# Patient Record
Sex: MALE | Race: WHITE | NOT HISPANIC OR LATINO | Employment: OTHER | URBAN - METROPOLITAN AREA
[De-identification: names, ages, dates, MRNs, and addresses within clinical notes are randomized per-mention and may not be internally consistent; named-entity substitution may affect disease eponyms.]

---

## 2017-07-07 ENCOUNTER — OFFICE VISIT (OUTPATIENT)
Dept: FAMILY MEDICINE CLINIC | Facility: CLINIC | Age: 67
End: 2017-07-07

## 2017-07-07 ENCOUNTER — HOSPITAL ENCOUNTER (OUTPATIENT)
Dept: GENERAL RADIOLOGY | Facility: HOSPITAL | Age: 67
Discharge: HOME | End: 2017-07-07
Admitting: INTERNAL MEDICINE

## 2017-07-07 VITALS
HEART RATE: 55 BPM | WEIGHT: 192.3 LBS | BODY MASS INDEX: 25.49 KG/M2 | TEMPERATURE: 98.2 F | SYSTOLIC BLOOD PRESSURE: 126 MMHG | RESPIRATION RATE: 16 BRPM | DIASTOLIC BLOOD PRESSURE: 78 MMHG | OXYGEN SATURATION: 95 % | HEIGHT: 73 IN

## 2017-07-07 DIAGNOSIS — J42 CHRONIC BRONCHITIS, UNSPECIFIED CHRONIC BRONCHITIS TYPE (HCC): Primary | ICD-10-CM

## 2017-07-07 PROCEDURE — 71020 HC CHEST PA AND LATERAL: CPT

## 2017-07-07 PROCEDURE — 99213 OFFICE O/P EST LOW 20 MIN: CPT | Performed by: INTERNAL MEDICINE

## 2017-07-07 RX ORDER — METHYLPREDNISOLONE 4 MG/1
TABLET ORAL
Qty: 21 TABLET | Refills: 0 | Status: SHIPPED | OUTPATIENT
Start: 2017-07-07 | End: 2017-08-23

## 2017-07-07 RX ORDER — PREDNISONE 10 MG/1
TABLET ORAL
COMMUNITY
Start: 2017-07-03 | End: 2017-08-23

## 2017-07-07 RX ORDER — CEFUROXIME AXETIL 500 MG/1
TABLET ORAL
COMMUNITY
Start: 2017-07-03 | End: 2017-08-23

## 2017-07-07 RX ORDER — AZITHROMYCIN 250 MG/1
TABLET, FILM COATED ORAL
Qty: 6 TABLET | Refills: 0 | Status: SHIPPED | OUTPATIENT
Start: 2017-07-07 | End: 2017-08-23

## 2017-07-07 NOTE — PROGRESS NOTES
"Subjective   Patient ID: Ronnell Rizvi is a 67 y.o. male presents with   Chief Complaint   Patient presents with   • Follow-up     on COPD       HPI -  This patient has a history of COPD.  He says he's been sick off and on for the last 2 months.  He went to a urgent care center.  Earlier this week and they gave him Ceftin and prednisone he's feeling better now.    Assessment plan    Finish Ceftin and prednisone start a nonsedating antihistamine and I'll send him in a Z-Manny and a Medrol pack to have on hand in case he gets sick again we will also get a chest x-ray.    Allergies   Allergen Reactions   • Aspirin    • Combivent [Ipratropium-Albuterol]        The following portions of the patient's history were reviewed and updated as appropriate: allergies, current medications, past family history, past medical history, past social history, past surgical history and problem list.      Review of Systems   Constitutional: Positive for fatigue. Negative for fever.   HENT: Negative.    Eyes: Negative.    Respiratory: Positive for cough. Negative for shortness of breath.    Cardiovascular: Negative.        Objective     Vitals:    07/07/17 0746   BP: 126/78   Pulse: 55   Resp: 16   Temp: 98.2 °F (36.8 °C)   TempSrc: Oral   SpO2: 95%   Weight: 192 lb 4.8 oz (87.2 kg)   Height: 73\" (185.4 cm)         Physical Exam   Constitutional: He is oriented to person, place, and time. He appears well-developed and well-nourished.   HENT:   Head: Normocephalic and atraumatic.   Eyes: EOM are normal. Pupils are equal, round, and reactive to light.   Cardiovascular: Normal rate, regular rhythm and normal heart sounds.    Pulmonary/Chest: Effort normal and breath sounds normal.   Neurological: He is alert and oriented to person, place, and time.   Psychiatric: He has a normal mood and affect. His behavior is normal.   Nursing note and vitals reviewed.        Ronnell was seen today for follow-up.    Diagnoses and all orders for this " visit:    Chronic bronchitis, unspecified chronic bronchitis type  -     XR Chest PA & Lateral    Other orders  -     azithromycin (ZITHROMAX Z-MIKAEL) 250 MG tablet; Take 2 tablets the first day, then 1 tablet daily for 4 days.  -     MethylPREDNISolone (MEDROL, MIKAEL,) 4 MG tablet; Take as directed on package instructions.        Call or return to clinic prn if these symptoms worsen or fail to improve as anticipated.

## 2017-08-11 ENCOUNTER — TELEPHONE (OUTPATIENT)
Dept: FAMILY MEDICINE CLINIC | Facility: CLINIC | Age: 67
End: 2017-08-11

## 2017-08-12 ENCOUNTER — APPOINTMENT (OUTPATIENT)
Dept: GENERAL RADIOLOGY | Facility: HOSPITAL | Age: 67
End: 2017-08-12

## 2017-08-12 ENCOUNTER — APPOINTMENT (OUTPATIENT)
Dept: CT IMAGING | Facility: HOSPITAL | Age: 67
End: 2017-08-12

## 2017-08-12 ENCOUNTER — HOSPITAL ENCOUNTER (EMERGENCY)
Facility: HOSPITAL | Age: 67
Discharge: HOME | End: 2017-08-12
Attending: EMERGENCY MEDICINE | Admitting: EMERGENCY MEDICINE

## 2017-08-12 VITALS
TEMPERATURE: 98.2 F | RESPIRATION RATE: 16 BRPM | WEIGHT: 185 LBS | DIASTOLIC BLOOD PRESSURE: 79 MMHG | BODY MASS INDEX: 24.52 KG/M2 | SYSTOLIC BLOOD PRESSURE: 150 MMHG | HEIGHT: 73 IN | HEART RATE: 58 BPM | OXYGEN SATURATION: 96 %

## 2017-08-12 DIAGNOSIS — R41.0 CONFUSION: Primary | ICD-10-CM

## 2017-08-12 LAB
ALBUMIN SERPL-MCNC: 3.6 G/DL (ref 3.5–5.2)
ALBUMIN/GLOB SERPL: 1.6 G/DL
ALP SERPL-CCNC: 48 U/L (ref 40–129)
ALT SERPL W P-5'-P-CCNC: 22 U/L (ref 5–41)
AMPHET+METHAMPHET UR QL: NEGATIVE
AMPHETAMINES UR QL: NEGATIVE
ANION GAP SERPL CALCULATED.3IONS-SCNC: 11.2 MMOL/L
AST SERPL-CCNC: 31 U/L (ref 5–40)
BARBITURATES UR QL SCN: NEGATIVE
BASOPHILS # BLD AUTO: 0.04 10*3/MM3 (ref 0–0.2)
BASOPHILS NFR BLD AUTO: 0.5 % (ref 0–2)
BENZODIAZ UR QL SCN: NEGATIVE
BILIRUB SERPL-MCNC: 0.4 MG/DL (ref 0.2–1.2)
BILIRUB UR QL STRIP: NEGATIVE
BUN BLD-MCNC: 20 MG/DL (ref 8–23)
BUN/CREAT SERPL: 18.3 (ref 7–25)
BUPRENORPHINE SERPL-MCNC: NEGATIVE NG/ML
CALCIUM SPEC-SCNC: 8.7 MG/DL (ref 8.8–10.5)
CANNABINOIDS SERPL QL: NEGATIVE
CHLORIDE SERPL-SCNC: 100 MMOL/L (ref 98–107)
CLARITY UR: CLEAR
CO2 SERPL-SCNC: 26.8 MMOL/L (ref 22–29)
COCAINE UR QL: NEGATIVE
COLOR UR: YELLOW
CREAT BLD-MCNC: 1.09 MG/DL (ref 0.76–1.27)
DEPRECATED RDW RBC AUTO: 41.1 FL (ref 37–54)
EOSINOPHIL # BLD AUTO: 0.02 10*3/MM3 (ref 0.1–0.3)
EOSINOPHIL NFR BLD AUTO: 0.3 % (ref 0–4)
ERYTHROCYTE [DISTWIDTH] IN BLOOD BY AUTOMATED COUNT: 12.5 % (ref 11.5–14.5)
GFR SERPL CREATININE-BSD FRML MDRD: 67 ML/MIN/1.73
GLOBULIN UR ELPH-MCNC: 2.3 GM/DL
GLUCOSE BLD-MCNC: 98 MG/DL (ref 65–99)
GLUCOSE UR STRIP-MCNC: NEGATIVE MG/DL
HCT VFR BLD AUTO: 41.8 % (ref 42–52)
HGB BLD-MCNC: 14.3 G/DL (ref 14–18)
HGB UR QL STRIP.AUTO: NEGATIVE
IMM GRANULOCYTES # BLD: 0.05 10*3/MM3 (ref 0–0.03)
IMM GRANULOCYTES NFR BLD: 0.6 % (ref 0–0.5)
KETONES UR QL STRIP: NEGATIVE
LEUKOCYTE ESTERASE UR QL STRIP.AUTO: NEGATIVE
LYMPHOCYTES # BLD AUTO: 2 10*3/MM3 (ref 0.6–4.8)
LYMPHOCYTES NFR BLD AUTO: 25.2 % (ref 20–45)
MCH RBC QN AUTO: 31.2 PG (ref 27–31)
MCHC RBC AUTO-ENTMCNC: 34.2 G/DL (ref 31–37)
MCV RBC AUTO: 91.3 FL (ref 80–94)
METHADONE UR QL SCN: NEGATIVE
MONOCYTES # BLD AUTO: 0.98 10*3/MM3 (ref 0–1)
MONOCYTES NFR BLD AUTO: 12.4 % (ref 3–8)
NEUTROPHILS # BLD AUTO: 4.84 10*3/MM3 (ref 1.5–8.3)
NEUTROPHILS NFR BLD AUTO: 61 % (ref 45–70)
NITRITE UR QL STRIP: NEGATIVE
NRBC BLD MANUAL-RTO: 0 /100 WBC (ref 0–0)
OPIATES UR QL: NEGATIVE
OXYCODONE UR QL SCN: NEGATIVE
PCP UR QL SCN: NEGATIVE
PH UR STRIP.AUTO: 6 [PH] (ref 4.5–8)
PLATELET # BLD AUTO: 207 10*3/MM3 (ref 140–500)
PMV BLD AUTO: 10.4 FL (ref 7.4–10.4)
POTASSIUM BLD-SCNC: 3.6 MMOL/L (ref 3.5–5.2)
PROPOXYPH UR QL: NEGATIVE
PROT SERPL-MCNC: 5.9 G/DL (ref 6–8.5)
PROT UR QL STRIP: NEGATIVE
RBC # BLD AUTO: 4.58 10*6/MM3 (ref 4.7–6.1)
SODIUM BLD-SCNC: 138 MMOL/L (ref 136–145)
SP GR UR STRIP: 1.02 (ref 1–1.03)
TRICYCLICS UR QL SCN: NEGATIVE
UROBILINOGEN UR QL STRIP: NORMAL
WBC NRBC COR # BLD: 7.93 10*3/MM3 (ref 4.8–10.8)

## 2017-08-12 PROCEDURE — 71020 HC CHEST PA AND LATERAL: CPT

## 2017-08-12 PROCEDURE — 80306 DRUG TEST PRSMV INSTRMNT: CPT | Performed by: EMERGENCY MEDICINE

## 2017-08-12 PROCEDURE — 70450 CT HEAD/BRAIN W/O DYE: CPT

## 2017-08-12 PROCEDURE — 93005 ELECTROCARDIOGRAM TRACING: CPT | Performed by: EMERGENCY MEDICINE

## 2017-08-12 PROCEDURE — 81003 URINALYSIS AUTO W/O SCOPE: CPT | Performed by: EMERGENCY MEDICINE

## 2017-08-12 PROCEDURE — 99284 EMERGENCY DEPT VISIT MOD MDM: CPT | Performed by: EMERGENCY MEDICINE

## 2017-08-12 PROCEDURE — 80053 COMPREHEN METABOLIC PANEL: CPT | Performed by: EMERGENCY MEDICINE

## 2017-08-12 PROCEDURE — 93010 ELECTROCARDIOGRAM REPORT: CPT | Performed by: INTERNAL MEDICINE

## 2017-08-12 PROCEDURE — 99283 EMERGENCY DEPT VISIT LOW MDM: CPT

## 2017-08-12 PROCEDURE — 85025 COMPLETE CBC W/AUTO DIFF WBC: CPT | Performed by: EMERGENCY MEDICINE

## 2017-08-12 NOTE — DISCHARGE INSTRUCTIONS
Follow-up with Dr. Joy as discussed.  Return to ED for medical emergencies.    Hypertension  Hypertension, commonly called high blood pressure, is when the force of blood pumping through your arteries is too strong. Your arteries are the blood vessels that carry blood from your heart throughout your body. A blood pressure reading consists of a higher number over a lower number, such as 110/72. The higher number (systolic) is the pressure inside your arteries when your heart pumps. The lower number (diastolic) is the pressure inside your arteries when your heart relaxes. Ideally you want your blood pressure below 120/80.  Hypertension forces your heart to work harder to pump blood. Your arteries may become narrow or stiff. Having untreated or uncontrolled hypertension can cause heart attack, stroke, kidney disease, and other problems.  RISK FACTORS  Some risk factors for high blood pressure are controllable. Others are not.   Risk factors you cannot control include:   · Race. You may be at higher risk if you are .  · Age. Risk increases with age.  · Gender. Men are at higher risk than women before age 45 years. After age 65, women are at higher risk than men.  Risk factors you can control include:  · Not getting enough exercise or physical activity.  · Being overweight.  · Getting too much fat, sugar, calories, or salt in your diet.  · Drinking too much alcohol.  SIGNS AND SYMPTOMS  Hypertension does not usually cause signs or symptoms. Extremely high blood pressure (hypertensive crisis) may cause headache, anxiety, shortness of breath, and nosebleed.  DIAGNOSIS  To check if you have hypertension, your health care provider will measure your blood pressure while you are seated, with your arm held at the level of your heart. It should be measured at least twice using the same arm. Certain conditions can cause a difference in blood pressure between your right and left arms. A blood pressure reading  that is higher than normal on one occasion does not mean that you need treatment. If it is not clear whether you have high blood pressure, you may be asked to return on a different day to have your blood pressure checked again. Or, you may be asked to monitor your blood pressure at home for 1 or more weeks.  TREATMENT  Treating high blood pressure includes making lifestyle changes and possibly taking medicine. Living a healthy lifestyle can help lower high blood pressure. You may need to change some of your habits.  Lifestyle changes may include:  · Following the DASH diet. This diet is high in fruits, vegetables, and whole grains. It is low in salt, red meat, and added sugars.  · Keep your sodium intake below 2,300 mg per day.  · Getting at least 30-45 minutes of aerobic exercise at least 4 times per week.  · Losing weight if necessary.  · Not smoking.  · Limiting alcoholic beverages.  · Learning ways to reduce stress.  Your health care provider may prescribe medicine if lifestyle changes are not enough to get your blood pressure under control, and if one of the following is true:  · You are 18-59 years of age and your systolic blood pressure is above 140.  · You are 60 years of age or older, and your systolic blood pressure is above 150.  · Your diastolic blood pressure is above 90.  · You have diabetes, and your systolic blood pressure is over 140 or your diastolic blood pressure is over 90.  · You have kidney disease and your blood pressure is above 140/90.  · You have heart disease and your blood pressure is above 140/90.  Your personal target blood pressure may vary depending on your medical conditions, your age, and other factors.  HOME CARE INSTRUCTIONS  · Have your blood pressure rechecked as directed by your health care provider.    · Take medicines only as directed by your health care provider. Follow the directions carefully. Blood pressure medicines must be taken as prescribed. The medicine does not  work as well when you skip doses. Skipping doses also puts you at risk for problems.  · Do not smoke.    · Monitor your blood pressure at home as directed by your health care provider.   SEEK MEDICAL CARE IF:   · You think you are having a reaction to medicines taken.  · You have recurrent headaches or feel dizzy.  · You have swelling in your ankles.  · You have trouble with your vision.  SEEK IMMEDIATE MEDICAL CARE IF:  · You develop a severe headache or confusion.  · You have unusual weakness, numbness, or feel faint.  · You have severe chest or abdominal pain.  · You vomit repeatedly.  · You have trouble breathing.  MAKE SURE YOU:   · Understand these instructions.  · Will watch your condition.  · Will get help right away if you are not doing well or get worse.     This information is not intended to replace advice given to you by your health care provider. Make sure you discuss any questions you have with your health care provider.     Document Released: 12/18/2006 Document Revised: 05/03/2016 Document Reviewed: 10/10/2014  [employer] Patient Education ©2017 [employer]

## 2017-08-12 NOTE — ED PROVIDER NOTES
Subjective   Patient is a 67 y.o. male presenting with altered mental status.   History provided by:  Spouse  History limited by: patient slept through why she attempts to awaken him.  He can provide no information during this.  Altered Mental Status   Presenting symptoms: confusion    Severity:  Mild (wife reports patient had difficulty using TV remote 2 days ago.  Just today a restaurant patient did not recall  name despite knowing this in the past.)  Most recent episode:  Yesterday  Progression:  Resolved  Chronicity:  New  Context: not alcohol use, not dementia, not drug use, not head injury, not nursing home resident and not recent change in medication    Context comment:  As described below.  Associated symptoms: nausea and vomiting    Associated symptoms: no abdominal pain, normal movement, no agitation, no bladder incontinence, no decreased appetite, no depression, no eye deviation, no fever, no hallucinations, no headaches, no light-headedness, no palpitations, no rash, no seizures, no slurred speech, no visual change and no weakness      HPI Narrative:Mr. Rizvi is a 68 yo WM who presents secondary to confusion.  Wife is main historian.  She reports early Thursday morning patient awaken her from sleep due to his restlessness.  Patient states she is a very heavy sleeper.  After patient awaken her with his restlessness she in turn attempted to wake up him.  Despite multiple She was unsuccessful.  She reports patient was breathing abnormally.  When patient awoke Thursday morning he was confused and ill feeling.  He had several episodes of emesis.  This resolved over the course of the day.  Yesterday patient continued to have confusion although less significant than the day before.  Patient is asymptomatic today.  Wife called to Dr. Joy but unable to arrange an appointment until later in the week.    Discharge recommend patient come to ER for evaluation.  Patient has no complaint at this  time.        Review of Systems   Constitutional: Negative for chills, decreased appetite, diaphoresis and fever.   HENT: Negative for congestion, ear pain and sore throat.    Eyes: Negative for pain and discharge.   Respiratory: Negative for chest tightness, shortness of breath, wheezing and stridor.    Cardiovascular: Negative for chest pain, palpitations and leg swelling.   Gastrointestinal: Positive for nausea and vomiting. Negative for abdominal pain and diarrhea.   Genitourinary: Negative for bladder incontinence, dysuria, flank pain, frequency and hematuria.   Musculoskeletal: Negative for back pain, myalgias, neck pain and neck stiffness.   Skin: Negative for color change, pallor and rash.   Neurological: Negative for dizziness, seizures, syncope, weakness, light-headedness and headaches.   Psychiatric/Behavioral: Positive for confusion. Negative for agitation and hallucinations. The patient is not nervous/anxious.    All other systems reviewed and are negative.      Past Medical History:   Diagnosis Date   • Allergic rhinitis    • Anal fissure    • Asthma    • B12 deficiency    • Chronic bronchitis    • Dysarthria    • Emphysema lung    • Fatty liver    • GERD (gastroesophageal reflux disease)    • Heart murmur     possible MVP in past documentation   • Hepatitis     thought to be Hepatitis A    • History of pneumonia     With left lower lobe atelectasis and scar tissue, being followed   • Pneumonia     LLL with scar tissue   • Spinal stenosis        Allergies   Allergen Reactions   • Aspirin    • Combivent [Ipratropium-Albuterol]        Past Surgical History:   Procedure Laterality Date   • COLONOSCOPY     • HAND SURGERY Bilateral    • OTHER SURGICAL HISTORY      inguinal hernia repair for person over age 5   • TONSILLECTOMY         Family History   Problem Relation Age of Onset   • Obesity Mother    • Clotting disorder Mother      Upper extremities   • Alcohol abuse Father    • Cancer Father 63      Esophagus and lung   • Other Father      cardiac disorder   • Kidney disease Sister    • Kidney failure Sister    • Drug abuse Paternal Uncle    • Stroke Sister    • Throat cancer Sister        Social History     Social History   • Marital status:      Spouse name: Joey   • Number of children: N/A   • Years of education: N/A     Occupational History   •  Retired     Social History Main Topics   • Smoking status: Former Smoker   • Smokeless tobacco: Never Used   • Alcohol use No   • Drug use: No   • Sexual activity: Defer     Other Topics Concern   • None     Social History Narrative   • None           Objective   Physical Exam   Constitutional: He is oriented to person, place, and time. He appears well-developed and well-nourished. No distress.   66 yo WM lying in bed. He appears in good overall health. Accompanied by his wife.    HENT:   Head: Normocephalic and atraumatic.   Right Ear: External ear normal.   Left Ear: External ear normal.   Nose: Nose normal.   Mouth/Throat: Oropharynx is clear and moist.   Eyes: Conjunctivae and EOM are normal. Pupils are equal, round, and reactive to light.   Neck: Normal range of motion. Neck supple.   Cardiovascular: Normal rate, regular rhythm, normal heart sounds and intact distal pulses.  Exam reveals no gallop and no friction rub.    No murmur heard.  Pulmonary/Chest: Effort normal and breath sounds normal. No stridor. No respiratory distress. He has no wheezes. He has no rales.   Abdominal: Soft. He exhibits no distension. There is no tenderness.   Musculoskeletal: Normal range of motion. He exhibits no edema.   Neurological: He is alert and oriented to person, place, and time. No cranial nerve deficit.   Skin: Skin is warm and dry. No rash noted. He is not diaphoretic. No erythema.   Psychiatric: He has a normal mood and affect. His behavior is normal.   Nursing note and vitals reviewed.      ECG 12 Lead    Date/Time: 8/12/2017 11:52 AM  Performed by:  MOSES HANSON  Authorized by: MOSES HANSON   Interpreted by physician  Comparison: compared with previous ECG from 10/1/2016  Similar to previous ECG  Rhythm: sinus rhythm  Rate: normal  QRS axis: left  Conduction: conduction normal  ST Segments: ST segments normal  T flattening: III, aVF and aVL  Other: no other findings  Clinical impression: non-specific ECG               ED Course  ED Course   Comment By Time   08/12/17  11:41 AM  Patient's symptoms haven't resolved.  However wife called PMD - Dr. Joy.  He recommended patient come into the ER for evaluation secondary to confusion/altered mental status. Moses Hanson MD 08/12 1141   08/12/17  1:48 PM  patient's workup was unremarkable.  I find no source for patient's confusion.   Based on Elise description I suspect patient has sleep apnea.  Encouraged patient to follow-up with Dr. Joy to arrange sleep study.  Will DC home. Moses Hanson MD 08/12 1349      Labs Reviewed   COMPREHENSIVE METABOLIC PANEL - Abnormal; Notable for the following:        Result Value    Calcium 8.7 (*)     Total Protein 5.9 (*)     All other components within normal limits   CBC WITH AUTO DIFFERENTIAL - Abnormal; Notable for the following:     RBC 4.58 (*)     Hematocrit 41.8 (*)     MCH 31.2 (*)     Monocyte % 12.4 (*)     Immature Grans % 0.6 (*)     Eosinophils, Absolute 0.02 (*)     Immature Grans, Absolute 0.05 (*)     All other components within normal limits   URINALYSIS W/ CULTURE IF INDICATED - Normal    Narrative:     Urine microscopic not indicated.   URINE DRUG SCREEN - Normal    Narrative:     Urine drug screen results are to be used for medical purposes only.  They are not to be used for legal purposes such as employment testing.  Negative results do not necessarily mean the complete absence of a subtance, but rather that the result is less than the cutoff for that substance.  Positive results are unconfirmed and considered Preliminary Positive.   [facility] does not automatically confirm Postitive Unconfirmed results.  The physician may request (order) an Unconfirmed Positive result to be sent out for confirmation.      Negative Thresholds for Drugs Screened:    THC screen, urine                          50 ng/ml  Phenycyclidine (PCP), urine                25 ng/ml  Cocaine screen, urine                     150 ng/ml  Methamphetamine, urine                    500 ng/ml  Opiate screen, urine                      100 ng/ml  Amphetamine screen, urine                 500 ng/ml  Benzodiazepine screen, urine              150 ng/ml  Tricyclic Antidepressants screen, urine   300 ng/ml  Methadone screen, urine                   200 ng/ml  Barbiturates screen, urine                200 ng/ml  Oxycodone screen, urine                   100 ng/ml  Propoxyphene screen, urine                300 ng/ml  Buprenorphine screen, urine                10 ng/ml   CBC AND DIFFERENTIAL    Narrative:     The following orders were created for panel order CBC & Differential.  Procedure                               Abnormality         Status                     ---------                               -----------         ------                     CBC Auto Differential[[id number]]         Abnormal            Final result                 Please view results for these tests on the individual orders.     My differential diagnosis for altered mental status includes but is not limited to:  Hypoglycemia, hyperglycemia, DKA, overdose, ethanol intoxication, thiamine deficiency, niacin deficiency, hypothymia, hyperviscosity, Chucho’s disease, hyponatremia, hypernatremia, liver failure, kidney failure, hyper or hypothyroid, no insufficiency, hypoxia, hypercarbia, carbon monoxide poisoning, postanoxic encephalopathy, ischemic stroke, intracranial bleed, subarachnoid hemorrhage, brain tumor, closed head injury, epidural hematoma, epidural hematoma, seizure activity, postictal state,  syncopal episode, disseminated encephalomyelitis, central pontine myelinolysis, post cardiac arrest, bacterial meningitis, viral meningitis, fungal meningitis, encephalitis, brain abscess, subdural empyema, hysteria, catatonic state, malingering, hypertensive encephalopathy, vasculitis, TTP, DIC            MDM  Number of Diagnoses or Management Options  Confusion: new and requires workup     Amount and/or Complexity of Data Reviewed  Clinical lab tests: reviewed and ordered  Tests in the radiology section of CPT®: reviewed and ordered  Tests in the medicine section of CPT®: reviewed and ordered  Independent visualization of images, tracings, or specimens: yes    Risk of Complications, Morbidity, and/or Mortality  Presenting problems: moderate  Diagnostic procedures: moderate  Management options: moderate    Patient Progress  Patient progress: stable      Final diagnoses:   Confusion            Sid Barajas MD  08/12/17 7689

## 2017-08-23 ENCOUNTER — OFFICE VISIT (OUTPATIENT)
Dept: FAMILY MEDICINE CLINIC | Facility: CLINIC | Age: 67
End: 2017-08-23

## 2017-08-23 VITALS
WEIGHT: 194.1 LBS | SYSTOLIC BLOOD PRESSURE: 130 MMHG | HEART RATE: 63 BPM | DIASTOLIC BLOOD PRESSURE: 82 MMHG | OXYGEN SATURATION: 96 % | TEMPERATURE: 98 F | HEIGHT: 73 IN | BODY MASS INDEX: 25.72 KG/M2 | RESPIRATION RATE: 16 BRPM

## 2017-08-23 DIAGNOSIS — G47.33 OBSTRUCTIVE SLEEP APNEA SYNDROME: Primary | ICD-10-CM

## 2017-08-23 PROCEDURE — 99213 OFFICE O/P EST LOW 20 MIN: CPT | Performed by: INTERNAL MEDICINE

## 2017-08-23 NOTE — PROGRESS NOTES
"Subjective   Patient ID: Ronnell Rizvi is a 67 y.o. male presents with   Chief Complaint   Patient presents with   • Follow-up     from [facility] er       HPI - This is an ER follow-up patient was unarousable at home his wife took a video of him and he looks like he's having severe sleep apnea.  On top of that he does have chronic lung disease.  I suspect he has sleep apnea and is having pretty significant hypoxia with it.  His workup at the ER was negative.  He's feeling better now.    Assessment plan    Likely obstructive sleep apnea and a history of chronic bronchitis-appointment with sleep study.  In the meantime patient is to try to sleep on his side.    Allergies   Allergen Reactions   • Aspirin    • Combivent [Ipratropium-Albuterol]        The following portions of the patient's history were reviewed and updated as appropriate: allergies, current medications, past family history, past medical history, past social history, past surgical history and problem list.      Review of Systems   Constitutional: Positive for fatigue.   HENT: Negative.    Respiratory: Negative.    Cardiovascular: Negative.    Psychiatric/Behavioral: Positive for confusion.       Objective     Vitals:    08/23/17 0917   BP: 130/82   Pulse: 63   Resp: 16   Temp: 98 °F (36.7 °C)   TempSrc: Oral   SpO2: 96%   Weight: 194 lb 1.6 oz (88 kg)   Height: 73\" (185.4 cm)         Physical Exam   Constitutional: He is oriented to person, place, and time. He appears well-developed and well-nourished.   HENT:   Head: Normocephalic and atraumatic.   Cardiovascular: Normal rate, regular rhythm and normal heart sounds.    Pulmonary/Chest: Effort normal and breath sounds normal.   Neurological: He is alert and oriented to person, place, and time.   Psychiatric: He has a normal mood and affect. His behavior is normal.   Nursing note and vitals reviewed.        Ronnell was seen today for follow-up.    Diagnoses and all orders for this visit:    Obstructive sleep " apnea syndrome  -     Ambulatory Referral to Sleep Medicine        Call or return to clinic prn if these symptoms worsen or fail to improve as anticipated.

## 2017-08-31 ENCOUNTER — TELEPHONE (OUTPATIENT)
Dept: FAMILY MEDICINE CLINIC | Facility: CLINIC | Age: 67
End: 2017-08-31

## 2017-09-26 ENCOUNTER — HOSPITAL ENCOUNTER (OUTPATIENT)
Dept: SLEEP MEDICINE | Facility: HOSPITAL | Age: 67
Discharge: HOME | End: 2017-09-26
Admitting: INTERNAL MEDICINE

## 2017-09-26 DIAGNOSIS — G47.33 OSA (OBSTRUCTIVE SLEEP APNEA): Primary | ICD-10-CM

## 2017-09-26 DIAGNOSIS — G47.33 OBSTRUCTIVE SLEEP APNEA SYNDROME: ICD-10-CM

## 2017-09-26 DIAGNOSIS — R06.83 SNORING: ICD-10-CM

## 2017-09-26 PROCEDURE — G0463 HOSPITAL OUTPT CLINIC VISIT: HCPCS

## 2017-09-26 NOTE — CONSULTS
[facility]      Ronnell Rizvi  67 y.o.  male  1950    PCP:Jong Joy MD    Type of service: Initial consult/Visit    Chief complaint: Snoring,      History of present illness;  This is a 67 y.o. male being referred for evaluation of sleep apnea.  The patient reports symptoms of snoring, daytime excessive sleepiness and fatigue.  In addition patient also gives a history of waking up choking and gasping for breath.  His wife as recorded in snore and having apneic spells.  These recordings shows that he has sleep apnea.  Normally goes to bed around midnight  and wakes up around 6 a.m, still feels not rested well and tired.       Past Medical History:   Diagnosis Date   • Allergic rhinitis    • Anal fissure    • Asthma    • B12 deficiency    • Chronic bronchitis    • Dysarthria    • Emphysema lung    • Fatty liver    • GERD (gastroesophageal reflux disease)    • Heart murmur     possible MVP in past documentation   • Hepatitis     thought to be Hepatitis A    • History of pneumonia     With left lower lobe atelectasis and scar tissue, being followed   • Pneumonia     LLL with scar tissue   • Spinal stenosis      no  Medications:    Current Outpatient Prescriptions:   •  Cyanocobalamin (B-12) 1000 MCG/ML kit, Inject 1,000 mcg as directed Every 30 (Thirty) Days. Pt. Stated he takes this medication weekly 250 mcg , Disp: , Rfl:     Social history:  Shift work: no  Tobacco use: quit 1979  Alcohol use: no  Caffeinated drinks: 4 soda  Over-the-counter sleeping aid: no  Narcotic medications: no    Review of systems:  [place] Sleepiness Scale: 10  Positive for snoring, witnessed apneas, fatigue and daytime excessive sleepiness,   Negative for shortness of breath, cough, wheezing, chest pain, nausea and vomiting,    Physical exam:  Weight: 192,lbs   BMI: 25  Neck circumference: 16 inches  BP: 150/80    PHYSICAL EXAMINATION:  HEENT: Head is atraumatic, pupils are round equal and reacting to  light,  no nasal septal defects or deviation and the nasal passages are clear, tonsils are not enlarged, oral airway Mallampati class II  NECK: No lymphadenopathy, trachea is in the midline  RESPIRATORY SYSTEM: Breath sounds are equal on both sides, there are no wheezes or crackles  CARDIOVASULAR SYSTEM: Heart sounds are regular and normal, there are no murmurs or thrills  ABDOMEN: Soft, no hepatosplenomegaly, no evidence of ascites  EXTREMITES: No cyanosis, clubbing or edema   NEUROLOGICAL SYSTEM: Oriented x 3, no gross neurological defects    Assessment and plan:  · Obstructive sleep apnea:  I strongly suspect the patient has sleep apnea as suggested by the symptoms and physical examination.  I have talked to the patient about the signs and symptoms of sleep apnea and consequences of untreated sleep apnea.  I'm going to order a home sleep test.  Will have a follow-up after this sleep test is done  · Snoring  · History of COPD  · Ringing in the ears      Lora Block MD, [facility]  Pulmonary, Critical Care and sleep Medicine

## 2017-10-10 ENCOUNTER — HOSPITAL ENCOUNTER (OUTPATIENT)
Facility: HOSPITAL | Age: 67
Setting detail: OBSERVATION
LOS: 2 days | Discharge: HOME | End: 2017-10-12
Attending: EMERGENCY MEDICINE | Admitting: HOSPITALIST

## 2017-10-10 ENCOUNTER — APPOINTMENT (OUTPATIENT)
Dept: CT IMAGING | Facility: HOSPITAL | Age: 67
End: 2017-10-10

## 2017-10-10 ENCOUNTER — APPOINTMENT (OUTPATIENT)
Dept: GENERAL RADIOLOGY | Facility: HOSPITAL | Age: 67
End: 2017-10-10

## 2017-10-10 ENCOUNTER — TELEPHONE (OUTPATIENT)
Dept: FAMILY MEDICINE CLINIC | Facility: CLINIC | Age: 67
End: 2017-10-10

## 2017-10-10 DIAGNOSIS — I63.9 CEREBROVASCULAR ACCIDENT (CVA), UNSPECIFIED MECHANISM (HCC): Primary | ICD-10-CM

## 2017-10-10 DIAGNOSIS — R47.01 APHASIA: ICD-10-CM

## 2017-10-10 DIAGNOSIS — R48.0 ALEXIA: ICD-10-CM

## 2017-10-10 LAB
ALBUMIN SERPL-MCNC: 4 G/DL (ref 3.5–5.2)
ALBUMIN/GLOB SERPL: 1.4 G/DL
ALP SERPL-CCNC: 57 U/L (ref 40–129)
ALT SERPL W P-5'-P-CCNC: 24 U/L (ref 5–41)
ANION GAP SERPL CALCULATED.3IONS-SCNC: 10.8 MMOL/L
APTT PPP: 25.1 SECONDS (ref 24.3–38.1)
AST SERPL-CCNC: 22 U/L (ref 5–40)
BASOPHILS # BLD AUTO: 0.03 10*3/MM3 (ref 0–0.2)
BASOPHILS NFR BLD AUTO: 0.5 % (ref 0–2)
BILIRUB SERPL-MCNC: 0.4 MG/DL (ref 0.2–1.2)
BUN BLD-MCNC: 18 MG/DL (ref 8–23)
BUN/CREAT SERPL: 15.8 (ref 7–25)
CALCIUM SPEC-SCNC: 9.1 MG/DL (ref 8.8–10.5)
CHLORIDE SERPL-SCNC: 100 MMOL/L (ref 98–107)
CO2 SERPL-SCNC: 27.2 MMOL/L (ref 22–29)
CREAT BLD-MCNC: 1.14 MG/DL (ref 0.76–1.27)
DEPRECATED RDW RBC AUTO: 42.9 FL (ref 37–54)
EOSINOPHIL # BLD AUTO: 0.1 10*3/MM3 (ref 0.1–0.3)
EOSINOPHIL NFR BLD AUTO: 1.7 % (ref 0–4)
ERYTHROCYTE [DISTWIDTH] IN BLOOD BY AUTOMATED COUNT: 12.4 % (ref 11.5–14.5)
GFR SERPL CREATININE-BSD FRML MDRD: 64 ML/MIN/1.73
GLOBULIN UR ELPH-MCNC: 2.9 GM/DL
GLUCOSE BLD-MCNC: 75 MG/DL (ref 65–99)
GLUCOSE BLDC GLUCOMTR-MCNC: 96 MG/DL (ref 70–130)
HCT VFR BLD AUTO: 46.3 % (ref 42–52)
HGB BLD-MCNC: 15.6 G/DL (ref 14–18)
IMM GRANULOCYTES # BLD: 0.02 10*3/MM3 (ref 0–0.03)
IMM GRANULOCYTES NFR BLD: 0.3 % (ref 0–0.5)
INR PPP: 0.97 (ref 0.9–1.1)
LYMPHOCYTES # BLD AUTO: 1.59 10*3/MM3 (ref 0.6–4.8)
LYMPHOCYTES NFR BLD AUTO: 26.7 % (ref 20–45)
MCH RBC QN AUTO: 31.6 PG (ref 27–31)
MCHC RBC AUTO-ENTMCNC: 33.7 G/DL (ref 31–37)
MCV RBC AUTO: 93.7 FL (ref 80–94)
MONOCYTES # BLD AUTO: 0.62 10*3/MM3 (ref 0–1)
MONOCYTES NFR BLD AUTO: 10.4 % (ref 3–8)
NEUTROPHILS # BLD AUTO: 3.6 10*3/MM3 (ref 1.5–8.3)
NEUTROPHILS NFR BLD AUTO: 60.4 % (ref 45–70)
NRBC BLD MANUAL-RTO: 0 /100 WBC (ref 0–0)
PLATELET # BLD AUTO: 217 10*3/MM3 (ref 140–500)
PMV BLD AUTO: 10.1 FL (ref 7.4–10.4)
POTASSIUM BLD-SCNC: 3.9 MMOL/L (ref 3.5–5.2)
PROT SERPL-MCNC: 6.9 G/DL (ref 6–8.5)
PROTHROMBIN TIME: 12.9 SECONDS (ref 12.1–15)
RBC # BLD AUTO: 4.94 10*6/MM3 (ref 4.7–6.1)
SODIUM BLD-SCNC: 138 MMOL/L (ref 136–145)
WBC NRBC COR # BLD: 5.96 10*3/MM3 (ref 4.8–10.8)

## 2017-10-10 PROCEDURE — 99284 EMERGENCY DEPT VISIT MOD MDM: CPT

## 2017-10-10 PROCEDURE — 25010000002 ENOXAPARIN PER 10 MG: Performed by: EMERGENCY MEDICINE

## 2017-10-10 PROCEDURE — 82962 GLUCOSE BLOOD TEST: CPT

## 2017-10-10 PROCEDURE — 85730 THROMBOPLASTIN TIME PARTIAL: CPT | Performed by: EMERGENCY MEDICINE

## 2017-10-10 PROCEDURE — 70450 CT HEAD/BRAIN W/O DYE: CPT

## 2017-10-10 PROCEDURE — 71010 HC CHEST PA OR AP: CPT

## 2017-10-10 PROCEDURE — 93005 ELECTROCARDIOGRAM TRACING: CPT | Performed by: EMERGENCY MEDICINE

## 2017-10-10 PROCEDURE — 85025 COMPLETE CBC W/AUTO DIFF WBC: CPT | Performed by: EMERGENCY MEDICINE

## 2017-10-10 PROCEDURE — 80053 COMPREHEN METABOLIC PANEL: CPT | Performed by: EMERGENCY MEDICINE

## 2017-10-10 PROCEDURE — 94799 UNLISTED PULMONARY SVC/PX: CPT

## 2017-10-10 PROCEDURE — 99285 EMERGENCY DEPT VISIT HI MDM: CPT | Performed by: EMERGENCY MEDICINE

## 2017-10-10 PROCEDURE — 93010 ELECTROCARDIOGRAM REPORT: CPT | Performed by: INTERNAL MEDICINE

## 2017-10-10 PROCEDURE — 85610 PROTHROMBIN TIME: CPT | Performed by: EMERGENCY MEDICINE

## 2017-10-10 RX ORDER — GUAIFENESIN 600 MG/1
1200 TABLET, EXTENDED RELEASE ORAL 2 TIMES DAILY PRN
Status: DISCONTINUED | OUTPATIENT
Start: 2017-10-10 | End: 2017-10-12 | Stop reason: HOSPADM

## 2017-10-10 RX ORDER — ACETAMINOPHEN 650 MG/1
650 SUPPOSITORY RECTAL EVERY 4 HOURS PRN
Status: DISCONTINUED | OUTPATIENT
Start: 2017-10-10 | End: 2017-10-12 | Stop reason: HOSPADM

## 2017-10-10 RX ORDER — ATORVASTATIN CALCIUM 40 MG/1
80 TABLET, FILM COATED ORAL NIGHTLY
Status: DISCONTINUED | OUTPATIENT
Start: 2017-10-10 | End: 2017-10-12

## 2017-10-10 RX ORDER — ACETAMINOPHEN 325 MG/1
650 TABLET ORAL EVERY 4 HOURS PRN
Status: DISCONTINUED | OUTPATIENT
Start: 2017-10-10 | End: 2017-10-12 | Stop reason: HOSPADM

## 2017-10-10 RX ORDER — SODIUM CHLORIDE 0.9 % (FLUSH) 0.9 %
1-10 SYRINGE (ML) INJECTION AS NEEDED
Status: DISCONTINUED | OUTPATIENT
Start: 2017-10-10 | End: 2017-10-12 | Stop reason: HOSPADM

## 2017-10-10 RX ORDER — GUAIFENESIN 600 MG/1
1200 TABLET, EXTENDED RELEASE ORAL 2 TIMES DAILY PRN
COMMUNITY
End: 2018-11-29 | Stop reason: ALTCHOICE

## 2017-10-10 RX ORDER — CLOPIDOGREL BISULFATE 75 MG/1
75 TABLET ORAL ONCE
Status: COMPLETED | OUTPATIENT
Start: 2017-10-10 | End: 2017-10-10

## 2017-10-10 RX ORDER — ATORVASTATIN CALCIUM 20 MG/1
TABLET, FILM COATED ORAL
Status: DISPENSED
Start: 2017-10-10 | End: 2017-10-11

## 2017-10-10 RX ORDER — CLOPIDOGREL BISULFATE 75 MG/1
75 TABLET ORAL DAILY
Status: DISCONTINUED | OUTPATIENT
Start: 2017-10-10 | End: 2017-10-12 | Stop reason: HOSPADM

## 2017-10-10 RX ORDER — SODIUM CHLORIDE 9 MG/ML
40 INJECTION, SOLUTION INTRAVENOUS AS NEEDED
Status: DISCONTINUED | OUTPATIENT
Start: 2017-10-10 | End: 2017-10-12 | Stop reason: HOSPADM

## 2017-10-10 RX ADMIN — ATORVASTATIN CALCIUM 80 MG: 20 TABLET, FILM COATED ORAL at 21:29

## 2017-10-10 RX ADMIN — CLOPIDOGREL BISULFATE 75 MG: 75 TABLET ORAL at 17:09

## 2017-10-10 RX ADMIN — ENOXAPARIN SODIUM 40 MG: 40 INJECTION SUBCUTANEOUS at 17:09

## 2017-10-10 NOTE — ED PROVIDER NOTES
Subjective   History of Present Illness  History of Present Illness    Chief complaint: Unable to read    Location: Home    Quality/Severity:  Moderate    Timing/Onset/Duration: Acute onset at midnight    Modifying Factors: Nothing seems to make it worse, patient states it's gotten a little bit better    Associated Symptoms: Patient complains of a mild headache above his left eye.  This was present until just prior to arrival at the emergency department.  The patient denies any double vision or blurred vision.  No slurred speech or difficulty swallowing or speaking.  No chest pain or shortness of breath.  No fever or chills.  No abdominal pain.  No diarrhea or burning when he urinates.  No numbness, tingling, weakness, or change in bladder or bowel function. Patient states that he has difficulty naming objects.  The patient has a chronic cough related to COPD.  The cough is no worse than usual.  There is no sore throat earache or nasal congestion.    Narrative: This 67-year-old white male presents to the emergency department after being seen at Dr. Upton's office who is an ophthalmologist at [facility].  The patient states at midnight he could not read.  He experienced a headache above the left eye.  Like his right eye was making a jerking movement.  The patient's wife gave the patient has been a Tylenol.  The patient's symptoms did not resolve and he saw Dr. Arriola.  Dr. Negron evaluated the eye and stated that there was no ophthalmologic pathology.  The patient: Primary care provider who sent him here for further evaluation.  The patient states that the headache was 4/10 above the left eye.  He has difficulty naming objects.  He has no other complaints.    PCP:  Rufino    Review of Systems   Constitutional: Negative for chills and fever.   HENT: Negative for ear pain and sore throat.    Eyes: Negative for discharge and redness.   Respiratory: Positive for cough. Negative for chest tightness and shortness of breath.     Cardiovascular: Negative for chest pain, palpitations and leg swelling.   Gastrointestinal: Negative for abdominal pain, blood in stool, constipation, diarrhea, nausea and vomiting.   Genitourinary: Negative for decreased urine volume, dysuria and urgency.   Musculoskeletal: Negative for arthralgias, back pain, neck pain and neck stiffness.   Skin: Negative for rash.   Neurological: Positive for headaches. Negative for dizziness, speech difficulty, weakness, light-headedness and numbness.   Hematological: Negative for adenopathy.   Psychiatric/Behavioral: Negative.  Negative for agitation and confusion.        Medication List      ASK your doctor about these medications          B-12 1000 MCG/ML kit           Past Medical History:   Diagnosis Date   • Allergic rhinitis    • Anal fissure    • Asthma    • B12 deficiency    • Chronic bronchitis    • Dysarthria    • Emphysema lung    • Fatty liver    • GERD (gastroesophageal reflux disease)    • Heart murmur     possible MVP in past documentation   • Hepatitis     thought to be Hepatitis A    • History of pneumonia     With left lower lobe atelectasis and scar tissue, being followed   • Pneumonia     LLL with scar tissue   • Spinal stenosis        Allergies   Allergen Reactions   • Aspirin    • Combivent [Ipratropium-Albuterol]        Past Surgical History:   Procedure Laterality Date   • COLONOSCOPY     • HAND SURGERY Bilateral    • HERNIA REPAIR     • OTHER SURGICAL HISTORY      inguinal hernia repair for person over age 5   • TONSILLECTOMY         Family History   Problem Relation Age of Onset   • Obesity Mother    • Clotting disorder Mother      Upper extremities   • Alcohol abuse Father    • Cancer Father 63     Esophagus and lung   • Other Father      cardiac disorder   • Kidney disease Sister    • Kidney failure Sister    • Drug abuse Paternal Uncle    • Stroke Sister    • Throat cancer Sister        Social History     Social History   • Marital status:       Spouse name: Joey   • Number of children: N/A   • Years of education: N/A     Occupational History   •  Retired     Social History Main Topics   • Smoking status: Former Smoker   • Smokeless tobacco: Never Used   • Alcohol use No   • Drug use: No   • Sexual activity: Defer     Other Topics Concern   • None     Social History Narrative   • None           Objective   Physical Exam   Constitutional: He is oriented to person, place, and time. He appears well-developed and well-nourished. No distress.   ED Triage Vitals:  Temp: 97.7 °F (36.5 °C) (10/10/17 1514)  Heart Rate: 118 (10/10/17 1514)  Resp: 18 (10/10/17 1514)  BP: 132/88 (10/10/17 1514)  SpO2: 94 % (10/10/17 1514)  Temp src: n/a  Heart Rate Source: n/a  Patient Position: n/a  BP Location: n/a  FiO2 (%): n/a    The patient's vitals were reviewed by me.  Unless otherwise noted they are within normal limits.  Patient is tachycardic with heart rate of 118.     HENT:   Head: Normocephalic and atraumatic.   Right Ear: External ear normal.   Left Ear: External ear normal.   Nose: Nose normal.   Mouth/Throat: Oropharynx is clear and moist.   Eyes: Conjunctivae and EOM are normal. Pupils are equal, round, and reactive to light. Right eye exhibits no discharge. Left eye exhibits no discharge.   Neck: Normal range of motion. Neck supple. No JVD present. No tracheal deviation present. No thyromegaly present.   No carotid bruit   Cardiovascular: Normal rate, regular rhythm, normal heart sounds and intact distal pulses.  Exam reveals no gallop and no friction rub.    No murmur heard.  Pulmonary/Chest: Effort normal and breath sounds normal. No stridor. No respiratory distress. He has no wheezes. He has no rales. He exhibits no tenderness.   Abdominal: Soft. Bowel sounds are normal. He exhibits no distension and no mass. There is no tenderness. There is no rebound and no guarding. No hernia.   Musculoskeletal: Normal range of motion. He exhibits no  edema or deformity.   Lymphadenopathy:     He has no cervical adenopathy.   Neurological: He is alert and oriented to person, place, and time. No cranial nerve deficit. He exhibits normal muscle tone. Coordination normal.   The patient has anomic aphasia   Skin: Skin is dry. No rash noted. He is not diaphoretic. No erythema. No pallor.   Psychiatric: His behavior is normal.   Nursing note and vitals reviewed.      Procedures         ED Course  ED Course   Comment By Time   The laboratory values were reviewed by me.  There is no abnormality. Cash Mario MD 10/10 1709   5:09 PM, 10/10/17:  The EKG was obtained at 1624.  EKG was read by me at 1625.  EKG shows a sinus bradycardia with a rate of 59.  There is left axis deviation with left ventricular hypertrophy.  The NH is prolonged at 220 ms.  The QRS and QT intervals are unremarkable.  There is a sinus pause arrest with supraventricular escape rhythm.  There is no acute ST elevation or depression.    5:09 PM, 10/10/17:  The CT the head as read by Dr. Rayo Araujo shows an evolving left posterior CVA.  It is nonhemorrhagic.    5:09 PM, 10/10/17:  The patient was reassessed.  His vital signs were reviewed and are stable.  Neurological exam: Unchanged.    5:09 PM, 10/10/17:  I spoke with Dr. Huerta.  He wants the patient to receive 75 mg of Plavix by mouth.  He wants the patient to receive Lovenox 40 mg subcutaneous daily.  He will consult on the patient.    5:09 PM, 10/10/17:  The patient's glucose is 93.    5:09 PM, 10/10/17:  The patient passed his bedside swallow screen.    5:17 PM, 10/10/17:  I spoke with , on-call for the hospitalist, she will admit the patient.                [facility]  CT Head Without Contrast   ED Interpretation   The CT the head as read by Dr. Araujo shows a left posterior   temporal and occipital subacute infarct.  No evidence of   hemorrhage transformation.  Otherwise no change since August 12, 2017.  There is stable  diffuse low-attenuation white matter   changes and mild cerebral cortical atrophy.  Old lacunar infarct   involving the head of the left caudate nucleus.      Final Result   1. Left posterior temporal and occipital subacute infarct. No evidence   of hemorrhagic transformation.   2. Otherwise, no change since 08/12/2017.   3. Stable diffuse low-attenuation white matter changes and mild cerebral   cortical atrophy. Old lacunar infarct involving the head of the left   caudate nucleus.   4. I have discussed the findings by telephone with the ED physician   prior to this dictation.       This report was finalized on 10/10/2017 4:52 PM by Dr. aRyo Araujo MD.          XR Chest 1 View   ED Interpretation   The chest x-ray is read by Dr. Rayo Araujo is negative.      Final Result   No active disease. No change since 08/12/2017.       This report was finalized on 10/10/2017 4:25 PM by Dr. Rayo Araujo MD.            Labs Reviewed   CBC WITH AUTO DIFFERENTIAL - Abnormal; Notable for the following:        Result Value    MCH 31.6 (*)     Monocyte % 10.4 (*)     All other components within normal limits   PROTIME-INR - Normal    Narrative:     Therapeutic Ranges for INR: 2.0-3.0 (PT 20-30)                              2.5-3.5 (PT 25-34)   APTT - Normal    Narrative:     PTT = The equivalent PTT values for the therapeutic range of heparin levels at 0.1 to 0.7 U/ml are 53 to 110 seconds.   POCT GLUCOSE FINGERSTICK - Normal    Narrative:     Meter: [id number] : [id number] Denny Camara NURSING ASSISTANT   COMPREHENSIVE METABOLIC PANEL   CBC AND DIFFERENTIAL    Narrative:     The following orders were created for panel order CBC & Differential.  Procedure                               Abnormality         Status                     ---------                               -----------         ------                     CBC Auto Differential[[id number]]        Abnormal            Final result                  Please view results for these tests on the individual orders.     Ct Head Without Contrast    Result Date: 10/10/2017  Narrative: CT HEAD, NONCONTRAST, 10/10/2017:  HISTORY: 67-year-old male in the ED with new onset visual disturbance and altered mental status beginning at about midnight last night.  TECHNIQUE: CT examination of the head without IV contrast. Radiation dose reduction techniques were utilized, including automated exposure control and exposure modulation based on body size.  FINDINGS: The examination shows a subacute cortical and subcortical infarct in the low left posterior temporal occipital lobe without evidence of hemorrhagic transformation. This is new since the previous study of 08/12/2017.  No additional new intracranial lesion is demonstrated. Mild generalized cerebral cortical atrophy. Moderate diffuse low-attenuation white matter changes, nonspecific but likely related to chronic small vessel disease. Old lacunar infarcts in the head of the left caudate nucleus.  No evidence of intracranial hemorrhage, mass, mass effect, progressive ventricular enlargement or additional abnormality.      Impression: 1. Left posterior temporal and occipital subacute infarct. No evidence of hemorrhagic transformation. 2. Otherwise, no change since 08/12/2017. 3. Stable diffuse low-attenuation white matter changes and mild cerebral cortical atrophy. Old lacunar infarct involving the head of the left caudate nucleus. 4. I have discussed the findings by telephone with the ED physician prior to this dictation.  This report was finalized on 10/10/2017 4:52 PM by Dr. Rayo Araujo MD.      Xr Chest 1 View    Result Date: 10/10/2017  Narrative: CHEST X-RAY, 10/10/2017:  HISTORY: 67-year-old male in the ED complaining of new onset visual disturbance beginning last evening. Headache.  TECHNIQUE: AP portable upright chest x-ray.  FINDINGS: Heart size and pulmonary vascularity are normal. The lungs are clear. No  visible pulmonary infiltrate or pleural effusion. Mild chronic elevation right hemidiaphragm. No change since 08/12/2017.      Impression: No active disease. No change since 08/12/2017.  This report was finalized on 10/10/2017 4:25 PM by Dr. Rayo Araujo MD.        Final diagnoses:   None         ED Medications:  Medications   enoxaparin (LOVENOX) syringe 40 mg (not administered)   clopidogrel (PLAVIX) tablet 75 mg (75 mg Oral Given 10/10/17 9086)       New Medications:     Medication List      ASK your doctor about these medications          B-12 1000 MCG/ML kit           Stopped Medications:     Medication List      ASK your doctor about these medications          B-12 1000 MCG/ML kit             Final diagnoses:   Cerebrovascular accident (CVA), unspecified mechanism            Cash Mario MD  10/10/17 2942

## 2017-10-10 NOTE — ED NOTES
Pt scored a zero on the NIH stroke scale. He has a clear voice but can not read some sentences but can tell me he can not read them. Pt did put his glasses on and he was able to read two - three words but not a complete sentence.     Mingo Weber RN  10/10/17 2802

## 2017-10-10 NOTE — PLAN OF CARE
Problem: Stroke (Ischemic) (Adult)  Goal: Signs and Symptoms of Listed Potential Problems Will be Absent or Manageable (Stroke)  Outcome: Outcome(s) achieved Date Met:  10/10/17

## 2017-10-10 NOTE — TELEPHONE ENCOUNTER
Mr. Rizvi stated that las night he was having trouble with his vision and a headache. He also states that he is having problems reading and understanding what he reads. Went to eye  This morning and she said everything in the eye is ok but it could be a TIA. I suggested he goes to hospital. He lives by [place] so he said he would run over there. If you want me to do anything just let me know. Thanks!

## 2017-10-10 NOTE — ED NOTES
Pt ambulatory to room and needed to go to the bathroom. Report from Marie Weber RN  10/10/17 7314

## 2017-10-11 ENCOUNTER — APPOINTMENT (OUTPATIENT)
Dept: MRI IMAGING | Facility: HOSPITAL | Age: 67
End: 2017-10-11

## 2017-10-11 LAB
CHOLEST SERPL-MCNC: 171 MG/DL (ref 0–200)
GLUCOSE BLDC GLUCOMTR-MCNC: 119 MG/DL (ref 70–130)
GLUCOSE BLDC GLUCOMTR-MCNC: 74 MG/DL (ref 70–130)
HBA1C MFR BLD: 5.2 % (ref 4.8–5.6)
HDLC SERPL-MCNC: 47 MG/DL (ref 40–60)
LDLC SERPL CALC-MCNC: 108 MG/DL (ref 0–100)
LDLC/HDLC SERPL: 2.31 {RATIO}
TRIGL SERPL-MCNC: 78 MG/DL (ref 0–150)
VLDLC SERPL-MCNC: 15.6 MG/DL (ref 8–32)

## 2017-10-11 PROCEDURE — 99222 1ST HOSP IP/OBS MODERATE 55: CPT | Performed by: NURSE PRACTITIONER

## 2017-10-11 PROCEDURE — 83036 HEMOGLOBIN GLYCOSYLATED A1C: CPT | Performed by: HOSPITALIST

## 2017-10-11 PROCEDURE — 82962 GLUCOSE BLOOD TEST: CPT

## 2017-10-11 PROCEDURE — G8989 SELF CARE D/C STATUS: HCPCS

## 2017-10-11 PROCEDURE — 97161 PT EVAL LOW COMPLEX 20 MIN: CPT

## 2017-10-11 PROCEDURE — G9176 SPEECH LANG D/C STATUS: HCPCS

## 2017-10-11 PROCEDURE — 97165 OT EVAL LOW COMPLEX 30 MIN: CPT

## 2017-10-11 PROCEDURE — G0008 ADMIN INFLUENZA VIRUS VAC: HCPCS | Performed by: HOSPITALIST

## 2017-10-11 PROCEDURE — 90661 CCIIV3 VAC ABX FR 0.5 ML IM: CPT | Performed by: HOSPITALIST

## 2017-10-11 PROCEDURE — G8979 MOBILITY GOAL STATUS: HCPCS

## 2017-10-11 PROCEDURE — 80061 LIPID PANEL: CPT | Performed by: HOSPITALIST

## 2017-10-11 PROCEDURE — 25010000002 ENOXAPARIN PER 10 MG: Performed by: HOSPITALIST

## 2017-10-11 PROCEDURE — G9174 SPEECH LANG CURRENT STATUS: HCPCS

## 2017-10-11 PROCEDURE — 70553 MRI BRAIN STEM W/O & W/DYE: CPT

## 2017-10-11 PROCEDURE — 96372 THER/PROPH/DIAG INJ SC/IM: CPT

## 2017-10-11 PROCEDURE — 70549 MR ANGIOGRAPH NECK W/O&W/DYE: CPT

## 2017-10-11 PROCEDURE — 70544 MR ANGIOGRAPHY HEAD W/O DYE: CPT

## 2017-10-11 PROCEDURE — A9577 INJ MULTIHANCE: HCPCS | Performed by: HOSPITALIST

## 2017-10-11 PROCEDURE — G9175 SPEECH LANG GOAL STATUS: HCPCS

## 2017-10-11 PROCEDURE — 96105 ASSESSMENT OF APHASIA: CPT

## 2017-10-11 PROCEDURE — 0 GADOBENATE DIMEGLUMINE 529 MG/ML SOLUTION: Performed by: HOSPITALIST

## 2017-10-11 PROCEDURE — G8987 SELF CARE CURRENT STATUS: HCPCS

## 2017-10-11 PROCEDURE — 25010000002 INFLUENZA VAC SUBUNIT QUAD 0.5 ML SUSPENSION PREFILLED SYRINGE: Performed by: HOSPITALIST

## 2017-10-11 PROCEDURE — G8978 MOBILITY CURRENT STATUS: HCPCS

## 2017-10-11 PROCEDURE — G8980 MOBILITY D/C STATUS: HCPCS

## 2017-10-11 PROCEDURE — G8988 SELF CARE GOAL STATUS: HCPCS

## 2017-10-11 RX ADMIN — CLOPIDOGREL 75 MG: 75 TABLET, FILM COATED ORAL at 09:58

## 2017-10-11 RX ADMIN — ENOXAPARIN SODIUM 40 MG: 40 INJECTION SUBCUTANEOUS at 09:58

## 2017-10-11 RX ADMIN — A/SINGAPORE/GP1908/2015 IVR-180 (H1N1) (AN A/MICHIGAN/45/2015-LIKE VIRUS), A/SINGAPORE/GP2050/2015 (H3N2) (AN A/HONG KONG/4801/2014 - LIKE VIRUS), B/UTAH/9/2014 (A B/PHUKET/3073/2013-LIKE VIRUS), B/HONG KONG/259/2010 (A B/BRISBANE/60/08-LIKE VIRUS) 0.5 ML: 15; 15; 15; 15 INJECTION, SUSPENSION INTRAMUSCULAR at 09:59

## 2017-10-11 RX ADMIN — GADOBENATE DIMEGLUMINE 18 ML: 529 INJECTION, SOLUTION INTRAVENOUS at 17:30

## 2017-10-11 RX ADMIN — ATORVASTATIN CALCIUM 80 MG: 20 TABLET, FILM COATED ORAL at 21:04

## 2017-10-11 NOTE — THERAPY DISCHARGE NOTE
"Acute Care - Physical Therapy Initial Eval/Discharge   Angelica Alatorre     Patient Name: Ronnell Rizvi  : 1950  MRN: [id number]  Today's Date: 10/11/2017   Onset of Illness/Injury or Date of Surgery Date: 10/10/17  Date of Referral to PT: 10/10/17  Referring Physician: Dr Alcaraz      Admit Date: 10/10/2017    Visit Dx:    ICD-10-CM ICD-9-CM   1. Cerebrovascular accident (CVA), unspecified mechanism I63.9 434.91     Patient Active Problem List   Diagnosis   • Epididymal pain   • Cough   • Anemia, unspecified   • Chest pain   • Health care maintenance   • Vitamin B12 deficiency   • Chronic fatigue   • Dysarthria   • Benign prostatic hypertrophy (BPH) with weak urinary stream   • Chronic bronchitis   • Obstructive sleep apnea syndrome   • CVA (cerebral vascular accident)     Past Medical History:   Diagnosis Date   • Allergic rhinitis    • Anal fissure    • Asthma    • B12 deficiency    • Chronic bronchitis    • Dysarthria    • Emphysema lung    • Fatty liver    • GERD (gastroesophageal reflux disease)    • Heart murmur     possible MVP in past documentation   • Hepatitis     thought to be Hepatitis A    • History of pneumonia     With left lower lobe atelectasis and scar tissue, being followed   • Pneumonia     LLL with scar tissue   • Spinal stenosis      Past Surgical History:   Procedure Laterality Date   • COLONOSCOPY     • HAND SURGERY Bilateral    • HERNIA REPAIR     • OTHER SURGICAL HISTORY      inguinal hernia repair for person over age 5   • TONSILLECTOMY            PT ASSESSMENT (last 72 hours)      PT Evaluation       10/11/17 0835        Document Type evaluation  -    Subjective Information agree to therapy   c/o \"difficulty reading\"  -    Patient Effort, Rehab Treatment excellent  -    Symptoms Noted During/After Treatment none  -       Patient Profile Review yes  -    Onset of Illness/Injury or Date of Surgery Date 10/10/17  -    Referring Physician Dr Alcaraz  -JW    General " Observations pt sitting in chair, agreeable to evaluation  -JW    Pertinent History Of Current Problem pt reports a headache above left eye and difficulty reading items.  pt presents to ER and admitted for possible CVA.  CT scan showed subacute left posterior CVA in temporal and occipital areas.  -JW    Precautions/Limitations no known precautions/limitations  -JW    Prior Level of Function independent:;all household mobility;community mobility;ADL's  -JW    Equipment Currently Used at Home none   reports spouse owns rollator  -JW    Plans/Goals Discussed With patient;agreed upon  -JW    Risks Reviewed patient:;increased discomfort  -JW    Barriers to Rehab none identified  -JW       Lives With spouse  -JW    Living Arrangements house  -JW    Home Accessibility stairs to enter home  -JW    Number of Stairs to Enter Home 2  -JW    Stair Railings at Home other (see comments)   1 handrail  -JW    Type of Financial/Environmental Concern     Transportation Available        Date of Referral to PT 10/10/17  -JW    Patient/Family Goals Statement return home  -JW    Criteria for Skilled Therapeutic Interventions Met no problems identified which require skilled intervention  -JW       Pain Assessment No/denies pain  -JW       Visual Impairment Comment pt able to track objects across midline and throughout visual field.  pt noted to have some decreased in peripheral vision.  Patient attempted to read sentences on paper, noted significant increase in time required   -JW       Current Cognitive/Communication Assessment functional  -JW    Orientation Status oriented x 4  -JW    Follows Commands/Answers Questions 100% of the time;able to follow single-step instructions  -    Personal Safety WNL/WFL  -JW    Personal Safety Interventions gait belt;nonskid shoes/slippers when out of bed  -JW       General ROM no range of motion deficits identified  -JW       General MMT Assessment no strength deficits identified  -JW       Bed  Mobility, Comment deferred--up in chair  -       Transfers, Sit-Stand [place] conditional independence  -    Transfers, Stand-Sit [place] conditional independence  -    Transfers, Sit-Stand-Sit, Assist Device other (see comments)   no device used  -       Gait, [place] Level conditional independence  -    Gait, Assistive Device other (see comments)   no device used  -    Gait, Distance (Feet) 300  -    Gait, Gait Pattern Analysis swing-through gait  -    Gait, Gait Deviations forward flexed posture  -    Gait, Comment pt performs gait around external obstacles and performs head turns.   pt able to read room numbers and signs along hospital corridor with 75% accuracy  -       Sensory Impairment --   reports no numbness or tingling  -       Pre-Treatment Position sitting in chair/recliner  -    Post Treatment Position chair  -JW    In Chair sitting;call light within reach;encouraged to call for assist;with SLP  -            User Key  (r) = Recorded By, (t) = Taken By, (c) = Cosigned By    Initials Name Provider Type    MAIA Ibarra RN Registered Nurse    MUKUND Quintana PT Physical Therapist          Physical Therapy Education     Title: PT OT SLP Therapies (Resolved)     Topic: Physical Therapy (Resolved)     Point: Mobility training (Resolved)    Learning Progress Summary    Learner Readiness Method Response Comment Documented by Status   Patient Acceptance E VU   10/11/17 0936 Done                      User Key     Initials Effective Dates Name Provider Type Discipline     12/01/15 -  Patria Quintana PT Physical Therapist PT                PT Recommendation and Plan  Anticipated Equipment Needs At Discharge: other (see comments) (none)  Anticipated Discharge Disposition: home with outpatient services (pt may require outpatinet SLP--defer to SLP recommendations.)  PT Frequency: evaluation only  Plan of Care Review  Plan Of Care Reviewed With:  patient  Outcome Summary/Follow up Plan: Physical therapy evaluation complete.  Patient performs transfers and gait with conditional independence x300 feet without device.  Patient able to perform gait with head turns and around external obstacles without balance loss.  Patient reports mobility is currently at baseline level of function and states only concern currently is reading accuracy.  No further PT recommendations at this time.              Outcome Measures       10/11/17 0835          How much help from another person do you currently need...    Turning from your back to your side while in flat bed without using bedrails? 4  -JW      Moving from lying on back to sitting on the side of a flat bed without bedrails? 4  -JW      Moving to and from a bed to a chair (including a wheelchair)? 4  -JW      Standing up from a chair using your arms (e.g., wheelchair, bedside chair)? 4  -JW      Climbing 3-5 steps with a railing? 3  -JW      To walk in hospital room? 3  -JW      AM-PAC 6 Clicks Score 22  -      Modified [place] Scale    Modified [place] Scale 1 - No significant disability despite symptoms.  Able to carry out all usual duties and activities.  -      Functional Assessment    Outcome Measure Options AM-PAC 6 Clicks Basic Mobility (PT);Modified [place]  -        User Key  (r) = Recorded By, (t) = Taken By, (c) = Cosigned By    Initials Name Provider Type    MUKUND Quintana PT Physical Therapist           Time Calculation:         PT Charges       10/11/17 0940          Time Calculation    Start Time 0835  -      Stop Time 0856  -      Time Calculation (min) 21 min  -      PT Received On 10/11/17  -        User Key  (r) = Recorded By, (t) = Taken By, (c) = Cosigned By    Initials Name Provider Type    MUKUND Quintana PT Physical Therapist          Therapy Charges for Today     Code Description Service Date Service Provider Modifiers Qty    96247010038  PT MOBILITY CURRENT 10/11/2017 Patria  Mariano, PT GP, [place] 1    57105960808 HC PT MOBILITY PROJECTED 10/11/2017 Patria Quintana, PT GP, [place] 1    73989248351 HC PT MOBILITY DISCHARGE 10/11/2017 Patria Quintana, PT GP, [place] 1    02880377794 HC PT EVAL LOW COMPLEXITY 1 10/11/2017 Patria Quintana, PT GP 1          PT G-Codes  PT Professional Judgement Used?: Yes  Outcome Measure Options: AM-PAC 6 Clicks Basic Mobility (PT), Modified [place]  Functional Limitation: Mobility: Walking and moving around  Mobility: Walking and Moving Around Current Status (): 0 percent impaired, limited or restricted  Mobility: Walking and Moving Around Goal Status (): 0 percent impaired, limited or restricted  Mobility: Walking and Moving Around Discharge Status (): 0 percent impaired, limited or restricted    PT Discharge Summary  Anticipated Discharge Disposition: home with outpatient services (pt may require outpatinet SLP--defer to SLP recommendations.)  Reason for Discharge: At baseline function    Patria Quintana, PT  10/11/2017

## 2017-10-11 NOTE — THERAPY DISCHARGE NOTE
Acute Care - Occupational Therapy Initial Eval/Discharge  [facility]     Patient Name: Ronnell Rizvi  : 1950  MRN: [id number]  Today's Date: 10/11/2017  Onset of Illness/Injury or Date of Surgery Date: 10/10/17  Date of Referral to OT: 10/11/17  Referring Physician: Dr. Alcaraz      Admit Date: 10/10/2017       ICD-10-CM ICD-9-CM   1. Cerebrovascular accident (CVA), unspecified mechanism I63.9 434.91   2. Aphasia R47.01 784.3   3. Alexia R48.0 315.01     Patient Active Problem List   Diagnosis   • Epididymal pain   • Cough   • Anemia, unspecified   • Chest pain   • Health care maintenance   • Vitamin B12 deficiency   • Chronic fatigue   • Dysarthria   • Benign prostatic hypertrophy (BPH) with weak urinary stream   • Chronic bronchitis   • Obstructive sleep apnea syndrome   • CVA (cerebral vascular accident)     Past Medical History:   Diagnosis Date   • Allergic rhinitis    • Anal fissure    • Asthma    • B12 deficiency    • Chronic bronchitis    • Dysarthria    • Emphysema lung    • Fatty liver    • GERD (gastroesophageal reflux disease)    • Heart murmur     possible MVP in past documentation   • Hepatitis     thought to be Hepatitis A    • History of pneumonia     With left lower lobe atelectasis and scar tissue, being followed   • Pneumonia     LLL with scar tissue   • Spinal stenosis      Past Surgical History:   Procedure Laterality Date   • COLONOSCOPY     • HAND SURGERY Bilateral    • HERNIA REPAIR     • OTHER SURGICAL HISTORY      inguinal hernia repair for person over age 5   • TONSILLECTOMY            OT ASSESSMENT FLOWSHEET (last 72 hours)      OT Evaluation       10/11/17 0840        Rehab Evaluation    Document Type evaluation  -SD (r) LD (t) SD (c)    Subjective Information agree to therapy;complains of   difficulty reading  -SD (r) LD (t) SD (c)    Patient Effort, Rehab Treatment good  -SD (r) LD (t) SD (c)    Symptoms Noted Comment Pt stated he felt his ability to read was slowly  improving since admission  -SD    General Information    Patient Profile Review yes  -SD (r) LD (t) SD (c)    Onset of Illness/Injury or Date of Surgery Date 10/10/17  -SD (r) LD (t) SD (c)    Referring Physician Dr. Alcaraz  -SD (r) LD (t) SD (c)    General Observations pt sitting in chair, agreeable to evaluation  -SD (r) LD (t) SD (c)    Pertinent History Of Current Problem pt reports a headache above left eye and difficulty reading items.  pt presents to ER and admitted for possible CVA.  CT scan showed subacute left posterior CVA in temporal and occipital areas.  -SD (r) LD (t) SD (c)    Precautions/Limitations no known precautions/limitations  -SD (r) LD (t) SD (c)    Prior Level of Function independent:;all household mobility;community mobility;ADL's  -SD (r) LD (t) SD (c)    Equipment Currently Used at Home none  -SD (r) LD (t) SD (c)    Plans/Goals Discussed With patient;agreed upon  -SD (r) LD (t) SD (c)    Risks Reviewed patient:;increased discomfort  -SD (r) LD (t) SD (c)    Barriers to Rehab none identified  -SD (r) LD (t) SD (c)    Living Environment    Lives With spouse  -SD (r) LD (t) SD (c)    Living Arrangements house  -SD (r) LD (t) SD (c)    Home Accessibility stairs to enter home  -SD (r) LD (t) SD (c)    Number of Stairs to Enter Home 2  -SD (r) LD (t) SD (c)    Stair Railings at Home other (see comments)   1 handrail  -SD (r) LD (t) SD (c)    Type of Financial/Environmental Concern none  -SD (r) LD (t) SD (c)    Transportation Available car  -SD (r) LD (t) SD (c)    Clinical Impression    Date of Referral to OT 10/11/17  -SD (r) LD (t) SD (c)    OT Diagnosis Difficulty reading s/p  subacute left posterior CVA in temporal and occipital areas.  -SD (r) LD (t) SD (c)    Prognosis good  -SD (r) LD (t) SD (c)    Functional Level At Time Of Evaluation Reading deficit may impact functional activities (management of finances, medication routine, driving)   -SD (r) LD (t) SD (c)    Impairments Found  (describe specific impairments) other (see comments)   reading deficit  -SD (r) LD (t) SD (c)    Patient/Family Goals Statement Return home with spouse, outpatient therapy to address reading difficulty  -SD (r) LD (t) SD (c)    Criteria for Skilled Therapeutic Interventions Met no   recommending outpatient services  -SD (r) LD (t) SD (c)    Therapy Frequency evaluation only  -SD    Anticipated Discharge Disposition home with outpatient services  -SD (r) LD (t) SD (c)    Pain Assessment    Pain Assessment No/denies pain  -SD (r) LD (t) SD (c)    Vision Assessment/Intervention    Visual Impairment Comment pt able to track objects across midline and throughout visual field.  pt noted to have some decreased in peripheral vision.  Patient attempted to read sentences on paper, noted significant increase in time required.   -SD (r) LD (t) SD (c)    Cognitive Assessment/Intervention    Current Cognitive/Communication Assessment functional  -SD (r) LD (t) SD (c)    Orientation Status oriented x 4  -SD (r) LD (t) SD (c)    Follows Commands/Answers Questions 100% of the time;able to follow single-step instructions  -SD (r) LD (t) SD (c)    Personal Safety WNL/WFL  -SD (r) LD (t) SD (c)    Personal Safety Interventions fall prevention program maintained;gait belt;nonskid shoes/slippers when out of bed;supervised activity  -SD (r) LD (t) SD (c)    Additional Documentation --   Pt completed written/ recall activity without difficulty  -SD (r) LD (t) SD (c)    ROM (Range of Motion)    General ROM no range of motion deficits identified  -SD (r) LD (t) SD (c)    MMT (Manual Muscle Testing)    General MMT Assessment no strength deficits identified  -SD (r) LD (t) SD (c)    Bed Mobility, Assessment/Treatment    Bed Mobility, Comment Pt up in chair  -SD (r) LD (t) SD (c)    Transfer Assessment/Treatment    Transfers, Sit-Stand [place] independent  -SD (r) LD (t) SD (c)    Transfers, Stand-Sit [place] independent  -SD (r) LD  (t) SD (c)    Transfers, Sit-Stand-Sit, Assist Device other (see comments)   no device  -SD (r) LD (t) SD (c)    Functional Mobility    Functional Mobility- Ind. Level independent  -SD (r) LD (t) SD (c)    Functional Mobility- Device other (see comments)   no device used  -SD (r) LD (t) SD (c)    Functional Mobility-Distance (Feet) 100  -SD (r) LD (t) SD (c)    Upper Body Bathing Assessment/Training    UB Bathing Assess/Train, [place] Level independent  -SD (r) LD (t) SD (c)    Lower Body Bathing Assessment/Training    LB Bathing Assess/Train, [place] Level independent  -SD (r) LD (t) SD (c)    Upper Body Dressing Assessment/Training    UB Dressing Assess/Train, [place] independent  -SD (r) LD (t) SD (c)    Lower Body Dressing Assessment/Training    LB Dressing Assess/Train, [place] independent  -SD (r) LD (t) SD (c)    Toileting Assessment/Training    Toileting Assess/Train, [place]ep[place] Level independent  -SD (r) LD (t) SD (c)    Grooming Assessment/Training    Grooming Assess/Train, Indepen Level independent  -SD (r) LD (t) SD (c)    Positioning and Restraints    Pre-Treatment Position sitting in chair/recliner  -SD (r) LD (t) SD (c)    Post Treatment Position chair  -SD (r) LD (t) SD (c)    In Chair sitting;call light within reach;encouraged to call for assist;with SLP  -SD (r) LD (t) SD (c)      User Key  (r) = Recorded By, (t) = Taken By, (c) = Cosigned By    Initials Name Effective Dates    GIOVANA Ballard, MS CCC-SLP 06/22/16 -     BENNIE Valdes, RN 02/24/17 -     PIPPA Ballard, OTR 06/22/16 -     GAUDENCIO Dexter, RN 06/16/16 -     MAIA Ibarra, MAT 06/16/16 -     MUKUND Quintana, PT 12/01/15 -     LD Sarah Eugene, OT Student 08/24/17 -           Occupational Therapy Education     Title: PT OT SLP Therapies (Done)     Topic: Occupational Therapy (Resolved)     Point: ADL training (Resolved)    Description: Instruct learner(s) on proper safety adaptation and  remediation techniques during self care or transfers.   Instruct in proper use of assistive devices.    Learning Progress Summary    Learner Readiness Method Response Comment Documented by Status   Patient Acceptance E VU Educated pt on OT services and safety concerns considering reading deficit with ADL/IADL management.  10/11/17 1010 Done                      User Key     Initials Effective Dates Name Provider Type Discipline     08/24/17 -  Sarah Eugene, OT Student OT Student OT                OT Recommendation and Plan  Anticipated Discharge Disposition: home with outpatient services  Therapy Frequency: evaluation only  Plan of Care Review  Outcome Summary/Follow up Plan: Occupational therapy evaluation completed. Pt is independent with basic ADL/IADL activity; however his reading deficit may impact higher level IADL tasks such as management of finances, medication routine, etc. Pt stated he does not have any immediate concerns with daily routine or returning home. Pt states his spouse will be available to assist at home as needed. No OT services recommended at this time. Recommended  pt has supervision with activities impacted by his reading deficit to ensure accuracy (medicine routine/money management).               Outcome Measures       10/11/17 0840 10/11/17 0835       How much help from another person do you currently need...    Turning from your back to your side while in flat bed without using bedrails?  4  -JW     Moving from lying on back to sitting on the side of a flat bed without bedrails?  4  -JW     Moving to and from a bed to a chair (including a wheelchair)?  4  -JW     Standing up from a chair using your arms (e.g., wheelchair, bedside chair)?  4  -JW     Climbing 3-5 steps with a railing?  3  -JW     To walk in hospital room?  3  -JW     AM-PAC 6 Clicks Score  22  -JW     How much help from another is currently needed...    Putting on and taking off regular lower body clothing? 4  -SD  (r) LD (t) SD (c)      Bathing (including washing, rinsing, and drying) 4  -SD (r) LD (t) SD (c)      Toileting (which includes using toilet bed pan or urinal) 4  -SD (r) LD (t) SD (c)      Putting on and taking off regular upper body clothing 4  -SD (r) LD (t) SD (c)      Taking care of personal grooming (such as brushing teeth) 4  -SD (r) LD (t) SD (c)      Eating meals 4  -SD (r) LD (t) SD (c)      Score 24  -SD (r) LD (t)      Modified [place] Scale    Modified [place] Scale 1 - No significant disability despite symptoms.  Able to carry out all usual duties and activities.  -SD 1 - No significant disability despite symptoms.  Able to carry out all usual duties and activities.  -     Functional Assessment    Outcome Measure Options AM-PAC 6 Clicks Daily Activity (OT)  -SD (r) LD (t) SD (c) AM-PAC 6 Clicks Basic Mobility (PT);Modified [place]  -       User Key  (r) = Recorded By, (t) = Taken By, (c) = Cosigned By    Initials Name Provider Type    PIPPA Ballard OTR Occupational Therapist    MUKUND Quintana, PT Physical Therapist    NIKITA Eugene, OT Student OT Student          Time Calculation:         Time Calculation- OT       10/11/17 1121          Time Calculation- OT    OT Start Time 0835  -SD      OT Stop Time 0857  -SD      OT Time Calculation (min) 22 min  -SD        User Key  (r) = Recorded By, (t) = Taken By, (c) = Cosigned By    Initials Name Provider Type    PIPPA Ballard OTTRACIE Occupational Therapist          Therapy Charges for Today     Code Description Service Date Service Provider Modifiers Qty    [id number]  OT EVAL LOW COMPLEXITY 1 10/11/2017 Sarah Eugene, OT Student GO 1          OT G-codes  OT Functional Scales Options: AM-PAC 6 Clicks Daily Activity (OT)  Score: 24  Functional Limitation: Self care  Self Care Current Status (): 0 percent impaired, limited or restricted  Self Care Goal Status (): 0 percent impaired, limited or restricted  Self Care Discharge  Status (): 0 percent impaired, limited or restricted    OT Discharge Summary  Anticipated Discharge Disposition: home with outpatient services  Reason for Discharge:  (Pt stated no immediate concerns with ADL/IADLs, recommended outpatient services after discharge to address concerns regarding reading deficits)    Sarah Eugene, OT Student  10/11/2017

## 2017-10-11 NOTE — SIGNIFICANT NOTE
10/11/17 1523   Rehab Treatment   Discipline speech language pathologist       SLP provided patient with handouts for alexia. Pt demonstrates understanding. Also provided number and name to contact me regarding outpatient therapy if needed.

## 2017-10-11 NOTE — PLAN OF CARE
Problem: Patient Care Overview (Adult)  Goal: Plan of Care Review  Outcome: Ongoing (interventions implemented as appropriate)    10/11/17 1214   Coping/Psychosocial Response Interventions   Plan Of Care Reviewed With patient   Outcome Evaluation   Outcome Summary/Follow up Plan SLP/Reading assessment: Patient demonstrates mild alexia due to visual processing impairment related to his stroke. He demonstrated performance as follows: word/visual 10/10, word/auditory 10/10, word/semantic 9/10, functional reading of phrases/sentences 9/10, matching words to synonyms 8/10. Comprehension of sentences 40/40, Reading commands 18/20, letter discrimination 6/6, spelled word recognition 6/6, spelling 6/6, number recognition 6/6. Pt demonstrated some errors of oral reading but was able to use context cues to make corrections and demonstrated appropriate comprehension with extended time necessary to read sentences. Pt was also able to use techniques to cover prefixes and/or suffixes to aid in reading of longer, more complex words. Further assessment is indicated to further determine severity and interventions. Recommend outpatient Speech and Language Services.

## 2017-10-11 NOTE — PLAN OF CARE
Problem: Patient Care Overview (Adult)  Goal: Plan of Care Review  Outcome: Ongoing (interventions implemented as appropriate)    10/11/17 0110   Coping/Psychosocial Response Interventions   Plan Of Care Reviewed With patient   Patient Care Overview   Progress progress toward functional goals as expected         Problem: Fall Risk (Adult)  Goal: Absence of Falls  Outcome: Ongoing (interventions implemented as appropriate)    10/11/17 0110   Fall Risk (Adult)   Absence of Falls making progress toward outcome

## 2017-10-11 NOTE — CONSULTS
Patient Identification:  NAME:  Ronnell Rizvi  Age:  67 y.o.   Sex:  male   :  1950   MRN:  [id number]       Chief complaint: I can't read, reason for consult inability to read    History of present illness:  This patient is a 67-year-old right-handed white male with a history of heart murmur hepatitis  History of pneumonia and chronic bronchitis spinal stenosis who comes in and thinks his problems may have begun Monday night 2 nights ago he felt a little funny but the next day noted he was having trouble seeing off to the right he was also having trouble reading since then he feels like his vision is returned to normal but he still having trouble reading.  Quality is trouble reading duration is been 1-1/2 days, no other associated symptoms no modifying factors context patient who on CAT scan appears to have some evidence of stroke in the left occipital temporal type region no evidence of hemorrhage.  Modifying factor we placed him on Plavix and low-dose Lovenox again no evidence of hemorrhage on the CAT scan which I looked at them with an independent eyeball review      Past medical history:  Past Medical History:   Diagnosis Date   • Allergic rhinitis    • Anal fissure    • Asthma    • B12 deficiency    • Chronic bronchitis    • Dysarthria    • Emphysema lung    • Fatty liver    • GERD (gastroesophageal reflux disease)    • Heart murmur     possible MVP in past documentation   • Hepatitis     thought to be Hepatitis A    • History of pneumonia     With left lower lobe atelectasis and scar tissue, being followed   • Pneumonia     LLL with scar tissue   • Spinal stenosis        Past surgical history:  Past Surgical History:   Procedure Laterality Date   • COLONOSCOPY     • HAND SURGERY Bilateral    • HERNIA REPAIR     • OTHER SURGICAL HISTORY      inguinal hernia repair for person over age 5   • TONSILLECTOMY         Allergies:  Aspirin and Combivent [ipratropium-albuterol]    Home  medications:  Prescriptions Prior to Admission   Medication Sig Dispense Refill Last Dose   • Cyanocobalamin (B-12) 1000 MCG/ML kit Inject 250 mcg as directed 1 (One) Time Per Week. Pt. Stated he takes this medication weekly 250 mcg, pt reports took it last friday   Past Week at Unknown time   • guaiFENesin (MUCINEX) 600 MG 12 hr tablet Take 1,200 mg by mouth 2 (Two) Times a Day As Needed for Cough.   10/9/2017 at Unknown time        Hospital medications:    atorvastatin 80 mg Oral Nightly   clopidogrel 75 mg Oral Daily   enoxaparin 40 mg Subcutaneous Daily        •  acetaminophen **OR** acetaminophen  •  guaiFENesin  •  sodium chloride  •  sodium chloride    Family history:  Family History   Problem Relation Age of Onset   • Obesity Mother    • Clotting disorder Mother      Upper extremities   • Alcohol abuse Father    • Cancer Father 63     Esophagus and lung   • Other Father      cardiac disorder   • Kidney disease Sister    • Kidney failure Sister    • Drug abuse Paternal Uncle    • Stroke Sister    • Throat cancer Sister        Social history:  Social History   Substance Use Topics   • Smoking status: Former Smoker   • Smokeless tobacco: Never Used   • Alcohol use No       Review of systems:    He feels like his vision is back to normal now but it wasn't yesterday off to the right no hair eyes nose throat skin bone joint  lymphatic hematologic or oncologic complaints no neck pain chest pain abdominal pain bowel bladder incontinence fever chills or rash    Objective:  Vitals Ranges:   Temp:  [97.2 °F (36.2 °C)-98.3 °F (36.8 °C)] 97.3 °F (36.3 °C)  Heart Rate:  [] 77  Resp:  [16-18] 18  BP: (116-181)/(62-90) 128/79      Physical Exam:  Awake alert well oriented fund of knowledge good attention span and concentration good recent and remote memory good language function normal well-developed well-nourished in no distress able to take dictation very well has a little trouble reading but not much he does have  more trouble reading other more complex words like on a menu or handout.  He is in no distress no clubbing cyanosis edema awake alert oriented ×3 visual acuity normal at 3 feet visual fields are full there is no homonymous hemianopsia at this time.  Language function is normal well-developed well-nourished in no distress pupils to constricting to one half normal disc retinas visual fields extraocular movements full without nystagmus nasolabial folds palate tongue symmetrical normal hearing facial sensation head turning shoulder shrug motor 5 out of 5 times four exts no atrophy fasciculations rigidity bradykinesia resting tremor reflexes trace throughout symmetrical toes downgoing bilaterally sensation normal light touch face arms and legs when the upper and lower extremity station and gait normal heart regular without murmur neck supple without bruits no clubbing cyanosis or edema    Results review:   I reviewed the patient's new clinical results.    Data review:  Lab Results (last 24 hours)     Procedure Component Value Units Date/Time    CBC & Differential [[id number]] Collected:  10/10/17 1551    Specimen:  Blood Updated:  10/10/17 1558    Narrative:       The following orders were created for panel order CBC & Differential.  Procedure                               Abnormality         Status                     ---------                               -----------         ------                     CBC Auto Differential[[id number]]        Abnormal            Final result                 Please view results for these tests on the individual orders.    CBC Auto Differential [[id number]]  (Abnormal) Collected:  10/10/17 1551    Specimen:  Blood Updated:  10/10/17 1558     WBC 5.96 10*3/mm3      RBC 4.94 10*6/mm3      Hemoglobin 15.6 g/dL      Hematocrit 46.3 %      MCV 93.7 fL      MCH 31.6 (H) pg      MCHC 33.7 g/dL      RDW 12.4 %      RDW-SD 42.9 fl      MPV 10.1 fL      Platelets 217 10*3/mm3      Neutrophil % 60.4 %       Lymphocyte % 26.7 %      Monocyte % 10.4 (H) %      Eosinophil % 1.7 %      Basophil % 0.5 %      Immature Grans % 0.3 %      Neutrophils, Absolute 3.60 10*3/mm3      Lymphocytes, Absolute 1.59 10*3/mm3      Monocytes, Absolute 0.62 10*3/mm3      Eosinophils, Absolute 0.10 10*3/mm3      Basophils, Absolute 0.03 10*3/mm3      Immature Grans, Absolute 0.02 10*3/mm3      nRBC 0.0 /100 WBC     Protime-INR [[id number]]  (Normal) Collected:  10/10/17 1551    Specimen:  Blood Updated:  10/10/17 1612     Protime 12.9 Seconds      INR 0.97    Narrative:       Therapeutic Ranges for INR: 2.0-3.0 (PT 20-30)                              2.5-3.5 (PT 25-34)    aPTT [[id number]]  (Normal) Collected:  10/10/17 1551    Specimen:  Blood Updated:  10/10/17 1612     PTT 25.1 seconds     Narrative:       PTT = The equivalent PTT values for the therapeutic range of heparin levels at 0.1 to 0.7 U/ml are 53 to 110 seconds.    Comprehensive Metabolic Panel [[id number]] Collected:  10/10/17 1551    Specimen:  Blood Updated:  10/10/17 1619     Glucose 75 mg/dL      BUN 18 mg/dL      Creatinine 1.14 mg/dL      Sodium 138 mmol/L      Potassium 3.9 mmol/L      Chloride 100 mmol/L      CO2 27.2 mmol/L      Calcium 9.1 mg/dL      Total Protein 6.9 g/dL      Albumin 4.00 g/dL      ALT (SGPT) 24 U/L      AST (SGOT) 22 U/L      Alkaline Phosphatase 57 U/L      Total Bilirubin 0.4 mg/dL      eGFR Non African Amer 64 mL/min/1.73      Globulin 2.9 gm/dL      A/G Ratio 1.4 g/dL      BUN/Creatinine Ratio 15.8     Anion Gap 10.8 mmol/L     POC Glucose Fingerstick [[id number]]  (Normal) Collected:  10/10/17 1654    Specimen:  Blood Updated:  10/10/17 1702     Glucose 96 mg/dL     Narrative:       Meter: [id number] : [id number] Denny Camara NURSING ASSISTANT    Hemoglobin A1c [[id number]]  (Normal) Collected:  10/11/17 0404    Specimen:  Blood Updated:  10/11/17 0454     Hemoglobin A1C 5.20 %     Narrative:       Hemoglobin A1C Ranges:    Increased  Risk for Diabetes  5.7% to 6.4%  Diabetes                     >= 6.5%  Diabetic Goal                < 7.0%    Lipid Panel [[id number]]  (Abnormal) Collected:  10/11/17 0404    Specimen:  Blood Updated:  10/11/17 0516     Total Cholesterol 171 mg/dL      Triglycerides 78 mg/dL      HDL Cholesterol 47 mg/dL      LDL Cholesterol  108 (H) mg/dL      VLDL Cholesterol 15.6 mg/dL      LDL/HDL Ratio 2.31    POC Glucose Fingerstick [[id number]]  (Normal) Collected:  10/11/17 1158    Specimen:  Blood Updated:  10/11/17 1206     Glucose 74 mg/dL     Narrative:       Meter: [id number] : [id number] Angy Haynes NURSING ASSISTANT           Imaging:  Imaging Results (last 24 hours)     Procedure Component Value Units Date/Time    XR Chest 1 View [[id number]] Collected:  10/10/17 1624     Updated:  10/10/17 1628    Narrative:       CHEST X-RAY, 10/10/2017:     HISTORY:   67-year-old male in the ED complaining of new onset visual disturbance  beginning last evening. Headache.     TECHNIQUE:  AP portable upright chest x-ray.     FINDINGS:  Heart size and pulmonary vascularity are normal. The lungs are clear. No  visible pulmonary infiltrate or pleural effusion. Mild chronic elevation  right hemidiaphragm. No change since 08/12/2017.       Impression:       No active disease. No change since 08/12/2017.     This report was finalized on 10/10/2017 4:25 PM by Dr. Rayo Araujo MD.       CT Head Without Contrast [[id number]] Collected:  10/10/17 1648     Updated:  10/10/17 1709    Narrative:       CT HEAD, NONCONTRAST, 10/10/2017:     HISTORY:  67-year-old male in the ED with new onset visual disturbance and altered  mental status beginning at about midnight last night.     TECHNIQUE:  CT examination of the head without IV contrast. Radiation dose reduction  techniques were utilized, including automated exposure control and  exposure modulation based on body size.     FINDINGS:  The examination shows a subacute cortical and  subcortical infarct in the  low left posterior temporal occipital lobe without evidence of  hemorrhagic transformation. This is new since the previous study of  08/12/2017.     No additional new intracranial lesion is demonstrated. Mild generalized  cerebral cortical atrophy. Moderate diffuse low-attenuation white matter  changes, nonspecific but likely related to chronic small vessel disease.  Old lacunar infarcts in the head of the left caudate nucleus.     No evidence of intracranial hemorrhage, mass, mass effect, progressive  ventricular enlargement or additional abnormality.       Impression:       1. Left posterior temporal and occipital subacute infarct. No evidence  of hemorrhagic transformation.  2. Otherwise, no change since 08/12/2017.  3. Stable diffuse low-attenuation white matter changes and mild cerebral  cortical atrophy. Old lacunar infarct involving the head of the left  caudate nucleus.  4. I have discussed the findings by telephone with the ED physician  prior to this dictation.     This report was finalized on 10/10/2017 4:52 PM by Dr. Rayo Araujo MD.                Assessment and Plan:     Active Problems:    CVA (cerebral vascular accident)    Left occipital stroke with right homonymous hemianopsia now resolved and improving alexia without agraphia  His hemoglobin A1c and lipid panel are normal he's been started on Plavix I will check an MRI of the brain with and without contrast plus MRA of the brain and neck with and without contrast which I will review with an independent eyeball review otherwise all of this should continue to improve he is very ricco that the visual field has improved to the right and was able to read most of what he had written himself.  He will not drive until being given clearance by an outpatient M.D. area  Krunal Huerta MD  10/11/17  1:47 PM

## 2017-10-11 NOTE — PLAN OF CARE
Problem: Patient Care Overview (Adult)  Goal: Plan of Care Review  Outcome: Ongoing (interventions implemented as appropriate)    10/11/17 1011   Outcome Evaluation   Outcome Summary/Follow up Plan Occupational therapy evaluation completed. Pt is independent with basic ADL/IADL activity; however his reading deficit may impact higher level IADL tasks such as management of finances, medication routine, etc. Pt stated he does not have any immediate concerns with daily routine or returning home. Pt states his spouse will be available to assist at home as needed. No OT services recommended at this time. Recommended  pt has supervision with activities impacted by his reading deficit to ensure accuracy (medicine routine/money management).

## 2017-10-11 NOTE — PLAN OF CARE
"Problem: Patient Care Overview (Adult)  Goal: Plan of Care Review  Outcome: Ongoing (interventions implemented as appropriate)    10/11/17 1544   Outcome Evaluation   Outcome Summary/Follow up Plan No c/o pain 0/10; no fall noted; CVA sypmtoms resolved; MRA/MRI-Pending; NIHHS-\"0\";Tele-SR w/ [facility] pause; PT/OT-Signed off; SLP-Passed; Pt. A&O X4; Accu Checks Q6Hrs; Flu vaccine administered         Problem: Fall Risk (Adult)  Goal: Identify Related Risk Factors and Signs and Symptoms  Outcome: Ongoing (interventions implemented as appropriate)    Problem: Pain, Acute (Adult)  Goal: Identify Related Risk Factors and Signs and Symptoms  Outcome: Ongoing (interventions implemented as appropriate)      "

## 2017-10-11 NOTE — PLAN OF CARE
Problem: Patient Care Overview (Adult)  Goal: Plan of Care Review  Outcome: Ongoing (interventions implemented as appropriate)    10/11/17 0980   Coping/Psychosocial Response Interventions   Plan Of Care Reviewed With patient   Outcome Evaluation   Outcome Summary/Follow up Plan Physical therapy evaluation complete. Patient performs transfers and gait with conditional independence x300 feet without device. Patient able to perform gait with head turns and around external obstacles without balance loss. Patient reports mobility is currently at baseline level of function and states only concern currently is reading accuracy. No further PT recommendations at this time.

## 2017-10-11 NOTE — THERAPY DISCHARGE NOTE
Acute Care - Speech Language Pathology Initial Eval/Discharge  [place] [facility]     Patient Name: Ronnell Rivzi  : 1950  MRN: [id number]  Today's Date: 10/11/2017  Onset of Illness/Injury or Date of Surgery Date: (P) 10/10/17  Date of Referral to SLP: 10/10/17  Referring Physician: Marilia      Admit Date: 10/10/2017     Visit Dx:    ICD-10-CM ICD-9-CM   1. Cerebrovascular accident (CVA), unspecified mechanism I63.9 434.91   2. Aphasia R47.01 784.3   3. Alexia R48.0 315.01     Patient Active Problem List   Diagnosis   • Epididymal pain   • Cough   • Anemia, unspecified   • Chest pain   • Health care maintenance   • Vitamin B12 deficiency   • Chronic fatigue   • Dysarthria   • Benign prostatic hypertrophy (BPH) with weak urinary stream   • Chronic bronchitis   • Obstructive sleep apnea syndrome   • CVA (cerebral vascular accident)     Past Medical History:   Diagnosis Date   • Allergic rhinitis    • Anal fissure    • Asthma    • B12 deficiency    • Chronic bronchitis    • Dysarthria    • Emphysema lung    • Fatty liver    • GERD (gastroesophageal reflux disease)    • Heart murmur     possible MVP in past documentation   • Hepatitis     thought to be Hepatitis A    • History of pneumonia     With left lower lobe atelectasis and scar tissue, being followed   • Pneumonia     LLL with scar tissue   • Spinal stenosis      Past Surgical History:   Procedure Laterality Date   • COLONOSCOPY     • HAND SURGERY Bilateral    • HERNIA REPAIR     • OTHER SURGICAL HISTORY      inguinal hernia repair for person over age 5   • TONSILLECTOMY            SLP EVALUATION (last 72 hours)      SLP Evaluation       10/11/17 1025    Rehab Evaluation    Document Type evaluation  -AD    Subjective Information no complaints;agree to therapy  -AD    Patient Effort, Rehab Treatment good  -AD    Symptoms Noted During/After Treatment none  -AD    General Information    Patient Profile Review yes  -AD    Onset of Illness/Injury 10/10/17    midnight  -AD    Referring Physician Marilia  -AD    Subjective Patient Observations Pt was seen upright in a chair, was alert and cooperative. His wife was present for a few minutes initially.  -AD    Pertinent History Of Current Problem Pt presented to the ER with difficulty reading and visual changes. He reports that he had difficulty reading an aspirin bottle and could not make out the words. He states it has improved over the last 24 hours and that he has had an episode similar to this about 2 years ago that resolved on it's own. Pt with some reported hearing loss and tinnitus. Suspected sleep apnea that is being evaluated.  -AD    Current Diet Limitations thin liquids;regular solid  -AD    Precautions/Limitations, Vision other (see comments)   visual changes related to his current stroke  -AD    Precautions/Limitations, Hearing hearing impairment, bilaterally;other (see comments)   has a hearing aid but not present. Not sure what ear.  -AD    Prior Level of Function- Communication functional in all spheres  -AD    Prior Level of Function- Swallowing no diet consistency restrictions;safe, efficient swallowing in all situations  -AD    Plans/Goals Discussed With patient;agreed upon  -AD    Barriers to Rehab visual deficit  -AD    Living Environment    Lives With spouse  -AD    Clinical Impression    Date of Referral to SLP 10/10/17  -AD    SLP Diagnosis Mild alexia  -AD    Prognosis Good for return to functional reading for current social, vocational and avocational tasks  -AD    Functional Level At Time Of Evaluation impaired  -AD    Patient's Goals For Discharge return home;take care of myself at home;return to work;return to all previous roles/activities  -AD    Criteria for Skilled Therapeutic Interventions Met skilled criteria for speech language intervention met;other (see comments)   for reading ability/comprehension for high level tasks  -AD    Rehab Potential good, to achieve stated therapy goals   -AD    Therapy Frequency evaluation only   recommend outpatient Speech Language therapy  -AD    Anticipated Discharge Disposition home with outpatient services  -AD    Pain Assessment    Pain Assessment No/denies pain  -AD    Vision Assessment/Intervention    Visual Field right visual field impairment;other (see comments)  -AD    Visual Field Comment Suspect some very mild right field cuts.   -AD    Cognitive Assessment/Intervention    Current Cognitive/Communication Assessment functional  -AD    Orientation Status oriented x 4  -AD    Follows Commands/Answers Questions able to follow single-step instructions;able to follow multi-step instructions;100% of the time  -AD    Personal Safety WNL/WFL  -AD    Communication Assessment/Intervention    Communication Assessment --   Mild alexia  -AD    Additional Documentation Reading Assessment/Intervention (Group)  -AD    Reading Assessment/Intervention    Reading Skills mild impairment  -AD    Reading, Matching Ability successful, picture/word;successful, object/word;successful, word/word;WNL/WFL  -AD    Oral Reading Ability mild impairment;successful, numbers;successful, letters;successful, words;successful, sentences  -AD    Reading Comprehension mild impairment;successful, single words;successful, phrase length;successful, sentence length  -AD    Reading Skills Intervention Patient demonstrates mild alexia due to visual processing impairment related to his stroke. He demonstrated performance as follows: word/visual 10/10, word/auditory 10/10, word/semantic 9/10, functional reading of phrases/sentences 9/10, matching words to synonyms 8/10. Comprehension of sentences 40/40, Reading commands 18/20, letter discrimination 6/6, spelled word recognition 6/6, spelling 6/6, number recognition 6/6. Pt demonstrated some errors of oral reading but was able to use context cues to make corrections and demonstrated appropriate comprehension with extended time necessary to read  sentences. Pt was also able to use techniques to cover prefixes and/or suffixes to aid in reading of longer, more complex words. Further assessment is indicated to further determine severity and interventions.   -AD    Positioning and Restraints    Pre-Treatment Position sitting in chair/recliner  -AD    Post Treatment Position chair  -AD    In Chair call light within reach;encouraged to call for assist;sitting  -AD      User Key  (r) = Recorded By, (t) = Taken By, (c) = Cosigned By    Initials Name Effective Dates    AD Sunshine Ballard MS [facility]-SLP 06/22/16 -          EDUCATION  The patient has been educated in the following areas:   Reading Impairment. Understanding of reading deficits related to visual changes/visual processing deficits resulting from his stroke. Understanding of recommendation for outpatient therapy.     SLP Recommendation and Plan  SLP Diagnosis: Mild alexia  Prognosis: Good for return to functional reading for current social, vocational and avocational tasks  Rehab Potential: good, to achieve stated therapy goals  Criteria for Skilled Therapeutic Interventions Met: skilled criteria for speech language intervention met, other (see comments) (for reading ability/comprehension for high level tasks)  Anticipated Discharge Disposition: home with outpatient services  Therapy Frequency: evaluation only (recommend outpatient Speech Language therapy)    Plan of Care Review  Plan Of Care Reviewed With: patient  Outcome Summary/Follow up Plan: SLP/Reading assessment: Patient demonstrates mild alexia due to visual processing impairment related to his stroke. He demonstrated performance as follows: word/visual 10/10, word/auditory 10/10, word/semantic 9/10, functional reading of phrases/sentences 9/10, matching words to synonyms 8/10. Comprehension of sentences 40/40, Reading commands 18/20, letter discrimination 6/6, spelled word recognition 6/6, spelling 6/6, number recognition 6/6. Pt demonstrated some  errors of oral reading but was able to use context cues to make corrections and demonstrated appropriate comprehension with extended time necessary to read sentences. Pt was also able to use techniques to cover prefixes and/or suffixes to aid in reading of longer, more complex words. Further assessment is indicated to further determine severity and interventions. Recommend outpatient Speech and Language Services.     Time Calculation:         Time Calculation- SLP       10/11/17 1101          Time Calculation- SLP    SLP Start Time 0910  -AD      SLP Stop Time 1025  -AD      SLP Time Calculation (min) 75 min  -AD      Total Timed Code Minutes- SLP 75 minute(s)  -AD      SLP Non-Billable Time (min) 0 min  -AD      TCU Minutes- SLP 0 min  -AD      SLP Received On 10/11/17  -AD      SLP - Next Appointment 10/11/17  -AD        User Key  (r) = Recorded By, (t) = Taken By, (c) = Cosigned By    Initials Name Provider Type    AD Sunshine Ballard MS CCC-SLP Speech Therapist        Therapy Charges for Today     Code Description Service Date Service Provider Modifiers Qty    89237536761 HC ST ASSESSMENT OF APHASIA PER HOUR 10/11/2017 Sunshine Ballard MS CCC-SLP GN 1    97860246481 HC ST OTHER FUNCT LIMIT CURRENT 10/11/2017 Sunshine Ballard MS CCC-SLP TEA, CJ 1    [id number] HC ST OTHER FUNCT LIMIT PROJECTED 10/11/2017 Sunshine Ballard MS CCC-SLP GN, CJ 1    [id number] HC ST OTHER FUNCT LIMIT DISCHARGE 10/11/2017 Sunshine Ballard MS CCC-SLP GN, CJ 1        SLP G-Codes  SLP NOMS Used?: Yes  Functional Limitations: Other Speech Language Pathology  Other Speech-Language Pathology Functional Limitation Current Status (): At least 20 percent but less than 40 percent impaired, limited or restricted (Reading comprehension)  Other Speech-Language Pathology Functional Limitation Goal Status (): At least 20 percent but less than 40 percent impaired, limited or restricted  Other Speech-Language Pathology Functional  Limitation Discharge Status (): At least 20 percent but less than 40 percent impaired, limited or restricted    SLP Discharge Summary  Anticipated Discharge Disposition: home with outpatient services    Sunshine Ballard, MS CCC-SLP  10/11/2017

## 2017-10-11 NOTE — NURSING NOTE
Discharge Planning Assessment  [place]     Patient Name: Ronnell Rizvi  MRN: [id number]  Today's Date: 10/11/2017    Admit Date: 10/10/2017          Discharge Needs Assessment       10/11/17 1126    Living Environment    Lives With spouse    Living Arrangements house    Transportation Available car;family or friend will provide    Living Environment    Provides Primary Care For no one    Quality Of Family Relationships supportive    Able to Return to Prior Living Arrangements yes    Discharge Needs Assessment    Readmission Within The Last 30 Days no previous admission in last 30 days    Equipment Currently Used at Home none    Discharge Planning Comments Spoke with patient at bedside, he is sitting up in recliner. Face sheet verified. He states that he is independent of ADLs including driving prior to admission. He denies use of DME, home 02,cpap/bipap. He states he was recently diagnosed with sleep apnea and is scheduled to  a bipap machine from offices at [facility] on 10/27/17 and use over night and [id number]. He inquires if that can be arranged at this facility because he prefers not to drive in to [place]. He cannot recall the ordering doctor, but states I may contact his wife for clarification. He has not used home health or rehab services. He uses [facility] [place] and denies issues obtaining medications. He has a living will. He plans to return home with his wife at [place] and does not anticipate additional needs. Will continue to follow.            Discharge Plan       10/11/17 1132    Case Management/Social Work Plan    Plan plan return home, no needs    Patient/Family In Agreement With Plan yes    Additional Comments Spoke with patient at bedside, he is sitting up in recliner. Face sheet verified. He states that he is independent of ADLs including driving prior to admission. He denies use of DME, home 02,cpap/bipap. He states he was recently diagnosed with sleep apnea and is scheduled to   a bipap machine from offices at [facility] on 10/27/17 and use over night and [id number]. He inquires if that can be arranged at this facility because he prefers not to drive in to [place]. He cannot recall the ordering doctor, but states I may contact his wife for clarification. He has not used home health or rehab services. He uses [facility] [place] and denies issues obtaining medications. He has a living will. He plans to return home with his wife at [place] and does not anticipate additional needs. Will continue to follow        Discharge Placement     No information found                Demographic Summary       10/11/17 1125    Referral Information    Admission Type inpatient    Arrived From home or self-care    Referral Source admission list    Reason For Consult discharge planning   stroke protocol    Record Reviewed medical record    Contact Information    Permission Granted to Share Information With ;family/designee    Primary Care Physician Information    Name Jong Joy MD            Functional Status     None            Psychosocial     None            Abuse/Neglect     None            Legal     None            Substance Abuse     None            Patient Forms     None          Jose Valdes RN

## 2017-10-11 NOTE — H&P
[facility] HOSPITALIST     Jong Joy MD    CHIEF COMPLAINT: unable to read, right eye visual difficulty    HISTORY OF PRESENT ILLNESS:  The patient is a 67 YOM who presented to the emergency department secondary to 2 nights of difficulty seeing on the right, trouble reading associated with mild headache over left eye, difficulty naming objects. He went to optometrist at [facility] who sent him to the ER. CT in ER revealed left posterior temporal and occipital subacute infarct and he was admitted for further workup.     At the time of exam, the patient reports his vision has returned, and he has no headache or difficulty naming objects. He reports no other symptoms previously or currently other than as mentioned above.    He further denies f/c/cough/soa/n/v/d/chest pain/abdominal pain/recent illness/sick exposures/change in bowel or bladder habits/no weight change/bloody emesis or bloody stools/change in medications or any other new concerns.    Past Medical History:   Diagnosis Date   • Allergic rhinitis    • Anal fissure    • Asthma    • B12 deficiency    • Chronic bronchitis    • Dysarthria    • Emphysema lung    • Fatty liver    • GERD (gastroesophageal reflux disease)    • Heart murmur     possible MVP in past documentation   • Hepatitis     thought to be Hepatitis A    • History of pneumonia     With left lower lobe atelectasis and scar tissue, being followed   • Pneumonia     LLL with scar tissue   • Spinal stenosis      Past Surgical History:   Procedure Laterality Date   • COLONOSCOPY     • HAND SURGERY Bilateral    • HERNIA REPAIR     • OTHER SURGICAL HISTORY      inguinal hernia repair for person over age 5   • TONSILLECTOMY       Family History   Problem Relation Age of Onset   • Obesity Mother    • Clotting disorder Mother      Upper extremities   • Alcohol abuse Father    • Cancer Father 63     Esophagus and lung   • Other Father      cardiac disorder   • Kidney disease Sister    •  "Kidney failure Sister    • Drug abuse Paternal Uncle    • Stroke Sister    • Throat cancer Sister      Social History   Substance Use Topics   • Smoking status: Former Smoker   • Smokeless tobacco: Never Used   • Alcohol use No     Prescriptions Prior to Admission   Medication Sig Dispense Refill Last Dose   • Cyanocobalamin (B-12) 1000 MCG/ML kit Inject 250 mcg as directed 1 (One) Time Per Week. Pt. Stated he takes this medication weekly 250 mcg, pt reports took it last friday   Past Week at Unknown time   • guaiFENesin (MUCINEX) 600 MG 12 hr tablet Take 1,200 mg by mouth 2 (Two) Times a Day As Needed for Cough.   10/9/2017 at Unknown time     Allergies:  Aspirin and Combivent [ipratropium-albuterol]    REVIEW OF SYSTEMS:  Please see the above history of present illness for pertinent positives and negatives.  The remainder of the patient's systems have been reviewed and are negative.     Vital Signs  Temp:  [97.2 °F (36.2 °C)-98.3 °F (36.8 °C)] 97.6 °F (36.4 °C)  Heart Rate:  [57-88] 74  Resp:  [16-18] 18  BP: (116-181)/(62-90) 150/81  Oxygen Therapy  SpO2: 94 %  Pulse Oximetry Type: Continuous  O2 Device: room air}  Body mass index is 24.82 kg/(m^2).  Flowsheet Rows         First Filed Value    Admission Height  73\" (185.4 cm) Documented at 10/10/2017 1514    Admission Weight  185 lb (83.9 kg) Documented at 10/10/2017 1514             Physical Exam:  Physical Exam   Constitutional: Patient appears well-developed and well-nourished and in no acute distress   HEENT:   Head: Normocephalic and atraumatic.   Eyes:  Pupils are equal, round, and reactive to light. EOM are intact. Sclera are anicteric and non-injected.  Mouth and Throat: Patient has moist mucous membranes. Oropharynx is clear of any erythema or exudate.     Neck: Neck supple. No JVD present. No thyromegaly present. No lymphadenopathy present.  Cardiovascular: Regular rate, regular rhythm, S1 normal and S2 normal.  Exam reveals no gallop and no friction " rub.  No murmur heard.  Pulmonary/Chest: Lungs are clear to auscultation bilaterally. No respiratory distress. No wheezes. No rhonchi. No rales.   Abdominal: Soft. Bowel sounds are normal. No distension and no mass. There is no hepatosplenomegaly. There is no tenderness.   Musculoskeletal: Normal Muscle tone  Extremities: No edema. Pulses are palpable in all 4 extremities.  Neurological: Patient is alert and oriented to person, place, and time. Cranial nerves II-XII are grossly intact with no focal deficits.  Skin: Skin is warm. No rash noted. Nails show no clubbing.  No cyanosis or erythema.     Results Review:    I reviewed the patient's new clinical results.  Lab Results (most recent)     Procedure Component Value Units Date/Time    CBC & Differential [[id number]] Collected:  10/10/17 1551    Specimen:  Blood Updated:  10/10/17 1558    Narrative:       The following orders were created for panel order CBC & Differential.  Procedure                               Abnormality         Status                     ---------                               -----------         ------                     CBC Auto Differential[[id number]]        Abnormal            Final result                 Please view results for these tests on the individual orders.    CBC Auto Differential [[id number]]  (Abnormal) Collected:  10/10/17 1551    Specimen:  Blood Updated:  10/10/17 1558     WBC 5.96 10*3/mm3      RBC 4.94 10*6/mm3      Hemoglobin 15.6 g/dL      Hematocrit 46.3 %      MCV 93.7 fL      MCH 31.6 (H) pg      MCHC 33.7 g/dL      RDW 12.4 %      RDW-SD 42.9 fl      MPV 10.1 fL      Platelets 217 10*3/mm3      Neutrophil % 60.4 %      Lymphocyte % 26.7 %      Monocyte % 10.4 (H) %      Eosinophil % 1.7 %      Basophil % 0.5 %      Immature Grans % 0.3 %      Neutrophils, Absolute 3.60 10*3/mm3      Lymphocytes, Absolute 1.59 10*3/mm3      Monocytes, Absolute 0.62 10*3/mm3      Eosinophils, Absolute 0.10 10*3/mm3      Basophils,  Absolute 0.03 10*3/mm3      Immature Grans, Absolute 0.02 10*3/mm3      nRBC 0.0 /100 WBC     Protime-INR [[id number]]  (Normal) Collected:  10/10/17 1551    Specimen:  Blood Updated:  10/10/17 1612     Protime 12.9 Seconds      INR 0.97    Narrative:       Therapeutic Ranges for INR: 2.0-3.0 (PT 20-30)                              2.5-3.5 (PT 25-34)    aPTT [[id number]]  (Normal) Collected:  10/10/17 1551    Specimen:  Blood Updated:  10/10/17 1612     PTT 25.1 seconds     Narrative:       PTT = The equivalent PTT values for the therapeutic range of heparin levels at 0.1 to 0.7 U/ml are 53 to 110 seconds.    Comprehensive Metabolic Panel [[id number]] Collected:  10/10/17 1551    Specimen:  Blood Updated:  10/10/17 1619     Glucose 75 mg/dL      BUN 18 mg/dL      Creatinine 1.14 mg/dL      Sodium 138 mmol/L      Potassium 3.9 mmol/L      Chloride 100 mmol/L      CO2 27.2 mmol/L      Calcium 9.1 mg/dL      Total Protein 6.9 g/dL      Albumin 4.00 g/dL      ALT (SGPT) 24 U/L      AST (SGOT) 22 U/L      Alkaline Phosphatase 57 U/L      Total Bilirubin 0.4 mg/dL      eGFR Non African Amer 64 mL/min/1.73      Globulin 2.9 gm/dL      A/G Ratio 1.4 g/dL      BUN/Creatinine Ratio 15.8     Anion Gap 10.8 mmol/L     POC Glucose Fingerstick [[id number]]  (Normal) Collected:  10/10/17 1654    Specimen:  Blood Updated:  10/10/17 1702     Glucose 96 mg/dL     Narrative:       Meter: [id number] : [id number] Denny Camara NURSING ASSISTANT    Hemoglobin A1c [[id number]]  (Normal) Collected:  10/11/17 0404    Specimen:  Blood Updated:  10/11/17 0454     Hemoglobin A1C 5.20 %     Narrative:       Hemoglobin A1C Ranges:    Increased Risk for Diabetes  5.7% to 6.4%  Diabetes                     >= 6.5%  Diabetic Goal                < 7.0%    Lipid Panel [[id number]]  (Abnormal) Collected:  10/11/17 0404    Specimen:  Blood Updated:  10/11/17 0516     Total Cholesterol 171 mg/dL      Triglycerides 78 mg/dL      HDL Cholesterol 47 mg/dL       LDL Cholesterol  108 (H) mg/dL      VLDL Cholesterol 15.6 mg/dL      LDL/HDL Ratio 2.31    POC Glucose Fingerstick [[id number]]  (Normal) Collected:  10/11/17 1158    Specimen:  Blood Updated:  10/11/17 1206     Glucose 74 mg/dL     Narrative:       Meter: [id number] : [id number] Angy Ivy NURSING ASSISTANT          Imaging Results (most recent)     Procedure Component Value Units Date/Time    XR Chest 1 View [[id number]] Collected:  10/10/17 1624     Updated:  10/10/17 1628    Narrative:       CHEST X-RAY, 10/10/2017:     HISTORY:   67-year-old male in the ED complaining of new onset visual disturbance  beginning last evening. Headache.     TECHNIQUE:  AP portable upright chest x-ray.     FINDINGS:  Heart size and pulmonary vascularity are normal. The lungs are clear. No  visible pulmonary infiltrate or pleural effusion. Mild chronic elevation  right hemidiaphragm. No change since 08/12/2017.       Impression:       No active disease. No change since 08/12/2017.     This report was finalized on 10/10/2017 4:25 PM by Dr. Rayo Araujo MD.       CT Head Without Contrast [[id number]] Collected:  10/10/17 1648     Updated:  10/10/17 1709    Narrative:       CT HEAD, NONCONTRAST, 10/10/2017:     HISTORY:  67-year-old male in the ED with new onset visual disturbance and altered  mental status beginning at about midnight last night.     TECHNIQUE:  CT examination of the head without IV contrast. Radiation dose reduction  techniques were utilized, including automated exposure control and  exposure modulation based on body size.     FINDINGS:  The examination shows a subacute cortical and subcortical infarct in the  low left posterior temporal occipital lobe without evidence of  hemorrhagic transformation. This is new since the previous study of  08/12/2017.     No additional new intracranial lesion is demonstrated. Mild generalized  cerebral cortical atrophy. Moderate diffuse low-attenuation white  matter  changes, nonspecific but likely related to chronic small vessel disease.  Old lacunar infarcts in the head of the left caudate nucleus.     No evidence of intracranial hemorrhage, mass, mass effect, progressive  ventricular enlargement or additional abnormality.       Impression:       1. Left posterior temporal and occipital subacute infarct. No evidence  of hemorrhagic transformation.  2. Otherwise, no change since 08/12/2017.  3. Stable diffuse low-attenuation white matter changes and mild cerebral  cortical atrophy. Old lacunar infarct involving the head of the left  caudate nucleus.  4. I have discussed the findings by telephone with the ED physician  prior to this dictation.     This report was finalized on 10/10/2017 4:52 PM by Dr. Rayo Araujo MD.       MRI Angiogram Head Without Contrast [[id number]] Updated:  10/11/17 1502        pending    ECG/EMG Results (most recent)     Procedure Component Value Units Date/Time    ECG 12 Lead [[id number]] Collected:  10/10/17 1624     Updated:  10/10/17 2003    Narrative:       RR Interval= 1017 ms  WV Interval= 220 ms  QRSD Interval= 88 ms  QT Interval= 448 ms  QTc Interval= 444 ms  Heart Rate= 59 ms  P Axis= 27 deg  QRS Axis= -33 deg  T Wave Axis= 63 deg  I: 40 Axis= -22 deg  T: 40 Axis= -38 deg  ST Axis= 167 deg  SINUS RHYTHM  SINUS PAUSE/ARREST W/ SUPRAVENTRICULAR ESCAPE  LEFT AXIS DEVIATION  LVH WITH SECONDARY REPOLARIZATION ABNORMALITY  pause is new  Electronically Signed by:  Andrew Regalado ([facility]) ([facility]) 10-Oct-2017 20:00:39  Date and Time of Study: 2017-10-10 16:24:43       reviewed report    Assessment/Plan    1.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 Homonymous hemianopsia: resolved, Bradley Parker following  Continue lovenox prophy dose, plavix 75 mg daily, atorvastatin 80 mg nightly  No driving until cleared by Jong Joy MD and ophthalmology  Diagnostics still in process, plan discharge tomorrow with outpatient therapies  "arranged.     2. H/O Sinus pause/Wenkebach: noted on EKG here and Echo 10/2016   · \"Left ventricular ejection fraction is normal (Calculated EF = 54%).  · Myocardial perfusion imaging indicates a normal myocardial perfusion study with no evidence of ischemia.  · Impressions are consistent with a low risk study.  · Findings consistent with a normal ECG stress test.  There were sinus pauses on the baseline echo consistent with likely sinus node Wenkebach. These pauses resolved with exercise.\"    3. GERD: no acute issues here    4. Chronic bronchitis/emphysema: mucinex twice daily as needed, no acute issues here    5. H/O B12 deficiency: resume home injections at discharge    6. H/O hepatitis: no acute issues here    Plan: AD tomorrow with outpatient SLP/OT planned    I discussed the patients findings and my recommendations with patient.    Bhavani Sena, APRN  10/11/17  4:13 PM          "

## 2017-10-11 NOTE — PLAN OF CARE
Problem: Patient Care Overview (Adult)  Goal: Discharge Needs Assessment  Outcome: Ongoing (interventions implemented as appropriate)    10/11/17 1126   Discharge Needs Assessment   Readmission Within The Last 30 Days no previous admission in last 30 days   Discharge Planning Comments Spoke with patient at bedside, he is sitting up in recliner. Face sheet verified. He states that he is independent of ADLs including driving prior to admission. He denies use of DME, home 02,cpap/bipap. He states he was recently diagnosed with sleep apnea and is scheduled to  a bipap machine from offices at [facility] on 10/27/17 and use over night and [id number]. He inquires if that can be arranged at this facility because he prefers not to drive in to [place]. He cannot recall the ordering doctor, but states I may contact his wife for clarification. He has not used home health or rehab services. He uses [facility] [place] and denies issues obtaining medications. He has a living will. He plans to return home with his wife at [place] and does not anticipate additional needs. Will continue to follow.   Living Environment   Transportation Available car;family or friend will provide   Self-Care   Equipment Currently Used at Home none

## 2017-10-12 ENCOUNTER — APPOINTMENT (OUTPATIENT)
Dept: CARDIOLOGY | Facility: HOSPITAL | Age: 67
End: 2017-10-12
Attending: PSYCHIATRY & NEUROLOGY

## 2017-10-12 VITALS
HEIGHT: 73 IN | DIASTOLIC BLOOD PRESSURE: 84 MMHG | TEMPERATURE: 97.1 F | WEIGHT: 188.1 LBS | OXYGEN SATURATION: 94 % | HEART RATE: 71 BPM | SYSTOLIC BLOOD PRESSURE: 142 MMHG | BODY MASS INDEX: 24.93 KG/M2 | RESPIRATION RATE: 18 BRPM

## 2017-10-12 PROBLEM — I63.9 CEREBROVASCULAR ACCIDENT (CVA) (HCC): Status: ACTIVE | Noted: 2017-10-12

## 2017-10-12 LAB
GLUCOSE BLDC GLUCOMTR-MCNC: 101 MG/DL (ref 70–130)
GLUCOSE BLDC GLUCOMTR-MCNC: 80 MG/DL (ref 70–130)
GLUCOSE BLDC GLUCOMTR-MCNC: 99 MG/DL (ref 70–130)

## 2017-10-12 PROCEDURE — 82962 GLUCOSE BLOOD TEST: CPT

## 2017-10-12 PROCEDURE — G0378 HOSPITAL OBSERVATION PER HR: HCPCS

## 2017-10-12 PROCEDURE — 25010000002 ENOXAPARIN PER 10 MG: Performed by: HOSPITALIST

## 2017-10-12 PROCEDURE — 99217 PR OBSERVATION CARE DISCHARGE MANAGEMENT: CPT | Performed by: NURSE PRACTITIONER

## 2017-10-12 PROCEDURE — 0296T HC EXT ECG > 48HR TO 21 DAY RCRD W/CONECT INTL RCRD: CPT

## 2017-10-12 PROCEDURE — 96372 THER/PROPH/DIAG INJ SC/IM: CPT

## 2017-10-12 RX ORDER — ATORVASTATIN CALCIUM 40 MG/1
40 TABLET, FILM COATED ORAL NIGHTLY
Status: DISCONTINUED | OUTPATIENT
Start: 2017-10-12 | End: 2017-10-12 | Stop reason: HOSPADM

## 2017-10-12 RX ORDER — ATORVASTATIN CALCIUM 80 MG/1
80 TABLET, FILM COATED ORAL NIGHTLY
Qty: 30 TABLET | Refills: 1 | Status: CANCELLED | OUTPATIENT
Start: 2017-10-12

## 2017-10-12 RX ORDER — ATORVASTATIN CALCIUM 40 MG/1
40 TABLET, FILM COATED ORAL NIGHTLY
Qty: 30 TABLET | Refills: 1 | Status: ON HOLD | OUTPATIENT
Start: 2017-10-12 | End: 2018-03-26 | Stop reason: ALTCHOICE

## 2017-10-12 RX ORDER — CLOPIDOGREL BISULFATE 75 MG/1
75 TABLET ORAL DAILY
Qty: 30 TABLET | Refills: 1 | Status: SHIPPED | OUTPATIENT
Start: 2017-10-13 | End: 2019-03-26 | Stop reason: SDUPTHER

## 2017-10-12 RX ADMIN — ENOXAPARIN SODIUM 40 MG: 40 INJECTION SUBCUTANEOUS at 09:08

## 2017-10-12 RX ADMIN — CLOPIDOGREL 75 MG: 75 TABLET, FILM COATED ORAL at 09:08

## 2017-10-12 NOTE — PROGRESS NOTES
Patient Identification:  NAME:  Ronnell Rizvi  Age:  67 y.o.   Sex:  male   :  1950   MRN:  [id number]       Chief complaint: Stroke    History of present illness:  He is doing very well he has no visual field problems but still has some difficulty reading although he states it's getting better I performed an independent eyeball review the MRI and MRA he has no vascular cutoff and there is some slight left occipital temporal cerebral edema without significant shift and without hemorrhage she is tolerating Plavix well and lobe and A1c aren't is normal lipid panel is very mildly abnormal  He knows not to drive until given clearance by an outpatient M.D. and will follow-up with an outpatient M.D. subsequent to this with regards to his ZIO patch that we are ordering      Past medical history:  Past Medical History:   Diagnosis Date   • Allergic rhinitis    • Anal fissure    • Asthma    • B12 deficiency    • Chronic bronchitis    • Dysarthria    • Emphysema lung    • Fatty liver    • GERD (gastroesophageal reflux disease)    • Heart murmur     possible MVP in past documentation   • Hepatitis     thought to be Hepatitis A    • History of pneumonia     With left lower lobe atelectasis and scar tissue, being followed   • Pneumonia     LLL with scar tissue   • Spinal stenosis        Allergies:  Aspirin; Citrus; Combivent [ipratropium-albuterol]; and Lactose intolerance (gi)    Home medications:  Prescriptions Prior to Admission   Medication Sig Dispense Refill Last Dose   • Cyanocobalamin (B-12) 1000 MCG/ML kit Inject 250 mcg as directed 1 (One) Time Per Week. Pt. Stated he takes this medication weekly 250 mcg, pt reports took it last friday   Past Week at Unknown time   • guaiFENesin (MUCINEX) 600 MG 12 hr tablet Take 1,200 mg by mouth 2 (Two) Times a Day As Needed for Cough.   10/9/2017 at Unknown time        Hospital medications:    atorvastatin 40 mg Oral Nightly   clopidogrel 75 mg Oral Daily   enoxaparin 40 mg  Subcutaneous Daily        •  acetaminophen **OR** acetaminophen  •  guaiFENesin  •  sodium chloride  •  sodium chloride      Objective:  Vitals Ranges:   Temp:  [97.1 °F (36.2 °C)-99.5 °F (37.5 °C)] 97.1 °F (36.2 °C)  Heart Rate:  [71-81] 71  Resp:  [18-20] 18  BP: (112-150)/(58-84) 142/84      Physical Exam:Awake alert and oriented ×3 fund of knowledge good normal cranial nerves II through VII tongue is midline visual fields are absolutely full he is able to read but misses a few words along the way.  Reflexes 1+ symmetrical toes downgoing bilaterally    Results review:   I reviewed the patient's new clinical results.    Data review:  Lab Results (last 24 hours)     Procedure Component Value Units Date/Time    POC Glucose Fingerstick [[id number]]  (Normal) Collected:  10/11/17 1158    Specimen:  Blood Updated:  10/11/17 1206     Glucose 74 mg/dL     Narrative:       Meter: [id number] : [id number] Angy Haynes NURSING ASSISTANT    POC Glucose Fingerstick [[id number]]  (Normal) Collected:  10/11/17 1753    Specimen:  Blood Updated:  10/11/17 1801     Glucose 119 mg/dL     Narrative:       Meter: [id number] : [id number] Angy Haynes NURSING ASSISTANT    POC Glucose Fingerstick [[id number]]  (Normal) Collected:  10/11/17 2355    Specimen:  Blood Updated:  10/12/17 0001     Glucose 99 mg/dL     Narrative:       Meter: [id number] : [id number] Ada AVILA PRN    POC Glucose Fingerstick [[id number]]  (Normal) Collected:  10/12/17 0705    Specimen:  Blood Updated:  10/12/17 0712     Glucose 101 mg/dL     Narrative:       Meter: [id number] : [id number] Ada LINDAG PRN           Imaging:  Imaging Results (last 24 hours)     Procedure Component Value Units Date/Time    MRI Brain With & Without Contrast [[id number]] Resulted:  10/12/17 1129     Updated:  10/12/17 1129    Narrative:       MRI OF THE BRAIN WITH AND WITHOUT CONTRAST 10/11/2017    HISTORY: Difficulty reading and trouble seeing off to the right  side with   a headache left side of eye starting on 10/09/2017. Vision has improved.    MRI of the anny was performed prior to and following intravenous   administration of 17 mL MultiHance. There is an earlier MRI of the brain   from 08/12/2017.     There is abnormally restricted diffusion seen in the left posterior   cerebral artery distribution including predominantly posterolateral   occipital lobe into posterior temporal lobe with subtle localized mass   effect and focal effacement. The area of restricted diffusion is about 4.7   x 3.1 cm dimension axial plane. It is not associated with hemorrhagic   transformation. There is subtle enhancement on the surface of the involved   gyri and the findings are most consistent with now early subacute ischemic   insult. Pattern of involvement would suggest a thromboembolic insult to   the left posterior cerebral artery distribution and further clinical   assessment is recommended. There is a background of preexisting white   matter disease which is moderate in amount, nonspecific and likely due to   small vessel disease. Mild signal abnormality in the brainstem is likely   due to small vessel disease and there are either multiple lacunes or   prominent perivascular spaces in the bilateral basal ganglia and to a   lesser extent thalami. These are tiny lesions and it is likely that there   is a mixture of both processes (lacunes and prominent perivascular spaces)   present.     No additional possible intracranial mass effect or pathologic intracranial   enhancement is seen after contrast administration. There is no pathologic   intracranial enhancement.     The visualized paranasal sinuses show minor mucosal thickening in the   ethmoid cells and the visualized mastoid air cells are clear.       Impression:          1.  Findings are most consistent with a early subacute thromboembolic   insult to the left posterior cerebral artery distribution with area of   restricted  diffusion and mild localized mass effect involving the left   inferolateral occipital lobe to posterolateral temporal lobe. Please   correlate further clinically for possible risk factors for thromboembolic   disease. No hemorrhagic transformation at this time. No midline shift.   2.  Preexisting probable sequela of small vessel disease.         MRI Angiogram Neck With & Without Contrast [[id number]] Resulted:  10/12/17 1129     Updated:  10/12/17 1129    Narrative:       MR ANGIOGRAM OF THE NECK WITH AND WITHOUT CONTRAST 10/11/2017    HISTORY: Onset of difficulty reading and seeing off to the right side   since 10/09/2017. Headaches. Vision improving.    COMMENT: MR angiography performed neck vessels prior to and during the   intravenous administration of 17 mL MultiHance.     By NASCET criteria there does not appear to be hemodynamically significant   stenosis at either carotid bifurcation. There is probably mild disease at   the right carotid bulb with mild signal loss proximally. Both vertebral   arteries are patent and supply the basilar. The right is slightly   dominant.     The aortic arch branch pattern is normal.      Impression:          By NASCET criteria there does not appear to be hemodynamically significant   stenosis at either carotid bifurcation. Likely mild disease at the right   carotid bulb. Both vertebral arteries are patent.     MRI Angiogram Head Without Contrast [[id number]] Resulted:  10/12/17 1129     Updated:  10/12/17 1129    Narrative:       MR ANGIOGRAM INTRACRANIAL 10/11/2017    HISTORY: Difficulty reading, trouble seeing off to the right side with a   headache left side of eye starting 10/09/2017. Vision has improved.     COMMENT: MR angiography performed [place] [facility] [place] vasculature without   contrast on a 1.5T system. There is no comparison MR angiogram.    The vertebrobasilar system is patent. At this time, there is no   intracranial vascular cutoff. There is a hypoplastic or disease  right A1   vessel. Supply of the right A2 vessel is from the left side. There is a   focus of signal outpouching from the anterior communicating artery   directed posteriorly and inferiorly up to 2-3 mm in dimension. This is   concerning for a saccular aneurysm and should be further characterized   with at minimum a CT angiogram of the intracranial vessels. There is also   a broad-neck focus signal outpouching from the supraclinoid left internal   carotid artery up to 2-3 mm dimension. It could be an infundibulum related   to a posterior communicator which I cannot demonstrate on the MR   angiogram, or alternatively it could be a small aneurysm. The CT angiogram   will also be helpful for further evaluating this area. There is no other   possible focal central stenosis or intracranial aneurysm suspected.       Impression:          1.  Concern for a 2-3 mm aneurysm from the anterior communicating artery   directed posteriorly and inferiorly. Recommend correlation with a CT   angiogram.   2.  Possible 2-3 mm aneurysm supraclinoid left internal carotid artery.   3.  Hypoplastic right A1 vessel.   4.  No intracranial vascular cutoff.                    Assessment and Plan:     This patient has a left occipital stroke in the area that would explain his original right field cut in his current problems with reading printed words.  He has some cerebral edema but I would not start Decadron or mannitol and he looks great today.  He is on Lipitor at 80 mg and I will decrease that down to 40 mg as I've reviewed his lipid panel and they're minimally abnormal he has been started on Plavix and will not take aspirin with it.  At this point he is remained in a normal sinus rhythm but I have ordered additional diagnostic testing in the form of his CO patch to rule out the possibility of intermittent atrial fibrillation.  I will sign off in follow-up when necessary reconsult thank you      Krunal Huerta MD  10/12/17  11:44 AM

## 2017-10-12 NOTE — DISCHARGE SUMMARY
Ronnell Elip  1950  [id number]    Hospitalists Discharge Summary    Date of Admission: 10/10/2017  Date of Discharge:  10/12/2017    Primary Discharge Diagnoses:   1. 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 Homonymous hemianopsia   Secondary Discharge Diagnoses:   2. H/O Sinus pause/Wenkebach   3. GERD   4. Chronic bronchitis/emphysema   5. H/O B12 deficiency   6. H/O hepatitis  PCP  Patient Care Team:  Jong Joy MD as PCP - General  Gregory King MD as Consulting Physician (Hematology and Oncology)  Lacey Davis RN as Care Coordinator (Population Health)    Consults:   Consults     Date and Time Order Name Status Description    10/10/2017 1906 Inpatient Consult to Neurology Completed         Operations and Procedures Performed:     Ct Head Without Contrast    Result Date: 10/10/2017  Narrative: CT HEAD, NONCONTRAST, 10/10/2017:  HISTORY: 67-year-old male in the ED with new onset visual disturbance and altered mental status beginning at about midnight last night.  TECHNIQUE: CT examination of the head without IV contrast. Radiation dose reduction techniques were utilized, including automated exposure control and exposure modulation based on body size.  FINDINGS: The examination shows a subacute cortical and subcortical infarct in the low left posterior temporal occipital lobe without evidence of hemorrhagic transformation. This is new since the previous study of 08/12/2017.  No additional new intracranial lesion is demonstrated. Mild generalized cerebral cortical atrophy. Moderate diffuse low-attenuation white matter changes, nonspecific but likely related to chronic small vessel disease. Old lacunar infarcts in the head of the left caudate nucleus.  No evidence of intracranial hemorrhage, mass, mass effect, progressive ventricular enlargement or additional abnormality.      Impression: 1. Left posterior temporal and occipital subacute infarct. No evidence of hemorrhagic  transformation. 2. Otherwise, no change since 08/12/2017. 3. Stable diffuse low-attenuation white matter changes and mild cerebral cortical atrophy. Old lacunar infarct involving the head of the left caudate nucleus. 4. I have discussed the findings by telephone with the ED physician prior to this dictation.  This report was finalized on 10/10/2017 4:52 PM by Dr. Rayo Araujo MD.      Mri Angiogram Head Without Contrast    Result Date: 10/12/2017  Narrative: MR ANGIOGRAM INTRACRANIAL 10/11/2017 HISTORY: Difficulty reading, trouble seeing off to the right side with a headache left side of eye starting 10/09/2017. Vision has improved. COMMENT: MR angiography performed [place] vasculature without contrast on a 1.5T system. There is no comparison MR angiogram. The vertebrobasilar system is patent. At this time, there is no intracranial vascular cutoff. There is a hypoplastic or disease right A1 vessel. Supply of the right A2 vessel is from the left side. There is a focus of signal outpouching from the anterior communicating artery directed posteriorly and inferiorly up to 2-3 mm in dimension. This is concerning for a saccular aneurysm and should be further characterized with at minimum a CT angiogram of the intracranial vessels. There is also a broad-neck focus signal outpouching from the supraclinoid left internal carotid artery up to 2-3 mm dimension. It could be an infundibulum related to a posterior communicator which I cannot demonstrate on the MR angiogram, or alternatively it could be a small aneurysm. The CT angiogram will also be helpful for further evaluating this area. There is no other possible focal central stenosis or intracranial aneurysm suspected.     Impression:  1.  Concern for a 2-3 mm aneurysm from the anterior communicating artery directed posteriorly and inferiorly. Recommend correlation with a CT angiogram. 2.  Possible 2-3 mm aneurysm supraclinoid left internal carotid artery. 3.   Hypoplastic right A1 vessel. 4.  No intracranial vascular cutoff.     Mri Angiogram Neck With & Without Contrast    Result Date: 10/12/2017  Narrative: MR ANGIOGRAM OF THE NECK WITH AND WITHOUT CONTRAST 10/11/2017 HISTORY: Onset of difficulty reading and seeing off to the right side since 10/09/2017. Headaches. Vision improving. COMMENT: MR angiography performed neck vessels prior to and during the intravenous administration of 17 mL MultiHance. By NASCET criteria there does not appear to be hemodynamically significant stenosis at either carotid bifurcation. There is probably mild disease at the right carotid bulb with mild signal loss proximally. Both vertebral arteries are patent and supply the basilar. The right is slightly dominant. The aortic arch branch pattern is normal.     Impression:  By NASCET criteria there does not appear to be hemodynamically significant stenosis at either carotid bifurcation. Likely mild disease at the right carotid bulb. Both vertebral arteries are patent.     Mri Brain With & Without Contrast    Result Date: 10/12/2017  Narrative: MRI OF THE BRAIN WITH AND WITHOUT CONTRAST 10/11/2017 HISTORY: Difficulty reading and trouble seeing off to the right side with a headache left side of eye starting on 10/09/2017. Vision has improved. MRI of the anny was performed prior to and following intravenous administration of 17 mL MultiHance. There is an earlier MRI of the brain from 08/12/2017. There is abnormally restricted diffusion seen in the left posterior cerebral artery distribution including predominantly posterolateral occipital lobe into posterior temporal lobe with subtle localized mass effect and focal effacement. The area of restricted diffusion is about 4.7 x 3.1 cm dimension axial plane. It is not associated with hemorrhagic transformation. There is subtle enhancement on the surface of the involved gyri and the findings are most consistent with now early subacute ischemic insult.  Pattern of involvement would suggest a thromboembolic insult to the left posterior cerebral artery distribution and further clinical assessment is recommended. There is a background of preexisting white matter disease which is moderate in amount, nonspecific and likely due to small vessel disease. Mild signal abnormality in the brainstem is likely due to small vessel disease and there are either multiple lacunes or prominent perivascular spaces in the bilateral basal ganglia and to a lesser extent thalami. These are tiny lesions and it is likely that there is a mixture of both processes (lacunes and prominent perivascular spaces) present. No additional possible intracranial mass effect or pathologic intracranial enhancement is seen after contrast administration. There is no pathologic intracranial enhancement. The visualized paranasal sinuses show minor mucosal thickening in the ethmoid cells and the visualized mastoid air cells are clear.     Impression:  1.  Findings are most consistent with a early subacute thromboembolic insult to the left posterior cerebral artery distribution with area of restricted diffusion and mild localized mass effect involving the left inferolateral occipital lobe to posterolateral temporal lobe. Please correlate further clinically for possible risk factors for thromboembolic disease. No hemorrhagic transformation at this time. No midline shift. 2.  Preexisting probable sequela of small vessel disease.     Xr Chest 1 View    Result Date: 10/10/2017  Narrative: CHEST X-RAY, 10/10/2017:  HISTORY: 67-year-old male in the ED complaining of new onset visual disturbance beginning last evening. Headache.  TECHNIQUE: AP portable upright chest x-ray.  FINDINGS: Heart size and pulmonary vascularity are normal. The lungs are clear. No visible pulmonary infiltrate or pleural effusion. Mild chronic elevation right hemidiaphragm. No change since 08/12/2017.      Impression: No active disease. No  "change since 08/12/2017.  This report was finalized on 10/10/2017 4:25 PM by Dr. Rayo Araujo MD.      Allergies:  is allergic to aspirin; citrus; combivent [ipratropium-albuterol]; and lactose intolerance (gi).    Azar  n/a    Discharge Medications:   Ronnell Rizvi   Home Medication Instructions ALOK:[id number]    Printed on:10/12/17 4516   Medication Information                      atorvastatin (LIPITOR) 40 MG tablet  Take 1 tablet by mouth Every Night.             clopidogrel (PLAVIX) 75 MG tablet  Take 1 tablet by mouth Daily.             Cyanocobalamin (B-12) 1000 MCG/ML kit  Inject 250 mcg as directed 1 (One) Time Per Week. Pt. Stated he takes this medication weekly 250 mcg, pt reports took it last friday             guaiFENesin (MUCINEX) 600 MG 12 hr tablet  Take 1,200 mg by mouth 2 (Two) Times a Day As Needed for Cough.               History of Present Illness: Taken from original HPI on admit:   \"The patient is a 67 YOM who presented to the emergency department secondary to 2 nights of difficulty seeing on the right, trouble reading associated with mild headache over left eye, difficulty naming objects. He went to optometrist at [facility] who sent him to the ER. CT in ER revealed left posterior temporal and occipital subacute infarct and he was admitted for further workup.      At the time of exam, the patient reports his vision has returned, and he has no headache or difficulty naming objects. He reports no other symptoms previously or currently other than as mentioned above.     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 Homonymous hemianopsia: resolved, Bradley Parker following 1,2  Continued on lovenox prophy dose while admitted, home on " "plavix 75 mg daily, atorvastatin 40 mg nightly  No driving until cleared by Jong Joy MD and ophthalmology  Plan outpatient OT/SLP  Zio patch placed, f/u Dr. Sawant 2 weeks  F/U Jong Joy MD 1 week     2. H/O Sinus pause/Wenkebach: noted on EKG here and Echo 10/2016   · \"Left ventricular ejection fraction is normal (Calculated EF = 54%).  · Myocardial perfusion imaging indicates a normal myocardial perfusion study with no evidence of ischemia.  · Impressions are consistent with a low risk study.  · Findings consistent with a normal ECG stress test.  There were sinus pauses on the baseline echo consistent with likely sinus node Wenkebach. These pauses resolved with exercise.\"  F/U Dr. Sawant 2 weeks as above     3. GERD: no acute issues here     4. Chronic bronchitis/emphysema: continued on mucinex twice daily as needed, no acute issues here     5. H/O B12 deficiency: resume home injections at discharge     6. H/O hepatitis: no acute issues here      Last Lab Results:   Lab Results (most recent)     Procedure Component Value Units Date/Time    CBC & Differential [[id number]] Collected:  10/10/17 1551    Specimen:  Blood Updated:  10/10/17 1558    Narrative:       The following orders were created for panel order CBC & Differential.  Procedure                               Abnormality         Status                     ---------                               -----------         ------                     CBC Auto Differential[[id number]]        Abnormal            Final result                 Please view results for these tests on the individual orders.    CBC Auto Differential [[id number]]  (Abnormal) Collected:  10/10/17 1551    Specimen:  Blood Updated:  10/10/17 1558     WBC 5.96 10*3/mm3      RBC 4.94 10*6/mm3      Hemoglobin 15.6 g/dL      Hematocrit 46.3 %      MCV 93.7 fL      MCH 31.6 (H) pg      MCHC 33.7 g/dL      RDW 12.4 %      RDW-SD 42.9 fl      MPV 10.1 fL      Platelets 217 10*3/mm3      Neutrophil % " 60.4 %      Lymphocyte % 26.7 %      Monocyte % 10.4 (H) %      Eosinophil % 1.7 %      Basophil % 0.5 %      Immature Grans % 0.3 %      Neutrophils, Absolute 3.60 10*3/mm3      Lymphocytes, Absolute 1.59 10*3/mm3      Monocytes, Absolute 0.62 10*3/mm3      Eosinophils, Absolute 0.10 10*3/mm3      Basophils, Absolute 0.03 10*3/mm3      Immature Grans, Absolute 0.02 10*3/mm3      nRBC 0.0 /100 WBC     Protime-INR [[id number]]  (Normal) Collected:  10/10/17 1551    Specimen:  Blood Updated:  10/10/17 1612     Protime 12.9 Seconds      INR 0.97    Narrative:       Therapeutic Ranges for INR: 2.0-3.0 (PT 20-30)                              2.5-3.5 (PT 25-34)    aPTT [[id number]]  (Normal) Collected:  10/10/17 1551    Specimen:  Blood Updated:  10/10/17 1612     PTT 25.1 seconds     Narrative:       PTT = The equivalent PTT values for the therapeutic range of heparin levels at 0.1 to 0.7 U/ml are 53 to 110 seconds.    Comprehensive Metabolic Panel [[id number]] Collected:  10/10/17 1551    Specimen:  Blood Updated:  10/10/17 1619     Glucose 75 mg/dL      BUN 18 mg/dL      Creatinine 1.14 mg/dL      Sodium 138 mmol/L      Potassium 3.9 mmol/L      Chloride 100 mmol/L      CO2 27.2 mmol/L      Calcium 9.1 mg/dL      Total Protein 6.9 g/dL      Albumin 4.00 g/dL      ALT (SGPT) 24 U/L      AST (SGOT) 22 U/L      Alkaline Phosphatase 57 U/L      Total Bilirubin 0.4 mg/dL      eGFR Non African Amer 64 mL/min/1.73      Globulin 2.9 gm/dL      A/G Ratio 1.4 g/dL      BUN/Creatinine Ratio 15.8     Anion Gap 10.8 mmol/L     POC Glucose Fingerstick [[id number]]  (Normal) Collected:  10/10/17 1654    Specimen:  Blood Updated:  10/10/17 1702     Glucose 96 mg/dL     Narrative:       Meter: [id number] : [id number] Denny Garciah NURSING ASSISTANT    Hemoglobin A1c [[id number]]  (Normal) Collected:  10/11/17 0404    Specimen:  Blood Updated:  10/11/17 0454     Hemoglobin A1C 5.20 %     Narrative:       Hemoglobin A1C  Ranges:    Increased Risk for Diabetes  5.7% to 6.4%  Diabetes                     >= 6.5%  Diabetic Goal                < 7.0%    Lipid Panel [[id number]]  (Abnormal) Collected:  10/11/17 0404    Specimen:  Blood Updated:  10/11/17 0516     Total Cholesterol 171 mg/dL      Triglycerides 78 mg/dL      HDL Cholesterol 47 mg/dL      LDL Cholesterol  108 (H) mg/dL      VLDL Cholesterol 15.6 mg/dL      LDL/HDL Ratio 2.31    POC Glucose Fingerstick [[id number]]  (Normal) Collected:  10/11/17 1158    Specimen:  Blood Updated:  10/11/17 1206     Glucose 74 mg/dL     Narrative:       Meter: [id number] : [id number] Angy Ivy NURSING ASSISTANT    POC Glucose Fingerstick [[id number]]  (Normal) Collected:  10/11/17 1753    Specimen:  Blood Updated:  10/11/17 1801     Glucose 119 mg/dL     Narrative:       Meter: [id number] : [id number] Angy Haynes NURSING ASSISTANT    POC Glucose Fingerstick [[id number]]  (Normal) Collected:  10/11/17 2355    Specimen:  Blood Updated:  10/12/17 0001     Glucose 99 mg/dL     Narrative:       Meter: [id number] : [id number] Ada AVILA PRZEFERINO        Imaging Results (most recent)     Procedure Component Value Units Date/Time    XR Chest 1 View [[id number]] Collected:  10/10/17 1624     Updated:  10/10/17 1628    Narrative:       CHEST X-RAY, 10/10/2017:     HISTORY:   67-year-old male in the ED complaining of new onset visual disturbance  beginning last evening. Headache.     TECHNIQUE:  AP portable upright chest x-ray.     FINDINGS:  Heart size and pulmonary vascularity are normal. The lungs are clear. No  visible pulmonary infiltrate or pleural effusion. Mild chronic elevation  right hemidiaphragm. No change since 08/12/2017.       Impression:       No active disease. No change since 08/12/2017.     This report was finalized on 10/10/2017 4:25 PM by Dr. Rayo Araujo MD.       CT Head Without Contrast [[id number]] Collected:  10/10/17 1648     Updated:  10/10/17 1709     Narrative:       CT HEAD, NONCONTRAST, 10/10/2017:     HISTORY:  67-year-old male in the ED with new onset visual disturbance and altered  mental status beginning at about midnight last night.     TECHNIQUE:  CT examination of the head without IV contrast. Radiation dose reduction  techniques were utilized, including automated exposure control and  exposure modulation based on body size.     FINDINGS:  The examination shows a subacute cortical and subcortical infarct in the  low left posterior temporal occipital lobe without evidence of  hemorrhagic transformation. This is new since the previous study of  08/12/2017.     No additional new intracranial lesion is demonstrated. Mild generalized  cerebral cortical atrophy. Moderate diffuse low-attenuation white matter  changes, nonspecific but likely related to chronic small vessel disease.  Old lacunar infarcts in the head of the left caudate nucleus.     No evidence of intracranial hemorrhage, mass, mass effect, progressive  ventricular enlargement or additional abnormality.       Impression:       1. Left posterior temporal and occipital subacute infarct. No evidence  of hemorrhagic transformation.  2. Otherwise, no change since 08/12/2017.  3. Stable diffuse low-attenuation white matter changes and mild cerebral  cortical atrophy. Old lacunar infarct involving the head of the left  caudate nucleus.  4. I have discussed the findings by telephone with the ED physician  prior to this dictation.     This report was finalized on 10/10/2017 4:52 PM by Dr. Rayo Araujo MD.       MRI Angiogram Head Without Contrast [[id number]] Updated:  10/11/17 1502    MRI Angiogram Neck With & Without Contrast [[id number]] Updated:  10/11/17 1658    MRI Brain With & Without Contrast [[id number]] Updated:  10/11/17 1717        PROCEDURES: NONE    Condition on Discharge:  Stable    Physical Exam at Discharge  Vital Signs  Temp:  [97.1 °F (36.2 °C)-99.5 °F (37.5 °C)] 97.1 °F (36.2  °C)  Heart Rate:  [71-81] 71  Resp:  [18-20] 18  BP: (112-150)/(58-84) 142/84    Physical Exam:  Physical Exam   Constitutional: Patient appears well-developed and well-nourished and in no acute distress   HEENT:   Head: Normocephalic and atraumatic.   Eyes:  Pupils are equal, round, and reactive to light. EOM are intact. Sclera are anicteric and non-injected.  Mouth and Throat: Patient has moist mucous membranes. Oropharynx is clear of any erythema or exudate.     Neck: Neck supple. No JVD present. No thyromegaly present. No lymphadenopathy present.  Cardiovascular: Regular rate, regular rhythm, S1 normal and S2 normal.  Exam reveals no gallop and no friction rub.  No murmur heard.  Pulmonary/Chest: Lungs are clear to auscultation bilaterally. No respiratory distress. No wheezes. No rhonchi. No rales.   Abdominal: Soft. Bowel sounds are normal. No distension and no mass. There is no hepatosplenomegaly. There is no tenderness.   Musculoskeletal: Normal Muscle tone  Extremities: No edema. Pulses are palpable in all 4 extremities.  Neurological: Patient is alert and oriented to person, place, and time. Cranial nerves II-XII are grossly intact with no focal deficits.  Skin: Skin is warm. No rash noted. Nails show no clubbing.  No cyanosis or erythema.    Discharge Disposition  Home    Visiting Nurse:    No     Home PT/OT:  No     Home Safety Evaluation:  No     DME  None new    Discharge Diet:         Dietary Orders            Start     Ordered    10/10/17 1926  Diet Regular; Cardiac  Diet Effective Now     Comments:  Pt passed swallow evel.   Question Answer Comment   Diet Texture / Consistency Regular    Common Modifiers Cardiac        10/10/17 1926        Activity at Discharge:  As tolerated    Pre-discharge education  Stroke, medications, follow up    Follow-up Appointments  Future Appointments  Date Time Provider Department Center   10/27/2017 1:00 PM  [facility]     Additional  Instructions for the Follow-ups that You Need to Schedule     Discharge Follow-Up With Specified Provider    As directed    To:  Dr. Sawant   Follow Up:  2 Weeks       Discharge Follow-up with PCP    As directed    Follow Up Details:  1 week             Test Results Pending at Discharge: none     CHINEDU Schumacher  10/12/17  12:07 PM    Time: Discharge 30 min (if over 30 minutes give explanation as to why it took greater than 30 minutes)

## 2017-10-12 NOTE — DISCHARGE INSTR - OTHER ORDERS
[facility]  [phone]  Please call to make arrangements to have home sleep study appointment changed to [facility] location.  Spoke with Ember @ [facility]

## 2017-10-12 NOTE — NURSING NOTE
Continued Stay Note  [place] Morton     Patient Name: Ronnell Rizvi  MRN: [id number]  Today's Date: 10/12/2017    Admit Date: 10/10/2017          Discharge Plan       10/12/17 1101    Case Management/Social Work Plan    Additional Comments Call placed to [facility], spoke with Ember. She states she can arrange for patient to  equipment for home sleep study from this facility- request patient call to make arrangements that work with his schedule. Spoke with patient at bedside. Informed phone number and information will be printed on discharge AVS. Will follow for additional needs.              Discharge Codes     None            Jose Valdes RN

## 2017-10-12 NOTE — NURSING NOTE
Continued Stay Note  [place] Morton     Patient Name: Ronnell iRzvi  MRN: [id number]  Today's Date: 10/12/2017    Admit Date: 10/10/2017          Discharge Plan       10/12/17 1354    Case Management/Social Work Plan    Additional Comments Spoke with [facility], appointment for SLP/OT made for Monday 10/16/17 @ 9am. Patient updated and agreeable to appt time. Will continue to follow.      10/12/17 1101    Case Management/Social Work Plan    Additional Comments Call placed to [facility], spoke with Ember. She states she can arrange for patient to  equipment for home sleep study from this facility- request patient call to make arrangements that work with his schedule. Spoke with patient at bedside. Informed phone number and information will be printed on discharge AVS. Will follow for additional needs.              Discharge Codes     None        Expected Discharge Date and Time     Expected Discharge Date Expected Discharge Time    Oct 12, 2017             Jose Valdes RN

## 2017-10-12 NOTE — PLAN OF CARE
Problem: Patient Care Overview (Adult)  Goal: Plan of Care Review  Outcome: Ongoing (interventions implemented as appropriate)    10/12/17 0447   Coping/Psychosocial Response Interventions   Plan Of Care Reviewed With patient   Patient Care Overview   Progress improving   Outcome Evaluation   Outcome Summary/Follow up Plan VSS, NIH remains 0, no complaints or issues this shift. Patient ready to go home.          Problem: Fall Risk (Adult)  Goal: Identify Related Risk Factors and Signs and Symptoms  Outcome: Ongoing (interventions implemented as appropriate)    10/12/17 0447   Fall Risk   Fall Risk: Related Risk Factors age-related changes;fatigue/slow reaction;fear of falling;gait/mobility problems;history of falls   Fall Risk: Signs and Symptoms presence of risk factors       Goal: Absence of Falls  Outcome: Ongoing (interventions implemented as appropriate)    10/12/17 0447   Fall Risk (Adult)   Absence of Falls making progress toward outcome         Problem: Pain, Acute (Adult)  Goal: Identify Related Risk Factors and Signs and Symptoms  Outcome: Ongoing (interventions implemented as appropriate)    10/12/17 0447   Pain, Acute   Related Risk Factors (Acute Pain) knowledge deficit;patient perception       Goal: Acceptable Pain Control/Comfort Level  Outcome: Ongoing (interventions implemented as appropriate)    10/12/17 0447   Pain, Acute (Adult)   Acceptable Pain Control/Comfort Level making progress toward outcome

## 2017-10-16 ENCOUNTER — APPOINTMENT (OUTPATIENT)
Dept: SPEECH THERAPY | Facility: HOSPITAL | Age: 67
End: 2017-10-16

## 2017-10-16 ENCOUNTER — PATIENT OUTREACH (OUTPATIENT)
Dept: CASE MANAGEMENT | Facility: OTHER | Age: 67
End: 2017-10-16

## 2017-10-16 NOTE — OUTREACH NOTE
Pt. Plans on attending [facility] he will also  equipment for a home sleep study. Explained role of Care Advisor and contact information given to patient. Reviewed Gaps in Care and need to schedule Annual Wellness Visit. He has got his flu shot and needs #2 pneumonia. No other questions or concerns voiced at this time.

## 2017-10-17 ENCOUNTER — HOSPITAL ENCOUNTER (OUTPATIENT)
Dept: SPEECH THERAPY | Facility: HOSPITAL | Age: 67
Setting detail: THERAPIES SERIES
Discharge: HOME | End: 2017-10-17

## 2017-10-17 DIAGNOSIS — I69.30 LATE EFFECT OF CEREBROVASCULAR ACCIDENT: Primary | ICD-10-CM

## 2017-10-17 DIAGNOSIS — H53.9 VISUAL DISTURBANCE AS LATE EFFECT OF CEREBROVASCULAR ACCIDENT (CVA): ICD-10-CM

## 2017-10-17 DIAGNOSIS — I69.398 VISUAL DISTURBANCE AS LATE EFFECT OF CEREBROVASCULAR ACCIDENT (CVA): ICD-10-CM

## 2017-10-17 PROCEDURE — 92523 SPEECH SOUND LANG COMPREHEN: CPT

## 2017-10-17 PROCEDURE — G9174 SPEECH LANG CURRENT STATUS: HCPCS

## 2017-10-17 PROCEDURE — G9175 SPEECH LANG GOAL STATUS: HCPCS

## 2017-10-17 NOTE — THERAPY EVALUATION
Outpatient Speech Language Pathology   Adult Speech Language Cognitive Initial Evaluation  [place]     Patient Name: Ronnell Rizvi  : 1950  MRN: [id number]  Today's Date: 10/17/2017        Visit Date: 10/17/2017   Patient Active Problem List   Diagnosis   • Epididymal pain   • Cough   • Anemia, unspecified   • Chest pain   • Health care maintenance   • Vitamin B12 deficiency   • Chronic fatigue   • Dysarthria   • Benign prostatic hypertrophy (BPH) with weak urinary stream   • Chronic bronchitis   • Obstructive sleep apnea syndrome   • CVA (cerebral vascular accident)   • Cerebrovascular accident (CVA)        Past Medical History:   Diagnosis Date   • Allergic rhinitis    • Anal fissure    • Asthma    • B12 deficiency    • Chronic bronchitis    • Dysarthria    • Emphysema lung    • Fatty liver    • GERD (gastroesophageal reflux disease)    • Heart murmur     possible MVP in past documentation   • Hepatitis     thought to be Hepatitis A    • History of pneumonia     With left lower lobe atelectasis and scar tissue, being followed   • Pneumonia     LLL with scar tissue   • Spinal stenosis         Past Surgical History:   Procedure Laterality Date   • COLONOSCOPY     • HAND SURGERY Bilateral    • HERNIA REPAIR     • OTHER SURGICAL HISTORY      inguinal hernia repair for person over age 5   • TONSILLECTOMY           Visit Dx:    ICD-10-CM ICD-9-CM   1. Late effect of cerebrovascular accident I69.30 438.9   2. Visual disturbance as late effect of cerebrovascular accident (CVA) I69.398 438.7    H53.9                  Adult Speech Language - 10/17/17 1600     Background and History    Reason for Referral Pt with difficulty reading following a stroke on   -AD    Stated Goals To be able to read better and quicker.  -AD    Description of Complaint Slow ability to read due to stroke.  -AD    Previous Functional Status Functional in all spheres  -AD    Current Baseline Abilities Independent with the exception of  reading ability.  -AD    Pertinent Medications Clopidogrel; atoravastatin, B12 injections, guafenisin.  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  -AD    Effective Techniques Verbal cue;Other (comment)   Use of tracking w/finger and blocking of prefixes/suffixes  -AD    Expression    Primary Mode of Expression verbal  -AD    Dominant Hand Right  -AD    Expressive Language WFL  -AD    Receptive Language WFL  -AD    Automatic Speech WFL  -AD    Repetition WFL  -AD    Expressive Production WFL  -AD    Pragmatics Comprehension WFL  -AD    Pragmatics Production WFL  -AD    Written Expression WFL  -AD    Oral Motor    Facial Appearance WFL  -AD    Dentition adequate  -AD    Secretions manages secretions (comment)   no anterior loss or excess secretions noted  -AD    Lips WFL  -AD    Tongue WFL  -AD    Palate WFL  -AD    Cheeks WFL  -AD    Jaw WFL  -AD    Dysarthria- Informal Assessment    Tasks Used to Assess Respiratory Support Effect reading;connected speech  -AD    Phonation Effects on Speech WFL  -AD    Tasks Used to Assess Phonation reading;connected speech  -AD    Articulation Effects on Speech WFL  -AD    Tasks Used to Assess Articulation reading;connected speech  -AD    Resonance Effects on Speech WFL  -AD    Tasks Used to Assess Resonance reading;connected speech  -AD    Prosody Effects on Speech WFL  -AD    Tasks Used to Assess Prosody  reading;connected speech  -AD    Reading and Writing    Reading 78/100; 15.6/20 Reading Score for LQ; 7.8/10 Reading Score CQ  -AD      User Key  (r) = Recorded By, (t) = Taken By, (c) = Cosigned By    Initials Name Provider Type    GIOVANA Ballard, MS CCC-SLP Speech Therapist         Patient demonstrates functional reading skills based on assessment via the WAB-R per the reading subtest and the supplemental Writing and Reading subtests. Comprehension and skills are WFL but slow rate noted with occasional errors. He can typically catch his errors and correct on his own. Good use of context information and ability to use his finger to scan and follow along while he is reading.     WAB-R Scores  Reading  A. Comprehension of Sentences 40/40  B. Reading Commands 18/18  C. Written Word-Object Choice Matching 6/6  D. Written Word -Picture Choice Matching 6/6  E. Picture-Written Word Choice Matching 6/6  F. Spoken Word-Written Word Choice Matching 6/6  G. Letter Discrimination 6/6  H. Spelled Word Recognition 6/6  I. Spelling 6/6    Reading Total 78/100    Reading Comprehension Battery for Aphasia  Word-Visual 10/10  Word-Auditory 10/10  Word-Semantic 9/10  Functional Reading 9/10  Synonyms 8/10  Sentence-Picture 10/10  Paragraph-Picture 9/10  Paragraph-Factual 10/10  Paragraph-Inferential 10/10    Again, noted increased time to read, but functional comprehension. Some effect noted in social, avocational and volunteer activities.             OP SLP Education       10/17/17 1215    Education    Barriers to Learning Visual deficit  -AD    Action Taken to Address Barriers Compensatory strategies; recommend follow up appointment with opthalmologist for further assessment of vision.  -AD    Education Provided Described results of evaluation;Patient expressed understanding of evaluation;Patient participated in establishing goals and treatment plan;Patient demonstrated recommended strategies  -AD    Assessed Learning  needs;Learning motivation;Learning preferences;Learning readiness  -AD    Learning Motivation Strong  -AD    Learning Method Explanation;Demonstration;Teach back;Written materials  -AD    Teaching Response Verbalized understanding;Demonstrated understanding  -AD    Education Comments Handouts for home practice provided while in the hospital. Pt states he has not worked on these yet, but will when he gets time.   -AD      User Key  (r) = Recorded By, (t) = Taken By, (c) = Cosigned By    Initials Name Effective Dates    GIOVANA Ballard MS [facility]SLP 06/22/16 -                 SLP OP Goals       10/17/17 1215       Goal Type Needed    Goal Type Needed Other Adult Goals  -AD     Subjective Comments    Subjective Comments Patient was seen for an evaluation for 75 direct minutes. He was alert and cooperative throughout the evaluation.   -AD     Subjective Pain    Able to rate subjective pain? yes  -AD     Pre-Treatment Pain Level 0  -AD     Post-Treatment Pain Level 0  -AD     Other Goals    Other Adult Goal- 1 LTG: Patient will be able to comprehend written material in social/avocational/volunteer work setting independently with spontaneous use of compensatory strategies as needed.   -AD     Status: Other Adult Goal- 1 New  -AD     Other Adult Goal- 2 STG: Patient will improve comprehension of written language skills by reading aloud multiple paragraphs and responding to wh-questions with 80% without cues adn using strategies as needed.  -AD     Status: Other Adult Goal- 2 New  -AD     Other Adult Goal- 3 Patient will improve comprehension of writtent language skills by stating compensatory strategies to utilize when breakdowns in reading comprehension occur with 80% consistently without cues.   -AD     Status: Other Adult Goal- 3 New  -AD     SLP Time Calculation    SLP Goal Re-Cert Due Date 11/17/17  -AD       User Key  (r) = Recorded By, (t) = Taken By, (c) = Cosigned By    Initials Name Provider Type    GIOVANA Gonsalez  RADHA Ballard MS [facility]-SLP Speech Therapist                OP SLP Assessment/Plan - 10/17/17 1215     SLP Assessment    Functional Problems Speech Language- Adult/Cognition  -AD    Impact on Function: Adult Speech Language/Cognition Unable to understand written/spoken language;Unable to complete specified job requirements;Difficulty participating in avocational activities  -AD    Clinical Impression: Speech Language-Adult/Congnition Mild:;Other (comment)   alexia r/t recent stroke  -AD    Functional Problems Comment Difficulty reading information for work, social and avocational activities. Needs significantly increased time to perform.   -AD    Clinical Impression Comments Mild alexia which has improved since onset of stroke symptoms.   -AD    Please refer to paper survey for additional self-reported information Yes  -AD    Please refer to items scanned into chart for additional diagnostic informaiton and handouts as provided by clinician Yes  -AD    SLP Diagnosis Mild alexia   -AD    Prognosis Excellent (comment)   Due to motivation and spontaneous recovery at this time.   -AD    Patient/caregiver participated in establishment of treatment plan and goals Yes  -AD    Patient would benefit from skilled therapy intervention Yes  -AD    SLP Plan    Frequency once weekly   -AD    Duration 1 month  -AD    Planned CPT's? SLP INDIVIDUAL SPEECH THERAPY: 67250;SLP SPEECH & LANGUAGE EVAL: 26956  -AD    Expected Duration Therapy Session (min) 30-45 minutes  -AD    Plan Comments Will contact patient to set up next appointment.   -AD      User Key  (r) = Recorded By, (t) = Taken By, (c) = Cosigned By    Initials Name Provider Type    AD Sunshine Ballard, MS CCC-SLP Speech Therapist         Time Calculation:   SLP Start Time: 1100  SLP Stop Time: 1215  SLP Time Calculation (min): 75 min  SLP Non-Billable Time (min): 0 min  Total Timed Code Minutes- SLP: 0 minute(s)    Therapy Charges for Today     Code Description Service Date  Service Provider Modifiers Qty    23488254537 HC ST EVAL SPEECH AND PROD W LANG  5 10/17/2017 Sunshine Ballard, MS CCC-SLP GN 1    94842435680 HC ST OTHER FUNCT LIMIT CURRENT 10/17/2017 Sunshine Ballard MS CCC-SLP GN, CI 1    04696136395 HC ST OTHER FUNCT LIMIT PROJECTED 10/17/2017 Sunshine Ballard, MS CCC-SLP TEA, CH 1          SLP G-Codes  SLP NOMS Used?: Yes  Functional Limitations: Other Speech Language Pathology (Reading)  Other Speech-Language Pathology Functional Limitation Current Status (): At least 1 percent but less than 20 percent impaired, limited or restricted (NOMS Reading)  Other Speech-Language Pathology Functional Limitation Goal Status (): 0 percent impaired, limited or restricted (NOMS Reading)      Sunshine Ballard, MS CABRERA-SLP  10/17/2017

## 2017-10-24 ENCOUNTER — OFFICE VISIT (OUTPATIENT)
Dept: CARDIOLOGY | Facility: CLINIC | Age: 67
End: 2017-10-24

## 2017-10-24 VITALS
HEART RATE: 67 BPM | BODY MASS INDEX: 25.58 KG/M2 | HEIGHT: 73 IN | DIASTOLIC BLOOD PRESSURE: 76 MMHG | SYSTOLIC BLOOD PRESSURE: 118 MMHG | WEIGHT: 193 LBS

## 2017-10-24 DIAGNOSIS — I44.1 MOBITZ (TYPE) I (WENCKEBACH'S) ATRIOVENTRICULAR BLOCK: ICD-10-CM

## 2017-10-24 DIAGNOSIS — Z86.73 HISTORY OF STROKE: Primary | ICD-10-CM

## 2017-10-24 PROCEDURE — 99214 OFFICE O/P EST MOD 30 MIN: CPT | Performed by: INTERNAL MEDICINE

## 2017-10-24 PROCEDURE — 93000 ELECTROCARDIOGRAM COMPLETE: CPT | Performed by: INTERNAL MEDICINE

## 2017-10-24 NOTE — PROGRESS NOTES
Date of Office Visit: 10/24/2017  Encounter Provider: Christopher Sawant MD  Place of Service: [facility] CARDIOLOGY  Patient Name: Ronnell Rizvi  :1950    Chief Complaint   Patient presents with   • Stroke   :     HPI: Ronnell Rizvi is a 67 y.o. male who presents today to follow up after recent hospitalization.  I met him in 2016 when he was hospitalized for chest pain.  A Cardiolite stress was normal (EF 54%).  He did have some Wenckebach at the beginning of stress which normalized with exercise.    In 2017 he had an episode of visual changes that resolved on their own.  The next morning it happened again and he came to the ED and was diagnosed with a stroke of the left occipital/temporal lobe.  He was found to have some diffuse small vessel disease.  An echo wasn't performed but a Zio patch was placed.  We were not consulted in the hospital.  He was placed on clopidogrel and atorvastatin.    His visual changes have resolved.  He denies palpitations, syncope, lightheadedness, edema, orthopnea, PND, chest pain, or dyspnea.  He's very active.        Past Medical History:   Diagnosis Date   • Allergic rhinitis    • Anal fissure    • Asthma    • B12 deficiency    • Chronic bronchitis    • Emphysema lung    • Fatty liver    • GERD (gastroesophageal reflux disease)    • Heart murmur     possible MVP in past documentation   • Hepatitis     thought to be Hepatitis A    • History of pneumonia     With left lower lobe atelectasis and scar tissue, being followed   • Pneumonia     LLL with scar tissue   • Spinal stenosis    • Stroke     10/2017: left occipital/temporal       Past Surgical History:   Procedure Laterality Date   • COLONOSCOPY     • HAND SURGERY Bilateral    • HERNIA REPAIR     • OTHER SURGICAL HISTORY      inguinal hernia repair for person over age 5   • TONSILLECTOMY         Social History     Social History   • Marital status:      Spouse name: Joey   •  "Number of children: N/A   • Years of education: N/A     Occupational History   •  Retired     Social History Main Topics   • Smoking status: Former Smoker   • Smokeless tobacco: Never Used   • Alcohol use No   • Drug use: No   • Sexual activity: Defer     Other Topics Concern   • Not on file     Social History Narrative       Family History   Problem Relation Age of Onset   • Obesity Mother    • Clotting disorder Mother      Upper extremities   • Alcohol abuse Father    • Cancer Father 63     Esophagus and lung   • Other Father      cardiac disorder   • Kidney disease Sister    • Kidney failure Sister    • Drug abuse Paternal Uncle    • Stroke Sister    • Throat cancer Sister        Review of Systems   Neurological:        As per HPI   All other systems reviewed and are negative.      Allergies   Allergen Reactions   • Aspirin Nausea Only   • Citrus      Blisters on mouth     • Combivent [Ipratropium-Albuterol]    • Lactose Intolerance (Gi)          Current Outpatient Prescriptions:   •  atorvastatin (LIPITOR) 40 MG tablet, Take 1 tablet by mouth Every Night., Disp: 30 tablet, Rfl: 1  •  clopidogrel (PLAVIX) 75 MG tablet, Take 1 tablet by mouth Daily., Disp: 30 tablet, Rfl: 1  •  Cyanocobalamin (B-12) 1000 MCG/ML kit, Inject 250 mcg as directed 1 (One) Time Per Week. Pt. Stated he takes this medication weekly 250 mcg, pt reports took it last friday, Disp: , Rfl:   •  guaiFENesin (MUCINEX) 600 MG 12 hr tablet, Take 1,200 mg by mouth 2 (Two) Times a Day As Needed for Cough., Disp: , Rfl:      Objective:     Vitals:    10/24/17 0848   BP: 118/76   Pulse: 67   Weight: 193 lb (87.5 kg)   Height: 73\" (185.4 cm)     Body mass index is 25.46 kg/(m^2).    Physical Exam   Constitutional: He is oriented to person, place, and time. He appears well-developed and well-nourished.   HENT:   Head: Normocephalic.   Nose: Nose normal.   Mouth/Throat: Oropharynx is clear and moist.   Eyes: Conjunctivae and EOM are normal. " Pupils are equal, round, and reactive to light.   Neck: Normal range of motion. No JVD present.   Cardiovascular: Normal rate, regular rhythm, normal heart sounds and intact distal pulses.    No murmur heard.  Pulmonary/Chest: Effort normal and breath sounds normal.   Abdominal: Soft. He exhibits no mass. There is no tenderness.   Musculoskeletal: Normal range of motion. He exhibits no edema.   Lymphadenopathy:     He has no cervical adenopathy.   Neurological: He is alert and oriented to person, place, and time. No cranial nerve deficit.   Skin: Skin is warm and dry. No rash noted.   Psychiatric: He has a normal mood and affect. His behavior is normal. Judgment and thought content normal.   Vitals reviewed.        ECG 12 Lead  Date/Time: 10/24/2017 10:20 AM  Performed by: CHRISTOPHER SAWANT  Authorized by: CHRISTOPHER SAWANT   Comparison: compared with previous ECG   Similar to previous ECG  Rhythm: sinus rhythm  Conduction: conduction normal  ST Segments: ST segments normal  T Waves: T waves normal  QRS axis: normal  Other findings: PRWP  Clinical impression: non-specific ECG              Assessment:       Diagnosis Plan   1. History of stroke  Adult Transthoracic Echo Complete W/ Cont if Necessary Per Protocol   2. Mobitz (type) I (Wenckebach's) atrioventricular block  Adult Transthoracic Echo Complete W/ Cont if Necessary Per Protocol          Plan:       1.  He had a left occipital/temporal stroke which was presumed to be due to small vessel disease. He's now on clopidogrel and atorvastatin.  I am going to complete the workup by getting an echo with bubble study, and I will follow up on the Zio patch.    2.  He had some Wenckebach during his treadmill stress last year.  He has no symptoms of advanced conduction system disease.  The Zio will shed more light on this.    Sincerely,       Christopher Sawant MD

## 2017-10-27 ENCOUNTER — APPOINTMENT (OUTPATIENT)
Dept: SLEEP MEDICINE | Facility: HOSPITAL | Age: 67
End: 2017-10-27
Attending: INTERNAL MEDICINE

## 2017-10-27 ENCOUNTER — HOSPITAL ENCOUNTER (OUTPATIENT)
Dept: CARDIOLOGY | Facility: HOSPITAL | Age: 67
Discharge: HOME | End: 2017-10-27
Attending: INTERNAL MEDICINE | Admitting: INTERNAL MEDICINE

## 2017-10-27 VITALS
OXYGEN SATURATION: 96 % | DIASTOLIC BLOOD PRESSURE: 71 MMHG | HEIGHT: 73 IN | BODY MASS INDEX: 25.58 KG/M2 | HEART RATE: 55 BPM | WEIGHT: 193 LBS | SYSTOLIC BLOOD PRESSURE: 146 MMHG

## 2017-10-27 DIAGNOSIS — I44.1 MOBITZ (TYPE) I (WENCKEBACH'S) ATRIOVENTRICULAR BLOCK: ICD-10-CM

## 2017-10-27 DIAGNOSIS — Z86.73 HISTORY OF STROKE: ICD-10-CM

## 2017-10-27 LAB
BH CV ECHO MEAS - ACS: 2.1 CM
BH CV ECHO MEAS - AI DEC SLOPE: 327 CM/SEC^2
BH CV ECHO MEAS - AI MAX PG: 90.6 MMHG
BH CV ECHO MEAS - AI MAX VEL: 476 CM/SEC
BH CV ECHO MEAS - AI P1/2T: 426.4 MSEC
BH CV ECHO MEAS - AO MAX PG (FULL): 2.5 MMHG
BH CV ECHO MEAS - AO MAX PG: 7.5 MMHG
BH CV ECHO MEAS - AO MEAN PG (FULL): 1 MMHG
BH CV ECHO MEAS - AO MEAN PG: 4 MMHG
BH CV ECHO MEAS - AO ROOT AREA (BSA CORRECTED): 1.7
BH CV ECHO MEAS - AO ROOT AREA: 10.8 CM^2
BH CV ECHO MEAS - AO ROOT DIAM: 3.7 CM
BH CV ECHO MEAS - AO V2 MAX: 137 CM/SEC
BH CV ECHO MEAS - AO V2 MEAN: 87.3 CM/SEC
BH CV ECHO MEAS - AO V2 VTI: 34.8 CM
BH CV ECHO MEAS - AVA(I,A): 2.9 CM^2
BH CV ECHO MEAS - AVA(I,D): 2.9 CM^2
BH CV ECHO MEAS - AVA(V,A): 2.8 CM^2
BH CV ECHO MEAS - AVA(V,D): 2.8 CM^2
BH CV ECHO MEAS - BSA(HAYCOCK): 2.1 M^2
BH CV ECHO MEAS - BSA: 2.1 M^2
BH CV ECHO MEAS - BZI_BMI: 25.5 KILOGRAMS/M^2
BH CV ECHO MEAS - BZI_METRIC_HEIGHT: 185.4 CM
BH CV ECHO MEAS - BZI_METRIC_WEIGHT: 87.5 KG
BH CV ECHO MEAS - CONTRAST EF (2CH): 64 ML/M^2
BH CV ECHO MEAS - CONTRAST EF 4CH: 63.8 ML/M^2
BH CV ECHO MEAS - EDV(CUBED): 141.4 ML
BH CV ECHO MEAS - EDV(MOD-SP2): 113 ML
BH CV ECHO MEAS - EDV(MOD-SP4): 128 ML
BH CV ECHO MEAS - EDV(TEICH): 130.1 ML
BH CV ECHO MEAS - EF(CUBED): 74.6 %
BH CV ECHO MEAS - EF(MOD-SP2): 64 %
BH CV ECHO MEAS - EF(MOD-SP4): 63.8 %
BH CV ECHO MEAS - EF(TEICH): 66.1 %
BH CV ECHO MEAS - ESV(CUBED): 35.9 ML
BH CV ECHO MEAS - ESV(MOD-SP2): 40.7 ML
BH CV ECHO MEAS - ESV(MOD-SP4): 46.4 ML
BH CV ECHO MEAS - ESV(TEICH): 44.1 ML
BH CV ECHO MEAS - FS: 36.7 %
BH CV ECHO MEAS - IVS/LVPW: 1.1
BH CV ECHO MEAS - IVSD: 0.75 CM
BH CV ECHO MEAS - LA DIMENSION: 3.5 CM
BH CV ECHO MEAS - LA/AO: 0.95
BH CV ECHO MEAS - LAT PEAK E' VEL: 9 CM/SEC
BH CV ECHO MEAS - LV DIASTOLIC VOL/BSA (35-75): 60.4 ML/M^2
BH CV ECHO MEAS - LV MASS(C)D: 122.7 GRAMS
BH CV ECHO MEAS - LV MASS(C)DI: 57.9 GRAMS/M^2
BH CV ECHO MEAS - LV MAX PG: 5 MMHG
BH CV ECHO MEAS - LV MEAN PG: 3 MMHG
BH CV ECHO MEAS - LV SYSTOLIC VOL/BSA (12-30): 21.9 ML/M^2
BH CV ECHO MEAS - LV V1 MAX: 112 CM/SEC
BH CV ECHO MEAS - LV V1 MEAN: 74.1 CM/SEC
BH CV ECHO MEAS - LV V1 VTI: 29.3 CM
BH CV ECHO MEAS - LVIDD: 5.2 CM
BH CV ECHO MEAS - LVIDS: 3.3 CM
BH CV ECHO MEAS - LVLD AP2: 8.7 CM
BH CV ECHO MEAS - LVLD AP4: 8.9 CM
BH CV ECHO MEAS - LVLS AP2: 7.3 CM
BH CV ECHO MEAS - LVLS AP4: 7.1 CM
BH CV ECHO MEAS - LVOT AREA (M): 3.5 CM^2
BH CV ECHO MEAS - LVOT AREA: 3.5 CM^2
BH CV ECHO MEAS - LVOT DIAM: 2.1 CM
BH CV ECHO MEAS - LVPWD: 0.65 CM
BH CV ECHO MEAS - MED PEAK E' VEL: 7 CM/SEC
BH CV ECHO MEAS - MR MAX PG: 81.4 MMHG
BH CV ECHO MEAS - MR MAX VEL: 451 CM/SEC
BH CV ECHO MEAS - MV A DUR: 0.17 SEC
BH CV ECHO MEAS - MV A MAX VEL: 107 CM/SEC
BH CV ECHO MEAS - MV DEC SLOPE: 569 CM/SEC^2
BH CV ECHO MEAS - MV DEC TIME: 0.29 SEC
BH CV ECHO MEAS - MV E MAX VEL: 83.9 CM/SEC
BH CV ECHO MEAS - MV E/A: 0.78
BH CV ECHO MEAS - MV MAX PG: 5.9 MMHG
BH CV ECHO MEAS - MV MEAN PG: 2 MMHG
BH CV ECHO MEAS - MV P1/2T MAX VEL: 99.9 CM/SEC
BH CV ECHO MEAS - MV P1/2T: 51.4 MSEC
BH CV ECHO MEAS - MV V2 MAX: 121 CM/SEC
BH CV ECHO MEAS - MV V2 MEAN: 62 CM/SEC
BH CV ECHO MEAS - MV V2 VTI: 33.7 CM
BH CV ECHO MEAS - MVA P1/2T LCG: 2.2 CM^2
BH CV ECHO MEAS - MVA(P1/2T): 4.3 CM^2
BH CV ECHO MEAS - MVA(VTI): 3 CM^2
BH CV ECHO MEAS - PA ACC TIME: 0.11 SEC
BH CV ECHO MEAS - PA MAX PG (FULL): 2.1 MMHG
BH CV ECHO MEAS - PA MAX PG: 3.3 MMHG
BH CV ECHO MEAS - PA PR(ACCEL): 31.3 MMHG
BH CV ECHO MEAS - PA V2 MAX: 91.2 CM/SEC
BH CV ECHO MEAS - PULM A REVS DUR: 0.16 SEC
BH CV ECHO MEAS - PULM A REVS VEL: 38.1 CM/SEC
BH CV ECHO MEAS - PULM DIAS VEL: 46.5 CM/SEC
BH CV ECHO MEAS - PULM S/D: 1.3
BH CV ECHO MEAS - PULM SYS VEL: 61.8 CM/SEC
BH CV ECHO MEAS - PVA(V,A): 2.7 CM^2
BH CV ECHO MEAS - PVA(V,D): 2.7 CM^2
BH CV ECHO MEAS - QP/QS: 0.62
BH CV ECHO MEAS - RAP SYSTOLE: 3 MMHG
BH CV ECHO MEAS - RV MAX PG: 1.2 MMHG
BH CV ECHO MEAS - RV MEAN PG: 1 MMHG
BH CV ECHO MEAS - RV V1 MAX: 55.4 CM/SEC
BH CV ECHO MEAS - RV V1 MEAN: 39.9 CM/SEC
BH CV ECHO MEAS - RV V1 VTI: 13.8 CM
BH CV ECHO MEAS - RVOT AREA: 4.5 CM^2
BH CV ECHO MEAS - RVOT DIAM: 2.4 CM
BH CV ECHO MEAS - RVSP: 20.1 MMHG
BH CV ECHO MEAS - SI(AO): 176.5 ML/M^2
BH CV ECHO MEAS - SI(CUBED): 49.8 ML/M^2
BH CV ECHO MEAS - SI(LVOT): 47.9 ML/M^2
BH CV ECHO MEAS - SI(MOD-SP2): 34.1 ML/M^2
BH CV ECHO MEAS - SI(MOD-SP4): 38.5 ML/M^2
BH CV ECHO MEAS - SI(TEICH): 40.6 ML/M^2
BH CV ECHO MEAS - SV(AO): 374.2 ML
BH CV ECHO MEAS - SV(CUBED): 105.5 ML
BH CV ECHO MEAS - SV(LVOT): 101.5 ML
BH CV ECHO MEAS - SV(MOD-SP2): 72.3 ML
BH CV ECHO MEAS - SV(MOD-SP4): 81.6 ML
BH CV ECHO MEAS - SV(RVOT): 62.4 ML
BH CV ECHO MEAS - SV(TEICH): 86 ML
BH CV ECHO MEAS - TAPSE (>1.6): 2.4 CM2
BH CV ECHO MEAS - TR MAX VEL: 207 CM/SEC
BH CV VAS BP RIGHT ARM: NORMAL MMHG
BH CV XLRA - RV BASE: 3.4 CM
BH CV XLRA - TDI S': 13 CM/SEC
E/E' RATIO: 11
LEFT ATRIUM VOLUME INDEX: 30 ML/M2
LEFT ATRIUM VOLUME: 57 CM3
LV EF 2D ECHO EST: 64 %

## 2017-10-27 PROCEDURE — 0399T ADULT TRANSTHORACIC ECHO COMPLETE W/ CONT IF NECESSARY PER PROTOCOL: CPT | Performed by: INTERNAL MEDICINE

## 2017-10-27 PROCEDURE — 93306 TTE W/DOPPLER COMPLETE: CPT | Performed by: INTERNAL MEDICINE

## 2017-10-27 PROCEDURE — 0399T HC MYOCARDL STRAIN IMAG QUAN ASSMT PER SESS: CPT

## 2017-10-27 PROCEDURE — 93306 TTE W/DOPPLER COMPLETE: CPT

## 2017-10-31 ENCOUNTER — HOSPITAL ENCOUNTER (OUTPATIENT)
Dept: SLEEP MEDICINE | Facility: HOSPITAL | Age: 67
Discharge: HOME | End: 2017-10-31
Attending: INTERNAL MEDICINE | Admitting: INTERNAL MEDICINE

## 2017-10-31 DIAGNOSIS — G47.33 OSA (OBSTRUCTIVE SLEEP APNEA): ICD-10-CM

## 2017-10-31 DIAGNOSIS — R06.83 SNORING: ICD-10-CM

## 2017-10-31 PROCEDURE — 95806 SLEEP STUDY UNATT&RESP EFFT: CPT

## 2017-11-03 ENCOUNTER — TELEPHONE (OUTPATIENT)
Dept: CARDIOLOGY | Facility: CLINIC | Age: 67
End: 2017-11-03

## 2017-11-03 PROCEDURE — 0298T ZIO PATCH: CPT | Performed by: INTERNAL MEDICINE

## 2017-11-03 NOTE — TELEPHONE ENCOUNTER
I reviewed the monitor and called him but got VM so I left a message.    This isn't long enough to be considered AF.  This is just a run of atrial ectopy.  No true AF seen in two weeks.  Stroke was felt to be due to small vessel disease and he's on clopidogrel.    No high grade AV block seen either.

## 2017-11-03 NOTE — TELEPHONE ENCOUNTER
----- Message from Marva Ogden MD sent at 11/3/2017  8:10 AM EDT -----  Abnormal Ziopatch - see results.     thx-  RM

## 2017-11-14 ENCOUNTER — TELEPHONE (OUTPATIENT)
Dept: CARDIOLOGY | Facility: CLINIC | Age: 67
End: 2017-11-14

## 2017-11-14 NOTE — TELEPHONE ENCOUNTER
Pt called and said that he had his sleep study done and that they told him he stops breathing about 20 times at night. During the times that he stops breathing his O2 will drop into the 80s. He just wanted to inform you.

## 2017-11-15 ENCOUNTER — TELEPHONE (OUTPATIENT)
Dept: SLEEP MEDICINE | Facility: HOSPITAL | Age: 67
End: 2017-11-15

## 2018-01-17 ENCOUNTER — OFFICE VISIT (OUTPATIENT)
Dept: SLEEP MEDICINE | Facility: HOSPITAL | Age: 68
End: 2018-01-17
Attending: INTERNAL MEDICINE

## 2018-01-17 VITALS
HEIGHT: 73 IN | BODY MASS INDEX: 25.84 KG/M2 | SYSTOLIC BLOOD PRESSURE: 168 MMHG | DIASTOLIC BLOOD PRESSURE: 79 MMHG | HEART RATE: 65 BPM | WEIGHT: 195 LBS

## 2018-01-17 DIAGNOSIS — G47.33 OBSTRUCTIVE SLEEP APNEA SYNDROME: Primary | ICD-10-CM

## 2018-01-17 PROCEDURE — G0463 HOSPITAL OUTPT CLINIC VISIT: HCPCS

## 2018-01-17 NOTE — PROGRESS NOTES
Sleep clinic follow up note.  [facility] SLEEP MEDICINE    Ronnell Rizvi  68 y.o.  male  1950    PCP: Chase Haque MD      Date of visit: 1/17/2018    INTERM HISTORY:  This is a 68 y.o. years old patient who has a history of sleep apnea AHI 20/hr and is on CPAP.  Patient reports good compliance with the device and has no problems.  Denies snoring, daytime excessive sleepiness.   The Smart card download has been reviewed and shows the following..  Compliance;76 %  > 4 hr use, 33 %  Residual AHI: 15 /hr (normal less than 5)      PAST MEDICAL HISTORY:  · Obstructive sleep apnea  Past Medical History:   Diagnosis Date   • Allergic rhinitis    • Anal fissure    • Asthma    • B12 deficiency    • Chronic bronchitis    • Emphysema lung    • Fatty liver    • GERD (gastroesophageal reflux disease)    • Heart murmur     possible MVP in past documentation   • Hepatitis     thought to be Hepatitis A    • History of pneumonia     With left lower lobe atelectasis and scar tissue, being followed   • Pneumonia     LLL with scar tissue   • Spinal stenosis    • Stroke     10/2017: left occipital/temporal       MEDICATIONS:    Current Outpatient Prescriptions:   •  atorvastatin (LIPITOR) 40 MG tablet, Take 1 tablet by mouth Every Night., Disp: 30 tablet, Rfl: 1  •  clopidogrel (PLAVIX) 75 MG tablet, Take 1 tablet by mouth Daily., Disp: 30 tablet, Rfl: 1  •  Cyanocobalamin (B-12) 1000 MCG/ML kit, Inject 250 mcg as directed 1 (One) Time Per Week. Pt. Stated he takes this medication weekly 250 mcg, pt reports took it last friday, Disp: , Rfl:   •  guaiFENesin (MUCINEX) 600 MG 12 hr tablet, Take 1,200 mg by mouth 2 (Two) Times a Day As Needed for Cough., Disp: , Rfl:     SOCIAL, FAMILY HISTORY: Medical record is reviewed and noted.    REVIEW OF SYSTEMS: Martinez Sleepiness Scale :Total score: 3     PHYSICAL EXAMINATION:  Vitals:    01/17/18 1500   BP: 168/79   Pulse: 65   Weight: 88.5 kg (195 lb)   Height: 185.4 cm  "(73\")    Body mass index is 25.73 kg/(m^2).    HEENT: Unremarkable, pupils are round equal and reacting to light   NECK: No lymphadenopathy, throat is clear, oral airway Mallampati class III  RESPRATORY SYSTEM: Breath sounds are equal on both sides and are normal, no wheezes or crackles  CARDIOVASULAR SYSTEM: Heart rate is regular without murmur  ABDOMEN: Soft, no ascites, no hepatosplenomegaly.  EXTREMITIES: No cyanosis, clubbing or edema    ASSESSMENT AND PLAN:  · Obstructive sleep apnea, this patient has moderate sleep apnea .  He is using the CPAP but he needs to use it more than 4 over 60.  The full benefit.  I have talked with patient about the download data and encouraged him to continue to use his CPAP.  Moreover he still has high residual AHI,  going to increase the CPAP 14 cm.  He will return to the clinic for another check in about 2 months The DME company is [facility]  · COPD      Lora Block MD, FCCP  Pulmonary, Critical Care and sleep Medicine                 "

## 2018-02-20 ENCOUNTER — LAB (OUTPATIENT)
Dept: OTHER | Facility: HOSPITAL | Age: 68
End: 2018-02-20

## 2018-02-20 ENCOUNTER — OFFICE VISIT (OUTPATIENT)
Dept: ONCOLOGY | Facility: CLINIC | Age: 68
End: 2018-02-20

## 2018-02-20 VITALS
BODY MASS INDEX: 24.83 KG/M2 | OXYGEN SATURATION: 97 % | SYSTOLIC BLOOD PRESSURE: 124 MMHG | HEIGHT: 74 IN | DIASTOLIC BLOOD PRESSURE: 80 MMHG | TEMPERATURE: 97.8 F | RESPIRATION RATE: 16 BRPM | HEART RATE: 52 BPM | WEIGHT: 193.5 LBS

## 2018-02-20 DIAGNOSIS — I69.328 SPEECH OR LANGUAGE DEFICIT, POST-STROKE: ICD-10-CM

## 2018-02-20 DIAGNOSIS — E53.8 VITAMIN B12 DEFICIENCY: Primary | ICD-10-CM

## 2018-02-20 DIAGNOSIS — I63.012 CEREBROVASCULAR ACCIDENT (CVA) DUE TO THROMBOSIS OF LEFT VERTEBRAL ARTERY (HCC): ICD-10-CM

## 2018-02-20 DIAGNOSIS — E53.8 VITAMIN B12 DEFICIENCY: ICD-10-CM

## 2018-02-20 DIAGNOSIS — N40.1 BENIGN PROSTATIC HYPERPLASIA WITH WEAK URINARY STREAM: Primary | ICD-10-CM

## 2018-02-20 DIAGNOSIS — R39.12 BENIGN PROSTATIC HYPERPLASIA WITH WEAK URINARY STREAM: Primary | ICD-10-CM

## 2018-02-20 DIAGNOSIS — D51.3 OTHER DIETARY VITAMIN B12 DEFICIENCY ANEMIA: ICD-10-CM

## 2018-02-20 DIAGNOSIS — R53.82 CHRONIC FATIGUE: ICD-10-CM

## 2018-02-20 LAB
ALBUMIN SERPL-MCNC: 4.1 G/DL (ref 3.5–5.2)
ALBUMIN/GLOB SERPL: 1.6 G/DL
ALP SERPL-CCNC: 62 U/L (ref 39–117)
ALT SERPL W P-5'-P-CCNC: 24 U/L (ref 1–41)
ANION GAP SERPL CALCULATED.3IONS-SCNC: 11.5 MMOL/L
AST SERPL-CCNC: 23 U/L (ref 1–40)
BASOPHILS # BLD AUTO: 0.03 10*3/MM3 (ref 0–0.2)
BASOPHILS NFR BLD AUTO: 0.6 % (ref 0–1.5)
BILIRUB SERPL-MCNC: 0.5 MG/DL (ref 0.1–1.2)
BUN BLD-MCNC: 15 MG/DL (ref 8–23)
BUN/CREAT SERPL: 12.8 (ref 7–25)
CALCIUM SPEC-SCNC: 9 MG/DL (ref 8.6–10.5)
CHLORIDE SERPL-SCNC: 101 MMOL/L (ref 98–107)
CHOLEST SERPL-MCNC: 142 MG/DL (ref 0–200)
CO2 SERPL-SCNC: 27.5 MMOL/L (ref 22–29)
CREAT BLD-MCNC: 1.17 MG/DL (ref 0.76–1.27)
CRP SERPL-MCNC: 0.04 MG/DL (ref 0–0.5)
DEPRECATED RDW RBC AUTO: 40.8 FL (ref 37–54)
EOSINOPHIL # BLD AUTO: 0.06 10*3/MM3 (ref 0–0.7)
EOSINOPHIL NFR BLD AUTO: 1.1 % (ref 0.3–6.2)
ERYTHROCYTE [DISTWIDTH] IN BLOOD BY AUTOMATED COUNT: 12.3 % (ref 11.5–14.5)
ERYTHROCYTE [SEDIMENTATION RATE] IN BLOOD: 1 MM/HR (ref 0–20)
FIBRINOGEN PPP-MCNC: 374 MG/DL (ref 219–464)
GFR SERPL CREATININE-BSD FRML MDRD: 62 ML/MIN/1.73
GLOBULIN UR ELPH-MCNC: 2.5 GM/DL
GLUCOSE BLD-MCNC: 97 MG/DL (ref 65–99)
HCT VFR BLD AUTO: 44 % (ref 40.4–52.2)
HDLC SERPL-MCNC: 51 MG/DL (ref 40–60)
HGB BLD-MCNC: 15.2 G/DL (ref 13.7–17.6)
IMM GRANULOCYTES # BLD: 0.02 10*3/MM3 (ref 0–0.03)
IMM GRANULOCYTES NFR BLD: 0.4 % (ref 0–0.5)
LDH SERPL-CCNC: 171 U/L (ref 135–225)
LDLC SERPL CALC-MCNC: 76 MG/DL (ref 0–100)
LDLC/HDLC SERPL: 1.49 {RATIO}
LYMPHOCYTES # BLD AUTO: 1.62 10*3/MM3 (ref 0.9–4.8)
LYMPHOCYTES NFR BLD AUTO: 29.8 % (ref 19.6–45.3)
MCH RBC QN AUTO: 30.9 PG (ref 27–32.7)
MCHC RBC AUTO-ENTMCNC: 34.5 G/DL (ref 32.6–36.4)
MCV RBC AUTO: 89.4 FL (ref 79.8–96.2)
MONOCYTES # BLD AUTO: 0.58 10*3/MM3 (ref 0.2–1.2)
MONOCYTES NFR BLD AUTO: 10.7 % (ref 5–12)
NEUTROPHILS # BLD AUTO: 3.13 10*3/MM3 (ref 1.9–8.1)
NEUTROPHILS NFR BLD AUTO: 57.4 % (ref 42.7–76)
NRBC BLD MANUAL-RTO: 0 /100 WBC (ref 0–0)
PLATELET # BLD AUTO: 208 10*3/MM3 (ref 140–500)
PMV BLD AUTO: 9.9 FL (ref 6–12)
POTASSIUM BLD-SCNC: 4.2 MMOL/L (ref 3.5–5.2)
PROT SERPL-MCNC: 6.6 G/DL (ref 6–8.5)
RBC # BLD AUTO: 4.92 10*6/MM3 (ref 4.6–6)
SODIUM BLD-SCNC: 140 MMOL/L (ref 136–145)
TRIGL SERPL-MCNC: 75 MG/DL (ref 0–150)
VLDLC SERPL-MCNC: 15 MG/DL (ref 5–40)
WBC NRBC COR # BLD: 5.44 10*3/MM3 (ref 4.5–10.7)

## 2018-02-20 PROCEDURE — 85651 RBC SED RATE NONAUTOMATED: CPT | Performed by: INTERNAL MEDICINE

## 2018-02-20 PROCEDURE — 85025 COMPLETE CBC W/AUTO DIFF WBC: CPT | Performed by: INTERNAL MEDICINE

## 2018-02-20 PROCEDURE — 99214 OFFICE O/P EST MOD 30 MIN: CPT | Performed by: INTERNAL MEDICINE

## 2018-02-20 PROCEDURE — 82784 ASSAY IGA/IGD/IGG/IGM EACH: CPT | Performed by: INTERNAL MEDICINE

## 2018-02-20 PROCEDURE — 86147 CARDIOLIPIN ANTIBODY EA IG: CPT | Performed by: INTERNAL MEDICINE

## 2018-02-20 PROCEDURE — 85732 THROMBOPLASTIN TIME PARTIAL: CPT | Performed by: INTERNAL MEDICINE

## 2018-02-20 PROCEDURE — 85613 RUSSELL VIPER VENOM DILUTED: CPT | Performed by: INTERNAL MEDICINE

## 2018-02-20 PROCEDURE — 36415 COLL VENOUS BLD VENIPUNCTURE: CPT

## 2018-02-20 PROCEDURE — 83883 ASSAY NEPHELOMETRY NOT SPEC: CPT | Performed by: INTERNAL MEDICINE

## 2018-02-20 PROCEDURE — 85384 FIBRINOGEN ACTIVITY: CPT | Performed by: INTERNAL MEDICINE

## 2018-02-20 PROCEDURE — 84165 PROTEIN E-PHORESIS SERUM: CPT | Performed by: INTERNAL MEDICINE

## 2018-02-20 PROCEDURE — 86334 IMMUNOFIX E-PHORESIS SERUM: CPT | Performed by: INTERNAL MEDICINE

## 2018-02-20 PROCEDURE — 86140 C-REACTIVE PROTEIN: CPT | Performed by: INTERNAL MEDICINE

## 2018-02-20 PROCEDURE — 83615 LACTATE (LD) (LDH) ENZYME: CPT | Performed by: INTERNAL MEDICINE

## 2018-02-20 PROCEDURE — 80053 COMPREHEN METABOLIC PANEL: CPT | Performed by: INTERNAL MEDICINE

## 2018-02-20 PROCEDURE — 80061 LIPID PANEL: CPT | Performed by: INTERNAL MEDICINE

## 2018-02-20 PROCEDURE — 86146 BETA-2 GLYCOPROTEIN ANTIBODY: CPT | Performed by: INTERNAL MEDICINE

## 2018-02-20 NOTE — PROGRESS NOTES
Subjective        History of Present Illness  This patient is here today after almost 2 years of absence from our practice with previous history of vitamin B12 deficiency for which he undergoes self-replacement of vitamin B12 once a week. In any event, since 10/2017, the patient presented to the emergency room after being seen by his eye doctor when he had the night before, decrease in his visual field in both eyes and lasted for several minutes, probably for an hour. He had also some pain in his right eye. Similar symptoms had happened weeks before in absence of any pain but this disappeared very quickly. Upon being seen in the emergency room, the patient was admitted after a CT scan of the head documented a posterior circulation stroke. The patient was placed on anticoagulant and he was given cholesterol medicine. He was found to have a high blood pressure during that episode and he remains on blood pressure medication. Today, he complains that he has not had any other new episode similar to the original one but he has still a lot of difficulty in regard to vision of words and he has tremendous difficulty understanding letters and signs. This talks in favor of some other phenomenon. The patient denies any alterations in his language at this time in regard to understanding or discussing issues but he has sometimes difficulty finding words. He has tremendous difficulty mainly in reading. He has no difficulty with his gait or balance. He has no motor deficit, no sensory deficit. Memory seems to be appropriate. He is able to drive his car. He has not had any falls. He denies any seizure activity. He has not had any chest pains, palpitations or shortness of breath. He has not had any angina. Cardiological assessment was done while in the [facility] showing probably a PFO that he has had for most of his life. No atrial septum aneurysm was found. On the other hand, no PE was performed.     The patient also  had an MRI angiogram that suggested maybe an embolic phenomenon more than a local thrombotic phenomenon through posterior circulation.     The patient denies any other clots in lower extremities. He has not had anything that suggests a pulmonary embolus. His appetite is good. His weight is stable. His bowel function is normal. He has no passage of blood in the stools. Urination is ongoing with some frequency. No alterations in the skin. No joint pain. No rashes. No neuropathy.       Past Medical History   Past Medical History:   Diagnosis Date   • Allergic rhinitis    • Anal fissure    • Asthma    • B12 deficiency    • Chronic bronchitis    • Emphysema lung    • Fatty liver    • GERD (gastroesophageal reflux disease)    • Heart murmur     possible MVP in past documentation   • Hepatitis     thought to be Hepatitis A    • History of pneumonia     With left lower lobe atelectasis and scar tissue, being followed   • Pneumonia     LLL with scar tissue   • Spinal stenosis    • Stroke     10/2017: left occipital/temporal     Social History     Social History   • Marital status:      Spouse name: Joey   • Number of children: N/A   • Years of education: N/A     Occupational History   •  Retired     Social History Main Topics   • Smoking status: Former Smoker     Quit date: 2/20/1979   • Smokeless tobacco: Never Used   • Alcohol use No   • Drug use: No   • Sexual activity: Defer     Other Topics Concern   • Not on file     Social History Narrative     Family History   Problem Relation Age of Onset   • Obesity Mother    • Clotting disorder Mother      Upper extremities   • Alcohol abuse Father    • Cancer Father 63     Esophagus and lung   • Other Father      cardiac disorder   • Kidney disease Sister    • Kidney failure Sister    • Drug abuse Paternal Uncle    • Stroke Sister    • Throat cancer Sister        Review of Systems   General: no fever, chills, fatigue, weight changes, or lack of  appetite.  Eyes: no epiphora, xerophthalmia,conjunctivitis, pain, glaucoma, blurred vision, blindness, secretion, photophobia, proptosis, diplopia.  Ears: no otorrhea, tinnitus, otorrhagia, deafness, pain, vertigo.  Nose: no rhinorrhea, epistaxis, alteration in perception of odors, sinuses pressure.  Mouth: no alteration in gums or teeth,  ulcers, no difficulty with mastication or deglut ion, no odynophagia.  Neck: no masses or pain, no thyroid alterations, no pain in muscles or arteries, no carotid odynia, no crepitation.  Respiratory: no cough, sputum production, dyspnea, trepopnea, pleuritic pain, hemoptysis.  Heart: no syncope, irregularity, palpitations, angina, orthopnea, paroxysmal nocturnal dyspnea.  Vascular Venous: no tenderness,edema, palpable cords, postphlebitic syndrome, skin changes or ulcerations.  Vascular Arterial: no distal ischemia, claudication, gangrene, neuropathic ischemic pain, skin ulcers, paleness or cyanosis.  GI: no dysphagia, odynophagia, no regurgitation, no heartburn,no indigestion,no nausea,no vomiting,no hematemesis ,no melena,no jaundice,no distention, no obstipation,no enterorrhagia,no proctalgia,no anal  lesions, nochanges in bowel habits.  : no frequency, hesitancy, hematuria, discharge, pain.  Musculoskeletal: no muscle or tendon pain or inflammation, joint pain, edema, functional limitation, fasciculations, mass.  Neurologic: no headache, seizures, stated alterations on Craneal nerves, no motor or senssory deficit, normal coordination, no alteration in memory, orientation, calculation,writting, verbal , significant written language difficulties.  Skin: no rashes, pruritus or localized lesions.  Psychiatric: no anxiety, depression, agitation, delusions, proper insight.  A comprehensive 14 point review of systems was performed and was negative except as mentioned.    Medications:  The current medication list was reviewed in the EMR    ALLERGIES:    Allergies   Allergen  "Reactions   • Aspirin Nausea Only   • Citrus      Blisters on mouth     • Combivent [Ipratropium-Albuterol]    • Lactose Intolerance (Gi)        Objective      Vitals:    02/20/18 0904   BP: 124/80   Pulse: 52   Resp: 16   Temp: 97.8 °F (36.6 °C)   TempSrc: Oral   SpO2: 97%   Weight: 87.8 kg (193 lb 8 oz)   Height: 187 cm (73.62\")  Comment: new ht   PainSc: 0-No pain     Current Status 2/20/2018   ECOG score 0       Physical Exam  .   GENERAL:  Well-developed, well-nourished  Patient  in no acute distress.   SKIN:  Warm, dry without rashes, purpura or petechiae.  HEENT:  Pupils were equal and reactive to light and accomodation, conjunctivas non injected, no pterigion, normal extraocular movements, normal visual acuity.   Mouth mucosa was moist, no exudates in oropharynx, normal gum line, normal roof of the mouth and pillars, normal papillations of the tongue.Ear canals were normal, as well tympanic membranes, normal hearing acuity.No pain in mastoid area or erythema.FUNDOSCOPIC EXAM WAS NORMAL, NO PAIN ON TEMPORAL ARTERIES.  NECK:  Supple with good range of motion; no thyromegaly or masses, no JVD or bruits, no cervical adenopathies.No carotid arteries pain, no carotid abnormal pulsation or arterial dance.  LYMPHATICS:  No cervical, supraclavicular, axillary, epitrochlear or inguinal adenopathy.  CHEST:  Normal excursion of both spencer thoraces, normal voice fremitus, no subcutaneous emphysema, normal axillas, no rashes or acanthosis nigricans. Lungs clear to percussion and auscultation, normal breath sounds bilaterally, no wheezing, crackles or ronchi, no stridor, no rubs.  CARDIAC AND VASCULAR: PMI not displaced,no thrills, normal rate and regular rhythm, with BASAL GRADE 1/6 SYSTOLIC murmur,NO  rubs or S3 or S4 right or left sided gallops. Normal femoral, popliteal, pedis, brachial and carotid pulses.  ABDOMEN:  Soft, nontender with no organomegaly or masses, no ascites, no collateral circulation,no distention,no " Platinum sign, no abdominal pain, no inguinal hernias,no umbilical hernias, no abdominal bruits. Normal bowel sounds.  GENITAL: Not  Performed.  EXTREMITIES  AND SPINE:  No clubbing, cyanosis or edema, no deformities or pain .No kyphosis, scoliosis, deformities or pain in spine, ribs or pelvic bone.  NEUROLOGICAL:  Patient was awake, alert, oriented to time, person and place,normal gait and coordination, negative Romberg; cranial nerves were normal, motor strength in upper and lower extremities was 5/5 proximally and distally, reflexes were symmetric, toes were down going, normal sensory modalities to touch, pinprick, temperature discrimination, and vibratory sensation, normal propioception, no meningeal signs with supple neck.        RECENT LABS:  Hematology WBC   Date Value Ref Range Status   02/20/2018 5.44 4.50 - 10.70 10*3/mm3 Final     RBC   Date Value Ref Range Status   02/20/2018 4.92 4.60 - 6.00 10*6/mm3 Final     Hemoglobin   Date Value Ref Range Status   02/20/2018 15.2 13.7 - 17.6 g/dL Final     Hematocrit   Date Value Ref Range Status   02/20/2018 44.0 40.4 - 52.2 % Final     Platelets   Date Value Ref Range Status   02/20/2018 208 140 - 500 10*3/mm3 Final      MRI OF THE BRAIN WITH AND WITHOUT CONTRAST 10/11/2017     HISTORY: Difficulty reading and trouble seeing off to the right side with a headache left side of eye starting on 10/09/2017. Vision has improved.     MRI of the anny was performed prior to and following intravenous administration of 17 mL MultiHance. There is an earlier MRI of the brain from 08/12/2017.      There is abnormally restricted diffusion seen in the left posterior cerebral artery distribution including predominantly posterolateral occipital lobe into posterior temporal lobe with subtle localized mass effect and focal effacement. The area of restricted diffusion is about 4.7 x 3.1 cm dimension axial plane. It is not associated with hemorrhagic transformation. There is subtle  enhancement on the surface of the involved gyri and the findings are most consistent with now early subacute ischemic insult. Pattern of involvement would suggest a thromboembolic insult to the left posterior cerebral artery distribution and further clinical assessment is recommended. There is a background of preexisting white matter disease which is moderate in amount, nonspecific and likely due to small vessel disease. Mild signal abnormality in the brainstem is likely due to small vessel disease and there are either multiple lacunes or prominent perivascular spaces in the bilateral basal ganglia and to a lesser extent thalami. These are tiny lesions and it is likely that there is a mixture of both processes (lacunes and prominent perivascular spaces) present.      No additional possible intracranial mass effect or pathologic intracranial enhancement is seen after contrast administration. There is no pathologic intracranial enhancement.      The visualized paranasal sinuses show minor mucosal thickening in the ethmoid cells and the visualized mastoid air cells are clear.      IMPRESSION:   1.  Findings are most consistent with a early subacute thromboembolic insult to the left posterior cerebral artery distribution with area of restricted diffusion and mild localized mass effect involving the left inferolateral occipital lobe to posterolateral temporal lobe. Please correlate further clinically for possible risk factors for thromboembolic disease. No hemorrhagic transformation at this time. No midline shift.   2.  Preexisting probable sequela of small vessel disease.       Interpretation Summary ECHO  · Left ventricular systolic function is normal. Estimated EF = 64%.  · Mild aortic valve regurgitation is present.  · There is chordal KYRA without LVOT obstruction.  · Saline test results are positive and indicate an atrial level shunt.             Assessment/Plan  This patient has history of developing a stroke in  posterior circulation in 10/2017, having a previous episode like a TIA, lasting for a few minutes weeks before. He decided to sit at home and take aspirin and Tylenol when he went to bed after the episode that took him to the hospital the next day. He has had since then tremendous difficulty for written language to the point that he has a difficulty reading anything no matter what the size that the letters are. The patient states that he has also recovered the speech with some slowness and he has occasional difficulty finding the words that correspond to the proper answer. That raises the question if he had indeed an embolic phenomenon even though I have reviewed the note by Cardiology and seems that they doubt that this was the case. I have reviewed the note by the neurologist suggesting that he had a local thrombotic phenomenon more than embolic phenomenon even though the radiologist mentioned the opposite. Therefore, this raises the question if the patient has had more episodes and why he is having this in absence of any hypertension, hyperlipidemia, diabetes, carotid arterial disease and things of this nature. I would like for this patient, given his history of B12 deficiency, to have an extensive workup, at least from the point of view of blood work, to be sure that it is not thrombophilic phenomenon ongoing that is making him more prone to develop such. For this reason I have sent him back to the lab to obtain a lupus anticoagulant, anticardiolipin antibody profile, C-reactive protein, LDH, protein immunoelectrophoresis as well as a lipid profile.     I will review him back in a couple of weeks. I have not modified any medicines.     I strongly believe this patient needs to have rehab by Speech Pathology. He was never contacted after discharge in regard to the need for this. We are now 4 months after the episode.     The patient is willing to pursue. He was sent back to the lab to proceed with testing and we  will review him back in a couple of weeks.                           2/20/2018      CC:

## 2018-02-22 LAB
ALBUMIN SERPL-MCNC: 3.7 G/DL (ref 2.9–4.4)
ALBUMIN/GLOB SERPL: 1.3 {RATIO} (ref 0.7–1.7)
ALPHA1 GLOB FLD ELPH-MCNC: 0.3 G/DL (ref 0–0.4)
ALPHA2 GLOB SERPL ELPH-MCNC: 0.7 G/DL (ref 0.4–1)
B-GLOBULIN SERPL ELPH-MCNC: 1.1 G/DL (ref 0.7–1.3)
CARDIOLIPIN IGG SER IA-ACNC: <9 GPL U/ML (ref 0–14)
CARDIOLIPIN IGM SER IA-ACNC: <9 MPL U/ML (ref 0–12)
GAMMA GLOB SERPL ELPH-MCNC: 0.9 G/DL (ref 0.4–1.8)
GLOBULIN SER CALC-MCNC: 2.9 G/L (ref 2.2–3.9)
IGA SERPL-MCNC: 200 MG/DL (ref 61–437)
IGG SERPL-MCNC: 805 MG/DL (ref 700–1600)
IGM SERPL-MCNC: 60 MG/DL (ref 20–172)
INTERPRETATION SERPL IEP-IMP: NORMAL
KAPPA LC SERPL-MCNC: 15.5 MG/L (ref 3.3–19.4)
KAPPA LC/LAMBDA SER: 1.38 {RATIO} (ref 0.26–1.65)
LA NT PLATELET PPP: 30.4 SEC (ref 0–51.9)
LAMBDA LC FREE SERPL-MCNC: 11.2 MG/L (ref 5.7–26.3)
LUPUS ANTICOAGULANT REFLEX: NORMAL
Lab: NORMAL
M-SPIKE: NORMAL G/DL
PROT SERPL-MCNC: 6.6 G/DL (ref 6–8.5)
SCREEN DRVVT: 37.3 SEC (ref 0–47)

## 2018-02-23 LAB
B2 GLYCOPROT1 IGA SER-ACNC: <9 GPI IGA UNITS (ref 0–25)
B2 GLYCOPROT1 IGG SER-ACNC: <9 GPI IGG UNITS (ref 0–20)
B2 GLYCOPROT1 IGM SER-ACNC: <9 GPI IGM UNITS (ref 0–32)

## 2018-03-06 ENCOUNTER — OFFICE VISIT (OUTPATIENT)
Dept: ONCOLOGY | Facility: CLINIC | Age: 68
End: 2018-03-06

## 2018-03-06 ENCOUNTER — APPOINTMENT (OUTPATIENT)
Dept: OTHER | Facility: HOSPITAL | Age: 68
End: 2018-03-06

## 2018-03-06 VITALS
HEIGHT: 74 IN | BODY MASS INDEX: 25.03 KG/M2 | HEART RATE: 65 BPM | OXYGEN SATURATION: 96 % | SYSTOLIC BLOOD PRESSURE: 145 MMHG | DIASTOLIC BLOOD PRESSURE: 77 MMHG | TEMPERATURE: 97.6 F | WEIGHT: 195 LBS | RESPIRATION RATE: 16 BRPM

## 2018-03-06 DIAGNOSIS — Z86.73 HISTORY OF STROKE: ICD-10-CM

## 2018-03-06 DIAGNOSIS — E53.8 VITAMIN B12 DEFICIENCY: Primary | ICD-10-CM

## 2018-03-06 PROCEDURE — 99214 OFFICE O/P EST MOD 30 MIN: CPT | Performed by: INTERNAL MEDICINE

## 2018-03-06 PROCEDURE — G0463 HOSPITAL OUTPT CLINIC VISIT: HCPCS | Performed by: INTERNAL MEDICINE

## 2018-03-06 NOTE — PROGRESS NOTES
Subjective        History of Present Illness  This patient is here today after almost 2 years of absence from our practice with previous history of vitamin B12 deficiency for which he undergoes self-replacement of vitamin B12 once a week. In any event, since 10/2017, the patient presented to the emergency room after being seen by his eye doctor when he had the night before, decrease in his visual field in both eyes and lasted for several minutes, probably for an hour. He had also some pain in his right eye. Similar symptoms had happened weeks before in absence of any pain but this disappeared very quickly. Upon being seen in the emergency room, the patient was admitted after a CT scan of the head documented a posterior circulation stroke. The patient was placed on anticoagulant and he was given cholesterol medicine. He was found to have a high blood pressure during that episode and he remains on blood pressure medication. Today, he complains that he has not had any other new episode similar to the original one but he has still a lot of difficulty in regard to vision of words and he has tremendous difficulty understanding letters and signs. This talks in favor of some other phenomenon. The patient denies any alterations in his language at this time in regard to understanding or discussing issues but he has sometimes difficulty finding words. He has tremendous difficulty mainly in reading. He has no difficulty with his gait or balance. He has no motor deficit, no sensory deficit. Memory seems to be appropriate. He is able to drive his car. He has not had any falls. He denies any seizure activity. He has not had any chest pains, palpitations or shortness of breath. He has not had any angina. Cardiological assessment was done while in the [facility] showing probably a PFO that he has had for most of his life. No atrial septum aneurysm was found. On the other hand, no PE was performed.     The patient also  had an MRI angiogram that suggested maybe an embolic phenomenon more than a local thrombotic phenomenon through posterior circulation.     The patient denies any other clots in lower extremities. He has not had anything that suggests a pulmonary embolus. His appetite is good. His weight is stable. His bowel function is normal. He has no passage of blood in the stools. Urination is ongoing with some frequency. No alterations in the skin. No joint pain. No rashes. No neuropathy.     In the meantime the patient has returned after he has had further laboratory assessment that will be described below. The conclusion of the discussion today is very important and for this reason I have referred the patient back to see Dr. Christopher Sawant, Cardiology because I strongly believe that the reason why this patient developed an embolic stroke was related to atrial fibrillation. Also the patient's blood pressure has been mildly out of control and this will require some assessment and control.       Past Medical History   Past Medical History:   Diagnosis Date   • Allergic rhinitis    • Anal fissure    • Asthma    • B12 deficiency    • Chronic bronchitis    • Emphysema lung    • Fatty liver    • GERD (gastroesophageal reflux disease)    • Heart murmur     possible MVP in past documentation   • Hepatitis     thought to be Hepatitis A    • History of pneumonia     With left lower lobe atelectasis and scar tissue, being followed   • Pneumonia     LLL with scar tissue   • Spinal stenosis    • Stroke     10/2017: left occipital/temporal     Social History     Social History   • Marital status:      Spouse name: Joey   • Number of children: N/A   • Years of education: N/A     Occupational History   •  Retired     Social History Main Topics   • Smoking status: Former Smoker     Quit date: 2/20/1979   • Smokeless tobacco: Never Used   • Alcohol use No   • Drug use: No   • Sexual activity: Defer     Other Topics Concern   •  Not on file     Social History Narrative     Family History   Problem Relation Age of Onset   • Obesity Mother    • Clotting disorder Mother      Upper extremities   • Alcohol abuse Father    • Cancer Father 63     Esophagus and lung   • Other Father      cardiac disorder   • Kidney disease Sister    • Kidney failure Sister    • Drug abuse Paternal Uncle    • Stroke Sister    • Throat cancer Sister        Review of Systems   General: no fever, chills, fatigue, weight changes, or lack of appetite.  Eyes: no epiphora, xerophthalmia,conjunctivitis, pain, glaucoma, blurred vision, blindness, secretion, photophobia, proptosis, diplopia.  Ears: no otorrhea, tinnitus, otorrhagia, deafness, pain, vertigo.  Nose: no rhinorrhea, epistaxis, alteration in perception of odors, sinuses pressure.  Mouth: no alteration in gums or teeth,  ulcers, no difficulty with mastication or deglut ion, no odynophagia.  Neck: no masses or pain, no thyroid alterations, no pain in muscles or arteries, no carotid odynia, no crepitation.  Respiratory: no cough, sputum production, dyspnea, trepopnea, pleuritic pain, hemoptysis.  Heart: no syncope, irregularity, palpitations, angina, orthopnea, paroxysmal nocturnal dyspnea.  Vascular Venous: no tenderness,edema, palpable cords, postphlebitic syndrome, skin changes or ulcerations.  Vascular Arterial: no distal ischemia, claudication, gangrene, neuropathic ischemic pain, skin ulcers, paleness or cyanosis.  GI: no dysphagia, odynophagia, no regurgitation, no heartburn,no indigestion,no nausea,no vomiting,no hematemesis ,no melena,no jaundice,no distention, no obstipation,no enterorrhagia,no proctalgia,no anal  lesions, nochanges in bowel habits.  : no frequency, hesitancy, hematuria, discharge, pain.  Musculoskeletal: no muscle or tendon pain or inflammation, joint pain, edema, functional limitation, fasciculations, mass.  Neurologic: no headache, seizures, alterations on Craneal nerves, no motor or  "senssory deficit, normal coordination, stated alteration in memory, no alteration in orientation, calculation,problem with writting, verbal language.  Skin: no rashes, pruritus or localized lesions.  Psychiatric: no anxiety, depression, agitation, delusions, proper insight.      Medications:  The current medication list was reviewed in the EMR    ALLERGIES:    Allergies   Allergen Reactions   • Aspirin Nausea Only   • Citrus      Blisters on mouth     • Combivent [Ipratropium-Albuterol]    • Lactose Intolerance (Gi)        Objective      Vitals:    03/06/18 0757   BP: 145/77   Pulse: 65   Resp: 16   Temp: 97.6 °F (36.4 °C)   TempSrc: Oral   SpO2: 96%   Weight: 88.5 kg (195 lb)   Height: 187 cm (73.62\")   PainSc: 0-No pain     Current Status 3/6/2018   ECOG score 0       Physical Exam  .   GENERAL:  Well-developed, well-nourished  Patient  in no acute distress.   SKIN:  Warm, dry without rashes, purpura or petechiae.  HEENT:  Pupils were equal and reactive to light and accomodation, conjunctivas non injected, no pterigion, normal extraocular movements, normal visual acuity.   Mouth mucosa was moist, no exudates in oropharynx, normal gum line, normal roof of the mouth and pillars, normal papillations of the tongue  NECK:  Supple with good range of motion; no thyromegaly or masses, no JVD or bruits, no cervical adenopathies.No carotid arteries pain, no carotid abnormal pulsation or arterial dance.  LYMPHATICS:  No cervical, supraclavicular, axillary, epitrochlear or inguinal adenopathy.  CHEST:  Normal excursion of both spencer thoraces, normal voice fremitus, no subcutaneous emphysema, normal axillas, no rashes or acanthosis nigricans. Lungs clear to percussion and auscultation, normal breath sounds bilaterally, no wheezing, crackles or ronchi, no stridor, no rubs.  CARDIAC AND VASCULAR:normal rate and regular rhythm, with systolic g1/6 basal  murmur,no rubs or S3 or S4 right or left sided gallops. Normal femoral, " popliteal, pedis, brachial and carotid pulses.  ABDOMEN:  Soft, nontender with no organomegaly or masses, no ascites, no collateral circulation,no distention,no [place] sign, no abdominal pain, no inguinal hernias,no umbilical hernias, no abdominal bruits. Normal bowel sounds.  GENITAL: Not  Performed.  EXTREMITIES  AND SPINE:  No clubbing, cyanosis or edema, no deformities or pain .No kyphosis, scoliosis, deformities or pain in spine, ribs or pelvic bone.  NEUROLOGICAL:  Patient was awake, alert, oriented to time, person and place,normal gait and coordination, negative Romberg; cranial nerves were normal, motor strength in upper and lower extremities was 5/5 proximally and distally, reflexes were symmetric, toes were down going, normal sensory modalities to touch, pinprick, temperature discrimination, and vibratory sensation, normal propioception, no meningeal signs with supple neck.              RECENT LABS:  Hematology WBC   Date Value Ref Range Status   02/20/2018 5.44 4.50 - 10.70 10*3/mm3 Final     RBC   Date Value Ref Range Status   02/20/2018 4.92 4.60 - 6.00 10*6/mm3 Final     Hemoglobin   Date Value Ref Range Status   02/20/2018 15.2 13.7 - 17.6 g/dL Final     Hematocrit   Date Value Ref Range Status   02/20/2018 44.0 40.4 - 52.2 % Final     Platelets   Date Value Ref Range Status   02/20/2018 208 140 - 500 10*3/mm3 Final      MRI OF THE BRAIN WITH AND WITHOUT CONTRAST 10/11/2017     HISTORY: Difficulty reading and trouble seeing off to the right side with a headache left side of eye starting on 10/09/2017. Vision has improved.     MRI of the anny was performed prior to and following intravenous administration of 17 mL MultiHance. There is an earlier MRI of the brain from 08/12/2017.      There is abnormally restricted diffusion seen in the left posterior cerebral artery distribution including predominantly posterolateral occipital lobe into posterior temporal lobe with subtle localized mass effect and  focal effacement. The area of restricted diffusion is about 4.7 x 3.1 cm dimension axial plane. It is not associated with hemorrhagic transformation. There is subtle enhancement on the surface of the involved gyri and the findings are most consistent with now early subacute ischemic insult. Pattern of involvement would suggest a thromboembolic insult to the left posterior cerebral artery distribution and further clinical assessment is recommended. There is a background of preexisting white matter disease which is moderate in amount, nonspecific and likely due to small vessel disease. Mild signal abnormality in the brainstem is likely due to small vessel disease and there are either multiple lacunes or prominent perivascular spaces in the bilateral basal ganglia and to a lesser extent thalami. These are tiny lesions and it is likely that there is a mixture of both processes (lacunes and prominent perivascular spaces) present.      No additional possible intracranial mass effect or pathologic intracranial enhancement is seen after contrast administration. There is no pathologic intracranial enhancement.      The visualized paranasal sinuses show minor mucosal thickening in the ethmoid cells and the visualized mastoid air cells are clear.      IMPRESSION:   1.  Findings are most consistent with a early subacute thromboembolic insult to the left posterior cerebral artery distribution with area of restricted diffusion and mild localized mass effect involving the left inferolateral occipital lobe to posterolateral temporal lobe. Please correlate further clinically for possible risk factors for thromboembolic disease. No hemorrhagic transformation at this time. No midline shift.   2.  Preexisting probable sequela of small vessel disease.       Interpretation Summary ECHO  · Left ventricular systolic function is normal. Estimated EF = 64%.  · Mild aortic valve regurgitation is present.  · There is chordal KYRA without LVOT  obstruction.  · Saline test results are positive and indicate an atrial level shunt.         Study Impressions zio patch  An abnormal monitor study. There was a 3.1 second pause at 0341 on 10/26/17. There was a 13 beat run of atrial fibrillation, heart rate 120s, at 0506 on 10/15/17.    Assessment/Plan  I have reviewed the extensive evaluation in this patient after he had his stroke and I have reviewed his echocardiogram as well as the Zio patch that basically concluded that the patient has had episodes of paroxysmal atrial fibrillation. The laboratory assessment that we did on the patient a few weeks ago including lipid profile that was completely normal, C-reactive protein that was normal, fibrinogen level that was completely normal, anticardiolipin antibody profile and anti-glycoprotein antibody profile negative, as well as sedimentation rate normal, and a normal monoclonal protein study and serum protein electrophoresis. Therefore there is nothing that I can tell from the point of view of blood disorders that could be favoring a stroke and leads me to the conclusion that again the most likely reason why this patient developed the embolic phenomenon was related to irregular heartbeat, episodes of paroxysmal atrial fibrillation and so forth. Therefore the patient is going to see Dr. Christopher Sawant very soon and I wonder if the patient is going to require a beta blocker to control blood pressure and instead of Plavix maybe the patient will be a candidate to receive long term anticoagulation with Xarelto or Eliquis. In my opinion that will be better off. In any event his B12 deficiency has been treated by the patient with injections twice a month and I do not have any problems with this.    The patient otherwise will return to see me in a year. I advised him to have proper measurement of blood pressure from time to time and notify if any systolic number is above 160 or diastolic number is above 90. He can discuss this  with his primary physician or Dr. Sawant.    I do believe that the patient probably will require another round of monitoring in regard to heart rhythm and again will leave this to Dr. Sawant in the close future an appointment that is coming up in the next couple of weeks.                                3/6/2018      CC:

## 2018-03-07 ENCOUNTER — HOSPITAL ENCOUNTER (OUTPATIENT)
Dept: SPEECH THERAPY | Facility: HOSPITAL | Age: 68
Setting detail: THERAPIES SERIES
Discharge: HOME | End: 2018-03-07

## 2018-03-07 DIAGNOSIS — I69.928 OTHER SPEECH AND LANGUAGE DEFICITS, LATE EFFECT OF CEREBROVASCULAR DISEASE(438.19): Primary | ICD-10-CM

## 2018-03-07 PROCEDURE — 96105 ASSESSMENT OF APHASIA: CPT

## 2018-03-07 PROCEDURE — G9174 SPEECH LANG CURRENT STATUS: HCPCS

## 2018-03-07 PROCEDURE — G9175 SPEECH LANG GOAL STATUS: HCPCS

## 2018-03-12 ENCOUNTER — HOSPITAL ENCOUNTER (OUTPATIENT)
Dept: SPEECH THERAPY | Facility: HOSPITAL | Age: 68
Setting detail: THERAPIES SERIES
End: 2018-03-12

## 2018-03-12 ENCOUNTER — HOSPITAL ENCOUNTER (EMERGENCY)
Facility: HOSPITAL | Age: 68
Discharge: HOME | End: 2018-03-12
Attending: EMERGENCY MEDICINE | Admitting: EMERGENCY MEDICINE

## 2018-03-12 ENCOUNTER — APPOINTMENT (OUTPATIENT)
Dept: GENERAL RADIOLOGY | Facility: HOSPITAL | Age: 68
End: 2018-03-12

## 2018-03-12 ENCOUNTER — APPOINTMENT (OUTPATIENT)
Dept: CT IMAGING | Facility: HOSPITAL | Age: 68
End: 2018-03-12
Attending: EMERGENCY MEDICINE

## 2018-03-12 VITALS
TEMPERATURE: 98.6 F | SYSTOLIC BLOOD PRESSURE: 146 MMHG | BODY MASS INDEX: 24.36 KG/M2 | DIASTOLIC BLOOD PRESSURE: 80 MMHG | HEART RATE: 82 BPM | HEIGHT: 73 IN | RESPIRATION RATE: 16 BRPM | WEIGHT: 183.8 LBS | OXYGEN SATURATION: 92 %

## 2018-03-12 DIAGNOSIS — I10 UNCONTROLLED HYPERTENSION: Primary | ICD-10-CM

## 2018-03-12 DIAGNOSIS — J44.9 CHRONIC OBSTRUCTIVE PULMONARY DISEASE, UNSPECIFIED COPD TYPE (HCC): ICD-10-CM

## 2018-03-12 DIAGNOSIS — R11.0 NAUSEA: ICD-10-CM

## 2018-03-12 LAB
ALBUMIN SERPL-MCNC: 4 G/DL (ref 3.5–5.2)
ALBUMIN/GLOB SERPL: 1.3 G/DL
ALP SERPL-CCNC: 76 U/L (ref 40–129)
ALT SERPL W P-5'-P-CCNC: 21 U/L (ref 5–41)
ANION GAP SERPL CALCULATED.3IONS-SCNC: 10.7 MMOL/L
ARTERIAL PATENCY WRIST A: POSITIVE
AST SERPL-CCNC: 24 U/L (ref 5–40)
ATMOSPHERIC PRESS: 740 MMHG
BASE EXCESS BLDA CALC-SCNC: 1.3 MMOL/L (ref 0–2)
BASOPHILS # BLD AUTO: 0.02 10*3/MM3 (ref 0–0.2)
BASOPHILS NFR BLD AUTO: 0.2 % (ref 0–2)
BDY SITE: ABNORMAL
BILIRUB SERPL-MCNC: 0.7 MG/DL (ref 0.2–1.2)
BODY TEMPERATURE: 37 C
BUN BLD-MCNC: 14 MG/DL (ref 8–23)
BUN/CREAT SERPL: 14.1 (ref 7–25)
CALCIUM SPEC-SCNC: 9.3 MG/DL (ref 8.8–10.5)
CHLORIDE SERPL-SCNC: 100 MMOL/L (ref 98–107)
CO2 SERPL-SCNC: 26.3 MMOL/L (ref 22–29)
CREAT BLD-MCNC: 0.99 MG/DL (ref 0.76–1.27)
D DIMER PPP FEU-MCNC: 0.41 MCGFEU/ML (ref 0–0.46)
DEPRECATED RDW RBC AUTO: 40 FL (ref 37–54)
EOSINOPHIL # BLD AUTO: 0 10*3/MM3 (ref 0.1–0.3)
EOSINOPHIL NFR BLD AUTO: 0 % (ref 0–4)
ERYTHROCYTE [DISTWIDTH] IN BLOOD BY AUTOMATED COUNT: 12.1 % (ref 11.5–14.5)
GAS FLOW AIRWAY: 0 LPM
GFR SERPL CREATININE-BSD FRML MDRD: 75 ML/MIN/1.73
GLOBULIN UR ELPH-MCNC: 3 GM/DL
GLUCOSE BLD-MCNC: 128 MG/DL (ref 65–99)
HCO3 BLDA-SCNC: 25.9 MMOL/L (ref 20–26)
HCT VFR BLD AUTO: 45.2 % (ref 42–52)
HGB BLD-MCNC: 15.8 G/DL (ref 14–18)
HGB BLDA-MCNC: 15.8 G/DL (ref 14–18)
IMM GRANULOCYTES # BLD: 0.07 10*3/MM3 (ref 0–0.03)
IMM GRANULOCYTES NFR BLD: 0.7 % (ref 0–0.5)
LYMPHOCYTES # BLD AUTO: 0.93 10*3/MM3 (ref 0.6–4.8)
LYMPHOCYTES NFR BLD AUTO: 9 % (ref 20–45)
Lab: ABNORMAL
Lab: ABNORMAL
MCH RBC QN AUTO: 31.8 PG (ref 27–31)
MCHC RBC AUTO-ENTMCNC: 35 G/DL (ref 31–37)
MCV RBC AUTO: 90.9 FL (ref 80–94)
MODALITY: ABNORMAL
MONOCYTES # BLD AUTO: 0.62 10*3/MM3 (ref 0–1)
MONOCYTES NFR BLD AUTO: 6 % (ref 3–8)
NEUTROPHILS # BLD AUTO: 8.74 10*3/MM3 (ref 1.5–8.3)
NEUTROPHILS NFR BLD AUTO: 84.1 % (ref 45–70)
NOTIFIED BY: ABNORMAL
NOTIFIED WHO: ABNORMAL
NRBC BLD MANUAL-RTO: 0 /100 WBC (ref 0–0)
PCO2 BLDA: 40.1 MM HG (ref 35–45)
PCO2 TEMP ADJ BLD: 40.1 MM HG (ref 35–45)
PH BLDA: 7.42 PH UNITS (ref 7.35–7.45)
PH, TEMP CORRECTED: 7.42 PH UNITS (ref 7.35–7.45)
PLATELET # BLD AUTO: 238 10*3/MM3 (ref 140–500)
PMV BLD AUTO: 9.9 FL (ref 7.4–10.4)
PO2 BLDA: 52.9 MM HG (ref 83–108)
PO2 TEMP ADJ BLD: 52.9 MM HG (ref 83–108)
POTASSIUM BLD-SCNC: 4.5 MMOL/L (ref 3.5–5.2)
PROT SERPL-MCNC: 7 G/DL (ref 6–8.5)
RBC # BLD AUTO: 4.97 10*6/MM3 (ref 4.7–6.1)
SAO2 % BLDCOA: 89.7 % (ref 94–99)
SODIUM BLD-SCNC: 137 MMOL/L (ref 136–145)
TROPONIN T SERPL-MCNC: <0.01 NG/ML (ref 0–0.03)
VENTILATOR MODE: ABNORMAL
WBC NRBC COR # BLD: 10.38 10*3/MM3 (ref 4.8–10.8)

## 2018-03-12 PROCEDURE — 85025 COMPLETE CBC W/AUTO DIFF WBC: CPT | Performed by: EMERGENCY MEDICINE

## 2018-03-12 PROCEDURE — 36600 WITHDRAWAL OF ARTERIAL BLOOD: CPT

## 2018-03-12 PROCEDURE — 85379 FIBRIN DEGRADATION QUANT: CPT | Performed by: EMERGENCY MEDICINE

## 2018-03-12 PROCEDURE — 80053 COMPREHEN METABOLIC PANEL: CPT | Performed by: EMERGENCY MEDICINE

## 2018-03-12 PROCEDURE — 71046 X-RAY EXAM CHEST 2 VIEWS: CPT

## 2018-03-12 PROCEDURE — 70450 CT HEAD/BRAIN W/O DYE: CPT

## 2018-03-12 PROCEDURE — 99284 EMERGENCY DEPT VISIT MOD MDM: CPT

## 2018-03-12 PROCEDURE — 93005 ELECTROCARDIOGRAM TRACING: CPT | Performed by: EMERGENCY MEDICINE

## 2018-03-12 PROCEDURE — 96374 THER/PROPH/DIAG INJ IV PUSH: CPT

## 2018-03-12 PROCEDURE — 25010000002 ONDANSETRON PER 1 MG: Performed by: EMERGENCY MEDICINE

## 2018-03-12 PROCEDURE — 82803 BLOOD GASES ANY COMBINATION: CPT

## 2018-03-12 PROCEDURE — 84484 ASSAY OF TROPONIN QUANT: CPT | Performed by: EMERGENCY MEDICINE

## 2018-03-12 PROCEDURE — 99284 EMERGENCY DEPT VISIT MOD MDM: CPT | Performed by: EMERGENCY MEDICINE

## 2018-03-12 PROCEDURE — 93010 ELECTROCARDIOGRAM REPORT: CPT | Performed by: INTERNAL MEDICINE

## 2018-03-12 RX ORDER — ONDANSETRON 2 MG/ML
8 INJECTION INTRAMUSCULAR; INTRAVENOUS ONCE
Status: COMPLETED | OUTPATIENT
Start: 2018-03-12 | End: 2018-03-12

## 2018-03-12 RX ORDER — HYDROCHLOROTHIAZIDE 12.5 MG/1
12.5 TABLET ORAL DAILY
Qty: 5 TABLET | Refills: 0 | Status: SHIPPED | OUTPATIENT
Start: 2018-03-12 | End: 2018-03-20 | Stop reason: SDUPTHER

## 2018-03-12 RX ADMIN — ONDANSETRON 8 MG: 2 INJECTION INTRAMUSCULAR; INTRAVENOUS at 08:46

## 2018-03-12 NOTE — ED PROVIDER NOTES
Subjective     Nausea   The primary symptoms include nausea and vomiting. Primary symptoms do not include fever, abdominal pain, diarrhea, arthralgias or rash. Primary symptoms comment: elev bp. The illness began yesterday. The onset was sudden. The problem has not changed since onset.      Review of Systems   Constitutional: Negative for fever.   HENT: Negative for congestion and sore throat.    Eyes: Negative for photophobia, pain and visual disturbance.   Respiratory: Negative for chest tightness and shortness of breath.    Gastrointestinal: Positive for nausea and vomiting. Negative for abdominal pain and diarrhea.   Musculoskeletal: Negative for arthralgias.   Skin: Negative for rash.   Neurological: Positive for headaches. Negative for dizziness, syncope and speech difficulty.   All other systems reviewed and are negative.      Past Medical History:   Diagnosis Date   • Allergic rhinitis    • Anal fissure    • Asthma    • B12 deficiency    • Chronic bronchitis    • Emphysema lung    • Fatty liver    • GERD (gastroesophageal reflux disease)    • Heart murmur     possible MVP in past documentation   • Hepatitis     thought to be Hepatitis A    • History of pneumonia     With left lower lobe atelectasis and scar tissue, being followed   • Pneumonia     LLL with scar tissue   • Spinal stenosis    • Stroke     10/2017: left occipital/temporal       Allergies   Allergen Reactions   • Aspirin Nausea Only   • Citrus      Blisters on mouth     • Combivent [Ipratropium-Albuterol]    • Lactose Intolerance (Gi)        Past Surgical History:   Procedure Laterality Date   • COLONOSCOPY     • HAND SURGERY Bilateral    • HERNIA REPAIR     • OTHER SURGICAL HISTORY      inguinal hernia repair for person over age 5   • TONSILLECTOMY         Family History   Problem Relation Age of Onset   • Obesity Mother    • Clotting disorder Mother      Upper extremities   • Alcohol abuse Father    • Cancer Father 63     Esophagus and lung    • Other Father      cardiac disorder   • Kidney disease Sister    • Kidney failure Sister    • Drug abuse Paternal Uncle    • Stroke Sister    • Throat cancer Sister        Social History     Social History   • Marital status:      Spouse name: Joey     Occupational History   •  Retired     Social History Main Topics   • Smoking status: Former Smoker     Quit date: 2/20/1979   • Smokeless tobacco: Never Used   • Alcohol use No   • Drug use: No   • Sexual activity: Defer     Other Topics Concern   • Not on file           Objective   Physical Exam   Constitutional: He is oriented to person, place, and time. He appears well-developed and well-nourished. No distress.   HENT:   Head: Normocephalic and atraumatic.   Nose: Nose normal.   Mouth/Throat: Oropharynx is clear and moist. No oropharyngeal exudate.   Eyes: Conjunctivae and EOM are normal. Pupils are equal, round, and reactive to light. Right eye exhibits no discharge. Left eye exhibits no discharge. No scleral icterus.   Neck: Normal range of motion. Neck supple. No JVD present. No tracheal deviation present. No thyromegaly present.   Cardiovascular: Normal rate, regular rhythm, normal heart sounds and intact distal pulses.  Exam reveals no gallop and no friction rub.    No murmur heard.  Pulmonary/Chest: Effort normal and breath sounds normal. No stridor. No respiratory distress. He has no wheezes. He has no rales. He exhibits no tenderness.   Abdominal: Soft. Bowel sounds are normal. He exhibits no distension and no mass. There is no tenderness. There is no rebound and no guarding.   Musculoskeletal: Normal range of motion. He exhibits no edema, tenderness or deformity.   Lymphadenopathy:     He has no cervical adenopathy.   Neurological: He is oriented to person, place, and time. He has normal reflexes. He displays normal reflexes. No cranial nerve deficit. He exhibits normal muscle tone. Coordination normal.   Skin: Skin is warm. No rash  noted. He is not diaphoretic. No erythema.   Psychiatric: He has a normal mood and affect.   Nursing note and vitals reviewed.      Procedures         ED Course  ED Course   Comment By Time   Ekg by me nsr, lvh, no st param Leiva MD 03/12 0840      reeval appears well, ok with plan to start hctz today and f/u pmd this week, denies any soa or resp complaints            MDM      Final diagnoses:   Uncontrolled hypertension   Chronic obstructive pulmonary disease, unspecified COPD type   Nausea            Curt Leiva MD  03/12/18 1022       Curt Leiva MD  03/12/18 1022       Curt Leiva MD  03/12/18 1033

## 2018-03-14 ENCOUNTER — OFFICE VISIT (OUTPATIENT)
Dept: SLEEP MEDICINE | Facility: HOSPITAL | Age: 68
End: 2018-03-14
Attending: INTERNAL MEDICINE

## 2018-03-14 VITALS
HEIGHT: 73 IN | HEART RATE: 79 BPM | DIASTOLIC BLOOD PRESSURE: 71 MMHG | SYSTOLIC BLOOD PRESSURE: 154 MMHG | BODY MASS INDEX: 25.84 KG/M2 | WEIGHT: 195 LBS

## 2018-03-14 DIAGNOSIS — G47.33 OBSTRUCTIVE SLEEP APNEA SYNDROME: Primary | ICD-10-CM

## 2018-03-14 PROCEDURE — G0463 HOSPITAL OUTPT CLINIC VISIT: HCPCS

## 2018-03-14 NOTE — PROGRESS NOTES
SLEEP CLINIC FOLLOW UP PROGRESS NOTE.    [facility] SLEEP MEDICINE    Ronnell Rizvi  68 y.o.  male  1950    PCP: Chase Haque MD      Date of visit: 3/14/2018    INTERM HISTORY:  This is a 68 y.o. years old patient who has a history of sleep apnea with AHI of 20/h.  Patient was prescribed CPAP but unfortunately he is unable to use the CPAP.  He has been trying it for almost 3-4 months.  His compliance is very poor.  Patient reports that he is moderate gets very dry even though he is on humidification set at 5 which is maximum.  His compliance more than 4 was usage is only 16%.  He also has a history of stroke in the past and I am concerned that if his sleep apnea is not treated.  He may be at a high risk for repeat stroke.    PAST MEDICAL HISTORY:  · Obstructive sleep apnea  Past Medical History:   Diagnosis Date   • Allergic rhinitis    • Anal fissure    • Asthma    • B12 deficiency    • Chronic bronchitis    • Emphysema lung    • Fatty liver    • GERD (gastroesophageal reflux disease)    • Heart murmur     possible MVP in past documentation   • Hepatitis     thought to be Hepatitis A    • History of pneumonia     With left lower lobe atelectasis and scar tissue, being followed   • Pneumonia     LLL with scar tissue   • Spinal stenosis    • Stroke     10/2017: left occipital/temporal       MEDICATIONS:    Current Outpatient Prescriptions:   •  atorvastatin (LIPITOR) 40 MG tablet, Take 1 tablet by mouth Every Night., Disp: 30 tablet, Rfl: 1  •  clopidogrel (PLAVIX) 75 MG tablet, Take 1 tablet by mouth Daily., Disp: 30 tablet, Rfl: 1  •  Cyanocobalamin (B-12) 1000 MCG/ML kit, Inject 250 mcg as directed 1 (One) Time Per Week. Pt. Stated he takes this medication weekly 250 mcg, pt reports took it last friday, Disp: , Rfl:   •  guaiFENesin (MUCINEX) 600 MG 12 hr tablet, Take 1,200 mg by mouth 2 (Two) Times a Day As Needed for Cough., Disp: , Rfl:   •  hydrochlorothiazide (HYDRODIURIL) 12.5 MG  "tablet, Take 1 tablet by mouth Daily., Disp: 5 tablet, Rfl: 0    Allergies   Allergen Reactions   • Aspirin Nausea Only   • Citrus      Blisters on mouth     • Combivent [Ipratropium-Albuterol]    • Lactose Intolerance (Gi)        SOCIAL, FAMILY HISTORY: Medical records are reviewed and noted.    REVIEW OF SYSTEMS:   [place] Sleepiness Scale :Total score: 5       PHYSICAL EXAMINATION:  Vitals:    03/14/18 1300   BP: 154/71   Pulse: 79   Weight: 88.5 kg (195 lb)   Height: 185.4 cm (73\")    Body mass index is 25.73 kg/m².    HEENT: Unremarkable, pupils are round equal and reacting to light, nasal passage is clear   NECK: No lymphadenopathy, throat is clear, oral airway Mallampati class III  RESPRATORY SYSTEM: Breath sounds are equal on both sides and are normal, no wheezes or crackles  CARDIOVASULAR SYSTEM: Heart rate is regular without murmur  ABDOMEN: Soft, no ascites, no hepatosplenomegaly.  EXTREMITIES: No cyanosis, clubbing or edema       ASSESSMENT AND PLAN:  · Obstructive sleep apnea, patient is unable to use the CPAP.  He has been trying for almost 4 months.  His compliance is extremely poor.  I have talked to the patient about getting a mandibular advancement device.  I'm going to send him to see Dr. Krunal Harrell to get a med device made  ·       Lora Block MD, [facility]  Pulmonary, Critical Care and sleep Medicine                 "

## 2018-03-19 ENCOUNTER — HOSPITAL ENCOUNTER (OUTPATIENT)
Dept: SPEECH THERAPY | Facility: HOSPITAL | Age: 68
Setting detail: THERAPIES SERIES
Discharge: HOME | End: 2018-03-19

## 2018-03-19 DIAGNOSIS — I69.928 OTHER SPEECH AND LANGUAGE DEFICITS, LATE EFFECT OF CEREBROVASCULAR DISEASE(438.19): Primary | ICD-10-CM

## 2018-03-19 PROCEDURE — 96125 COGNITIVE TEST BY HC PRO: CPT

## 2018-03-20 ENCOUNTER — OFFICE VISIT (OUTPATIENT)
Dept: CARDIOLOGY | Facility: CLINIC | Age: 68
End: 2018-03-20

## 2018-03-20 VITALS
BODY MASS INDEX: 25.45 KG/M2 | HEART RATE: 66 BPM | DIASTOLIC BLOOD PRESSURE: 60 MMHG | WEIGHT: 192 LBS | SYSTOLIC BLOOD PRESSURE: 136 MMHG | HEIGHT: 73 IN

## 2018-03-20 DIAGNOSIS — Z86.73 HISTORY OF STROKE: Primary | ICD-10-CM

## 2018-03-20 DIAGNOSIS — R41.3 MEMORY LOSS: ICD-10-CM

## 2018-03-20 DIAGNOSIS — G47.33 OBSTRUCTIVE SLEEP APNEA SYNDROME: ICD-10-CM

## 2018-03-20 DIAGNOSIS — R93.1 ABNORMAL FINDINGS ON DIAGNOSTIC IMAGING OF HEART AND CORONARY CIRCULATION: ICD-10-CM

## 2018-03-20 DIAGNOSIS — R03.0 ELEVATED BLOOD-PRESSURE READING WITHOUT DIAGNOSIS OF HYPERTENSION: ICD-10-CM

## 2018-03-20 PROCEDURE — 93000 ELECTROCARDIOGRAM COMPLETE: CPT | Performed by: INTERNAL MEDICINE

## 2018-03-20 PROCEDURE — 99214 OFFICE O/P EST MOD 30 MIN: CPT | Performed by: INTERNAL MEDICINE

## 2018-03-20 RX ORDER — HYDROCHLOROTHIAZIDE 12.5 MG/1
12.5 TABLET ORAL DAILY
Qty: 90 TABLET | Refills: 1 | Status: SHIPPED | OUTPATIENT
Start: 2018-03-20 | End: 2018-03-22 | Stop reason: SDUPTHER

## 2018-03-20 NOTE — PROGRESS NOTES
Date of Office Visit: 2018  Encounter Provider: Christopher Sawant MD  Place of Service: [facility] CARDIOLOGY  Patient Name: Ronnell Rizvi  :1950    Chief Complaint   Patient presents with   • Follow-up     H/O STROKE    :     HPI: Ronnell Rizvi is a 68 y.o. male who presents today to follow up after recent hospitalization.  I met him in 2016 when he was hospitalized for chest pain.  A Cardiolite stress was normal (EF 54%).  He did have some Wenckebach at the beginning of stress which normalized with exercise.    In 2017, he had an episode of visual changes that resolved on their own.  The next morning it happened again and he came to the ED and was diagnosed with a stroke of the left occipital/temporal lobe.  He was found to have some diffuse small vessel disease.  An echo wasn't performed but a Zio patch was placed.  We were not consulted in the hospital.  He was placed on clopidogrel and atorvastatin.    In 2017, the Zio showed some atrial ectopy (there was a 13 beat run of PACs) but no atrial fibrillation.  An echo showed normal LV systolic function and a small PFO.    His studies have been reviewed by neurology, who feel that this may very well have been embolic; he has an appointment with them soon.    His visual changes have resolved.  He denies palpitations, syncope, lightheadedness, edema, orthopnea, PND, chest pain, or dyspnea.  His blood pressure recently started spiking and he was placed on HCTZ.   He has ELIZABETH and doesn't tolerate CPAP at all.    He also has had some memory loss and he worries it's the statin.    Past Medical History:   Diagnosis Date   • Allergic rhinitis    • Anal fissure    • Asthma    • B12 deficiency    • Benign prostatic hypertrophy (BPH) with weak urinary stream 2016   • Chronic bronchitis    • Emphysema lung    • Fatty liver    • GERD (gastroesophageal reflux disease)    • Heart murmur     possible MVP in past  documentation   • Hepatitis     thought to be Hepatitis A    • History of pneumonia     With left lower lobe atelectasis and scar tissue, being followed   • Obstructive sleep apnea syndrome 8/23/2017   • Pneumonia     LLL with scar tissue   • Spinal stenosis    • Stroke     10/2017: left occipital/temporal       Past Surgical History:   Procedure Laterality Date   • COLONOSCOPY     • HAND SURGERY Bilateral    • HERNIA REPAIR     • OTHER SURGICAL HISTORY      inguinal hernia repair for person over age 5   • TONSILLECTOMY         Social History     Social History   • Marital status:      Spouse name: Joey   • Number of children: N/A   • Years of education: N/A     Occupational History   •  Retired     Social History Main Topics   • Smoking status: Former Smoker     Quit date: 2/20/1979   • Smokeless tobacco: Never Used      Comment: caffeine use: Drinks 4 glasses of Dt coke on avg.    • Alcohol use No   • Drug use: No   • Sexual activity: Defer     Other Topics Concern   • Not on file     Social History Narrative   • No narrative on file       Family History   Problem Relation Age of Onset   • Obesity Mother    • Clotting disorder Mother      Upper extremities   • Alcohol abuse Father    • Cancer Father 63     Esophagus and lung   • Other Father      cardiac disorder   • Kidney disease Sister    • Kidney failure Sister    • Drug abuse Paternal Uncle    • Stroke Sister    • Throat cancer Sister        Review of Systems   Constitution: Positive for malaise/fatigue.   Cardiovascular: Negative for chest pain and palpitations.   Respiratory: Negative for shortness of breath.    Neurological: Negative for dizziness and light-headedness.        As per HPI   Psychiatric/Behavioral: Positive for depression and memory loss.   All other systems reviewed and are negative.      Allergies   Allergen Reactions   • Aspirin Nausea Only   • Citrus      Blisters on mouth     • Combivent [Ipratropium-Albuterol] Other  "(See Comments)     Throat swelling   • Lactose Intolerance (Gi)          Current Outpatient Prescriptions:   •  atorvastatin (LIPITOR) 40 MG tablet, Take 1 tablet by mouth Every Night., Disp: 30 tablet, Rfl: 1  •  clopidogrel (PLAVIX) 75 MG tablet, Take 1 tablet by mouth Daily., Disp: 30 tablet, Rfl: 1  •  Cyanocobalamin (B-12) 1000 MCG/ML kit, Inject 250 mcg as directed 1 (One) Time Per Week. Pt. Stated he takes this medication weekly 250 mcg, pt reports took it last friday, Disp: , Rfl:   •  guaiFENesin (MUCINEX) 600 MG 12 hr tablet, Take 1,200 mg by mouth 2 (Two) Times a Day As Needed for Cough., Disp: , Rfl:   •  hydrochlorothiazide (HYDRODIURIL) 12.5 MG tablet, Take 1 tablet by mouth Daily., Disp: 5 tablet, Rfl: 0     Objective:     Vitals:    03/20/18 1245   BP: 136/60   BP Location: Right arm   Pulse: 66   Weight: 87.1 kg (192 lb)   Height: 185.4 cm (73\")     Body mass index is 25.33 kg/m².    Physical Exam   Constitutional: He is oriented to person, place, and time. He appears well-developed and well-nourished.   HENT:   Head: Normocephalic.   Nose: Nose normal.   Mouth/Throat: Oropharynx is clear and moist.   Eyes: Conjunctivae and EOM are normal. Pupils are equal, round, and reactive to light.   Neck: Normal range of motion. No JVD present.   Cardiovascular: Normal rate, regular rhythm, normal heart sounds and intact distal pulses.    No murmur heard.  Pulmonary/Chest: Effort normal and breath sounds normal.   Abdominal: Soft. He exhibits no mass. There is no tenderness.   Musculoskeletal: Normal range of motion. He exhibits no edema.   Lymphadenopathy:     He has no cervical adenopathy.   Neurological: He is alert and oriented to person, place, and time. No cranial nerve deficit.   Skin: Skin is warm and dry. No rash noted.   Psychiatric: He has a normal mood and affect. His behavior is normal. Judgment and thought content normal.   Vitals reviewed.        ECG 12 Lead  Date/Time: 3/20/2018 1:15 " "PM  Performed by: CHRISTOPHER SAWANT  Authorized by: CHRISTOPHER ASWANT   Comparison: compared with previous ECG   Similar to previous ECG  Rhythm: sinus rhythm  Conduction: 1st degree  ST Segments: ST segments normal  T Waves: T waves normal  Other findings: PRWP  Clinical impression: abnormal ECG              Assessment:       Diagnosis Plan   1. History of stroke  Adult Transesophageal Echo (THIERRY) W/ Cont if Necessary Per Protocol   2. Elevated blood-pressure reading without diagnosis of hypertension     3. Memory loss     4. Obstructive sleep apnea syndrome            Plan:       1.  He had a left occipital/temporal stroke which was initially presumed to be due to small vessel disease. He's on clopidogrel and atorvastatin.  Neurology feels that this was actually a large vessel process and possibly embolic.  He does have untreated ELIZABETH, which increases his risk of AF significantly.  An echo showed a PFO and a Zio showed atrial ectopy.  A LINQ will be placed and a THIERRY will be performed.     2.  I have continued the HCTZ that was started in the ED.    3.  He is concerned this is due to the statin.  I asked him to take a \"holiday\" from atorvastatin for 30 days to see if there is any change.    4.  He is working with Dr. Perez on treatment options.    Sincerely,       Christopher Sawant MD                "

## 2018-03-20 NOTE — THERAPY EVALUATION
Outpatient Speech Language Pathology   Adult Speech Language Cognitive Initial Evaluation  [place]     Patient Name: Ronnell Rizvi  : 1950  MRN: [id number]  Today's Date: 3/20/2018        Visit Date: 2018   Patient Active Problem List   Diagnosis   • Epididymal pain   • Anemia, unspecified   • Health care maintenance   • Vitamin B12 deficiency   • Chronic fatigue   • Benign prostatic hypertrophy (BPH) with weak urinary stream   • Chronic bronchitis   • Obstructive sleep apnea syndrome   • History of stroke        Past Medical History:   Diagnosis Date   • Allergic rhinitis    • Anal fissure    • Asthma    • B12 deficiency    • Chronic bronchitis    • Emphysema lung    • Fatty liver    • GERD (gastroesophageal reflux disease)    • Heart murmur     possible MVP in past documentation   • Hepatitis     thought to be Hepatitis A    • History of pneumonia     With left lower lobe atelectasis and scar tissue, being followed   • Pneumonia     LLL with scar tissue   • Spinal stenosis    • Stroke     10/2017: left occipital/temporal        Past Surgical History:   Procedure Laterality Date   • COLONOSCOPY     • HAND SURGERY Bilateral    • HERNIA REPAIR     • OTHER SURGICAL HISTORY      inguinal hernia repair for person over age 5   • TONSILLECTOMY           Visit Dx:    ICD-10-CM ICD-9-CM   1. Other speech and language deficits, late effect of cerebrovascular disease(438.19) I69.928 438.19                 SLP SLC Evaluation - 18 1430        Communication Assessment/Intervention    Document Type evaluation  -AD (r) AB (t) AD (c)    Total Evaluation Minutes, SLP 90  -AD (r) AB (t) AD (c)    Subjective Information no complaints  -AD (r) AB (t) AD (c)    Patient Observations alert;cooperative;agree to therapy  -AD (r) AB (t) AD (c)    Patient Effort good  -AD (r) AB (t) AD (c)    Comment Pt was seen for cognitive linguistic evalaution due to complaints of recent memory difficulties.  -AD    Symptoms Noted  During/After Treatment none  -AD (r) AB (t) AD (c)       General Information    Patient Profile Reviewed yes  -AD (r) AB (t) AD (c)    Pertinent History Of Current Problem Pt with some alexia as a result of CVA. See previous note for further hx.  -AD (r) AB (t) AD (c)    Precautions/Limitations, Vision WFL  -AD (r) AB (t) AD (c)    Precautions/Limitations, Hearing other (see comments)   pt reports recent HA prescription.Not wearing during session  -AD (r)  AD (c)    Prior Level of Function-Communication WFL  -AD (r) AB (t) AD (c)    Plans/Goals Discussed with patient  -AD (r) AB (t) AD (c)    Barriers to Rehab none identified  -AD (r) AB (t) AD (c)    Patient's Goals for Discharge other (see comments)   improved reading   -AD (r)  AD (c)    Standardized Assessment Used RBANS  -AD (r) AB (t) AD (c)       Cognitive Assessment Intervention- SLP    Cognitive Function (Cognition) WFL  -AD (r) AB (t) AD (c)    Orientation Status (Cognition) WNL  -AD (r) AB (t) AD (c)    Memory (Cognitive) WNL  -AD (r) AB (t) AD (c)    Attention (Cognitive) WFL;mild impairment;other (see comments)   relative difficulty in attention  -AD (r) AB (t) AD (c)    Thought Organization (Cognitive) WFL  -AD (r) AB (t) AD (c)    Reasoning (Cognitive) WFL  -AD (r) AB (t) AD (c)    Problem Solving (Cognitive) WFL  -AD (r) AB (t) AD (c)    Functional Math (Cognitive) WFL  -AD (r) AB (t) AD (c)    Executive Function (Cognition) WFL  -AD (r) AB (t) AD (c)    Pragmatics (Communication) WFL  -AD (r) AB (t) AD (c)    Right Hemisphere Function WFL  -AD (r) AB (t) AD (c)    Cognition, Comment Pt completed the Repeatable Battery for the Assessment of Neuropsychological Status (RBANS-B). This assessment evaluates the areas of immediate memory, visuospatial/ constructional skills, language, attention, and delayed memory. He achieved a total standard score of 100, placing him within average range overall, however, there was significant descrepancy between areas  tested. His scores are as follows: immediate memory 94, visuospatial construct 131, language 85, attention 91, delayed memory 102. His visuospatial abilities were significantly higher than the other areas tested, with the greatest discrepancy between that and language. The subtests that compose the language portion of RBANS include picture naming and semantic fluency. He did not demonstrate difficulty during the picture naming tasks, however he did demonstrate difficulty during the semantic fluency task. This indicates that his language abilities are variable depending on the task persented. His other relative difficulty was in attention. Low average or borderline scores can represent general low average attention and processing speed, or may occur when there is variability in the subtests that comprise the index. While the overall score would still indicate attention difficulty, the deficits may be more related to visual scanning and processing speed. This is likely related to the difficulties reading, that Mr. Rizvi reported. Overall his scores in every area tested place him in the average range or, for visuospatial/constructional skills, above average. Overall, cognition is considered normal.  -AD (r)  AD (c)       SLP Clinical Impressions    SLP Diagnosis no evident deficits in cognitive communication. Continue to target written language comprehension.  -AD (r)  AD (c)      User Key  (r) = Recorded By, (t) = Taken By, (c) = Cosigned By    Initials Name Provider Type    GIOVANA Ballard MS [facility]-SLP Speech Therapist    AB Devin Rod, Speech Therapy Student Speech Therapy Student                OP SLP Education     Row Name 03/20/18 0900       Education    Education Comments Pt verbalized understanding of the targets of the session and the results of the measures taken.   -AD (r) AB (t) AD (c)      User Key  (r) = Recorded By, (t) = Taken By, (c) = Cosigned By    Initials Name Effective Dates    GIOVANA GARCIA  Elio, MS CCC-SLP 06/22/16 -     AB Devin Rod, Speech Therapy Student 01/31/18 -                 SLP OP Goals     Row Name 03/19/[id number]          Goal Type Needed Other Adult Goals  -AD (r) AB (t) AD (c)          Subjective Comments Pt was seen by graduate clinician with SLP present. Pt alert and cooperative and appeared to put forth his best effort on all tasks.  -AD (r)  AD (c)          Able to rate subjective pain? no  -AD (r) AB (t) AD (c)          Written Language Comprehension LTG's Patient will be able to read and comprehend magazines and books  -AD (r) AB (t) AD (c)    Patient will be able to read and comprehend magazines and books 80%:;without cues  -AD (r) AB (t) AD (c)    Status: Patient will be able to read and comprehend magazines and books New  -AD (r) AB (t) AD (c)    Comments: Patient will be able to read and comprehend magazines and books Not directly targeted due to therapy focus on other goals.   -AD (r) AB (t) AD (c)          Other Adult Goal- 1 Pt will answer questions about written paragraphs with increased speed with 90% accuracy without cues.  -AD (r) AB (t) AD (c)    Status: Other Adult Goal- 1 New  -AD (r) AB (t) AD (c)    Comments: Other Adult Goal- 1 Not directly targeted due to therapy focus on other goals.  -AD (r) AB (t) AD (c)    Other Adult Goal- 2 Pt will read multisyllabic words, in the context of a sentence, with 90% accuracy without cues.  -AD (r) AB (t) AD (c)    Status: Other Adult Goal- 2 New  -AD (r) AB (t) AD (c)    Comments: Other Adult Goal- 2 Not directly targeted due to therapy focus on other goals.   -AD (r) AB (t) AD (c)    Other Adult Goal- 3 Patient will improve comprehension of writtent language skills by stating compensatory strategies to utilize when breakdowns in reading comprehension occur with 80% consistently without cues.   -AD (r) AB (t) AD (c)    Status: Other Adult Goal- 3 New  -AD (r) AB (t) AD (c)    Comments: Other Adult Goal- 3 Not directly  targeted due to therapy focus on other goals.   -AD (r) AB (t) AD (c)    Other Adult Goal- 4 Pt will participate in further testing to assess cognitive communication and memory.   -AD (r) AB (t) AD (c)    Status: Other Adult Goal- 4 Progressing as expected  -AD (r) AB (t) AD (c)    Comments: Other Adult Goal- 4 Pt completed the Repeatable Battery for the Assessment of Neuropsychological Status (RBANS-B). This assessment evaluates the areas of immediate memory, visuospatial/ constructional skills, language, attention, and delayed memory. He achieved a total standard score of 100, placing him within average range overall, however, there was significant descrepancy between areas tested. His scores are as follows: immediate memory 94, visuospatial construct 131, language 85, attention 91, delayed memory 102. His visuospatial abilities were significantly higher than the other areas tested, with the greatest discrepancy between that and language. The subtests that comprise the language portion of RBANS include picture naming and semantic fluency. He did not demonstrate difficulty during the picture naming tasks, however he did demonstrate difficulty during the semantic fluency task. This indicates that his language abilities are variable depending on the task persented. His other relative difficulty was in attention. Low average or borderline scores can represent general low average attention and processing speed, or may occur when there is variability in the subtests that comprise the index. While the overall score would still indicate attention difficulty, the deficits may be more related to visual scanning and processing speed. This is likely related to the difficulties reading, that Mr. Rizvi reported. Overall his scores in every area tested place him in the average range or, for visuospatial/constructional skills, above average.   -AD (r)  AD (c)          SLP Goal Re-Cert Due Date 04/07/18  -AD (r) AB (t) AD (c)       User Key  (r) = Recorded By, (t) = Taken By, (c) = Cosigned By    Initials Name Provider Type    GIOVANA Ballard, MS [facility]-SLP Speech Therapist    AB Devin Rod Speech Therapy Student Speech Therapy Student                OP SLP Assessment/Plan - 03/20/18 0900        SLP Assessment    Clinical Impression Comments The results from the RBANS suggest that Mr. Saenz has a strength in visuospatial contructional abilities, and a relative weakness in language and attention. His scores were within average, or above average range, however, there was some discrepency between visuospatial/constructional abilities compared to attention and language. The reading difficulties Mr. Rizvi reported are likely due to his relative weakness in attention and language in combination of visual processing deficits related to his occipital lobe infarct.    -AD (r)  AD (c)       SLP Plan    Plan Comments Target goals as stated in his plan of care.  -AD (r)  AD (c)      User Key  (r) = Recorded By, (t) = Taken By, (c) = Cosigned By    Initials Name Provider Type    GIOVANA Ballard, MS [facility]-SLP Speech Therapist           Time Calculation:   SLP Start Time: 1300  SLP Stop Time: 1430  SLP Time Calculation (min): 90 min  SLP Non-Billable Time (min): 0 min  Total Timed Code Minutes- SLP: 90 minute(s)    Therapy Charges for Today     Code Description Service Date Service Provider Modifiers Qty    [id number]  ST STD COG PERF TEST PER HOUR 3/19/2018 Devin Rod, Speech Therapy Student GN 1          Devin Rod Speech Therapy Student  3/20/2018

## 2018-03-22 RX ORDER — HYDROCHLOROTHIAZIDE 12.5 MG/1
12.5 TABLET ORAL DAILY
Qty: 90 TABLET | Refills: 1 | Status: SHIPPED | OUTPATIENT
Start: 2018-03-22 | End: 2018-09-18 | Stop reason: SDUPTHER

## 2018-03-26 ENCOUNTER — HOSPITAL ENCOUNTER (OUTPATIENT)
Dept: CARDIOLOGY | Facility: HOSPITAL | Age: 68
Discharge: HOME | End: 2018-03-26
Attending: INTERNAL MEDICINE

## 2018-03-26 ENCOUNTER — HOSPITAL ENCOUNTER (OUTPATIENT)
Facility: HOSPITAL | Age: 68
Setting detail: HOSPITAL OUTPATIENT SURGERY
Discharge: HOME | End: 2018-03-26
Attending: INTERNAL MEDICINE | Admitting: INTERNAL MEDICINE

## 2018-03-26 VITALS
SYSTOLIC BLOOD PRESSURE: 154 MMHG | OXYGEN SATURATION: 93 % | RESPIRATION RATE: 16 BRPM | DIASTOLIC BLOOD PRESSURE: 95 MMHG | HEART RATE: 57 BPM

## 2018-03-26 VITALS
DIASTOLIC BLOOD PRESSURE: 83 MMHG | TEMPERATURE: 97.5 F | WEIGHT: 185 LBS | BODY MASS INDEX: 24.52 KG/M2 | HEART RATE: 68 BPM | RESPIRATION RATE: 18 BRPM | OXYGEN SATURATION: 96 % | HEIGHT: 73 IN | SYSTOLIC BLOOD PRESSURE: 142 MMHG

## 2018-03-26 DIAGNOSIS — Z86.73 HISTORY OF STROKE: ICD-10-CM

## 2018-03-26 DIAGNOSIS — R93.1 ABNORMAL FINDINGS ON DIAGNOSTIC IMAGING OF HEART AND CORONARY CIRCULATION: ICD-10-CM

## 2018-03-26 LAB
BH CV ECHO MEAS - AI DEC SLOPE: 208.5 CM/SEC^2
BH CV ECHO MEAS - AI MAX PG: 42.2 MMHG
BH CV ECHO MEAS - AI MAX VEL: 324 CM/SEC
BH CV ECHO MEAS - AI P1/2T: 455.1 MSEC
BH CV ECHO MEAS - BSA(HAYCOCK): 2.1 M^2
BH CV ECHO MEAS - BSA: 2.1 M^2
BH CV ECHO MEAS - BZI_BMI: 25.3 KILOGRAMS/M^2
BH CV ECHO MEAS - BZI_METRIC_HEIGHT: 185.4 CM
BH CV ECHO MEAS - BZI_METRIC_WEIGHT: 87.1 KG
BH CV VAS BP LEFT ARM: NORMAL MMHG

## 2018-03-26 PROCEDURE — C1764 EVENT RECORDER, CARDIAC: HCPCS | Performed by: INTERNAL MEDICINE

## 2018-03-26 PROCEDURE — 93010 ELECTROCARDIOGRAM REPORT: CPT | Performed by: INTERNAL MEDICINE

## 2018-03-26 PROCEDURE — 25010000002 FENTANYL CITRATE (PF) 100 MCG/2ML SOLUTION: Performed by: INTERNAL MEDICINE

## 2018-03-26 PROCEDURE — 93312 ECHO TRANSESOPHAGEAL: CPT | Performed by: INTERNAL MEDICINE

## 2018-03-26 PROCEDURE — 33282 PR IMPLANTATION PT-ACTIVATED CARDIAC EVENT RECORDER: CPT | Performed by: INTERNAL MEDICINE

## 2018-03-26 PROCEDURE — 33282 HC INSERTION PT ACTIV CARD EVNT REC: CPT | Performed by: INTERNAL MEDICINE

## 2018-03-26 PROCEDURE — 93312 ECHO TRANSESOPHAGEAL: CPT

## 2018-03-26 PROCEDURE — 93005 ELECTROCARDIOGRAM TRACING: CPT | Performed by: INTERNAL MEDICINE

## 2018-03-26 PROCEDURE — 93325 DOPPLER ECHO COLOR FLOW MAPG: CPT

## 2018-03-26 PROCEDURE — 25010000002 MIDAZOLAM PER 1 MG: Performed by: INTERNAL MEDICINE

## 2018-03-26 PROCEDURE — 93320 DOPPLER ECHO COMPLETE: CPT

## 2018-03-26 PROCEDURE — 93320 DOPPLER ECHO COMPLETE: CPT | Performed by: INTERNAL MEDICINE

## 2018-03-26 PROCEDURE — 93325 DOPPLER ECHO COLOR FLOW MAPG: CPT | Performed by: INTERNAL MEDICINE

## 2018-03-26 DEVICE — ICM CONFIRM RX 1.4CC DM3500: Type: IMPLANTABLE DEVICE | Status: FUNCTIONAL

## 2018-03-26 RX ORDER — PROMETHAZINE HYDROCHLORIDE 25 MG/ML
12.5 INJECTION, SOLUTION INTRAMUSCULAR; INTRAVENOUS EVERY 4 HOURS PRN
Status: DISCONTINUED | OUTPATIENT
Start: 2018-03-26 | End: 2018-03-26 | Stop reason: HOSPADM

## 2018-03-26 RX ORDER — ACETAMINOPHEN 325 MG/1
650 TABLET ORAL EVERY 4 HOURS PRN
Status: DISCONTINUED | OUTPATIENT
Start: 2018-03-26 | End: 2018-03-26 | Stop reason: HOSPADM

## 2018-03-26 RX ORDER — METOCLOPRAMIDE HYDROCHLORIDE 5 MG/ML
10 INJECTION INTRAMUSCULAR; INTRAVENOUS EVERY 6 HOURS PRN
Status: DISCONTINUED | OUTPATIENT
Start: 2018-03-26 | End: 2018-03-26 | Stop reason: HOSPADM

## 2018-03-26 RX ORDER — LIDOCAINE HYDROCHLORIDE 10 MG/ML
0.1 INJECTION, SOLUTION EPIDURAL; INFILTRATION; INTRACAUDAL; PERINEURAL ONCE AS NEEDED
Status: DISCONTINUED | OUTPATIENT
Start: 2018-03-26 | End: 2018-03-26 | Stop reason: HOSPADM

## 2018-03-26 RX ORDER — SODIUM CHLORIDE 0.9 % (FLUSH) 0.9 %
1-10 SYRINGE (ML) INJECTION AS NEEDED
Status: DISCONTINUED | OUTPATIENT
Start: 2018-03-26 | End: 2018-03-26 | Stop reason: HOSPADM

## 2018-03-26 RX ORDER — ONDANSETRON 2 MG/ML
4 INJECTION INTRAMUSCULAR; INTRAVENOUS EVERY 6 HOURS PRN
Status: DISCONTINUED | OUTPATIENT
Start: 2018-03-26 | End: 2018-03-26 | Stop reason: HOSPADM

## 2018-03-26 RX ORDER — NITROGLYCERIN 0.4 MG/1
0.4 TABLET SUBLINGUAL
Status: DISCONTINUED | OUTPATIENT
Start: 2018-03-26 | End: 2018-03-26 | Stop reason: HOSPADM

## 2018-03-26 RX ORDER — FENTANYL CITRATE 50 UG/ML
INJECTION, SOLUTION INTRAMUSCULAR; INTRAVENOUS AS NEEDED
Status: DISCONTINUED | OUTPATIENT
Start: 2018-03-26 | End: 2018-03-26 | Stop reason: HOSPADM

## 2018-03-26 RX ORDER — LIDOCAINE HYDROCHLORIDE AND EPINEPHRINE 10; 10 MG/ML; UG/ML
INJECTION, SOLUTION INFILTRATION; PERINEURAL AS NEEDED
Status: DISCONTINUED | OUTPATIENT
Start: 2018-03-26 | End: 2018-03-26 | Stop reason: HOSPADM

## 2018-03-26 RX ORDER — MIDAZOLAM HYDROCHLORIDE 1 MG/ML
1 INJECTION INTRAMUSCULAR; INTRAVENOUS
Status: DISCONTINUED | OUTPATIENT
Start: 2018-03-26 | End: 2018-03-26 | Stop reason: HOSPADM

## 2018-03-26 RX ORDER — SODIUM CHLORIDE 9 MG/ML
75 INJECTION, SOLUTION INTRAVENOUS CONTINUOUS
Status: DISCONTINUED | OUTPATIENT
Start: 2018-03-26 | End: 2018-03-26 | Stop reason: HOSPADM

## 2018-03-26 RX ORDER — FENTANYL CITRATE 50 UG/ML
INJECTION, SOLUTION INTRAMUSCULAR; INTRAVENOUS
Status: COMPLETED | OUTPATIENT
Start: 2018-03-26 | End: 2018-03-26

## 2018-03-26 RX ORDER — MIDAZOLAM HYDROCHLORIDE 1 MG/ML
INJECTION INTRAMUSCULAR; INTRAVENOUS
Status: COMPLETED | OUTPATIENT
Start: 2018-03-26 | End: 2018-03-26

## 2018-03-26 RX ADMIN — LIDOCAINE HYDROCHLORIDE 10 ML: 20 SOLUTION ORAL; TOPICAL at 11:03

## 2018-03-26 RX ADMIN — Medication 2 MG: at 11:08

## 2018-03-26 RX ADMIN — FENTANYL CITRATE 50 MCG: 50 INJECTION INTRAMUSCULAR; INTRAVENOUS at 11:05

## 2018-03-26 RX ADMIN — FENTANYL CITRATE 25 MCG: 50 INJECTION INTRAMUSCULAR; INTRAVENOUS at 11:09

## 2018-03-26 RX ADMIN — BENZOCAINE, BUTAMBEN, AND TETRACAINE HYDROCHLORIDE 1 SPRAY: .028; .004; .004 AEROSOL, SPRAY TOPICAL at 11:04

## 2018-03-26 RX ADMIN — SODIUM CHLORIDE 75 ML/HR: 9 INJECTION, SOLUTION INTRAVENOUS at 10:15

## 2018-03-26 RX ADMIN — Medication 2 MG: at 11:05

## 2018-03-26 NOTE — H&P (VIEW-ONLY)
Date of Office Visit: 2018  Encounter Provider: Christopher Sawant MD  Place of Service: [facility] CARDIOLOGY  Patient Name: Ronnell Rizvi  :1950    Chief Complaint   Patient presents with   • Follow-up     H/O STROKE    :     HPI: Ronnell Rizvi is a 68 y.o. male who presents today to follow up after recent hospitalization.  I met him in 2016 when he was hospitalized for chest pain.  A Cardiolite stress was normal (EF 54%).  He did have some Wenckebach at the beginning of stress which normalized with exercise.    In 2017, he had an episode of visual changes that resolved on their own.  The next morning it happened again and he came to the ED and was diagnosed with a stroke of the left occipital/temporal lobe.  He was found to have some diffuse small vessel disease.  An echo wasn't performed but a Zio patch was placed.  We were not consulted in the hospital.  He was placed on clopidogrel and atorvastatin.    In 2017, the Zio showed some atrial ectopy (there was a 13 beat run of PACs) but no atrial fibrillation.  An echo showed normal LV systolic function and a small PFO.    His studies have been reviewed by neurology, who feel that this may very well have been embolic; he has an appointment with them soon.    His visual changes have resolved.  He denies palpitations, syncope, lightheadedness, edema, orthopnea, PND, chest pain, or dyspnea.  His blood pressure recently started spiking and he was placed on HCTZ.   He has ELIZABETH and doesn't tolerate CPAP at all.    He also has had some memory loss and he worries it's the statin.    Past Medical History:   Diagnosis Date   • Allergic rhinitis    • Anal fissure    • Asthma    • B12 deficiency    • Benign prostatic hypertrophy (BPH) with weak urinary stream 2016   • Chronic bronchitis    • Emphysema lung    • Fatty liver    • GERD (gastroesophageal reflux disease)    • Heart murmur     possible MVP in past  documentation   • Hepatitis     thought to be Hepatitis A    • History of pneumonia     With left lower lobe atelectasis and scar tissue, being followed   • Obstructive sleep apnea syndrome 8/23/2017   • Pneumonia     LLL with scar tissue   • Spinal stenosis    • Stroke     10/2017: left occipital/temporal       Past Surgical History:   Procedure Laterality Date   • COLONOSCOPY     • HAND SURGERY Bilateral    • HERNIA REPAIR     • OTHER SURGICAL HISTORY      inguinal hernia repair for person over age 5   • TONSILLECTOMY         Social History     Social History   • Marital status:      Spouse name: Joey   • Number of children: N/A   • Years of education: N/A     Occupational History   •  Retired     Social History Main Topics   • Smoking status: Former Smoker     Quit date: 2/20/1979   • Smokeless tobacco: Never Used      Comment: caffeine use: Drinks 4 glasses of Dt coke on avg.    • Alcohol use No   • Drug use: No   • Sexual activity: Defer     Other Topics Concern   • Not on file     Social History Narrative   • No narrative on file       Family History   Problem Relation Age of Onset   • Obesity Mother    • Clotting disorder Mother      Upper extremities   • Alcohol abuse Father    • Cancer Father 63     Esophagus and lung   • Other Father      cardiac disorder   • Kidney disease Sister    • Kidney failure Sister    • Drug abuse Paternal Uncle    • Stroke Sister    • Throat cancer Sister        Review of Systems   Constitution: Positive for malaise/fatigue.   Cardiovascular: Negative for chest pain and palpitations.   Respiratory: Negative for shortness of breath.    Neurological: Negative for dizziness and light-headedness.        As per HPI   Psychiatric/Behavioral: Positive for depression and memory loss.   All other systems reviewed and are negative.      Allergies   Allergen Reactions   • Aspirin Nausea Only   • Citrus      Blisters on mouth     • Combivent [Ipratropium-Albuterol] Other  "(See Comments)     Throat swelling   • Lactose Intolerance (Gi)          Current Outpatient Prescriptions:   •  atorvastatin (LIPITOR) 40 MG tablet, Take 1 tablet by mouth Every Night., Disp: 30 tablet, Rfl: 1  •  clopidogrel (PLAVIX) 75 MG tablet, Take 1 tablet by mouth Daily., Disp: 30 tablet, Rfl: 1  •  Cyanocobalamin (B-12) 1000 MCG/ML kit, Inject 250 mcg as directed 1 (One) Time Per Week. Pt. Stated he takes this medication weekly 250 mcg, pt reports took it last friday, Disp: , Rfl:   •  guaiFENesin (MUCINEX) 600 MG 12 hr tablet, Take 1,200 mg by mouth 2 (Two) Times a Day As Needed for Cough., Disp: , Rfl:   •  hydrochlorothiazide (HYDRODIURIL) 12.5 MG tablet, Take 1 tablet by mouth Daily., Disp: 5 tablet, Rfl: 0     Objective:     Vitals:    03/20/18 1245   BP: 136/60   BP Location: Right arm   Pulse: 66   Weight: 87.1 kg (192 lb)   Height: 185.4 cm (73\")     Body mass index is 25.33 kg/m².    Physical Exam   Constitutional: He is oriented to person, place, and time. He appears well-developed and well-nourished.   HENT:   Head: Normocephalic.   Nose: Nose normal.   Mouth/Throat: Oropharynx is clear and moist.   Eyes: Conjunctivae and EOM are normal. Pupils are equal, round, and reactive to light.   Neck: Normal range of motion. No JVD present.   Cardiovascular: Normal rate, regular rhythm, normal heart sounds and intact distal pulses.    No murmur heard.  Pulmonary/Chest: Effort normal and breath sounds normal.   Abdominal: Soft. He exhibits no mass. There is no tenderness.   Musculoskeletal: Normal range of motion. He exhibits no edema.   Lymphadenopathy:     He has no cervical adenopathy.   Neurological: He is alert and oriented to person, place, and time. No cranial nerve deficit.   Skin: Skin is warm and dry. No rash noted.   Psychiatric: He has a normal mood and affect. His behavior is normal. Judgment and thought content normal.   Vitals reviewed.        ECG 12 Lead  Date/Time: 3/20/2018 1:15 " "PM  Performed by: CHRISTOPHER SAWANT  Authorized by: CHRISTOPHER SAWANT   Comparison: compared with previous ECG   Similar to previous ECG  Rhythm: sinus rhythm  Conduction: 1st degree  ST Segments: ST segments normal  T Waves: T waves normal  Other findings: PRWP  Clinical impression: abnormal ECG              Assessment:       Diagnosis Plan   1. History of stroke  Adult Transesophageal Echo (THIERRY) W/ Cont if Necessary Per Protocol   2. Elevated blood-pressure reading without diagnosis of hypertension     3. Memory loss     4. Obstructive sleep apnea syndrome            Plan:       1.  He had a left occipital/temporal stroke which was initially presumed to be due to small vessel disease. He's on clopidogrel and atorvastatin.  Neurology feels that this was actually a large vessel process and possibly embolic.  He does have untreated ELIZABETH, which increases his risk of AF significantly.  An echo showed a PFO and a Zio showed atrial ectopy.  A LINQ will be placed and a THIERRY will be performed.     2.  I have continued the HCTZ that was started in the ED.    3.  He is concerned this is due to the statin.  I asked him to take a \"holiday\" from atorvastatin for 30 days to see if there is any change.    4.  He is working with Dr. Perez on treatment options.    Sincerely,       Christopher Sawant MD                "

## 2018-03-28 ENCOUNTER — APPOINTMENT (OUTPATIENT)
Dept: SPEECH THERAPY | Facility: HOSPITAL | Age: 68
End: 2018-03-28

## 2018-03-28 ENCOUNTER — HOSPITAL ENCOUNTER (OUTPATIENT)
Dept: SPEECH THERAPY | Facility: HOSPITAL | Age: 68
Setting detail: THERAPIES SERIES
Discharge: HOME | End: 2018-03-28

## 2018-03-28 DIAGNOSIS — I69.928 OTHER SPEECH AND LANGUAGE DEFICITS, LATE EFFECT OF CEREBROVASCULAR DISEASE(438.19): Primary | ICD-10-CM

## 2018-03-28 PROCEDURE — 92507 TX SP LANG VOICE COMM INDIV: CPT

## 2018-03-29 ENCOUNTER — TELEPHONE (OUTPATIENT)
Dept: CARDIOLOGY | Facility: CLINIC | Age: 68
End: 2018-03-29

## 2018-03-29 ENCOUNTER — CLINICAL SUPPORT NO REQUIREMENTS (OUTPATIENT)
Dept: CARDIOLOGY | Facility: CLINIC | Age: 68
End: 2018-03-29

## 2018-03-29 DIAGNOSIS — I44.1 MOBITZ (TYPE) I (WENCKEBACH'S) ATRIOVENTRICULAR BLOCK: ICD-10-CM

## 2018-03-29 DIAGNOSIS — I63.9 CRYPTOGENIC STROKE (HCC): Primary | ICD-10-CM

## 2018-03-29 NOTE — THERAPY TREATMENT NOTE
Outpatient Speech Language Pathology   Adult Speech Language Cognitive Treatment Note  [place]     Patient Name: Ronnell Rizvi  : 1950  MRN: [id number]  Today's Date: 3/29/2018         Visit Date: 2018   Patient Active Problem List   Diagnosis   • Epididymal pain   • Anemia, unspecified   • Health care maintenance   • Vitamin B12 deficiency   • Chronic fatigue   • Benign prostatic hypertrophy (BPH) with weak urinary stream   • Chronic bronchitis   • Obstructive sleep apnea syndrome   • History of stroke          Visit Dx:    ICD-10-CM ICD-9-CM   1. Other speech and language deficits, late effect of cerebrovascular disease(438.19) I69.928 438.19           SLP OP Goals     Row Name 18 1500       Goal Type Needed    Goal Type Needed Other Adult Goals  -AD (r) AB (t) AD (c)       Subjective Comments    Subjective Comments Pt was seen by graduate clinician with SLP present. He was alert and willing to participate.   -AD (r) AB (t) AD (c)       Subjective Pain    Able to rate subjective pain? no  -AD (r) AB (t) AD (c)       Written Language Comprehension Goals    Written Language Comprehension LTG's Patient will be able to read and comprehend magazines and books  -AD (r) AB (t) AD (c)    Patient will be able to read and comprehend magazines and books 80%:;without cues  -AD (r) AB (t) AD (c)    Status: Patient will be able to read and comprehend magazines and books Progressing as expected  -AD (r) AB (t) AD (c)       Other Goals    Other Adult Goal- 1 Pt will answer questions about written paragraphs with increased speed with 90% accuracy without cues.  -AD (r) AB (t) AD (c)    Status: Other Adult Goal- 1 Progressing as expected  -AD (r) AB (t) AD (c)    Comments: Other Adult Goal- 1 Mr Rizvi was able to read newspaper articles in 5-10 mnutes. He was able to independenty answer questions about a previously known news story. He demonstrated decreased reading fluency, particularly on less frequently  occuring words, and contractions he was able to independently correct reading mistakes. He reported fatigue after reading tasks.   -AD (r) AB (t) AD (c)    Other Adult Goal- 2 Pt will read multisyllabic words, in the context of a sentence, with 90% accuracy without cues.  -AD (r) AB (t) AD (c)    Status: Other Adult Goal- 2 Progressing as expected  -AD (r) AB (t) AD (c)    Comments: Other Adult Goal- 2 When highlighted, Mr. Rizvi was able to read multisyllabic words independently, but with some decreased fluency. He was 100% accurate with only occasional self corrections.  -AD    Other Adult Goal- 3 Patient will improve comprehension of written language skills by stating compensatory strategies to utilize when breakdowns in reading comprehension occur with 80% consistently without cues.   -AD    Status: Other Adult Goal- 3 Progressing as expected  -AD (r) AB (t) AD (c)    Comments: Other Adult Goal- 3 Mr. Rizvi demonstrated comprehension of written news story that he was previously familiar with. With a 15 minute delay between reading task and comprehension questions, Mr. Rizvi was able to independently answer 2/3 questions about the article.  -AD (r) AB (t) AD (c)    Other Adult Goal- 4 Pt will participate in further testing to assess cognitive communication and memory.   -AD (r) AB (t) AD (c)    Status: Other Adult Goal- 4 Achieved  -AD    Comments: Other Adult Goal- 4 Achieved on 3/20/18.  -AD       SLP Time Calculation    SLP Goal Re-Cert Due Date 04/07/18  -AD (r) AB (t) AD (c)      User Key  (r) = Recorded By, (t) = Taken By, (c) = Cosigned By    Initials Name Provider Type    GIOVANA Ballard, MS [facility]-SLP Speech Therapist    AB Devin Rod, Speech Therapy Student Speech Therapy Student                OP SLP Education     Row Name 03/28/18 1500       Education    Education Comments Pt verbalized understanding of the therapy targets. Home tasks were provided. Encouragement of oral reading daily at home  provided. Pt verbalized understanding.  -AD      User Key  (r) = Recorded By, (t) = Taken By, (c) = Cosigned By    Initials Name Effective Dates    GIOVANA Ballard MS CCC-SLP 06/22/16 -                 OP SLP Assessment/Plan - 03/28/18 1500        SLP Assessment    Clinical Impression Comments Mr. Rizvi demonstrated reading decoding and comprehension skills. He demonstrated the most difficulty with reading fluency and speed. He reported fatigue with reading tasks.   -AD (r) AB (t) AD (c)       SLP Plan    Plan Comments Target goals as previously stated. Target reading speed and fluency.  -AD (r) AB (t) AD (c)      User Key  (r) = Recorded By, (t) = Taken By, (c) = Cosigned By    Initials Name Provider Type    GIOVANA Ballard MS [facility]-SLP Speech Therapist    AB Devin Rod, Speech Therapy Student Speech Therapy Student             Time Calculation:   SLP Start Time: 1300  SLP Stop Time: 1400  SLP Time Calculation (min): 60 min  SLP Non-Billable Time (min): 0 min  Total Timed Code Minutes- SLP: 0 minute(s)    Therapy Charges for Today     Code Description Service Date Service Provider Modifiers Qty    56692265322 [facility] TREATMENT SPEECH 4 3/28/2018 Devin Rod, Speech Therapy Student GN 1          Devin Rod Speech Therapy Student  3/29/2018

## 2018-04-01 ENCOUNTER — CLINICAL SUPPORT NO REQUIREMENTS (OUTPATIENT)
Dept: CARDIOLOGY | Facility: CLINIC | Age: 68
End: 2018-04-01

## 2018-04-01 DIAGNOSIS — I63.9 CRYPTOGENIC STROKE (HCC): Primary | ICD-10-CM

## 2018-04-01 DIAGNOSIS — I44.1 MOBITZ (TYPE) I (WENCKEBACH'S) ATRIOVENTRICULAR BLOCK: ICD-10-CM

## 2018-04-02 ENCOUNTER — HOSPITAL ENCOUNTER (OUTPATIENT)
Dept: SPEECH THERAPY | Facility: HOSPITAL | Age: 68
Setting detail: THERAPIES SERIES
Discharge: HOME | End: 2018-04-02

## 2018-04-02 DIAGNOSIS — I69.928 OTHER SPEECH AND LANGUAGE DEFICITS, LATE EFFECT OF CEREBROVASCULAR DISEASE(438.19): Primary | ICD-10-CM

## 2018-04-02 PROCEDURE — 92507 TX SP LANG VOICE COMM INDIV: CPT

## 2018-04-02 NOTE — THERAPY TREATMENT NOTE
Outpatient Speech Language Pathology   Adult Speech Language Cognitive Treatment Note  [place] [facility]     Patient Name: Ronnell Rizvi  : 1950  MRN: [id number]  Today's Date: 2018         Visit Date: 2018   Patient Active Problem List   Diagnosis   • Epididymal pain   • Anemia, unspecified   • Health care maintenance   • Vitamin B12 deficiency   • Chronic fatigue   • Benign prostatic hypertrophy (BPH) with weak urinary stream   • Chronic bronchitis   • Obstructive sleep apnea syndrome   • History of stroke          Visit Dx:    ICD-10-CM ICD-9-CM   1. Other speech and language deficits, late effect of cerebrovascular disease(438.19) I69.928 438.19               SLP OP Goals     Row Name 18 1415       Goal Type Needed    Goal Type Needed Other Adult Goals  -AD (r) AB (t) AD (c)       Subjective Comments    Subjective Comments Pt was seen by graduate clinician with SLP present. He was alert and cooperative.  -AD (r) AB (t) AD (c)       Subjective Pain    Able to rate subjective pain? no  -AD (r) AB (t) AD (c)       Written Language Comprehension Goals    Written Language Comprehension LTG's Patient will be able to read and comprehend magazines and books  -AD (r) AB (t) AD (c)    Patient will be able to read and comprehend magazines and books 80%:;without cues  -AD (r) AB (t) AD (c)    Status: Patient will be able to read and comprehend magazines and books Progressing as expected  -AD (r) AB (t) AD (c)       Other Goals    Other Adult Goal- 1 Pt will answer questions about written paragraphs with increased speed with 90% accuracy without cues.  -AD (r) AB (t) AD (c)    Status: Other Adult Goal- 1 Progressing as expected  -AD (r) AB (t) AD (c)    Comments: Other Adult Goal- 1 Mr. Rizvi was able to independently answer approximately 8 comprehension questions about articles on unfamiliar topics with inconsistent cues, without difficulty.   -AD (r) AB (t) AD (c)    Other Adult Goal- 2 Pt will read  multisyllabic words, in the context of a sentence, with 90% accuracy without cues.  -AD (r) AB (t) AD (c)    Status: Other Adult Goal- 2 Progressing as expected  -AD (r) AB (t) AD (c)    Comments: Other Adult Goal- 2 When highlighted, the pt was audrey to read multisyllabic words with approximately 90% accuracy. His comprehension of multisyllabic words appeared to be better than his reading fluency. His errors were more likely to be on unfamiliar compound words and contractions.   -AD (r) AB (t) AD (c)    Other Adult Goal- 3 Patient will improve comprehension of written language skills by stating compensatory strategies to utilize when breakdowns in reading comprehension occur with 80% consistently without cues.   -AD (r) AB (t) AD (c)    Status: Other Adult Goal- 3 Progressing as expected  -AD (r) AB (t) AD (c)    Comments: Other Adult Goal- 3 When covering the first half of a compound word, Mr. Rizvi was able to read a word that he had difficulty reading independently. Mr. Rizvi did not demonstrate independent use of reading strategy.  -AD (r) AB (t) AD (c)       SLP Time Calculation    SLP Goal Re-Cert Due Date  --      User Key  (r) = Recorded By, (t) = Taken By, (c) = Cosigned By    Initials Name Provider Type    GIOVANA Ballard MS [facility]-SLP Speech Therapist    AB Devin Rod, Speech Therapy Student Speech Therapy Student                OP SLP Education     Row Name 04/02/18 1500       Education    Education Comments Pt verbalized understanding of session targets. Clinician gave verbal instructions for home practice activities.  -AD (r) AB (t) AD (c)      User Key  (r) = Recorded By, (t) = Taken By, (c) = Cosigned By    Initials Name Effective Dates    GIOVANA Ballard MS CCC-SLP 06/22/16 -     AB Devin Rod, Speech Therapy Student 01/31/18 -                 OP SLP Assessment/Plan - 04/02/18 1400        SLP Assessment    Clinical Impression Comments Mr Rizvi demonstrated improved reading comprehension  and improved reading fluency of multisyllabic words. He has the most difficulty with unfamiliar words, compound words, contractions, and hyephenated words. He benefited from visual cue of covering up first part of compound word. He also required inconsistent cues to answer comprehension questions.  -AD (r) AB (t) AD (c)       SLP Plan    Plan Comments Target goals as stated. Target compensatory strategies and target compound words and contractions.   -AD (r) AB (t) AD (c)      User Key  (r) = Recorded By, (t) = Taken By, (c) = Cosigned By    Initials Name Provider Type    GIOVANA Ballard, MS [facility]-SLP Speech Therapist    AB Devin Rod, Speech Therapy Student Speech Therapy Student             Time Calculation:   SLP Start Time: 1300  SLP Stop Time: 1415  SLP Time Calculation (min): 75 min  SLP Non-Billable Time (min): 0 min  Total Timed Code Minutes- SLP: 0 minute(s)    Therapy Charges for Today     Code Description Service Date Service Provider Modifiers Qty    [id number] [facility] TREATMENT SPEECH 5 4/2/2018 Devin Rod Speech Therapy Student GN 1                   Devin Rod Speech Therapy Student  4/2/2018

## 2018-04-05 ENCOUNTER — CLINICAL SUPPORT NO REQUIREMENTS (OUTPATIENT)
Dept: CARDIOLOGY | Facility: CLINIC | Age: 68
End: 2018-04-05

## 2018-04-05 DIAGNOSIS — I44.1 MOBITZ (TYPE) I (WENCKEBACH'S) ATRIOVENTRICULAR BLOCK: ICD-10-CM

## 2018-04-05 DIAGNOSIS — I63.9 CRYPTOGENIC STROKE (HCC): Primary | ICD-10-CM

## 2018-04-05 PROCEDURE — 93285 PRGRMG DEV EVAL SCRMS IP: CPT | Performed by: INTERNAL MEDICINE

## 2018-04-12 ENCOUNTER — HOSPITAL ENCOUNTER (OUTPATIENT)
Dept: SPEECH THERAPY | Facility: HOSPITAL | Age: 68
Setting detail: THERAPIES SERIES
Discharge: HOME | End: 2018-04-12

## 2018-04-12 DIAGNOSIS — I69.928 OTHER SPEECH AND LANGUAGE DEFICITS, LATE EFFECT OF CEREBROVASCULAR DISEASE(438.19): Primary | ICD-10-CM

## 2018-04-12 PROCEDURE — 92507 TX SP LANG VOICE COMM INDIV: CPT

## 2018-04-12 NOTE — THERAPY PROGRESS REPORT/RE-CERT
Outpatient Speech Language Pathology   Adult Speech Language Cognitive Progress Note  [place]     Patient Name: Ronnell Rizvi  : 1950  MRN: [id number]  Today's Date: 2018         Visit Date: 2018   Patient Active Problem List   Diagnosis   • Epididymal pain   • Anemia, unspecified   • Health care maintenance   • Vitamin B12 deficiency   • Chronic fatigue   • Benign prostatic hypertrophy (BPH) with weak urinary stream   • Chronic bronchitis   • Obstructive sleep apnea syndrome   • History of stroke          Visit Dx:    ICD-10-CM ICD-9-CM   1. Other speech and language deficits, late effect of cerebrovascular disease(438.19) I69.928 438.19             SLP OP Goals     Row Name 18 1415       Goal Type Needed    Goal Type Needed Other Adult Goals  -AD (r) AB (t) AD (c)       Subjective Comments    Subjective Comments Pt was seen by graduate clinician with SLP pleasant. He was cooperative and willing to participate.   -AD (r) AB (t) AD (c)       Subjective Pain    Able to rate subjective pain? no  -AD (r) AB (t) AD (c)       Written Language Comprehension Goals    Written Language Comprehension LTG's Patient will be able to read and comprehend magazines and books  -AD (r) AB (t) AD (c)    Patient will be able to read and comprehend magazines and books 80%:;without cues  -AD (r) AB (t) AD (c)    Status: Patient will be able to read and comprehend magazines and books Progressing as expected  -AD (r) AB (t) AD (c)    Comments: Patient will be able to read and comprehend magazines and books Mr. Rizvi is making progress in his overall ability to read and comprehend articles. His reading fluency is improving, but he continues to have the most difficulty in this area with decreased rate of oral and silent reading.   -AD       Other Goals    Other Adult Goal- 1 Pt will answer questions about written paragraphs with increased speed with 90% accuracy without cues.  -AD (r) AB (t) AD (c)    Status:  Other Adult Goal- 1 Progressing as expected  -AD (r) AB (t) AD (c)    Comments: Other Adult Goal- 1 Mr. Rizvi was able to answer approximately 6/7 questions about written paragraphs and newspaper articles without cues.   -AD (r) AB (t) AD (c)    Other Adult Goal- 2 Pt will read multisyllabic words, in the context of a sentence, with 90% accuracy without cues.  -AD (r) AB (t) AD (c)    Status: Other Adult Goal- 2 Progressing as expected  -AD (r) AB (t) AD (c)    Comments: Other Adult Goal- 2 Mr. Rizvi was able to read 55/55 multisyllabic words in the context of a paragraph with inconsistent cues. He can successfully read and decode unfamiliar and uncommon multisyllabic words, however, his overall reading fluency is affected by difficulty with quickly decoding multisyllabic words.   -AD (r) AB (t) AD (c)    Other Adult Goal- 3 Patient will improve comprehension of written language skills by stating compensatory strategies to utilize when breakdowns in reading comprehension occur with 80% consistently without cues.   -AD (r) AB (t) AD (c)    Status: Other Adult Goal- 3 Progressing as expected  -AD (r) AB (t) AD (c)    Comments: Other Adult Goal- 3 Mr. Rizvi demonstrated the ability to successfully use the compensatory strategy of reading one letter of a word at a time to decode a word. This strategy is effective for him but it does have an effect on his overall fluency.   -AD (r) AB (t) AD (c)    Other Adult Goal- 4 --  -AD       SLP Time Calculation    SLP Goal Re-Cert Due Date 04/07/18  -AD (r) AB (t) AD (c)      User Key  (r) = Recorded By, (t) = Taken By, (c) = Cosigned By    Initials Name Provider Type    GIOVANA Ballard, MS [facility]-SLP Speech Therapist    AB Devin Rod, Speech Therapy Student Speech Therapy Student                OP SLP Education     Row Name 04/12/18 1500       Education    Barriers to Learning No barriers identified  -AD (r) AB (t) AD (c)    Education Provided Patient participated in  establishing goals and treatment plan  -AD (r) AB (t) AD (c)    Assessed Learning needs;Learning motivation;Learning preferences;Learning readiness  -AD (r) AB (t) AD (c)    Learning Motivation Strong  -AD (r) AB (t) AD (c)    Learning Method Explanation  -AD (r) AB (t) AD (c)    Teaching Response Verbalized understanding  -AD (r) AB (t) AD (c)    Education Comments Pt was provided with verbal instructions for home practice. Rec to continue reading at least one article daily and to consider re-reading articles to improve fluency and speed. He verbalized understanding.   -AD      User Key  (r) = Recorded By, (t) = Taken By, (c) = Cosigned By    Initials Name Effective Dates    GIOVANA Ballard, MS [facility]-SLP 06/22/16 -     AB Devin Rod, Speech Therapy Student 01/31/18 -                 OP SLP Assessment/Plan - 04/12/18 1500        SLP Assessment    Functional Problems Speech Language- Adult/Cognition  -AD (r) AB (t) AD (c)    Impact on Function: Adult Speech Language/Cognition Restrictions in personal and social life  -AD (r) AB (t) AD (c)    Clinical Impression: Speech Language-Adult/Congnition Mild:;Other (comment)   alexia  -AD (r) AB (t) AD (c)    Functional Problems Comment Pt reports difficulty with reading fluency, which affects his overall speed when completing a reading task.   -AD (r) AB (t) AD (c)    Clinical Impression Comments Mr. Rizvi continues to demonstrate difficulty with reading fluency. His reading comprehension appears to be within normal limits, however, his fluency continues to affect the time in which it takes to complete a reading task. He reports that unfamiliar or uncommon words are the most difficult for him. When he uses strategies he is able to independently decode an unfamiliar word. He also reports that he is continuing to have difficulty with reading endurance.   -AD    Please refer to paper survey for additional self-reported information Yes  -AD (r) AB (t) AD (c)    Please refer  to items scanned into chart for additional diagnostic informaiton and handouts as provided by clinician Yes  -AD (r) AB (t) AD (c)    Prognosis Good (comment)  -AD (r) AB (t) AD (c)    Patient/caregiver participated in establishment of treatment plan and goals Yes  -AD (r) AB (t) AD (c)    Patient would benefit from skilled therapy intervention Yes  -AD (r) AB (t) AD (c)    SLP Diagnosis mild alexia  -AD (r) AB (t) AD (c)       SLP Plan    Frequency once biweekly  -AD (r) AB (t) AD (c)    Duration 3 weeks  -AD (r) AB (t) AD (c)    Planned CPT's? SLP INDIVIDUAL SPEECH THERAPY: 57973  -AD (r) AB (t) AD (c)    Expected Duration Therapy Session - minutes 45-60 minutes  -AD (r) AB (t) AD (c)    Plan Comments Target goals as stated. Continue to target multisyllabic words.   -AD (r) AB (t) AD (c)    Retired CPM F14 ROW ASR EXPECTED DURATION THERAPY SESSION (MIN) 45-60 minutes  -AD (r) AB (t) AD (c)      User Key  (r) = Recorded By, (t) = Taken By, (c) = Cosigned By    Initials Name Provider Type    GIOVANA Ballard, MS [facility]-SLP Speech Therapist    AB Devin Rod Speech Therapy Student Speech Therapy Student           Time Calculation:   SLP Start Time: 1300  SLP Stop Time: 1415  SLP Time Calculation (min): 75 min  SLP Non-Billable Time (min): 0 min  Total Timed Code Minutes- SLP: 0 minute(s)    Therapy Charges for Today     Code Description Service Date Service Provider Modifiers Qty    [id number]  ST TREATMENT SPEECH 5 4/12/2018 Devin Rod, Speech Therapy Student GN 1          Devin Rod Speech Therapy Student  4/12/2018

## 2018-04-18 ENCOUNTER — OFFICE VISIT (OUTPATIENT)
Dept: NEUROLOGY | Facility: CLINIC | Age: 68
End: 2018-04-18

## 2018-04-18 VITALS
HEART RATE: 68 BPM | DIASTOLIC BLOOD PRESSURE: 73 MMHG | BODY MASS INDEX: 25.45 KG/M2 | WEIGHT: 192 LBS | HEIGHT: 73 IN | SYSTOLIC BLOOD PRESSURE: 135 MMHG | OXYGEN SATURATION: 95 %

## 2018-04-18 DIAGNOSIS — I63.412 CEREBROVASCULAR ACCIDENT (CVA) DUE TO EMBOLISM OF LEFT MIDDLE CEREBRAL ARTERY (HCC): Primary | ICD-10-CM

## 2018-04-18 DIAGNOSIS — R51.9 INTRACTABLE EPISODIC HEADACHE, UNSPECIFIED HEADACHE TYPE: ICD-10-CM

## 2018-04-18 PROCEDURE — 99205 OFFICE O/P NEW HI 60 MIN: CPT | Performed by: RADIOLOGY

## 2018-04-18 NOTE — PROGRESS NOTES
"DOS: 2018  NAME: Ronnell Rizvi   : 1950  PCP: Chase Haque MD    Chief Complaint   Patient presents with   • Stroke      Referring MD: Gregory King MD    Neurological Problem and Interval History:  68 y.o. RHW male with a Hx of hypertension and ELIZABETH, stroke and heart murmur. Patient here today for stroke follow up.     2017 patient had a 5 min episode of loss of peripheral vision with no other symptoms. Patient was not seen/treated for this event. Patient reports that he thought the issues was eye related. 2017 patient reports bilateral loss of peripheral vision with difficulty reading and right frontal headache with eye pain . He reports all words looked Icelandic.  Patient again thought this event was related to his eyes and scheduled an appointment with his eye doctor. After a normal eye exam the eye doctor then referred him to his PCP. Patient was unable to get an appointment for 3 weeks therefore he went to the emergency department. He had an evaluation at [facility].  The etiology of the stroke was not found.  He was placed on Plavix.  He cannot tolerate aspirin is a some stomachache.  Patient denies headaches prior to this event. He reports similar headaches since. His headaches are transient and present nearly everyday. He denies photophobia, phonophobia, double/blurred vision, N/V, neck stiffness, and/or other neurological symptoms with these events. He reports that the headaches last 5-10 min in duration and often occur multiple times daily and are sudden in onset. He denies change in duration or quality. The headaches are aggravated by stress, concentration and reading and are not alleviated by anything although patient denies trying anything to improve the headaches. Denies headaches with straining, cough and bending over. He states \"they just come and go on their own\".  He denies prior history of headache, migraine. Denies family hx of migraine, MI or Stroke under " "the age of 44 year of age. He denies PE, hypercoagulable issues, extra stretchy skin, MI, prior stroke. He denies any s/s of stroke since event 10/17.     Patient reports stopping Statin d/t memory issues. Patient also notes that he is unable to tolerate ASA \"it alters\" me.     Family History   Problem Relation Age of Onset   • Obesity Mother    • Clotting disorder Mother      Upper extremities   • Alcohol abuse Father    • Cancer Father 63     Esophagus and lung   • Other Father      cardiac disorder   • Kidney disease Sister    • Kidney failure Sister    • Drug abuse Paternal Uncle    • Stroke Sister    • Throat cancer Sister      Social History     Social History   • Marital status:      Spouse name: Joey     Occupational History   •  Retired     Social History Main Topics   • Smoking status: Former Smoker     Quit date: 2/20/1979   • Smokeless tobacco: Never Used      Comment: caffeine use: Drinks 4 glasses of Dt coke on avg.    • Alcohol use No   • Drug use: No   • Sexual activity: Defer     Other Topics Concern   • Not on file       Review of Systems:        Review of Systems   Constitutional: Positive for fatigue. Negative for activity change, appetite change, chills, diaphoresis, fever and unexpected weight change.   HENT: Positive for tinnitus. Negative for drooling, hearing loss, trouble swallowing and voice change.    Eyes: Negative for photophobia, pain and visual disturbance.   Respiratory: Negative for chest tightness and shortness of breath.    Cardiovascular: Negative for chest pain, palpitations and leg swelling.   Gastrointestinal: Negative for blood in stool, nausea and vomiting.   Endocrine: Negative for cold intolerance and heat intolerance.   Genitourinary: Negative for difficulty urinating.   Musculoskeletal: Negative for gait problem, neck pain and neck stiffness.   Skin: Negative for rash.   Neurological: Positive for headaches (alot of headaches after second stroke and " "he never got them before them. They come sharp around forehead/eyebrow area and comes and goes pretty quickly per pt. ). Negative for dizziness, tremors, seizures, syncope, facial asymmetry, speech difficulty, weakness, light-headedness and numbness.   Hematological: Does not bruise/bleed easily.   Psychiatric/Behavioral: Positive for agitation and sleep disturbance. Negative for behavioral problems, confusion, hallucinations and suicidal ideas. The patient is not nervous/anxious.        \"The following portions of the patient's history were reviewed and updated as appropriate: allergies, current medications, past family history, past medical history, past social history, past surgical history and problem list.\"    Review and Interpretation of Imaging:  MRI brain 10/11/17: DWI shows a moderate inferior left temporal occipital stroke, flair shows mild to moderate periventricular and subcortical white matter disease that also involves the alonzo and the acute stroke is visible on FLAIR and GRE sequences are poor quality but show few small punctate areas of abnormality within the acute stroke but not elsewhere  MRA aortic arch with and without contrast: For some reason the innominate artery and aortic arch were cut off by the tech when they reconstructed images however the right vertebral artery appears to be patent as is the right intracarotid all artery although it does have a mild stenosis in the distal part of bulb the left internal carotid artery is mildly irregular otherwise patent, left vertebral artery appears to be patent with possibly some irregularity at its origin  MRA [place]: Image quality is suboptimal however the major vessels appear to be patent with a dominant left A1 segment, the posterior cerebral artery on the left is patent as is the left proximal M1 and M2 branches but there is some fullness in the region of the ventricular indicating artery raise possibility of a small ACom " aneurysm    Laboratory Results:             Lab Results   Component Value Date    HGBA1C 5.20 10/11/2017         Lab Results   Component Value Date    CHOL 142 02/20/2018    CHOL 171 10/11/2017         Lab Results   Component Value Date    HDL 51 02/20/2018    HDL 47 10/11/2017    HDL 49 11/01/2016         Lab Results   Component Value Date    LDL 76 02/20/2018     (H) 10/11/2017     (H) 11/01/2016         Lab Results   Component Value Date    TRIG 75 02/20/2018    TRIG 78 10/11/2017    TRIG 86 11/01/2016     No results found for: RPR  Lab Results   Component Value Date    TSH 1.260 11/01/2016     Lab Results   Component Value Date    SPZGHFDP10 776 11/01/2016     THIERRY   Interpretation Summary   · No evidence of a left atrial appendage thrombus was present.  · Moderate patent foramen ovale present. Saline test results are positive.     Physical Examination: NIHSS: 0 mRS: 1  General Appearance:   Well developed, well nourished, well groomed, alert, and cooperative.  HEENT: Normocephalic.  Normal fundoscopic exam including normal retina, discs are flat with sharp margins, normal vasculature. Jose's sign  Neck and Spine: Normal range of motion.  Normal alignment. No mass or tenderness. No bruits.  Cardiac: Bradycardia, regularly irregular.   Peripheral Vasculature: Radial and pedal pulses are equal and symmetric. No signs of distal embolization.  Extremities:    No edema or deformities. Normal joint ROM.  Skin:    No rashes or birth marks.    Neurological examination:  Higher Integrative  Function: Oriented to time, place and person. Normal registration, recall, attention span and concentration. Normal language including comprehension, spontaneous speech, repetition, writing, naming and vocabulary. Subtle dyslexia. No neglect with normal visual-spatial function and construction. Normal fund of knowledge and higher integrative function.  CN II: Pupils are equal, round, and reactive to light. Normal visual  acuity and visual fields.    CN III IV VI: Extraocular movements are full without nystagmus.   CN V: Normal facial sensation and strength of muscles of mastication.  CN VII: Facial movements are symmetric. No weakness.  CN VIII:   Auditory acuity mildly decreased  CN IX & X:   Symmetric palatal movement.  CN XI: Sternocleidomastoid and trapezius are normal.  No weakness.  CN XII:   The tongue is midline.  No atrophy or fasciculations.  Motor: Normal muscle strength, bulk and tone in upper and lower extremities.  No fasciculations, rigidity, spasticity, or abnormal movements.  Reflexes: 2+ in the upper and lower extremities. Plantar responses are flexor.  Sensation: Normal to light touch, temperature, and proprioception in arms and legs. Normal graphesthesia and no extinction on DSS.  Station and Gait: Normal gait and station.    Coordination: Finger to nose test shows no dysmetria.  Rapid alternating movements are normal.  Heel to shin normal.    Diagnoses / Discussion:  68-year-old man presents several months status post a left hemispheric cortical stroke.  He has recovered very well and has only minimal difficulty with reading.  Prognosis for continued recovery is good.  The major question is etiology.  He has had a pretty thorough evaluation with the only possible etiology found being a PFO.  At his age it PFO is unlikely to be a cause of stroke therefore I think he needs completion of the stroke workup.  In reviewing the records it appears he may not have had a hypercoagulable panel although he did see hematology.  We will therefore review the records and if any hypercoagulable testing was not completed order at this time.  The headaches are of unclear significance as they are very brief in duration.  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  Nevertheless this is in the differential diagnosis and if the headaches change or worsen then catheter  angiography may be indicated if no other etiology for the stroke is found the headaches and cells appear to be brought on by reading therefore eyestrain may be a simple explanation at.  Although the MRA suggests a possible anterior communicating artery aneurysm if it is real it would be very small and therefore very unlikely to be symptomatic.  His current antiplatelet regimen is adequate as he cannot tolerate aspirin.  I do not see a clear indication for anticoagulation. If after completion of the workup including at least 6 months of EKG monitoring no other etiology is found then percutaneous PFO closure may be reasonable.    Plan:   Hypercoagulable panel   Blood pressure control to <130/80   Goal LDL <70-recommend high dose statins-    Serum glucose < 140   Call 911 for stroke any stroke symptoms   Follow-up 3 months  Ronnell was seen today for stroke.    Diagnoses and all orders for this visit:    Cerebrovascular accident (CVA) due to embolism of left middle cerebral artery  -     Hypercoagulation Profile; Future    Intractable episodic headache, unspecified headache type        MDM  Reviewed: previous chart and vitals  Reviewed previous: ultrasound  Interpretation: labs, MRI and ECG         “The above HPI was documented by CHINEDU Muñoz acting as a scribe for Marjorie Rees MD.”     “I confirm the above HPI documented by CHINEDU Muñoz,  accurately reflected working decisions made by myself, Adrien Amador MD.”

## 2018-04-23 ENCOUNTER — LAB (OUTPATIENT)
Dept: LAB | Facility: HOSPITAL | Age: 68
End: 2018-04-23

## 2018-04-23 DIAGNOSIS — I63.412 CEREBROVASCULAR ACCIDENT (CVA) DUE TO EMBOLISM OF LEFT MIDDLE CEREBRAL ARTERY (HCC): ICD-10-CM

## 2018-04-23 PROCEDURE — 36415 COLL VENOUS BLD VENIPUNCTURE: CPT

## 2018-04-26 LAB
AT III ACT/NOR PPP CHRO: 103 %
F5 GENE MUT ANL BLD/T: NORMAL
INTERPRETATION: NORMAL
LABORATORY COMMENT REPORT: NORMAL
PROT S ACT/NOR PPP: 110 %
PRT C ACTIVITY (CHROMOGENIC): 114 %
REF LAB TEST METHOD: NORMAL

## 2018-04-27 ENCOUNTER — CLINICAL SUPPORT NO REQUIREMENTS (OUTPATIENT)
Dept: CARDIOLOGY | Facility: CLINIC | Age: 68
End: 2018-04-27

## 2018-04-27 DIAGNOSIS — I63.9 CRYPTOGENIC STROKE (HCC): Primary | ICD-10-CM

## 2018-04-27 PROCEDURE — 93299 PR REM INTERROG ICPMS/SCRMS <30 D TECH REVIEW: CPT | Performed by: INTERNAL MEDICINE

## 2018-04-27 PROCEDURE — 93298 REM INTERROG DEV EVAL SCRMS: CPT | Performed by: INTERNAL MEDICINE

## 2018-04-30 ENCOUNTER — HOSPITAL ENCOUNTER (OUTPATIENT)
Dept: SPEECH THERAPY | Facility: HOSPITAL | Age: 68
Setting detail: THERAPIES SERIES
Discharge: HOME | End: 2018-04-30

## 2018-04-30 DIAGNOSIS — I69.928 OTHER SPEECH AND LANGUAGE DEFICITS, LATE EFFECT OF CEREBROVASCULAR DISEASE(438.19): Primary | ICD-10-CM

## 2018-04-30 PROCEDURE — 92507 TX SP LANG VOICE COMM INDIV: CPT

## 2018-04-30 PROCEDURE — G9175 SPEECH LANG GOAL STATUS: HCPCS

## 2018-04-30 PROCEDURE — G9176 SPEECH LANG D/C STATUS: HCPCS

## 2018-05-03 ENCOUNTER — OFFICE VISIT (OUTPATIENT)
Dept: CARDIOLOGY | Facility: CLINIC | Age: 68
End: 2018-05-03

## 2018-05-03 ENCOUNTER — CLINICAL SUPPORT NO REQUIREMENTS (OUTPATIENT)
Dept: CARDIOLOGY | Facility: CLINIC | Age: 68
End: 2018-05-03

## 2018-05-03 VITALS
HEART RATE: 54 BPM | SYSTOLIC BLOOD PRESSURE: 118 MMHG | DIASTOLIC BLOOD PRESSURE: 70 MMHG | HEIGHT: 73 IN | BODY MASS INDEX: 25.31 KG/M2 | WEIGHT: 191 LBS

## 2018-05-03 DIAGNOSIS — Z86.73 HISTORY OF STROKE: Primary | ICD-10-CM

## 2018-05-03 DIAGNOSIS — I63.9 CRYPTOGENIC STROKE (HCC): Primary | ICD-10-CM

## 2018-05-03 DIAGNOSIS — I44.1 MOBITZ (TYPE) I (WENCKEBACH'S) ATRIOVENTRICULAR BLOCK: ICD-10-CM

## 2018-05-03 DIAGNOSIS — I45.5 SINOATRIAL BLOCK: ICD-10-CM

## 2018-05-03 DIAGNOSIS — R00.2 PALPITATIONS: ICD-10-CM

## 2018-05-03 DIAGNOSIS — Q21.12 PFO (PATENT FORAMEN OVALE): ICD-10-CM

## 2018-05-03 PROCEDURE — 93000 ELECTROCARDIOGRAM COMPLETE: CPT | Performed by: INTERNAL MEDICINE

## 2018-05-03 PROCEDURE — 93285 PRGRMG DEV EVAL SCRMS IP: CPT | Performed by: INTERNAL MEDICINE

## 2018-05-03 PROCEDURE — 99214 OFFICE O/P EST MOD 30 MIN: CPT | Performed by: INTERNAL MEDICINE

## 2018-05-03 NOTE — PROGRESS NOTES
Date of Office Visit: 2018  Encounter Provider: Christopher Sawant MD  Place of Service: [facility] CARDIOLOGY  Patient Name: Ronnell Rizvi  :1950    Chief Complaint   Patient presents with   • Cerebrovascular Accident   :     HPI: Ronnell Rizvi is a 68 y.o. male who presents today to follow up.    I first met him in 2016 when he was hospitalized for chest pain.  A Cardiolite stress was normal (EF 54%).  He did have some Wenckebach at the beginning of stress which normalized with exercise.    In 2017, he had an episode of visual changes that resolved on their own.  The next morning it happened again and he came to the ED and was diagnosed with a stroke of the left occipital/temporal lobe.  He was found to have some diffuse small vessel disease.  An echo wasn't performed but a Zio patch was placed.  We were not consulted in the hospital.  He was placed on clopidogrel and atorvastatin.    In 2017, the Zio showed some atrial ectopy (there was a 13 beat run of PACs) but no atrial fibrillation.  An echo showed normal LV systolic function and a small PFO.    His studies were then reviewed by neurology, who felt that this was actually embolic.    A THIERRY showed a moderate sized PFO and moderate aortic insufficiency but was otherwise normal. A Confirm device was placed (implanted monitor from [facility]).  Upon arrival he was noted to have an irregular rhythm (not atrial fibrillation).      Yesterday he noted a few minutes of palpitations which felt like a hard heart beat, but not fast or irregular.  It occurred after a nap.    Past Medical History:   Diagnosis Date   • Allergic rhinitis    • Anal fissure    • Asthma    • B12 deficiency    • Benign prostatic hypertrophy (BPH) with weak urinary stream 2016   • Chronic bronchitis    • Emphysema lung    • Fatty liver    • GERD (gastroesophageal reflux disease)    • Heart murmur     possible MVP in past  documentation   • Hepatitis     thought to be Hepatitis A    • History of pneumonia     With left lower lobe atelectasis and scar tissue, being followed   • Obstructive sleep apnea syndrome 8/23/2017   • Pneumonia     LLL with scar tissue   • Spinal stenosis    • Stroke     10/2017: left occipital/temporal       Past Surgical History:   Procedure Laterality Date   • CARDIAC ELECTROPHYSIOLOGY PROCEDURE N/A 3/26/2018    Procedure: Loop insertion   CONFIRM RX;  Surgeon: Luis Wyatt MD;  Location: [facility] INVASIVE LOCATION;  Service: Cardiovascular   • COLONOSCOPY     • HAND SURGERY Bilateral    • INGUINAL HERNIA REPAIR Left    • OTHER SURGICAL HISTORY      inguinal hernia repair for person over age 5   • TONSILLECTOMY         Social History     Social History   • Marital status:      Spouse name: Joey   • Number of children: N/A   • Years of education: N/A     Occupational History   •  Retired     Social History Main Topics   • Smoking status: Former Smoker     Quit date: 2/20/1979   • Smokeless tobacco: Never Used      Comment: caffeine use: Drinks 4 glasses of Dt coke on avg.    • Alcohol use No   • Drug use: No   • Sexual activity: Defer     Other Topics Concern   • Not on file     Social History Narrative   • No narrative on file       Family History   Problem Relation Age of Onset   • Obesity Mother    • Clotting disorder Mother      Upper extremities   • Alcohol abuse Father    • Cancer Father 63     Esophagus and lung   • Other Father      cardiac disorder   • Kidney disease Sister    • Kidney failure Sister    • Drug abuse Paternal Uncle    • Stroke Sister    • Throat cancer Sister        Review of Systems   Constitution: Positive for malaise/fatigue.   HENT: Positive for hearing loss.    Cardiovascular: Positive for palpitations. Negative for chest pain.   Respiratory: Positive for cough and snoring. Negative for shortness of breath.    Neurological: Positive for excessive  "daytime sleepiness and headaches. Negative for dizziness and light-headedness.        As per HPI   Psychiatric/Behavioral: Positive for depression and memory loss.   All other systems reviewed and are negative.      Allergies   Allergen Reactions   • Aspirin Nausea Only   • Citrus      Blisters on mouth     • Combivent [Ipratropium-Albuterol] Other (See Comments)     Throat swelling   • Lactose Intolerance (Gi)          Current Outpatient Prescriptions:   •  clopidogrel (PLAVIX) 75 MG tablet, Take 1 tablet by mouth Daily., Disp: 30 tablet, Rfl: 1  •  Cyanocobalamin (B-12) 1000 MCG/ML kit, Inject 250 mcg as directed 1 (One) Time Per Week. Pt. Stated he takes this medication weekly 250 mcg, pt reports took it last friday, Disp: , Rfl:   •  guaiFENesin (MUCINEX) 600 MG 12 hr tablet, Take 1,200 mg by mouth 2 (Two) Times a Day As Needed for Cough., Disp: , Rfl:   •  hydrochlorothiazide (HYDRODIURIL) 12.5 MG tablet, Take 1 tablet by mouth Daily., Disp: 90 tablet, Rfl: 1     Objective:     Vitals:    05/03/18 1021   BP: 118/70   BP Location: Left arm   Pulse: 54   Weight: 86.6 kg (191 lb)   Height: 185.4 cm (72.99\")     Body mass index is 25.21 kg/m².    Physical Exam   Constitutional: He is oriented to person, place, and time. He appears well-developed and well-nourished.   HENT:   Head: Normocephalic.   Nose: Nose normal.   Mouth/Throat: Oropharynx is clear and moist.   Eyes: Conjunctivae and EOM are normal. Pupils are equal, round, and reactive to light.   Neck: Normal range of motion. No JVD present.   Cardiovascular: Normal rate, normal heart sounds and intact distal pulses.  A regularly irregular rhythm present.   No murmur heard.  Pulmonary/Chest: Effort normal and breath sounds normal.   Abdominal: Soft. He exhibits no mass. There is no tenderness.   Musculoskeletal: Normal range of motion. He exhibits no edema.   Lymphadenopathy:     He has no cervical adenopathy.   Neurological: He is alert and oriented to " person, place, and time. No cranial nerve deficit.   Skin: Skin is warm and dry. No rash noted.   Psychiatric: He has a normal mood and affect. His behavior is normal. Judgment and thought content normal.   Vitals reviewed.        ECG 12 Lead  Date/Time: 5/3/2018 3:15 PM  Performed by: CHRISTOPHER SAWANT  Authorized by: CHRISTOPHER SAWANT   Comparison: compared with previous ECG   Comparison to previous ECG: SA exit block is new  Rhythm: sinus rhythm  Conduction comments: Second degree type 2 SA exit block  ST Segments: ST segments normal  T Waves: T waves normal  Other: no other findings  Clinical impression: abnormal ECG              Assessment:       Diagnosis Plan   1. History of stroke  Adult Transesophageal Echo (THIERRY) W/ Cont if Necessary Per Protocol     2.  PFO  3.  Palpitations  4.  Sinoatrial block     Plan:       1/2.  He had a left occipital/temporal stroke which was initially presumed to be due to small vessel disease. He was placed on clopidogrel and atorvastatin (which he stopped due to memory loss).  Then, outpatient neurological evaluation led to a further workup.  He was found to have a PFO on THIERRY. He has an implanted monitor.  If no atrial fibrillation is seen after six months or so, then we will consider PFO closure.    3/4.  We interrogated his device today and no arrhythmia was seen yesterday.  No AF has been seen.  He has second degree SA block (type 2) which is asymptomatic.  He doesn't have any symptoms of higher level block.    Sincerely,       Christopher Sawant MD

## 2018-06-02 ENCOUNTER — CLINICAL SUPPORT NO REQUIREMENTS (OUTPATIENT)
Dept: CARDIOLOGY | Facility: CLINIC | Age: 68
End: 2018-06-02

## 2018-06-02 DIAGNOSIS — I45.5 SINOATRIAL BLOCK: ICD-10-CM

## 2018-06-02 DIAGNOSIS — I44.1 MOBITZ (TYPE) I (WENCKEBACH'S) ATRIOVENTRICULAR BLOCK: ICD-10-CM

## 2018-06-02 DIAGNOSIS — I63.9 CRYPTOGENIC STROKE (HCC): Primary | ICD-10-CM

## 2018-07-04 ENCOUNTER — CLINICAL SUPPORT NO REQUIREMENTS (OUTPATIENT)
Dept: CARDIOLOGY | Facility: CLINIC | Age: 68
End: 2018-07-04

## 2018-07-04 DIAGNOSIS — I63.9 CRYPTOGENIC STROKE (HCC): Primary | ICD-10-CM

## 2018-07-04 DIAGNOSIS — I45.5 SINOATRIAL BLOCK: ICD-10-CM

## 2018-07-04 DIAGNOSIS — I44.1 MOBITZ (TYPE) I (WENCKEBACH'S) ATRIOVENTRICULAR BLOCK: ICD-10-CM

## 2018-07-04 PROCEDURE — 93299 PR REM INTERROG ICPMS/SCRMS <30 D TECH REVIEW: CPT | Performed by: INTERNAL MEDICINE

## 2018-07-04 PROCEDURE — 93298 REM INTERROG DEV EVAL SCRMS: CPT | Performed by: INTERNAL MEDICINE

## 2018-07-12 ENCOUNTER — EPISODE CHANGES (OUTPATIENT)
Dept: CASE MANAGEMENT | Facility: OTHER | Age: 68
End: 2018-07-12

## 2018-07-18 ENCOUNTER — TELEPHONE (OUTPATIENT)
Dept: NEUROLOGY | Facility: CLINIC | Age: 68
End: 2018-07-18

## 2018-07-18 NOTE — TELEPHONE ENCOUNTER
I spoke with Mr. Rizvi and let him know the hypercoaguable blood pane was unremarkable.  This lab work shows how well his blood clots.  No source for stroke was identified.  Follow up as previously recommended.  We reviewed signs and symptoms of stroke and to call 911 immediately.  CHADD Bennett RN

## 2018-07-31 ENCOUNTER — CLINICAL SUPPORT NO REQUIREMENTS (OUTPATIENT)
Dept: CARDIOLOGY | Facility: CLINIC | Age: 68
End: 2018-07-31

## 2018-07-31 DIAGNOSIS — I63.9 CRYPTOGENIC STROKE (HCC): Primary | ICD-10-CM

## 2018-07-31 DIAGNOSIS — I45.5 SINOATRIAL BLOCK: ICD-10-CM

## 2018-07-31 DIAGNOSIS — I44.1 MOBITZ (TYPE) I (WENCKEBACH'S) ATRIOVENTRICULAR BLOCK: ICD-10-CM

## 2018-08-14 ENCOUNTER — OFFICE VISIT (OUTPATIENT)
Dept: NEUROLOGY | Facility: CLINIC | Age: 68
End: 2018-08-14

## 2018-08-14 VITALS
DIASTOLIC BLOOD PRESSURE: 82 MMHG | WEIGHT: 183 LBS | BODY MASS INDEX: 24.25 KG/M2 | OXYGEN SATURATION: 96 % | HEIGHT: 73 IN | HEART RATE: 70 BPM | SYSTOLIC BLOOD PRESSURE: 136 MMHG

## 2018-08-14 DIAGNOSIS — I63.412 CEREBROVASCULAR ACCIDENT (CVA) DUE TO EMBOLISM OF LEFT MIDDLE CEREBRAL ARTERY (HCC): Primary | ICD-10-CM

## 2018-08-14 DIAGNOSIS — R51.9 INTRACTABLE EPISODIC HEADACHE, UNSPECIFIED HEADACHE TYPE: ICD-10-CM

## 2018-08-14 PROCEDURE — 99213 OFFICE O/P EST LOW 20 MIN: CPT | Performed by: NURSE PRACTITIONER

## 2018-08-14 NOTE — PROGRESS NOTES
"DOS: 2018  NAME: Ronnell Rizvi   : 1950  PCP: Chase Haque MD    Chief Complaint   Patient presents with   • Stroke        Neurological Problem and Interval History:  This is a 68 y.o. RHW male with a Hx of hypertension and ELIZABETH, stroke and PFO/heart murmur. Patient here today for stroke follow up.      2017 patient had a 5 min episode of loss of peripheral vision with no other symptoms. He was not seen/ treated for this event.     In 2017 patient reports bilateral loss of peripheral vision with difficulty reading and right frontal headache with eye pain . He reports all words looked Ivorian.  Patient again thought this event was related to his eyes and scheduled an appointment with his eye doctor. After a normal eye exam the eye doctor then referred him to his PCP. Patient was unable to get an appointment for 3 weeks therefore he went to the emergency department. He had an evaluation at [facility].  The etiology of the stroke was not determined.  He was placed on Plavix as ASA causes GI upset. He denies having headaches prior to this event and has reported similar frequent headaches since. The headache are primarily around the eyes and  are sudden in onset  lasting  < 30 seconds and he feels that he has noticed that they are often brought on by stress although that is not always the case as he's noted concentration and reading can \"bring on\" the headaches as well. The headaches are relieved w/o intervention. He denies photophobia, phonophobia, double/blurred vision, N/V, neck stiffness, and/or other neurological symptoms with these events. He denies aura prior to headaches, headache after exercise and/or intercourse. Denies headaches with straining, cough and bending over. Denies family hx of migraine, MI or Stroke under the age of 45 years of age. He denies PE, hypercoagulable issues, extra stretchy skin, MI, prior stroke.  He is currently still taking Plavix and stopped " statin d/t issues w/ memory.     He denies any s/s of stroke since event 10/17. He has a LINQ recorder in place; no evidence of arrhythmias (per cardiology notes). According to patient if no arrhythmias noted by October 2018; Cardiology will consider PFO closure d/t etiology of stroke remains unknown. He reports poor compliance of ELIZABETH treatment d/t poor fitting denture mask. He reports poor sleep quality; 4 hours per night. Patient reports that he is closer to is baseline but still notices issues w/ fatiguing with reading, dyslexia and short-term memory loss. BP adequately controlled; checks it at home SBP consistently 120s. Quit smoking 1979. LAst LDL > 100; discussed goal of < 70 to decrease cardiovascular and stroke risk factors.         Review of Systems:        Review of Systems   Constitutional: Positive for fatigue. Negative for activity change, appetite change, chills, diaphoresis, fever and unexpected weight change.   HENT: Positive for hearing loss, sinus pressure, tinnitus and voice change. Negative for drooling and trouble swallowing.    Eyes: Negative for photophobia, pain and visual disturbance.   Respiratory: Positive for shortness of breath. Negative for chest tightness.    Cardiovascular: Negative for chest pain, palpitations and leg swelling.   Gastrointestinal: Positive for nausea (once a month). Negative for blood in stool and vomiting.   Endocrine: Negative for cold intolerance and heat intolerance.   Genitourinary: Negative for difficulty urinating.   Musculoskeletal: Positive for back pain, neck pain and neck stiffness. Negative for gait problem.   Skin: Negative for rash.   Neurological: Positive for headaches. Negative for dizziness, tremors, seizures, syncope, facial asymmetry, speech difficulty, weakness, light-headedness and numbness.   Hematological: Does not bruise/bleed easily.   Psychiatric/Behavioral: Positive for agitation (pt stated when he b12 gets low he can tell because he gets  "more aggitated easier.) and sleep disturbance. Negative for behavioral problems, confusion, hallucinations and suicidal ideas. The patient is not nervous/anxious.          Current Outpatient Prescriptions:   •  clopidogrel (PLAVIX) 75 MG tablet, Take 1 tablet by mouth Daily., Disp: 30 tablet, Rfl: 1  •  Cyanocobalamin (B-12) 1000 MCG/ML kit, Inject 250 mcg as directed 1 (One) Time Per Week. Pt. Stated he takes this medication weekly 250 mcg, pt reports took it last friday, Disp: , Rfl:   •  guaiFENesin (MUCINEX) 600 MG 12 hr tablet, Take 1,200 mg by mouth 2 (Two) Times a Day As Needed for Cough., Disp: , Rfl:   •  hydrochlorothiazide (HYDRODIURIL) 12.5 MG tablet, Take 1 tablet by mouth Daily., Disp: 90 tablet, Rfl: 1    \"The following portions of the patient's history were reviewed and updated as appropriate: allergies, current medications, past family history, past medical history, past social history, past surgical history and problem list.\"  Review and Interpretation of Imaging (per Dr. Turcios's independent review:  MRI brain 10/11/17: DWI shows a moderate inferior left temporal occipital stroke, flair shows mild to moderate periventricular and subcortical white matter disease that also involves the alonzo and the acute stroke is visible on FLAIR and GRE sequences are poor quality but show few small punctate areas of abnormality within the acute stroke but not elsewhere  MRA aortic arch with and without contrast: For some reason the innominate artery and aortic arch were cut off by the tech when they reconstructed images however the right vertebral artery appears to be patent as is the right intracarotid all artery although it does have a mild stenosis in the distal part of bulb the left internal carotid artery is mildly irregular otherwise patent, left vertebral artery appears to be patent with possibly some irregularity at its origin  MRA [place]: Image quality is suboptimal however the major vessels " appear to be patent with a dominant left A1 segment, the posterior cerebral artery on the left is patent as is the left proximal M1 and M2 branches but there is some fullness in the region of the ventricular indicating artery raise possibility of a small ACom aneurysm    Laboratory Results:             Lab Results   Component Value Date    HGBA1C 5.20 10/11/2017         Lab Results   Component Value Date    CHOL 142 02/20/2018    CHOL 171 10/11/2017         Lab Results   Component Value Date    HDL 51 02/20/2018    HDL 47 10/11/2017    HDL 49 11/01/2016         Lab Results   Component Value Date    LDL 76 02/20/2018     (H) 10/11/2017     (H) 11/01/2016         Lab Results   Component Value Date    TRIG 75 02/20/2018    TRIG 78 10/11/2017    TRIG 86 11/01/2016     No results found for: RPR  Lab Results   Component Value Date    TSH 1.260 11/01/2016     Lab Results   Component Value Date    [id number] 776 11/01/2016       Physical Examination: NIHSS: 0 mRS: 0  General Appearance:   Well developed, well nourished, well groomed, alert, and cooperative.  HEENT: Normocephalic.  Normal fundoscopic exam including normal retina, discs are flat with sharp margins, normal vasculature.  Neck and Spine: Normal range of motion.  Normal alignment. No mass or tenderness. No bruits.  Cardiac: Regular rate and rhythm. No murmurs.  Peripheral Vasculature: Radial and pedal pulses are equal and symmetric. No     signs of distal embolization.  Extremities:    No edema or deformities. Normal joint ROM.  Skin:    No rashes or birth marks.    Neurological examination:  Higher Integrative  Function: Oriented to time, place and person. Normal registration, recall, attention span and concentration. Normal language including comprehension, spontaneous speech, repetition, reading, writing, naming and vocabulary. No neglect with normal visual-spatial function and construction. Normal fund of knowledge and higher integrative  function.  CN II: Pupils are equal, round, and reactive to light. Normal visual acuity and visual fields.    CN III IV VI: Extraocular movements are full without nystagmus.   CN V: Normal facial sensation and strength of muscles of mastication.  CN VII: Facial movements are symmetric. No weakness.  CN VIII:   Auditory acuity is normal.  CN IX & X:   Symmetric palatal movement.  CN XI: Sternocleidomastoid and trapezius are normal.  No weakness.  CN XII:   The tongue is midline.  No atrophy or fasciculations.  Motor: Normal muscle strength, bulk and tone in upper and lower extremities.  No fasciculations, rigidity, spasticity, or abnormal movements.  Reflexes: 2+ in the upper and lower extremities. Plantar responses are flexor.  Sensation: Normal to light touch, pinprick, vibration, temperature, and proprioception in arms and legs. Normal graphesthesia and no extinction on DSS.  Station and Gait: Normal gait and station.    Coordination: Finger to nose test shows no dysmetria.  Rapid alternating movements are normal.  Heel to shin normal.    Diagnoses / Discussion: Diagnoses / Discussion:  68-year-old man presents is being seen today in follow-up for left hemispheric cortical stroke.  He has recovered fully minus some issues w/ w/ fatiguing with reading, dyslexia and short-term memory loss. The stroke work-up has been unrevealing thus far; etiology remains unknown. Cardiology following. LINQ recorder in place; no arrythmias noted; possible PFO closure later this year. Previously, Dr. Amador suggested catheter angiography may be helpful in determining etiology if other work-up was negative. Given the fact that etiology of CVA is unknown and  patient is still having headaches although no change and/or worsening has been reported I will plan to refer to Dr. Star Rodgers to obtain his opinion about the need for diagnostic angiography. Patient is agreeable to plan above.        Plan:     Referral to [facility] for  Headaches; possible diagnostic angiography               Blood pressure control to <130/80              Goal LDL <70-recommend high dose statins-                         Serum glucose < 140              Call 911 for stroke any stroke symptoms              Follow-up 6 months   Ronnell was seen today for stroke.  There are no diagnoses linked to this encounter.    Coding

## 2018-08-30 ENCOUNTER — TELEPHONE (OUTPATIENT)
Dept: NEUROSURGERY | Facility: CLINIC | Age: 68
End: 2018-08-30

## 2018-08-31 ENCOUNTER — CLINICAL SUPPORT NO REQUIREMENTS (OUTPATIENT)
Dept: CARDIOLOGY | Facility: CLINIC | Age: 68
End: 2018-08-31

## 2018-08-31 DIAGNOSIS — I63.9 CRYPTOGENIC STROKE (HCC): Primary | ICD-10-CM

## 2018-08-31 PROCEDURE — 93298 REM INTERROG DEV EVAL SCRMS: CPT | Performed by: INTERNAL MEDICINE

## 2018-08-31 PROCEDURE — 93299 PR REM INTERROG ICPMS/SCRMS <30 D TECH REVIEW: CPT | Performed by: INTERNAL MEDICINE

## 2018-09-16 ENCOUNTER — CLINICAL SUPPORT NO REQUIREMENTS (OUTPATIENT)
Dept: CARDIOLOGY | Facility: CLINIC | Age: 68
End: 2018-09-16

## 2018-09-16 DIAGNOSIS — I63.9 CRYPTOGENIC STROKE (HCC): Primary | ICD-10-CM

## 2018-09-18 RX ORDER — HYDROCHLOROTHIAZIDE 12.5 MG/1
TABLET ORAL
Qty: 90 TABLET | Refills: 0 | Status: SHIPPED | OUTPATIENT
Start: 2018-09-18 | End: 2018-10-08 | Stop reason: SDUPTHER

## 2018-09-23 ENCOUNTER — CLINICAL SUPPORT NO REQUIREMENTS (OUTPATIENT)
Dept: CARDIOLOGY | Facility: CLINIC | Age: 68
End: 2018-09-23

## 2018-09-23 DIAGNOSIS — I63.9 CRYPTOGENIC STROKE (HCC): Primary | ICD-10-CM

## 2018-10-02 ENCOUNTER — CLINICAL SUPPORT NO REQUIREMENTS (OUTPATIENT)
Dept: CARDIOLOGY | Facility: CLINIC | Age: 68
End: 2018-10-02

## 2018-10-02 DIAGNOSIS — I63.9 CRYPTOGENIC STROKE (HCC): Primary | ICD-10-CM

## 2018-10-02 PROCEDURE — 93298 REM INTERROG DEV EVAL SCRMS: CPT | Performed by: INTERNAL MEDICINE

## 2018-10-02 PROCEDURE — 93299 PR REM INTERROG ICPMS/SCRMS <30 D TECH REVIEW: CPT | Performed by: INTERNAL MEDICINE

## 2018-10-09 ENCOUNTER — OFFICE VISIT (OUTPATIENT)
Dept: CARDIOLOGY | Facility: CLINIC | Age: 68
End: 2018-10-09

## 2018-10-09 VITALS
HEIGHT: 73 IN | HEART RATE: 59 BPM | SYSTOLIC BLOOD PRESSURE: 120 MMHG | WEIGHT: 192.8 LBS | DIASTOLIC BLOOD PRESSURE: 62 MMHG | BODY MASS INDEX: 25.55 KG/M2

## 2018-10-09 DIAGNOSIS — I35.1 NONRHEUMATIC AORTIC VALVE INSUFFICIENCY: ICD-10-CM

## 2018-10-09 DIAGNOSIS — Z86.73 HISTORY OF STROKE: Primary | ICD-10-CM

## 2018-10-09 DIAGNOSIS — I45.5 SINOATRIAL BLOCK: ICD-10-CM

## 2018-10-09 DIAGNOSIS — Q21.12 PFO (PATENT FORAMEN OVALE): ICD-10-CM

## 2018-10-09 PROCEDURE — 93000 ELECTROCARDIOGRAM COMPLETE: CPT | Performed by: INTERNAL MEDICINE

## 2018-10-09 PROCEDURE — 99213 OFFICE O/P EST LOW 20 MIN: CPT | Performed by: INTERNAL MEDICINE

## 2018-10-09 RX ORDER — HYDROCHLOROTHIAZIDE 12.5 MG/1
12.5 TABLET ORAL DAILY
Qty: 90 TABLET | Refills: 2 | Status: SHIPPED | OUTPATIENT
Start: 2018-10-09 | End: 2019-04-09 | Stop reason: SDUPTHER

## 2018-10-09 RX ORDER — INFLUENZA A VIRUS A/MICHIGAN/45/2015 X-275 (H1N1) ANTIGEN (FORMALDEHYDE INACTIVATED), INFLUENZA A VIRUS A/SINGAPORE/INFIMH-16-0019/2016 IVR-186 (H3N2) ANTIGEN (FORMALDEHYDE INACTIVATED), AND INFLUENZA B VIRUS B/MARYLAND/15/2016 BX-69A (A B/COLORADO/6/2017-LIKE VIRUS) ANTIGEN (FORMALDEHYDE INACTIVATED) 60; 60; 60 UG/.5ML; UG/.5ML; UG/.5ML
INJECTION, SUSPENSION INTRAMUSCULAR
COMMUNITY
Start: 2018-09-23 | End: 2018-11-29 | Stop reason: ALTCHOICE

## 2018-10-09 NOTE — PROGRESS NOTES
Date of Office Visit: 10/09/2018  Encounter Provider: Christopher Sawant MD  Place of Service: [facility] CARDIOLOGY  Patient Name: Ronnell Rizvi  :1950    Chief Complaint   Patient presents with   • Stroke     FOLLOW UP    :     HPI: Ronnell Rizvi is a 68 y.o. male who presents today to follow up.    I first met him in 2016 when he was hospitalized for chest pain.  A Cardiolite stress was normal (EF 54%).  He did have some Wenckebach at the beginning of stress which normalized with exercise.    In 2017, he had an episode of visual changes that resolved on their own.  The next morning it happened again and he came to the ED and was diagnosed with a stroke of the left occipital/temporal lobe.  He was found to have some diffuse small vessel disease.  An echo wasn't performed but a Zio patch was placed.  We were not consulted in the hospital.  He was placed on clopidogrel and atorvastatin.    In 2017, the Zio showed some atrial ectopy (there was a 13 beat run of PACs) but no atrial fibrillation.  An echo showed normal LV systolic function and a small PFO.    His studies were then reviewed by neurology, who felt that this was actually embolic.    A THIERRY showed a moderate sized PFO and moderate aortic insufficiency but was otherwise normal. A Confirm device was placed (implanted monitor from [facility] [facility]).  Upon arrival he was noted to have an irregular rhythm (not atrial fibrillation).  This was actually a type 2 SA block.    He is doing well.  He is tired, but he doesn't have palpitations, dyspnea, or chest pain.     Past Medical History:   Diagnosis Date   • Allergic rhinitis    • Anal fissure    • Aortic insufficiency     moderate, THIERRY    • Asthma    • B12 deficiency    • Benign prostatic hypertrophy (BPH) with weak urinary stream 2016   • Chronic bronchitis (CMS/HCC)    • Emphysema lung (CMS/HCC)    • Fatty liver    • GERD (gastroesophageal reflux  disease)    • Hepatitis     thought to be Hepatitis A    • History of pneumonia     With left lower lobe atelectasis and scar tissue, being followed   • Obstructive sleep apnea syndrome 8/23/2017   • PFO (patent foramen ovale) 10/9/2018   • Pneumonia     LLL with scar tissue   • 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   • Spinal stenosis    • Stroke (CMS/HCC)     10/2017: left occipital/temporal       Past Surgical History:   Procedure Laterality Date   • CARDIAC ELECTROPHYSIOLOGY PROCEDURE N/A 3/26/2018    Procedure: Loop insertion   CONFIRM RX;  Surgeon: Luis Wyatt MD;  Location: [facility] INVASIVE LOCATION;  Service: Cardiovascular   • COLONOSCOPY     • HAND SURGERY Bilateral    • INGUINAL HERNIA REPAIR Left    • OTHER SURGICAL HISTORY      inguinal hernia repair for person over age 5   • TONSILLECTOMY         Social History     Social History   • Marital status:      Spouse name: Joey   • Number of children: N/A   • Years of education: N/A     Occupational History   •  Retired     Social History Main Topics   • Smoking status: Former Smoker     Quit date: 2/20/1979   • Smokeless tobacco: Never Used      Comment: caffeine use: Drinks 4 glasses of Dt coke on avg.    • Alcohol use No   • Drug use: No   • Sexual activity: Defer     Other Topics Concern   • Not on file     Social History Narrative   • No narrative on file       Family History   Problem Relation Age of Onset   • Obesity Mother    • Clotting disorder Mother         Upper extremities   • Alcohol abuse Father    • Cancer Father 63        Esophagus and lung   • Other Father         cardiac disorder   • Kidney disease Sister    • Kidney failure Sister    • Drug abuse Paternal Uncle    • Stroke Sister    • Throat cancer Sister        Review of Systems   Constitution: Positive for malaise/fatigue.   HENT: Positive for hearing loss.    Cardiovascular: Negative for chest pain.   Respiratory: Positive for snoring. Negative for shortness of  "breath.    Neurological: Negative for dizziness and light-headedness.        As per HPI   Psychiatric/Behavioral: Positive for depression and memory loss.   All other systems reviewed and are negative.      Allergies   Allergen Reactions   • Aspirin Nausea Only   • Citrus      Blisters on mouth     • Combivent [Ipratropium-Albuterol] Other (See Comments)     Throat swelling   • Lactose Intolerance (Gi)          Current Outpatient Prescriptions:   •  clopidogrel (PLAVIX) 75 MG tablet, Take 1 tablet by mouth Daily., Disp: 30 tablet, Rfl: 1  •  Cyanocobalamin (B-12) 1000 MCG/ML kit, Inject 250 mcg as directed 1 (One) Time Per Week. Pt. Stated he takes this medication weekly 250 mcg, pt reports took it last friday, Disp: , Rfl:   •  FLUZONE HIGH-DOSE 0.5 ML suspension prefilled syringe injection, , Disp: , Rfl:   •  guaiFENesin (MUCINEX) 600 MG 12 hr tablet, Take 1,200 mg by mouth 2 (Two) Times a Day As Needed for Cough., Disp: , Rfl:   •  hydrochlorothiazide (HYDRODIURIL) 12.5 MG tablet, Take 1 tablet by mouth Daily., Disp: 90 tablet, Rfl: 2     Objective:     Vitals:    10/09/18 0950   BP: 120/62   BP Location: Left arm   Pulse: 59   Weight: 87.5 kg (192 lb 12.8 oz)   Height: 185.4 cm (73\")     Body mass index is 25.44 kg/m².    Physical Exam   Constitutional: He is oriented to person, place, and time. He appears well-developed and well-nourished.   HENT:   Head: Normocephalic.   Nose: Nose normal.   Mouth/Throat: Oropharynx is clear and moist.   Eyes: Pupils are equal, round, and reactive to light. Conjunctivae and EOM are normal.   Neck: Normal range of motion. No JVD present.   Cardiovascular: Normal rate, regular rhythm, normal heart sounds and intact distal pulses.    No murmur heard.  Pulmonary/Chest: Effort normal and breath sounds normal.   Abdominal: Soft. He exhibits no mass. There is no tenderness.   Musculoskeletal: Normal range of motion. He exhibits no edema.   Lymphadenopathy:     He has no cervical " adenopathy.   Neurological: He is alert and oriented to person, place, and time. No cranial nerve deficit.   Skin: Skin is warm and dry. No rash noted.   Psychiatric: He has a normal mood and affect. His behavior is normal. Judgment and thought content normal.   Vitals reviewed.        ECG 12 Lead  Date/Time: 10/9/2018 11:50 AM  Performed by: CHRISTOPHER SAWANT  Authorized by: CHRISTOPHER SAWANT   Comparison: compared with previous ECG   Similar to previous ECG  Comparison to previous ECG: C/w prior, the SA block is gone, the PAC is now seen  Rhythm: sinus rhythm  Ectopy: atrial premature contractions  ST Segments: ST segments normal  T Waves: T waves normal  QRS axis: normal  Other: no other findings  Clinical impression: non-specific ECG              Assessment:       Diagnosis Plan   1. History of stroke  Adult Transesophageal Echo (THIERRY) W/ Cont if Necessary Per Protocol     2.  PFO  3.  Sinoatrial block type 2  4.  Aortic insufficiency     Plan:       1/2.  He had a left occipital/temporal stroke which was initially presumed to be due to small vessel disease. He was placed on clopidogrel and atorvastatin (which he stopped due to memory loss).  Then, outpatient neurological evaluation led to a further workup.  He was found to have a PFO on THIERRY. He has had an implanted monitor for seven months now, and no atrial fibrillation has been seen; I will discuss his case with Dr. Harris to see if we need to proceed with PFO closure.      3.  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  He doesn't have any symptoms of higher level block.    4.  I will check an echo in 2019; it was moderate by THIERRY in 2017.    Sincerely,       Christopher Sawant MD

## 2018-10-14 ENCOUNTER — CLINICAL SUPPORT NO REQUIREMENTS (OUTPATIENT)
Dept: CARDIOLOGY | Facility: CLINIC | Age: 68
End: 2018-10-14

## 2018-10-14 DIAGNOSIS — I44.1 MOBITZ (TYPE) I (WENCKEBACH'S) ATRIOVENTRICULAR BLOCK: ICD-10-CM

## 2018-10-14 DIAGNOSIS — I63.9 CRYPTOGENIC STROKE (HCC): Primary | ICD-10-CM

## 2018-10-14 DIAGNOSIS — I45.5 SINOATRIAL BLOCK: ICD-10-CM

## 2018-10-15 NOTE — PROGRESS NOTES
Christopher   I  think this is reasonable.  His Linq has been in for 6 months from what I can see with no atrial arrhythmias that would explain his CVA.  It looks like neurology is referring him to neurosurgery for a possible angiography.  I would like to see him in clinic and discuss PFO closure.  Belen will get this taken care of for us

## 2018-10-18 ENCOUNTER — TELEPHONE (OUTPATIENT)
Dept: NEUROSURGERY | Facility: CLINIC | Age: 68
End: 2018-10-18

## 2018-10-18 ENCOUNTER — OFFICE VISIT (OUTPATIENT)
Dept: NEUROSURGERY | Facility: CLINIC | Age: 68
End: 2018-10-18

## 2018-10-18 VITALS
WEIGHT: 190 LBS | BODY MASS INDEX: 25.18 KG/M2 | SYSTOLIC BLOOD PRESSURE: 146 MMHG | HEIGHT: 73 IN | DIASTOLIC BLOOD PRESSURE: 82 MMHG | HEART RATE: 80 BPM

## 2018-10-18 DIAGNOSIS — Z86.73 STATUS POST CVA: Primary | ICD-10-CM

## 2018-10-18 DIAGNOSIS — I67.1 CEREBRAL ANEURYSM, NONRUPTURED: ICD-10-CM

## 2018-10-18 PROCEDURE — 99214 OFFICE O/P EST MOD 30 MIN: CPT | Performed by: NURSE PRACTITIONER

## 2018-10-18 NOTE — TELEPHONE ENCOUNTER
[facility] [facility] Confirm Loop recorder implanted by Dr. Wyatt 3-26-18. Contacted his office; per Ember he is fine to have CTA (only MRI/MRA would be incompatible). Patient informed.

## 2018-10-18 NOTE — PROGRESS NOTES
"Subjective   Patient ID: Ronnell Rizvi is a 68 y.o. male who is being seen for consultation today at the request of CHINEDU Whitaker for CVA. He had an MRA of the head and neck at [facility] on 3/2/18. He presents accompanied.    History of Present Illness     He presents to the office today at the request of Dia Call, nurse practitioner, for history of a CVA.  In October 2017 he had visual changes and after coming to the ER was diagnosed with a stroke of the left occipital and temporal lobes.  He has been followed by cardiology and underwent Cardiolite stress test, which was normal.  He was found to have a PFO and is pending surgical discussion for closure.  He remains on Plavix for the history of strokes.    During a stroke workup, MRA imaging of the head was obtained revealing 2 small cerebral aneurysms.  He denies any new strokelike symptoms.  Only residual issues are some short-term memory loss, difficulty with concentration with speaking to others and some very mild word finding difficulty.  He denies any balance or gait instability, falls, double or blurry vision or any other new problems at this time.  He was having \"ice pick headaches.\"  That resolved after starting doxycycline approximately 1-1/2 weeks ago for concern of [place] spotted fever following multiple tick bites.      he presents unaccompanied.        /82 (BP Location: Left arm, Patient Position: Sitting, Cuff Size: Adult)   Pulse 80   Ht 185.4 cm (73\")   Wt 86.2 kg (190 lb)   BMI 25.07 kg/m²     The following portions of the patient's history were reviewed and updated as appropriate: allergies, current medications, past family history, past medical history, past social history, past surgical history and problem list.    Review of Systems   Constitutional: Negative for activity change (malaise).   HENT: Negative for rhinorrhea and tinnitus (or pulsation).    Eyes: Negative for visual disturbance.   Respiratory: Negative for " cough and shortness of breath.    Cardiovascular: Negative for chest pain and palpitations.   Gastrointestinal: Positive for nausea. Negative for abdominal pain and vomiting.   Genitourinary: Negative for enuresis.   Musculoskeletal: Negative for gait problem.   Skin: Negative for rash.   Neurological: Negative for dizziness, seizures, speech difficulty, weakness, light-headedness, numbness and headaches.   Hematological: Does not bruise/bleed easily.   Psychiatric/Behavioral: Negative for behavioral problems and sleep disturbance. The patient is not nervous/anxious.         Memory loss        Objective   Physical Exam   Constitutional: He is oriented to person, place, and time. He appears well-developed and well-nourished. He is cooperative.  Non-toxic appearance. He does not have a sickly appearance. He does not appear ill.   Very pleasant, well-appearing older male   HENT:   Head: Normocephalic and atraumatic.   Eyes: Pupils are equal, round, and reactive to light. EOM are normal.   Neck: Normal range of motion. Neck supple. No tracheal deviation present.   Pulmonary/Chest: Effort normal and breath sounds normal.   Abdominal: Soft.   Musculoskeletal: Normal range of motion.   Strength equal throughout and moving all extremities well   Neurological: He is alert and oriented to person, place, and time. He has normal strength. He displays no tremor. No cranial nerve deficit or sensory deficit. He has a normal Finger-Nose-Finger Test and a normal Tandem Gait Test. Gait normal. Coordination and gait normal. GCS eye subscore is 4. GCS verbal subscore is 5. GCS motor subscore is 6.   Reflex Scores:       Tricep reflexes are 2+ on the right side and 2+ on the left side.       Bicep reflexes are 2+ on the right side and 2+ on the left side.       Brachioradialis reflexes are 2+ on the right side and 2+ on the left side.       Patellar reflexes are 2+ on the right side and 2+ on the left side.       Achilles reflexes are  2+ on the right side and 2+ on the left side.  Gait is stable and upright  Cranial nerves II through XII grossly intact  Able to heel and toe walk, able to tandem   Skin: Skin is warm and dry.   Psychiatric: He has a normal mood and affect. His speech is normal and behavior is normal. Thought content normal.   Vitals reviewed.    Neurologic Exam     Mental Status   Oriented to person, place, and time.   Oriented to person.   Oriented to place.   Attention: normal. Concentration: normal.   Speech: speech is normal   Level of consciousness: alert    Cranial Nerves     CN II   Visual fields full to confrontation.   Visual acuity: normal  Right visual field deficit: none  Left visual field deficit: none     CN III, IV, VI   Pupils are equal, round, and reactive to light.  Extraocular motions are normal.   Right pupil: Size: 3 mm. Shape: regular. Reactivity: brisk.   Left pupil: Size: 3 mm. Shape: regular. Reactivity: brisk.   CN III: no CN III palsy  CN VI: no CN VI palsy  Nystagmus: none     CN V   Facial sensation intact.   Right facial sensation deficit: none  Left facial sensation deficit: none    CN VII   Facial expression full, symmetric.   Right facial weakness: none  Left facial weakness: none    CN VIII   CN VIII normal.     CN IX, X   CN IX normal.     CN XI   CN XI normal.     CN XII   CN XII normal.   Tongue: not atrophic  Tongue deviation: none    Motor Exam     Strength   Strength 5/5 throughout.     Gait, Coordination, and Reflexes     Gait  Gait: normal    Coordination   Finger to nose coordination: normal  Tandem walking coordination: normal    Tremor   Resting tremor: absent    Reflexes   Right brachioradialis: 2+  Left brachioradialis: 2+  Right biceps: 2+  Left biceps: 2+  Right triceps: 2+  Left triceps: 2+  Right patellar: 2+  Left patellar: 2+  Right achilles: 2+  Left achilles: 2+      Assessment/Plan   Independent Review of Radiographic Studies:     MRI from [facility] dated  October 11, 2017 reveals a posteriorly directed 3 mm aneurysm from the anterior communicating artery as well as a possible 2-3 mm aneurysm in the supraclinoid left ICA.        MRI brain from October 11, 2017 reveals acute infarct in the left posterior cerebral artery distribution      Medical Decision Making:    He presents to the office today for further evaluation with history of 2 prior left posterior cerebral artery distribution infarcts as well as to known small cerebral aneurysms.  Exam as noted above, no red flags.  Clinically, he is stable.  He has no deficits on exam.  He continues to take the Plavix as directed by neurology.  As discussed with Dr. Rodgers, he does not feel that it is necessary to have the patient undergo cerebral arteriogram at this time.  We will order CTA imaging of the head and neck, specifically getting the top of the aortic arch, to rule out any underlying atherosclerotic disease that may be contributing to 2 prior strokes.  As noted above, he is being followed closely by cardiology and is pending surgical closure of the PFO.  The aneurysms will also be followed with the CTA head imaging.  He has no restrictions from our standpoint.  We will see him back in the office once imaging studies are complete.      Plan: CTA head and neck as ordered, return to office in one month   Ronnell was seen today for cerebrovascular accident.    Diagnoses and all orders for this visit:    Status post CVA  -     CT Angiogram Head With & Without Contrast; Future  -     CT Angiogram Neck With & Without Contrast; Future    Cerebral aneurysm, nonruptured      Return in about 4 weeks (around 11/15/2018).

## 2018-10-18 NOTE — TELEPHONE ENCOUNTER
Patient was seen today by Sheila for CVA. Sheila put in for pt to have CTA head and neck     At checkout patient said that Sheila wants imaging of his heart because he has a hole in it. He also said that there is a device in his heart that records things. He wants to know if it is okay to do a CTA with that device in him.  Please advise

## 2018-10-22 ENCOUNTER — HOSPITAL ENCOUNTER (OUTPATIENT)
Dept: CT IMAGING | Facility: HOSPITAL | Age: 68
Discharge: HOME | End: 2018-10-22

## 2018-10-22 ENCOUNTER — HOSPITAL ENCOUNTER (OUTPATIENT)
Dept: CT IMAGING | Facility: HOSPITAL | Age: 68
Discharge: HOME | End: 2018-10-22
Admitting: NURSE PRACTITIONER

## 2018-10-22 DIAGNOSIS — Z86.73 STATUS POST CVA: ICD-10-CM

## 2018-10-22 PROCEDURE — 70498 CT ANGIOGRAPHY NECK: CPT

## 2018-10-22 PROCEDURE — 70496 CT ANGIOGRAPHY HEAD: CPT

## 2018-10-22 PROCEDURE — 0 IOPAMIDOL PER 1 ML: Performed by: NURSE PRACTITIONER

## 2018-10-22 RX ADMIN — IOPAMIDOL 100 ML: 755 INJECTION, SOLUTION INTRAVENOUS at 13:05

## 2018-11-12 ENCOUNTER — CLINICAL SUPPORT NO REQUIREMENTS (OUTPATIENT)
Dept: CARDIOLOGY | Facility: CLINIC | Age: 68
End: 2018-11-12

## 2018-11-12 DIAGNOSIS — I63.9 CRYPTOGENIC STROKE (HCC): Primary | ICD-10-CM

## 2018-11-12 PROCEDURE — 93299 PR REM INTERROG ICPMS/SCRMS <30 D TECH REVIEW: CPT | Performed by: INTERNAL MEDICINE

## 2018-11-12 PROCEDURE — 93298 REM INTERROG DEV EVAL SCRMS: CPT | Performed by: INTERNAL MEDICINE

## 2018-11-28 ENCOUNTER — TELEPHONE (OUTPATIENT)
Dept: NEUROSURGERY | Facility: CLINIC | Age: 68
End: 2018-11-28

## 2018-11-28 NOTE — TELEPHONE ENCOUNTER
Pt called and had to reschedule his appt for tomorrow due to having other things he needed to get done. He rescheduled for 1/3/19.

## 2018-11-29 ENCOUNTER — HOSPITAL ENCOUNTER (OUTPATIENT)
Facility: HOSPITAL | Age: 68
Setting detail: HOSPITAL OUTPATIENT SURGERY
End: 2018-11-29
Attending: INTERNAL MEDICINE | Admitting: INTERNAL MEDICINE

## 2018-11-29 ENCOUNTER — OFFICE VISIT (OUTPATIENT)
Dept: CARDIOLOGY | Facility: CLINIC | Age: 68
End: 2018-11-29

## 2018-11-29 VITALS
BODY MASS INDEX: 25.31 KG/M2 | WEIGHT: 191 LBS | DIASTOLIC BLOOD PRESSURE: 84 MMHG | SYSTOLIC BLOOD PRESSURE: 156 MMHG | HEIGHT: 73 IN | HEART RATE: 62 BPM

## 2018-11-29 DIAGNOSIS — Q21.12 PFO (PATENT FORAMEN OVALE): Primary | ICD-10-CM

## 2018-11-29 DIAGNOSIS — I63.9 CEREBROVASCULAR ACCIDENT (CVA), UNSPECIFIED MECHANISM (HCC): ICD-10-CM

## 2018-11-29 DIAGNOSIS — Q21.12 PFO (PATENT FORAMEN OVALE): ICD-10-CM

## 2018-11-29 PROCEDURE — 99204 OFFICE O/P NEW MOD 45 MIN: CPT | Performed by: INTERNAL MEDICINE

## 2018-11-29 NOTE — PROGRESS NOTES
Ronnell Elip  1950  Date of Office Visit: 11/29/2018  Encounter Provider: Deangelo Harris MD  Place of Service: [facility] CARDIOLOGY      CHIEF COMPLAINT:  CVA  PFO     HISTORY OF PRESENT ILLNESS:I had the pleasure of seeing the patient in consultation today. As you know, he is a 68-year-old male with a medical history of CVA who presents to me for evaluation of PFO closure. He has been evaluated by our neurology team including CHINEDU Muñoz. His story starts back in 01/2017 when he had symptoms consistent with CVA at that time. He had loss of peripheral vision that lasted for 5-10 minutes and resolved with no significant residual. He did not undergo evaluation at that time and did not have imaging done. He presented back later in 2017 with loss of peripheral vision bilaterally and difficulty reading. He stated that all the words that he was looking at appeared to be a different language.     He underwent evaluation by his ophthalmologist and his ophthalmologist told him that this was not caused by an issue with his eyes and recommended a neurology evaluation. He was evaluated at [facility] and was felt to have a CVA. He underwent imaging as below including an MRI and an MRA. He had no significant carotid artery disease. His MRI of the brain showed him to have early subacute thromboembolic insult to the left posterior cerebral artery distribution with restricted diffusion in the inferolateral occipital to posterolateral temporal lobe. This was felt to be embolic by Neurology. He had a transthoracic echocardiogram done at that time and did have evidence of a PFO then. He wore a ZIO patch which showed a 3-second pause and reportedly a 13-beat run of AFib but nothing sustained. On my review, this is actually just a nonsustained atrial tachycardia and certainly would not warrant anticoagulant.    He was evaluated by Dr. Amador as well and did have an  implantable recording device also placed which has not had any evidence of atrial fibrillation. He did have a CTA done more recently that showed just very mild carotid plaquing and no stenosis in that area. He also had a transesophageal echocardiogram done, once again with a moderate-sized patent foramen ovale.           Review of Systems   Constitution: Negative for fever, weakness and malaise/fatigue.   HENT: Negative for nosebleeds and sore throat.    Eyes: Negative for blurred vision and double vision.   Cardiovascular: Negative for chest pain, claudication, palpitations and syncope.   Respiratory: Negative for cough, shortness of breath and snoring.    Endocrine: Negative for cold intolerance, heat intolerance and polydipsia.   Skin: Negative for itching, poor wound healing and rash.   Musculoskeletal: Negative for joint pain, joint swelling, muscle weakness and myalgias.   Gastrointestinal: Negative for abdominal pain, melena, nausea and vomiting.   Neurological: Negative for light-headedness, loss of balance, seizures and vertigo.   Psychiatric/Behavioral: Negative for altered mental status and depression.          Past Medical History:   Diagnosis Date   • Allergic rhinitis    • Anal fissure    • Aortic insufficiency     moderate, THIERRY 2017   • Asthma    • B12 deficiency    • Benign prostatic hypertrophy (BPH) with weak urinary stream 11/1/2016   • Chronic bronchitis (CMS/HCC)    • Emphysema lung (CMS/HCC)    • Fatty liver    • GERD (gastroesophageal reflux disease)    • Hepatitis     thought to be Hepatitis A    • History of pneumonia     With left lower lobe atelectasis and scar tissue, being followed   • Obstructive sleep apnea syndrome 8/23/2017   • PFO (patent foramen ovale) 10/9/2018   • Pneumonia     LLL with scar tissue   • 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   • Spinal stenosis    • Stroke (CMS/HCC)     10/2017: left occipital/temporal       The following portions of the patient's history were reviewed and  "updated as appropriate: Social history , Family history and Surgical history     Current Outpatient Medications on File Prior to Visit   Medication Sig Dispense Refill   • clopidogrel (PLAVIX) 75 MG tablet Take 1 tablet by mouth Daily. 30 tablet 1   • Cyanocobalamin (B-12) 1000 MCG/ML kit Inject 250 mcg as directed 1 (One) Time Per Week. Pt. Stated he takes this medication weekly 250 mcg, pt reports took it last friday     • hydrochlorothiazide (HYDRODIURIL) 12.5 MG tablet Take 1 tablet by mouth Daily. 90 tablet 2   • [DISCONTINUED] FLUZONE HIGH-DOSE 0.5 ML suspension prefilled syringe injection      • [DISCONTINUED] guaiFENesin (MUCINEX) 600 MG 12 hr tablet Take 1,200 mg by mouth 2 (Two) Times a Day As Needed for Cough.       No current facility-administered medications on file prior to visit.        Allergies   Allergen Reactions   • Aspirin Nausea Only   • Citrus      Blisters on mouth     • Combivent [Ipratropium-Albuterol] Other (See Comments)     Throat swelling   • Lactose Intolerance (Gi)        Vitals:    11/29/18 1432   BP: 156/84   Pulse: 62   Weight: 86.6 kg (191 lb)   Height: 185.4 cm (73\")     Physical Exam   Constitutional: He is oriented to person, place, and time. He appears well-developed and well-nourished.   HENT:   Head: Normocephalic and atraumatic.   Eyes: Conjunctivae and EOM are normal. No scleral icterus.   Neck: Normal range of motion. Neck supple. Normal carotid pulses, no hepatojugular reflux and no JVD present. Carotid bruit is not present. No tracheal deviation present. No thyromegaly present.   Cardiovascular: Normal rate and regular rhythm. Exam reveals no gallop and no friction rub.   No murmur heard.  Pulses:       Carotid pulses are 2+ on the right side, and 2+ on the left side.       Radial pulses are 2+ on the right side, and 2+ on the left side.        Femoral pulses are 2+ on the right side, and 2+ on the left side.       Dorsalis pedis pulses are 2+ on the right side, and 2+ " on the left side.        Posterior tibial pulses are 2+ on the right side, and 2+ on the left side.   Pulmonary/Chest: Breath sounds normal. No respiratory distress. He has no decreased breath sounds. He has no wheezes. He has no rhonchi. He has no rales. He exhibits no tenderness.   Abdominal: Soft. Bowel sounds are normal. He exhibits no distension. There is no tenderness. There is no rebound.   Musculoskeletal: He exhibits no edema or deformity.   Neurological: He is alert and oriented to person, place, and time. He has normal strength. No sensory deficit.   Skin: No rash noted. No erythema.   Psychiatric: He has a normal mood and affect. His behavior is normal.       No components found for: CBC  No results found for: CMP  No components found for: LIPID  No results found for: BMP    Procedures     10/22/18  CTA  IMPRESSION:  1. Mild plaque in the bulbs with about 18% stenosis by NASCET criteria  in both proximal ICAs. No carotid or vertebral dissection.  2. High-grade short segment stenosis in the right posterior cerebral  artery in the P2 segment. This is new from prior.  3. Small 2 mm infundibulum on the posterior aspect of the left  supraclinoid ICA.  4. No definite anterior communicating artery aneurysm.  5. Stable appearance of hypoplastic right A1.      3/26/18  THIERRY  · No evidence of a left atrial appendage thrombus was present.  · Moderate patent foramen ovale present. Saline test results are positive.  · Left ventricular systolic function is normal.  · Moderate aortic valve regurgitation is present.  · Mild mitral valve regurgitation is present    10/11/17  MRI  1.  Findings are most consistent with a early subacute thromboembolic insult to the left posterior cerebral artery distribution with area of restricted diffusion and mild localized mass effect involving the left inferolateral occipital lobe to posterolateral temporal lobe. Please correlate further clinically for possible risk factors for  thromboembolic disease. No hemorrhagic transformation at this time. No midline shift.   2.  Preexisting probable sequela of small vessel disease.       DISCUSSION/SUMMARYThis is a 68-year-old male with a medical history of prior CVA, patent foramen ovale, and aortic regurgitation who presents to me for evaluation of PFO closure. He does have a moderate-sized patent foramen ovale documented on transesophageal echocardiogram. He certainly did have an embolic-appearing CVA that is cryptogenic. He has no evidence of atrial fibrillation on long-term rhythm monitoring. I think, in discussing with him, that he has had more than 1 neurologic event. In January he had the exact same symptoms that he had at the time of his CVA in October; it is just that in January they resolved relatively quickly. He did not undergo evaluation at that time.    1. PFO/TIA/CVA.  - I do think that he has a moderate-sized PFO and has had a couple of neurologic events which certainly have not been subtle.   - I have recommended continuing his Plavix therapy.   - I would recommend patent foramen ovale closure in the setting of more than 1 neurologic event and a moderate-sized PFO. I am aware that he is greater than 60 years of age; however, that is only applicable in my mind if the patient has had 1 neurologic event per the respect trial.   - He is agreeable to PFO closure and I will get this taken care of.

## 2018-12-03 ENCOUNTER — TRANSCRIBE ORDERS (OUTPATIENT)
Dept: CARDIOLOGY | Facility: CLINIC | Age: 68
End: 2018-12-03

## 2018-12-03 DIAGNOSIS — Z13.6 SCREENING FOR CARDIOVASCULAR CONDITION: Primary | ICD-10-CM

## 2018-12-03 DIAGNOSIS — Q21.12 PFO (PATENT FORAMEN OVALE): ICD-10-CM

## 2018-12-03 DIAGNOSIS — Z01.810 PREPROCEDURAL CARDIOVASCULAR EXAMINATION: ICD-10-CM

## 2018-12-07 ENCOUNTER — TELEPHONE (OUTPATIENT)
Dept: CARDIOLOGY | Facility: CLINIC | Age: 68
End: 2018-12-07

## 2018-12-07 NOTE — TELEPHONE ENCOUNTER
Pt called the office and had some questions about the cath he is to have on the 19th with Dr. Harris. Pt says he cant remember if it was discussed or not about the monitor  that tracks his afib if he will still have it or if its being removed. Also he wants to know if he needs to stop his blood thinner before his surgery.     Pt seemed very forgetful on what was discussed, wanting to know if he needs to come in on Tuesday to discuss this information or if you can call him at [phone]

## 2018-12-07 NOTE — TELEPHONE ENCOUNTER
I left a message.    The monitor will be removed at the time of PFO closure.      He is NOT to stop his clopidogrel.    He may call back to discuss more.

## 2018-12-08 ENCOUNTER — HOSPITAL ENCOUNTER (EMERGENCY)
Facility: HOSPITAL | Age: 68
Discharge: ANOTHER HOSPITAL | End: 2018-12-08
Attending: EMERGENCY MEDICINE | Admitting: EMERGENCY MEDICINE

## 2018-12-08 ENCOUNTER — HOSPITAL ENCOUNTER (INPATIENT)
Facility: HOSPITAL | Age: 68
LOS: 2 days | Discharge: HOME | End: 2018-12-13
Attending: INTERNAL MEDICINE | Admitting: INTERNAL MEDICINE

## 2018-12-08 ENCOUNTER — APPOINTMENT (OUTPATIENT)
Dept: CT IMAGING | Facility: HOSPITAL | Age: 68
End: 2018-12-08

## 2018-12-08 ENCOUNTER — APPOINTMENT (OUTPATIENT)
Dept: GENERAL RADIOLOGY | Facility: HOSPITAL | Age: 68
End: 2018-12-08

## 2018-12-08 ENCOUNTER — CLINICAL SUPPORT NO REQUIREMENTS (OUTPATIENT)
Dept: CARDIOLOGY | Facility: CLINIC | Age: 68
End: 2018-12-08

## 2018-12-08 VITALS
HEART RATE: 65 BPM | HEIGHT: 72 IN | OXYGEN SATURATION: 95 % | RESPIRATION RATE: 16 BRPM | SYSTOLIC BLOOD PRESSURE: 171 MMHG | TEMPERATURE: 97.8 F | BODY MASS INDEX: 24.23 KG/M2 | WEIGHT: 178.9 LBS | DIASTOLIC BLOOD PRESSURE: 85 MMHG

## 2018-12-08 DIAGNOSIS — Q21.12 PFO (PATENT FORAMEN OVALE): ICD-10-CM

## 2018-12-08 DIAGNOSIS — I63.9 CEREBROVASCULAR ACCIDENT (CVA), UNSPECIFIED MECHANISM (HCC): ICD-10-CM

## 2018-12-08 DIAGNOSIS — G45.9 TIA (TRANSIENT ISCHEMIC ATTACK): Primary | ICD-10-CM

## 2018-12-08 LAB
ALBUMIN SERPL-MCNC: 4.4 G/DL (ref 3.5–5.2)
ALBUMIN/GLOB SERPL: 1.5 G/DL
ALP SERPL-CCNC: 50 U/L (ref 40–129)
ALT SERPL W P-5'-P-CCNC: 29 U/L (ref 5–41)
ANION GAP SERPL CALCULATED.3IONS-SCNC: 10.1 MMOL/L
AST SERPL-CCNC: 22 U/L (ref 5–40)
BASOPHILS # BLD AUTO: 0.04 10*3/MM3 (ref 0–0.2)
BASOPHILS NFR BLD AUTO: 0.6 % (ref 0–2)
BILIRUB SERPL-MCNC: 0.4 MG/DL (ref 0.2–1.2)
BUN BLD-MCNC: 13 MG/DL (ref 8–23)
BUN/CREAT SERPL: 12.9 (ref 7–25)
CALCIUM SPEC-SCNC: 9.1 MG/DL (ref 8.8–10.5)
CHLORIDE SERPL-SCNC: 100 MMOL/L (ref 98–107)
CO2 SERPL-SCNC: 28.9 MMOL/L (ref 22–29)
CREAT BLD-MCNC: 1.01 MG/DL (ref 0.76–1.27)
DEPRECATED RDW RBC AUTO: 42.3 FL (ref 37–54)
EOSINOPHIL # BLD AUTO: 0.1 10*3/MM3 (ref 0.1–0.3)
EOSINOPHIL NFR BLD AUTO: 1.6 % (ref 0–4)
ERYTHROCYTE [DISTWIDTH] IN BLOOD BY AUTOMATED COUNT: 12.2 % (ref 11.5–14.5)
GFR SERPL CREATININE-BSD FRML MDRD: 73 ML/MIN/1.73
GLOBULIN UR ELPH-MCNC: 3 GM/DL
GLUCOSE BLD-MCNC: 90 MG/DL (ref 65–99)
HCT VFR BLD AUTO: 49.9 % (ref 42–52)
HGB BLD-MCNC: 16.6 G/DL (ref 14–18)
IMM GRANULOCYTES # BLD: 0.02 10*3/MM3 (ref 0–0.03)
IMM GRANULOCYTES NFR BLD: 0.3 % (ref 0–0.5)
LYMPHOCYTES # BLD AUTO: 1.85 10*3/MM3 (ref 0.6–4.8)
LYMPHOCYTES NFR BLD AUTO: 29.9 % (ref 20–45)
MCH RBC QN AUTO: 31.3 PG (ref 27–31)
MCHC RBC AUTO-ENTMCNC: 33.3 G/DL (ref 31–37)
MCV RBC AUTO: 94 FL (ref 80–94)
MONOCYTES # BLD AUTO: 0.73 10*3/MM3 (ref 0–1)
MONOCYTES NFR BLD AUTO: 11.8 % (ref 3–8)
NEUTROPHILS # BLD AUTO: 3.44 10*3/MM3 (ref 1.5–8.3)
NEUTROPHILS NFR BLD AUTO: 55.8 % (ref 45–70)
NRBC BLD MANUAL-RTO: 0 /100 WBC (ref 0–0)
PLATELET # BLD AUTO: 254 10*3/MM3 (ref 140–500)
PMV BLD AUTO: 10.2 FL (ref 7.4–10.4)
POTASSIUM BLD-SCNC: 3.7 MMOL/L (ref 3.5–5.2)
PROT SERPL-MCNC: 7.4 G/DL (ref 6–8.5)
RBC # BLD AUTO: 5.31 10*6/MM3 (ref 4.7–6.1)
SODIUM BLD-SCNC: 139 MMOL/L (ref 136–145)
TROPONIN T SERPL-MCNC: <0.01 NG/ML (ref 0–0.03)
WBC NRBC COR # BLD: 6.18 10*3/MM3 (ref 4.8–10.8)

## 2018-12-08 PROCEDURE — 84484 ASSAY OF TROPONIN QUANT: CPT | Performed by: EMERGENCY MEDICINE

## 2018-12-08 PROCEDURE — G0378 HOSPITAL OBSERVATION PER HR: HCPCS

## 2018-12-08 PROCEDURE — 70450 CT HEAD/BRAIN W/O DYE: CPT

## 2018-12-08 PROCEDURE — 93010 ELECTROCARDIOGRAM REPORT: CPT | Performed by: INTERNAL MEDICINE

## 2018-12-08 PROCEDURE — 85025 COMPLETE CBC W/AUTO DIFF WBC: CPT | Performed by: EMERGENCY MEDICINE

## 2018-12-08 PROCEDURE — 71045 X-RAY EXAM CHEST 1 VIEW: CPT

## 2018-12-08 PROCEDURE — 80053 COMPREHEN METABOLIC PANEL: CPT | Performed by: EMERGENCY MEDICINE

## 2018-12-08 PROCEDURE — 99285 EMERGENCY DEPT VISIT HI MDM: CPT | Performed by: EMERGENCY MEDICINE

## 2018-12-08 PROCEDURE — B246ZZZ ULTRASONOGRAPHY OF RIGHT AND LEFT HEART: ICD-10-PCS | Performed by: INTERNAL MEDICINE

## 2018-12-08 PROCEDURE — 99284 EMERGENCY DEPT VISIT MOD MDM: CPT

## 2018-12-08 PROCEDURE — 93005 ELECTROCARDIOGRAM TRACING: CPT | Performed by: EMERGENCY MEDICINE

## 2018-12-08 RX ORDER — SODIUM CHLORIDE 0.9 % (FLUSH) 0.9 %
3 SYRINGE (ML) INJECTION EVERY 12 HOURS SCHEDULED
Status: DISCONTINUED | OUTPATIENT
Start: 2018-12-08 | End: 2018-12-13 | Stop reason: HOSPADM

## 2018-12-08 RX ORDER — CLOPIDOGREL BISULFATE 75 MG/1
75 TABLET ORAL DAILY
Status: DISCONTINUED | OUTPATIENT
Start: 2018-12-09 | End: 2018-12-13 | Stop reason: HOSPADM

## 2018-12-08 RX ORDER — SODIUM CHLORIDE 0.9 % (FLUSH) 0.9 %
3-10 SYRINGE (ML) INJECTION AS NEEDED
Status: DISCONTINUED | OUTPATIENT
Start: 2018-12-08 | End: 2018-12-13 | Stop reason: HOSPADM

## 2018-12-08 RX ORDER — HYDROCHLOROTHIAZIDE 25 MG/1
12.5 TABLET ORAL DAILY
Status: DISCONTINUED | OUTPATIENT
Start: 2018-12-09 | End: 2018-12-13 | Stop reason: HOSPADM

## 2018-12-08 RX ORDER — ASPIRIN 300 MG/1
300 SUPPOSITORY RECTAL DAILY
Status: DISCONTINUED | OUTPATIENT
Start: 2018-12-09 | End: 2018-12-09

## 2018-12-08 RX ORDER — ATORVASTATIN CALCIUM 80 MG/1
80 TABLET, FILM COATED ORAL NIGHTLY
Status: DISCONTINUED | OUTPATIENT
Start: 2018-12-08 | End: 2018-12-13 | Stop reason: HOSPADM

## 2018-12-08 RX ORDER — ONDANSETRON 2 MG/ML
4 INJECTION INTRAMUSCULAR; INTRAVENOUS EVERY 6 HOURS PRN
Status: DISCONTINUED | OUTPATIENT
Start: 2018-12-08 | End: 2018-12-12 | Stop reason: SDUPTHER

## 2018-12-08 RX ORDER — ASPIRIN 325 MG
325 TABLET ORAL DAILY
Status: DISCONTINUED | OUTPATIENT
Start: 2018-12-09 | End: 2018-12-09

## 2018-12-08 RX ORDER — SODIUM CHLORIDE 9 MG/ML
75 INJECTION, SOLUTION INTRAVENOUS CONTINUOUS
Status: DISCONTINUED | OUTPATIENT
Start: 2018-12-08 | End: 2018-12-13 | Stop reason: HOSPADM

## 2018-12-08 RX ADMIN — ATORVASTATIN CALCIUM 80 MG: 80 TABLET, FILM COATED ORAL at 23:38

## 2018-12-08 RX ADMIN — SODIUM CHLORIDE, PRESERVATIVE FREE 3 ML: 5 INJECTION INTRAVENOUS at 20:45

## 2018-12-08 RX ADMIN — SODIUM CHLORIDE 75 ML/HR: 9 INJECTION, SOLUTION INTRAVENOUS at 23:50

## 2018-12-08 NOTE — ED NOTES
Bed board was called at 1604. Priscilla stated she had paged [facility] and was waiting for them to return her page.     Hoa Baldwin, SEMAJA  12/08/18 2120

## 2018-12-08 NOTE — ED NOTES
Call made to Dr. Huerta at 15:08, answered on the first call. This call is complete.      Gladis Sepulveda N  12/08/18 1503

## 2018-12-08 NOTE — ED NOTES
"RN calls [facility] to give report and was informed that \"Saskia\" will call back.     Zachery More RN  12/08/18 2444    "

## 2018-12-08 NOTE — ED NOTES
Call made to [facility] stroke doctor at 15:12, answered on the first call. Call competed      Hoa Baldwin CNA  12/08/18 [id number]

## 2018-12-08 NOTE — ED NOTES
Pt transferred to [facility] per ALS transport,IV site patent and no acute s/sx's of distress and no voiced c/o dizziness.     Zachery More RN  12/08/18 2557

## 2018-12-08 NOTE — ED NOTES
Bed board calls the ER with # for room and RN was informed that bed is dirty and to call back in 1 hr to give report. Pt informed.     Zachery More, MAT  12/08/18 6829

## 2018-12-08 NOTE — ED PROVIDER NOTES
Subjective   History of Present Illness  History of Present Illness    Chief complaint:  arm and leg weakness    Location: Right side    Quality/Severity:  Patient had difficulty putting his right leg in his pants, and opening jelly a [employer].    Timing/Onset/Duration: Noted at 4 AM this morning, lasted 2-3 hours.    Modifying Factors: Time is made it better, nothing is made it worse    Associated Symptoms: Mild headache.  No fever chills or cough.  No sore throat earache or nasal congestion.  No chest pain or shortness of breath.  No abdominal pain.  No diarrhea or burning when he urinates.  No nausea or vomiting.    Narrative: This 68-year-old white female with history of previous CVA, presents with onset of right-sided arm and leg weakness that started about 4 AM and lasted 2-3 hours.  Patient also states that he had difficulty driving    PCP:  Shabnam      Review of Systems   Constitutional: Negative for chills and fever.   HENT: Negative for ear pain and sore throat.    Eyes: Negative for discharge and redness.   Respiratory: Negative for cough, chest tightness, shortness of breath and wheezing.    Cardiovascular: Negative for chest pain.   Gastrointestinal: Negative for abdominal pain, diarrhea, nausea and vomiting.   Genitourinary: Negative for difficulty urinating and hematuria.   Musculoskeletal: Negative for back pain.   Skin: Negative for rash.   Neurological: Positive for weakness and headaches. Negative for dizziness, speech difficulty, light-headedness and numbness.   Psychiatric/Behavioral: Negative.  Negative for confusion.        Medication List      ASK your doctor about these medications    B-12 1000 MCG/ML kit     clopidogrel 75 MG tablet  Commonly known as:  PLAVIX  Take 1 tablet by mouth Daily.     hydrochlorothiazide 12.5 MG tablet  Commonly known as:  HYDRODIURIL  Take 1 tablet by mouth Daily.          Past Medical History:   Diagnosis Date   • Allergic rhinitis    • Anal fissure    •  Aortic insufficiency     moderate, THIERRY    • Asthma    • B12 deficiency    • Benign prostatic hypertrophy (BPH) with weak urinary stream 2016   • Chronic bronchitis (CMS/HCC)    • Emphysema lung (CMS/HCC)    • Fatty liver    • GERD (gastroesophageal reflux disease)    • Hepatitis     thought to be Hepatitis A    • History of pneumonia     With left lower lobe atelectasis and scar tissue, being followed   • Obstructive sleep apnea syndrome 2017   • PFO (patent foramen ovale) 10/9/2018   • Pneumonia     LLL with scar tissue   • 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   • Spinal stenosis    • Stroke (CMS/HCC)     10/2017: left occipital/temporal       Allergies   Allergen Reactions   • Aspirin Nausea Only   • Citrus      Blisters on mouth     • Combivent [Ipratropium-Albuterol] Other (See Comments)     Throat swelling   • Lactose Intolerance (Gi)        Past Surgical History:   Procedure Laterality Date   • COLONOSCOPY     • HAND SURGERY Bilateral    • INGUINAL HERNIA REPAIR Left    • OTHER SURGICAL HISTORY      inguinal hernia repair for person over age 5   • TONSILLECTOMY         Family History   Problem Relation Age of Onset   • Obesity Mother    • Clotting disorder Mother         Upper extremities   • Alcohol abuse Father    • Cancer Father 63        Esophagus and lung   • Other Father         cardiac disorder   • Heart disease Father    • Kidney disease Sister    • Kidney failure Sister    • Drug abuse Paternal Uncle    • Stroke Sister    • Throat cancer Sister        Social History     Socioeconomic History   • Marital status:      Spouse name: Joey   • Number of children: Not on file   • Years of education: Not on file   • Highest education level: Not on file   Occupational History   • Occupation:      Employer: RETIRED   Tobacco Use   • Smoking status: Former Smoker     Last attempt to quit: 1979     Years since quittin.8   • Smokeless tobacco: Never Used   • Tobacco comment:  caffeine use: Drinks 4 glasses of Dt coke on avg.    Substance and Sexual Activity   • Alcohol use: No   • Drug use: No   • Sexual activity: Defer           Objective   Physical Exam   Constitutional: He is oriented to person, place, and time. He appears well-developed and well-nourished. No distress.   ED Triage Vitals (12/08/18 1341)  Temp: 98 °F (36.7 °C)  Heart Rate: 71  Resp: 16  BP: (!) 194/93  SpO2: 97 %  Temp src: Oral  Heart Rate Source: Monitor  Patient Position: Sitting  BP Location: Right arm  FiO2 (%): n/a    The patient's vitals were reviewed by me.  Unless otherwise noted they are within normal limits.     HENT:   Head: Normocephalic and atraumatic.   Right Ear: External ear normal.   Left Ear: External ear normal.   Nose: Nose normal.   Mouth/Throat: Oropharynx is clear and moist. No oropharyngeal exudate.   Eyes: Conjunctivae and EOM are normal. Pupils are equal, round, and reactive to light. Right eye exhibits no discharge. Left eye exhibits no discharge. No scleral icterus.   Neck: Normal range of motion. Neck supple. No JVD present. No tracheal deviation present. No thyromegaly present.   Cardiovascular: Normal rate, regular rhythm, normal heart sounds and intact distal pulses. Exam reveals no gallop and no friction rub.   No murmur heard.  Pulmonary/Chest: Effort normal and breath sounds normal. No stridor. No respiratory distress. He has no wheezes. He has no rales. He exhibits no tenderness.   Abdominal: Soft. Bowel sounds are normal. He exhibits no distension and no mass. There is no tenderness. There is no rebound and no guarding. No hernia.   Musculoskeletal: Normal range of motion. He exhibits no edema, tenderness or deformity.   Lymphadenopathy:     He has no cervical adenopathy.   Neurological: He is alert and oriented to person, place, and time. He displays normal reflexes. No cranial nerve deficit or sensory deficit. Coordination normal.   Skin: Skin is warm and dry. No rash noted. He  is not diaphoretic. No erythema. No pallor.   Psychiatric: His behavior is normal.   Nursing note and vitals reviewed.      Procedures           ED Course  ED Course as of Dec 08 1516   Sat Dec 08, 2018   1452 The laboratory values were reviewed by me.  The laboratory values are unremarkable.  [RC]      ED Course User Index  [RC] Cash Mario MD      2:00 PM, 12/08/18:  The EKG was obtained at 1340.  EKG was read by me at 1341.  EKG shows a normal sinus rhythm with rate of 71.  There is borderline left axis deviation.  No hypertrophy.  The TN is prolonged at 204 ms.  The QRS and QT intervals are unremarkable.  There is no ectopy.  There is no acute ST elevation or depression  3:16 PM, 12/08/18:  The patient was reassessed.  He has no new complaints.  His vital signs were reviewed and are stable.  Her logical exam: Conscious alert oriented ×4 with no focal deficits noted.  The patient passed his bedside swallow evaluation.    3:16 PM, 12/08/18:  Spoke with Dr. Huerta, on call for neurology, he would like to have the patient transferred to [place] for further workup as there is no MRI capability here in the hospital this weekend.    3:17 PM, 12/08/18:  I spoke with , on call for neurology at [facility], he will accept the patient.    3:17 PM, 12/08/18:  He patient's diagnosis of TIA was discussed with him.  Treatment plan for transfer to [place] for further workup and evaluation was discussed with him.  All of his questions were answered patient will be transferred in good condition.          MDM    No orders to display     Labs Reviewed - No data to display  No results found.    Final diagnoses:   TIA (transient ischemic attack)         ED Medications:  Medications - No data to display    New Medications:     Medication List      ASK your doctor about these medications    B-12 1000 MCG/ML kit     clopidogrel 75 MG tablet  Commonly known as:  PLAVIX  Take 1 tablet by mouth  Daily.     hydrochlorothiazide 12.5 MG tablet  Commonly known as:  HYDRODIURIL  Take 1 tablet by mouth Daily.          Stopped Medications:     Medication List      ASK your doctor about these medications    B-12 1000 MCG/ML kit     clopidogrel 75 MG tablet  Commonly known as:  PLAVIX  Take 1 tablet by mouth Daily.     hydrochlorothiazide 12.5 MG tablet  Commonly known as:  HYDRODIURIL  Take 1 tablet by mouth Daily.            Final diagnoses:   TIA (transient ischemic attack)            Cash Mario MD  12/08/18 1518       Cash Mario MD  12/08/18 0999

## 2018-12-09 ENCOUNTER — APPOINTMENT (OUTPATIENT)
Dept: MRI IMAGING | Facility: HOSPITAL | Age: 68
End: 2018-12-09

## 2018-12-09 LAB
ALBUMIN SERPL-MCNC: 3.7 G/DL (ref 3.5–5.2)
ALBUMIN/GLOB SERPL: 1.6 G/DL
ALP SERPL-CCNC: 44 U/L (ref 39–117)
ALT SERPL W P-5'-P-CCNC: 25 U/L (ref 1–41)
ANION GAP SERPL CALCULATED.3IONS-SCNC: 8.3 MMOL/L
AST SERPL-CCNC: 19 U/L (ref 1–40)
BILIRUB SERPL-MCNC: 0.5 MG/DL (ref 0.1–1.2)
BUN BLD-MCNC: 15 MG/DL (ref 8–23)
BUN/CREAT SERPL: 14.7 (ref 7–25)
CALCIUM SPEC-SCNC: 9 MG/DL (ref 8.6–10.5)
CHLORIDE SERPL-SCNC: 105 MMOL/L (ref 98–107)
CHOLEST SERPL-MCNC: 168 MG/DL (ref 0–200)
CO2 SERPL-SCNC: 27.7 MMOL/L (ref 22–29)
CREAT BLD-MCNC: 1.02 MG/DL (ref 0.76–1.27)
DEPRECATED RDW RBC AUTO: 45.2 FL (ref 37–54)
ERYTHROCYTE [DISTWIDTH] IN BLOOD BY AUTOMATED COUNT: 12.7 % (ref 11.5–14.5)
FOLATE SERPL-MCNC: 12.17 NG/ML (ref 4.78–24.2)
GFR SERPL CREATININE-BSD FRML MDRD: 72 ML/MIN/1.73
GLOBULIN UR ELPH-MCNC: 2.3 GM/DL
GLUCOSE BLD-MCNC: 99 MG/DL (ref 65–99)
GLUCOSE BLDC GLUCOMTR-MCNC: 87 MG/DL (ref 70–130)
HBA1C MFR BLD: 5.1 % (ref 4.8–5.6)
HCT VFR BLD AUTO: 47.2 % (ref 40.4–52.2)
HDLC SERPL-MCNC: 46 MG/DL (ref 40–60)
HGB BLD-MCNC: 15.3 G/DL (ref 13.7–17.6)
LDLC SERPL CALC-MCNC: 107 MG/DL (ref 0–100)
LDLC/HDLC SERPL: 2.33 {RATIO}
MCH RBC QN AUTO: 31.5 PG (ref 27–32.7)
MCHC RBC AUTO-ENTMCNC: 32.4 G/DL (ref 32.6–36.4)
MCV RBC AUTO: 97.3 FL (ref 79.8–96.2)
PA ADP PRP-ACNC: 149 PRU (ref 194–418)
PLATELET # BLD AUTO: 208 10*3/MM3 (ref 140–500)
PMV BLD AUTO: 9.8 FL (ref 6–12)
POTASSIUM BLD-SCNC: 4.1 MMOL/L (ref 3.5–5.2)
PROT SERPL-MCNC: 6 G/DL (ref 6–8.5)
RBC # BLD AUTO: 4.85 10*6/MM3 (ref 4.6–6)
SODIUM BLD-SCNC: 141 MMOL/L (ref 136–145)
TRIGL SERPL-MCNC: 75 MG/DL (ref 0–150)
TSH SERPL DL<=0.05 MIU/L-ACNC: 1.71 MIU/ML (ref 0.27–4.2)
VIT B12 BLD-MCNC: 838 PG/ML (ref 211–946)
VLDLC SERPL-MCNC: 15 MG/DL (ref 5–40)
WBC NRBC COR # BLD: 5.16 10*3/MM3 (ref 4.5–10.7)

## 2018-12-09 PROCEDURE — G0378 HOSPITAL OBSERVATION PER HR: HCPCS

## 2018-12-09 PROCEDURE — 97530 THERAPEUTIC ACTIVITIES: CPT | Performed by: PHYSICAL THERAPIST

## 2018-12-09 PROCEDURE — 97162 PT EVAL MOD COMPLEX 30 MIN: CPT | Performed by: PHYSICAL THERAPIST

## 2018-12-09 PROCEDURE — 80053 COMPREHEN METABOLIC PANEL: CPT | Performed by: INTERNAL MEDICINE

## 2018-12-09 PROCEDURE — 80061 LIPID PANEL: CPT | Performed by: INTERNAL MEDICINE

## 2018-12-09 PROCEDURE — 70549 MR ANGIOGRAPH NECK W/O&W/DYE: CPT

## 2018-12-09 PROCEDURE — 85576 BLOOD PLATELET AGGREGATION: CPT | Performed by: PSYCHIATRY & NEUROLOGY

## 2018-12-09 PROCEDURE — 82962 GLUCOSE BLOOD TEST: CPT

## 2018-12-09 PROCEDURE — 0 GADOBENATE DIMEGLUMINE 529 MG/ML SOLUTION: Performed by: INTERNAL MEDICINE

## 2018-12-09 PROCEDURE — 82746 ASSAY OF FOLIC ACID SERUM: CPT | Performed by: PSYCHIATRY & NEUROLOGY

## 2018-12-09 PROCEDURE — 70544 MR ANGIOGRAPHY HEAD W/O DYE: CPT

## 2018-12-09 PROCEDURE — 70553 MRI BRAIN STEM W/O & W/DYE: CPT

## 2018-12-09 PROCEDURE — A9577 INJ MULTIHANCE: HCPCS | Performed by: INTERNAL MEDICINE

## 2018-12-09 PROCEDURE — 99221 1ST HOSP IP/OBS SF/LOW 40: CPT | Performed by: PSYCHIATRY & NEUROLOGY

## 2018-12-09 PROCEDURE — 82607 VITAMIN B-12: CPT | Performed by: PSYCHIATRY & NEUROLOGY

## 2018-12-09 PROCEDURE — 85027 COMPLETE CBC AUTOMATED: CPT | Performed by: INTERNAL MEDICINE

## 2018-12-09 PROCEDURE — 84443 ASSAY THYROID STIM HORMONE: CPT | Performed by: PSYCHIATRY & NEUROLOGY

## 2018-12-09 PROCEDURE — 83036 HEMOGLOBIN GLYCOSYLATED A1C: CPT | Performed by: INTERNAL MEDICINE

## 2018-12-09 RX ORDER — ASPIRIN 325 MG
325 TABLET ORAL DAILY
Status: DISCONTINUED | OUTPATIENT
Start: 2018-12-09 | End: 2018-12-09

## 2018-12-09 RX ORDER — ASPIRIN 300 MG/1
300 SUPPOSITORY RECTAL DAILY
Status: DISCONTINUED | OUTPATIENT
Start: 2018-12-09 | End: 2018-12-13

## 2018-12-09 RX ORDER — ASPIRIN 300 MG/1
300 SUPPOSITORY RECTAL DAILY
Status: DISCONTINUED | OUTPATIENT
Start: 2018-12-09 | End: 2018-12-09

## 2018-12-09 RX ORDER — ASPIRIN 325 MG
325 TABLET, DELAYED RELEASE (ENTERIC COATED) ORAL DAILY
Status: DISCONTINUED | OUTPATIENT
Start: 2018-12-09 | End: 2018-12-13

## 2018-12-09 RX ADMIN — SODIUM CHLORIDE 75 ML/HR: 9 INJECTION, SOLUTION INTRAVENOUS at 16:19

## 2018-12-09 RX ADMIN — CLOPIDOGREL 75 MG: 75 TABLET, FILM COATED ORAL at 09:10

## 2018-12-09 RX ADMIN — SODIUM CHLORIDE, PRESERVATIVE FREE 3 ML: 5 INJECTION INTRAVENOUS at 09:02

## 2018-12-09 RX ADMIN — GADOBENATE DIMEGLUMINE 16 ML: 529 INJECTION, SOLUTION INTRAVENOUS at 11:19

## 2018-12-09 RX ADMIN — ASPIRIN 325 MG: 325 TABLET, COATED ORAL at 12:10

## 2018-12-09 RX ADMIN — HYDROCHLOROTHIAZIDE 12.5 MG: 25 TABLET ORAL at 09:01

## 2018-12-09 NOTE — H&P
Internal medicine history and physical    INTERNAL MEDICINE   [facility]       Patient Identification:  Name: Ronnell Rizvi  Age: 68 y.o.  Sex: male  :  1950  MRN: [id number]                   Primary Care Physician: Chase Haque MD                                   Chief Complaint:  Sudden onset weakness of right arm and leg first noticed at 4:00 in the morning.  Last for about couple hours.    History of Present Illness:   Patient is a 68-year-old male with history of strokes twice in the past, history of hypertension and asthma and COPD/emphysema and obstructive sleep apnea as well as history of PFO was in his usual state of his health when he went to bed last night.  He woke up this morning feeling fine but when he attempted to printed put pants on himself his legs won't cooperate.  Subsequently he was unable to use his right arm and hand very well.  By the time he came to the emergency room his symptoms have improved.  He did have some issue driving to the accessed as his hands and would not cooperate.  History is limited difficult as patient had a hard time recalling all events in proper sequence and required multiple attempts at revising his history.  Isn't currently denies any chest pain shortness of breath, nausea or vomiting.  Patient's presentation was discussed with nephrology on call at [facility] and it was recommended that he would need further workup for this TIA-like presentation.  Patient was transferred to [facility] after the patient case was discussed accepted by the on-call neurologist for stroke team.  Hospitalist Service was asked to accept the patient in transfer.      Past Medical History:  Past Medical History:   Diagnosis Date   • Allergic rhinitis    • Anal fissure    • Aortic insufficiency     moderate, THIERRY 2017   • Asthma    • B12 deficiency    • Benign prostatic hypertrophy (BPH) with weak urinary stream 2016   • Chronic  bronchitis (CMS/HCC)    • Emphysema lung (CMS/HCC)    • Fatty liver    • GERD (gastroesophageal reflux disease)    • Hepatitis     thought to be Hepatitis A    • History of pneumonia     With left lower lobe atelectasis and scar tissue, being followed   • Obstructive sleep apnea syndrome 8/23/2017   • PFO (patent foramen ovale) 10/9/2018   • Pneumonia     LLL with scar tissue   • 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   • Spinal stenosis    • Stroke (CMS/HCC)     10/2017: left occipital/temporal     Past Surgical History:  Past Surgical History:   Procedure Laterality Date   • COLONOSCOPY     • HAND SURGERY Bilateral    • INGUINAL HERNIA REPAIR Left    • OTHER SURGICAL HISTORY      inguinal hernia repair for person over age 5   • TONSILLECTOMY        Home Meds:  Medications Prior to Admission   Medication Sig Dispense Refill Last Dose   • clopidogrel (PLAVIX) 75 MG tablet Take 1 tablet by mouth Daily. 30 tablet 1 12/7/2018 at Unknown time   • Cyanocobalamin (B-12) 1000 MCG/ML kit Inject 250 mcg as directed 1 (One) Time Per Week. Pt. Stated he takes this medication weekly 250 mcg, pt reports took it last friday 12/7/2018 at Unknown time   • hydrochlorothiazide (HYDRODIURIL) 12.5 MG tablet Take 1 tablet by mouth Daily. 90 tablet 2 12/7/2018 at Unknown time     Current Meds:     Current Facility-Administered Medications:   •  [START ON 12/9/2018] aspirin tablet 325 mg, 325 mg, Oral, Daily **OR** [START ON 12/9/2018] aspirin suppository 300 mg, 300 mg, Rectal, Daily, Brandy Stanford MD  •  atorvastatin (LIPITOR) tablet 80 mg, 80 mg, Oral, Nightly, Brandy Stanford MD  •  [START ON 12/9/2018] clopidogrel (PLAVIX) tablet 75 mg, 75 mg, Oral, Daily, Brandy Stanford MD  •  [START ON 12/9/2018] hydrochlorothiazide (HYDRODIURIL) tablet 12.5 mg, 12.5 mg, Oral, Daily, Brandy Stanford MD  •  ondansetron (ZOFRAN) injection 4 mg, 4 mg, Intravenous, Q6H PRN, Brandy Stanford MD  •  sodium chloride 0.9 % flush 3 mL, 3 mL, Intravenous, Q12H, Brandy Stanford  MD  •  sodium chloride 0.9 % flush 3-10 mL, 3-10 mL, Intravenous, PRN, Brandy Stanford MD  •  sodium chloride 0.9 % infusion, 75 mL/hr, Intravenous, Continuous, Brandy Stanford MD  Allergies:  Allergies   Allergen Reactions   • Aspirin Nausea Only   • Citrus      Blisters on mouth     • Combivent [Ipratropium-Albuterol] Other (See Comments)     Throat swelling   • Lactose Intolerance (Gi)      Social History:   Social History     Tobacco Use   • Smoking status: Former Smoker     Last attempt to quit: 1979     Years since quittin.8   • Smokeless tobacco: Never Used   • Tobacco comment: caffeine use: Drinks 4 glasses of Dt coke on avg.    Substance Use Topics   • Alcohol use: No      Family History:  Family History   Problem Relation Age of Onset   • Obesity Mother    • Clotting disorder Mother         Upper extremities   • Alcohol abuse Father    • Cancer Father 63        Esophagus and lung   • Other Father         cardiac disorder   • Heart disease Father    • Kidney disease Sister    • Kidney failure Sister    • Drug abuse Paternal Uncle    • Stroke Sister    • Throat cancer Sister           Review of Systems  See history of present illness and past medical history.  Constitutional: Negative for chills and fever.   HENT: Negative for ear pain and sore throat.    Eyes: Negative for discharge and redness.   Respiratory: Negative for cough, chest tightness, shortness of breath and wheezing.    Cardiovascular: Negative for chest pain.   Gastrointestinal: Negative for abdominal pain, diarrhea, nausea and vomiting.   Genitourinary: Negative for difficulty urinating and hematuria.   Musculoskeletal: Negative for back pain.   Skin: Negative for rash.   Neurological: Positive for weakness and headaches. Negative for dizziness, speech difficulty, light-headedness and numbness.   Psychiatric/Behavioral: Negative.  Negative for confusion.   Remainder of ROS is negative.      Vitals:   /82 (BP Location: Right arm,  "Patient Position: Sitting)   Pulse 58   Temp 97.8 °F (36.6 °C) (Oral)   Resp 16   Ht 184.2 cm (72.5\")   Wt 81.1 kg (178 lb 14.4 oz)   SpO2 96%   BMI 23.93 kg/m²   I/O: No intake or output data in the 24 hours ending 12/08/18 2051  Exam:  General Appearance:    Alert, cooperative, no distress, appears stated age   Head:    Normocephalic, without obvious abnormality, atraumatic   Eyes:    PERRL, conjunctiva/corneas clear, EOM's intact, both eyes   Ears:    Normal external ear canals, both ears   Nose:   Nares normal, septum midline, mucosa normal, no drainage    or sinus tenderness   Throat:   Lips, tongue, gums normal; oral mucosa pink and moist   Neck:   Supple, symmetrical, trachea midline, no adenopathy;     thyroid:  no enlargement/tenderness/nodules; no carotid    bruit or JVD   Back:     Symmetric, no curvature, ROM normal, no CVA tenderness   Lungs:     Clear to auscultation bilaterally, respirations unlabored   Chest Wall:    No tenderness or deformity    Heart:    Regular rate and rhythm, S1 and S2 normal, no murmur, rub   or gallop   Abdomen:     Soft, non-tender, bowel sounds active all four quadrants,     no masses, no hepatomegaly, no splenomegaly   Extremities:   Extremities normal, atraumatic, no cyanosis or edema   Pulses:   Pulses palpable in all extremities; symmetric all extremities   Skin:   Skin color normal, Skin is warm and dry,  no rashes or palpable lesions   Neurologic:   CNII-XII intact, motor strength grossly intact, sensation grossly intact to light touch, no focal deficits noted       Data Review:      I reviewed the patient's new clinical results.  Results from last 7 days   Lab Units  12/08/18   1423   WBC 10*3/mm3  6.18   HEMOGLOBIN g/dL  16.6   PLATELETS 10*3/mm3  254     Results from last 7 days   Lab Units  12/08/18   1423   SODIUM mmol/L  139   POTASSIUM mmol/L  3.7   CHLORIDE mmol/L  100   CO2 mmol/L  28.9   BUN mg/dL  13   CREATININE mg/dL  1.01   CALCIUM mg/dL  9.1 "   GLUCOSE mg/dL  90     REPORT of CT scan of the head and chest x-ray is not available in the computer but according to the ER physician's note no acute abnormality seen.  EKG showed normal sinus rhythm no acute changes.  Heart rate 71 bpm.        Assessment:  Active Hospital Problems    Diagnosis Date Noted   • **TIA (transient ischemic attack) [G45.9] 12/08/2018   • Cerebral aneurysm, nonruptured [I67.1] 10/18/2018   • PFO (patent foramen ovale) [Q21.1] 10/09/2018   • History of stroke [Z86.73] 10/24/2017   • Obstructive sleep apnea syndrome [G47.33] 08/23/2017   • Benign prostatic hypertrophy (BPH) with weak urinary stream [N40.1, R39.12] 11/01/2016   • Vitamin B12 deficiency [E53.8] 11/01/2016       Medical decision making:  Sudden onset of right leg weakness and right arm weakness with resolution in 23 hours in the context of previous stroke and negative CT scan of the head as reported-this presentation is concerning for TIA versus evolving CVA.  Plan is to admit the patient continue with aspirin and continue his statins, get MRI/MRA of head and neck vessels, neurology evaluation, echocardiogram and check fasting lipid profile.  Get OT and PT evaluation.  History of cerebral aneurysm-clinically does not have any specific symptoms and plan is to monitor.  We'll defer to neurology service if further evaluation by neurosurgery service as indicated during this hospitalization.  Obstructive sleep apnea-law him to use his CPAP if he has been using it in the past.  History of PFO - and previous history of strokes concern is that the patient is a candidate for modification of his anticoagulation regimen as he is exhibiting symptoms of TIA while being on Plavix.  Plan is to check echocardiogram and defer to neurology if further adjustment in his antiplatelet regimen need to be made.  Hypertension-continue his antihypertensive regimen which is hydrochlorothiazide and monitor for any electrolyte imbalance.    Brandy Stanford,  MD   12/8/2018  8:51 PM  Much of this encounter note is an electronic transcription/translation of spoken language to printed text. The electronic translation of spoken language may permit erroneous, or at times, nonsensical words or phrases to be inadvertently transcribed; Although I have reviewed the note for such errors, some may still exist

## 2018-12-09 NOTE — PLAN OF CARE
Problem: Patient Care Overview  Goal: Plan of Care Review  Outcome: Ongoing (interventions implemented as appropriate)   12/09/18 0150   Coping/Psychosocial   Plan of Care Reviewed With patient   Plan of Care Review   Progress no change   OTHER   Outcome Summary VSS. A&O x4. Denies pain. NIH=0. No defecits. MRI pending this am. Screening sheet in chart.

## 2018-12-09 NOTE — THERAPY DISCHARGE NOTE
Acute Care - Physical Therapy Initial Eval/Discharge  [facility]     Patient Name: Ronnell Rizvi  : 1950  MRN: [id number]  Today's Date: 2018   Onset of Illness/Injury or Date of Surgery: 18  Date of Referral to PT: 18  Referring Physician: syeda      Admit Date: 2018    Visit Dx:  No diagnosis found.  Patient Active Problem List   Diagnosis   • Anemia, unspecified   • Health care maintenance   • Vitamin B12 deficiency   • Chronic fatigue   • Benign prostatic hypertrophy (BPH) with weak urinary stream   • Chronic bronchitis (CMS/HCC)   • Obstructive sleep apnea syndrome   • History of stroke   • Sinoatrial block   • PFO (patent foramen ovale)   • Aortic insufficiency   • Cerebral aneurysm, nonruptured   • Cerebrovascular accident (CVA) (CMS/HCC)   • TIA (transient ischemic attack)     Past Medical History:   Diagnosis Date   • Allergic rhinitis    • Anal fissure    • Aortic insufficiency     moderate, THIERRY    • Asthma    • B12 deficiency    • Benign prostatic hypertrophy (BPH) with weak urinary stream 2016   • Chronic bronchitis (CMS/HCC)    • Emphysema lung (CMS/HCC)    • Fatty liver    • GERD (gastroesophageal reflux disease)    • Hepatitis     thought to be Hepatitis A    • History of pneumonia     With left lower lobe atelectasis and scar tissue, being followed   • Obstructive sleep apnea syndrome 2017   • PFO (patent foramen ovale) 10/9/2018   • Pneumonia     LLL with scar tissue   • 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   • Spinal stenosis    • Stroke (CMS/HCC)     10/2017: left occipital/temporal     Past Surgical History:   Procedure Laterality Date   • COLONOSCOPY     • HAND SURGERY Bilateral    • INGUINAL HERNIA REPAIR Left    • OTHER SURGICAL HISTORY      inguinal hernia repair for person over age 5   • TONSILLECTOMY            PT ASSESSMENT (last 12 hours)      Physical Therapy Evaluation     Row Name 18 1400          PT Evaluation Time/Intention    Subjective  Information  no complaints  -GJ     Document Type  discharge evaluation/summary  -GJ     Mode of Treatment  physical therapy  -GJ     Total Evaluation Minutes, Physical Therapy  20  -GJ     Patient Effort  excellent  -GJ     Symptoms Noted During/After Treatment  none  -GJ     Row Name 12/09/18 1400          General Information    Patient Profile Reviewed?  yes  -GJ     Onset of Illness/Injury or Date of Surgery  12/08/18  -GJ     Referring Physician  syeda  -GJ     Patient Observations  alert;cooperative;agree to therapy  -GJ     General Observations of Patient  pt in bed, no apparent distress  -GJ     Prior Level of Function  independent:;all household mobility;community mobility;gait;transfer  -GJ     Equipment Currently Used at Home  none  -GJ     Pertinent History of Current Functional Problem  Mr. Rizvi has h/o CVA, experienced weakness of RUE/RLE which prompted him to drive himself to hospital.  , PTA, independent with all mobility, lives with wife.   -GJ     Existing Precautions/Restrictions  no known precautions/restrictions  -GJ     Risks Reviewed  patient:;LOB;nausea/vomiting;dizziness;increased discomfort;change in vital signs;lines disloged;increased drainage  -GJ     Benefits Reviewed  patient:;improve function;increase independence;increase strength;increase balance;improve skin integrity;decrease pain;decrease risk of DVT;increase knowledge  -GJ     Barriers to Rehab  none identified  -GJ     Row Name 12/09/18 1400          Relationship/Environment    Lives With  spouse  -GJ     Row Name 12/09/18 1400          Resource/Environmental Concerns    Current Living Arrangements  home/apartment/condo  -GJ     Row Name 12/09/18 1400          Home Main Entrance    Number of Stairs, Main Entrance  two  -GJ     Stair Railings, Main Entrance  railings on both sides of stairs  -GJ     Row Name 12/09/18 1400          Cognitive Assessment/Intervention- PT/OT    Orientation Status (Cognition)  oriented x 4  -GJ      Follows Commands (Cognition)  WNL  -GJ     Row Name 12/09/18 1400          Bed Mobility Assessment/Treatment    Bed Mobility Assessment/Treatment  bed mobility (all) activities  -GJ     Sanders Level (Bed Mobility)  independent  -GJ     Row Name 12/09/18 1400          Transfer Assessment/Treatment    Transfer Assessment/Treatment  sit-stand transfer;stand-sit transfer  -GJ     Sit-Stand Sanders (Transfers)  independent  -GJ     Stand-Sit Sanders (Transfers)  independent  -GJ     Row Name 12/09/18 1400          Gait/Stairs Assessment/Training    Sanders Level (Gait)  independent  -GJ     Distance in Feet (Gait)  75  -GJ     Pattern (Gait)  step-through  -GJ     Comment (Gait/Stairs)  able to perform rhomberg, and tandem stance appropriately  -GJ     Row Name 12/09/18 1400          General ROM    GENERAL ROM COMMENTS  BUE/BLE AROM WFL  -GJ     Row Name 12/09/18 1400          MMT (Manual Muscle Testing)    General MMT Comments  BUE/BLE WFL  -GJ     Row Name 12/09/18 1400          Physical Therapy Clinical Impression    Date of Referral to PT  12/08/18  -GJ     Patient/Family Goals Statement (PT Clinical Impression)  go home  -GJ     Criteria for Skilled Interventions Met (PT Clinical Impression)  no;treatment indicated  -GJ     Care Plan Review (PT)  evaluation/treatment results reviewed;care plan/treatment goals reviewed  -GJ     Row Name 12/09/18 1400          Positioning and Restraints    Pre-Treatment Position  in bed  -GJ     Post Treatment Position  bed  -GJ     In Bed  notified nsg;supine;call light within reach;encouraged to call for assist  -GJ     Row Name 12/09/18 1400          Living Environment    Home Accessibility  stairs to enter home  -GJ       User Key  (r) = Recorded By, (t) = Taken By, (c) = Cosigned By    Initials Name Provider Type    Justin Dumont, PT Physical Therapist          Physical Therapy Education     Title: PT OT SLP Therapies (Done)     Topic: Physical  Therapy (Done)     Point: Mobility training (Done)     Learning Progress Summary           Patient Acceptance, TB,E,D, VU,DU,NR by  at 12/9/2018  2:38 PM                   Point: Home exercise program (Done)     Learning Progress Summary           Patient Acceptance, TB,E,D, VU,DU,NR by  at 12/9/2018  2:38 PM                   Point: Body mechanics (Done)     Learning Progress Summary           Patient Acceptance, TB,E,D, VU,DU,NR by  at 12/9/2018  2:38 PM                   Point: Precautions (Done)     Learning Progress Summary           Patient Acceptance, TB,E,D, VU,DU,NR by  at 12/9/2018  2:38 PM                               User Key     Initials Effective Dates Name Provider Type Discipline     03/07/18 -  Justin Richmond, PT Physical Therapist PT                PT Recommendation and Plan  Anticipated Discharge Disposition (PT): home  Therapy Frequency (PT Clinical Impression): evaluation only  Outcome Summary/Treatment Plan (PT)  Anticipated Discharge Disposition (PT): home    Outcome Measures     Row Name 12/09/18 1400             How much help from another person do you currently need...    Turning from your back to your side while in flat bed without using bedrails?  4  -GJ      Moving from lying on back to sitting on the side of a flat bed without bedrails?  4  -GJ      Moving to and from a bed to a chair (including a wheelchair)?  4  -GJ      Standing up from a chair using your arms (e.g., wheelchair, bedside chair)?  4  -GJ      Climbing 3-5 steps with a railing?  4  -GJ      To walk in hospital room?  4  -GJ      AM-PAC 6 Clicks Score  24  -GJ         Functional Assessment    Outcome Measure Options  AM-PAC 6 Clicks Basic Mobility (PT)  -        User Key  (r) = Recorded By, (t) = Taken By, (c) = Cosigned By    Initials Name Provider Type     Justin Richmond, PT Physical Therapist           Time Calculation:   PT Charges     Row Name 12/09/18 4241             Time Calculation    Start Time   1325  -GJ      Stop Time  1345  -GJ      Time Calculation (min)  20 min  -GJ      PT Received On  12/09/18  -GJ         Timed Charges    55066 - PT Therapeutic Activity Minutes  15  -GJ        User Key  (r) = Recorded By, (t) = Taken By, (c) = Cosigned By    Initials Name Provider Type    GJ Justin Richmond, PT Physical Therapist        Therapy Suggested Charges     Code   Minutes Charges    41588 (CPT®) Hc Pt Neuromusc Re Education Ea 15 Min      77067 (CPT®) Hc Pt Ther Proc Ea 15 Min      21203 (CPT®) Hc Gait Training Ea 15 Min      11602 (CPT®) Hc Pt Therapeutic Act Ea 15 Min 15 1    50557 (CPT®) Hc Pt Manual Therapy Ea 15 Min      32544 (CPT®) Hc Pt Iontophoresis Ea 15 Min      87239 (CPT®) Hc Pt Elec Stim Ea-Per 15 Min      72127 (CPT®) Hc Pt Ultrasound Ea 15 Min      04656 (CPT®) Hc Pt Self Care/Mgmt/Train Ea 15 Min      06889 (CPT®) Hc Pt Prosthetic (S) Train Initial Encounter, Each 15 Min      26909 (CPT®) Hc Pt Orthotic(S)/Prosthetic(S) Encounter, Each 15 Min      24116 (CPT®) Hc Orthotic(S) Mgmt/Train Initial Encounter, Each 15min      Total  15 1        Therapy Charges for Today     Code Description Service Date Service Provider Modifiers Qty    17244061335 HC PT THERAPEUTIC ACT EA 15 MIN 12/9/2018 Justin Richmond, PT GP 1    69764385182 HC PT EVAL MOD COMPLEXITY 1 12/9/2018 Justin Richmond, PT GP 1          PT G-Codes  Outcome Measure Options: AM-PAC 6 Clicks Basic Mobility (PT)  AM-PAC 6 Clicks Score: 24    PT Discharge Summary  Anticipated Discharge Disposition (PT): home  Reason for Discharge: At baseline function, Independent  Discharge Destination: Home    Justin Richmond, PT  12/9/2018

## 2018-12-09 NOTE — PROGRESS NOTES
" LOS: 0 days     Name: Ronnell Rizvi  Age: 68 y.o.  Sex: male  :  1950  MRN: [id number]         Primary Care Physician: Chase Haque MD    Subjective   Subjective  Feeling nearly back to his baseline.  Right-sided weakness has improved.  No new complaints today.    Objective   Vital Signs  Temp:  [97.6 °F (36.4 °C)-98 °F (36.7 °C)] 97.9 °F (36.6 °C)  Heart Rate:  [55-77] 72  Resp:  [16-20] 18  BP: (120-194)/() 139/71  Body mass index is 23.93 kg/m².    Objective:  General Appearance:  Comfortable and in no acute distress.    Vital signs: (most recent): Blood pressure 139/71, pulse 72, temperature 97.9 °F (36.6 °C), temperature source Oral, resp. rate 18, height 184.2 cm (72.5\"), weight 81.1 kg (178 lb 14.4 oz), SpO2 95 %.    Lungs:  Normal effort and normal respiratory rate.    Heart: Normal rate.  Regular rhythm.    Abdomen: Abdomen is soft.  Bowel sounds are normal.   There is no abdominal tenderness.     Extremities: There is no dependent edema or local swelling.    Neurological: Patient is alert and oriented to person, place and time.    Skin:  Warm and dry.              Results Review:       I reviewed the patient's new clinical results.    Results from last 7 days   Lab Units  18   0527  18   1423   WBC 10*3/mm3  5.16  6.18   HEMOGLOBIN g/dL  15.3  16.6   PLATELETS 10*3/mm3  208  254     Results from last 7 days   Lab Units  18   0527  18   1423   SODIUM mmol/L  141  139   POTASSIUM mmol/L  4.1  3.7   CHLORIDE mmol/L  105  100   CO2 mmol/L  27.7  28.9   BUN mg/dL  15  13   CREATININE mg/dL  1.02  1.01   CALCIUM mg/dL  9.0  9.1   GLUCOSE mg/dL  99  90                 Scheduled Meds:     aspirin  mg Oral Daily   Or      aspirin 300 mg Rectal Daily   atorvastatin 80 mg Oral Nightly   clopidogrel 75 mg Oral Daily   hydrochlorothiazide 12.5 mg Oral Daily   sodium chloride 3 mL Intravenous Q12H     PRN Meds:   ondansetron  •  sodium chloride  Continuous " Infusions:    sodium chloride 75 mL/hr Last Rate: 75 mL/hr (12/09/18 1211)       Assessment/Plan   Active Hospital Problems    Diagnosis Date Noted   • **TIA (transient ischemic attack) [G45.9] 12/08/2018   • Cerebral aneurysm, nonruptured [I67.1] 10/18/2018   • PFO (patent foramen ovale) [Q21.1] 10/09/2018   • History of stroke [Z86.73] 10/24/2017   • Obstructive sleep apnea syndrome [G47.33] 08/23/2017   • Benign prostatic hypertrophy (BPH) with weak urinary stream [N40.1, R39.12] 11/01/2016   • Vitamin B12 deficiency [E53.8] 11/01/2016      Resolved Hospital Problems   No resolved problems to display.       Assessment & Plan    - Await MRI/MRA and echocardiogram results  - Continue aspirin and Plavix, he does not wish to take atorvastatin given adverse effects previously with this medication  - Follow-up additional input from neurology.  May need to consider closure of the PFO  - Blood pressure stable.  Continue present management.    Kieran Valadez MD  [facility]  12/09/18  1:08 PM

## 2018-12-09 NOTE — PROGRESS NOTES
Discharge Planning Assessment  [facility]     Patient Name: Ronnell Rizvi  MRN: [id number]  Today's Date: 12/9/2018    Admit Date: 12/8/2018    Discharge Needs Assessment     Row Name 12/09/18 1709       Living Environment    Lives With  spouse    Name(s) of Who Lives With Patient  spouse Joey    Current Living Arrangements  home/apartment/condo    Primary Care Provided by  self    Provides Primary Care For  no one    Family Caregiver if Needed  none    Quality of Family Relationships  helpful;involved;supportive    Able to Return to Prior Arrangements  yes       Resource/Environmental Concerns    Resource/Environmental Concerns  none    Transportation Concerns  car, none       Transition Planning    Patient/Family Anticipates Transition to  home with family    Patient/Family Anticipated Services at Transition  none    Transportation Anticipated  family or friend will provide       Discharge Needs Assessment    Readmission Within the Last 30 Days  no previous admission in last 30 days    Concerns to be Addressed  denies needs/concerns at this time    Equipment Currently Used at Home  other (see comments) states he occasionally uses a BP monitor    Anticipated Changes Related to Illness  none    Equipment Needed After Discharge  none    Outpatient/Agency/Support Group Needs  -- n/a    Discharge Facility/Level of Care Needs  -- n/a    Offered/Gave Vendor List  no    Current Discharge Risk  -- denies current DC risk        Discharge Plan     Row Name 12/09/18 1712       Plan    Plan  Home via private auto with no anticipated needs    Patient/Family in Agreement with Plan  yes    Plan Comments  Introduced self/explained role of CCP. Face sheet data confirmed. CMS KUMARI letter signed. Pt states he is IADLs and lives at home with his spouse. States he has a BP monitor at home he uses occasionally but no other DME. States no hx of HH or SNF. Plans for his spouse to drive him home at DC and anticipates no needs. CCP  to follow.................JW        Destination      No service coordination in this encounter.      Durable Medical Equipment      No service coordination in this encounter.      Dialysis/Infusion      No service coordination in this encounter.      Home Medical Care      No service coordination in this encounter.      Community Resources      No service coordination in this encounter.          Demographic Summary     Row Name 12/09/18 1705       General Information    Admission Type  observation    Arrived From  home    Required Notices Provided  Observation Status Notice    Referral Source  admission list;physician    Reason for Consult  discharge planning    Preferred Language  English     Used During This Interaction  no       Contact Information    Permission Granted to Share Info With  ;family/designee        Functional Status     Row Name 12/09/18 1700       Functional Status    Usual Activity Tolerance  good    Current Activity Tolerance  fair       Functional Status, IADL    Medications  independent    Meal Preparation  independent    Housekeeping  independent    Laundry  independent    Shopping  independent       Mental Status    General Appearance WDL  WDL        Psychosocial    No documentation.       Abuse/Neglect    No documentation.       Legal    No documentation.       Substance Abuse    No documentation.       Patient Forms    No documentation.           Ivy Davis RN

## 2018-12-09 NOTE — PLAN OF CARE
Problem: Patient Care Overview  Goal: Plan of Care Review  Mr. Rizvi is a 67 y/o male, PTA independent with all mobility, lives with wife.  Today, he is independent with all functional mobility, demonstrates appropritae RHomberg/tandem stance balance.  He is at base line level and not an appropriate candidate for skilled physical therapy. Pt. Will be safe to return home.

## 2018-12-09 NOTE — PLAN OF CARE
Problem: Patient Care Overview  Goal: Plan of Care Review  Outcome: Ongoing (interventions implemented as appropriate)   12/09/18 0719   Coping/Psychosocial   Plan of Care Reviewed With patient   Plan of Care Review   Progress improving   OTHER   Outcome Summary NIH=0; VSS; Ambulating well SBA. Acute/possible sub-acute 9mm L infarct left corona radiata. Called to Dr. Elder and Dr. Valadez. Cardiology consulted (patient already sees Dr. Sawant. Continue to monitor and progress towards goals as tolerated.

## 2018-12-09 NOTE — CONSULTS
Neurology Note    Patient:  Ronnell Rizvi    YOB: 1950    REFERRING PHYSICIAN:  Brandy Stanford MD    CHIEF COMPLAINT:    Right sided weakness    HISTORY OF PRESENT ILLNESS:   The patient is a 68 y.o. male with h/o left occipitoparietal stroke last October, found to have a PFO, scheduled for closure later this month, on Plavix, admitted for observation in transfer from [place] yday with h/o episode of right sided weakness lasting 2-3 hours starting around 4 am yday. He was having difficulty putting on his pants and using his right hand to open a jar and driving. He is on Plavix. Reported poor tolerance of aspirin and Plavix noted.    Past Medical History:  Past Medical History:   Diagnosis Date   • Allergic rhinitis    • Anal fissure    • Aortic insufficiency     moderate, THIERRY 2017   • Asthma    • B12 deficiency    • Benign prostatic hypertrophy (BPH) with weak urinary stream 11/1/2016   • Chronic bronchitis (CMS/HCC)    • Emphysema lung (CMS/HCC)    • Fatty liver    • GERD (gastroesophageal reflux disease)    • Hepatitis     thought to be Hepatitis A    • History of pneumonia     With left lower lobe atelectasis and scar tissue, being followed   • Obstructive sleep apnea syndrome 8/23/2017   • PFO (patent foramen ovale) 10/9/2018   • Pneumonia     LLL with scar tissue   • 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   • Spinal stenosis    • Stroke (CMS/HCC)     10/2017: left occipital/temporal       Past Surgical History:  Past Surgical History:   Procedure Laterality Date   • COLONOSCOPY     • HAND SURGERY Bilateral    • INGUINAL HERNIA REPAIR Left    • OTHER SURGICAL HISTORY      inguinal hernia repair for person over age 5   • TONSILLECTOMY         Social History:   Social History     Socioeconomic History   • Marital status:      Spouse name: Joey   • Number of children: Not on file   • Years of education: Not on file   • Highest education level: Not on file   Occupational History   • Occupation:       Employer: RETIRED   Tobacco Use   • Smoking status: Former Smoker     Last attempt to quit: 1979     Years since quittin.8   • Smokeless tobacco: Never Used   • Tobacco comment: caffeine use: Drinks 4 glasses of Dt coke on avg.    Substance and Sexual Activity   • Alcohol use: No   • Drug use: No   • Sexual activity: Defer        Family History:   Family History   Problem Relation Age of Onset   • Obesity Mother    • Clotting disorder Mother         Upper extremities   • Alcohol abuse Father    • Cancer Father 63        Esophagus and lung   • Other Father         cardiac disorder   • Heart disease Father    • Kidney disease Sister    • Kidney failure Sister    • Drug abuse Paternal Uncle    • Stroke Sister    • Throat cancer Sister        Medications Prior to Admission:    Prior to Admission medications    Medication Sig Start Date End Date Taking? Authorizing Provider   clopidogrel (PLAVIX) 75 MG tablet Take 1 tablet by mouth Daily. 10/13/17   Bhavani Sena APRN   Cyanocobalamin (B-12) 1000 MCG/ML kit Inject 250 mcg as directed 1 (One) Time Per Week. Pt. Stated he takes this medication weekly 250 mcg, pt reports took it last friday    Provider, MD Sariah   hydrochlorothiazide (HYDRODIURIL) 12.5 MG tablet Take 1 tablet by mouth Daily. 10/9/18   Christopher Sawant MD       Allergies:  Aspirin; Citrus; Combivent [ipratropium-albuterol]; and Lactose intolerance (gi)      Review of system  Review of Systems   Neurological: Positive for weakness.   All other systems reviewed and are negative.      Vitals:    18 0901   BP: 139/71   Pulse: 72   Resp: 18   Temp: 97.9 °F (36.6 °C)   SpO2: 95%       Physical exam  Physical Exam   Constitutional: He is oriented to person, place, and time. He appears well-developed and well-nourished.   Eyes: EOM are normal. Pupils are equal, round, and reactive to light.   Cardiovascular: Normal rate and regular rhythm.   Pulmonary/Chest: Effort normal.    Neurological: He is alert and oriented to person, place, and time. He has normal strength and normal reflexes. He displays normal reflexes. No cranial nerve deficit or sensory deficit. He exhibits normal muscle tone. Coordination normal. He displays no Babinski's sign on the right side. He displays no Babinski's sign on the left side.   Psychiatric: He has a normal mood and affect. His behavior is normal. Thought content normal.         Lab Results   Component Value Date    WBC 5.16 12/09/2018    HGB 15.3 12/09/2018    HCT 47.2 12/09/2018    MCV 97.3 (H) 12/09/2018     12/09/2018     Lab Results   Component Value Date    GLUCOSE 99 12/09/2018    BUN 15 12/09/2018    CREATININE 1.02 12/09/2018    EGFRIFNONA 72 12/09/2018    EGFRIFAFRI 76 11/01/2016    BCR 14.7 12/09/2018    CO2 27.7 12/09/2018    CALCIUM 9.0 12/09/2018    PROTENTOTREF 6.6 02/20/2018    ALBUMIN 3.70 12/09/2018    LABIL2 1.3 02/20/2018    AST 19 12/09/2018    ALT 25 12/09/2018         Radiological Studies:  Ct Head Without Contrast    Result Date: 12/9/2018  NONCONTRAST CT HEAD  HISTORY: Dizziness and right-sided weakness since last night. HISTORY of stroke and atrial fibrillation.  TECHNIQUE:  Noncontrast CT head. Radiation dose reduction techniques included automated exposure control or exposure modulation based on body size. Radiation audit for CT and nuclear cardiology exams in the last 12 months: 3.  COMPARISON 03/12/2018.  FINDINGS:  There is generalized cerebral atrophy. Sulci and ventricles are otherwise unremarkable. No midline shift. No evidence of acute intracranial hemorrhage. There is no mass mass effect or edema to suggest acute infarct. Sequela of chronic infarct on the left posteriorly at the level of the parieto-occipital lobe unchanged. Periventricular white matter changes likely reflecting sequela of chronic ischemic small vessel disease. Globes are intact. Bones are intact. Sinuses are clear.       1. No clearly acute  intracranial process. No evidence of acute intracranial hemorrhage. 2. Imaging findings most characteristic of sequela of chronic ischemic small vessel disease. Chronic infarct in the left parieto-occipital region. 3. Preliminary report provided by Dr. Correa at approximately 1502 hours 12/08/2018.  This report was finalized on 12/9/2018 8:05 AM by Dr. Luiz Orellana MD.      Mri Angiogram Head Without Contrast    Result Date: 12/9/2018  MRI OF THE BRAIN WITH AND WITHOUT CONTRAST, MR ANGIOGRAPHY OF THE NECK WITH AND WITHOUT CONTRAST, AND MR ANGIOGRAPHY OF THE HEAD WITHOUT CONTRAST 12/09/2018.  HISTORY: Dizziness. Right-sided weakness.  TECHNIQUE: Multiple pre and postcontrast sagittal, axial and coronal images were obtained through the brain.  FINDINGS: The diffusion-weighted images show a small focus of bright signal intensity in the left corona radiata measuring 9 mm in size. This is consistent with an acute to subacute infarct. FLAIR images show scattered foci of bright signal intensity in the bilateral cerebral white matter consistent with moderate small vessel white matter ischemic disease. There is a moderate size area of T1 low signal and T2 bright signal in the left occipital lobe consistent with an old infarction. This is also seen on the outside MRI of the brain dated 10/11/2017.  No intracranial hemorrhage is seen. There is mild diffuse atrophy.  The craniocervical junction and sella appear normal.  No abnormal enhancement is seen following gadolinium.  3-D time-of-flight MR angiography of the neck was performed with and without contrast. NASCET criteria was used. There is normal flow signal in the bilateral common carotid and bilateral internal and external carotid arteries. No significant carotid stenosis is seen. There is vascular flow in both vertebral arteries.  3-D time-of-flight MR angiography of the head was performed. The distal bilateral vertebral arteries and the basilar artery and its branches  appear patent. There is some narrowing of the right posterior cerebral artery approximately 2 cm after its origin best seen on axial series 507 image 1. There is a hypoplastic A1 segment of the right anterior cerebral artery.  No definite aneurysm is seen.      1. A 9 mm acute or subacute infarct in the left corona radiata. 2. Old infarct in the left occipital lobe. 3. Moderate changes of bilateral small vessel white matter ischemic disease. 4. Hypoplastic A1 segment of the right anterior cerebral artery and right posterior cerebral artery narrowing approximately 2 cm after its origin. 5. No aneurysm is seen. 6. Findings were called to the [address] [place] [address]     station.        Mri Angiogram Neck With & Without Contrast    Result Date: 12/9/2018  MRI OF THE BRAIN WITH AND WITHOUT CONTRAST, MR ANGIOGRAPHY OF THE NECK WITH AND WITHOUT CONTRAST, AND MR ANGIOGRAPHY OF THE HEAD WITHOUT CONTRAST 12/09/2018.  HISTORY: Dizziness. Right-sided weakness.  TECHNIQUE: Multiple pre and postcontrast sagittal, axial and coronal images were obtained through the brain.  FINDINGS: The diffusion-weighted images show a small focus of bright signal intensity in the left corona radiata measuring 9 mm in size. This is consistent with an acute to subacute infarct. FLAIR images show scattered foci of bright signal intensity in the bilateral cerebral white matter consistent with moderate small vessel white matter ischemic disease. There is a moderate size area of T1 low signal and T2 bright signal in the left occipital lobe consistent with an old infarction. This is also seen on the outside MRI of the brain dated 10/11/2017.  No intracranial hemorrhage is seen. There is mild diffuse atrophy.  The craniocervical junction and sella appear normal.  No abnormal enhancement is seen following gadolinium.  3-D time-of-flight MR angiography of the neck was performed with and without contrast. NASCET criteria was used. There is normal flow signal in the  bilateral common carotid and bilateral internal and external carotid arteries. No significant carotid stenosis is seen. There is vascular flow in both vertebral arteries.  3-D time-of-flight MR angiography of the head was performed. The distal bilateral vertebral arteries and the basilar artery and its branches appear patent. There is some narrowing of the right posterior cerebral artery approximately 2 cm after its origin best seen on axial series 507 image 1. There is a hypoplastic A1 segment of the right anterior cerebral artery.  No definite aneurysm is seen.      1. A 9 mm acute or subacute infarct in the left corona radiata. 2. Old infarct in the left occipital lobe. 3. Moderate changes of bilateral small vessel white matter ischemic disease. 4. Hypoplastic A1 segment of the right anterior cerebral artery and right posterior cerebral artery narrowing approximately 2 cm after its origin. 5. No aneurysm is seen. 6. Findings were called to the [address] [place] [address]     station.        Mri Brain With & Without Contrast    Result Date: 12/9/2018  MRI OF THE BRAIN WITH AND WITHOUT CONTRAST, MR ANGIOGRAPHY OF THE NECK WITH AND WITHOUT CONTRAST, AND MR ANGIOGRAPHY OF THE HEAD WITHOUT CONTRAST 12/09/2018.  HISTORY: Dizziness. Right-sided weakness.  TECHNIQUE: Multiple pre and postcontrast sagittal, axial and coronal images were obtained through the brain.  FINDINGS: The diffusion-weighted images show a small focus of bright signal intensity in the left corona radiata measuring 9 mm in size. This is consistent with an acute to subacute infarct. FLAIR images show scattered foci of bright signal intensity in the bilateral cerebral white matter consistent with moderate small vessel white matter ischemic disease. There is a moderate size area of T1 low signal and T2 bright signal in the left occipital lobe consistent with an old infarction. This is also seen on the outside MRI of the brain dated 10/11/2017.  No intracranial hemorrhage  is seen. There is mild diffuse atrophy.  The craniocervical junction and sella appear normal.  No abnormal enhancement is seen following gadolinium.  3-D time-of-flight MR angiography of the neck was performed with and without contrast. NASCET criteria was used. There is normal flow signal in the bilateral common carotid and bilateral internal and external carotid arteries. No significant carotid stenosis is seen. There is vascular flow in both vertebral arteries.  3-D time-of-flight MR angiography of the head was performed. The distal bilateral vertebral arteries and the basilar artery and its branches appear patent. There is some narrowing of the right posterior cerebral artery approximately 2 cm after its origin best seen on axial series 507 image 1. There is a hypoplastic A1 segment of the right anterior cerebral artery.  No definite aneurysm is seen.      1. A 9 mm acute or subacute infarct in the left corona radiata. 2. Old infarct in the left occipital lobe. 3. Moderate changes of bilateral small vessel white matter ischemic disease. 4. Hypoplastic A1 segment of the right anterior cerebral artery and right posterior cerebral artery narrowing approximately 2 cm after its origin. 5. No aneurysm is seen. 6. Findings were called to the [facility]     station.        Xr Chest 1 View    Result Date: 12/9/2018  FRONTAL CHEST.     HISTORY: Dizziness and right-sided weakness since last night. HISTORY of stroke and atrial fibrillation.  TECHNIQUE: Frontal chest. COMPARISON 03/12/2018.  FINDINGS: Cardiac silhouette is within normal limits. The vascularity is unremarkable. There is old healed granulomatous disease. Mild chronic appearing volume loss on the left and mild elevation of the right hemidiaphragm. No effusion pneumothorax or dense consolidation. Incidental loop recorder on the left.      1. Chronic lung changes. No definite superimposed active disease or significant change.  This report was finalized on  12/9/2018 8:59 AM by Dr. Luiz Orellana MD.          During this visit the following were done:  Labs Reviewed [x]    Labs Ordered []    Radiology Reports Reviewed []    Radiology Ordered []    EKG, echo, and/or stress test reviewed [x]    EEG results reviewed  []    EEG reviewed and interpreted per myself   []    Discussed case with neurointerventionalist or neuroradiologist [x]    Referring Provider Records Reviewed []    ER Records Reviewed []    Hospital Records Reviewed []    History Obtained From Family []    Radiological images view and Interpreted per myself [x]    Case Discussed with referring provider []     Decision to obtain and request outside records  []        Assessment and Plan     Subacute left hemispheric stroke. Suspect embolism given past h/o prior cortical left parietoocipital stroke and PFO.           - agree with adding ECASA 81 mg.      - Check P2Y12.      - Consider cardiology consult for PFO closure this admission.      - observation on telemetry.    Thanks,        Electronically signed by Siva Elder MD on 12/9/2018 at 9:31 AM

## 2018-12-10 ENCOUNTER — APPOINTMENT (OUTPATIENT)
Dept: CARDIOLOGY | Facility: HOSPITAL | Age: 68
End: 2018-12-10
Attending: INTERNAL MEDICINE

## 2018-12-10 PROBLEM — I63.9 CEREBROVASCULAR ACCIDENT (CVA) (HCC): Status: ACTIVE | Noted: 2018-11-29

## 2018-12-10 LAB
ANION GAP SERPL CALCULATED.3IONS-SCNC: 9 MMOL/L
BASOPHILS # BLD AUTO: 0.01 10*3/MM3 (ref 0–0.2)
BASOPHILS NFR BLD AUTO: 0.2 % (ref 0–1.5)
BH CV ECHO MEAS - ACS: 1.7 CM
BH CV ECHO MEAS - AI DEC SLOPE: 155 CM/SEC^2
BH CV ECHO MEAS - AI MAX PG: 62.1 MMHG
BH CV ECHO MEAS - AI MAX VEL: 394 CM/SEC
BH CV ECHO MEAS - AI P1/2T: 744.5 MSEC
BH CV ECHO MEAS - AO MEAN PG (FULL): 1 MMHG
BH CV ECHO MEAS - AO MEAN PG: 3 MMHG
BH CV ECHO MEAS - AO ROOT AREA (BSA CORRECTED): 1.6
BH CV ECHO MEAS - AO ROOT AREA: 8 CM^2
BH CV ECHO MEAS - AO ROOT DIAM: 3.2 CM
BH CV ECHO MEAS - AO V2 MAX: 106 CM/SEC
BH CV ECHO MEAS - AO V2 MEAN: 81.8 CM/SEC
BH CV ECHO MEAS - AO V2 VTI: 29.3 CM
BH CV ECHO MEAS - AVA(I,A): 2.6 CM^2
BH CV ECHO MEAS - AVA(I,D): 2.6 CM^2
BH CV ECHO MEAS - BSA(HAYCOCK): 2 M^2
BH CV ECHO MEAS - BSA: 2 M^2
BH CV ECHO MEAS - BZI_BMI: 24.1 KILOGRAMS/M^2
BH CV ECHO MEAS - BZI_METRIC_HEIGHT: 182.9 CM
BH CV ECHO MEAS - BZI_METRIC_WEIGHT: 80.7 KG
BH CV ECHO MEAS - EDV(CUBED): 117.6 ML
BH CV ECHO MEAS - EDV(MOD-SP2): 54 ML
BH CV ECHO MEAS - EDV(MOD-SP4): 114 ML
BH CV ECHO MEAS - EDV(TEICH): 112.8 ML
BH CV ECHO MEAS - EF(CUBED): 74.7 %
BH CV ECHO MEAS - EF(MOD-BP): 61 %
BH CV ECHO MEAS - EF(MOD-SP2): 61.1 %
BH CV ECHO MEAS - EF(MOD-SP4): 66.7 %
BH CV ECHO MEAS - EF(TEICH): 66.4 %
BH CV ECHO MEAS - ESV(CUBED): 29.8 ML
BH CV ECHO MEAS - ESV(MOD-SP2): 21 ML
BH CV ECHO MEAS - ESV(MOD-SP4): 38 ML
BH CV ECHO MEAS - ESV(TEICH): 37.9 ML
BH CV ECHO MEAS - FS: 36.7 %
BH CV ECHO MEAS - IVS/LVPW: 1
BH CV ECHO MEAS - IVSD: 0.9 CM
BH CV ECHO MEAS - LAT PEAK E' VEL: 4 CM/SEC
BH CV ECHO MEAS - LV DIASTOLIC VOL/BSA (35-75): 56.2 ML/M^2
BH CV ECHO MEAS - LV MASS(C)D: 153 GRAMS
BH CV ECHO MEAS - LV MASS(C)DI: 75.4 GRAMS/M^2
BH CV ECHO MEAS - LV MEAN PG: 2 MMHG
BH CV ECHO MEAS - LV SYSTOLIC VOL/BSA (12-30): 18.7 ML/M^2
BH CV ECHO MEAS - LV V1 MAX: 93 CM/SEC
BH CV ECHO MEAS - LV V1 MEAN: 67.1 CM/SEC
BH CV ECHO MEAS - LV V1 VTI: 24 CM
BH CV ECHO MEAS - LVIDD: 4.9 CM
BH CV ECHO MEAS - LVIDS: 3.1 CM
BH CV ECHO MEAS - LVLD AP2: 7.5 CM
BH CV ECHO MEAS - LVLD AP4: 8.8 CM
BH CV ECHO MEAS - LVLS AP2: 5.7 CM
BH CV ECHO MEAS - LVLS AP4: 7.8 CM
BH CV ECHO MEAS - LVOT AREA (M): 3.1 CM^2
BH CV ECHO MEAS - LVOT AREA: 3.1 CM^2
BH CV ECHO MEAS - LVOT DIAM: 2 CM
BH CV ECHO MEAS - LVPWD: 0.9 CM
BH CV ECHO MEAS - MED PEAK E' VEL: 4 CM/SEC
BH CV ECHO MEAS - MR MAX PG: 27.9 MMHG
BH CV ECHO MEAS - MR MAX VEL: 264 CM/SEC
BH CV ECHO MEAS - MV A DUR: 0.15 SEC
BH CV ECHO MEAS - MV A MAX VEL: 103 CM/SEC
BH CV ECHO MEAS - MV DEC SLOPE: 236.5 CM/SEC^2
BH CV ECHO MEAS - MV DEC TIME: 0.28 SEC
BH CV ECHO MEAS - MV E MAX VEL: 58.1 CM/SEC
BH CV ECHO MEAS - MV E/A: 0.56
BH CV ECHO MEAS - MV MEAN PG: 2 MMHG
BH CV ECHO MEAS - MV P1/2T MAX VEL: 77.4 CM/SEC
BH CV ECHO MEAS - MV P1/2T: 95.8 MSEC
BH CV ECHO MEAS - MV V2 MEAN: 75.6 CM/SEC
BH CV ECHO MEAS - MV V2 VTI: 31.9 CM
BH CV ECHO MEAS - MVA P1/2T LCG: 2.8 CM^2
BH CV ECHO MEAS - MVA(P1/2T): 2.3 CM^2
BH CV ECHO MEAS - MVA(VTI): 2.4 CM^2
BH CV ECHO MEAS - PA ACC SLOPE: 1319 CM/SEC^2
BH CV ECHO MEAS - PA ACC TIME: 0.07 SEC
BH CV ECHO MEAS - PA MAX PG: 3 MMHG
BH CV ECHO MEAS - PA PR(ACCEL): 47.5 MMHG
BH CV ECHO MEAS - PA V2 MAX: 86.9 CM/SEC
BH CV ECHO MEAS - QP/QS: 0.56
BH CV ECHO MEAS - RV MEAN PG: 1 MMHG
BH CV ECHO MEAS - RV V1 MEAN: 35.1 CM/SEC
BH CV ECHO MEAS - RV V1 VTI: 12.1 CM
BH CV ECHO MEAS - RVOT AREA: 3.5 CM^2
BH CV ECHO MEAS - RVOT DIAM: 2.1 CM
BH CV ECHO MEAS - SI(AO): 116.2 ML/M^2
BH CV ECHO MEAS - SI(CUBED): 43.3 ML/M^2
BH CV ECHO MEAS - SI(LVOT): 37.2 ML/M^2
BH CV ECHO MEAS - SI(MOD-SP2): 16.3 ML/M^2
BH CV ECHO MEAS - SI(MOD-SP4): 37.5 ML/M^2
BH CV ECHO MEAS - SI(TEICH): 36.9 ML/M^2
BH CV ECHO MEAS - SV(AO): 235.6 ML
BH CV ECHO MEAS - SV(CUBED): 87.9 ML
BH CV ECHO MEAS - SV(LVOT): 75.4 ML
BH CV ECHO MEAS - SV(MOD-SP2): 33 ML
BH CV ECHO MEAS - SV(MOD-SP4): 76 ML
BH CV ECHO MEAS - SV(RVOT): 41.9 ML
BH CV ECHO MEAS - SV(TEICH): 74.9 ML
BH CV ECHO MEAS - TAPSE (>1.6): 1.8 CM2
BH CV ECHO MEAS - TR MAX VEL: 159 CM/SEC
BH CV ECHO MEASUREMENTS AVERAGE E/E' RATIO: 14.53
BH CV VAS BP RIGHT ARM: NORMAL MMHG
BH CV XLRA - RV BASE: 3 CM
BH CV XLRA - TDI S': 10 CM/SEC
BUN BLD-MCNC: 16 MG/DL (ref 8–23)
BUN/CREAT SERPL: 16.7 (ref 7–25)
CALCIUM SPEC-SCNC: 8.6 MG/DL (ref 8.6–10.5)
CHLORIDE SERPL-SCNC: 105 MMOL/L (ref 98–107)
CO2 SERPL-SCNC: 26 MMOL/L (ref 22–29)
CREAT BLD-MCNC: 0.96 MG/DL (ref 0.76–1.27)
DEPRECATED RDW RBC AUTO: 44.2 FL (ref 37–54)
EOSINOPHIL # BLD AUTO: 0.1 10*3/MM3 (ref 0–0.7)
EOSINOPHIL NFR BLD AUTO: 1.7 % (ref 0.3–6.2)
ERYTHROCYTE [DISTWIDTH] IN BLOOD BY AUTOMATED COUNT: 12.6 % (ref 11.5–14.5)
GFR SERPL CREATININE-BSD FRML MDRD: 78 ML/MIN/1.73
GLUCOSE BLD-MCNC: 95 MG/DL (ref 65–99)
HCT VFR BLD AUTO: 47 % (ref 40.4–52.2)
HGB BLD-MCNC: 15.3 G/DL (ref 13.7–17.6)
IMM GRANULOCYTES # BLD: 0.02 10*3/MM3 (ref 0–0.03)
IMM GRANULOCYTES NFR BLD: 0.3 % (ref 0–0.5)
LEFT ATRIUM VOLUME INDEX: 23 ML/M2
LYMPHOCYTES # BLD AUTO: 1.5 10*3/MM3 (ref 0.9–4.8)
LYMPHOCYTES NFR BLD AUTO: 25 % (ref 19.6–45.3)
MAXIMAL PREDICTED HEART RATE: 151 BPM
MCH RBC QN AUTO: 31.4 PG (ref 27–32.7)
MCHC RBC AUTO-ENTMCNC: 32.6 G/DL (ref 32.6–36.4)
MCV RBC AUTO: 96.3 FL (ref 79.8–96.2)
MONOCYTES # BLD AUTO: 0.63 10*3/MM3 (ref 0.2–1.2)
MONOCYTES NFR BLD AUTO: 10.5 % (ref 5–12)
NEUTROPHILS # BLD AUTO: 3.74 10*3/MM3 (ref 1.9–8.1)
NEUTROPHILS NFR BLD AUTO: 62.3 % (ref 42.7–76)
PLATELET # BLD AUTO: 209 10*3/MM3 (ref 140–500)
PMV BLD AUTO: 10.1 FL (ref 6–12)
POTASSIUM BLD-SCNC: 3.9 MMOL/L (ref 3.5–5.2)
RBC # BLD AUTO: 4.88 10*6/MM3 (ref 4.6–6)
SODIUM BLD-SCNC: 140 MMOL/L (ref 136–145)
STRESS TARGET HR: 128 BPM
WBC NRBC COR # BLD: 6 10*3/MM3 (ref 4.5–10.7)

## 2018-12-10 PROCEDURE — 85025 COMPLETE CBC W/AUTO DIFF WBC: CPT | Performed by: INTERNAL MEDICINE

## 2018-12-10 PROCEDURE — 97165 OT EVAL LOW COMPLEX 30 MIN: CPT

## 2018-12-10 PROCEDURE — 80048 BASIC METABOLIC PNL TOTAL CA: CPT | Performed by: INTERNAL MEDICINE

## 2018-12-10 PROCEDURE — 97535 SELF CARE MNGMENT TRAINING: CPT

## 2018-12-10 PROCEDURE — 99214 OFFICE O/P EST MOD 30 MIN: CPT | Performed by: INTERNAL MEDICINE

## 2018-12-10 PROCEDURE — G8987 SELF CARE CURRENT STATUS: HCPCS

## 2018-12-10 PROCEDURE — G0378 HOSPITAL OBSERVATION PER HR: HCPCS

## 2018-12-10 PROCEDURE — G8998 SWALLOW D/C STATUS: HCPCS

## 2018-12-10 PROCEDURE — 93306 TTE W/DOPPLER COMPLETE: CPT

## 2018-12-10 PROCEDURE — 92610 EVALUATE SWALLOWING FUNCTION: CPT

## 2018-12-10 PROCEDURE — 25010000002 PERFLUTREN (DEFINITY) 8.476 MG IN SODIUM CHLORIDE 0.9 % 10 ML INJECTION: Performed by: INTERNAL MEDICINE

## 2018-12-10 PROCEDURE — G8997 SWALLOW GOAL STATUS: HCPCS

## 2018-12-10 PROCEDURE — G8996 SWALLOW CURRENT STATUS: HCPCS

## 2018-12-10 PROCEDURE — 99214 OFFICE O/P EST MOD 30 MIN: CPT | Performed by: NURSE PRACTITIONER

## 2018-12-10 PROCEDURE — G8988 SELF CARE GOAL STATUS: HCPCS

## 2018-12-10 PROCEDURE — 93306 TTE W/DOPPLER COMPLETE: CPT | Performed by: INTERNAL MEDICINE

## 2018-12-10 PROCEDURE — G8989 SELF CARE D/C STATUS: HCPCS

## 2018-12-10 RX ADMIN — HYDROCHLOROTHIAZIDE 12.5 MG: 25 TABLET ORAL at 10:02

## 2018-12-10 RX ADMIN — ASPIRIN 325 MG: 325 TABLET, COATED ORAL at 10:02

## 2018-12-10 RX ADMIN — CLOPIDOGREL 75 MG: 75 TABLET, FILM COATED ORAL at 10:02

## 2018-12-10 RX ADMIN — PERFLUTREN 3 ML: 6.52 INJECTION, SUSPENSION INTRAVENOUS at 08:44

## 2018-12-10 RX ADMIN — SODIUM CHLORIDE 75 ML/HR: 9 INJECTION, SOLUTION INTRAVENOUS at 06:13

## 2018-12-10 NOTE — THERAPY EVALUATION
Acute Care - Speech Language Pathology   Swallow Initial Evaluation [facility]     Patient Name: Ronnell Rizvi  : 1950  MRN: [id number]  Today's Date: 12/10/2018  Onset of Illness/Injury or Date of Surgery: 18     Referring Physician: syeda      Admit Date: 2018    Visit Dx:   No diagnosis found.  Patient Active Problem List   Diagnosis   • Anemia, unspecified   • Health care maintenance   • Vitamin B12 deficiency   • Chronic fatigue   • Benign prostatic hypertrophy (BPH) with weak urinary stream   • Chronic bronchitis (CMS/HCC)   • Obstructive sleep apnea syndrome   • History of stroke   • Sinoatrial block   • PFO (patent foramen ovale)   • Aortic insufficiency   • Cerebral aneurysm, nonruptured   • CVA (cerebral vascular accident) (CMS/HCC)   • TIA (transient ischemic attack)   • Cerebrovascular accident (CVA) (CMS/HCC)     Past Medical History:   Diagnosis Date   • Allergic rhinitis    • Anal fissure    • Aortic insufficiency     moderate, THIERRY    • Asthma    • B12 deficiency    • Benign prostatic hypertrophy (BPH) with weak urinary stream 2016   • Chronic bronchitis (CMS/HCC)    • Emphysema lung (CMS/HCC)    • Fatty liver    • GERD (gastroesophageal reflux disease)    • Hepatitis     thought to be Hepatitis A    • History of pneumonia     With left lower lobe atelectasis and scar tissue, being followed   • Obstructive sleep apnea syndrome 2017   • PFO (patent foramen ovale) 10/9/2018   • Pneumonia     LLL with scar tissue   • 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   • Spinal stenosis    • Stroke (CMS/HCC)     10/2017: left occipital/temporal     Past Surgical History:   Procedure Laterality Date   • COLONOSCOPY     • HAND SURGERY Bilateral    • INGUINAL HERNIA REPAIR Left    • OTHER SURGICAL HISTORY      inguinal hernia repair for person over age 5   • TONSILLECTOMY          SWALLOW EVALUATION (last 72 hours)      SLP Adult Swallow Evaluation     Row Name 12/10/18 1200                    Rehab Evaluation    Document Type  evaluation  -AW        Subjective Information  no complaints  -AW        Patient Observations  alert;cooperative;agree to therapy  -AW        Patient Effort  good  -AW        Symptoms Noted During/After Treatment  none  -AW           General Information    Patient Profile Reviewed  yes  -AW        Pertinent History Of Current Problem  Pt admittd with R side weakness. MRI showed small infarct L corona radiata.  -AW        Current Method of Nutrition  regular textures;thin liquids  -AW        Prior Level of Function-Swallowing  no diet consistency restrictions  -AW        Plans/Goals Discussed with  patient;agreed upon  -AW        Barriers to Rehab  none identified  -AW        Patient's Goals for Discharge  return to all previous roles/activities  -AW           Pain Assessment    Additional Documentation  Pain Scale: Numbers Pre/Post-Treatment (Group)  -AW           Pain Scale: Numbers Pre/Post-Treatment    Pain Scale: Numbers, Pretreatment  0/10 - no pain  -AW        Pain Scale: Numbers, Post-Treatment  0/10 - no pain  -AW           Oral Motor and Function    Dentition Assessment  natural, present and adequate  -AW        Secretion Management  WNL/WFL  -AW        Mucosal Quality  moist, healthy  -AW        Volitional Swallow  WFL  -AW        Volitional Cough  WFL  -AW           Oral Musculature and Cranial Nerve Assessment    Oral Labial or Buccal Impairment, Detail, Cranial Nerve VII (Facial):  right labial droop;other (see comments) slight at rest, good active movement  -AW           General Eating/Swallowing Observations    Respiratory Support Currently in Use  room air  -AW        Eating/Swallowing Skills  self-fed  -AW        Positioning During Eating  upright in chair  -AW        Utensils Used  spoon;cup;straw  -AW        Consistencies Trialed  regular textures;soft textures;pureed;thin liquids  -AW           Clinical Swallow Eval    Oral Prep Phase  WFL  -AW        Oral  Transit  WFL  -AW        Oral Residue  WFL  -AW        Pharyngeal Phase  no overt signs/symptoms of pharyngeal impairment  -AW        Clinical Swallow Evaluation Summary  Pt observed with lunch tray. Mastication functioinal for regular solids. Thins were tolerated via cup and straw.  -AW           Clinical Impression    SLP Swallowing Diagnosis  functional oral phase;functional pharyngeal phase  -AW        Functional Impact  no impact on function  -AW        Rehab Potential/Prognosis, Swallowing  good, to achieve stated therapy goals  -AW        Swallow Criteria for Skilled Therapeutic Interventions Met  no problems identified which require skilled intervention  -AW           Recommendations    Therapy Frequency (Swallow)  evaluation only  -AW        SLP Diet Recommendation  regular textures;thin liquids  -AW        Recommended Precautions and Strategies  upright posture during/after eating;small bites of food and sips of liquid  -AW        SLP Rec. for Method of Medication Administration  meds whole;with thin liquids  -AW        Monitor for Signs of Aspiration  yes;notify SLP if any concerns  -AW        Anticipated Dischage Disposition  home with assist  -AW          User Key  (r) = Recorded By, (t) = Taken By, (c) = Cosigned By    Initials Name Effective Dates    Leonarda Harvey, MS CCC-SLP 06/08/18 -           EDUCATION  The patient has been educated in the following areas:   Dysphagia (Swallowing Impairment) Oral Care/Hydration.    SLP Recommendation and Plan  SLP Swallowing Diagnosis: functional oral phase, functional pharyngeal phase  SLP Diet Recommendation: regular textures, thin liquids  Recommended Precautions and Strategies: upright posture during/after eating, small bites of food and sips of liquid     Monitor for Signs of Aspiration: yes, notify SLP if any concerns     Swallow Criteria for Skilled Therapeutic Interventions Met: no problems identified which require skilled intervention  Anticipated  Dischage Disposition: home with assist  Rehab Potential/Prognosis, Swallowing: good, to achieve stated therapy goals  Therapy Frequency (Swallow): evaluation only          Plan of Care Reviewed With: patient  Plan of Care Review  Plan of Care Reviewed With: patient  Progress: no change  Outcome Summary: Bedside Swallow eval completed. Recommend continue on regular diet. ST is not indicated at this time. Please reconsult if needed.          SLP Outcome Measures (last 72 hours)      SLP Outcome Measures     Row Name 12/10/18 1200             SLP Outcome Measures    Outcome Measure Used?  Adult NOMS  -AW         Adult FCM Scores    FCM Chosen  Swallowing  -AW      Swallowing FCM Score  7  -AW        User Key  (r) = Recorded By, (t) = Taken By, (c) = Cosigned By    Initials Name Effective Dates    Leonarda Harvey MS CCC-SLP 06/08/18 -            Time Calculation:   Time Calculation- SLP     Row Name 12/10/18 1240             Time Calculation- SLP    SLP Start Time  1115  -AW      SLP Received On  12/10/18  -        User Key  (r) = Recorded By, (t) = Taken By, (c) = Cosigned By    Initials Name Provider Type    Leonarda Harvey MS CCC-SLP Speech and Language Pathologist          Therapy Charges for Today     Code Description Service Date Service Provider Modifiers Qty    55589080607 HC ST SWALLOWING CURRENT STATUS 12/10/2018 Leonarda Dacosta MS CCC-SLP GN, CH 1    38157096591 HC ST SWALLOWING PROJECTED 12/10/2018 Leonarda Dacosta MS CCC-SLP GN, CH 1    32880684883 HC ST SWALLOWING DISCHARGE 12/10/2018 Leonarda Dacosta MS CCC-SLP GN, CH 1    01482078435 HC ST EVAL ORAL PHARYNG SWALLOW 3 12/10/2018 Leonarda Dacosta MS CCC-SLP GN, KX 1          SLP G-Codes  SLP NOMS Used?: Yes  Functional Limitations: Swallowing  Swallow Current Status (): 0 percent impaired, limited or restricted  Swallow Goal Status (): 0 percent impaired, limited or restricted  Swallow Discharge Status (): 0 percent impaired, limited or restricted    Leonarda  MS Praneeth CCC-SLP  12/10/2018

## 2018-12-10 NOTE — PLAN OF CARE
Problem: Patient Care Overview  Goal: Plan of Care Review   12/10/18 1204   Coping/Psychosocial   Plan of Care Reviewed With patient   OTHER   Outcome Summary Pt at SBA level for functional mobiltity and UE 's WFL. Denies weakness or numbness UE's. Will not follow for OT. Pt agrees

## 2018-12-10 NOTE — PLAN OF CARE
Problem: Patient Care Overview  Goal: Plan of Care Review  Outcome: Ongoing (interventions implemented as appropriate)   12/10/18 0144   Coping/Psychosocial   Plan of Care Reviewed With patient   Plan of Care Review   Progress improving   OTHER   Outcome Summary A&Ox4. No neuro deficits noted. Dr Sawant to evaluate in the morning to see if his PFO should be closed during this admission. VSS. No acute changes overnight.        Problem: Fall Risk (Adult)  Goal: Identify Related Risk Factors and Signs and Symptoms  Outcome: Ongoing (interventions implemented as appropriate)    Goal: Absence of Fall  Outcome: Ongoing (interventions implemented as appropriate)      Problem: Stroke (Ischemic) (Adult)  Goal: Signs and Symptoms of Listed Potential Problems Will be Absent, Minimized or Managed (Stroke)  Outcome: Ongoing (interventions implemented as appropriate)

## 2018-12-10 NOTE — THERAPY DISCHARGE NOTE
Acute Care - Occupational Therapy Initial Eval/Discharge  [facility]     Patient Name: Ronnell Rizvi  : 1950  MRN: [id number]  Today's Date: 12/10/2018  Onset of Illness/Injury or Date of Surgery: 18     Referring Physician: syeda      Admit Date: 2018     No diagnosis found.  Patient Active Problem List   Diagnosis   • Anemia, unspecified   • Health care maintenance   • Vitamin B12 deficiency   • Chronic fatigue   • Benign prostatic hypertrophy (BPH) with weak urinary stream   • Chronic bronchitis (CMS/HCC)   • Obstructive sleep apnea syndrome   • History of stroke   • Sinoatrial block   • PFO (patent foramen ovale)   • Aortic insufficiency   • Cerebral aneurysm, nonruptured   • CVA (cerebral vascular accident) (CMS/HCC)   • TIA (transient ischemic attack)   • Cerebrovascular accident (CVA) (CMS/HCC)     Past Medical History:   Diagnosis Date   • Allergic rhinitis    • Anal fissure    • Aortic insufficiency     moderate, THIERRY    • Asthma    • B12 deficiency    • Benign prostatic hypertrophy (BPH) with weak urinary stream 2016   • Chronic bronchitis (CMS/HCC)    • Emphysema lung (CMS/HCC)    • Fatty liver    • GERD (gastroesophageal reflux disease)    • Hepatitis     thought to be Hepatitis A    • History of pneumonia     With left lower lobe atelectasis and scar tissue, being followed   • Obstructive sleep apnea syndrome 2017   • PFO (patent foramen ovale) 10/9/2018   • Pneumonia     LLL with scar tissue   • 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   • Spinal stenosis    • Stroke (CMS/HCC)     10/2017: left occipital/temporal     Past Surgical History:   Procedure Laterality Date   • COLONOSCOPY     • HAND SURGERY Bilateral    • INGUINAL HERNIA REPAIR Left    • OTHER SURGICAL HISTORY      inguinal hernia repair for person over age 5   • TONSILLECTOMY            OT ASSESSMENT FLOWSHEET (last 72 hours)      Occupational Therapy Evaluation     Row Name 12/10/18 1100                   OT  Evaluation Time/Intention    Subjective Information  no complaints  -SG        Document Type  evaluation;discharge evaluation/summary  -SG        Mode of Treatment  occupational therapy  -SG        Patient Effort  good  -SG           General Information    Patient Profile Reviewed?  yes  -SG        Patient Observations  alert;cooperative;agree to therapy  -SG        General Observations of Patient  standing in room  -SG        Prior Level of Function  independent:;ADL's  -SG           Cognitive Assessment/Intervention- PT/OT    Orientation Status (Cognition)  oriented x 3  -SG        Follows Commands (Cognition)  WFL  -SG           Functional Mobility    Functional Mobility- Ind. Level  supervision required  -SG        Functional Mobility- Comment  pt ambulates in room  -SG           Sit-Stand Transfer    Sit-Stand [place] (Transfers)  supervision  -SG           Stand-Sit Transfer    Stand-Sit [place] (Transfers)  supervision  -           BADL Safety/Performance    Skilled BADL Treatment/Intervention  BADL process/adaptation training  -SG           General ROM    GENERAL ROM COMMENTS  jBUE's WFL AROM  -SG           Sensory Assessment/Intervention    Sensory General Assessment  no sensation deficits identified BUE's  -SG           Positioning and Restraints    Pre-Treatment Position  in bed  -SG        Post Treatment Position  bed  -SG        In Bed  sitting;call light within reach;encouraged to call for assist;exit alarm on  -SG           Clinical Impression (OT)    OT Diagnosis  need for assist with personal care  -SG           Patient Education Goal (OT)    Activity (Patient Education Goal, OT)  pt to state safety for adls  -SG        [place]/Cues/Accuracy (Memory Goal 2, OT)  verbalizes understanding  -SG        Time Frame (Patient Education Goal, OT)  1 day  -SG        Progress/Outcome (Patient Education Goal, OT)  goal met  -SG          User Key  (r) = Recorded By, (t) = Taken By, (c) =  Cosigned By    Initials Name Effective Dates     Rosana Luis OTR 06/08/18 -           Occupational Therapy Education     Title: PT OT SLP Therapies (Done)     Topic: Occupational Therapy (Done)     Point: ADL training (Resolved)     Description: Instruct learner(s) on proper safety adaptation and remediation techniques during self care or transfers.   Instruct in proper use of assistive devices.    Learning Progress Summary           Patient Acceptance, E,TB, VU by  at 12/10/2018 12:07 PM    Acceptance, TB,E,D, VU,DU,NR by  at 12/9/2018  2:38 PM                   Point: Home exercise program (Done)     Description: Instruct learner(s) on appropriate technique for monitoring, assisting and/or progressing therapeutic exercises/activities.    Learning Progress Summary           Patient Acceptance, TB,E,D, VU,DU,NR by  at 12/9/2018  2:38 PM                   Point: Precautions (Done)     Description: Instruct learner(s) on prescribed precautions during self-care and functional transfers.    Learning Progress Summary           Patient Acceptance, TB,E,D, VU,DU,NR by  at 12/9/2018  2:38 PM                   Point: Body mechanics (Done)     Description: Instruct learner(s) on proper positioning and spine alignment during self-care, functional mobility activities and/or exercises.    Learning Progress Summary           Patient Acceptance, TB,E,D, VU,DU,NR by  at 12/9/2018  2:38 PM                               User Key     Initials Effective Dates Name Provider Type Discipline     06/08/18 -  Rosana Luis OTR Occupational Therapist OT     03/07/18 -  Justin Richmond, PT Physical Therapist PT                OT Recommendation and Plan     Plan of Care Review  Plan of Care Reviewed With: patient  Plan of Care Reviewed With: patient  Outcome Summary: Pt at SBA level for functional mobiltity and UE 's WFL. Denies weakness or numbness UE's. Will not follow for OT. Pt agrees     OT Rehab Goals     Row  Name 12/10/18 1100             Patient Education Goal (OT)    Activity (Patient Education Goal, OT)  pt to state safety for adls  -SG      [place]/Cues/Accuracy (Memory Goal 2, OT)  verbalizes understanding  -SG      Time Frame (Patient Education Goal, OT)  1 day  -SG      Progress/Outcome (Patient Education Goal, OT)  goal met  -SG        User Key  (r) = Recorded By, (t) = Taken By, (c) = Cosigned By    Initials Name Provider Type Discipline    Rosana Haro OTR Occupational Therapist OT          Outcome Measures     Row Name 12/10/18 1209 12/09/18 1400          How much help from another person do you currently need...    Turning from your back to your side while in flat bed without using bedrails?  --  4  -GJ     Moving from lying on back to sitting on the side of a flat bed without bedrails?  --  4  -GJ     Moving to and from a bed to a chair (including a wheelchair)?  --  4  -GJ     Standing up from a chair using your arms (e.g., wheelchair, bedside chair)?  --  4  -GJ     Climbing 3-5 steps with a railing?  --  4  -GJ     To walk in hospital room?  --  4  -GJ     AM-PAC 6 Clicks Score  --  24  -GJ        How much help from another is currently needed...    Putting on and taking off regular lower body clothing?  3  -SG  --     Bathing (including washing, rinsing, and drying)  3  -SG  --     Toileting (which includes using toilet bed pan or urinal)  3  -SG  --     Putting on and taking off regular upper body clothing  3  -SG  --     Taking care of personal grooming (such as brushing teeth)  3  -SG  --     Eating meals  4  -SG  --     Score  19  -SG  --        Functional Assessment    Outcome Measure Options  AM-PAC 6 Clicks Daily Activity (OT)  -SG  AM-PAC 6 Clicks Basic Mobility (PT)  -GJ       User Key  (r) = Recorded By, (t) = Taken By, (c) = Cosigned By    Initials Name Provider Type    Rosana Haro, OTR Occupational Therapist    Justin Dumont, PT Physical Therapist          Time  Calculation:   Time Calculation- OT     Row Name 12/10/18 1209             Time Calculation- OT    OT Start Time  0933  -SG      OT Stop Time  0950  -SG      OT Time Calculation (min)  17 min  -SG      Total Timed Code Minutes- OT  8 minute(s)  -SG        User Key  (r) = Recorded By, (t) = Taken By, (c) = Cosigned By    Initials Name Provider Type    SG Rosana Luis OTR Occupational Therapist        Therapy Suggested Charges     Code   Minutes Charges    None           Therapy Charges for Today     Code Description Service Date Service Provider Modifiers Qty    26822725471 HC OT EVAL LOW COMPLEXITY 2 12/10/2018 Rosana Luis OTR GO 1    90448853907 HC OT SELF CARE/MGMT/TRAIN EA 15 MIN 12/10/2018 Rosana Luis OTR GO 1    08464914391 HC OT SELFCARE CURRENT 12/10/2018 Rosana Luis OTR GO CK 1    91083670571 HC OT SELFCARE PROJECTED 12/10/2018 Rosana Luis OTR GO CK 1    59566323918 HC OT SELFCARE DISCHARGE 12/10/2018 Rosana Luis OTR GO, CK 1          OT G-codes  OT Functional Scales Options: AM-PAC 6 Clicks Daily Activity (OT)  Functional Limitation: Self care  Self Care Current Status (): At least 40 percent but less than 60 percent impaired, limited or restricted  Self Care Goal Status (): At least 40 percent but less than 60 percent impaired, limited or restricted  Self Care Discharge Status (): At least 40 percent but less than 60 percent impaired, limited or restricted    OT Discharge Summary  Reason for Discharge: other (comment)(denies need for OT)    DULCE Mcghee  12/10/2018

## 2018-12-10 NOTE — PROGRESS NOTES
" LOS: 0 days     Name: Ronnell Rizvi  Age: 68 y.o.  Sex: male  :  1950  MRN: [id number]         Primary Care Physician: Chase Haque MD    Subjective   Subjective  No new complaints.  Feels well.  Right sided weakness is much better.    Objective   Vital Signs  Temp:  [97.4 °F (36.3 °C)-98.5 °F (36.9 °C)] 98.5 °F (36.9 °C)  Heart Rate:  [51-69] 64  Resp:  [18] 18  BP: (130-137)/(57-88) 133/80  Body mass index is 23.93 kg/m².    Objective:  General Appearance:  Comfortable and in no acute distress.    Vital signs: (most recent): Blood pressure 133/80, pulse 64, temperature 98.5 °F (36.9 °C), temperature source Oral, resp. rate 18, height 184.2 cm (72.5\"), weight 81.1 kg (178 lb 14.4 oz), SpO2 94 %.    Lungs:  Normal effort and normal respiratory rate.    Heart: Normal rate.  Regular rhythm.    Abdomen: Abdomen is soft.  Bowel sounds are normal.   There is no abdominal tenderness.     Extremities: There is no dependent edema or local swelling.    Neurological: Patient is alert and oriented to person, place and time.    Skin:  Warm and dry.              Results Review:       I reviewed the patient's new clinical results.    Results from last 7 days   Lab Units  12/10/18   0456  18   0527  18   1423   WBC 10*3/mm3  6.00  5.16  6.18   HEMOGLOBIN g/dL  15.3  15.3  16.6   PLATELETS 10*3/mm3  209  208  254     Results from last 7 days   Lab Units  12/10/18   0456  18   0527  18   1423   SODIUM mmol/L  140  141  139   POTASSIUM mmol/L  3.9  4.1  3.7   CHLORIDE mmol/L  105  105  100   CO2 mmol/L  26.0  27.7  28.9   BUN mg/dL  16  15  13   CREATININE mg/dL  0.96  1.02  1.01   CALCIUM mg/dL  8.6  9.0  9.1   GLUCOSE mg/dL  95  99  90                 Scheduled Meds:     aspirin  mg Oral Daily   Or      aspirin 300 mg Rectal Daily   atorvastatin 80 mg Oral Nightly   clopidogrel 75 mg Oral Daily   hydrochlorothiazide 12.5 mg Oral Daily   sodium chloride 3 mL Intravenous Q12H     PRN " Meds:   ondansetron  •  sodium chloride  Continuous Infusions:    sodium chloride 75 mL/hr Last Rate: 75 mL/hr (12/10/18 0613)       Assessment/Plan   Active Hospital Problems    Diagnosis Date Noted   • **CVA (cerebral vascular accident) (CMS/[facility]) [I63.9] 11/29/2018   • Cerebral aneurysm, nonruptured [I67.1] 10/18/2018   • PFO (patent foramen ovale) [Q21.1] 10/09/2018   • History of stroke [Z86.73] 10/24/2017   • Obstructive sleep apnea syndrome [G47.33] 08/23/2017   • Benign prostatic hypertrophy (BPH) with weak urinary stream [N40.1, R39.12] 11/01/2016   • Vitamin B12 deficiency [E53.8] 11/01/2016      Resolved Hospital Problems   No resolved problems to display.       Assessment & Plan    -  MRI shows acute stroke  - Neurology has recommended PFO closure during this admission.  Cardiology has been consulted to assist with this.  He does have the procedure already scheduled for next Wednesday.  - Continue aspirin and Plavix, he does not wish to take atorvastatin given adverse effects previously with this medication  - Blood pressure stable.  Continue present management.          Kieran Valadez MD  [facility]  12/10/18  9:29 AM

## 2018-12-10 NOTE — PROGRESS NOTES
DOS: 12/10/2018  NAME: Ronnell Rizvi  : 1950  PCP: Chase Haque MD  CC: right sided weakness    Stroke    Subjective: Pt sitting up in the recliner, states he is feeling well. Family at bedside.    Pt seen in follow up, however new to the examiner.    Objective:  Vital signs:      Vitals:    18 1402 18 1756 18 2100 12/10/18 0500   BP: 130/88 137/74 131/57 133/80   BP Location: Left arm Left arm Left arm Left arm   Patient Position: Lying Lying Lying Lying   Pulse: 69 66 51 64   Resp:    Temp: 98 °F (36.7 °C) 97.4 °F (36.3 °C) 98.2 °F (36.8 °C) 98.5 °F (36.9 °C)   TempSrc: Oral Oral Oral Oral   SpO2: 94% 93% 96% 94%   Weight:       Height:           Current Facility-Administered Medications:   •  aspirin EC tablet 325 mg, 325 mg, Oral, Daily, 325 mg at 12/10/18 1002 **OR** aspirin suppository 300 mg, 300 mg, Rectal, Daily, Krunal Huerta MD  •  atorvastatin (LIPITOR) tablet 80 mg, 80 mg, Oral, Nightly, Brandy Stanford MD, 80 mg at 18 2338  •  clopidogrel (PLAVIX) tablet 75 mg, 75 mg, Oral, Daily, Brandy Stanford MD, 75 mg at 12/10/18 1002  •  hydrochlorothiazide (HYDRODIURIL) tablet 12.5 mg, 12.5 mg, Oral, Daily, Brandy Stanford MD, 12.5 mg at 12/10/18 1002  •  ondansetron (ZOFRAN) injection 4 mg, 4 mg, Intravenous, Q6H PRN, Brandy Stanford MD  •  sodium chloride 0.9 % flush 3 mL, 3 mL, Intravenous, Q12H, Brandy Stanford MD, 3 mL at 18 0902  •  sodium chloride 0.9 % flush 3-10 mL, 3-10 mL, Intravenous, PRN, Brandy Stanford MD  •  sodium chloride 0.9 % infusion, 75 mL/hr, Intravenous, Continuous, Brandy Stanford MD, Last Rate: 75 mL/hr at 12/10/18 1003, 75 mL/hr at 12/10/18 1003    No current facility-administered medications on file prior to encounter.      Current Outpatient Medications on File Prior to Encounter   Medication Sig   • clopidogrel (PLAVIX) 75 MG tablet Take 1 tablet by mouth Daily.   • Cyanocobalamin (B-12) 1000 MCG/ML kit Inject 250 mcg as directed 1 (One)  Time Per Week. Pt. Stated he takes this medication weekly 250 mcg, pt reports took it last friday   • hydrochlorothiazide (HYDRODIURIL) 12.5 MG tablet Take 1 tablet by mouth Daily.       PRN meds  ondansetron  •  sodium chloride    General appearance: well groomed, well nourished, alert and cooperative, NAD.  HEENT: Normocephalic, atraumatic, no masses or tenderness  COR: RRR  Resp: Even and unlabored, clear to auscultation in all 4 lung fields.  Extremities: Equal pulses, non distal embolization  Skin: warm, dry, no lesions or rashes    Neurological:   MS: AO x4. Language normal. No neglect. Higher integrative function normal  CN: II-XII normal  Motor: 5/5 strength in all 4 ext., normal tone  Reflexes: 1+ reflexes in all 4 ext.   Sensory: normal light touch sensation in all 4 ext.  Coordination: Normal finger to nose test, no dysmetria. Normal heel to shin test.    Laboratory results:  Lab Results   Component Value Date    HGBA1C 5.10 12/09/2018     Lab Results   Component Value Date    TSH 1.710 12/09/2018     Lab Results   Component Value Date    CHOL 168 12/09/2018    CHOL 142 02/20/2018    CHOL 171 10/11/2017    CHLPL 174 11/01/2016    CHLPL 170 07/21/2015     Lab Results   Component Value Date    TRIG 75 12/09/2018    TRIG 75 02/20/2018    TRIG 78 10/11/2017     Lab Results   Component Value Date    HDL 46 12/09/2018    HDL 51 02/20/2018    HDL 47 10/11/2017     Lab Results   Component Value Date     (H) 12/09/2018    LDL 76 02/20/2018     (H) 10/11/2017     Lab Results   Component Value Date    ZVGSVDBI18 838 12/09/2018     Lab Results   Component Value Date    GLUCOSE 95 12/10/2018    BUN 16 12/10/2018    CREATININE 0.96 12/10/2018    EGFRIFNONA 78 12/10/2018    EGFRIFAFRI 76 11/01/2016    BCR 16.7 12/10/2018    K 3.9 12/10/2018    CO2 26.0 12/10/2018    CALCIUM 8.6 12/10/2018    PROTENTOTREF 6.6 02/20/2018    ALBUMIN 3.70 12/09/2018    LABIL2 1.3 02/20/2018    AST 19 12/09/2018    ALT 25  12/09/2018 12/9 P2Y12 149    Review and interpretation of imaging:  Ct Head Without Contrast    Result Date: 12/9/2018  1. No clearly acute intracranial process. No evidence of acute intracranial hemorrhage. 2. Imaging findings most characteristic of sequela of chronic ischemic small vessel disease. Chronic infarct in the left parieto-occipital region. 3. Preliminary report provided by Dr. Correa at approximately 1502 hours 12/08/2018.  This report was finalized on 12/9/2018 8:05 AM by Dr. Luiz Orellana MD.      Mri Angiogram Head Without Contrast    Result Date: 12/10/2018  1. A 9 mm acute or subacute infarct in the left corona radiata. 2. Old infarct in the left occipital lobe. 3. Moderate changes of bilateral small vessel white matter ischemic disease. 4. Hypoplastic A1 segment of the right anterior cerebral artery and moderately severe narrowing of the P2 segment of the right posterior cerebral artery. 3. No aneurysm is seen. Tiny infundibulum is seen in the left posterior internal carotid artery near the posterior communicating artery origin. 6. Findings were called to the [facility].  This report was finalized on 12/10/2018 1:21 PM by Dr. Cayden Goddard M.D.      Mri Angiogram Neck With & Without Contrast    Result Date: 12/10/2018  1. A 9 mm acute or subacute infarct in the left corona radiata. 2. Old infarct in the left occipital lobe. 3. Moderate changes of bilateral small vessel white matter ischemic disease. 4. Hypoplastic A1 segment of the right anterior cerebral artery and moderately severe narrowing of the P2 segment of the right posterior cerebral artery. 3. No aneurysm is seen. Tiny infundibulum is seen in the left posterior internal carotid artery near the posterior communicating artery origin. 6. Findings were called to the [facility].  This report was finalized on 12/10/2018 1:21 PM by Dr. Cayden Goddard M.D.      Mri Brain With & Without Contrast    Result Date:  12/10/2018  1. A 9 mm acute or subacute infarct in the left corona radiata. 2. Old infarct in the left occipital lobe. 3. Moderate changes of bilateral small vessel white matter ischemic disease. 4. Hypoplastic A1 segment of the right anterior cerebral artery and moderately severe narrowing of the P2 segment of the right posterior cerebral artery. 3. No aneurysm is seen. Tiny infundibulum is seen in the left posterior internal carotid artery near the posterior communicating artery origin. 6. Findings were called to the [facility] nurses station.  This report was finalized on 12/10/2018 1:21 PM by Dr. Cayden Goddard M.D.      Xr Chest 1 View    Result Date: 12/9/2018  1. Chronic lung changes. No definite superimposed active disease or significant change.  This report was finalized on 12/9/2018 8:59 AM by Dr. Luiz Orellana MD.      12/10/2018 2D Echo interpretation  · Calculated EF = 61%.  · Left ventricular systolic function is normal.  · Left ventricular diastolic dysfunction (grade I) consistent with impaired relaxation.  · Mild aortic valve regurgitation is present.  · Saline test results are positive with valsalva manuever for right to left atrial level shunt.  · Small patent foramen ovale present.    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  Patient states he was trying ot put on his pants and was having a difficult time.  He also could not use his right hand to open a jar or drive.  He is on Plavix at home and was on ASA but reportedly has poor tolerance to it stating that it makes his stomach hurt. His P2Y12 was 149 on 12/9. CT head showed no acute intracranial hemorrhage, chronic ischemic small vessel disease, and a chronic infarct in the left occipitoparietal region. MRI/MRA head/neck showed a 9mm acute/subacute  infarct in the left corona radiata, hypoplastic A1 segment of the right GURMEET, and moderately severe narrowing of the P2 segment of the right PCA.  Etiology of stroke likely cardioemoblic given patient's prior cortical left occipitoparietal stroke and PFO. He previously wore a ZIO patch after last stroke which showed a nonsustained atrial tachycardia and did not warrant A/C.  He also has an implantable recording device by Dr. Leyda Abraham and this did not have any evidence of afib. Cardiology following patient, patient will undergo a PFO closure on Wednesday. Okay to have IV heparin during procedure.    Assessment/Plan:  Continue EC ASA 81mg and Plavix 75mg  Continue Lipitor 80mg  Neurochecks per stroke protocol  Normalize BP, encourage hydration.  Stroke Education  BEST/SCDs  PT/OT/ST  Will follow.    Plan discussed with patient and Dr. Galvan, agrees with plan above.

## 2018-12-10 NOTE — CONSULTS
Patient Name: Ronnell Rizvi  Age/Sex: 68 y.o. male  : 1950  MRN: [id number]    Date of Admission: 2018  Date of Encounter Visit: 12/10/18  Encounter Provider: Deangelo Harris MD  Place of Service: [facility] CARDIOLOGY      Referring Provider: Brandy Stanford MD  Patient Care Team:  Chase Haque MD as PCP - General (Internal Medicine)  Chase Haque MD as PCP - Claims Attributed  Gregory King MD as Consulting Physician (Hematology and Oncology)  Chase Haque MD as Referring Physician (Internal Medicine)  Luis Wyatt MD as Consulting Physician (Cardiology)    Subjective:     Consulted for: PFO closure    Chief Complaint: right sided arm and leg weakness      History of Present Illness:  Ronnell Rizvi is a 68 y.o. male with a history of CVA, PFO-on Plavix, moderate AI, and ELIZABETH.   He presented to the ED at [place] with c/o right sided arm and leg weakness. He was transferred here for further care.  Per the patient's report the right sided weakness lasted about 2-3 hours and started at about 4 AM the day before yesterday.  Brain MRI showed a 9mm acute or subacute infarct in the left corona radiata. EKG showed NSR.  At this point in time the patient states that his right-sided weakness is much better.  He reportedly feels well.    He is a patient of Dr. Sawant, who I saw in the office on 18 for PFO evaluation. He had a stroke in 10/2017. No echo/THIERRY was done; Zio patch was placed and he was placed Plavix and atorvastatin. Cardiology did not see that admission. In 2017, the Zio showed some atrial ectopy-13 beat run of PACs-but no atrial fibrillation and a 3 second pause. An echo showed normal LV and a small PFO. He later had a THIERRY that showed a moderate sized PFO and a LINQ placed. The LINQ was last interrogated on 18 and has not shown any atrial fibrillation.  1 result was documented as A. fib, however this is  actually sinus with exit block.    Previous testing:   PPM interrogation 11/12/18    3/26/18  THIERRY  · No evidence of a left atrial appendage thrombus was present.  · Moderate patent foramen ovale present. Saline test results are positive.  · Left ventricular systolic function is normal.  · Moderate aortic valve regurgitation is present.  · Mild mitral valve regurgitation is present    10/22/18  CTA  IMPRESSION:  1. Mild plaque in the bulbs with about 18% stenosis by NASCET criteria  in both proximal ICAs. No carotid or vertebral dissection.  2. High-grade short segment stenosis in the right posterior cerebral  artery in the P2 segment. This is new from prior.  3. Small 2 mm infundibulum on the posterior aspect of the left  supraclinoid ICA.  4. No definite anterior communicating artery aneurysm.  5. Stable appearance of hypoplastic right A1.  Past Medical History:  Past Medical History:   Diagnosis Date   • Allergic rhinitis    • Anal fissure    • Aortic insufficiency     moderate, THIERRY 2017   • Asthma    • B12 deficiency    • Benign prostatic hypertrophy (BPH) with weak urinary stream 11/1/2016   • Chronic bronchitis (CMS/HCC)    • Emphysema lung (CMS/HCC)    • Fatty liver    • GERD (gastroesophageal reflux disease)    • Hepatitis     thought to be Hepatitis A    • History of pneumonia     With left lower lobe atelectasis and scar tissue, being followed   • Obstructive sleep apnea syndrome 8/23/2017   • PFO (patent foramen ovale) 10/9/2018   • Pneumonia     LLL with scar tissue   • 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   • Spinal stenosis    • Stroke (CMS/HCC)     10/2017: left occipital/temporal       Past Surgical History:   Procedure Laterality Date   • COLONOSCOPY     • HAND SURGERY Bilateral    • INGUINAL HERNIA REPAIR Left    • OTHER SURGICAL HISTORY      inguinal hernia repair for person over age 5   • TONSILLECTOMY         Home Medications:   Medications Prior to Admission   Medication Sig Dispense Refill Last Dose   •  clopidogrel (PLAVIX) 75 MG tablet Take 1 tablet by mouth Daily. 30 tablet 1 2018 at Unknown time   • Cyanocobalamin (B-12) 1000 MCG/ML kit Inject 250 mcg as directed 1 (One) Time Per Week. Pt. Stated he takes this medication weekly 250 mcg, pt reports took it last 2018 at Unknown time   • hydrochlorothiazide (HYDRODIURIL) 12.5 MG tablet Take 1 tablet by mouth Daily. 90 tablet 2 2018 at Unknown time       Allergies:  Allergies   Allergen Reactions   • Aspirin Nausea Only   • Citrus      Blisters on mouth     • Combivent [Ipratropium-Albuterol] Other (See Comments)     Throat swelling   • Lactose Intolerance (Gi)    • Statins Mental Status Change     Severe memory impairment       Past Social History:  Social History     Socioeconomic History   • Marital status:      Spouse name: Joey   • Number of children: Not on file   • Years of education: Not on file   • Highest education level: Not on file   Social Needs   • Financial resource strain: Not on file   • Food insecurity - worry: Not on file   • Food insecurity - inability: Not on file   • Transportation needs - medical: Not on file   • Transportation needs - non-medical: Not on file   Occupational History   • Occupation:      Employer: RETIRED   Tobacco Use   • Smoking status: Former Smoker     Last attempt to quit: 1979     Years since quittin.8   • Smokeless tobacco: Never Used   • Tobacco comment: caffeine use: Drinks 4 glasses of Dt coke on avg.    Substance and Sexual Activity   • Alcohol use: No   • Drug use: No   • Sexual activity: Defer   Other Topics Concern   • Not on file   Social History Narrative   • Not on file        Past Family History:  Family History   Problem Relation Age of Onset   • Obesity Mother    • Clotting disorder Mother         Upper extremities   • Alcohol abuse Father    • Cancer Father 63        Esophagus and lung   • Other Father         cardiac disorder   • Heart disease Father     • Kidney disease Sister    • Kidney failure Sister    • Drug abuse Paternal Uncle    • Stroke Sister    • Throat cancer Sister          Review of Systems:  All systems reviewed. Pertinent positives identified in HPI. All other systems are negative.         Objective:     Objective:  Temp:  [97.4 °F (36.3 °C)-98.5 °F (36.9 °C)] 97.4 °F (36.3 °C)  Heart Rate:  [51-67] 67  Resp:  [18] 18  BP: (123-137)/(57-80) 123/78    Intake/Output Summary (Last 24 hours) at 12/10/2018 1604  Last data filed at 12/10/2018 0613  Gross per 24 hour   Intake 1980 ml   Output --   Net 1980 ml     Body mass index is 23.93 kg/m².      12/08/18  1853   Weight: 81.1 kg (178 lb 14.4 oz)           Physical Exam:   General Appearance Well developed, cooperative and well nourished and no acute distress   Head Normocephalic, without abnormality, atraumatic   Ears Ears appear intact with no abnormalities noted   Throat No oral lesions, no thrush, oral mucosa moist   Neck No adenopathy, supple, trachea midline, no thyromegaly, no carotid bruit, no JVD   Back No skin lesions, erythema or scars, no tenderness to percussion or palptaion,range of motion is normal   Lungs Clear to auscultation,respirations regular, even and unlabored   Heart Regular rhythm and normal rate, normal S1 and S2, no murmur, no gallop, no rub, no click   Chest wall No abnormalities observed   Abdomen Normal bowel sounds, no masses, no hepatomegaly,    Extremities Moves all extremities well, no edema, no cyanosis, no redness   Pulses Pulses palpable and equal bilaterally. Normal radial, carotid, femoral, dorsalis pedis and posterior tibial pulses bilaterally. Normal abdominal aorta   Skin No bleeding, bruising or rash   Psyhiatric Alert and oriented x 3, normal mood and affect       Lab Review:     Results from last 7 days   Lab Units  12/10/18   0456  12/09/18   0527  12/08/18   1423   SODIUM mmol/L  140  141  139   POTASSIUM mmol/L  3.9  4.1  3.7   CHLORIDE mmol/L  105  105   100   CO2 mmol/L  26.0  27.7  28.9   BUN mg/dL  16  15  13   CREATININE mg/dL  0.96  1.02  1.01   GLUCOSE mg/dL  95  99  90   CALCIUM mg/dL  8.6  9.0  9.1       Results from last 7 days   Lab Units  12/08/18   1423   TROPONIN T ng/mL  <0.010     Results from last 7 days   Lab Units  12/10/18   0456   WBC 10*3/mm3  6.00   HEMOGLOBIN g/dL  15.3   HEMATOCRIT %  47.0   PLATELETS 10*3/mm3  209         Results from last 7 days   Lab Units  12/09/18   0527   CHOLESTEROL mg/dL  168         Results from last 7 days   Lab Units  12/09/18   0527   CHOLESTEROL mg/dL  168   TRIGLYCERIDES mg/dL  75   HDL CHOL mg/dL  46   LDL CHOL mg/dL  107*             Results from last 7 days   Lab Units  12/09/18   1437   TSH mIU/mL  1.710       Imaging:    Imaging Results (most recent)     Procedure Component Value Units Date/Time    MRI Brain With & Without Contrast [[id number]] Collected:  12/09/18 1314     Updated:  12/10/18 1325    Narrative:       MRI OF THE BRAIN WITH AND WITHOUT CONTRAST, MR ANGIOGRAPHY OF THE NECK  WITH AND WITHOUT CONTRAST, AND MR ANGIOGRAPHY OF THE HEAD WITHOUT  CONTRAST 12/09/2018.     HISTORY: Dizziness. Right-sided weakness.     TECHNIQUE: Multiple pre and postcontrast sagittal, axial and coronal  images were obtained through the brain.     FINDINGS: The diffusion-weighted images show a small focus of bright  signal intensity in the left corona radiata measuring 9 mm in size. This  is consistent with an acute to subacute infarct. FLAIR images show  scattered foci of bright signal intensity in the bilateral cerebral  white matter consistent with moderate small vessel white matter ischemic  disease. There is a moderate size area of T1 low signal and T2 bright  signal in the left occipital lobe consistent with an old infarction.  This is also seen on the outside MRI of the brain dated 10/11/2017.     No intracranial hemorrhage is seen. There is mild diffuse atrophy.     The craniocervical junction and sella appear  normal.     No abnormal enhancement is seen following gadolinium.     3-D time-of-flight MR angiography of the neck was performed with and  without contrast. NASCET criteria was used. There is normal flow signal  in the bilateral common carotid and bilateral internal and external  carotid arteries. No significant carotid stenosis is seen. There is  vascular flow in both vertebral arteries.     3-D time-of-flight MR angiography of the head was performed. The distal  bilateral vertebral arteries and the basilar artery and its branches  appear patent. There is some narrowing of the right posterior cerebral  artery approximately 2 cm after its origin best seen on axial series 507  image 1. There is a hypoplastic A1 segment of the right anterior  cerebral artery. No anterior communicating artery aneurysm is seen. Tiny  infundibulum is seen in the posterior aspect of the left internal  carotid artery at the posterior to indicating artery origin.     No definite aneurysm is seen.       Impression:       1. A 9 mm acute or subacute infarct in the left corona radiata.  2. Old infarct in the left occipital lobe.  3. Moderate changes of bilateral small vessel white matter ischemic  disease.  4. Hypoplastic A1 segment of the right anterior cerebral artery and  moderately severe narrowing of the P2 segment of the right posterior  cerebral artery.  3. No aneurysm is seen. Tiny infundibulum is seen in the left posterior  internal carotid artery near the posterior communicating artery origin.  6. Findings were called to the [facility] nurses station.     This report was finalized on 12/10/2018 1:21 PM by Dr. Cayden Goddard M.D.       MRI Angiogram Head Without Contrast [[id number]] Collected:  12/09/18 1314     Updated:  12/10/18 1325    Narrative:       MRI OF THE BRAIN WITH AND WITHOUT CONTRAST, MR ANGIOGRAPHY OF THE NECK  WITH AND WITHOUT CONTRAST, AND MR ANGIOGRAPHY OF THE HEAD WITHOUT  CONTRAST 12/09/2018.     HISTORY:  Dizziness. Right-sided weakness.     TECHNIQUE: Multiple pre and postcontrast sagittal, axial and coronal  images were obtained through the brain.     FINDINGS: The diffusion-weighted images show a small focus of bright  signal intensity in the left corona radiata measuring 9 mm in size. This  is consistent with an acute to subacute infarct. FLAIR images show  scattered foci of bright signal intensity in the bilateral cerebral  white matter consistent with moderate small vessel white matter ischemic  disease. There is a moderate size area of T1 low signal and T2 bright  signal in the left occipital lobe consistent with an old infarction.  This is also seen on the outside MRI of the brain dated 10/11/2017.     No intracranial hemorrhage is seen. There is mild diffuse atrophy.     The craniocervical junction and sella appear normal.     No abnormal enhancement is seen following gadolinium.     3-D time-of-flight MR angiography of the neck was performed with and  without contrast. NASCET criteria was used. There is normal flow signal  in the bilateral common carotid and bilateral internal and external  carotid arteries. No significant carotid stenosis is seen. There is  vascular flow in both vertebral arteries.     3-D time-of-flight MR angiography of the head was performed. The distal  bilateral vertebral arteries and the basilar artery and its branches  appear patent. There is some narrowing of the right posterior cerebral  artery approximately 2 cm after its origin best seen on axial series 507  image 1. There is a hypoplastic A1 segment of the right anterior  cerebral artery. No anterior communicating artery aneurysm is seen. Tiny  infundibulum is seen in the posterior aspect of the left internal  carotid artery at the posterior to indicating artery origin.     No definite aneurysm is seen.       Impression:       1. A 9 mm acute or subacute infarct in the left corona radiata.  2. Old infarct in the left occipital  lobe.  3. Moderate changes of bilateral small vessel white matter ischemic  disease.  4. Hypoplastic A1 segment of the right anterior cerebral artery and  moderately severe narrowing of the P2 segment of the right posterior  cerebral artery.  3. No aneurysm is seen. Tiny infundibulum is seen in the left posterior  internal carotid artery near the posterior communicating artery origin.  6. Findings were called to the [facility] nurses station.     This report was finalized on 12/10/2018 1:21 PM by Dr. Cayden Goddard M.D.       MRI Angiogram Neck With & Without Contrast [[id number]] Collected:  12/09/18 1314     Updated:  12/10/18 1325    Narrative:       MRI OF THE BRAIN WITH AND WITHOUT CONTRAST, MR ANGIOGRAPHY OF THE NECK  WITH AND WITHOUT CONTRAST, AND MR ANGIOGRAPHY OF THE HEAD WITHOUT  CONTRAST 12/09/2018.     HISTORY: Dizziness. Right-sided weakness.     TECHNIQUE: Multiple pre and postcontrast sagittal, axial and coronal  images were obtained through the brain.     FINDINGS: The diffusion-weighted images show a small focus of bright  signal intensity in the left corona radiata measuring 9 mm in size. This  is consistent with an acute to subacute infarct. FLAIR images show  scattered foci of bright signal intensity in the bilateral cerebral  white matter consistent with moderate small vessel white matter ischemic  disease. There is a moderate size area of T1 low signal and T2 bright  signal in the left occipital lobe consistent with an old infarction.  This is also seen on the outside MRI of the brain dated 10/11/2017.     No intracranial hemorrhage is seen. There is mild diffuse atrophy.     The craniocervical junction and sella appear normal.     No abnormal enhancement is seen following gadolinium.     3-D time-of-flight MR angiography of the neck was performed with and  without contrast. NASCET criteria was used. There is normal flow signal  in the bilateral common carotid and bilateral internal and  external  carotid arteries. No significant carotid stenosis is seen. There is  vascular flow in both vertebral arteries.     3-D time-of-flight MR angiography of the head was performed. The distal  bilateral vertebral arteries and the basilar artery and its branches  appear patent. There is some narrowing of the right posterior cerebral  artery approximately 2 cm after its origin best seen on axial series 507  image 1. There is a hypoplastic A1 segment of the right anterior  cerebral artery. No anterior communicating artery aneurysm is seen. Tiny  infundibulum is seen in the posterior aspect of the left internal  carotid artery at the posterior to indicating artery origin.     No definite aneurysm is seen.       Impression:       1. A 9 mm acute or subacute infarct in the left corona radiata.  2. Old infarct in the left occipital lobe.  3. Moderate changes of bilateral small vessel white matter ischemic  disease.  4. Hypoplastic A1 segment of the right anterior cerebral artery and  moderately severe narrowing of the P2 segment of the right posterior  cerebral artery.  3. No aneurysm is seen. Tiny infundibulum is seen in the left posterior  internal carotid artery near the posterior communicating artery origin.  6. Findings were [place] to the [facility] [place].     This report was finalized on 12/10/2018 1:21 PM by Dr. Cayden Goddard M.D.             Results for orders placed during the hospital encounter of 12/08/18   Adult transthoracic echo complete    Narrative · Calculated EF = 61%.  · Left ventricular systolic function is normal.  · Left ventricular diastolic dysfunction (grade I) consistent with   impaired relaxation.  · Mild aortic valve regurgitation is present.  · Saline test results are positive with valsalva manuever for right to   left atrial level shunt.  · Small patent foramen ovale present.          EKG:         BASELINE:       I personally viewed and interpreted the patient's EKG/Telemetry  data.    Assessment:   Assessment/Plan   1.  Cryptogenic CVA: Recurrent  2.  Patent foramen Ovale  3.  Hyperlipidemia: refusing atorvastatin    -I have recommended the patient move forward with patent foramen ovale closure while he is an inpatient. Scheduled on Wed.  -He will receive IV heparin at the time of the procedure.  He should stay on aspirin and Plavix  -I will plan on leaving the LINQ in at this time.  We can always remove in the future.      Thank you for allowing me to participate in the care of Ronnell MENA Rizvi. Feel free to contact me directly with any further questions or concerns.    Deangelo Harris MD  [facility]  12/10/18  4:04 PM

## 2018-12-11 PROCEDURE — 99232 SBSQ HOSP IP/OBS MODERATE 35: CPT | Performed by: NURSE PRACTITIONER

## 2018-12-11 RX ADMIN — ASPIRIN 325 MG: 325 TABLET, COATED ORAL at 08:16

## 2018-12-11 RX ADMIN — CLOPIDOGREL 75 MG: 75 TABLET, FILM COATED ORAL at 08:16

## 2018-12-11 RX ADMIN — HYDROCHLOROTHIAZIDE 12.5 MG: 25 TABLET ORAL at 08:15

## 2018-12-11 RX ADMIN — SODIUM CHLORIDE, PRESERVATIVE FREE 3 ML: 5 INJECTION INTRAVENOUS at 08:15

## 2018-12-11 NOTE — PROGRESS NOTES
" LOS: 0 days     Name: Ronnell Rizvi  Age: 68 y.o.  Sex: male  :  1950  MRN: [id number]         Primary Care Physician: Chase Haque MD    Subjective   Subjective  No new complaints    Objective   Vital Signs  Temp:  [97.4 °F (36.3 °C)-97.6 °F (36.4 °C)] 97.5 °F (36.4 °C)  Heart Rate:  [64-74] 64  Resp:  [18-20] 18  BP: (123-136)/(76-83) 124/82  Body mass index is 23.93 kg/m².    Objective:  General Appearance:  Comfortable and in no acute distress.    Vital signs: (most recent): Blood pressure 124/82, pulse 64, temperature 97.5 °F (36.4 °C), resp. rate 18, height 184.2 cm (72.5\"), weight 81.1 kg (178 lb 14.4 oz), SpO2 94 %.    Lungs:  Normal effort and normal respiratory rate.    Heart: Normal rate.  Regular rhythm.    Abdomen: Abdomen is soft.  Bowel sounds are normal.   There is no abdominal tenderness.     Extremities: There is no dependent edema or local swelling.    Neurological: Patient is alert and oriented to person, place and time.    Skin:  Warm and dry.              Results Review:       I reviewed the patient's new clinical results.    Results from last 7 days   Lab Units  12/10/18   0456  18   0527  18   1423   WBC 10*3/mm3  6.00  5.16  6.18   HEMOGLOBIN g/dL  15.3  15.3  16.6   PLATELETS 10*3/mm3  209  208  254     Results from last 7 days   Lab Units  12/10/18   0456  18   0527  18   1423   SODIUM mmol/L  140  141  139   POTASSIUM mmol/L  3.9  4.1  3.7   CHLORIDE mmol/L  105  105  100   CO2 mmol/L  26.0  27.7  28.9   BUN mg/dL  16  15  13   CREATININE mg/dL  0.96  1.02  1.01   CALCIUM mg/dL  8.6  9.0  9.1   GLUCOSE mg/dL  95  99  90         Scheduled Meds:     aspirin  mg Oral Daily   Or      aspirin 300 mg Rectal Daily   atorvastatin 80 mg Oral Nightly   clopidogrel 75 mg Oral Daily   hydrochlorothiazide 12.5 mg Oral Daily   sodium chloride 3 mL Intravenous Q12H     PRN Meds:   ondansetron  •  sodium chloride  Continuous Infusions:    sodium chloride 75 " mL/hr Last Rate: 75 mL/hr (12/10/18 1003)       Assessment/Plan   Active Hospital Problems    Diagnosis Date Noted   • **CVA (cerebral vascular accident) (CMS/HCC) [I63.9] 11/29/2018   • Cerebral aneurysm, nonruptured [I67.1] 10/18/2018   • PFO (patent foramen ovale) [Q21.1] 10/09/2018   • History of stroke [Z86.73] 10/24/2017   • Obstructive sleep apnea syndrome [G47.33] 08/23/2017   • Benign prostatic hypertrophy (BPH) with weak urinary stream [N40.1, R39.12] 11/01/2016   • Vitamin B12 deficiency [E53.8] 11/01/2016      Resolved Hospital Problems   No resolved problems to display.       Assessment & Plan    - MRI shows acute stroke  -  Plans for PFO closure tomorrow noted  - Continue aspirin and Plavix, he does not wish to take atorvastatin given adverse effects previously with this medication  - Blood pressure stable.  Continue present management.    Dispo  Possibly home tomorrow after PFO closure      Kieran Valadez MD  [facility]  12/11/18  11:05 AM

## 2018-12-11 NOTE — PROGRESS NOTES
DOS: 2018  NAME: Ronnell Rizvi  : 1950  PCP: Chase Haque MD  CC: right sided weakness    Stroke    Subjective:  No acute events overnight, plans for PFO closure tomorrow. Patient sitting in recliner.  States he feels the same today, no new deficits.    Objective:  Vital signs:      Vitals:    12/10/18 1757 12/10/18 2221 18 0626 18 0806   BP: 123/83  136/76 124/82   BP Location: Left arm  Right arm    Patient Position: Sitting  Lying    Pulse: 74 74 64    Resp:    Temp: 97.5 °F (36.4 °C) 97.6 °F (36.4 °C) 97.4 °F (36.3 °C) 97.5 °F (36.4 °C)   TempSrc: Oral Oral Oral    SpO2: 96%  94%    Weight:       Height:           Current Facility-Administered Medications:   •  aspirin EC tablet 325 mg, 325 mg, Oral, Daily, 325 mg at 18 0816 **OR** aspirin suppository 300 mg, 300 mg, Rectal, Daily, Krunal Huerta MD  •  atorvastatin (LIPITOR) tablet 80 mg, 80 mg, Oral, Nightly, Brandy Stanford MD, 80 mg at 18 2338  •  clopidogrel (PLAVIX) tablet 75 mg, 75 mg, Oral, Daily, Brandy Stanford MD, 75 mg at 18 0816  •  hydrochlorothiazide (HYDRODIURIL) tablet 12.5 mg, 12.5 mg, Oral, Daily, Brandy Stanford MD, 12.5 mg at 18 0815  •  ondansetron (ZOFRAN) injection 4 mg, 4 mg, Intravenous, Q6H PRN, Brandy Stanford MD  •  sodium chloride 0.9 % flush 3 mL, 3 mL, Intravenous, Q12H, Brandy Stanford MD, 3 mL at 18 0815  •  sodium chloride 0.9 % flush 3-10 mL, 3-10 mL, Intravenous, PRN, Brandy Stanford MD  •  sodium chloride 0.9 % infusion, 75 mL/hr, Intravenous, Continuous, Brandy Stanford MD, Last Rate: 75 mL/hr at 12/10/18 1003, 75 mL/hr at 12/10/18 1003    PRN meds  ondansetron  •  sodium chloride    General appearance: well groomed, well nourished, alert and cooperative, NAD.  HEENT: Normocephalic, atraumatic, no masses or tenderness  COR: RRR  Resp: Even and unlabored, clear to auscultation in all 4 lung fields.  Extremities: Equal pulses, no edema.   Skin: warm, dry, no lesions  or rashes     Neurological:   MS: AO x4. Language normal. No neglect. Higher integrative function normal  CN: II-XII normal  Motor: 5/5 strength in all 4 ext., normal tone  Reflexes: 1+ reflexes in all 4 ext.   Sensory: normal light touch sensation in all 4 ext.  Coordination: Normal finger to nose test, no dysmetria. Normal heel to shin test.    Physical exam performed, changes noted.      Laboratory results:  Lab Results   Component Value Date    TSH 1.710 12/09/2018     Lab Results   Component Value Date    HGBA1C 5.10 12/09/2018     Lab Results   Component Value Date    FSEDAZUD76 838 12/09/2018     Lab Results   Component Value Date    CHOL 168 12/09/2018    CHOL 142 02/20/2018    CHOL 171 10/11/2017    CHLPL 174 11/01/2016    CHLPL 170 07/21/2015     Lab Results   Component Value Date    TRIG 75 12/09/2018    TRIG 75 02/20/2018    TRIG 78 10/11/2017     Lab Results   Component Value Date    HDL 46 12/09/2018    HDL 51 02/20/2018    HDL 47 10/11/2017     Lab Results   Component Value Date     (H) 12/09/2018    LDL 76 02/20/2018     (H) 10/11/2017     Lab Results   Component Value Date    GLUCOSE 95 12/10/2018    BUN 16 12/10/2018    CREATININE 0.96 12/10/2018    EGFRIFNONA 78 12/10/2018    EGFRIFAFRI 76 11/01/2016    BCR 16.7 12/10/2018    K 3.9 12/10/2018    CO2 26.0 12/10/2018    CALCIUM 8.6 12/10/2018    PROTENTOTREF 6.6 02/20/2018    ALBUMIN 3.70 12/09/2018    LABIL2 1.3 02/20/2018    AST 19 12/09/2018    ALT 25 12/09/2018       Review and interpretation of imaging:  Ct Head Without Contrast    Result Date: 12/9/2018  1. No clearly acute intracranial process. No evidence of acute intracranial hemorrhage. 2. Imaging findings most characteristic of sequela of chronic ischemic small vessel disease. Chronic infarct in the left parieto-occipital region. 3. Preliminary report provided by Dr. Correa at approximately 1502 hours 12/08/2018.  This report was finalized on 12/9/2018 8:05 AM by Dr. Dee  MD Esteban.      Mri Angiogram Head Without Contrast    Result Date: 12/10/2018  1. A 9 mm acute or subacute infarct in the left corona radiata. 2. Old infarct in the left occipital lobe. 3. Moderate changes of bilateral small vessel white matter ischemic disease. 4. Hypoplastic A1 segment of the right anterior cerebral artery and moderately severe narrowing of the P2 segment of the right posterior cerebral artery. 3. No aneurysm is seen. Tiny infundibulum is seen in the left posterior internal carotid artery near the posterior communicating artery origin. 6. Findings were called to the [facility] [employer] [facility].  This report was finalized on 12/10/2018 1:21 PM by Dr. Cayden Goddard M.D.      Mri Angiogram Neck With & Without Contrast    Result Date: 12/10/2018  1. A 9 mm acute or subacute infarct in the left corona radiata. 2. Old infarct in the left occipital lobe. 3. Moderate changes of bilateral small vessel white matter ischemic disease. 4. Hypoplastic A1 segment of the right anterior cerebral artery and moderately severe narrowing of the P2 segment of the right posterior cerebral artery. 3. No aneurysm is seen. Tiny infundibulum is seen in the left posterior internal carotid artery near the posterior communicating artery origin. 6. Findings were called to the  [facility].  This report was finalized on 12/10/2018 1:21 PM by Dr. Cayden Goddard M.D.      Mri Brain With & Without Contrast    Result Date: 12/10/2018  1. A 9 mm acute or subacute infarct in the left corona radiata. 2. Old infarct in the left occipital lobe. 3. Moderate changes of bilateral small vessel white matter ischemic disease. 4. Hypoplastic A1 segment of the right anterior cerebral artery and moderately severe narrowing of the P2 segment of the right posterior cerebral artery. 3. No aneurysm is seen. Tiny infundibulum is seen in the left posterior internal carotid artery near the posterior communicating artery origin. 6. Findings  were called to the [facility] nurses [facility].  This report was finalized on 12/10/2018 1:21 PM by Dr. Cayden Goddard M.D.      Xr Chest 1 View    Result Date: 12/9/2018  1. Chronic lung changes. No definite superimposed active disease or significant change.  This report was finalized on 12/9/2018 8:59 AM by Dr. Luiz Orellana MD.         12/10/2018 2D Echo interpretation  · Calculated EF = 61%.  · Left ventricular systolic function is normal.  · Left ventricular diastolic dysfunction (grade I) consistent with impaired relaxation.  · Mild aortic valve regurgitation is present.  · Saline test results are positive with valsalva manuever for right to left atrial level shunt.  · Small patent foramen ovale present.      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  Patient states he was trying to put on his pants and was having a difficult time.  He also could not use his right hand to open a jar or drive.  He is on Plavix at home and was on ASA but reportedly has poor tolerance to it stating that it makes his stomach hurt. His P2Y12 was 149 on 12/9. CT head showed no acute intracranial hemorrhage, chronic ischemic small vessel disease, and a chronic infarct in the left occipitoparietal region. MRI/MRA head/neck showed a 9mm acute/subacute infarct in the left corona radiata, hypoplastic A1 segment of the right GURMEET, and moderately severe narrowing of the P2 segment of the right PCA.      Etiology of stroke likely cardioemoblic given patient's prior cortical left occipitoparietal stroke and PFO. He previously wore a ZIO patch after last stroke which showed a nonsustained atrial tachycardia and did not warrant A/C.  He also has a LINQ device that did not have any evidence of afib. Per Cardiology, they will leave this device in and will  remove if warranted at a later date.     Cardiology following patient, patient will undergo a PFO closure tomorrow. Okay to have IV heparin during procedure. Recommend staying on EC ASA 81mg and Plavix 75mg post op.  He is currently tolerating EC ASA and agrees to continue to take it.      Assessment/Plan:  Continue EC ASA 81mg and Plavix 75mg post op.   Continue Lipitor 80mg, if patient unable to take Lipitor for previous adverse effects Recommend Crestor 20mg  Neurochecks per stroke protocol  Normalize BP, encourage hydration.  Stroke Education  BEST/SCDs  PT/OT/ST  Follow up in neurology clinic in 3 months with Anne CHE.  Will sign off, will see again per request.      Plan discussed with patient and Dr. Galvan, agrees with plan above

## 2018-12-11 NOTE — PLAN OF CARE
Problem: Patient Care Overview  Goal: Plan of Care Review  Outcome: Ongoing (interventions implemented as appropriate)   12/10/18 1238 12/10/18 1833   Coping/Psychosocial   Plan of Care Reviewed With patient --    Plan of Care Review   Progress no change --    OTHER   Outcome Summary --  Pt alert and oriented x3. No deficits noted, NIH =0, up ad mauro, echo completed. Pt will have PFO repair on Wed. Pt aware.      Goal: Individualization and Mutuality  Outcome: Ongoing (interventions implemented as appropriate)    Goal: Discharge Needs Assessment  Outcome: Ongoing (interventions implemented as appropriate)    Goal: Interprofessional Rounds/Family Conf  Outcome: Ongoing (interventions implemented as appropriate)      Problem: Fall Risk (Adult)  Goal: Identify Related Risk Factors and Signs and Symptoms  Outcome: Ongoing (interventions implemented as appropriate)    Goal: Absence of Fall  Outcome: Ongoing (interventions implemented as appropriate)      Problem: Stroke (Ischemic) (Adult)  Goal: Signs and Symptoms of Listed Potential Problems Will be Absent, Minimized or Managed (Stroke)  Outcome: Ongoing (interventions implemented as appropriate)

## 2018-12-11 NOTE — PLAN OF CARE
Problem: Patient Care Overview  Goal: Plan of Care Review  Outcome: Ongoing (interventions implemented as appropriate)   12/11/18 0312   Coping/Psychosocial   Plan of Care Reviewed With patient   Plan of Care Review   Progress improving   OTHER   Outcome Summary No acute changes noted overnight. PFO repair scheduled for Wednesday. VSS.       Problem: Stroke (Ischemic) (Adult)  Goal: Signs and Symptoms of Listed Potential Problems Will be Absent, Minimized or Managed (Stroke)  Outcome: Ongoing (interventions implemented as appropriate)

## 2018-12-11 NOTE — PLAN OF CARE
Problem: Patient Care Overview  Goal: Plan of Care Review  Outcome: Ongoing (interventions implemented as appropriate)   12/11/18 0312 12/11/18 0808 12/11/18 1732   Coping/Psychosocial   Plan of Care Reviewed With --  patient --    Plan of Care Review   Progress improving --  --    OTHER   Outcome Summary --  --  Pt. remains A&Ox4 throughout shift, Up ad mauro, NPO at midnight pending PFO repair; consent signed and in chart

## 2018-12-12 LAB
ACT BLD: 158 SECONDS (ref 82–152)
ACT BLD: 186 SECONDS (ref 82–152)

## 2018-12-12 PROCEDURE — C1769 GUIDE WIRE: HCPCS | Performed by: INTERNAL MEDICINE

## 2018-12-12 PROCEDURE — 85347 COAGULATION TIME ACTIVATED: CPT

## 2018-12-12 PROCEDURE — C1759 CATH, INTRA ECHOCARDIOGRAPHY: HCPCS | Performed by: INTERNAL MEDICINE

## 2018-12-12 PROCEDURE — 93662 INTRACARDIAC ECG (ICE): CPT | Performed by: INTERNAL MEDICINE

## 2018-12-12 PROCEDURE — 25010000002 FENTANYL CITRATE (PF) 100 MCG/2ML SOLUTION: Performed by: INTERNAL MEDICINE

## 2018-12-12 PROCEDURE — 25010000003 CEFAZOLIN 1-4 GM/50ML-% SOLUTION: Performed by: INTERNAL MEDICINE

## 2018-12-12 PROCEDURE — 25010000002 HEPARIN (PORCINE) PER 1000 UNITS: Performed by: INTERNAL MEDICINE

## 2018-12-12 PROCEDURE — C1892 INTRO/SHEATH,FIXED,PEEL-AWAY: HCPCS | Performed by: INTERNAL MEDICINE

## 2018-12-12 PROCEDURE — 25010000002 MIDAZOLAM PER 1 MG: Performed by: INTERNAL MEDICINE

## 2018-12-12 PROCEDURE — C1894 INTRO/SHEATH, NON-LASER: HCPCS | Performed by: INTERNAL MEDICINE

## 2018-12-12 PROCEDURE — 99153 MOD SED SAME PHYS/QHP EA: CPT | Performed by: INTERNAL MEDICINE

## 2018-12-12 PROCEDURE — C1817 SEPTAL DEFECT IMP SYS: HCPCS | Performed by: INTERNAL MEDICINE

## 2018-12-12 PROCEDURE — 93580 TRANSCATH CLOSURE OF ASD: CPT | Performed by: INTERNAL MEDICINE

## 2018-12-12 PROCEDURE — 99152 MOD SED SAME PHYS/QHP 5/>YRS: CPT | Performed by: INTERNAL MEDICINE

## 2018-12-12 PROCEDURE — 02U53JZ SUPPLEMENT ATRIAL SEPTUM WITH SYNTHETIC SUBSTITUTE, PERCUTANEOUS APPROACH: ICD-10-PCS | Performed by: INTERNAL MEDICINE

## 2018-12-12 DEVICE — OCCL PFO AMPLATZER CVR 45D 8F 25MM: Type: IMPLANTABLE DEVICE | Status: FUNCTIONAL

## 2018-12-12 RX ORDER — CEFAZOLIN SODIUM 1 G/50ML
INJECTION, SOLUTION INTRAVENOUS CONTINUOUS PRN
Status: COMPLETED | OUTPATIENT
Start: 2018-12-12 | End: 2018-12-12

## 2018-12-12 RX ORDER — SODIUM CHLORIDE 9 MG/ML
250 INJECTION, SOLUTION INTRAVENOUS ONCE AS NEEDED
Status: DISCONTINUED | OUTPATIENT
Start: 2018-12-12 | End: 2018-12-13 | Stop reason: HOSPADM

## 2018-12-12 RX ORDER — CLOPIDOGREL BISULFATE 75 MG/1
TABLET ORAL AS NEEDED
Status: DISCONTINUED | OUTPATIENT
Start: 2018-12-12 | End: 2018-12-12 | Stop reason: HOSPADM

## 2018-12-12 RX ORDER — FENTANYL CITRATE 50 UG/ML
INJECTION, SOLUTION INTRAMUSCULAR; INTRAVENOUS AS NEEDED
Status: DISCONTINUED | OUTPATIENT
Start: 2018-12-12 | End: 2018-12-12 | Stop reason: HOSPADM

## 2018-12-12 RX ORDER — HYDROCODONE BITARTRATE AND ACETAMINOPHEN 5; 325 MG/1; MG/1
1 TABLET ORAL EVERY 4 HOURS PRN
Status: DISCONTINUED | OUTPATIENT
Start: 2018-12-12 | End: 2018-12-13 | Stop reason: HOSPADM

## 2018-12-12 RX ORDER — HEPARIN SODIUM 1000 [USP'U]/ML
INJECTION, SOLUTION INTRAVENOUS; SUBCUTANEOUS AS NEEDED
Status: DISCONTINUED | OUTPATIENT
Start: 2018-12-12 | End: 2018-12-12 | Stop reason: HOSPADM

## 2018-12-12 RX ORDER — SODIUM CHLORIDE 9 MG/ML
50 INJECTION, SOLUTION INTRAVENOUS CONTINUOUS
Status: ACTIVE | OUTPATIENT
Start: 2018-12-12 | End: 2018-12-13

## 2018-12-12 RX ORDER — ASPIRIN 325 MG
TABLET ORAL AS NEEDED
Status: DISCONTINUED | OUTPATIENT
Start: 2018-12-12 | End: 2018-12-12 | Stop reason: HOSPADM

## 2018-12-12 RX ORDER — ACETAMINOPHEN 325 MG/1
650 TABLET ORAL EVERY 4 HOURS PRN
Status: DISCONTINUED | OUTPATIENT
Start: 2018-12-12 | End: 2018-12-13 | Stop reason: HOSPADM

## 2018-12-12 RX ORDER — LIDOCAINE HYDROCHLORIDE 20 MG/ML
INJECTION, SOLUTION INFILTRATION; PERINEURAL AS NEEDED
Status: DISCONTINUED | OUTPATIENT
Start: 2018-12-12 | End: 2018-12-12 | Stop reason: HOSPADM

## 2018-12-12 RX ORDER — MIDAZOLAM HYDROCHLORIDE 1 MG/ML
INJECTION INTRAMUSCULAR; INTRAVENOUS AS NEEDED
Status: DISCONTINUED | OUTPATIENT
Start: 2018-12-12 | End: 2018-12-12 | Stop reason: HOSPADM

## 2018-12-12 RX ORDER — TEMAZEPAM 15 MG/1
15 CAPSULE ORAL NIGHTLY PRN
Status: DISCONTINUED | OUTPATIENT
Start: 2018-12-12 | End: 2018-12-13 | Stop reason: HOSPADM

## 2018-12-12 RX ORDER — ONDANSETRON 4 MG/1
4 TABLET, ORALLY DISINTEGRATING ORAL EVERY 6 HOURS PRN
Status: DISCONTINUED | OUTPATIENT
Start: 2018-12-12 | End: 2018-12-13 | Stop reason: HOSPADM

## 2018-12-12 RX ORDER — ONDANSETRON 2 MG/ML
4 INJECTION INTRAMUSCULAR; INTRAVENOUS EVERY 6 HOURS PRN
Status: DISCONTINUED | OUTPATIENT
Start: 2018-12-12 | End: 2018-12-13 | Stop reason: HOSPADM

## 2018-12-12 RX ORDER — ONDANSETRON 4 MG/1
4 TABLET, FILM COATED ORAL EVERY 6 HOURS PRN
Status: DISCONTINUED | OUTPATIENT
Start: 2018-12-12 | End: 2018-12-13 | Stop reason: HOSPADM

## 2018-12-12 RX ADMIN — ATORVASTATIN CALCIUM 80 MG: 80 TABLET, FILM COATED ORAL at 20:54

## 2018-12-12 RX ADMIN — SODIUM CHLORIDE, PRESERVATIVE FREE 3 ML: 5 INJECTION INTRAVENOUS at 21:04

## 2018-12-12 NOTE — PROGRESS NOTES
" LOS: 1 day     Name: Ronnell Rizvi  Age: 68 y.o.  Sex: male  :  1950  MRN: [id number]         Primary Care Physician: Chase Haque MD    Subjective   Subjective  Patient seen this morning.  NO complaints at the time of my visit and awaiting PFO closure.    Objective   Vital Signs  Temp:  [97.5 °F (36.4 °C)-98.1 °F (36.7 °C)] 97.5 °F (36.4 °C)  Heart Rate:  [54-64] 62  Resp:  [14-20] 16  BP: (138-172)/(73-89) 158/87  Body mass index is 23.93 kg/m².    Objective:  General Appearance:  Comfortable and in no acute distress.    Vital signs: (most recent): Blood pressure 158/87, pulse 62, temperature 97.5 °F (36.4 °C), temperature source Oral, resp. rate 16, height 184.2 cm (72.5\"), weight 81.1 kg (178 lb 14.4 oz), SpO2 97 %.    Lungs:  Normal effort and normal respiratory rate.    Heart: Normal rate.  Regular rhythm.    Abdomen: Abdomen is soft.  Bowel sounds are normal.   There is no abdominal tenderness.     Extremities: There is no dependent edema or local swelling.    Neurological: Patient is alert and oriented to person, place and time.    Skin:  Warm and dry.              Results Review:       I reviewed the patient's new clinical results.    Results from last 7 days   Lab Units  12/10/18   0456  18   0527  18   1423   WBC 10*3/mm3  6.00  5.16  6.18   HEMOGLOBIN g/dL  15.3  15.3  16.6   PLATELETS 10*3/mm3  209  208  254     Results from last 7 days   Lab Units  12/10/18   0456  18   0527  18   1423   SODIUM mmol/L  140  141  139   POTASSIUM mmol/L  3.9  4.1  3.7   CHLORIDE mmol/L  105  105  100   CO2 mmol/L  26.0  27.7  28.9   BUN mg/dL  16  15  13   CREATININE mg/dL  0.96  1.02  1.01   CALCIUM mg/dL  8.6  9.0  9.1   GLUCOSE mg/dL  95  99  90                 Scheduled Meds:     [MAR Hold] aspirin  mg Oral Daily   Or      [MAR Hold] aspirin 300 mg Rectal Daily   [MAR Hold] atorvastatin 80 mg Oral Nightly   [MAR Hold] clopidogrel 75 mg Oral Daily   [MAR Hold] " hydrochlorothiazide 12.5 mg Oral Daily   [MAR Hold] sodium chloride 3 mL Intravenous Q12H     PRN Meds:   •  acetaminophen  •  atropine  •  HYDROcodone-acetaminophen  •  ondansetron **OR** ondansetron ODT **OR** ondansetron  •  [MAR Hold] sodium chloride  •  sodium chloride  •  temazepam  Continuous Infusions:    sodium chloride 75 mL/hr Last Rate: 75 mL/hr (12/10/18 1003)   sodium chloride 50 mL/hr        Assessment/Plan   Active Hospital Problems    Diagnosis Date Noted   • **CVA (cerebral vascular accident) (CMS/HCC) [I63.9] 11/29/2018   • Cerebrovascular accident (CVA) (CMS/HCC) [I63.9] 11/29/2018   • Cerebral aneurysm, nonruptured [I67.1] 10/18/2018   • PFO (patent foramen ovale) [Q21.1] 10/09/2018   • History of stroke [Z86.73] 10/24/2017   • Obstructive sleep apnea syndrome [G47.33] 08/23/2017   • Benign prostatic hypertrophy (BPH) with weak urinary stream [N40.1, R39.12] 11/01/2016   • Vitamin B12 deficiency [E53.8] 11/01/2016      Resolved Hospital Problems   No resolved problems to display.       Assessment & Plan    - MRI shows acute stroke  -  Plans for PFO closure today  - Continue aspirin and Plavix, he does not wish to take atorvastatin given adverse effects previously with this medication  - Blood pressure stable.  Continue present management.             Kieran Valadez MD  [facility]  12/12/18  6:48 PM

## 2018-12-12 NOTE — DISCHARGE INSTRUCTIONS
Discharge Instructions     1. Go home, rest and take it easy today.      2. You may experience some dizziness or memory loss from the anesthesia.  This may last for the next 24 hours.  Someone should plan on staying with you for the first 24 hours for your safety.    3. Do not make any important legal decisions or sign any legal papers for the next 24 hours.      4. Eat and drink lightly today.  Start off with liquids, jello, soup, crackers or other bland foods at first. You may advance your diet tomorrow as tolerated as long as you do not experience any nausea or vomiting.    5. You may resume routine medications including blood thinners. However, Glucophage should be not be started for 72 hours after the dye is given.      6. No lifting over 10 pounds and no strenuous activity for the next 2-3 days.    7. Try not to strain or bear down when your bowels move or when you empty your bladder.    8. Limit going up and down steps for 2 days.    9. No driving for the remainder of the day.  You may resume limited driving tomorrow if necessary.     10.  You may shower tomorrow.  No bath or hot tubs for at least 2 days after the procedure.          11. Leave the pressure dressing on until tomorrow morning.  You may then take this off, as well as the small see through dressing with gauze tomorrow.  If it doesn’t come off easily, do not pull this off.  If it is stuck, shower and let the warm water loosen it before removal.       12. Check your groin dressing regularly for bleeding through the dressing (under the pressure dressing).  A small amount of blood contained by the gauze is normal; the whole dressing should not be filled with blood or leaking out under the sides.     13. If you experience bleeding through the gauze/clear sticky dressing, lay flat and have someone apply direct pressure for 15 minutes.  If bleeding has stopped after this, you may put a clean gauze and tape over the area.  Continue to lie flat for  1-2 hours.  If bleeding continues after 15 minutes of pressure, call us at the number listed above.  There is an MD available after hours.      14. If you experience heavy bleeding or large swelling, continue to hold firm pressure and              call 911.  Do not call the MD on call.     15.  If you experience pain or discomfort you may take Tylenol or Ibuprofen, whichever you normally use for minor discomfort, unless otherwise instructed.       16.  If the MD gives you a prescription for narcotic pain pills (Tylenol #3, Vicodin, Hydrocodone, Oxycodone or Percocet), you cannot drive a vehicle or operate machinery while taking these.    17.  Severe pain is not expected after this procedure.  If you experience severe pain, call you doctor.    18.  Remember to contact our office for any of the following:    • Fever > 101 degrees  • Severe pain that cannot be controlled by taking your pain pills  • Severe nausea or vomiting   • Significant bleeding of your incisions  • Drainage that has a bad smell or is yellow or green in appearance  • Any other questions or concerns

## 2018-12-12 NOTE — PLAN OF CARE
Problem: Patient Care Overview  Goal: Plan of Care Review  Outcome: Ongoing (interventions implemented as appropriate)   12/12/18 0139   Coping/Psychosocial   Plan of Care Reviewed With patient   Plan of Care Review   Progress improving   OTHER   Outcome Summary No acute changes noted overnight. Rests peacefully through the night. Having PFO repair w/ Dr. Harris tomorrow.       Problem: Stroke (Ischemic) (Adult)  Goal: Signs and Symptoms of Listed Potential Problems Will be Absent, Minimized or Managed (Stroke)  Outcome: Ongoing (interventions implemented as appropriate)

## 2018-12-13 VITALS
SYSTOLIC BLOOD PRESSURE: 124 MMHG | DIASTOLIC BLOOD PRESSURE: 74 MMHG | HEIGHT: 73 IN | RESPIRATION RATE: 18 BRPM | BODY MASS INDEX: 23.71 KG/M2 | TEMPERATURE: 97.9 F | HEART RATE: 74 BPM | WEIGHT: 178.9 LBS | OXYGEN SATURATION: 93 %

## 2018-12-13 LAB — ACT BLD: 224 SECONDS (ref 82–152)

## 2018-12-13 PROCEDURE — 99232 SBSQ HOSP IP/OBS MODERATE 35: CPT | Performed by: NURSE PRACTITIONER

## 2018-12-13 RX ORDER — ASPIRIN 81 MG/1
81 TABLET ORAL DAILY
Status: DISCONTINUED | OUTPATIENT
Start: 2018-12-13 | End: 2018-12-13 | Stop reason: HOSPADM

## 2018-12-13 RX ORDER — ASPIRIN 81 MG/1
81 TABLET ORAL DAILY
Start: 2018-12-13 | End: 2019-10-15

## 2018-12-13 RX ADMIN — HYDROCHLOROTHIAZIDE 12.5 MG: 25 TABLET ORAL at 08:33

## 2018-12-13 RX ADMIN — CLOPIDOGREL 75 MG: 75 TABLET, FILM COATED ORAL at 08:33

## 2018-12-13 RX ADMIN — ASPIRIN 325 MG: 325 TABLET, COATED ORAL at 08:33

## 2018-12-13 RX ADMIN — SODIUM CHLORIDE, PRESERVATIVE FREE 3 ML: 5 INJECTION INTRAVENOUS at 08:33

## 2018-12-13 NOTE — PROGRESS NOTES
Case Management Discharge Note    Final Note: home    Destination      No service has been selected for the patient.      Durable Medical Equipment      No service has been selected for the patient.      Dialysis/Infusion      No service has been selected for the patient.      Home Medical Care      No service has been selected for the patient.      Community Resources      No service has been selected for the patient.        Other: (car)    Final Discharge Disposition Code: 01 - home or self-care

## 2018-12-13 NOTE — DISCHARGE SUMMARY
Date of Admission: 12/8/2018  Date of Discharge:  12/13/2018  Primary Care Physician: Chase Haque MD     Discharge Diagnosis:  Active Hospital Problems    Diagnosis Date Noted   • **CVA (cerebral vascular accident) (CMS/HCC) [I63.9] 11/29/2018   • Cerebrovascular accident (CVA) (CMS/HCC) [I63.9] 11/29/2018   • Cerebral aneurysm, nonruptured [I67.1] 10/18/2018   • PFO (patent foramen ovale) [Q21.1] 10/09/2018   • History of stroke [Z86.73] 10/24/2017   • Obstructive sleep apnea syndrome [G47.33] 08/23/2017   • Benign prostatic hypertrophy (BPH) with weak urinary stream [N40.1, R39.12] 11/01/2016   • Vitamin B12 deficiency [E53.8] 11/01/2016      Resolved Hospital Problems   No resolved problems to display.       DETAILS OF HOSPITAL STAY     Pertinent Test Results and Procedures Performed  Brain MRI with MRA of the head and neck:  1. A 9 mm acute or subacute infarct in the left corona radiata.  2. Old infarct in the left occipital lobe.  3. Moderate changes of bilateral small vessel white matter ischemic  disease.  4. Hypoplastic A1 segment of the right anterior cerebral artery and  moderately severe narrowing of the P2 segment of the right posterior  cerebral artery.  3. No aneurysm is seen. Tiny infundibulum is seen in the left posterior  internal carotid artery near the posterior communicating artery origin.  6. Findings were called to the [facility] nurses station.    Head CT:  1. No clearly acute intracranial process. No evidence of acute  intracranial hemorrhage.  2. Imaging findings most characteristic of sequela of chronic ischemic  small vessel disease. Chronic infarct in the left parieto-occipital  region.  3. Preliminary report provided by Dr. Correa at approximately 1502 hours  12/08/2018.    Transthoracic echocardiogram:  · Calculated EF = 61%.  · Left ventricular systolic function is normal.  · Left ventricular diastolic dysfunction (grade I) consistent with impaired relaxation.  · Mild aortic valve  regurgitation is present.  · Saline test results are positive with valsalva manuever for right to left atrial level shunt.  · Small patent foramen ovale present.    Chest x-ray:  1. Chronic lung changes. No definite superimposed active disease or  significant change.    The patient had PFO closure on 12/12/18    Hospital course  This is a very pleasant 68-year-old male who presented to the emergency room after noticing sudden onset weakness of the right arm and leg.  Please see H&P for full details of admission.  He underwent further workup with brain MRI and MRA of the head and neck as described above which showed an acute stroke in the left corona radiata.  The patient was evaluated by neurology and felt that this was a cryptogenic stroke and possibly due to his PFO.  Cardiology was consulted who was already following him in the outpatient setting and had planned for PFO closure next week.  Due to his new stroke the decision was made to move up closure and this was performed today.  The patient will be maintained on low-dose aspirin and Plavix.  The patient refused statin therapy.  His neurologic symptoms have resolved and now that the PFO has been closed he is ready for discharge home today.  He will follow-up in the neurology clinic in 3 months.        Physical Exam at Discharge:  General: No acute distress, AAOx3  HEENT: EOMI, PERRL  Cardiovascular: +s1 and s2, RRR  Lungs: No rhonchi or wheezing  Abdomen: soft, nontender    Consults:   Consults     Date and Time Order Name Status Description    12/9/2018 1341 Inpatient Cardiology Consult Completed     12/8/2018 2050 Inpatient Neurology Consult Stroke Completed     12/8/2018 1851 Inpatient Neurology Consult Stroke              Condition on Discharge: Stable, improved    Discharge Disposition  Home or Self Care    Discharge Medications     Discharge Medications      New Medications      Instructions Start Date   aspirin 81 MG EC tablet   81 mg, Oral, Daily          Continue These Medications      Instructions Start Date   B-12 1000 MCG/ML kit   250 mcg, Injection, Weekly, Pt. Stated he takes this medication weekly 250 mcg, pt reports took it last friday      clopidogrel 75 MG tablet  Commonly known as:  PLAVIX   75 mg, Oral, Daily      hydrochlorothiazide 12.5 MG tablet  Commonly known as:  HYDRODIURIL   12.5 mg, Oral, Daily             Discharge Diet:   Diet Instructions     Diet: Regular; Thin      Discharge Diet:  Regular    Fluid Consistency:  Thin          Activity at Discharge:   Activity Instructions     Activity as Tolerated            Follow-up Appointments  Future Appointments   Date Time Provider Department Center   1/3/2019  3:45 PM Sole Day APRN [facility] None   4/9/2019 10:30 AM Vivian Tran APRN [facility] None     Additional Instructions for the Follow-ups that You Need to Schedule     Discharge Follow-up with PCP   As directed       Currently Documented PCP:    Chase Haque MD    PCP Phone Number:    [phone]     Follow Up Details:  1-2 weeks         Discharge Follow-up with Specialty: celestino; 1 Month   As directed      Specialty:  celestino    Follow Up:  1 Month         Discharge Follow-up with Specified Provider: CHINEDU Garcia of Neurology   As directed      To:  CHINEDU Garcia of Neurology               I have examined and discussed discharge planning with the patient today.     Kieran Valadez MD  12/13/18  10:46 AM    Time: Discharge greater than 30 min

## 2018-12-13 NOTE — PROGRESS NOTES
"    Patient Name: Ronnell Rizvi  :1950  68 y.o.      Patient Care Team:  Chase Haque MD as PCP - General (Internal Medicine)  Chase Haque MD as PCP - Claims Attributed  Gregory King MD as Consulting Physician (Hematology and Oncology)  Chase Haque MD as Referring Physician (Internal Medicine)  Luis Wyatt MD as Consulting Physician (Cardiology)    Chief Complaint:  Follow up stroke, PFO    Interval History: s/p PFO closure. Neurologically intact. He feels well.        Objective   Vital Signs  Temp:  [97.5 °F (36.4 °C)-98.4 °F (36.9 °C)] 97.9 °F (36.6 °C)  Heart Rate:  [54-74] 74  Resp:  [14-20] 18  BP: (124-172)/(74-89) 124/74    Intake/Output Summary (Last 24 hours) at 2018 1029  Last data filed at 2018 1840  Gross per 24 hour   Intake 370 ml   Output --   Net 370 ml     Flowsheet Rows      First Filed Value   Admission Height  184.2 cm (72.5\") Documented at 2018 1853   Admission Weight  81.1 kg (178 lb 14.4 oz) Documented at 2018 1853          Physical Exam:   General Appearance:    Alert, cooperative, in no acute distress   Lungs:     Clear to auscultation.  Normal respiratory effort and rate.      Heart:    Regular rhythm and normal rate, normal S1 and S2, no murmurs, gallops or rubs.     Chest Wall:    No abnormalities observed   Abdomen:     Soft, nontender, positive bowel sounds.     Extremities:   no cyanosis, clubbing or edema.  No marked joint deformities.  Adequate musculoskeletal strength.    Bilateral fem sites - soft, not hematoma     Results Review:    Results from last 7 days   Lab Units  12/10/18   0456   SODIUM mmol/L  140   POTASSIUM mmol/L  3.9   CHLORIDE mmol/L  105   CO2 mmol/L  26.0   BUN mg/dL  16   CREATININE mg/dL  0.96   GLUCOSE mg/dL  95   CALCIUM mg/dL  8.6     Results from last 7 days   Lab Units  18   1423   TROPONIN T ng/mL  <0.010     Results from last 7 days   Lab Units  12/10/18   0456   WBC 10*3/mm3  6.00 "   HEMOGLOBIN g/dL  15.3   HEMATOCRIT %  47.0   PLATELETS 10*3/mm3  209             Results from last 7 days   Lab Units  12/09/18   0527   CHOLESTEROL mg/dL  168   TRIGLYCERIDES mg/dL  75   HDL CHOL mg/dL  46   LDL CHOL mg/dL  107*               Medication Review:     aspirin  mg Oral Daily   Or      aspirin 300 mg Rectal Daily   atorvastatin 80 mg Oral Nightly   clopidogrel 75 mg Oral Daily   hydrochlorothiazide 12.5 mg Oral Daily   sodium chloride 3 mL Intravenous Q12H          sodium chloride 75 mL/hr Last Rate: 75 mL/hr (12/10/18 1003)       Assessment/Plan   1. Cryptogenic stroke in setting of PFO s/p closure by Dr. Harris 12/12 - doing well, no complications.     Cardiac status is stable. He should follow up with Dr. Harris in one month with repeat echocardiogram at that time.  Recommend dual antiplatelet therapy for 6 months,  aspirin lifelong, and antibiotic prophylaxis for dental work for 6 months.     From a cardiac standpoint ASA 81 mg is sufficient with plavix. Per neuro noted on 12/10 81mg acceptable as well.     CHINEDU Caro  [facility]  12/13/18  10:29 AM

## 2018-12-14 ENCOUNTER — READMISSION MANAGEMENT (OUTPATIENT)
Dept: CALL CENTER | Facility: HOSPITAL | Age: 68
End: 2018-12-14

## 2018-12-15 NOTE — OUTREACH NOTE
Prep Survey      Responses   Facility patient discharged from?  [facility]   Is patient eligible?  Yes   Discharge diagnosis  acute CVA   Does the patient have one of the following disease processes/diagnoses(primary or secondary)?  Stroke (TIA)   Does the patient have Home health ordered?  No   Is there a DME ordered?  No   Comments regarding appointments  pt to schedule F/U with PCP   Medication alerts for this patient  ASA started, also on plavix   Prep survey completed?  Yes          Yennifer Correa RN

## 2018-12-17 ENCOUNTER — READMISSION MANAGEMENT (OUTPATIENT)
Dept: CALL CENTER | Facility: HOSPITAL | Age: 68
End: 2018-12-17

## 2018-12-17 NOTE — OUTREACH NOTE
Stroke Week 1 Survey      Responses   Facility patient discharged from?  [facility]   Does the patient have one of the following disease processes/diagnoses(primary or secondary)?  Stroke (TIA)   Is there a successful TCM telephone encounter documented?  No   Week 1 attempt successful?  Yes   Call start time  1104   Call end time  1119   Discharge diagnosis  acute CVA   Is patient permission given to speak with other caregiver?  No   Does the patient have a primary care provider?   Yes   Does the patient have an appointment with their PCP within 7 days of discharge?  No   Comments regarding PCP  Chase Haque MD   Nursing Interventions  Educated patient on importance of making appointment, Advised patient to make appointment   Has the patient kept scheduled appointments due by today?  N/A   Comments  Neurosurgery appt 1/3/19, cardiology appt 1/11/19.    Has home health visited the patient within 72 hours of discharge?  N/A   Psychosocial issues?  No   Does the patient require any assistance with activities of daily living such as eating, bathing, dressing, walking, etc.?  No   Does the patient have any residual symptoms from stroke/TIA?  Yes   Residual symptoms comments  Patient states that he has short term memory problems and difficulty with reading. States these deficits are from previous stroke. States taht the right sided weakness has totally resolved.    Does the patient understand the diet ordered at discharge?  Yes   Did the patient receive a copy of their discharge instructions?  Yes   Nursing interventions  Reviewed instructions with patient   What is the patient's perception of their health status since discharge?  Improving   Nursing interventions  Nurse provided patient education   Is the patient able to teach back FAST for Stroke?  Yes   Is the patient/caregiver able to teach back the risk factors for a stroke?  High blood pressure-goal below 120/80, High Cholesterol, Carotid or other artery  disease, History of TIAs, History of Afib   Is the patient/caregiver able to teach back signs and symptoms related to disease process for when to call PCP?  Yes   Is the patient/caregiver able to teach back signs and symptoms related to disease process for when to call 911?  Yes   Is the patient/caregiver able to teach back the hierarchy of who to call/visit for symptoms/problems? PCP, Specialist, Home health nurse, Urgent Care, ED, 911  Yes   Week 1 call completed?  Yes          Deena Hayes RN

## 2018-12-20 ENCOUNTER — CLINICAL SUPPORT NO REQUIREMENTS (OUTPATIENT)
Dept: CARDIOLOGY | Facility: CLINIC | Age: 68
End: 2018-12-20

## 2018-12-20 DIAGNOSIS — I63.9 CRYPTOGENIC STROKE (HCC): Primary | ICD-10-CM

## 2018-12-20 PROCEDURE — 93298 REM INTERROG DEV EVAL SCRMS: CPT | Performed by: INTERNAL MEDICINE

## 2018-12-20 PROCEDURE — 93299 PR REM INTERROG ICPMS/SCRMS <30 D TECH REVIEW: CPT | Performed by: INTERNAL MEDICINE

## 2018-12-26 ENCOUNTER — READMISSION MANAGEMENT (OUTPATIENT)
Dept: CALL CENTER | Facility: HOSPITAL | Age: 68
End: 2018-12-26

## 2018-12-26 NOTE — OUTREACH NOTE
Stroke Week 2 Survey      Responses   Facility patient discharged from?  [place]   Does the patient have one of the following disease processes/diagnoses(primary or secondary)?  Stroke (TIA)   Week 2 attempt successful?  Yes   Call start time  1108   Call end time  1113   Discharge diagnosis  acute CVA   Meds reviewed with patient/caregiver?  Yes   Is the patient having any side effects they believe may be caused by any medication additions or changes?  No   Does the patient have all medications ordered at discharge?  Yes   Is the patient taking all medications as directed (includes completed medication regime)?  Yes   Has the patient kept scheduled appointments due by today?  Yes   Does the patient require any assistance with activities of daily living such as eating, bathing, dressing, walking, etc.?  No   Does the patient have any residual symptoms from stroke/TIA?  No   Does the patient understand the diet ordered at discharge?  Yes   Is the patient able to teach back FAST for Stroke?  Yes   Additional teach back comments  Pt says he is doing well. Says he has no residule effects from stroke  Dr peck reviewed   Week 2 call completed?  Yes          Alison Tidwell RN

## 2018-12-28 ENCOUNTER — TELEPHONE (OUTPATIENT)
Dept: NEUROLOGY | Facility: CLINIC | Age: 68
End: 2018-12-28

## 2018-12-28 NOTE — TELEPHONE ENCOUNTER
Two Week Stroke Phone Call  Spoke with the patient    · Admission Date: 12/8/2018    · Discharge Date: 12/13/2018    · Discharge Destination: Home    · Meds reviewed with patient/caregiver?    [x]Yes [] No   o Antiplatelet: Aspirin 81mg, Plavix 75mg  - Understands purpose     [x]  Yes     []  No     - Understands how to take      [x]  Yes     []  No    o Cholesterol Reducing: Patient had refused statin therapy    · Is the patient taking all medication as directed?   [x]  Yes  []  No    · Discussed personal risk factors   [x]  Yes []  No     o High blood pressure   - Bp goal <130/80   o High cholesterol   - Review desired LDL goal <70      • Discussed signs and symptoms of stroke and when patient to call 911?   [x]  Yes []  No  o Sudden weakness or numbness of the face, arm, or leg especially on one side of the body  o Sudden confusion, trouble speaking or understanding  o Sudden trouble seeing in one or both eyes   o Sudden trouble walking, dizziness, loss of balance or coordination  o Sudden severe headaches with no known cause      Notified Patient that if any of these symptoms occur to call 911    · Does the patient have any new signs or symptoms of a stroke?   []  Yes     [x]  No    · Does the patietnt have an appointment with PCP?  [x]  Yes     []  No  · Does the patient have 3 month Stroke Clinic appointment?  Office will call to make    · Is the patient currently in therapy, outpatient, or home health?  []  Yes     [x]  No    Needs a referral?      []  Yes     [x]  No      Patient Satisfaction     · How often were you kept up to date with your plan of care?    []Never  [] Sometimes  [x] Usually  [] Always    · When test were ordered how often did someone explain to you the results?    []  Never  [] Sometimes  [x] Usually  [] Always    · What suggestions does the patient have of ways to improve the care to stroke patients?    No suggestions at this time  · Overall how satisfied were you with the stroke care  you received at [facility]?   []  Very Dissatisfied [] Dissatisfied   [] Satisfied [x] Very Satisfied

## 2019-01-03 ENCOUNTER — READMISSION MANAGEMENT (OUTPATIENT)
Dept: CALL CENTER | Facility: HOSPITAL | Age: 69
End: 2019-01-03

## 2019-01-03 ENCOUNTER — OFFICE VISIT (OUTPATIENT)
Dept: NEUROSURGERY | Facility: CLINIC | Age: 69
End: 2019-01-03

## 2019-01-03 VITALS
HEIGHT: 73 IN | WEIGHT: 193 LBS | SYSTOLIC BLOOD PRESSURE: 130 MMHG | HEART RATE: 83 BPM | DIASTOLIC BLOOD PRESSURE: 80 MMHG | BODY MASS INDEX: 25.58 KG/M2 | RESPIRATION RATE: 16 BRPM

## 2019-01-03 DIAGNOSIS — I67.9 INTRACRANIAL VASCULAR STENOSIS: Primary | ICD-10-CM

## 2019-01-03 PROCEDURE — 99214 OFFICE O/P EST MOD 30 MIN: CPT | Performed by: NURSE PRACTITIONER

## 2019-01-03 NOTE — OUTREACH NOTE
Stroke Week 3 Survey      Responses   Facility patient discharged from?  [place]   Does the patient have one of the following disease processes/diagnoses(primary or secondary)?  Stroke (TIA)   Week 3 attempt successful?  Yes   Call start time  1525   Call end time  1527   Discharge diagnosis  acute CVA   Meds reviewed with patient/caregiver?  Yes   Is the patient having any side effects they believe may be caused by any medication additions or changes?  No   Does the patient have all medications ordered at discharge?  Yes   Is the patient taking all medications as directed (includes completed medication regime)?  Yes   Does the patient have a primary care provider?   Yes   Has the patient kept scheduled appointments due by today?  Yes   Comments  Neurosurgery appt 1/3/19, cardiology appt 1/11/19.    Psychosocial issues?  No   Does the patient require any assistance with activities of daily living such as eating, bathing, dressing, walking, etc.?  No   Does the patient have any residual symptoms from stroke/TIA?  No   What is the patient's perception of their health status since discharge?  Improving   Week 3 call completed?  Yes          Deena Abebe RN

## 2019-01-03 NOTE — PROGRESS NOTES
Subjective   Patient ID: Ronnell Rizvi is a 68 y.o. male is here today for follow-up aneurysm.    History of Present Illness  Patient presents for follow-up of cerebral aneurysm prior imaging as well as recurrent strokes.  Since his last office visit he was hospitalized for an additional left corona radiata stroke that was associated with right sided weakness.  He had a known PFO and cardiology moved forward and did a PFO closure on December 12, 2018 while he was hospitalized.  No further stroke episodes. No significant headache episodes.    The following portions of the patient's history were reviewed and updated as appropriate: allergies, current medications and problem list.    Review of Systems   Constitutional: Negative for fatigue.   HENT: Positive for tinnitus.    Eyes: Negative for visual disturbance.   Respiratory: Negative for cough and wheezing.    Cardiovascular: Negative for leg swelling.   Gastrointestinal: Negative for nausea.   Genitourinary: Negative for difficulty urinating and enuresis.   Musculoskeletal: Positive for neck pain and neck stiffness.   Skin: Negative for rash.   Neurological: Positive for headaches. Negative for dizziness and weakness.   Psychiatric/Behavioral: Negative for sleep disturbance.       Objective   Physical Exam   Constitutional: He is oriented to person, place, and time. He appears well-developed and well-nourished.   Neck: Neck supple.   Pulmonary/Chest: Effort normal.   Neurological: He is alert and oriented to person, place, and time. He has a normal Finger-Nose-Finger Test, a normal Romberg Test and a normal Tandem Gait Test. Gait normal.   Skin: Skin is warm and dry.   Psychiatric: He has a normal mood and affect. His behavior is normal. Judgment normal.   Vitals reviewed.    Neurologic Exam     Mental Status   Oriented to person, place, and time.   Level of consciousness: alert  Knowledge: good.   Normal comprehension.     Cranial Nerves   Cranial nerves II  through XII intact.     Motor Exam   Right arm pronator drift: absent  Left arm pronator drift: absent    Gait, Coordination, and Reflexes     Gait  Gait: normal    Coordination   Romberg: negative  Finger to nose coordination: normal  Tandem walking coordination: normal      Assessment/Plan   Independent Review of Radiographic Studies:    CTA head and neck from [facility] dated October 22, 2018 was reviewed with Dr. Rodgers.  This shows severe right PCA stenosis, but only mild bilateral ICA stenosis. Here is no evidence of ACOM aneurysm and left ICA abnormality appears to be an infundibulum.    MRI brain [facility] dated December 9, 2018 reveals acute to subacute left corona radiata infarct. chronic infarct left occipital lobe.    Medical Decision Making:    Patient presents for follow-up after CTA head and neck.  Since his last office visit he has had an additional stroke.  He has also had a PFO closure in December.  He remains on dual antiplatelet therapy.  He denies any significant headaches.  He has had no further neurologic events.  His exam is as noted above with no neurologic red flags.  CTA reveals right PCA stenosis, but no left-sided stenosis of significance that would warrant the cause of his strokes.  The abnormality on the ACOM and left ICA do not appear aneurysmal.  They need no further follow-up.  I did review with him at his request the signs and symptoms of aneurysm rupture as well as the risk.  However, told him that the likelihood of this is minute.  There is no surgical intervention for the right PCA stenosis as there is no clinical benefit for angioplasty with this for reaching lesion as well as no ipsilateral stroke suggest a cause from the stenosis.  He should continue on dual antiplatelet therapy.  We'll continue follow-ups with neurology.  I have messaged the neurology coordinator to arrange three-month stroke follow-up from his recent hospitalization in  December.  No further follow-up needed in our office.    At the patient's request, I explained that an aneurysm less than 7 mm in size carries less than a 1% risk of rupture on a 5 year basis. I explained that the natural history of an aneurysm if it were to rupture included 30% of patients dying immediately. Of the survivors, 30% would die despite completely appropriate medical treatment, 30% would be severely disabled, and 30% would have a satisfactory result from treatment of a ruptured aneurysm. I went over the signs of rupture/leakage of aneurysm including sudden onset of severe headaches, vision problems, balance changes, speech difficulties, loss of consciousness, seizures, nausea/vomiting, etcetera and instructed the patient to seek immediate medical attention should these occur.    Plan: Return to office when necessary.  Follow-up with neurology.    Ronnell was seen today for cerebral aneurysm.    Diagnoses and all orders for this visit:    Intracranial vascular stenosis      Return if symptoms worsen or fail to improve.

## 2019-01-06 ENCOUNTER — CLINICAL SUPPORT NO REQUIREMENTS (OUTPATIENT)
Dept: CARDIOLOGY | Facility: CLINIC | Age: 69
End: 2019-01-06

## 2019-01-06 DIAGNOSIS — I63.9 CRYPTOGENIC STROKE (HCC): Primary | ICD-10-CM

## 2019-01-11 ENCOUNTER — OFFICE VISIT (OUTPATIENT)
Dept: CARDIOLOGY | Facility: CLINIC | Age: 69
End: 2019-01-11

## 2019-01-11 ENCOUNTER — HOSPITAL ENCOUNTER (OUTPATIENT)
Dept: CARDIOLOGY | Facility: HOSPITAL | Age: 69
Discharge: HOME | End: 2019-01-11
Admitting: NURSE PRACTITIONER

## 2019-01-11 ENCOUNTER — READMISSION MANAGEMENT (OUTPATIENT)
Dept: CALL CENTER | Facility: HOSPITAL | Age: 69
End: 2019-01-11

## 2019-01-11 VITALS
HEIGHT: 73 IN | HEART RATE: 69 BPM | SYSTOLIC BLOOD PRESSURE: 128 MMHG | WEIGHT: 182 LBS | DIASTOLIC BLOOD PRESSURE: 68 MMHG | BODY MASS INDEX: 24.12 KG/M2

## 2019-01-11 VITALS
BODY MASS INDEX: 26.14 KG/M2 | SYSTOLIC BLOOD PRESSURE: 136 MMHG | WEIGHT: 193 LBS | HEART RATE: 79 BPM | DIASTOLIC BLOOD PRESSURE: 74 MMHG | HEIGHT: 72 IN

## 2019-01-11 DIAGNOSIS — Q21.12 PFO (PATENT FORAMEN OVALE): ICD-10-CM

## 2019-01-11 DIAGNOSIS — Q21.12 PFO (PATENT FORAMEN OVALE): Primary | ICD-10-CM

## 2019-01-11 LAB
ASCENDING AORTA: 3.2 CM
BH CV ECHO MEAS - ACS: 2 CM
BH CV ECHO MEAS - AI DEC SLOPE: 133.2 CM/SEC^2
BH CV ECHO MEAS - AI MAX PG: 40 MMHG
BH CV ECHO MEAS - AI MAX VEL: 316.2 CM/SEC
BH CV ECHO MEAS - AI P1/2T: 695.2 MSEC
BH CV ECHO MEAS - AO MAX PG (FULL): 2.6 MMHG
BH CV ECHO MEAS - AO MAX PG: 6.3 MMHG
BH CV ECHO MEAS - AO MEAN PG (FULL): 1.8 MMHG
BH CV ECHO MEAS - AO MEAN PG: 4.1 MMHG
BH CV ECHO MEAS - AO ROOT AREA (BSA CORRECTED): 1.6
BH CV ECHO MEAS - AO ROOT AREA: 8.3 CM^2
BH CV ECHO MEAS - AO ROOT DIAM: 3.2 CM
BH CV ECHO MEAS - AO V2 MAX: 125.7 CM/SEC
BH CV ECHO MEAS - AO V2 MEAN: 96.2 CM/SEC
BH CV ECHO MEAS - AO V2 VTI: 27.1 CM
BH CV ECHO MEAS - AVA(I,A): 2.3 CM^2
BH CV ECHO MEAS - AVA(I,D): 2.3 CM^2
BH CV ECHO MEAS - AVA(V,A): 2.2 CM^2
BH CV ECHO MEAS - AVA(V,D): 2.2 CM^2
BH CV ECHO MEAS - BSA(HAYCOCK): 2.1 M^2
BH CV ECHO MEAS - BSA: 2 M^2
BH CV ECHO MEAS - BZI_BMI: 24.7 KILOGRAMS/M^2
BH CV ECHO MEAS - BZI_METRIC_HEIGHT: 182.9 CM
BH CV ECHO MEAS - BZI_METRIC_WEIGHT: 82.6 KG
BH CV ECHO MEAS - EDV(MOD-SP2): 66 ML
BH CV ECHO MEAS - EDV(MOD-SP4): 84 ML
BH CV ECHO MEAS - EDV(TEICH): 169.1 ML
BH CV ECHO MEAS - EF(CUBED): 68 %
BH CV ECHO MEAS - EF(MOD-BP): 60 %
BH CV ECHO MEAS - EF(MOD-SP2): 62.1 %
BH CV ECHO MEAS - EF(MOD-SP4): 57.1 %
BH CV ECHO MEAS - EF(TEICH): 58.7 %
BH CV ECHO MEAS - ESV(MOD-SP2): 25 ML
BH CV ECHO MEAS - ESV(MOD-SP4): 36 ML
BH CV ECHO MEAS - ESV(TEICH): 69.8 ML
BH CV ECHO MEAS - IVS/LVPW: 0.86
BH CV ECHO MEAS - IVSD: 0.92 CM
BH CV ECHO MEAS - LAT PEAK E' VEL: 7 CM/SEC
BH CV ECHO MEAS - LV DIASTOLIC VOL/BSA (35-75): 41 ML/M^2
BH CV ECHO MEAS - LV MASS(C)D: 235.3 GRAMS
BH CV ECHO MEAS - LV MASS(C)DI: 115 GRAMS/M^2
BH CV ECHO MEAS - LV MAX PG: 3.8 MMHG
BH CV ECHO MEAS - LV MEAN PG: 2.4 MMHG
BH CV ECHO MEAS - LV SYSTOLIC VOL/BSA (12-30): 17.6 ML/M^2
BH CV ECHO MEAS - LV V1 MAX: 97 CM/SEC
BH CV ECHO MEAS - LV V1 MEAN: 72.8 CM/SEC
BH CV ECHO MEAS - LV V1 VTI: 22.3 CM
BH CV ECHO MEAS - LVIDD: 5.8 CM
BH CV ECHO MEAS - LVIDS: 4 CM
BH CV ECHO MEAS - LVLD AP2: 7.2 CM
BH CV ECHO MEAS - LVLD AP4: 8 CM
BH CV ECHO MEAS - LVLS AP2: 6.1 CM
BH CV ECHO MEAS - LVLS AP4: 6.4 CM
BH CV ECHO MEAS - LVOT AREA (M): 2.8 CM^2
BH CV ECHO MEAS - LVOT AREA: 2.8 CM^2
BH CV ECHO MEAS - LVOT DIAM: 1.9 CM
BH CV ECHO MEAS - LVPWD: 1.1 CM
BH CV ECHO MEAS - MED PEAK E' VEL: 6 CM/SEC
BH CV ECHO MEAS - MV A DUR: 0.13 SEC
BH CV ECHO MEAS - MV A MAX VEL: 127.9 CM/SEC
BH CV ECHO MEAS - MV DEC SLOPE: 244.7 CM/SEC^2
BH CV ECHO MEAS - MV DEC TIME: 0.26 SEC
BH CV ECHO MEAS - MV E MAX VEL: 66.1 CM/SEC
BH CV ECHO MEAS - MV E/A: 0.52
BH CV ECHO MEAS - MV MAX PG: 7.7 MMHG
BH CV ECHO MEAS - MV MEAN PG: 2.5 MMHG
BH CV ECHO MEAS - MV P1/2T MAX VEL: 66.7 CM/SEC
BH CV ECHO MEAS - MV P1/2T: 79.8 MSEC
BH CV ECHO MEAS - MV V2 MAX: 139 CM/SEC
BH CV ECHO MEAS - MV V2 MEAN: 72.1 CM/SEC
BH CV ECHO MEAS - MV V2 VTI: 29.2 CM
BH CV ECHO MEAS - MVA P1/2T LCG: 3.3 CM^2
BH CV ECHO MEAS - MVA(P1/2T): 2.8 CM^2
BH CV ECHO MEAS - MVA(VTI): 2.2 CM^2
BH CV ECHO MEAS - PA MAX PG (FULL): 4 MMHG
BH CV ECHO MEAS - PA MAX PG: 5.5 MMHG
BH CV ECHO MEAS - PA V2 MAX: 116.7 CM/SEC
BH CV ECHO MEAS - PULM A REVS DUR: 0.1 SEC
BH CV ECHO MEAS - PULM A REVS VEL: 28.2 CM/SEC
BH CV ECHO MEAS - PULM DIAS VEL: 45.4 CM/SEC
BH CV ECHO MEAS - PULM S/D: 1.4
BH CV ECHO MEAS - PULM SYS VEL: 65 CM/SEC
BH CV ECHO MEAS - PVA(V,A): 1.5 CM^2
BH CV ECHO MEAS - PVA(V,D): 1.5 CM^2
BH CV ECHO MEAS - QP/QS: 0.58
BH CV ECHO MEAS - RAP SYSTOLE: 3 MMHG
BH CV ECHO MEAS - RV MAX PG: 1.5 MMHG
BH CV ECHO MEAS - RV MEAN PG: 0.88 MMHG
BH CV ECHO MEAS - RV V1 MAX: 60.6 CM/SEC
BH CV ECHO MEAS - RV V1 MEAN: 43.1 CM/SEC
BH CV ECHO MEAS - RV V1 VTI: 12.4 CM
BH CV ECHO MEAS - RVOT AREA: 3 CM^2
BH CV ECHO MEAS - RVOT DIAM: 1.9 CM
BH CV ECHO MEAS - RVSP: 27 MMHG
BH CV ECHO MEAS - SI(AO): 109.3 ML/M^2
BH CV ECHO MEAS - SI(CUBED): 66.1 ML/M^2
BH CV ECHO MEAS - SI(LVOT): 30.9 ML/M^2
BH CV ECHO MEAS - SI(MOD-SP2): 20 ML/M^2
BH CV ECHO MEAS - SI(MOD-SP4): 23.5 ML/M^2
BH CV ECHO MEAS - SI(TEICH): 48.5 ML/M^2
BH CV ECHO MEAS - SV(AO): 223.8 ML
BH CV ECHO MEAS - SV(CUBED): 135.3 ML
BH CV ECHO MEAS - SV(LVOT): 63.3 ML
BH CV ECHO MEAS - SV(MOD-SP2): 41 ML
BH CV ECHO MEAS - SV(MOD-SP4): 48 ML
BH CV ECHO MEAS - SV(RVOT): 36.9 ML
BH CV ECHO MEAS - SV(TEICH): 99.3 ML
BH CV ECHO MEAS - TAPSE (>1.6): 1.7 CM2
BH CV ECHO MEAS - TR MAX VEL: 243 CM/SEC
BH CV ECHO MEASUREMENTS AVERAGE E/E' RATIO: 10.17
BH CV XLRA - RV BASE: 2.8 CM
BH CV XLRA - TDI S': 12 CM/SEC
LEFT ATRIUM VOLUME INDEX: 17 ML/M2
LV EF 2D ECHO EST: 60 %
MAXIMAL PREDICTED HEART RATE: 150 BPM
SINUS: 2.9 CM
STJ: 3.1 CM
STRESS TARGET HR: 128 BPM

## 2019-01-11 PROCEDURE — 93000 ELECTROCARDIOGRAM COMPLETE: CPT | Performed by: INTERNAL MEDICINE

## 2019-01-11 PROCEDURE — 93306 TTE W/DOPPLER COMPLETE: CPT | Performed by: INTERNAL MEDICINE

## 2019-01-11 PROCEDURE — 99214 OFFICE O/P EST MOD 30 MIN: CPT | Performed by: INTERNAL MEDICINE

## 2019-01-11 PROCEDURE — 93306 TTE W/DOPPLER COMPLETE: CPT

## 2019-01-11 NOTE — OUTREACH NOTE
Stroke Week 4 Survey      Responses   Facility patient discharged from?  [facility]   Does the patient have one of the following disease processes/diagnoses(primary or secondary)?  Stroke (TIA)   Week 4 attempt successful?  Yes   Call start time  1312   Revoke  Decline to participate [He does not want to participate]   Call end time  1318          Leonarda Olmos RN

## 2019-01-11 NOTE — PROGRESS NOTES
Ronnell Elip  1950  Date of Office Visit: 1/11/2019  Encounter Provider: Deangelo Harris MD  Place of Service: [facility] CARDIOLOGY      CHIEF COMPLAINT:  CVA  PFO     HISTORY OF PRESENT ILLNESS:  68 y.o. male with a history of CVA, PFO-on Plavix, moderate AI, and ELIZABETH, who recently presented to the ED at [place] with c/o right sided arm and leg weakness. He was transferred for further care.  Per the patient's report the right sided weakness lasted about 2-3 hours and started at about 4 AM the day before his admit.  Brain MRI showed a 9mm acute or subacute infarct in the left corona radiata. EKG showed NSR.  His right-sided weakness improved. He had previously been noted to have a THIERRY that showed a moderate sized PFO and a LINQ placed. The LINQ was last interrogated on 11/12/18 and has not shown any atrial fibrillation.  1 result was documented as A. fib, however this is actually sinus with exit block.      During his hospital admission he was set up for a patent foramen ovale closure. He underwent that on 12/12/2018 with a 25 mm Amplatzer PFO occluder device. He tolerated this well and had no residual shunt documented then or on our echocardiogram today.        Review of Systems   Constitution: Negative for fever, weakness and malaise/fatigue.   HENT: Negative for nosebleeds and sore throat.    Eyes: Negative for blurred vision and double vision.   Cardiovascular: Negative for chest pain, claudication, palpitations and syncope.   Respiratory: Negative for cough, shortness of breath and snoring.    Endocrine: Negative for cold intolerance, heat intolerance and polydipsia.   Skin: Negative for itching, poor wound healing and rash.   Musculoskeletal: Negative for joint pain, joint swelling, muscle weakness and myalgias.   Gastrointestinal: Negative for abdominal pain, melena, nausea and vomiting.   Neurological: Negative for light-headedness, loss of balance, seizures and  "vertigo.   Psychiatric/Behavioral: Negative for altered mental status and depression.          Past Medical History:   Diagnosis Date   • Allergic rhinitis    • Anal fissure    • Aortic insufficiency     moderate, THIERRY 2017   • Asthma    • B12 deficiency    • Benign prostatic hypertrophy (BPH) with weak urinary stream 11/1/2016   • Chronic bronchitis (CMS/HCC)    • Emphysema lung (CMS/HCC)    • Fatty liver    • GERD (gastroesophageal reflux disease)    • Hepatitis     thought to be Hepatitis A    • History of pneumonia     With left lower lobe atelectasis and scar tissue, being followed   • Obstructive sleep apnea syndrome 8/23/2017   • PFO (patent foramen ovale) 10/9/2018   • Pneumonia     LLL with scar tissue   • 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     • clopidogrel (PLAVIX) 75 MG tablet Take 1 tablet by mouth Daily. 30 tablet 1   • Cyanocobalamin (B-12) 1000 MCG/ML kit Inject 250 mcg as directed 1 (One) Time Per Week. Pt. Stated he takes this medication weekly 250 mcg, pt reports took it last friday     • hydrochlorothiazide (HYDRODIURIL) 12.5 MG tablet Take 1 tablet by mouth Daily. 90 tablet 2     No current facility-administered medications on file prior to visit.        Allergies   Allergen Reactions   • Aspirin Nausea Only   • Citrus      Blisters on mouth     • Combivent [Ipratropium-Albuterol] Other (See Comments)     Throat swelling   • Lactose Intolerance (Gi)    • Statins Mental Status Change     Severe memory impairment       Vitals:    01/11/19 1505   BP: 128/68   Pulse: 69   Weight: 82.6 kg (182 lb)   Height: 184.2 cm (72.5\")     Physical " Exam   Constitutional: He is oriented to person, place, and time. He appears well-developed and well-nourished.   HENT:   Head: Normocephalic and atraumatic.   Eyes: Conjunctivae and EOM are normal. No scleral icterus.   Neck: Normal range of motion. Neck supple. Normal carotid pulses, no hepatojugular reflux and no JVD present. Carotid bruit is not present. No tracheal deviation present. No thyromegaly present.   Cardiovascular: Normal rate and regular rhythm. Exam reveals no gallop and no friction rub.   No murmur heard.  Pulses:       Carotid pulses are 2+ on the right side, and 2+ on the left side.       Radial pulses are 2+ on the right side, and 2+ on the left side.        Femoral pulses are 2+ on the right side, and 2+ on the left side.       Dorsalis pedis pulses are 2+ on the right side, and 2+ on the left side.        Posterior tibial pulses are 2+ on the right side, and 2+ on the left side.   Pulmonary/Chest: Breath sounds normal. No respiratory distress. He has no decreased breath sounds. He has no wheezes. He has no rhonchi. He has no rales. He exhibits no tenderness.   Abdominal: Soft. Bowel sounds are normal. He exhibits no distension. There is no tenderness. There is no rebound.   Musculoskeletal: He exhibits no edema or deformity.   Neurological: He is alert and oriented to person, place, and time. He has normal strength. No sensory deficit.   Skin: No rash noted. No erythema.   Psychiatric: He has a normal mood and affect. His behavior is normal.       No components found for: CBC  No results found for: CMP  No components found for: LIPID  No results found for: BMP      ECG 12 Lead  Date/Time: 1/11/2019 4:08 PM  Performed by: Deangelo Harris MD  Authorized by: Deangelo Harris MD   Comparison: compared with previous ECG from 12/8/2018  Rhythm: sinus rhythm  Rate: normal  QRS axis: left  Clinical impression: abnormal ECG                 10/22/18  CTA  IMPRESSION:  1. Mild plaque in the  bulbs with about 18% stenosis by NASCET criteria  in both proximal ICAs. No carotid or vertebral dissection.  2. High-grade short segment stenosis in the right posterior cerebral  artery in the P2 segment. This is new from prior.  3. Small 2 mm infundibulum on the posterior aspect of the left  supraclinoid ICA.  4. No definite anterior communicating artery aneurysm.  5. Stable appearance of hypoplastic right A1.      3/26/18  THIERRY  · No evidence of a left atrial appendage thrombus was present.  · Moderate patent foramen ovale present. Saline test results are positive.  · Left ventricular systolic function is normal.  · Moderate aortic valve regurgitation is present.  · Mild mitral valve regurgitation is present    10/11/17  MRI  1.  Findings are most consistent with a early subacute thromboembolic insult to the left posterior cerebral artery distribution with area of restricted diffusion and mild localized mass effect involving the left inferolateral occipital lobe to posterolateral temporal lobe. Please correlate further clinically for possible risk factors for thromboembolic disease. No hemorrhagic transformation at this time. No midline shift.   2.  Preexisting probable sequela of small vessel disease.       DISCUSSION/SUMMARY  69-year-old male with a medical history of prior CVA, patent foramen ovale, and aortic regurgitation who presents back to me after PFO closure.  He is doing very well.  His echocardiogram shows that his defect has been closed with no residual shunt.    1. PFO/TIA/CVA.  Status post PFO closure.  - I would recommend continuing aspirin and Plavix.  I will defer the duration of Plavix to neurology, however for his PFO closure I would recommend continuation of aspirin lifelong and Plavix for at least 6 months  -He will need to be on antibiotic prophylaxis for 6 months after PFO closure.    I will recommend that he follow-up with Dr. Sawant.  We can remove his LINQ when Dr. Sawant requests,however,  it does not seem to be bothering him.

## 2019-02-07 ENCOUNTER — TELEPHONE (OUTPATIENT)
Dept: CARDIOLOGY | Facility: CLINIC | Age: 69
End: 2019-02-07

## 2019-02-07 ENCOUNTER — CLINICAL SUPPORT NO REQUIREMENTS (OUTPATIENT)
Dept: CARDIOLOGY | Facility: CLINIC | Age: 69
End: 2019-02-07

## 2019-02-07 DIAGNOSIS — I63.9 CRYPTOGENIC STROKE (HCC): Primary | ICD-10-CM

## 2019-02-07 PROCEDURE — 93299 PR REM INTERROG ICPMS/SCRMS <30 D TECH REVIEW: CPT | Performed by: INTERNAL MEDICINE

## 2019-02-07 PROCEDURE — 93298 REM INTERROG DEV EVAL SCRMS: CPT | Performed by: INTERNAL MEDICINE

## 2019-02-07 NOTE — TELEPHONE ENCOUNTER
FYI Merlin alert report received today. Alert is for alexandra episode from 2/4 1829. On review, Strip begins Sinus 60s then slows to SB 27-30 lasting 21 sec before  returning to sinus 60s at end of strips. No other events recorded. Presents Sinus 70s at time of tx. I spoke with pt, he states he did get choked on some corn eating dinner last night and had a coughing spell around that time. He denies any dizziness or syncope with this episode.   NS    Episode occurred 2/4 1829

## 2019-03-09 ENCOUNTER — CLINICAL SUPPORT NO REQUIREMENTS (OUTPATIENT)
Dept: CARDIOLOGY | Facility: CLINIC | Age: 69
End: 2019-03-09

## 2019-03-09 DIAGNOSIS — I63.9 CRYPTOGENIC STROKE (HCC): Primary | ICD-10-CM

## 2019-03-20 ENCOUNTER — CLINICAL SUPPORT NO REQUIREMENTS (OUTPATIENT)
Dept: CARDIOLOGY | Facility: CLINIC | Age: 69
End: 2019-03-20

## 2019-03-20 DIAGNOSIS — I63.9 CRYPTOGENIC STROKE (HCC): Primary | ICD-10-CM

## 2019-03-20 PROCEDURE — 93298 REM INTERROG DEV EVAL SCRMS: CPT | Performed by: INTERNAL MEDICINE

## 2019-03-20 PROCEDURE — 93299 PR REM INTERROG ICPMS/SCRMS <30 D TECH REVIEW: CPT | Performed by: INTERNAL MEDICINE

## 2019-03-26 ENCOUNTER — OFFICE VISIT (OUTPATIENT)
Dept: NEUROLOGY | Facility: CLINIC | Age: 69
End: 2019-03-26

## 2019-03-26 VITALS
BODY MASS INDEX: 25.18 KG/M2 | HEIGHT: 73 IN | DIASTOLIC BLOOD PRESSURE: 70 MMHG | OXYGEN SATURATION: 98 % | SYSTOLIC BLOOD PRESSURE: 120 MMHG | HEART RATE: 68 BPM | WEIGHT: 190 LBS

## 2019-03-26 DIAGNOSIS — I63.9 CEREBROVASCULAR ACCIDENT (CVA), UNSPECIFIED MECHANISM (HCC): Primary | ICD-10-CM

## 2019-03-26 DIAGNOSIS — R56.9 SEIZURE-LIKE ACTIVITY (HCC): ICD-10-CM

## 2019-03-26 PROCEDURE — 99214 OFFICE O/P EST MOD 30 MIN: CPT | Performed by: NURSE PRACTITIONER

## 2019-03-26 RX ORDER — CLOPIDOGREL BISULFATE 75 MG/1
75 TABLET ORAL DAILY
Qty: 90 TABLET | Refills: 2 | Status: SHIPPED | OUTPATIENT
Start: 2019-03-26 | End: 2020-06-17

## 2019-04-09 ENCOUNTER — OFFICE VISIT (OUTPATIENT)
Dept: CARDIOLOGY | Facility: CLINIC | Age: 69
End: 2019-04-09

## 2019-04-09 ENCOUNTER — TELEPHONE (OUTPATIENT)
Dept: CARDIOLOGY | Facility: CLINIC | Age: 69
End: 2019-04-09

## 2019-04-09 VITALS
DIASTOLIC BLOOD PRESSURE: 78 MMHG | WEIGHT: 190 LBS | HEART RATE: 71 BPM | BODY MASS INDEX: 25.73 KG/M2 | SYSTOLIC BLOOD PRESSURE: 134 MMHG | HEIGHT: 72 IN

## 2019-04-09 DIAGNOSIS — I63.9 CEREBROVASCULAR ACCIDENT (CVA), UNSPECIFIED MECHANISM (HCC): ICD-10-CM

## 2019-04-09 DIAGNOSIS — I35.1 NONRHEUMATIC AORTIC VALVE INSUFFICIENCY: ICD-10-CM

## 2019-04-09 DIAGNOSIS — Q21.12 PFO (PATENT FORAMEN OVALE): Primary | ICD-10-CM

## 2019-04-09 PROCEDURE — 93000 ELECTROCARDIOGRAM COMPLETE: CPT | Performed by: NURSE PRACTITIONER

## 2019-04-09 PROCEDURE — 99214 OFFICE O/P EST MOD 30 MIN: CPT | Performed by: NURSE PRACTITIONER

## 2019-04-09 RX ORDER — HYDROCHLOROTHIAZIDE 12.5 MG/1
12.5 TABLET ORAL DAILY
Qty: 90 TABLET | Refills: 3 | Status: SHIPPED | OUTPATIENT
Start: 2019-04-09 | End: 2020-04-13

## 2019-04-09 NOTE — TELEPHONE ENCOUNTER
Pt would like to know if you would send in a refill on his hydrochlorothiazide 12.5 mg to the [facility] in [facility]    Thanks

## 2019-04-09 NOTE — PROGRESS NOTES
Subjective:     Encounter Date:04/09/2019      Patient ID: Ronnell Rizvi is a 69 y.o. male.    Chief Complaint:  6 month follow up, s/p CVA and PFO closure  History of Present Illness   Mr. Rizvi is a new patient to me and I have reviewed his past medical history.  He is a patient of Dr. Sawant.    History brought forward for continuity:    Dr. Sawant  first met him in October 2016 when he was hospitalized for chest pain.  A Cardiolite stress was normal (EF 54%).  He did have some Wenckebach at the beginning of stress which normalized with exercise.     In October 2017, he had an episode of visual changes that resolved on their own.  The next morning it happened again and he came to the ED and was diagnosed with a stroke of the left occipital/temporal lobe.  He was found to have some diffuse small vessel disease.  An echo wasn't performed but a Zio patch was placed.  We were not consulted in the hospital.  He was placed on clopidogrel and atorvastatin.     In November 2017, the Zio showed some atrial ectopy (there was a 13 beat run of PACs) but no atrial fibrillation.  An echo showed normal LV systolic function and a small PFO.     His studies were then reviewed by neurology, who felt that this was actually embolic.     A THIERRY showed a moderate sized PFO and moderate aortic insufficiency but was otherwise normal. A Confirm device was placed (implanted monitor from [facility]).  Upon arrival he was noted to have an irregular rhythm (not atrial fibrillation).  This was actually a type 2 SA block.    He presented to the ED at [place] on 12/10/19, with c/o right sided arm and leg weakness.  He was transferred for further care to [facility].  Patient reported the right sided weakness lasted about 2-3 hours and started at 4 am the day before his admit.  His brain MRI showed a 9mm acute or subacute infarct in the left corona radiata.  EKG showed NSR.  His right sided weakness improved.  There was a known moderate  sized PFO from previous THIERRY and a LINQ placed.  The LINQ was last interrogated on 11/12/18 and had not shown any atrial fibrillation.  There was one documented episode of a fib but that was actually sinus with exit block per Dr. Harris.  He underwent a PFO closure on 12/12/18 with a 25 mm Amplatzer PFO occluder device.  There was no residual shunt documented on echo post procedure.    He presents today, 4/9/19, for follow up of his cardiac status.  He denies any palpitations, shortness of breath or edema.  He has had no episodes of chest pain or chest tightness.  He has not had any lightheadedness dizziness, syncope or presyncopal episodes.  He denies any fatigue but states he does get tired after a long day.  He denies any leg pain, numbness or tingling in his upper or lower extremities.  He denies any snoring.  PND, cough or orthopnea.  He is on dual antiplatelet therapy and denies any unexplained bleeding.  He has had no further visual changes or weakness in his extremities status post his PFO closure.    The following portions of the patient's history were reviewed and updated as appropriate: allergies, current medications, past family history, past medical history, past social history, past surgical history and problem list.    Review of Systems   Constitution: Negative for weakness and malaise/fatigue.   HENT: Negative for congestion, hoarse voice and sore throat.    Eyes: Negative for blurred vision, double vision, photophobia, vision loss in left eye and vision loss in right eye.   Cardiovascular: Negative for chest pain, dyspnea on exertion, irregular heartbeat, leg swelling, near-syncope, orthopnea, palpitations, paroxysmal nocturnal dyspnea and syncope.   Respiratory: Negative for cough, hemoptysis, shortness of breath, sleep disturbances due to breathing, snoring, sputum production and wheezing.    Endocrine: Negative.    Hematologic/Lymphatic: Does not bruise/bleed easily.   Skin: Negative for color  change, dry skin, poor wound healing and rash.   Musculoskeletal: Negative for back pain, falls, gout, joint pain, joint swelling, muscle cramps and muscle weakness.   Gastrointestinal: Negative for abdominal pain, constipation, diarrhea, dysphagia, melena, nausea and vomiting.   Neurological: Negative for excessive daytime sleepiness, dizziness, headaches, light-headedness, loss of balance, numbness, paresthesias, seizures and vertigo.   Psychiatric/Behavioral: Negative for depression and substance abuse. The patient is not nervous/anxious.        ECG 12 Lead  Date/Time: 4/9/2019 11:20 AM  Performed by: Vivian Tran APRN  Authorized by: Vivian Tran APRN   Comparison: compared with previous ECG from 1/11/2019  Similar to previous ECG  Rhythm: sinus rhythm  Rate: normal  Conduction: conduction normal  ST Segments: ST segments normal  T Waves: T waves normal  QRS axis: left    Clinical impression: non-specific ECG               Objective:         Physical Exam   Constitutional: He is oriented to person, place, and time. Vital signs are normal. He appears well-developed and well-nourished. No distress.   HENT:   Head: Normocephalic and atraumatic.   Right Ear: Hearing normal.   Left Ear: Hearing normal.   Eyes: Conjunctivae and lids are normal.   Neck: Normal range of motion. Neck supple. No JVD present. Carotid bruit is not present. No thyromegaly present.   Cardiovascular: Normal rate, regular rhythm, S1 normal, S2 normal, normal heart sounds and intact distal pulses.  PMI is not displaced.  Exam reveals no gallop.    No murmur heard.  Pulses:       Carotid pulses are 2+ on the right side, and 2+ on the left side.       Radial pulses are 2+ on the right side, and 2+ on the left side.        Dorsalis pedis pulses are 2+ on the right side, and 2+ on the left side.        Posterior tibial pulses are 2+ on the right side, and 2+ on the left side.   Pulmonary/Chest: Effort normal and breath sounds normal. No  "respiratory distress. He has no wheezes. He has no rhonchi. He has no rales. He exhibits no tenderness.   Abdominal: Soft. Normal appearance and bowel sounds are normal. He exhibits no distension, no abdominal bruit and no mass. There is no tenderness.   Musculoskeletal: Normal range of motion.   Exhibits no edema or deformity   Lymphadenopathy:     He has no cervical adenopathy.   Neurological: He is alert and oriented to person, place, and time. No cranial nerve deficit. Coordination and gait normal.   Oriented to person, place and time.   Skin: Skin is warm, dry and intact. No rash noted. He is not diaphoretic. No cyanosis. Nails show no clubbing.   Psychiatric: He has a normal mood and affect. His speech is normal and behavior is normal. Judgment and thought content normal. Cognition and memory are normal.     Vitals:    04/09/19 1026   BP: 134/78   BP Location: Right arm   Pulse: 71   Weight: 86.2 kg (190 lb)   Height: 182.9 cm (72\")           Lab Review:       Assessment:          Diagnosis Plan   1. PFO (patent foramen ovale)     2. Nonrheumatic aortic valve insufficiency     3. Cerebrovascular accident (CVA), unspecified mechanism (CMS/[facility])            Plan:       1.  PFO - s/p closure 2/12/18 with 25mm Amplatzer PFO occluder device.  No further symptoms reported.      2.  Non rheumatic aortic valve insufficiency - Echo 12/18 -  normal LVSF.  EF 60%.  Normal contraction of LV wall segments.  LV wall thickness consistent with mild septal asymmetric hypertrophy. Mild thickening of AV, moderate AV regurg.  Trace MV regurg.  Trace TV regurg.  RVSP <35 mmHg (27 mmHg).    3.  He had a left occipital/temporal stroke which was initially presumed to be due to small vessel disease. He was placed on clopidogrel and atorvastatin (which he stopped due to memory loss).  Then, outpatient neurological evaluation led to a further workup.  He was found to have a PFO on THIERRY. He has had an implanted monitor for seven months now, " and no atrial fibrillation was seen.       4.  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  He doesn't have any symptoms of higher level block.    RTO 6 months with [facility]      Current Outpatient Medications:   •  aspirin 81 MG EC tablet, Take 1 tablet by mouth Daily., Disp: , Rfl:   •  clopidogrel (PLAVIX) 75 MG tablet, Take 1 tablet by mouth Daily., Disp: 90 tablet, Rfl: 2  •  Cyanocobalamin (B-12) 1000 MCG/ML kit, Inject 250 mcg as directed 1 (One) Time Per Week. Pt. Stated he takes this medication weekly 250 mcg, pt reports took it last friday, Disp: , Rfl:   •  hydrochlorothiazide (HYDRODIURIL) 12.5 MG tablet, Take 1 tablet by mouth Daily., Disp: 90 tablet, Rfl: 2

## 2019-04-17 ENCOUNTER — HOSPITAL ENCOUNTER (OUTPATIENT)
Dept: NEUROLOGY | Facility: HOSPITAL | Age: 69
Discharge: HOME | End: 2019-04-17
Admitting: NURSE PRACTITIONER

## 2019-04-17 DIAGNOSIS — R56.9 SEIZURE-LIKE ACTIVITY (HCC): ICD-10-CM

## 2019-04-17 PROCEDURE — 95813 EEG EXTND MNTR 61-119 MIN: CPT | Performed by: PSYCHIATRY & NEUROLOGY

## 2019-04-17 PROCEDURE — 95813 EEG EXTND MNTR 61-119 MIN: CPT

## 2019-05-05 ENCOUNTER — CLINICAL SUPPORT NO REQUIREMENTS (OUTPATIENT)
Dept: CARDIOLOGY | Facility: CLINIC | Age: 69
End: 2019-05-05

## 2019-05-05 DIAGNOSIS — I63.9 CRYPTOGENIC STROKE (HCC): Primary | ICD-10-CM

## 2019-05-05 PROCEDURE — 93298 REM INTERROG DEV EVAL SCRMS: CPT | Performed by: INTERNAL MEDICINE

## 2019-05-05 PROCEDURE — 93299 PR REM INTERROG ICPMS/SCRMS <30 D TECH REVIEW: CPT | Performed by: INTERNAL MEDICINE

## 2019-05-15 ENCOUNTER — TELEPHONE (OUTPATIENT)
Dept: NEUROLOGY | Facility: CLINIC | Age: 69
End: 2019-05-15

## 2019-05-15 NOTE — TELEPHONE ENCOUNTER
Called patient and notified.   Patient states understanding and has no other questions at this time.

## 2019-05-15 NOTE — TELEPHONE ENCOUNTER
----- Message from CHINEDU Whitaker sent at 5/15/2019  3:35 PM EDT -----  Normal EEG.  Keep planned appointment with Dr. Trujillo at the end of the month.

## 2019-05-30 ENCOUNTER — TELEPHONE (OUTPATIENT)
Dept: CARDIOLOGY | Facility: CLINIC | Age: 69
End: 2019-05-30

## 2019-05-30 ENCOUNTER — OFFICE VISIT (OUTPATIENT)
Dept: NEUROLOGY | Facility: CLINIC | Age: 69
End: 2019-05-30

## 2019-05-30 VITALS
SYSTOLIC BLOOD PRESSURE: 120 MMHG | OXYGEN SATURATION: 98 % | DIASTOLIC BLOOD PRESSURE: 80 MMHG | WEIGHT: 190.6 LBS | HEIGHT: 72 IN | HEART RATE: 63 BPM | BODY MASS INDEX: 25.81 KG/M2

## 2019-05-30 DIAGNOSIS — G47.33 OBSTRUCTIVE SLEEP APNEA: ICD-10-CM

## 2019-05-30 DIAGNOSIS — R56.9 NOCTURNAL SEIZURES (HCC): Primary | ICD-10-CM

## 2019-05-30 PROCEDURE — 99215 OFFICE O/P EST HI 40 MIN: CPT | Performed by: PSYCHIATRY & NEUROLOGY

## 2019-05-30 RX ORDER — LEVETIRACETAM 500 MG/1
500 TABLET ORAL 2 TIMES DAILY
Qty: 60 TABLET | Refills: 11 | Status: SHIPPED | OUTPATIENT
Start: 2019-05-30 | End: 2020-08-18

## 2019-05-30 NOTE — TELEPHONE ENCOUNTER
Dr. Jese Salazar wants to know if you can address this. She is in a THIERRY.    Stephany Ferro  Triage RN

## 2019-05-30 NOTE — TELEPHONE ENCOUNTER
05/30/19  11:27 AM  Ronnell Rizvi  1950  Home Phone [phone]       Ronnell Rizvi is a patient of Dr. Sawant. Dr. Trujillo has the pt in her office and wants to s/w you at your earliest convenience.    C: [phone].    Stephany Mcmahon RN

## 2019-05-30 NOTE — PROGRESS NOTES
Subjective:     Patient ID: Ronnell Rizvi is a 69 y.o. male.    Mr. Rizvi is a 69 year old right handed male with a h/o stroke x3, hepatitis as a kid, HTN, COPD, and Vit B12 deficiency who presents to the neurology clinic for the evaluation of possible seizures.  The patient was unsure regarding the details of his history, so we called his wife and discussed further details over speaker phone during the visit.   In 2017, he had a 30 minute episode, laying on back, unresponsive, had loud breathing.  Had been sleeping.  May have been woken up due to thrashing.  Took a while to get him back to his normal self.  Was thick tongued when he spoke.  Didn't seek immediate medical attention.  Wife videotaped him once with him laying on back in bed and he had made an odd noise that woke her up.  His eyes were open.  Wasn't responding to verbal stimulation.  Woke up after she started slapping him.  Body wasn't doing anything funny.  Not sure if he was awake.  Doesn't remember the whole episode.  [facility] that he was in a dream state.  This was a year ago.  The last one was late March/early April.  All episodes in the middle of the night.  Had associated urinary incontinence with the last one.  Wife has never say any stiffness, twitching or jerking.  Never sleeps on back, but always found on back.  Screamed out with the third episode.  No daytime symptoms.  No myalgias.  Never bit tongue or cheek.  Has a h/o multiple ticks bites and is taking doxy.  No known triggers.  Has a h/o ELIZABETH.  HST in 2017 showed AHI of 20.  Tried CPAP for 2 months, different masks, didn't do well.  Also failed oral appliance.  Last saw his sleep physician around 2017/2018.      Risk factors:  Childhood/febrile seizures? no  Head trauma/pathology? Concussion when he was a child  CNS infections? no  Family history of seizures? no  Driving? Yes    I reviewed the patient's record.  I reviewed Dia Call' note from 3/26/19.  The patient has a h/o HLD, HTN,  untreated ELIZABETH, strokes, PFO s/p closure.  He was in the hospital back in December of 2018 for right sided weakness.  He had an acute/subacute infarct in the left corona radiata.  Wife reports 3 episodes of difficulty breathing and depressed LOC.  Concern for seizure and ELIZABETH lead to referral.  MRI on 12/9/19 also showed an old infarct in the left occipital lobe as well.  4 hour EEG on 5/15/19 was normal.        The following portions of the patient's history were reviewed and updated as appropriate: allergies, current medications, past family history, past medical history, past social history, past surgical history and problem list.    Review of Systems   Constitutional: Negative for activity change, appetite change and fatigue.   HENT: Positive for sinus pressure. Negative for facial swelling, sinus pain and trouble swallowing.    Eyes: Negative for pain, redness and visual disturbance.   Respiratory: Negative for chest tightness, shortness of breath and wheezing.    Cardiovascular: Negative for chest pain, palpitations and leg swelling.   Gastrointestinal: Negative for diarrhea, nausea and vomiting.   Endocrine: Negative for cold intolerance, heat intolerance and polyphagia.   Genitourinary: Negative for difficulty urinating, frequency and urgency.   Musculoskeletal: Positive for back pain and neck pain. Negative for gait problem.   Neurological: Negative for dizziness, tremors, seizures, syncope, facial asymmetry, speech difficulty, weakness, light-headedness, numbness and headaches.   Hematological: Bruises/bleeds easily.   Psychiatric/Behavioral: Negative for agitation, behavioral problems, confusion, decreased concentration, dysphoric mood, hallucinations, self-injury, sleep disturbance and suicidal ideas. The patient is not nervous/anxious and is not hyperactive.     I reviewed the ROS documented by the MA.      Objective:    Neurologic Exam    Physical Exam   Constitutional:  Vital signs reviewed.  No  apparent distress.  Well groomed.  Eyes:  No injection, no icterus.  Fundoscopic exam performed.  No papilledema appreciated bilaterally.   Respiratory:  Normal effort.  Clear to auscultation bilaterally.  Cardiovascular:  Regular rate, but irregular rhythm.  No murmurs.  No carotid bruits. Symmetric radial pulses.  Musculoskeletal: Normal station.  Gait steady.  Normal arm swing.  Patient able to walk on heels and toes.   Some trouble with tandem gait.  Romberg negative.  Muscle tone and bulk normal.  Strength is 5/5 in the bilateral upper and lower extremities proximally and distally unless otherwise specified in the neurological exam.  Skin:  No rashes.  Warm, dry, and intact.  Psychiatric:  Good mood.  Normal affect.    Neurologic:  Mental status-  The patient is alert and oriented to person, place and time. Attention/concentration is within normal limits.  Speech is fluent without dysarthria.  The patient is able to name, repeat and follow complex commands without difficulty.  Immediate memory and delayed recall intact (3/3 words immediate and after 4 minutes).  Fund of knowledge normal.  Cranial nerves- Pupils equally round and reactive to light with intact accomodation.  Visual fields intact.  Extraocular movements intact.  Facial sensation intact.  Smile symmetric.  Hearing decreased to finger-rub bilaterally.  Palate elevates symmetrically.  SCM and trapezius are 5/5 bilaterally.  Tongue is midline.  Motor-  See musculoskeletal above.  No tremor.  Reflexes- Reflexes increased in right UE.  Toes down-going bilaterally.  Sensation- Intact to pinprick and vibration in bilateral upper and lower extremities symmetrically.  Coordination- Intact to finger to nose and heel knee shin bilaterally.  Intact rapid alternating movements bilaterally.  Gait- See musculoskeletal exam above.     Assessment/Plan:  Mr. Rizvi is a 69 year old right handed male with a h/o strokes, hepatitis as a child, HTN, COPD, and Vit B12 def  who presents today for evaluation of nocturnal spells.    1.  Nocturnal spells- Has had 3 spells in 3 years of waking up wife in the middle of the night unresponsive with noisy breathing.  His clinical presentation is suspicious for nocturnal seizures.  He is at risk given his h/o strokes.  Recommend treatment with an AED.  We discussed the pros and cons of LEV and LTG, and the patient decided to try LEV.  Will start on 500 mg BID.  He's already had a 4 hour EEG, so I don't feel more outpatient monitoring is needed at this time.  Unlikely to capture anything inpatient given the frequency of his spells.  We discussed that it is the [place] Law that he cannot drive within 90 days of a seizure.      2.  ELIZABETH- Has known ELIZABETH based on HST in 2017.  Has had strokes since and has COPD.  Will likely need repeat in lab PSG.  Recommend f/u with previous sleep physician for further evaluation.  We discussed briefly alternatives to PAP and oral appliances including the Inspire device.      3.  Irregular heart rate- Contacted the patient's cardiologist's office regarding the exam finding. He currently has a recording device.  They called back after he left.  They are going to contact him to have him download the data for their review.      A total of 60 minutes of face to face time was spent with the patient and >50% of that time was spent on counseling regarding his symptoms and plan of care.     Problems Addressed this Visit     None

## 2019-05-30 NOTE — TELEPHONE ENCOUNTER
I spoke with Dr. Trujillo via telephone.  She saw the patient today in the office and noted an irregular heartbeat.  He has a Linq monitor implanted.    I discussed with our pacemaker clinic and they are going to call the patient to perform a remote download to evaluate his rhythm.

## 2019-05-30 NOTE — PATIENT INSTRUCTIONS
[facility]  Gladis Trujillo MD  Neurology clinic  [phone]    With anti-seizure medications, you may initially notice side effects of fatigue, drowsiness, unsteadiness, and dizziness.  Other possible side effects include nausea, abdominal pain, headache, blurry or double vision, slurred speech and mood changes.  Generally, patients will noticed these symptoms when the medication is first started or with higher doses and will go away with time.    It is import to consistently take your medication every day.  Missing just one dose may put you at risk for a breakthrough seizure.  Consider using reminders on your phone or a pill box.    If you develop a rash, please call the neurology clinic immediately or notify another healthcare professional, as this may be potentially life-threatening.  If you are unable to reach a healthcare professional, go to the emergency room immediately for further evaluation.    If you develop thoughts of wanting to hurt yourself or others, please call the neurology clinic immediately to notify another healthcare professional.  If you are unable to reach a healthcare professional, go to the emergency room immediately for further evaluation.    It is the [place] state law that you cannot drive within 90 days of a seizure.    You should avoid certain activities that if you were to have a seizure, you could harm yourself or others. In general, it is recommended that you avoid operating heavy machinery or power tools, swimming or taking baths by yourself (showers are ok), don't stand over open flames, don't get on high ladders or the roof.  I also recommend to avoid sleeping on your stomach.    For further information on epilepsy and resources available to patients and their families, please visit the [facility] at www.efky.org or call [phone].

## 2019-06-05 ENCOUNTER — CLINICAL SUPPORT NO REQUIREMENTS (OUTPATIENT)
Dept: CARDIOLOGY | Facility: CLINIC | Age: 69
End: 2019-06-05

## 2019-06-05 DIAGNOSIS — I63.9 CRYPTOGENIC STROKE (HCC): Primary | ICD-10-CM

## 2019-06-05 PROCEDURE — 93291 INTERROG DEV EVAL SCRMS IP: CPT | Performed by: INTERNAL MEDICINE

## 2019-06-11 PROBLEM — I45.5 SINUS PAUSE: Status: ACTIVE | Noted: 2019-06-11

## 2019-06-25 ENCOUNTER — CLINICAL SUPPORT NO REQUIREMENTS (OUTPATIENT)
Dept: CARDIOLOGY | Facility: CLINIC | Age: 69
End: 2019-06-25

## 2019-06-25 DIAGNOSIS — I63.9 CRYPTOGENIC STROKE (HCC): Primary | ICD-10-CM

## 2019-07-28 ENCOUNTER — CLINICAL SUPPORT NO REQUIREMENTS (OUTPATIENT)
Dept: CARDIOLOGY | Facility: CLINIC | Age: 69
End: 2019-07-28

## 2019-07-28 DIAGNOSIS — I63.9 CRYPTOGENIC STROKE (HCC): Primary | ICD-10-CM

## 2019-07-28 PROCEDURE — 93299 PR REM INTERROG ICPMS/SCRMS <30 D TECH REVIEW: CPT | Performed by: INTERNAL MEDICINE

## 2019-07-28 PROCEDURE — 93298 REM INTERROG DEV EVAL SCRMS: CPT | Performed by: INTERNAL MEDICINE

## 2019-09-21 ENCOUNTER — CLINICAL SUPPORT NO REQUIREMENTS (OUTPATIENT)
Dept: CARDIOLOGY | Facility: CLINIC | Age: 69
End: 2019-09-21

## 2019-09-21 DIAGNOSIS — I63.9 CRYPTOGENIC STROKE (HCC): Primary | ICD-10-CM

## 2019-09-21 PROCEDURE — 93299 PR REM INTERROG ICPMS/SCRMS <30 D TECH REVIEW: CPT | Performed by: INTERNAL MEDICINE

## 2019-09-21 PROCEDURE — 93298 REM INTERROG DEV EVAL SCRMS: CPT | Performed by: INTERNAL MEDICINE

## 2019-09-24 ENCOUNTER — OFFICE VISIT (OUTPATIENT)
Dept: NEUROLOGY | Facility: CLINIC | Age: 69
End: 2019-09-24

## 2019-09-24 VITALS
WEIGHT: 186 LBS | OXYGEN SATURATION: 95 % | BODY MASS INDEX: 25.19 KG/M2 | HEIGHT: 72 IN | HEART RATE: 69 BPM | SYSTOLIC BLOOD PRESSURE: 160 MMHG | DIASTOLIC BLOOD PRESSURE: 88 MMHG

## 2019-09-24 DIAGNOSIS — G47.33 OBSTRUCTIVE SLEEP APNEA SYNDROME: ICD-10-CM

## 2019-09-24 DIAGNOSIS — I63.9 CEREBROVASCULAR ACCIDENT (CVA), UNSPECIFIED MECHANISM (HCC): Primary | ICD-10-CM

## 2019-09-24 DIAGNOSIS — E53.8 VITAMIN B12 DEFICIENCY: ICD-10-CM

## 2019-09-24 PROCEDURE — 99213 OFFICE O/P EST LOW 20 MIN: CPT | Performed by: NURSE PRACTITIONER

## 2019-09-24 NOTE — PROGRESS NOTES
DOS: 2019  NAME: Ronnell Rizvi   : 1950  PCP: Chase Haque MD    Chief Complaint   Patient presents with   • Stroke      He is unaccompanied.     Neurological Problem and Interval History:  69 y.o. right-handed male with a Hx of hyperlipidemia, hypertension and Obstructive sleep apnea (untreated), prior strokes and PFO (closed ) who I am seen today in follow-up for stroke.     History: Was last seen by me 2019.  At that time, he denied any new signs and/or symptoms of stroke.  At this visit he is wife reported 3 separate episodes of apnea/cessation of breathing with difficulty to arouse and sedation afterwards that was concerning to me for seizure versus untreated apnea before I referred him to my colleague Dr. Gladis Trujillo for further evaluation.  In reviewing her note it appears that she has concern for episodes being suspicious for nocturnal seizures therefore she recommended initiation of Keppra which patient states he never got filled.  Since that time, he denies any new nocturnal events.  Risk and benefits of not taking AED falguni with patient who verbalizes understanding.  I did reiterate [place] state law that he is unable to drive for 90 days after an event which he verbalizes understanding of.    Neurologically, he remains at his baseline.  He denies any new signs and/or symptoms of stroke.  He reports systolic blood pressures consistently less than 130s although today systolic is 160 he admits that he forgot to take his medicine this morning prior to leaving the house.  He continues to have untreated sleep apnea but is open to trying other delivery options therefore I will refer him back to the pulmonary group who did his original sleep study.  He denies any issues with mood specifically no concern for depression and/or PBA he denies any other complaints under concerns on my exam.  I will plan to follow him annually moving forward.      Review of Systems:   "      Review of Systems   Constitutional: Negative for activity change, appetite change and unexpected weight change.   HENT: Positive for postnasal drip. Negative for facial swelling, trouble swallowing and voice change.    Eyes: Negative for photophobia, pain and visual disturbance.   Respiratory: Negative for chest tightness, shortness of breath and wheezing.    Cardiovascular: Negative for chest pain, palpitations and leg swelling.   Gastrointestinal: Negative for abdominal pain, nausea and vomiting.   Endocrine: Negative for cold intolerance and heat intolerance.   Musculoskeletal: Negative for arthralgias, back pain, gait problem, joint swelling, myalgias, neck pain and neck stiffness.   Neurological: Negative for dizziness, tremors, seizures, syncope, facial asymmetry, speech difficulty, weakness, light-headedness, numbness and headaches.   Hematological: Does not bruise/bleed easily.   Psychiatric/Behavioral: Negative for agitation, behavioral problems, confusion, decreased concentration, dysphoric mood, hallucinations, self-injury, sleep disturbance and suicidal ideas. The patient is not nervous/anxious and is not hyperactive.          Current Outpatient Medications:   •  aspirin 81 MG EC tablet, Take 1 tablet by mouth Daily., Disp: , Rfl:   •  clopidogrel (PLAVIX) 75 MG tablet, Take 1 tablet by mouth Daily., Disp: 90 tablet, Rfl: 2  •  Cyanocobalamin (B-12) 1000 MCG/ML kit, Inject 250 mcg as directed 1 (One) Time Per Week. Pt. Stated he takes this medication weekly 250 mcg, pt reports took it last friday, Disp: , Rfl:   •  hydrochlorothiazide (HYDRODIURIL) 12.5 MG tablet, Take 1 tablet by mouth Daily., Disp: 90 tablet, Rfl: 3    \"The following portions of the patient's history were reviewed and updated as appropriate: allergies, current medications, past family history, past medical history, past social history, past surgical history and problem list.\"  Review and Interpretation of Imaging:  No new images " reviewed today  Laboratory Results:             Lab Results   Component Value Date    HGBA1C 5.10 12/09/2018         Lab Results   Component Value Date    CHOL 168 12/09/2018    CHOL 142 02/20/2018    CHOL 171 10/11/2017         Lab Results   Component Value Date    HDL 46 12/09/2018    HDL 51 02/20/2018    HDL 47 10/11/2017         Lab Results   Component Value Date     (H) 12/09/2018    LDL 76 02/20/2018     (H) 10/11/2017         Lab Results   Component Value Date    TRIG 75 12/09/2018    TRIG 75 02/20/2018    TRIG 78 10/11/2017     No results found for: RPR  Lab Results   Component Value Date    TSH 1.710 12/09/2018     Lab Results   Component Value Date    JPSDEMAH52 838 12/09/2018       Physical Examination: NIHSS: 0     mRS: 0  General Appearance:                      Well developed, well nourished, well groomed, alert, and cooperative.  HEENT:                       Normocephalic.  Normal fundoscopic exam including normal retina, discs are flat with sharp margins, normal vasculature.  Neck and Spine:                 Normal range of motion.  Normal alignment. No mass or tenderness. No bruits.  Cardiac:                                 Regular rate and rhythm. No murmurs.  Peripheral Vasculature:       Radial and pedal pulses are equal and symmetric.  Extremities:                           No edema or deformities. Normal joint ROM.  Skin:                                       No rashes or birth marks.     Neurological examination:  Higher Integrative  Function:                 Oriented to time, place and person. Normal registration, recall, attention span and concentration. Normal language including comprehension, spontaneous speech, repetition, reading, writing, naming and vocabulary. No neglect with normal visual-spatial function and construction. Normal fund of knowledge and higher integrative function.  CN II:       Pupils are equal, round, and reactive to light. Normal visual acuity and visual  fields.    CN III IV VI:           Extraocular movements are full without nystagmus.   CN V:     Normal facial sensation and strength of muscles of mastication.  CN VII:                         Facial movements are symmetric. No weakness.  CN VIII:                                   Auditory acuity is normal.  CN IX & X:                              Symmetric palatal movement.  CN XI:   Sternocleidomastoid and trapezius are normal.  No weakness.  CN XII:                                    The tongue is midline.  No atrophy or fasciculations.  Motor:   Normal muscle strength, bulk and tone in upper and lower extremities.  No fasciculations, rigidity, spasticity, or abnormal movements.  Reflexes:                 Plantar responses are flexor.  Sensation:             Normal to light touch, pinprick, vibration, temperature, and proprioception in arms and legs. Normal graphesthesia and no extinction on DSS.  Station and Gait:               Normal gait and station.    Coordination:                        Finger to nose test shows no dysmetria.  Rapid alternating movements are normal.  Heel to shin normal.       Diagnoses / Discussion: This is a 69-year-old male w/ hyperlipidemia, hypertension and Obstructive sleep apnea (untreated), prior strokes and PFO (closed 12/18) who I am seen today in follow-up for stroke who I am seen today in follow-up for her left hemispheric cortical stroke.  He remains at his neurologic baseline.  Previous he reported fatigue, issues with reading intermittent dyslexia and intermittent short-term memory loss which have improved mildly since last visit.  He was referred to Dr. Trujillo to further evaluate apneic episodes which were thought to be nocturnal seizures.  He was commended to start on ADD which patient states he never got filled.  [place] state law all reviewed with him in regards to not driving for 90 days after all seizures.  Patient verbalized understanding and agrees with plan.  He  continues to have untreated ELIZABETH he is open to re-evaluate.  I will plan to refer him back to pulmonary group who completed his initial sleep study.  I will plan to follow him annually moving forward.  Instructed him to call with any questions and/or concerns.  Recommendations below.    Plan:   Continue dual oral antiplatelets aspirin 81 mg and Plavix 75 mg and B12 per previous recommendations   Consider TriCor or Repatha for LDL control; discussed dietary options   Blood pressure control to <130/80   Goal LDL <70-recommend high dose statins-    Serum glucose < 140   Call 911 for stroke any stroke symptoms   Follow-up annually  Ronnell was seen today for stroke.    Diagnoses and all orders for this visit:    Cerebrovascular accident (CVA), unspecified mechanism (CMS/HCC)    Obstructive sleep apnea syndrome  -     Ambulatory Referral to Sleep Medicine    Vitamin B12 deficiency        MDM  Consults: pulmonary

## 2019-09-26 ENCOUNTER — CLINICAL SUPPORT NO REQUIREMENTS (OUTPATIENT)
Dept: CARDIOLOGY | Facility: CLINIC | Age: 69
End: 2019-09-26

## 2019-09-26 DIAGNOSIS — I63.9 CRYPTOGENIC STROKE (HCC): Primary | ICD-10-CM

## 2019-10-07 ENCOUNTER — CLINICAL SUPPORT NO REQUIREMENTS (OUTPATIENT)
Dept: CARDIOLOGY | Facility: CLINIC | Age: 69
End: 2019-10-07

## 2019-10-07 DIAGNOSIS — I63.9 CRYPTOGENIC STROKE (HCC): Primary | ICD-10-CM

## 2019-10-11 ENCOUNTER — OFFICE VISIT (OUTPATIENT)
Dept: SLEEP MEDICINE | Facility: HOSPITAL | Age: 69
End: 2019-10-11

## 2019-10-11 VITALS
WEIGHT: 190 LBS | BODY MASS INDEX: 25.18 KG/M2 | HEIGHT: 73 IN | HEART RATE: 76 BPM | DIASTOLIC BLOOD PRESSURE: 77 MMHG | SYSTOLIC BLOOD PRESSURE: 125 MMHG

## 2019-10-11 DIAGNOSIS — R53.82 CHRONIC FATIGUE: ICD-10-CM

## 2019-10-11 DIAGNOSIS — I63.9 CEREBROVASCULAR ACCIDENT (CVA), UNSPECIFIED MECHANISM (HCC): ICD-10-CM

## 2019-10-11 DIAGNOSIS — G47.33 OBSTRUCTIVE SLEEP APNEA SYNDROME: Primary | ICD-10-CM

## 2019-10-11 PROCEDURE — 99214 OFFICE O/P EST MOD 30 MIN: CPT | Performed by: INTERNAL MEDICINE

## 2019-10-11 RX ORDER — ZOLPIDEM TARTRATE 5 MG/1
5 TABLET ORAL TAKE AS DIRECTED
Qty: 2 TABLET | Refills: 0 | Status: SHIPPED | OUTPATIENT
Start: 2019-10-11 | End: 2019-10-15

## 2019-10-11 NOTE — PROGRESS NOTES
[facility]  [address]  [address]  [place] [address]  Phone   Fax       Ronnell SAN Belkys  1950  69 y.o.  male      PCP:Chase Haque MD  Referring Provider CHINEDU Muñoz    Type of service: Initial consult  Date of service: 10/11/2019      Chief Complaint   Patient presents with   • Sleep Apnea       History of present illness;  Thank you for asking to see Ronnell SAN Belkys, 69 y.o.  for evaluation of sleep apnea. The patient was seen today on 10/11/2019 at [facility].   Previously patient had a sleep study in 2017 and found to have moderate sleep apnea with AHI of 20/h.  However he returned his CPAP as he was unable to use the device.  Since then he had multiple developments.  He had 2 strokes and he had a PFO which was closed in December 2018.  He also has a loop recorder now.  He has been sent back for treatment of sleep apnea.  Patient does complain of having snoring and witnessed apneas which was evident in the previous sleep study.    Patient gives the following sleep history.  Sleep schedule:  Bedtime: 11:30 PM  Wake time: 6 AM  Normally takes about 10 minutes to fall asleep  Average hours of sleep 5-6  Number of naps per day 1  When patient wakes up still feels tired and not rested  Snoring; yes  Witnessed apneas; yes  Have you ever awakened gasping for breath, coughing, choking: Yes      Past Medical History:   Diagnosis Date   • Allergic rhinitis    • Anal fissure    • Aortic insufficiency     moderate, THIERRY 2017   • Asthma    • Benign prostatic hypertrophy (BPH) with weak urinary stream 11/1/2016   • Chronic bronchitis (CMS/HCC)    • Emphysema lung (CMS/HCC)    • Fatty liver    • GERD (gastroesophageal reflux disease)    • Hepatitis     thought to be Hepatitis A    • History of pneumonia     With left lower lobe atelectasis and scar tissue, being followed   • Obstructive sleep apnea syndrome 8/23/2017   • PFO (patent foramen  zenaida) 10/9/2018   • Pneumonia     LLL with scar tissue   • 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   • Spinal stenosis    • Stroke (CMS/HCC)     10/2017: left occipital/temporal       Social history:  Shift work: No  Tobacco use: Quit smoking  Alcohol use: No  Caffeinated drinks: 2 soda  Over-the-counter sleeping aid and medications: No  Narcotic medications: No    Family Hx  Family history of sleep apnea, not applicable  Family History   Problem Relation Age of Onset   • Obesity Mother    • Clotting disorder Mother         Upper extremities   • Alcohol abuse Father    • Cancer Father 63        Esophagus and lung   • Other Father         cardiac disorder   • Heart disease Father    • Kidney disease Sister    • Kidney failure Sister    • Drug abuse Paternal Uncle    • Stroke Sister    • Throat cancer Sister        Medications: reviewed    Current Outpatient Medications:   •  aspirin 81 MG EC tablet, Take 1 tablet by mouth Daily., Disp: , Rfl:   •  clopidogrel (PLAVIX) 75 MG tablet, Take 1 tablet by mouth Daily., Disp: 90 tablet, Rfl: 2  •  Cyanocobalamin (B-12) 1000 MCG/ML kit, Inject 250 mcg as directed 1 (One) Time Per Week. Pt. Stated he takes this medication weekly 250 mcg, pt reports took it last friday, Disp: , Rfl:   •  hydrochlorothiazide (HYDRODIURIL) 12.5 MG tablet, Take 1 tablet by mouth Daily., Disp: 90 tablet, Rfl: 3  •  zolpidem (AMBIEN) 5 MG tablet, Take 1 tablet by mouth Take As Directed for 2 doses. Bring the medication to the sleep lab. DO NOT USE AT HOME, Disp: 2 tablet, Rfl: 0    Review of systems:  [place] Sleepiness Scale: Total score: 7   Positive for snoring, witnessed apneas, fatigue and daytime excessive sleepiness, yes  Negative for shortness of breath, cough, wheezing, chest pain, nausea and vomiting, palpitation, swelling of feet:    Morning headaches: Yes  Awaken with sore throat or dry mouth : Yes  Leg jerking at night: No  Crawly feeling/urge sensation to move in the legs: No  Teeth grinding:  "No  Sleepwalking, nightmares, muscle weakness with laughing or anger,sleep paralysis: No  Nasal Congestion: No  Nocturia (how many times/night): 1  Memory Problem: Yes  Change in weight, no    Physical exam:  Vitals:    10/11/19 0900   BP: 125/77   Pulse: 76   Weight: 86.2 kg (190 lb)   Height: 185.4 cm (73\")    Body mass index is 25.07 kg/m². Neck Circumference: 16.5 inches  HEENT: Head is atraumatic, normocephalic   Eyes:pupils are round equal and reacting to light and accommodation, conjunctiva normal  Nose:no nasal septal defects or deviation and the nasal passages are clear, no nasal polyps,   Throat: tonsils are not enlarged, tongue normal size, oral airway Mallampati class 3  NECK: No lymphadenopathy, trachea is in the midline, thyroid not enlarged  RESPIRATORY SYSTEM: Breath sounds are equal on both sides, there are no wheezes or crackles  CARDIOVASULAR SYSTEM: Heart sounds are regular rhythm and teodora rate, there are no murmurs or thrills  ABDOMEN: Soft, no hepatosplenomegaly, no evidence of ascites  EXTREMITES: No cyanosis, clubbing or edema   NEUROLOGICAL SYSTEM: Oriented x 3, no gross neurological defects, gait normal    Medical records and labs reviewed in Epic reviewed in [facility]    Assessment and plan:  · Sleep apnea unspecified, (G47.30) Patient's symptoms and examination is consistent with sleep apnea.  I have talked to the patient about the signs and symptoms of sleep apnea and consequences of untreated sleep apnea. Discussed sleep testing.  I have placed a order in epic for a in lab Split night sleep test.  Patient will have a follow-up after this sleep test is done.  A prescription for zolpidem is been given for 2 tablets to bring it to the sleep center for sleep study  · Snoring secondary to sleep apnea  · Hypersomnia with [place] Sleepiness Scale of Total score: 7 due to sleep apnea.  · History of stroke  · Status post PFO closure        I once again thank you for asking me to see this patient in " consultation and I have forwarded my opinion and treatment plan.  Please do not hesitate to call me if you have any questions.     Lora Block MD, [facility]  Sleep Medicine.(Board-certified)  [facility]  10/11/2019 ,

## 2019-10-15 ENCOUNTER — OFFICE VISIT (OUTPATIENT)
Dept: CARDIOLOGY | Facility: CLINIC | Age: 69
End: 2019-10-15

## 2019-10-15 VITALS
SYSTOLIC BLOOD PRESSURE: 136 MMHG | WEIGHT: 187.9 LBS | BODY MASS INDEX: 24.9 KG/M2 | DIASTOLIC BLOOD PRESSURE: 76 MMHG | HEIGHT: 73 IN | HEART RATE: 56 BPM

## 2019-10-15 DIAGNOSIS — Q21.12 PFO (PATENT FORAMEN OVALE): ICD-10-CM

## 2019-10-15 DIAGNOSIS — I45.5 SINUS PAUSE: ICD-10-CM

## 2019-10-15 DIAGNOSIS — Z95.818 STATUS POST PLACEMENT OF IMPLANTABLE LOOP RECORDER: ICD-10-CM

## 2019-10-15 DIAGNOSIS — I35.1 NONRHEUMATIC AORTIC VALVE INSUFFICIENCY: ICD-10-CM

## 2019-10-15 DIAGNOSIS — I45.5 SINOATRIAL BLOCK: ICD-10-CM

## 2019-10-15 DIAGNOSIS — I63.9 CEREBROVASCULAR ACCIDENT (CVA), UNSPECIFIED MECHANISM (HCC): Primary | ICD-10-CM

## 2019-10-15 PROCEDURE — 93000 ELECTROCARDIOGRAM COMPLETE: CPT | Performed by: INTERNAL MEDICINE

## 2019-10-15 PROCEDURE — 99213 OFFICE O/P EST LOW 20 MIN: CPT | Performed by: INTERNAL MEDICINE

## 2019-10-15 RX ORDER — INFLUENZA A VIRUS A/MICHIGAN/45/2015 X-275 (H1N1) ANTIGEN (FORMALDEHYDE INACTIVATED), INFLUENZA A VIRUS A/SINGAPORE/INFIMH-16-0019/2016 IVR-186 (H3N2) ANTIGEN (FORMALDEHYDE INACTIVATED), AND INFLUENZA B VIRUS B/MARYLAND/15/2016 BX-69A (A B/COLORADO/6/2017-LIKE VIRUS) ANTIGEN (FORMALDEHYDE INACTIVATED) 60; 60; 60 UG/.5ML; UG/.5ML; UG/.5ML
INJECTION, SUSPENSION INTRAMUSCULAR
Refills: 0 | COMMUNITY
Start: 2019-10-02 | End: 2019-10-15

## 2019-10-15 RX ORDER — PNEUMOCOCCAL 13-VALENT CONJUGATE VACCINE 2.2; 2.2; 2.2; 2.2; 2.2; 4.4; 2.2; 2.2; 2.2; 2.2; 2.2; 2.2; 2.2 UG/.5ML; UG/.5ML; UG/.5ML; UG/.5ML; UG/.5ML; UG/.5ML; UG/.5ML; UG/.5ML; UG/.5ML; UG/.5ML; UG/.5ML; UG/.5ML; UG/.5ML
INJECTION, SUSPENSION INTRAMUSCULAR
COMMUNITY
Start: 2019-10-10 | End: 2019-10-15

## 2019-10-15 NOTE — PROGRESS NOTES
Date of Office Visit: 10/15/2019  Encounter Provider: Christopher Sawant MD  Place of Service: [facility] CARDIOLOGY  Patient Name: Ronnell Rizvi  :1950    Chief Complaint   Patient presents with   • Stroke   :     HPI: Ronnell Rizvi is a 69 y.o. male who presents today to follow up.    I first met him in 2016 when he was hospitalized for chest pain.  A Cardiolite stress was normal (EF 54%).  He did have some Wenckebach at the beginning of stress which normalized with exercise.    In 2017, he had an episode of visual changes that resolved on their own.  The next morning it happened again and he came to the ED and was diagnosed with a stroke of the left occipital/temporal lobe.  He was found to have some diffuse small vessel disease.  An echo wasn't performed but a Zio patch was placed.  We were not consulted in the hospital.  He was placed on clopidogrel and atorvastatin.    In 2017, the Zio showed some atrial ectopy (there was a 13 beat run of PACs) but no atrial fibrillation.  An echo showed normal LV systolic function and a small PFO.    His studies were then reviewed by a different neurologist, who felt that this was actually embolic. A THIERRY showed a moderate sized PFO and moderate aortic insufficiency but was otherwise normal. A Confirm device was placed (implanted monitor from [employer]).  Upon arrival he was noted to have an irregular rhythm (not atrial fibrillation).  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  The monitor failed to show any atrial fibrillation (see below).    In 2018, he presented with recurrent neurological symptoms.  He was found to have a subacute infarct as well as diffuse intracranial atherosclerosis.  He underwent percutaneous PFO closure with a 25mm Amplatzer device during that admission.    The implant the monitor has continued to report paroxysmal atrial fibrillation although review of every episode shows  that this is not the case.  He has short sinus pauses as well as type II sinoatrial exit block.    He feels well and denies any cardiac complaints at all.  He denies palpitations, lightheadedness, syncope, edema, chest pain, dyspnea, or recurrent stroke symptoms.    Past Medical History:   Diagnosis Date   • Allergic rhinitis    • Anal fissure    • Aortic insufficiency     moderate, THIERRY 2017   • Asthma    • Benign prostatic hypertrophy (BPH) with weak urinary stream 11/1/2016   • Chronic bronchitis (CMS/HCC)    • Emphysema lung (CMS/HCC)    • Fatty liver    • GERD (gastroesophageal reflux disease)    • Hepatitis     thought to be Hepatitis A    • History of pneumonia     With left lower lobe atelectasis and scar tissue, being followed   • Obstructive sleep apnea syndrome 8/23/2017   • PFO (patent foramen ovale)     s/p percutaneous closure with a 25mm Amplatzer device in December 2018   • Pneumonia     LLL with scar tissue   • 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   • Spinal stenosis    • Stroke (CMS/HCC)     10/2017: left occipital/temporal       Past Surgical History:   Procedure Laterality Date   • CARDIAC CATHETERIZATION N/A 12/12/2018    Procedure: Patent foramen ovale closure- [employer];  Surgeon: Deangelo Harris MD;  Location:  [facility];  Service: Cardiology   • CARDIAC CATHETERIZATION N/A 12/12/2018    Procedure: Intracardiac echocardiogram;  Surgeon: Deangelo Harris MD;  Location:  [facility];  Service: Cardiology   • CARDIAC ELECTROPHYSIOLOGY PROCEDURE N/A 3/26/2018    Procedure: Loop insertion   CONFIRM RX;  Surgeon: Luis Wyatt MD;  Location:  [facility];  Service: Cardiovascular   • COLONOSCOPY     • HAND SURGERY Bilateral    • INGUINAL HERNIA REPAIR Left    • OTHER SURGICAL HISTORY      inguinal hernia repair for person over age 5   • TONSILLECTOMY         Social History     Socioeconomic History   • Marital status:      Spouse  name: Joey   • Number of children: Not on file   • Years of education: Not on file   • Highest education level: Not on file   Occupational History   • Occupation:      Employer: RETIRED   Tobacco Use   • Smoking status: Former Smoker     Last attempt to quit: 1979     Years since quittin.6   • Smokeless tobacco: Never Used   • Tobacco comment: caffeine use: Drinks 4 glasses of Dt coke on avg.    Substance and Sexual Activity   • Alcohol use: No   • Drug use: No   • Sexual activity: Defer       Family History   Problem Relation Age of Onset   • Obesity Mother    • Clotting disorder Mother         Upper extremities   • Alcohol abuse Father    • Cancer Father 63        Esophagus and lung   • Other Father         cardiac disorder   • Heart disease Father    • Kidney disease Sister    • Kidney failure Sister    • Drug abuse Paternal Uncle    • Stroke Sister    • Throat cancer Sister        Review of Systems   HENT: Positive for hearing loss.    Cardiovascular: Negative for chest pain.   Respiratory: Positive for snoring. Negative for shortness of breath.    Neurological: Negative for dizziness and light-headedness.        As per HPI   Psychiatric/Behavioral: Positive for depression and memory loss.   All other systems reviewed and are negative.      Allergies   Allergen Reactions   • Aspirin Nausea Only   • Citrus      Blisters on mouth     • Combivent [Ipratropium-Albuterol] Other (See Comments)     Throat swelling   • Lactose Intolerance (Gi)    • Statins Mental Status Change     Severe memory impairment         Current Outpatient Medications:   •  aspirin 81 MG EC tablet, Take 1 tablet by mouth Daily., Disp: , Rfl:   •  clopidogrel (PLAVIX) 75 MG tablet, Take 1 tablet by mouth Daily., Disp: 90 tablet, Rfl: 2  •  hydrochlorothiazide (HYDRODIURIL) 12.5 MG tablet, Take 1 tablet by mouth Daily., Disp: 90 tablet, Rfl: 3     Objective:     Vitals:    10/15/19 1420   BP: 136/76   BP Location: Left arm  "  Pulse: 56   Weight: 85.2 kg (187 lb 14.4 oz)   Height: 185.4 cm (73\")     Body mass index is 24.79 kg/m².    Physical Exam   Constitutional: He is oriented to person, place, and time. He appears well-developed and well-nourished.   HENT:   Head: Normocephalic.   Nose: Nose normal.   Mouth/Throat: Oropharynx is clear and moist.   Eyes: Conjunctivae and EOM are normal. Pupils are equal, round, and reactive to light.   Neck: Normal range of motion. No JVD present.   Cardiovascular: Normal rate, regular rhythm, normal heart sounds and intact distal pulses.   No murmur heard.  Pulmonary/Chest: Effort normal and breath sounds normal.   Abdominal: Soft. There is no tenderness.   Musculoskeletal: Normal range of motion. He exhibits no edema.   Neurological: He is alert and oriented to person, place, and time. No cranial nerve deficit.   Skin: Skin is warm and dry. No rash noted.   Psychiatric: He has a normal mood and affect. His behavior is normal. Judgment and thought content normal.   Vitals reviewed.        ECG 12 Lead  Date/Time: 10/15/2019 2:31 PM  Performed by: Christopher Sawant MD  Authorized by: Christopher Sawant MD   Comparison: compared with previous ECG   Similar to previous ECG  Rhythm: sinus bradycardia  Conduction: 1st degree AV block  ST Segments: ST segments normal  T Waves: T waves normal  Other findings: poor R wave progression    Clinical impression: non-specific ECG              Assessment:       Diagnosis Plan   1. History of stroke  Adult Transesophageal Echo (THIERRY) W/ Cont if Necessary Per Protocol     2.  PFO  3.  Sinoatrial block type 2  4.  Aortic insufficiency     Plan:       1/2.  He had a left occipital/temporal stroke which was initially presumed to be due to small vessel disease. He was placed on clopidogrel and atorvastatin (which he stopped due to memory loss).  Then, outpatient neurological evaluation led to a further workup.  He was found to have a PFO on THIERRY, and then he had another event on " DAPT.  He then had the PFO closed.  As that was 10 months ago, and as he does have some intracranial atherosclerosis, I am stopping aspirin and continuing with clopidogrel as monotherapy.    He declines a statin.     3.  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  He doesn't have any symptoms of higher level block.    4.  I will check an echo in 2020.  It was moderate in 2017.    Sincerely,       Christopher Sawant MD

## 2019-10-24 ENCOUNTER — HOSPITAL ENCOUNTER (OUTPATIENT)
Dept: SLEEP MEDICINE | Facility: HOSPITAL | Age: 69
Discharge: HOME | End: 2019-10-24
Admitting: INTERNAL MEDICINE

## 2019-10-24 DIAGNOSIS — R53.82 CHRONIC FATIGUE: ICD-10-CM

## 2019-10-24 DIAGNOSIS — I63.9 CEREBROVASCULAR ACCIDENT (CVA), UNSPECIFIED MECHANISM (HCC): ICD-10-CM

## 2019-10-24 DIAGNOSIS — G47.33 OBSTRUCTIVE SLEEP APNEA SYNDROME: ICD-10-CM

## 2019-10-24 PROCEDURE — 95810 POLYSOM 6/> YRS 4/> PARAM: CPT | Performed by: INTERNAL MEDICINE

## 2019-10-24 PROCEDURE — 95810 POLYSOM 6/> YRS 4/> PARAM: CPT

## 2019-10-25 ENCOUNTER — TELEPHONE (OUTPATIENT)
Dept: SLEEP MEDICINE | Facility: HOSPITAL | Age: 69
End: 2019-10-25

## 2019-10-25 NOTE — TELEPHONE ENCOUNTER
Spoke with patient about results, faxed orders to [facility], [place], operated by [facility] per patient request, f/u on 12/27-AK

## 2019-10-27 ENCOUNTER — CLINICAL SUPPORT NO REQUIREMENTS (OUTPATIENT)
Dept: CARDIOLOGY | Facility: CLINIC | Age: 69
End: 2019-10-27

## 2019-10-27 DIAGNOSIS — I63.9 CRYPTOGENIC STROKE (HCC): Primary | ICD-10-CM

## 2019-10-27 PROCEDURE — 93298 REM INTERROG DEV EVAL SCRMS: CPT | Performed by: INTERNAL MEDICINE

## 2019-10-27 PROCEDURE — 93299 PR REM INTERROG ICPMS/SCRMS <30 D TECH REVIEW: CPT | Performed by: INTERNAL MEDICINE

## 2019-10-28 ENCOUNTER — TELEPHONE (OUTPATIENT)
Dept: CARDIOLOGY | Facility: CLINIC | Age: 69
End: 2019-10-28

## 2019-10-28 NOTE — TELEPHONE ENCOUNTER
A remote tx. Was received and there is bradycardia  Rates est. 27 bpm on 10/26/19 at 5;45 am  . See strips below. Thanks Ember

## 2019-11-24 ENCOUNTER — CLINICAL SUPPORT NO REQUIREMENTS (OUTPATIENT)
Dept: CARDIOLOGY | Facility: CLINIC | Age: 69
End: 2019-11-24

## 2019-11-24 DIAGNOSIS — I63.9 CRYPTOGENIC STROKE (HCC): Primary | ICD-10-CM

## 2019-12-13 ENCOUNTER — CLINICAL SUPPORT NO REQUIREMENTS (OUTPATIENT)
Dept: CARDIOLOGY | Facility: CLINIC | Age: 69
End: 2019-12-13

## 2019-12-13 DIAGNOSIS — I63.9 CRYPTOGENIC STROKE (HCC): Primary | ICD-10-CM

## 2019-12-13 PROCEDURE — 93298 REM INTERROG DEV EVAL SCRMS: CPT | Performed by: INTERNAL MEDICINE

## 2019-12-13 PROCEDURE — 93299 PR REM INTERROG ICPMS/SCRMS <30 D TECH REVIEW: CPT | Performed by: INTERNAL MEDICINE

## 2019-12-16 ENCOUNTER — CLINICAL SUPPORT NO REQUIREMENTS (OUTPATIENT)
Dept: CARDIOLOGY | Facility: CLINIC | Age: 69
End: 2019-12-16

## 2019-12-16 DIAGNOSIS — I63.9 CRYPTOGENIC STROKE (HCC): Primary | ICD-10-CM

## 2019-12-20 ENCOUNTER — CLINICAL SUPPORT NO REQUIREMENTS (OUTPATIENT)
Dept: CARDIOLOGY | Facility: CLINIC | Age: 69
End: 2019-12-20

## 2019-12-20 DIAGNOSIS — I63.9 CRYPTOGENIC STROKE (HCC): Primary | ICD-10-CM

## 2020-01-08 ENCOUNTER — CLINICAL SUPPORT NO REQUIREMENTS (OUTPATIENT)
Dept: CARDIOLOGY | Facility: CLINIC | Age: 70
End: 2020-01-08

## 2020-01-08 DIAGNOSIS — I63.9 CRYPTOGENIC STROKE (HCC): Primary | ICD-10-CM

## 2020-01-13 ENCOUNTER — CLINICAL SUPPORT NO REQUIREMENTS (OUTPATIENT)
Dept: CARDIOLOGY | Facility: CLINIC | Age: 70
End: 2020-01-13

## 2020-01-13 DIAGNOSIS — I63.9 CRYPTOGENIC STROKE (HCC): Primary | ICD-10-CM

## 2020-01-13 PROCEDURE — 93298 REM INTERROG DEV EVAL SCRMS: CPT | Performed by: INTERNAL MEDICINE

## 2020-01-14 ENCOUNTER — APPOINTMENT (OUTPATIENT)
Dept: GENERAL RADIOLOGY | Facility: HOSPITAL | Age: 70
End: 2020-01-14

## 2020-01-14 ENCOUNTER — APPOINTMENT (OUTPATIENT)
Dept: CT IMAGING | Facility: HOSPITAL | Age: 70
End: 2020-01-14

## 2020-01-14 ENCOUNTER — APPOINTMENT (OUTPATIENT)
Dept: MRI IMAGING | Facility: HOSPITAL | Age: 70
End: 2020-01-14

## 2020-01-14 ENCOUNTER — APPOINTMENT (OUTPATIENT)
Dept: CARDIOLOGY | Facility: HOSPITAL | Age: 70
End: 2020-01-14

## 2020-01-14 ENCOUNTER — HOSPITAL ENCOUNTER (OUTPATIENT)
Facility: HOSPITAL | Age: 70
Setting detail: OBSERVATION
Discharge: HOME | End: 2020-01-15
Attending: EMERGENCY MEDICINE | Admitting: HOSPITALIST

## 2020-01-14 DIAGNOSIS — G45.9 TIA ON MEDICATION: Primary | ICD-10-CM

## 2020-01-14 PROBLEM — I10 HTN (HYPERTENSION): Status: ACTIVE | Noted: 2020-01-14

## 2020-01-14 LAB
ALBUMIN SERPL-MCNC: 3.9 G/DL (ref 3.5–5.2)
ALBUMIN/GLOB SERPL: 1.4 G/DL
ALP SERPL-CCNC: 48 U/L (ref 39–117)
ALT SERPL W P-5'-P-CCNC: 24 U/L (ref 1–41)
ANION GAP SERPL CALCULATED.3IONS-SCNC: 11.5 MMOL/L (ref 5–15)
APTT PPP: 24.3 SECONDS (ref 22.7–35.4)
AST SERPL-CCNC: 20 U/L (ref 1–40)
BASOPHILS # BLD AUTO: 0.04 10*3/MM3 (ref 0–0.2)
BASOPHILS NFR BLD AUTO: 0.8 % (ref 0–1.5)
BH CV ECHO MEAS - ACS: 2.1 CM
BH CV ECHO MEAS - AI DEC SLOPE: 192 CM/SEC^2
BH CV ECHO MEAS - AI MAX PG: 46.8 MMHG
BH CV ECHO MEAS - AI MAX VEL: 342 CM/SEC
BH CV ECHO MEAS - AI P1/2T: 521.7 MSEC
BH CV ECHO MEAS - AO MAX PG (FULL): 0.49 MMHG
BH CV ECHO MEAS - AO MAX PG: 4.4 MMHG
BH CV ECHO MEAS - AO MEAN PG (FULL): 1 MMHG
BH CV ECHO MEAS - AO MEAN PG: 3 MMHG
BH CV ECHO MEAS - AO ROOT AREA (BSA CORRECTED): 1.7
BH CV ECHO MEAS - AO ROOT AREA: 10.2 CM^2
BH CV ECHO MEAS - AO ROOT DIAM: 3.6 CM
BH CV ECHO MEAS - AO V2 MAX: 105 CM/SEC
BH CV ECHO MEAS - AO V2 MEAN: 74.5 CM/SEC
BH CV ECHO MEAS - AO V2 VTI: 28.5 CM
BH CV ECHO MEAS - AVA(I,A): 2.6 CM^2
BH CV ECHO MEAS - AVA(I,D): 2.6 CM^2
BH CV ECHO MEAS - AVA(V,A): 3 CM^2
BH CV ECHO MEAS - AVA(V,D): 3 CM^2
BH CV ECHO MEAS - BSA(HAYCOCK): 2.1 M^2
BH CV ECHO MEAS - BSA: 2.1 M^2
BH CV ECHO MEAS - BZI_BMI: 25.1 KILOGRAMS/M^2
BH CV ECHO MEAS - BZI_METRIC_HEIGHT: 185.4 CM
BH CV ECHO MEAS - BZI_METRIC_WEIGHT: 86.2 KG
BH CV ECHO MEAS - EDV(CUBED): 97.3 ML
BH CV ECHO MEAS - EDV(MOD-SP2): 78 ML
BH CV ECHO MEAS - EDV(MOD-SP4): 72 ML
BH CV ECHO MEAS - EDV(TEICH): 97.3 ML
BH CV ECHO MEAS - EF(CUBED): 81.9 %
BH CV ECHO MEAS - EF(MOD-BP): 60 %
BH CV ECHO MEAS - EF(MOD-SP2): 59 %
BH CV ECHO MEAS - EF(MOD-SP4): 62.5 %
BH CV ECHO MEAS - EF(TEICH): 74.7 %
BH CV ECHO MEAS - ESV(CUBED): 17.6 ML
BH CV ECHO MEAS - ESV(MOD-SP2): 32 ML
BH CV ECHO MEAS - ESV(MOD-SP4): 27 ML
BH CV ECHO MEAS - ESV(TEICH): 24.6 ML
BH CV ECHO MEAS - FS: 43.5 %
BH CV ECHO MEAS - IVS/LVPW: 0.82
BH CV ECHO MEAS - IVSD: 0.9 CM
BH CV ECHO MEAS - LAT PEAK E' VEL: 6 CM/SEC
BH CV ECHO MEAS - LV DIASTOLIC VOL/BSA (35-75): 34.2 ML/M^2
BH CV ECHO MEAS - LV MASS(C)D: 158.8 GRAMS
BH CV ECHO MEAS - LV MASS(C)DI: 75.4 GRAMS/M^2
BH CV ECHO MEAS - LV MAX PG: 3.9 MMHG
BH CV ECHO MEAS - LV MEAN PG: 2 MMHG
BH CV ECHO MEAS - LV SYSTOLIC VOL/BSA (12-30): 12.8 ML/M^2
BH CV ECHO MEAS - LV V1 MAX: 99 CM/SEC
BH CV ECHO MEAS - LV V1 MEAN: 60.9 CM/SEC
BH CV ECHO MEAS - LV V1 VTI: 23.3 CM
BH CV ECHO MEAS - LVIDD: 4.6 CM
BH CV ECHO MEAS - LVIDS: 2.6 CM
BH CV ECHO MEAS - LVLD AP2: 7.3 CM
BH CV ECHO MEAS - LVLD AP4: 6.9 CM
BH CV ECHO MEAS - LVLS AP2: 6.2 CM
BH CV ECHO MEAS - LVLS AP4: 5.5 CM
BH CV ECHO MEAS - LVOT AREA (M): 3.1 CM^2
BH CV ECHO MEAS - LVOT AREA: 3.1 CM^2
BH CV ECHO MEAS - LVOT DIAM: 2 CM
BH CV ECHO MEAS - LVPWD: 1.1 CM
BH CV ECHO MEAS - MED PEAK E' VEL: 5 CM/SEC
BH CV ECHO MEAS - MV A DUR: 0.1 SEC
BH CV ECHO MEAS - MV A MAX VEL: 106 CM/SEC
BH CV ECHO MEAS - MV DEC SLOPE: 453 CM/SEC^2
BH CV ECHO MEAS - MV DEC TIME: 0.26 SEC
BH CV ECHO MEAS - MV E MAX VEL: 73.7 CM/SEC
BH CV ECHO MEAS - MV E/A: 0.7
BH CV ECHO MEAS - MV MAX PG: 6.3 MMHG
BH CV ECHO MEAS - MV MEAN PG: 2 MMHG
BH CV ECHO MEAS - MV P1/2T MAX VEL: 92.3 CM/SEC
BH CV ECHO MEAS - MV P1/2T: 59.7 MSEC
BH CV ECHO MEAS - MV V2 MAX: 125 CM/SEC
BH CV ECHO MEAS - MV V2 MEAN: 61.4 CM/SEC
BH CV ECHO MEAS - MV V2 VTI: 28.5 CM
BH CV ECHO MEAS - MVA P1/2T LCG: 2.4 CM^2
BH CV ECHO MEAS - MVA(P1/2T): 3.7 CM^2
BH CV ECHO MEAS - MVA(VTI): 2.6 CM^2
BH CV ECHO MEAS - PA ACC TIME: 0.07 SEC
BH CV ECHO MEAS - PA MAX PG (FULL): 1.4 MMHG
BH CV ECHO MEAS - PA MAX PG: 2.7 MMHG
BH CV ECHO MEAS - PA PR(ACCEL): 47.5 MMHG
BH CV ECHO MEAS - PA V2 MAX: 81.4 CM/SEC
BH CV ECHO MEAS - PULM A REVS DUR: 0.17 SEC
BH CV ECHO MEAS - PULM A REVS VEL: 32.4 CM/SEC
BH CV ECHO MEAS - PULM DIAS VEL: 40 CM/SEC
BH CV ECHO MEAS - PULM S/D: 1.7
BH CV ECHO MEAS - PULM SYS VEL: 66.6 CM/SEC
BH CV ECHO MEAS - PVA(V,A): 2.4 CM^2
BH CV ECHO MEAS - PVA(V,D): 2.4 CM^2
BH CV ECHO MEAS - QP/QS: 0.55
BH CV ECHO MEAS - RAP SYSTOLE: 3 MMHG
BH CV ECHO MEAS - RV MAX PG: 1.2 MMHG
BH CV ECHO MEAS - RV MEAN PG: 1 MMHG
BH CV ECHO MEAS - RV V1 MAX: 55.4 CM/SEC
BH CV ECHO MEAS - RV V1 MEAN: 35.8 CM/SEC
BH CV ECHO MEAS - RV V1 VTI: 11.6 CM
BH CV ECHO MEAS - RVOT AREA: 3.5 CM^2
BH CV ECHO MEAS - RVOT DIAM: 2.1 CM
BH CV ECHO MEAS - RVSP: 21.7 MMHG
BH CV ECHO MEAS - SI(AO): 137.8 ML/M^2
BH CV ECHO MEAS - SI(CUBED): 37.9 ML/M^2
BH CV ECHO MEAS - SI(LVOT): 34.8 ML/M^2
BH CV ECHO MEAS - SI(MOD-SP2): 21.8 ML/M^2
BH CV ECHO MEAS - SI(MOD-SP4): 21.4 ML/M^2
BH CV ECHO MEAS - SI(TEICH): 34.5 ML/M^2
BH CV ECHO MEAS - SV(AO): 290.1 ML
BH CV ECHO MEAS - SV(CUBED): 79.8 ML
BH CV ECHO MEAS - SV(LVOT): 73.2 ML
BH CV ECHO MEAS - SV(MOD-SP2): 46 ML
BH CV ECHO MEAS - SV(MOD-SP4): 45 ML
BH CV ECHO MEAS - SV(RVOT): 40.2 ML
BH CV ECHO MEAS - SV(TEICH): 72.7 ML
BH CV ECHO MEAS - TAPSE (>1.6): 1.9 CM
BH CV ECHO MEAS - TR MAX VEL: 216 CM/SEC
BH CV ECHO MEASUREMENTS AVERAGE E/E' RATIO: 13.4
BH CV VAS BP RIGHT ARM: NORMAL MMHG
BH CV XLRA - RV BASE: 2.8 CM
BH CV XLRA - TDI S': 12 CM/SEC
BILIRUB SERPL-MCNC: 0.3 MG/DL (ref 0.2–1.2)
BUN BLD-MCNC: 14 MG/DL (ref 8–23)
BUN/CREAT SERPL: 11.9 (ref 7–25)
CALCIUM SPEC-SCNC: 9.3 MG/DL (ref 8.6–10.5)
CHLORIDE SERPL-SCNC: 99 MMOL/L (ref 98–107)
CO2 SERPL-SCNC: 27.5 MMOL/L (ref 22–29)
CREAT BLD-MCNC: 1.18 MG/DL (ref 0.76–1.27)
DEPRECATED RDW RBC AUTO: 43.4 FL (ref 37–54)
EOSINOPHIL # BLD AUTO: 0.13 10*3/MM3 (ref 0–0.4)
EOSINOPHIL NFR BLD AUTO: 2.4 % (ref 0.3–6.2)
ERYTHROCYTE [DISTWIDTH] IN BLOOD BY AUTOMATED COUNT: 12.7 % (ref 12.3–15.4)
GFR SERPL CREATININE-BSD FRML MDRD: 61 ML/MIN/1.73
GLOBULIN UR ELPH-MCNC: 2.7 GM/DL
GLUCOSE BLD-MCNC: 94 MG/DL (ref 65–99)
GLUCOSE BLDC GLUCOMTR-MCNC: 102 MG/DL (ref 70–130)
GLUCOSE BLDC GLUCOMTR-MCNC: 96 MG/DL (ref 70–130)
GLUCOSE BLDC GLUCOMTR-MCNC: 96 MG/DL (ref 70–130)
HCT VFR BLD AUTO: 44.8 % (ref 37.5–51)
HGB BLD-MCNC: 15 G/DL (ref 13–17.7)
HOLD SPECIMEN: NORMAL
HOLD SPECIMEN: NORMAL
IMM GRANULOCYTES # BLD AUTO: 0.02 10*3/MM3 (ref 0–0.05)
IMM GRANULOCYTES NFR BLD AUTO: 0.4 % (ref 0–0.5)
INR PPP: 0.97 (ref 0.9–1.1)
LEFT ATRIUM VOLUME INDEX: 18 ML/M2
LV EF 2D ECHO EST: 60 %
LYMPHOCYTES # BLD AUTO: 1.2 10*3/MM3 (ref 0.7–3.1)
LYMPHOCYTES NFR BLD AUTO: 22.6 % (ref 19.6–45.3)
MAXIMAL PREDICTED HEART RATE: 149 BPM
MCH RBC QN AUTO: 30.9 PG (ref 26.6–33)
MCHC RBC AUTO-ENTMCNC: 33.5 G/DL (ref 31.5–35.7)
MCV RBC AUTO: 92.4 FL (ref 79–97)
MONOCYTES # BLD AUTO: 0.59 10*3/MM3 (ref 0.1–0.9)
MONOCYTES NFR BLD AUTO: 11.1 % (ref 5–12)
NEUTROPHILS # BLD AUTO: 3.33 10*3/MM3 (ref 1.7–7)
NEUTROPHILS NFR BLD AUTO: 62.7 % (ref 42.7–76)
NRBC BLD AUTO-RTO: 0 /100 WBC (ref 0–0.2)
PLATELET # BLD AUTO: 245 10*3/MM3 (ref 140–450)
PMV BLD AUTO: 9.9 FL (ref 6–12)
POTASSIUM BLD-SCNC: 3.8 MMOL/L (ref 3.5–5.2)
PROT SERPL-MCNC: 6.6 G/DL (ref 6–8.5)
PROTHROMBIN TIME: 12.6 SECONDS (ref 11.7–14.2)
RBC # BLD AUTO: 4.85 10*6/MM3 (ref 4.14–5.8)
SODIUM BLD-SCNC: 138 MMOL/L (ref 136–145)
STRESS TARGET HR: 127 BPM
TROPONIN T SERPL-MCNC: <0.01 NG/ML (ref 0–0.03)
WBC NRBC COR # BLD: 5.31 10*3/MM3 (ref 3.4–10.8)
WHOLE BLOOD HOLD SPECIMEN: NORMAL
WHOLE BLOOD HOLD SPECIMEN: NORMAL

## 2020-01-14 PROCEDURE — 93010 ELECTROCARDIOGRAM REPORT: CPT | Performed by: INTERNAL MEDICINE

## 2020-01-14 PROCEDURE — G0378 HOSPITAL OBSERVATION PER HR: HCPCS

## 2020-01-14 PROCEDURE — 82962 GLUCOSE BLOOD TEST: CPT

## 2020-01-14 PROCEDURE — 85610 PROTHROMBIN TIME: CPT | Performed by: EMERGENCY MEDICINE

## 2020-01-14 PROCEDURE — 70551 MRI BRAIN STEM W/O DYE: CPT

## 2020-01-14 PROCEDURE — 93306 TTE W/DOPPLER COMPLETE: CPT | Performed by: INTERNAL MEDICINE

## 2020-01-14 PROCEDURE — 96360 HYDRATION IV INFUSION INIT: CPT

## 2020-01-14 PROCEDURE — 85025 COMPLETE CBC W/AUTO DIFF WBC: CPT | Performed by: EMERGENCY MEDICINE

## 2020-01-14 PROCEDURE — 96361 HYDRATE IV INFUSION ADD-ON: CPT

## 2020-01-14 PROCEDURE — 93306 TTE W/DOPPLER COMPLETE: CPT

## 2020-01-14 PROCEDURE — 93005 ELECTROCARDIOGRAM TRACING: CPT | Performed by: EMERGENCY MEDICINE

## 2020-01-14 PROCEDURE — 70450 CT HEAD/BRAIN W/O DYE: CPT

## 2020-01-14 PROCEDURE — 71045 X-RAY EXAM CHEST 1 VIEW: CPT

## 2020-01-14 PROCEDURE — 80053 COMPREHEN METABOLIC PANEL: CPT | Performed by: EMERGENCY MEDICINE

## 2020-01-14 PROCEDURE — 99285 EMERGENCY DEPT VISIT HI MDM: CPT

## 2020-01-14 PROCEDURE — 85730 THROMBOPLASTIN TIME PARTIAL: CPT | Performed by: EMERGENCY MEDICINE

## 2020-01-14 PROCEDURE — 84484 ASSAY OF TROPONIN QUANT: CPT | Performed by: EMERGENCY MEDICINE

## 2020-01-14 RX ORDER — BISACODYL 10 MG
10 SUPPOSITORY, RECTAL RECTAL DAILY PRN
Status: DISCONTINUED | OUTPATIENT
Start: 2020-01-14 | End: 2020-01-15 | Stop reason: HOSPADM

## 2020-01-14 RX ORDER — HYDROCHLOROTHIAZIDE 12.5 MG/1
12.5 TABLET ORAL DAILY
Status: DISCONTINUED | OUTPATIENT
Start: 2020-01-15 | End: 2020-01-14

## 2020-01-14 RX ORDER — ASPIRIN 81 MG/1
81 TABLET ORAL DAILY
Status: DISCONTINUED | OUTPATIENT
Start: 2020-01-15 | End: 2020-01-15 | Stop reason: HOSPADM

## 2020-01-14 RX ORDER — SODIUM CHLORIDE 0.9 % (FLUSH) 0.9 %
10 SYRINGE (ML) INJECTION AS NEEDED
Status: DISCONTINUED | OUTPATIENT
Start: 2020-01-14 | End: 2020-01-15 | Stop reason: HOSPADM

## 2020-01-14 RX ORDER — ACETAMINOPHEN 325 MG/1
650 TABLET ORAL EVERY 4 HOURS PRN
Status: DISCONTINUED | OUTPATIENT
Start: 2020-01-14 | End: 2020-01-15 | Stop reason: HOSPADM

## 2020-01-14 RX ORDER — ACETAMINOPHEN 650 MG/1
650 SUPPOSITORY RECTAL EVERY 4 HOURS PRN
Status: DISCONTINUED | OUTPATIENT
Start: 2020-01-14 | End: 2020-01-15 | Stop reason: HOSPADM

## 2020-01-14 RX ORDER — CLOPIDOGREL BISULFATE 75 MG/1
75 TABLET ORAL DAILY
Status: DISCONTINUED | OUTPATIENT
Start: 2020-01-15 | End: 2020-01-15 | Stop reason: HOSPADM

## 2020-01-14 RX ORDER — LABETALOL HYDROCHLORIDE 5 MG/ML
10 INJECTION, SOLUTION INTRAVENOUS
Status: DISCONTINUED | OUTPATIENT
Start: 2020-01-14 | End: 2020-01-15 | Stop reason: HOSPADM

## 2020-01-14 RX ORDER — SODIUM CHLORIDE 9 MG/ML
75 INJECTION, SOLUTION INTRAVENOUS CONTINUOUS
Status: DISCONTINUED | OUTPATIENT
Start: 2020-01-14 | End: 2020-01-14

## 2020-01-14 RX ORDER — ASPIRIN 325 MG
325 TABLET ORAL DAILY
Status: CANCELLED | OUTPATIENT
Start: 2020-01-15

## 2020-01-14 RX ORDER — ONDANSETRON 2 MG/ML
4 INJECTION INTRAMUSCULAR; INTRAVENOUS EVERY 6 HOURS PRN
Status: DISCONTINUED | OUTPATIENT
Start: 2020-01-14 | End: 2020-01-15 | Stop reason: HOSPADM

## 2020-01-14 RX ORDER — ASPIRIN 300 MG/1
300 SUPPOSITORY RECTAL DAILY
Status: CANCELLED | OUTPATIENT
Start: 2020-01-15

## 2020-01-14 RX ORDER — ATORVASTATIN CALCIUM 80 MG/1
80 TABLET, FILM COATED ORAL NIGHTLY
Status: DISCONTINUED | OUTPATIENT
Start: 2020-01-14 | End: 2020-01-14

## 2020-01-14 RX ADMIN — SODIUM CHLORIDE 75 ML/HR: 9 INJECTION, SOLUTION INTRAVENOUS at 14:17

## 2020-01-14 NOTE — PLAN OF CARE
Problem: Patient Care Overview  Goal: Plan of Care Review  Outcome: Ongoing (interventions implemented as appropriate)  Flowsheets (Taken 1/14/2020 7631)  Outcome Summary: Pt admitted from ER with TIA.  NIH 0.  MRI scheduled.  No complaints of pain.  VSS.  Will continue to monitor.

## 2020-01-14 NOTE — H&P
Patient Name:  Ronnell Rizvi  YOB: 1950  MRN:  [id number]  Admit Date:  1/14/2020  Patient Care Team:  Chase Haque MD as PCP - General (Internal Medicine)  Dia Call APRN as PCP - Claims Attributed  Gregory King MD as Consulting Physician (Hematology and Oncology)  Chase Haque MD as Referring Physician (Internal Medicine)  Luis Wyatt MD as Consulting Physician (Cardiology)      Subjective   History Present Illness     Chief Complaint   Patient presents with   • Speech Problem       Mr. Rizvi is a 70 y.o. male with a history of previous CVA x 3, PFO s/p closure, ELIZABETH & multiple medical issues that presents to [facility] complaining of changes in speech and headache. Intermittent speech difficulties were noticed by wife last night around 2330. At 0900, wife noticed pt to have increased trouble speaking, described as saying words that did not make sense. No other neurological changes were noticed. She gave him ASA 81 mg po and brought him to ED for evaluation.     CTH did not show any acute changes but did show evidence of old infarcts. Labs are unremarkable. BP stable.    On exam, pt continues to have intermittent expressive aphasia where he will say a word that doesn't make sense or have word finding difficulty. Rates frontal HA 3/10. Denies visual changes, trouble swallowing, dizziness, lightheadedness, numbness, tingling, weakness.    Review of records show pt was taken off ASA 81 mg in October by Cardiology but has resumed Plavix. He does not tolerate statins as they cause AMS.       Review of Systems   Constitutional: Negative for fever.   HENT: Negative for congestion and trouble swallowing.    Eyes: Negative for visual disturbance.   Respiratory: Negative for cough and shortness of breath.    Cardiovascular: Negative for chest pain, palpitations and leg swelling.   Gastrointestinal: Negative for abdominal pain and nausea.   Genitourinary:  Negative for dysuria.   Musculoskeletal: Negative for gait problem.   Skin: Negative for rash.   Neurological: Positive for speech difficulty and headaches. Negative for dizziness, weakness, light-headedness and numbness.   Psychiatric/Behavioral: Negative for confusion.        Personal History     Past Medical History:   Diagnosis Date   • Allergic rhinitis    • Anal fissure    • Aortic insufficiency     moderate, THIERRY 2017   • Asthma    • Benign prostatic hypertrophy (BPH) with weak urinary stream 11/1/2016   • Chronic bronchitis (CMS/HCC)    • Emphysema lung (CMS/HCC)    • Fatty liver    • GERD (gastroesophageal reflux disease)    • Hepatitis     thought to be Hepatitis A    • History of pneumonia     With left lower lobe atelectasis and scar tissue, being followed   • Obstructive sleep apnea syndrome 8/23/2017   • PFO (patent foramen ovale)     s/p percutaneous closure with a 25mm Amplatzer device in December 2018   • Pneumonia     LLL with scar tissue   • 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   • Spinal stenosis    • Stroke (CMS/HCC)     10/2017: left occipital/temporal     Past Surgical History:   Procedure Laterality Date   • CARDIAC CATHETERIZATION N/A 12/12/2018    Procedure: Patent foramen ovale closure- Abbott;  Surgeon: Deangelo Harris MD;  Location:  [facility];  Service: Cardiology   • CARDIAC CATHETERIZATION N/A 12/12/2018    Procedure: Intracardiac echocardiogram;  Surgeon: Deangelo Harris MD;  Location:  [facility];  Service: Cardiology   • CARDIAC ELECTROPHYSIOLOGY PROCEDURE N/A 3/26/2018    Procedure: Loop insertion   CONFIRM RX;  Surgeon: Luis Wyatt MD;  Location:  [facility];  Service: Cardiovascular   • COLONOSCOPY     • HAND SURGERY Bilateral    • INGUINAL HERNIA REPAIR Left    • OTHER SURGICAL HISTORY      inguinal hernia repair for person over age 5   • TONSILLECTOMY       Family History   Problem Relation Age of Onset   •  "Obesity Mother    • Clotting disorder Mother         Upper extremities   • Alcohol abuse Father    • Cancer Father 63        Esophagus and lung   • Other Father         cardiac disorder   • Heart disease Father    • Kidney disease Sister    • Kidney failure Sister    • Drug abuse Paternal Uncle    • Stroke Sister    • Throat cancer Sister      Social History     Tobacco Use   • Smoking status: Former Smoker     Last attempt to quit: 1979     Years since quittin.9   • Smokeless tobacco: Never Used   • Tobacco comment: caffeine use: Drinks 4 glasses of Dt coke on avg.    Substance Use Topics   • Alcohol use: No   • Drug use: No     No current facility-administered medications on file prior to encounter.      Current Outpatient Medications on File Prior to Encounter   Medication Sig Dispense Refill   • clopidogrel (PLAVIX) 75 MG tablet Take 1 tablet by mouth Daily. 90 tablet 2   • hydrochlorothiazide (HYDRODIURIL) 12.5 MG tablet Take 1 tablet by mouth Daily. 90 tablet 3   • azithromycin (ZITHROMAX) 250 MG tablet Take 2 tablets the first day, then 1 tablet daily for 4 days. 6 tablet 0   • methylPREDNISolone (MEDROL, MIKAEL,) 4 MG tablet Take as directed on package instructions. 21 each 0     Allergies   Allergen Reactions   • Aspirin Nausea Only     Pt states he \"can tolerate enteric coated aspirin only.\"    • Citrus      Blisters on mouth     • Combivent [Ipratropium-Albuterol] Other (See Comments)     Throat swelling   • Lactose Intolerance (Gi)    • Statins Mental Status Change     Severe memory impairment       Objective    Objective     Vital Signs  Temp:  [97.6 °F (36.4 °C)] 97.6 °F (36.4 °C)  Heart Rate:  [62-86] 62  Resp:  [16-18] 17  BP: (120-146)/(70-93) 146/93  SpO2:  [96 %-98 %] 98 %  on   ;   Device (Oxygen Therapy): room air  Body mass index is 25.11 kg/m².    Physical Exam   Constitutional: He is oriented to person, place, and time. He appears well-developed and well-nourished. No distress.   HENT: "   Head: Normocephalic and atraumatic.   Eyes: EOM are normal.   Neck: No JVD present.   Cardiovascular: Normal rate and regular rhythm.   Pulmonary/Chest: Effort normal and breath sounds normal. No respiratory distress.   Abdominal: Soft. Bowel sounds are normal.   Musculoskeletal: Normal range of motion. He exhibits no edema.   Neurological: He is alert and oriented to person, place, and time. A sensory deficit is present. No cranial nerve deficit.   Intermittent word finding difficulty/words that don't make sense  Strength 5/5 throughout   Skin: Skin is warm and dry.   Psychiatric: He has a normal mood and affect.   Vitals reviewed.      Results Review:  I reviewed the patient's new clinical results.  I reviewed the patient's new imaging results and agree with the interpretation.  I reviewed the patient's other test results and agree with the interpretation  I personally viewed and interpreted the patient's EKG/Telemetry data  Discussed with ED provider.    Lab Results (last 24 hours)     Procedure Component Value Units Date/Time    POC Glucose Once [[id number]]  (Normal) Collected:  01/14/20 1034    Specimen:  Blood Updated:  01/14/20 1036     Glucose 102 mg/dL     CBC & Differential [[id number]] Collected:  01/14/20 1052    Specimen:  Blood Updated:  01/14/20 1134    Narrative:       The following orders were created for panel order CBC & Differential.  Procedure                               Abnormality         Status                     ---------                               -----------         ------                     CBC Auto Differential[[id number]]        Normal              Final result                 Please view results for these tests on the individual orders.    Comprehensive Metabolic Panel [[id number]] Collected:  01/14/20 1052    Specimen:  Blood Updated:  01/14/20 1159     Glucose 94 mg/dL      BUN 14 mg/dL      Creatinine 1.18 mg/dL      Sodium 138 mmol/L      Potassium 3.8 mmol/L      Chloride 99  mmol/L      CO2 27.5 mmol/L      Calcium 9.3 mg/dL      Total Protein 6.6 g/dL      Albumin 3.90 g/dL      ALT (SGPT) 24 U/L      AST (SGOT) 20 U/L      Alkaline Phosphatase 48 U/L      Total Bilirubin 0.3 mg/dL      eGFR Non African Amer 61 mL/min/1.73      Globulin 2.7 gm/dL      A/G Ratio 1.4 g/dL      BUN/Creatinine Ratio 11.9     Anion Gap 11.5 mmol/L     Narrative:       GFR Normal >60  Chronic Kidney Disease <60  Kidney Failure <15      Protime-INR [[id number]]  (Normal) Collected:  01/14/20 1052    Specimen:  Blood Updated:  01/14/20 1146     Protime 12.6 Seconds      INR 0.97    aPTT [[id number]]  (Normal) Collected:  01/14/20 1052    Specimen:  Blood Updated:  01/14/20 1146     PTT 24.3 seconds     Troponin [[id number]]  (Normal) Collected:  01/14/20 1052    Specimen:  Blood Updated:  01/14/20 1159     Troponin T <0.010 ng/mL     Narrative:       Troponin T Reference Range:  <= 0.03 ng/mL-   Negative for AMI  >0.03 ng/mL-     Abnormal for myocardial necrosis.  Clinicians would have to utilize clinical acumen, EKG, Troponin and serial changes to determine if it is an Acute Myocardial Infarction or myocardial injury due to an underlying chronic condition.       Results may be falsely decreased if patient taking Biotin.      CBC Auto Differential [[id number]]  (Normal) Collected:  01/14/20 1052    Specimen:  Blood Updated:  01/14/20 1134     WBC 5.31 10*3/mm3      RBC 4.85 10*6/mm3      Hemoglobin 15.0 g/dL      Hematocrit 44.8 %      MCV 92.4 fL      MCH 30.9 pg      MCHC 33.5 g/dL      RDW 12.7 %      RDW-SD 43.4 fl      MPV 9.9 fL      Platelets 245 10*3/mm3      Neutrophil % 62.7 %      Lymphocyte % 22.6 %      Monocyte % 11.1 %      Eosinophil % 2.4 %      Basophil % 0.8 %      Immature Grans % 0.4 %      Neutrophils, Absolute 3.33 10*3/mm3      Lymphocytes, Absolute 1.20 10*3/mm3      Monocytes, Absolute 0.59 10*3/mm3      Eosinophils, Absolute 0.13 10*3/mm3      Basophils, Absolute 0.04 10*3/mm3       Immature Grans, Absolute 0.02 10*3/mm3      nRBC 0.0 /100 WBC           Imaging Results (Last 24 Hours)     Procedure Component Value Units Date/Time    CT Head Without Contrast Stroke Protocol [[id number]] Collected:  01/14/20 1250     Updated:  01/14/20 1250    Narrative:       EMERGENCY NONCONTRAST HEAD CT 01/14/2020     CLINICAL HISTORY: Stroke, memory loss.     TECHNIQUE: Spiral CT images were obtained from the base of the skull to  the vertex without intravenous contrast. Images were reformatted and  submitted in 3 mm thick axial CT section with brain algorithm and 2 mm  thick sagittal and coronal reconstructions were performed and submitted  in brain algorithm.     COMPARISON: This is correlated to a prior MRI of the brain and MRA of  the head and neck from [facility] on 12/09/2018 and CT  angiogram of the head and neck on 10/22/2018 and noncontrast head CT  03/12/2018.     FINDINGS: There is a 4.7 x 2.1 cm area of encephalomalacia in the  lateral left occipital lobe consistent with an old infarct in the  distribution of the lateral occipital branches of the posterior-inferior  division of the left middle cerebral artery territory. There is some  mild patchy low density in the periventricular white matter consistent  with mild small vessel disease. There is a 7 mm old lacunar infarct in  the mid left corona radiata region that occurred back in 12/2018. There  is a 9 x 5 mm old lacunar infarct in the head of the left caudate  nucleus. There is a small subcentimeter old posterior superior left  frontal subcortical white matter infarct. The remainder of the brain  parenchyma is normal in attenuation. The ventricles are normal in size.  I see no mass effect, no midline shift and no extra-axial fluid  collections are identified. There is no evidence of acute intracranial  hemorrhage. The paranasal sinuses, mastoid air cells and middle ear  cavities are clear. The calvarium, skull base and orbits  are  unremarkable.       Impression:       1. No acute abnormality is seen.  2. There is mild small vessel disease in the cerebral white matter and  there is a 9 x 5 mm old lacunar infarct in the head of the left caudate  nucleus, a 7 mm old lacunar infarct in the mid left corona radiata  region, and a subcentimeter old posterior superior left frontal  subcortical white matter infarct. There is a 4.7 x 2.1 cm old lateral  left occipital infarct in the distribution of the lateral occipital  branches of the posterior-inferior division of the left middle cerebral  artery territory. The remainder of the head CT is within normal limits.  If there remains strong clinical suspicion of an acute infarct an MRI of  the head could be obtained for more complete assessment. The results  were communicated to Dr. Sánchez in the emergency room by telephone on  01/14/2020 at 11:50 AM.     Radiation dose reduction techniques were utilized, including automated  exposure control and exposure modulation based on body size.          XR Chest 1 View [[id number]] Collected:  01/14/20 1219     Updated:  01/14/20 1224    Narrative:       XR CHEST 1 VW-     HISTORY: Male who is 70 years-old,  stroke     TECHNIQUE: Frontal view of the chest     COMPARISON: 07/07/2017     FINDINGS: Heart, mediastinum and pulmonary vasculature are unremarkable.  Minimal likely atelectasis at the left base, there may be minimal left  pleural effusion. No pneumothorax. No acute osseous process.       Impression:       Minimal likely atelectasis at the left base, there may be  minimal left pleural effusion.      This report was finalized on 1/14/2020 12:21 PM by Dr. Justin Stone M.D.             Results for orders placed during the hospital encounter of 01/11/19   Adult Transthoracic Echo Complete W/ Cont if Necessary Per Protocol    Narrative · Left ventricular systolic function is normal. Calculated EF = 60%.   Estimated EF was in agreement with the  calculated EF. Estimated EF = 60%.   Normal left ventricular cavity size noted. All left ventricular wall   segments contract normally. Left ventricular wall thickness is consistent   with mild septal asymmetric hypertrophy. The findings are consistent with   hypertrophic cardiomyopathy.  · Normal left atrial volume noted. Saline test results are negative.  · There is mild thickening of the aortic valve. Moderate aortic valve   regurgitation is present.  · Systolic anterior motion of the leaflet is present. A gradient is not   present. There is no resting gradient/outflow tract obstruction at rest.   Valsalva was not performed today's study. Trace mitral valve regurgitation   is present.  · Trace tricuspid valve regurgitation is present. Estimated right   ventricular systolic pressure from tricuspid regurgitation is normal (<35   mmHg). Calculated right ventricular systolic pressure from tricuspid   regurgitation is 27 mmHg          ECG 12 Lead   Final Result   HEART RATE= 61  bpm   RR Interval= 988  ms   OK Interval= 228  ms   P Horizontal Axis= -1  deg   P Front Axis= 43  deg   QRSD Interval= 95  ms   QT Interval= 432  ms   QRS Axis= -28  deg   T Wave Axis= 49  deg   - ABNORMAL ECG -   Sinus rhythm   Prolonged OK interval   Borderline left axis deviation   Low voltage, precordial leads   Anteroseptal infarct, old   No change from prior tracing   Electronically Signed By: Andrew RegaladoCHADD) ([facility]) 14-Jan-2020 13:11:36   Date and Time of Study: 2020-01-14 11:24:57           Assessment/Plan     Active Hospital Problems    Diagnosis  POA   • **CVA (cerebral vascular accident) (CMS/[facility]) [I63.9]  Yes   • Status post placement of implantable loop recorder [Z95.818]  Yes   • 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]  Yes   • ELIZABETH (obstructive sleep apnea) [G47.33]  Yes      Resolved Hospital Problems   No resolved problems to display.       Mr. Rizvi is a 70 y.o.  with a history of  previous CVA x 3, PFO s/p closure, ELIZABETH & multiple  medical issues who presents for TIA vs CVA    TIA vs CVA:  -Check MRI, CTA, Echo  -Neurology & Cardiology consults  -Restart ASA; Neurology to adjust  -Continue Plavix  -Pt does not tolerate statins  -OT/PT/SLP eval    ELIZABETH:  -Sleep study 10/2019  -Recommended Auto Cpap 6-16 cm  -Unsure of pt compliance  -F/U Dr. Block    Sinoatrial block:  -Loop recorder in place; Event report 1/13/2020: No afib, Sinus rhythm  -Cardiology to evaluate    Hx PFO:  -S/P closure in 2018    · I discussed the patient's findings and my recommendations with patient and ED provider.    VTE Prophylaxis - SCDs.  Code Status - Full code.       CHINEDU Montesinos  [facility]  01/14/20  2:58 PM

## 2020-01-14 NOTE — ED NOTES
History of 3 previous strokes on the left side with no deficits per pt. Pt reports that his speech was slurred and he could not think of his pet's name this morning. Took a baby aspirin at 1000 and pt reports all symptoms has subsided. Denies headache or dizziness at this time     Yulissa Guardado, RN  01/14/20 1865

## 2020-01-14 NOTE — ED TRIAGE NOTES
Slurred speech and couldn't think of pets' name at 0900.  All symptoms have resolved.  Had similar symptoms last night.  Has hx of stroke

## 2020-01-14 NOTE — ED PROVIDER NOTES
" EMERGENCY DEPARTMENT ENCOUNTER    CHIEF COMPLAINT  Chief Complaint: Speech difficulty  History given by: self, pt's wife  History limited by: nothing  Room Number: 27/27  PMD: Chase Haque MD      HPI:  Pt is a 70 y.o. male who presents complaining of intermittent speech difficulties that were first noticed around 2300 last night. Pt also c/o HA. Pt denies visual disturbances, trouble swallowing, numbness, weakness, SOA and CP. The pt has a hx of CVA x3 and is anticoagulated on 75 mg Plavix. He started having difficulty \"finding the appropriate words\" last night around 2300 however his sx's resolved shortly after and the pt went to sleep. He woke up this morning feeling normal, with normal speech until 0900, when the pt was heard by his wife to be \"slurred\". He states \"I couldn't talk or use the right words\". After hearing the change in pt's speech, the pt's wife administered 81 mg ASA at 1000. He was brought to the ED for further evaluation.  Pt is not a smoker    Duration:  1100  Onset: gradual  Timing: intermittent  Location: neurological   Radiation: n/a  Quality: \"slurred\"  Intensity/Severity: moderate  Progression: resolved  Associated Symptoms: HA  Aggravating Factors: none stated  Alleviating Factors: none stated  Previous Episodes: Pt has a hx of CVA x3  Treatment before arrival: Pt was given 81 mg ASA at 1000 this morning.     PAST MEDICAL HISTORY  Active Ambulatory Problems     Diagnosis Date Noted   • Anemia, unspecified 10/12/2016   • Health care maintenance 11/01/2016   • Vitamin B12 deficiency 11/01/2016   • Chronic fatigue 11/01/2016   • Benign prostatic hypertrophy (BPH) with weak urinary stream 11/01/2016   • Chronic bronchitis (CMS/HCC) 07/07/2017   • Obstructive sleep apnea syndrome 08/23/2017   • Sinoatrial block 10/09/2018   • PFO (patent foramen ovale) 10/09/2018   • Aortic insufficiency    • Cerebral aneurysm, nonruptured 10/18/2018   • CVA (cerebral vascular accident) (CMS/HCC) " 11/29/2018   • TIA (transient ischemic attack) 12/08/2018   • Intracranial vascular stenosis 01/03/2019   • Sinus pause 06/11/2019   • Status post placement of implantable loop recorder 10/15/2019     Resolved Ambulatory Problems     Diagnosis Date Noted   • Epididymal pain 02/15/2016   • Cough 03/29/2016   • Chest pain 10/21/2016   • Dysarthria 11/01/2016     Past Medical History:   Diagnosis Date   • Allergic rhinitis    • Anal fissure    • Asthma    • Emphysema lung (CMS/HCC)    • Fatty liver    • GERD (gastroesophageal reflux disease)    • Hepatitis    • History of pneumonia    • Pneumonia    • Spinal stenosis    • Stroke (CMS/HCC)        PAST SURGICAL HISTORY  Past Surgical History:   Procedure Laterality Date   • CARDIAC CATHETERIZATION N/A 12/12/2018    Procedure: Patent foramen ovale closure- Abbott;  Surgeon: Deangelo Harris MD;  Location:  [facility] CATH INVASIVE LOCATION;  Service: Cardiology   • CARDIAC CATHETERIZATION N/A 12/12/2018    Procedure: Intracardiac echocardiogram;  Surgeon: Deangelo Harris MD;  [facility];  Service: Cardiology   • CARDIAC ELECTROPHYSIOLOGY PROCEDURE N/A 3/26/2018    Procedure: Loop insertion   CONFIRM RX;  Surgeon: Luis Wyatt MD;  Location:  [facility] CATH INVASIVE LOCATION;  Service: Cardiovascular   • COLONOSCOPY     • HAND SURGERY Bilateral    • INGUINAL HERNIA REPAIR Left    • OTHER SURGICAL HISTORY      inguinal hernia repair for person over age 5   • TONSILLECTOMY         FAMILY HISTORY  Family History   Problem Relation Age of Onset   • Obesity Mother    • Clotting disorder Mother         Upper extremities   • Alcohol abuse Father    • Cancer Father 63        Esophagus and lung   • Other Father         cardiac disorder   • Heart disease Father    • Kidney disease Sister    • Kidney failure Sister    • Drug abuse Paternal Uncle    • Stroke Sister    • Throat cancer Sister        SOCIAL HISTORY  Social History     Socioeconomic  History   • Marital status:      Spouse name: Joey   • Number of children: Not on file   • Years of education: Not on file   • Highest education level: Not on file   Occupational History   • Occupation:      Employer: RETIRED   Tobacco Use   • Smoking status: Former Smoker     Last attempt to quit: 1979     Years since quittin.9   • Smokeless tobacco: Never Used   • Tobacco comment: caffeine use: Drinks 4 glasses of Dt coke on avg.    Substance and Sexual Activity   • Alcohol use: No   • Drug use: No   • Sexual activity: Defer       ALLERGIES  Aspirin; Citrus; Combivent [ipratropium-albuterol]; Lactose intolerance (gi); and Statins    REVIEW OF SYSTEMS  Review of Systems   Constitutional: Negative for activity change, appetite change and fever.   HENT: Negative for congestion, sore throat and trouble swallowing.    Eyes: Negative for visual disturbance.   Respiratory: Negative for cough and shortness of breath.    Cardiovascular: Negative for chest pain and leg swelling.   Gastrointestinal: Negative for abdominal pain, diarrhea and vomiting.   Genitourinary: Negative for decreased urine volume and dysuria.   Musculoskeletal: Negative for neck pain.   Skin: Negative for rash and wound.   Neurological: Positive for speech difficulty and headaches. Negative for weakness and numbness.   All other systems reviewed and are negative.      PHYSICAL EXAM  ED Triage Vitals   Temp Heart Rate Resp BP SpO2   20 1025 20 1025 20 1025 20 1043 20 1025   97.6 °F (36.4 °C) 86 16 143/81 97 %      Temp src Heart Rate Source Patient Position BP Location FiO2 (%)   20 1025 20 1025 20 1043 20 1043 --   Tympanic Monitor Sitting Left arm        Physical Exam   Constitutional: He is oriented to person, place, and time. No distress.   HENT:   Head: Normocephalic and atraumatic.   Eyes: Pupils are equal, round, and reactive to light. EOM are normal.   Neck:  Normal range of motion. Neck supple.   Cardiovascular: Normal rate, regular rhythm and normal heart sounds.   Pulmonary/Chest: Effort normal and breath sounds normal. No respiratory distress.   Abdominal: Soft. There is no tenderness. There is no rebound and no guarding.   Musculoskeletal: Normal range of motion. He exhibits no edema.   Neurological: He is alert and oriented to person, place, and time. He has normal sensation and normal strength.   Interval: baseline  1a. Level of Consciousness: 0-->Alert, keenly responsive  1b. LOC Questions: 0-->Answers both questions correctly  1c. LOC Commands: 0-->Performs both tasks correctly  2. Best Gaze: 0-->Normal  3. Visual: 0-->No visual loss  4. Facial Palsy: 0-->Normal symmetrical movements  5a. Motor Arm, Left: 0-->No drift, limb holds 90 (or 45) degrees for full 10 secs  5b. Motor Arm, Right: 0-->No drift, limb holds 90 (or 45) degrees for full 10 secs  6a. Motor Leg, Left: 0-->No drift, leg holds 30 degree position for full 5 secs  6b. Motor Leg, Right: 0-->No drift, leg holds 30 degree position for full 5 secs   7. Limb Ataxia: 0-->Absent  8. Sensory: 0-->Normal, no sensory loss  9. Best Language: 0-->No aphasia, normal  10. Dysarthria: 0-->Normal  11. Extinction and Inattention (formerly Neglect): 0-->No abnormality    Total (NIH Stroke Scale): 0   Skin: Skin is warm and dry.   Psychiatric: Mood and affect normal.   Nursing note and vitals reviewed.      LAB RESULTS  Lab Results (last 24 hours)     Procedure Component Value Units Date/Time    POC Glucose Once [[id number]]  (Normal) Collected:  01/14/20 1034    Specimen:  Blood Updated:  01/14/20 1036     Glucose 102 mg/dL     CBC & Differential [[id number]] Collected:  01/14/20 1052    Specimen:  Blood Updated:  01/14/20 1134    Narrative:       The following orders were created for panel order CBC & Differential.  Procedure                               Abnormality         Status                     ---------                                -----------         ------                     CBC Auto Differential[[id number]]        Normal              Final result                 Please view results for these tests on the individual orders.    Comprehensive Metabolic Panel [[id number]] Collected:  01/14/20 1052    Specimen:  Blood Updated:  01/14/20 1159     Glucose 94 mg/dL      BUN 14 mg/dL      Creatinine 1.18 mg/dL      Sodium 138 mmol/L      Potassium 3.8 mmol/L      Chloride 99 mmol/L      CO2 27.5 mmol/L      Calcium 9.3 mg/dL      Total Protein 6.6 g/dL      Albumin 3.90 g/dL      ALT (SGPT) 24 U/L      AST (SGOT) 20 U/L      Alkaline Phosphatase 48 U/L      Total Bilirubin 0.3 mg/dL      eGFR Non African Amer 61 mL/min/1.73      Globulin 2.7 gm/dL      A/G Ratio 1.4 g/dL      BUN/Creatinine Ratio 11.9     Anion Gap 11.5 mmol/L     Narrative:       GFR Normal >60  Chronic Kidney Disease <60  Kidney Failure <15      Protime-INR [[id number]]  (Normal) Collected:  01/14/20 1052    Specimen:  Blood Updated:  01/14/20 1146     Protime 12.6 Seconds      INR 0.97    aPTT [[id number]]  (Normal) Collected:  01/14/20 1052    Specimen:  Blood Updated:  01/14/20 1146     PTT 24.3 seconds     Troponin [[id number]]  (Normal) Collected:  01/14/20 1052    Specimen:  Blood Updated:  01/14/20 1159     Troponin T <0.010 ng/mL     Narrative:       Troponin T Reference Range:  <= 0.03 ng/mL-   Negative for AMI  >0.03 ng/mL-     Abnormal for myocardial necrosis.  Clinicians would have to utilize clinical acumen, EKG, Troponin and serial changes to determine if it is an Acute Myocardial Infarction or myocardial injury due to an underlying chronic condition.       Results may be falsely decreased if patient taking Biotin.      CBC Auto Differential [[id number]]  (Normal) Collected:  01/14/20 1052    Specimen:  Blood Updated:  01/14/20 1134     WBC 5.31 10*3/mm3      RBC 4.85 10*6/mm3      Hemoglobin 15.0 g/dL      Hematocrit 44.8 %      MCV 92.4 fL       MCH 30.9 pg      MCHC 33.5 g/dL      RDW 12.7 %      RDW-SD 43.4 fl      MPV 9.9 fL      Platelets 245 10*3/mm3      Neutrophil % 62.7 %      Lymphocyte % 22.6 %      Monocyte % 11.1 %      Eosinophil % 2.4 %      Basophil % 0.8 %      Immature Grans % 0.4 %      Neutrophils, Absolute 3.33 10*3/mm3      Lymphocytes, Absolute 1.20 10*3/mm3      Monocytes, Absolute 0.59 10*3/mm3      Eosinophils, Absolute 0.13 10*3/mm3      Basophils, Absolute 0.04 10*3/mm3      Immature Grans, Absolute 0.02 10*3/mm3      nRBC 0.0 /100 WBC           I ordered the above labs and reviewed the results    RADIOLOGY  XR Chest 1 View   Final Result   Minimal likely atelectasis at the left base, there may be   minimal left pleural effusion.        This report was finalized on 1/14/2020 12:21 PM by Dr. Justin Stone M.D.          CT Head Without Contrast Stroke Protocol   Preliminary Result   1. No acute abnormality is seen.   2. There is mild small vessel disease in the cerebral white matter and   there is a 9 x 5 mm old lacunar infarct in the head of the left caudate   nucleus, a 7 mm old lacunar infarct in the mid left corona radiata   region, and a subcentimeter old posterior superior left frontal   subcortical white matter infarct. There is a 4.7 x 2.1 cm old lateral   left occipital infarct in the distribution of the lateral occipital   branches of the posterior-inferior division of the left middle cerebral   artery territory. The remainder of the head CT is within normal limits.   If there remains strong clinical suspicion of an acute infarct an MRI of   the head could be obtained for more complete assessment. The results   were communicated to Dr. Sánchez in the emergency room by telephone on   01/14/2020 at 11:50 AM.       Radiation dose reduction techniques were utilized, including automated   exposure control and exposure modulation based on body size.                   I ordered the above noted radiological studies.  Interpreted by radiologist. Discussed with radiologist (Dr. Sky). Reviewed by me in PACS.       PROCEDURES  EKG          EKG time: 1124  Rhythm/Rate: SR< 60  P waves and OH: first degree AV block  QRS, axis: LAD, poor R wave progression   ST and T waves: nonspecific ST changes     Interpreted Contemporaneously by me, independently viewed  No significant change compared to prior 10/15/19      PROGRESS AND CONSULTS     1112 CT Head without contrast ordered. CXR ordered. INR ordered. CMP ordered. APTT ordered. Troponin ordered. POC Glucose ordered. Call made to Stroke Neurology.    1113 Discussed pt w/Dr. Elder (Stroke Neurology) who recommends ASA and admission.    1149 Discussed pt w/Dr. Sky (Radiology) regarding CT Head which shows prior ischemic areas however nothing acute.     1213 Patient is resting comfortably and in NAD. Patient is stable. BP- 143/81 HR- 62 Temp- 97.6 °F (36.4 °C) (Tympanic) O2 sat- 96%. Informed the patient results of CT Head shows no acute findings Discussed the plan for admission. Pt understands and agrees with the plan, all questions answered.    1301 Discussed pt w/Yadi BURRIS who agrees to admit pt to telemetry on behalf of Dr. George ([facility])    MEDICAL DECISION MAKING  Results were reviewed/discussed with the patient and they were also made aware of online access. Pt also made aware that some labs, such as cultures, will not be resulted during ER visit and follow up with PMD is necessary.     MDM  Number of Diagnoses or Management Options  TIA on medication:      Amount and/or Complexity of Data Reviewed  Clinical lab tests: ordered and reviewed (INR 0.97  Troponin <0.010  WBC 5.31)  Tests in the radiology section of CPT®: reviewed and ordered (CXR  CT Head)  Tests in the medicine section of CPT®: ordered and reviewed (See EKG procedure notes)  Discussion of test results with the performing providers: yes (Dr. Sky (Radiology))  Decide to obtain previous medical records or to  obtain history from someone other than the patient: yes  Obtain history from someone other than the patient: yes (Pt's wife  )  Review and summarize past medical records: yes (Pt was [facility] in [facility] on 12/23/19 for URI with cough and congestion where she was placed on Methylprednisolone, benzonatae and azithromcin.   Pt was discharged on 12/8/18 after being dx with  PFO and CVA)  Discuss the patient with other providers: yes (Dr. Elder (Stroke Neurology))  Independent visualization of images, tracings, or specimens: yes           DIAGNOSIS  Final diagnoses:   TIA on medication       DISPOSITION  ADMISSION    Discussed treatment plan and reason for admission with pt/family and admitting physician.  Pt/family voiced understanding of the plan for admission for further testing/treatment as needed.         Latest Documented Vital Signs:  As of 1:06 PM  BP- 132/70 HR- 62 Temp- 97.6 °F (36.4 °C) (Tympanic) O2 sat- 97%    --  Documentation assistance provided by [facility] for Dr. Jesus Manuel Sánchez.  Information recorded by the scrsheye was done at my direction and has been verified and validated by me.         Chato Moreno  01/14/20 0178       Jesus Manuel Sánchez MD  01/14/20 2819

## 2020-01-15 ENCOUNTER — APPOINTMENT (OUTPATIENT)
Dept: CT IMAGING | Facility: HOSPITAL | Age: 70
End: 2020-01-15

## 2020-01-15 VITALS
HEART RATE: 75 BPM | TEMPERATURE: 98.1 F | WEIGHT: 184.2 LBS | HEIGHT: 73 IN | RESPIRATION RATE: 16 BRPM | SYSTOLIC BLOOD PRESSURE: 133 MMHG | BODY MASS INDEX: 24.41 KG/M2 | DIASTOLIC BLOOD PRESSURE: 76 MMHG | OXYGEN SATURATION: 94 %

## 2020-01-15 PROBLEM — I67.9 INTRACRANIAL VASCULAR STENOSIS: Chronic | Status: ACTIVE | Noted: 2019-01-03

## 2020-01-15 LAB
ALBUMIN SERPL-MCNC: 3.4 G/DL (ref 3.5–5.2)
ALBUMIN/GLOB SERPL: 1.2 G/DL
ALP SERPL-CCNC: 46 U/L (ref 39–117)
ALT SERPL W P-5'-P-CCNC: 21 U/L (ref 1–41)
ANION GAP SERPL CALCULATED.3IONS-SCNC: 9.5 MMOL/L (ref 5–15)
AST SERPL-CCNC: 17 U/L (ref 1–40)
BILIRUB SERPL-MCNC: 0.5 MG/DL (ref 0.2–1.2)
BUN BLD-MCNC: 17 MG/DL (ref 8–23)
BUN/CREAT SERPL: 18.1 (ref 7–25)
CALCIUM SPEC-SCNC: 8.7 MG/DL (ref 8.6–10.5)
CHLORIDE SERPL-SCNC: 103 MMOL/L (ref 98–107)
CHOLEST SERPL-MCNC: 167 MG/DL (ref 0–200)
CO2 SERPL-SCNC: 24.5 MMOL/L (ref 22–29)
CREAT BLD-MCNC: 0.94 MG/DL (ref 0.76–1.27)
CRP SERPL-MCNC: 0.09 MG/DL (ref 0–0.5)
DEPRECATED RDW RBC AUTO: 41.1 FL (ref 37–54)
ERYTHROCYTE [DISTWIDTH] IN BLOOD BY AUTOMATED COUNT: 12.4 % (ref 12.3–15.4)
GFR SERPL CREATININE-BSD FRML MDRD: 79 ML/MIN/1.73
GLOBULIN UR ELPH-MCNC: 2.9 GM/DL
GLUCOSE BLD-MCNC: 100 MG/DL (ref 65–99)
HBA1C MFR BLD: 5.5 % (ref 4.8–5.6)
HCT VFR BLD AUTO: 43.3 % (ref 37.5–51)
HDLC SERPL-MCNC: 47 MG/DL (ref 40–60)
HGB BLD-MCNC: 15 G/DL (ref 13–17.7)
LDLC SERPL CALC-MCNC: 106 MG/DL (ref 0–100)
LDLC/HDLC SERPL: 2.25 {RATIO}
MCH RBC QN AUTO: 31.7 PG (ref 26.6–33)
MCHC RBC AUTO-ENTMCNC: 34.6 G/DL (ref 31.5–35.7)
MCV RBC AUTO: 91.5 FL (ref 79–97)
PA ADP PRP-ACNC: 103 PRU (ref 194–418)
PLATELET # BLD AUTO: 214 10*3/MM3 (ref 140–450)
PMV BLD AUTO: 9.9 FL (ref 6–12)
POTASSIUM BLD-SCNC: 3.4 MMOL/L (ref 3.5–5.2)
PROT SERPL-MCNC: 6.3 G/DL (ref 6–8.5)
RBC # BLD AUTO: 4.73 10*6/MM3 (ref 4.14–5.8)
SODIUM BLD-SCNC: 137 MMOL/L (ref 136–145)
TRIGL SERPL-MCNC: 72 MG/DL (ref 0–150)
TSH SERPL DL<=0.05 MIU/L-ACNC: 1.73 UIU/ML (ref 0.27–4.2)
VLDLC SERPL-MCNC: 14.4 MG/DL (ref 5–40)
WBC NRBC COR # BLD: 5.27 10*3/MM3 (ref 3.4–10.8)

## 2020-01-15 PROCEDURE — 83036 HEMOGLOBIN GLYCOSYLATED A1C: CPT | Performed by: NURSE PRACTITIONER

## 2020-01-15 PROCEDURE — 25010000002 IOPAMIDOL 61 % SOLUTION: Performed by: HOSPITALIST

## 2020-01-15 PROCEDURE — 99214 OFFICE O/P EST MOD 30 MIN: CPT | Performed by: PSYCHIATRY & NEUROLOGY

## 2020-01-15 PROCEDURE — 70496 CT ANGIOGRAPHY HEAD: CPT

## 2020-01-15 PROCEDURE — G0378 HOSPITAL OBSERVATION PER HR: HCPCS

## 2020-01-15 PROCEDURE — 85027 COMPLETE CBC AUTOMATED: CPT | Performed by: NURSE PRACTITIONER

## 2020-01-15 PROCEDURE — 86140 C-REACTIVE PROTEIN: CPT | Performed by: PSYCHIATRY & NEUROLOGY

## 2020-01-15 PROCEDURE — 70498 CT ANGIOGRAPHY NECK: CPT

## 2020-01-15 PROCEDURE — 84443 ASSAY THYROID STIM HORMONE: CPT | Performed by: NURSE PRACTITIONER

## 2020-01-15 PROCEDURE — 85576 BLOOD PLATELET AGGREGATION: CPT | Performed by: PSYCHIATRY & NEUROLOGY

## 2020-01-15 PROCEDURE — 80053 COMPREHEN METABOLIC PANEL: CPT | Performed by: NURSE PRACTITIONER

## 2020-01-15 PROCEDURE — 80061 LIPID PANEL: CPT | Performed by: NURSE PRACTITIONER

## 2020-01-15 RX ORDER — ATORVASTATIN CALCIUM 20 MG/1
40 TABLET, FILM COATED ORAL DAILY
Status: CANCELLED | OUTPATIENT
Start: 2020-01-15

## 2020-01-15 RX ORDER — ASPIRIN 81 MG/1
81 TABLET ORAL DAILY
Qty: 30 TABLET | Refills: 1 | Status: SHIPPED | OUTPATIENT
Start: 2020-01-16 | End: 2020-01-28

## 2020-01-15 RX ORDER — POTASSIUM CHLORIDE 1.5 G/1.77G
40 POWDER, FOR SOLUTION ORAL ONCE
Status: COMPLETED | OUTPATIENT
Start: 2020-01-15 | End: 2020-01-15

## 2020-01-15 RX ADMIN — ASPIRIN 81 MG: 81 TABLET, COATED ORAL at 10:02

## 2020-01-15 RX ADMIN — CLOPIDOGREL 75 MG: 75 TABLET, FILM COATED ORAL at 10:01

## 2020-01-15 RX ADMIN — POTASSIUM CHLORIDE 40 MEQ: 1.5 POWDER, FOR SOLUTION ORAL at 14:01

## 2020-01-15 RX ADMIN — IOPAMIDOL 100 ML: 612 INJECTION, SOLUTION INTRAVENOUS at 07:55

## 2020-01-15 NOTE — DISCHARGE SUMMARY
[facility]    Date of Discharge:  1/15/2020    PCP: Chase Haque MD    Discharge Diagnosis:   Active Hospital Problems    Diagnosis  POA   • **TIA (transient ischemic attack) [G45.9]  Yes   • Status post placement of implantable loop recorder [Z95.818]  Yes   • Intracranial vascular stenosis [I67.9]  Yes   • 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]  Yes   • ELIZABETH (obstructive sleep apnea) [G47.33]  Yes      Resolved Hospital Problems   No resolved problems to display.     Procedures Performed    Diagnostics:  CTA of the head and neck:  IMPRESSION:  1.  Short segment of high-grade stenosis involving the P1 segment of the  right posterior cerebral artery, similar to that seen on MRA head  12/09/2018.   2.  No finding of occlusion of the cervical or intracranial vasculature.  No intracranial aneurysm evident.  3.  No findings of acute intracranial abnormality. Moderate small vessel  degenerative white matter changes and old left corona radiata and  occipital lobe infarcts, as before.     This report was finalized on 1/15/2020 9:35 AM by Dr. Felton Dominguez M.D.    MRI:  IMPRESSION:     No acute infarct. Chronic changes of the brain.     This report was finalized on 1/14/2020 8:32 PM by Dr. Justin Stone M.D.              Consults     Date and Time Order Name Status Description    1/14/2020 1412 Inpatient Cardiology Consult      1/14/2020 1412 Inpatient Neurology Consult Stroke Completed     1/14/2020 1214 [facility] (on-call MD unless specified) Details Completed     1/14/2020 1112 Inpatient Neurology Consult Stroke Completed     1/14/2020 1112 Inpatient Neurology Consult Stroke Completed         Hospital Course  Please see history and physical for details. Patient is a 70 y.o. male with a history of stroke for which he is on Plavix who presented to the emergency room after an episode of aphasia as described in the H&P.  His symptoms had pretty much resolved by the time he  got here though he had another short episode while in the ER.  He was placed in observation for further management.  He was started back on aspirin along with his usual Plavix.  His work-up is summarized above.  He also had an echocardiogram which was unremarkable.  He was seen by neurology today who felt that he needed to get back on dual antiplatelet therapy with aspirin and Plavix and otherwise had no further recommendations.  They actually felt this could represent a migraine with aura rather than TIA.  The patient has been intolerant to statins in the past and says because memory loss so he will not be started on a statin at this time.  Neurology did order a CRP and P2 Y12 level.  They will follow this up in the outpatient setting.           I discussed the patient's findings and my recommendations with patient and consulting provider.    Condition on Discharge: Improved.     Temp:  [97.9 °F (36.6 °C)-98.1 °F (36.7 °C)] 98.1 °F (36.7 °C)  Heart Rate:  [65-77] 75  Resp:  [16-18] 16  BP: (117-160)/(66-90) 133/76  Body mass index is 24.31 kg/m².    Physical Exam   Constitutional: He is oriented to person, place, and time. He appears well-developed and well-nourished. No distress.   HENT:   Head: Normocephalic and atraumatic.   Mouth/Throat: No oropharyngeal exudate.   Eyes: Pupils are equal, round, and reactive to light. No scleral icterus.   Neck: Neck supple. No JVD present.   Cardiovascular: Normal rate, regular rhythm and intact distal pulses.   No murmur heard.  Pulmonary/Chest: Effort normal and breath sounds normal. No respiratory distress. He has no wheezes.   Abdominal: Soft. Bowel sounds are normal. He exhibits no distension. There is no tenderness.   Musculoskeletal: He exhibits no edema.   Lymphadenopathy:     He has no cervical adenopathy.   Neurological: He is alert and oriented to person, place, and time.   Skin: Skin is warm and dry. No rash noted.   Psychiatric: He has a normal mood and affect.      Vitals reviewed.       Discharge Medications      New Medications      Instructions Start Date   aspirin 81 MG EC tablet   81 mg, Oral, Daily   Start Date:  January 16, 2020        Continue These Medications      Instructions Start Date   clopidogrel 75 MG tablet  Commonly known as:  PLAVIX   75 mg, Oral, Daily      hydroCHLOROthiazide 12.5 MG tablet  Commonly known as:  HYDRODIURIL   12.5 mg, Oral, Daily              Follow-up Information     Dai Call APRN Follow up in 4 week(s).    Specialties:  Emergency Medicine, Neurology, Nurse Practitioner  Contact information:  [address] [address] [phone]             Chase Haque MD Follow up in 1 week(s).    Specialty:  Internal Medicine  Contact information:  [address] [place] [id number] [phone]                 Test Results Pending at Discharge     Theodore George MD  01/15/20  4:36 PM

## 2020-01-15 NOTE — SIGNIFICANT NOTE
01/15/20 0846   Rehab Time/Intention   Evaluation Not Performed other (see comments)  (Pt denies need for OT. States feels baseline function. States has been up walking with nsg. d/c OT)   Rehab Treatment   Discipline occupational therapist

## 2020-01-15 NOTE — CONSULTS
Neurology Note    Patient:  Ronnell Rizvi    YOB: 1950    REFERRING PHYSICIAN:  Theodore Felipe*    CHIEF COMPLAINT:    Difficulty with speech, confusion    HISTORY OF PRESENT ILLNESS:   The patient is a 70 y.o. male with h/o CVD, PFO closure, on Plavix, previously on DAPT. On 1/13 he had intermittent speech difficulty at night which reoccurred last am. He was unable to remember the names of his dogs. He also noticed an occipital, mostly pressure headache, unusual for him which gradually resolved. No focal weakness or numbness. He reports a h/o ice pick headaches in the past. In ED /81, NSR, T97.6, CTH/CTA stable with chronic changes. He has started back on ASA 81 mg.    Past Medical History:  Past Medical History:   Diagnosis Date   • Allergic rhinitis    • Anal fissure    • Aortic insufficiency     moderate, THIERRY 2017   • Asthma    • Benign prostatic hypertrophy (BPH) with weak urinary stream 11/1/2016   • Chronic bronchitis (CMS/HCC)    • Emphysema lung (CMS/HCC)    • Fatty liver    • GERD (gastroesophageal reflux disease)    • Hepatitis     thought to be Hepatitis A    • History of pneumonia     With left lower lobe atelectasis and scar tissue, being followed   • Obstructive sleep apnea syndrome 8/23/2017   • PFO (patent foramen ovale)     s/p percutaneous closure with a 25mm Amplatzer device in December 2018   • Pneumonia     LLL with scar tissue   • 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   • Spinal stenosis    • Stroke (CMS/HCC)     10/2017: left occipital/temporal       Past Surgical History:  Past Surgical History:   Procedure Laterality Date   • CARDIAC CATHETERIZATION N/A 12/12/2018    Procedure: Patent foramen ovale closure- Abbott;  Surgeon: Deangelo Harris MD;  Location: [facility] CATH INVASIVE LOCATION;  Service: Cardiology   • CARDIAC CATHETERIZATION N/A 12/12/2018    Procedure: Intracardiac echocardiogram;  Surgeon: Deangelo Harris MD;  Location: [facility] CATH INVASIVE  LOCATION;  Service: Cardiology   • CARDIAC ELECTROPHYSIOLOGY PROCEDURE N/A 3/26/2018    Procedure: Loop insertion   CONFIRM RX;  Surgeon: Luis Wyatt MD;  Location: [facility] INVASIVE LOCATION;  Service: Cardiovascular   • COLONOSCOPY     • HAND SURGERY Bilateral    • INGUINAL HERNIA REPAIR Left    • OTHER SURGICAL HISTORY      inguinal hernia repair for person over age 5   • TONSILLECTOMY         Social History:   Social History     Socioeconomic History   • Marital status:      Spouse name: Joey   • Number of children: Not on file   • Years of education: Not on file   • Highest education level: Not on file   Occupational History   • Occupation:      Employer: RETIRED   Tobacco Use   • Smoking status: Former Smoker     Last attempt to quit: 1979     Years since quittin.9   • Smokeless tobacco: Never Used   • Tobacco comment: caffeine use: Drinks 4 glasses of Dt coke on avg.    Substance and Sexual Activity   • Alcohol use: No   • Drug use: No   • Sexual activity: Defer        Family History:   Family History   Problem Relation Age of Onset   • Obesity Mother    • Clotting disorder Mother         Upper extremities   • Alcohol abuse Father    • Cancer Father 63        Esophagus and lung   • Other Father         cardiac disorder   • Heart disease Father    • Kidney disease Sister    • Kidney failure Sister    • Drug abuse Paternal Uncle    • Stroke Sister    • Throat cancer Sister        Medications Prior to Admission:    Prior to Admission medications    Medication Sig Start Date End Date Taking? Authorizing Provider   clopidogrel (PLAVIX) 75 MG tablet Take 1 tablet by mouth Daily. 3/26/19  Yes Dia Call APRN   hydrochlorothiazide (HYDRODIURIL) 12.5 MG tablet Take 1 tablet by mouth Daily. 19  Yes Vivian Tran APRN       Allergies:  Aspirin; Citrus; Combivent [ipratropium-albuterol]; Lactose intolerance (gi); and Statins      Review of system  Review of Systems    Neurological: Positive for speech difficulty and headaches.   Psychiatric/Behavioral: Positive for confusion.   All other systems reviewed and are negative.      Vitals:    01/15/20 0709   BP: 117/66   Pulse: 67   Resp: 16   Temp: 97.9 °F (36.6 °C)   SpO2: 97%       Physical exam  Physical Exam   Constitutional: He is oriented to person, place, and time. He appears well-developed and well-nourished.   HENT:   Head: Normocephalic and atraumatic.   No temporal tenderness.   Eyes: Pupils are equal, round, and reactive to light. EOM are normal.   Cardiovascular: Normal rate and regular rhythm.   Pulmonary/Chest: Effort normal.   Neurological: He is alert and oriented to person, place, and time. He has normal strength and normal reflexes. He displays normal reflexes. No cranial nerve deficit or sensory deficit. He exhibits normal muscle tone. Coordination normal. He displays no Babinski's sign on the right side. He displays no Babinski's sign on the left side.   Speech clear, VFF, no aphasia, able to name and repeat.   Psychiatric: He has a normal mood and affect. His behavior is normal. Thought content normal.         Lab Results   Component Value Date    WBC 5.27 01/15/2020    HGB 15.0 01/15/2020    HCT 43.3 01/15/2020    MCV 91.5 01/15/2020     01/15/2020     Lab Results   Component Value Date    GLUCOSE 100 (H) 01/15/2020    BUN 17 01/15/2020    CREATININE 0.94 01/15/2020    EGFRIFNONA 79 01/15/2020    EGFRIFAFRI 76 11/01/2016    BCR 18.1 01/15/2020    CO2 24.5 01/15/2020    CALCIUM 8.7 01/15/2020    PROTENTOTREF 6.6 02/20/2018    ALBUMIN 3.40 (L) 01/15/2020    LABIL2 1.3 02/20/2018    AST 17 01/15/2020    ALT 21 01/15/2020   Contains abnormal data Lipid Panel   Order: [id number]   Status:  Final result   Visible to patient:  No (Not Released) Next appt:  01/17/2020 at 10:00 AM in Sleep Medicine (Lora Block MD)   Component  Ref Range & Units 05:19 1yr ago   Total Cholesterol  0 - 200 mg/dL 167  168  "   Triglycerides  0 - 150 mg/dL 72  75    HDL Cholesterol  40 - 60 mg/dL 47  46    LDL Cholesterol   0 - 100 mg/dL 106High   107High     VLDL Cholesterol  5 - 40 mg/dL 14.4  15    LDL/HDL Ratio 2.25  2.33    Resulting Agency  [facility]            TSH  0.270 - 4.200 uIU/mL 1.730      Contains abnormal data P2Y12 Platelet Inhibition   Order: [id number]   Status:  Final result   Visible to patient:  No (Not Released)   Next appt:  2020 at 10:00 AM in Sleep Medicine (Lora Block MD)   Component  Ref Range & Units 1yr ago   P2Y12 Platelet Inhibition  194 - 418 PRU 149Low                Ronnell Rizvi   Echocardiogram   Order# [id number]   Reading physician: Kathryn Ferraro MD Ordering physician: Yadi Garcia APRN Study date: 20   Patient Information     Patient Name  Ronnell Rizvi MRN  [id number] Sex  Male  (Age)  1950 (70 y.o.)   Admission Information     Admission Date/Time Discharge Date/Time Room/Bed   20  1026  N541/1   Sedation Narrator Report     Sedation Narrator Report   Interpretation Summary     · Left ventricular wall thickness is consistent with hypertrophy. Sigmoid-shaped ventricular septum is present.  · Estimated EF = 60%.  · Left ventricular systolic function is normal.  · Left ventricular diastolic dysfunction (grade I) consistent with impaired relaxation.  · There is mild calcification of the aortic valve.  · Mild aortic valve regurgitation is present.      Patient Hx Of Height, Weight, and Vitals     Height Weight BSA (Calculated - sq m) BMI (kg/m2) Pulse BP   185.4 cm (73\") 86.2 kg (190 lb) 2.11 sq meters 25.12 62 146/93   Blood Pressure at Time of Exam      Right Arm   Blood Pressure 146/93 mmHg         Reason for Exam     Symptoms or Conditions Related to Cardiac Etiology - Stroke   Cardiac History     Diagnosis Date Comment Source   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  Provider   Study Description     A two-dimensional transthoracic echocardiogram with " complete color flow and Doppler was performed. The study is technically adequate for diagnosis.Verbal consent was obtained from the patient to use saline contrast in order to optimize the study. A total of 20 mL of agitated saline was administered to assess for cardiac shunting.   No adverse reaction to contrast was noted.   Echocardiogram Findings     Left Ventricle Left ventricular systolic function is normal. Calculated EF = 60.0%. Estimated EF = 60%. Normal left ventricular cavity size noted. All left ventricular wall segments contract normally. Left ventricular wall thickness is consistent with hypertrophy. Sigmoid-shaped ventricular septum is present. Left ventricular diastolic dysfunction is noted (grade I) consistent with impaired relaxation.   Right Ventricle Normal right ventricular cavity size and systolic function noted.   Left Atrium Normal left atrial size noted. Saline test results are negative.   Right Atrium Normal right atrial size noted.   Aortic Valve The valve appears trileaflet. The aortic valve is abnormal in structure. There is mild calcification of the aortic valve.Mild aortic valve regurgitation is present. No aortic valve stenosis is present.   Mitral Valve The mitral valve is grossly normal in structure. No mitral valve regurgitation is present. No significant mitral valve stenosis is present.   Tricuspid Valve The tricuspid valve is grossly normal. No evidence of tricuspid valve stenosis is present. Trace tricuspid valve regurgitation is present. Estimated right ventricular systolic pressure from tricuspid regurgitation is normal (<35 mmHg).   Pulmonic Valve The pulmonic valve is grossly normal in structure.   Greater Vessels No dilation of the aortic root is present. No dilation of the sinuses of Valsalva is present.   Pericardium There is no evidence of pericardial effusion.         ECG 12 Lead   Order: [id number]   Status:  Final result   Visible to patient:  No (Not Released) Next  appt:  01/17/2020 at 10:00 AM in Sleep Medicine (Lora Block MD)      Narrative & Impression     HEART RATE= 61  bpm  RR Interval= 988  ms  WA Interval= 228  ms  P Horizontal Axis= -1  deg  P Front Axis= 43  deg  QRSD Interval= 95  ms  QT Interval= 432  ms  QRS Axis= -28  deg  T Wave Axis= 49  deg  - ABNORMAL ECG -  Sinus rhythm  Prolonged WA interval  Borderline left axis deviation  Low voltage, precordial leads  Anteroseptal infarct, old  No change from prior tracing  Electronically Signed By: Andrew RegaladoCHADD) ([facility]) 14-Jan-2020 13:11:36  Date and Time of Study: 2020-01-14 11:24:57      Specimen Collected: 01/14/20 11:24 Last Resulted: 01/14/20 13:11                  Radiological Studies:   Ct Angiogram Neck    Result Date: 1/15/2020  CT HEAD WITH AND WITHOUT CONTRAST CTA HEAD AND NECK  HISTORY: Stroke  COMPARISON: Brain MR 01/14/2020 and MRI brain and MRA head and neck 12/09/2018  TECHNIQUE: Initially CT was performed of the head. Subsequently CT angiography was performed of the head and neck following the intravenous administration of iodinated contrast with axial images as well as coronal and sagittal reformatted MIP images provided. Radiation dose reduction techniques were utilized, including automated exposure control and exposure modulation based on body size.  FINDINGS:  CT head: There is no finding of acute infarct, hemorrhage, contusion or abnormal extra-axial collection. No hydrocephalus is present. Hypodensity within the periventricular and subcortical white matter likely represents moderate chronic ischemic small vessel degenerative changes, as seen on prior MRIs. Old lacunar infarct within the left corona radiata and occipital lobe, as before. There is no abnormal intracranial enhancement.  CT angiography neck: The origins of the great vessels are widely patent.  The common and internal carotid arteries are without appreciable stenosis or finding of dissection. No significant internal  carotid artery stenosis is present by NASCET criteria. Mild stenosis of the origin of the right vertebral artery is present secondary calcified atherosclerosis..  CT angiography head: The anterior and posterior circulations are without appreciable aneurysm formation or occlusion. There is a long segment of high-grade stenosis of the distal aspect of the P1 segment of the right posterior cerebral artery extending over a length of approximately 0.4 cm, as seen on MRI 12/09/2018. The artery reconstitutes distally. Right A1 segment of the anterior cerebral artery is hypoplastic, as before.      1.  Short segment of high-grade stenosis involving the P1 segment of the right posterior cerebral artery, similar to that seen on MRA head 12/09/2018. 2.  No finding of occlusion of the cervical or intracranial vasculature. No intracranial aneurysm evident. 3.  No findings of acute intracranial abnormality. Moderate small vessel degenerative white matter changes and old left corona radiata and occipital lobe infarcts, as before.  This report was finalized on 1/15/2020 9:35 AM by Dr. Felton Dominguez M.D.      Mri Brain Without Contrast    Result Date: 1/14/2020  MRI BRAIN WO CONTRAST-  INDICATIONS: Stroke  TECHNIQUE: Multiplanar multisequence unenhanced magnetic resonance imaging of the brain.  COMPARISON: 12/09/2018, correlated with head CT from 01/14/2020  FINDINGS:  The diffusion-weighted images, in correlation with ADC mapping, show no restricted diffusion to suggest acute infarct. Chronic T2 shine through artifact is seen at the alonzo.  The midline structures appear unremarkable.  The brain parenchyma shows encephalomalacia/old infarct changes at the left occipital lobe. Moderate predominantly periventricular white matter chronic small vessel ischemic change is apparent.  Flow voids in the major arteries at the base of the brain appear unremarkable.  The paranasal sinuses, mastoid air cells, and orbits appear unremarkable.        No acute infarct. Chronic changes of the brain.  This report was finalized on 1/14/2020 8:32 PM by Dr. Justin Stone M.D.      Xr Chest 1 View    Result Date: 1/14/2020  XR CHEST 1 VW-  HISTORY: Male who is 70 years-old,  stroke  TECHNIQUE: Frontal view of the chest  COMPARISON: 07/07/2017  FINDINGS: Heart, mediastinum and pulmonary vasculature are unremarkable. Minimal likely atelectasis at the left base, there may be minimal left pleural effusion. No pneumothorax. No acute osseous process.      Minimal likely atelectasis at the left base, there may be minimal left pleural effusion.  This report was finalized on 1/14/2020 12:21 PM by Dr. Justin Stone M.D.      Ct Angiogram Head    Result Date: 1/15/2020  CT HEAD WITH AND WITHOUT CONTRAST CTA HEAD AND NECK  HISTORY: Stroke  COMPARISON: Brain MR 01/14/2020 and MRI brain and MRA head and neck 12/09/2018  TECHNIQUE: Initially CT was performed of the head. Subsequently CT angiography was performed of the head and neck following the intravenous administration of iodinated contrast with axial images as well as coronal and sagittal reformatted MIP images provided. Radiation dose reduction techniques were utilized, including automated exposure control and exposure modulation based on body size.  FINDINGS:  CT head: There is no finding of acute infarct, hemorrhage, contusion or abnormal extra-axial collection. No hydrocephalus is present. Hypodensity within the periventricular and subcortical white matter likely represents moderate chronic ischemic small vessel degenerative changes, as seen on prior MRIs. Old lacunar infarct within the left corona radiata and occipital lobe, as before. There is no abnormal intracranial enhancement.  CT angiography neck: The origins of the great vessels are widely patent.  The common and internal carotid arteries are without appreciable stenosis or finding of dissection. No significant internal carotid artery stenosis is  present by NASCET criteria. Mild stenosis of the origin of the right vertebral artery is present secondary calcified atherosclerosis..  CT angiography head: The anterior and posterior circulations are without appreciable aneurysm formation or occlusion. There is a long segment of high-grade stenosis of the distal aspect of the P1 segment of the right posterior cerebral artery extending over a length of approximately 0.4 cm, as seen on MRI 12/09/2018. The artery reconstitutes distally. Right A1 segment of the anterior cerebral artery is hypoplastic, as before.      1.  Short segment of high-grade stenosis involving the P1 segment of the right posterior cerebral artery, similar to that seen on MRA head 12/09/2018. 2.  No finding of occlusion of the cervical or intracranial vasculature. No intracranial aneurysm evident. 3.  No findings of acute intracranial abnormality. Moderate small vessel degenerative white matter changes and old left corona radiata and occipital lobe infarcts, as before.  This report was finalized on 1/15/2020 9:35 AM by Dr. Felton Dominguez M.D.      Ct Head Without Contrast Stroke Protocol    Result Date: 1/15/2020  EMERGENCY NONCONTRAST HEAD CT 01/14/2020  CLINICAL HISTORY: Stroke, memory loss.  TECHNIQUE: Spiral CT images were obtained from the base of the skull to the vertex without intravenous contrast. Images were reformatted and submitted in 3 mm thick axial CT section with brain algorithm and 2 mm thick sagittal and coronal reconstructions were performed and submitted in brain algorithm.  COMPARISON: This is correlated to a prior MRI of the brain and MRA of the head and neck from [facility] on 12/09/2018 and CT angiogram of the head and neck on 10/22/2018 and noncontrast head CT 03/12/2018.  FINDINGS: There is a 4.7 x 2.1 cm area of encephalomalacia in the lateral left occipital lobe consistent with an old infarct in the distribution of the lateral occipital branches of the  posterior-inferior division of the left middle cerebral artery territory. There is some mild patchy low density in the periventricular white matter consistent with mild small vessel disease. There is a 7 mm old lacunar infarct in the mid left corona radiata region that occurred back in 12/2018. There is a 9 x 5 mm old lacunar infarct in the head of the left caudate nucleus. There is a small subcentimeter old posterior superior left frontal subcortical white matter infarct. The remainder of the brain parenchyma is normal in attenuation. The ventricles are normal in size. I see no mass effect, no midline shift and no extra-axial fluid collections are identified. There is no evidence of acute intracranial hemorrhage. The paranasal sinuses, mastoid air cells and middle ear cavities are clear. The calvarium, skull base and orbits are unremarkable.      1. No acute abnormality is seen. 2. There is mild small vessel disease in the cerebral white matter and there is a 9 x 5 mm old lacunar infarct in the head of the left caudate nucleus, a 7 mm old lacunar infarct in the mid left corona radiata region, and a subcentimeter old posterior superior left frontal subcortical white matter infarct. There is a 4.7 x 2.1 cm old lateral left occipital infarct in the distribution of the lateral occipital branches of the posterior-inferior division of the left middle cerebral artery territory. The remainder of the head CT is within normal limits. If there remains strong clinical suspicion of an acute infarct an MRI of the head could be obtained for a more complete assessment. The results were communicated to Dr. Sánchez in the emergency room by telephone on 01/14/2020 at 11:50 AM.  Radiation dose reduction techniques were utilized, including automated exposure control and exposure modulation based on body size.  This report was finalized on 1/15/2020 7:40 AM by Dr. Kieran Sky M.D.        During this visit the following were  done:  Labs Reviewed []    Labs Ordered []    Radiology Reports Reviewed []    Radiology Ordered []    EKG, echo, and/or stress test reviewed []    EEG results reviewed  []    EEG reviewed and interpreted per myself   []    Discussed case with neurointerventionalist or neuroradiologist []    Referring Provider Records Reviewed []    ER Records Reviewed []    Hospital Records Reviewed []    History Obtained From Family []    Radiological images view and Interpreted per myself []    Case Discussed with referring provider []     Decision to obtain and request outside records  []        Assessment and Plan     Speech difficulty and confusion in the setting of a headache, favor migraine with aura over a TIA. MRI/MRA, TTE stable. He does have significant chronic small vessel disease and RPCA stenosis which is unchanged   - CRP, P2Y12.   - Continue DAPT w ASA 81mg, Plavix.   - F/U with Dia Call in one month.   - Report new symptoms immediately.    Thanks,    Electronically signed by Siva Elder MD on 1/15/2020 at 1:51 PM

## 2020-01-15 NOTE — NURSING NOTE
Screening per stroke order set. Noted negative CT and MRI. Ot and ST have signed off as pt is at baseline. No need for acute rehab at this time. Please re consult if something changes.     Erma Yap RN  Acute Rehab Admission Nurse

## 2020-01-16 ENCOUNTER — READMISSION MANAGEMENT (OUTPATIENT)
Dept: CALL CENTER | Facility: HOSPITAL | Age: 70
End: 2020-01-16

## 2020-01-16 NOTE — PROGRESS NOTES
Case Management Discharge Note      Final Note: Home no additional dc needs. Mike RIVERO/CCP         Destination      No service has been selected for the patient.      Durable Medical Equipment      No service has been selected for the patient.      Dialysis/Infusion      No service has been selected for the patient.      Home Medical Care      No service has been selected for the patient.      Therapy      No service has been selected for the patient.      Community Resources      No service has been selected for the patient.        Transportation Services  Private: Car    Final Discharge Disposition Code: 01 - home or self-care

## 2020-01-16 NOTE — OUTREACH NOTE
Prep Survey      Responses   Facility patient discharged from?  [facility]   Is patient eligible?  Yes   Discharge diagnosis  **TIA (transient ischemic attack   Does the patient have one of the following disease processes/diagnoses(primary or secondary)?  Stroke (TIA)   Does the patient have Home health ordered?  No   Is there a DME ordered?  No   Medication alerts for this patient  ASA   Prep survey completed?  Yes          Iman Sánchez RN

## 2020-01-17 ENCOUNTER — OFFICE VISIT (OUTPATIENT)
Dept: SLEEP MEDICINE | Facility: HOSPITAL | Age: 70
End: 2020-01-17

## 2020-01-17 VITALS
DIASTOLIC BLOOD PRESSURE: 72 MMHG | HEART RATE: 62 BPM | WEIGHT: 189 LBS | BODY MASS INDEX: 25.05 KG/M2 | HEIGHT: 73 IN | SYSTOLIC BLOOD PRESSURE: 133 MMHG

## 2020-01-17 DIAGNOSIS — G47.33 OSA (OBSTRUCTIVE SLEEP APNEA): Primary | ICD-10-CM

## 2020-01-17 DIAGNOSIS — G45.9 TIA (TRANSIENT ISCHEMIC ATTACK): ICD-10-CM

## 2020-01-17 PROCEDURE — 99213 OFFICE O/P EST LOW 20 MIN: CPT | Performed by: INTERNAL MEDICINE

## 2020-01-17 PROCEDURE — G0463 HOSPITAL OUTPT CLINIC VISIT: HCPCS

## 2020-01-17 NOTE — PROGRESS NOTES
[facility]  [address]  [address]  [place] [address]  Phone   Fax       SLEEP CLINIC FOLLOW UP PROGRESS NOTE.    Ronnell Rizvi  1950  70 y.o.  male      PCP: Chase Haque MD      Date of visit: 1/17/2020    Chief Complaint   Patient presents with   • Sleep Apnea       INTERM HISTORY:  This is a 70 y.o. years old patient who has a history of sleep apnea and is on CPAP.  Patient reports good compliance with the device.  Patient is not using the CPAP.  He reports that he is unable to use it because of ringing in the ears.    I have reviewed his polysomnography which shows moderate sleep apnea in supine position but he is AHI on the sides is 5.8 which is acceptable.    Sleep schedule  Normally goes to bed at 11 PM  Wakes up at 5 AM  Feels refreshed after waking up: Yes      The Smart card downloaded on 1/17/2020 has been reviewed by me and shows the following..  Patient has not been using the CPAP  DME company [employer]      Past Medical History:   Diagnosis Date   • Allergic rhinitis    • Anal fissure    • Aortic insufficiency     moderate, THIERRY 2017   • Asthma    • Benign prostatic hypertrophy (BPH) with weak urinary stream 11/1/2016   • Chronic bronchitis (CMS/HCC)    • Emphysema lung (CMS/HCC)    • Fatty liver    • GERD (gastroesophageal reflux disease)    • Hepatitis     thought to be Hepatitis A    • History of pneumonia     With left lower lobe atelectasis and scar tissue, being followed   • Obstructive sleep apnea syndrome 8/23/2017   • PFO (patent foramen ovale)     s/p percutaneous closure with a 25mm Amplatzer device in December 2018   • Pneumonia     LLL with scar tissue   • 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   • Spinal stenosis    • Stroke (CMS/HCC)     10/2017: left occipital/temporal       MEDICATIONS: reviewed by me    Current Outpatient Medications:   •  aspirin 81 MG EC tablet, Take 1 tablet by mouth Daily., Disp: 30 tablet, Rfl: 1  •   "clopidogrel (PLAVIX) 75 MG tablet, Take 1 tablet by mouth Daily., Disp: 90 tablet, Rfl: 2  •  hydrochlorothiazide (HYDRODIURIL) 12.5 MG tablet, Take 1 tablet by mouth Daily., Disp: 90 tablet, Rfl: 3    Allergies   Allergen Reactions   • Aspirin Nausea Only     Pt states he \"can tolerate enteric coated aspirin only.\"    • Citrus      Blisters on mouth     • Combivent [Ipratropium-Albuterol] Other (See Comments)     Throat swelling   • Lactose Intolerance (Gi)    • Statins Mental Status Change     Severe memory impairment    reviewed by me    SOCIAL, FAMILY HISTORY: Medical records are reviewed and noted by me.    REVIEW OF SYSTEMS:   Graham Sleepiness Scale :Total score: 4   Snoring: Yes      PHYSICAL EXAMINATION:  Vitals:    01/17/20 0900   BP: 133/72   Pulse: 62   Weight: 85.7 kg (189 lb)   Height: 185.4 cm (73\")    Body mass index is 24.94 kg/m².    HEENT: pupils are round equal and reacting to light and accommodation, nasal passage is clear, no nasal polyps, no lymphadenopathy, throat is clear, oral airway Mallampati class 3  RESPRATORY SYSTEM: Breath sounds are equal on both sides and are normal, no wheezes or crackles  CARDIOVASULAR SYSTEM: Heart rate is regular without murmur  ABDOMEN: Soft, no ascites, no hepatosplenomegaly.  EXTREMITIES: No cyanosis, clubbing or edema       ASSESSMENT AND PLAN:  · Obstructive sleep apnea, patient is not using the CPAP.  Patient reports that he is unable to use the CPAP because of the ringing in the ears.  I have reviewed his sleep study which shows moderate sleep apnea in supine position and the AHI is 5.8 on the lateral positions.  I have advised the patient to sleep on the side.  He wants to return the CPAP as he is not using.  He understands the risks but he says that he is going to return at.  No return appointment is made but I will be happy to see him if he needs my assistance in the future  · Hx of TIA        Lora Block MD, [facility]  Sleep " Medicine.(Board-certified)  [facility]  Medical Director for [facility]   1/17/2020

## 2020-01-20 ENCOUNTER — READMISSION MANAGEMENT (OUTPATIENT)
Dept: CALL CENTER | Facility: HOSPITAL | Age: 70
End: 2020-01-20

## 2020-01-20 NOTE — OUTREACH NOTE
Stroke Week 1 Survey      Responses   Facility patient discharged from?  [facility]   Does the patient have one of the following disease processes/diagnoses(primary or secondary)?  Stroke (TIA)   Is there a successful TCM telephone encounter documented?  No   Week 1 attempt successful?  Yes   Call start time  1245   Call end time  1251   Discharge diagnosis  **TIA (transient ischemic attack   Meds reviewed with patient/caregiver?  Yes   Is the patient having any side effects they believe may be caused by any medication additions or changes?  No   Does the patient have all medications ordered at discharge?  Yes   Is the patient taking all medications as directed (includes completed medication regime)?  Yes   Does the patient have a primary care provider?   Yes   Does the patient have an appointment with their PCP within 7 days of discharge?  Yes   Has the patient kept scheduled appointments due by today?  N/A   Comments  Cards 01/21 AND Neuro next Tuesday   Has home health visited the patient within 72 hours of discharge?  N/A   Psychosocial issues?  No   Does the patient require any assistance with activities of daily living such as eating, bathing, dressing, walking, etc.?  No   Does the patient have any residual symptoms from stroke/TIA?  No   Does the patient understand the diet ordered at discharge?  Yes   Did the patient receive a copy of their discharge instructions?  Yes   Nursing interventions  Reviewed instructions with patient   What is the patient's perception of their health status since discharge?  Improving   Nursing interventions  Nurse provided patient education   Is the patient able to teach back FAST for Stroke?  Yes   Is the patient/caregiver able to teach back the risk factors for a stroke?  High Cholesterol, High blood pressure-goal below 120/80   Is the patient/caregiver able to teach back signs and symptoms related to disease process for when to call PCP?  Yes   Is the patient/caregiver able  to teach back signs and symptoms related to disease process for when to call 911?  Yes   Is the patient/caregiver able to teach back the hierarchy of who to call/visit for symptoms/problems? PCP, Specialist, Home health nurse, Urgent Care, ED, 911  Yes   Additional teach back comments  Looking for new PCP, no issues at this time, says this TIA was less painful.   Week 1 call completed?  Yes          Pia Morales RN

## 2020-01-20 NOTE — PROGRESS NOTES
Date of Office Visit: 2020  Encounter Provider: CHINEDU Ruano  Place of Service: [facility] CARDIOLOGY  Patient Name: Ronnell Rizvi  :1950  Primary Cardiologist: Dr. Sawant    CC:  Follow up hosptial     Dear Dr. Haque    HPI: Ronnell Rizvi is a pleasant 70 y.o. male who presents 2020 for cardiac follow up.     First seen by Dr. Sawant in 2016 when he was hospitalized for chest pain.  A Cardiolite stress was normal (EF 54%).  He did have some Wenckebach at the beginning of stress which normalized with exercise.     In 2017, he had an episode of visual changes that resolved on their own.  The next morning it happened again and he came to the ED and was diagnosed with a stroke of the left occipital/temporal lobe.  He was found to have some diffuse small vessel disease.  An echo wasn't performed but a Zio patch was placed.  We were not consulted in the hospital.  He was placed on clopidogrel and atorvastatin.     In 2017, the Zio showed some atrial ectopy (there was a 13 beat run of PACs) but no atrial fibrillation.  An echo showed normal LV systolic function and a small PFO.     His studies were then reviewed by a different neurologist, who felt that this was actually embolic. A THIERRY showed a moderate sized PFO and moderate aortic insufficiency but was otherwise normal. A Confirm device was placed (implanted monitor from [employer]).  Upon arrival he was noted to have an irregular rhythm (not atrial fibrillation).  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  The monitor failed to show any atrial fibrillation (see below).     In 2018, he presented with recurrent neurological symptoms.  He was found to have a subacute infarct as well as diffuse intracranial atherosclerosis.  He underwent percutaneous PFO closure with a 25mm Amplatzer device during that admission.     The implant the monitor has continued to report paroxysmal  "atrial fibrillation although review of every episode shows that this is not the case.  He has short sinus pauses as well as type II sinoatrial exit block.    He presented to the emergency department at [facility] on 1/14/2020  complaining of intermittent speech difficulties that were first noticed around 11 PM the night prior.. Pt also c/o HA. Pt denies visual disturbances, trouble swallowing, numbness, weakness, SOA and CP. The pt has a hx of CVA x3 and is anticoagulated on 75 mg Plavix. He started having difficulty \"finding the appropriate words\" last night around 2300 however his sx's resolved shortly after and the pt went to sleep. He woke up this morning feeling normal, with normal speech until 0900, when the pt was heard by his wife to be \"slurred\". He states \"I couldn't talk or use the right words\". After hearing the change in pt's speech, the pt's wife administered 81 mg ASA at 1000. He was brought to the ED for further evaluation.  Pt is not a smoker.      CTA of the head and neck:  IMPRESSION:  1.  Short segment of high-grade stenosis involving the P1 segment of the  right posterior cerebral artery, similar to that seen on MRA head  12/09/2018.   2.  No finding of occlusion of the cervical or intracranial vasculature.  No intracranial aneurysm evident.  3.  No findings of acute intracranial abnormality. Moderate small vessel  degenerative white matter changes and old left corona radiata and  occipital lobe infarcts, as before    He was started on aspirin along with his Plavix.  An echocardiogram was performed that was unremarkable.  He was seen by neurology who felt that he needed to get back on the dual antiplatelet therapy and otherwise had no further recommendations.  It was actually felt this could represent a migraine with aura rather than a TIA.  The patient has been intolerant to statins in the past and and states because he has had memory loss he would not want to be restarted on a statin at " this time.  He is to follow-up with neurology as an outpatient.  Echo on 1/14/2020 as below:    · Left ventricular wall thickness is consistent with hypertrophy. Sigmoid-shaped ventricular septum is present.  · Estimated EF = 60%.  · Left ventricular systolic function is normal.  · Left ventricular diastolic dysfunction (grade I) consistent with impaired relaxation.  · There is mild calcification of the aortic valve.  · Mild aortic valve regurgitation is present    He states he has had no further symptoms.  He denies any palpitations or shortness of breath.  He has no lower extremity edema.  He denies any chest pain or chest tightness.  He has had no dizziness.  He does have some fatigue and gets sleepy during the day.  He is diagnosed with ELIZABETH but is unable to wear his CPAP.  He had question whether or not he should restart the baby aspirin and I told him yes that was per neurology's recommendations and we would follow those.  He was agreeable to do so.  Past Medical History:   Diagnosis Date   • Allergic rhinitis    • Anal fissure    • Aortic insufficiency     moderate, THIERRY 2017   • Asthma    • Benign prostatic hypertrophy (BPH) with weak urinary stream 11/1/2016   • Chronic bronchitis (CMS/HCC)    • Emphysema lung (CMS/HCC)    • Fatty liver    • GERD (gastroesophageal reflux disease)    • Hepatitis     thought to be Hepatitis A    • History of pneumonia     With left lower lobe atelectasis and scar tissue, being followed   • Obstructive sleep apnea syndrome 8/23/2017   • PFO (patent foramen ovale)     s/p percutaneous closure with a 25mm Amplatzer device in December 2018   • Pneumonia     LLL with scar tissue   • 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   • Spinal stenosis    • Stroke (CMS/HCC)     10/2017: left occipital/temporal       Past Surgical History:   Procedure Laterality Date   • CARDIAC CATHETERIZATION N/A 12/12/2018    Procedure: Patent foramen ovale closure- [employer];  Surgeon: Deangelo Harris MD;  Location:   [facility] CATH INVASIVE LOCATION;  Service: Cardiology   • CARDIAC CATHETERIZATION N/A 2018    Procedure: Intracardiac echocardiogram;  Surgeon: Deangelo Harris MD;  Location: [facility] CATH INVASIVE LOCATION;  Service: Cardiology   • CARDIAC ELECTROPHYSIOLOGY PROCEDURE N/A 3/26/2018    Procedure: Loop insertion   CONFIRM RX;  Surgeon: Luis Wyatt MD;  Location: [facility] CATH INVASIVE LOCATION;  Service: Cardiovascular   • COLONOSCOPY     • HAND SURGERY Bilateral    • INGUINAL HERNIA REPAIR Left    • OTHER SURGICAL HISTORY      inguinal hernia repair for person over age 5   • TONSILLECTOMY         Social History     Socioeconomic History   • Marital status:      Spouse name: Joey   • Number of children: Not on file   • Years of education: Not on file   • Highest education level: Not on file   Occupational History   • Occupation:      Employer: RETIRED   Tobacco Use   • Smoking status: Former Smoker     Last attempt to quit: 1979     Years since quittin.9   • Smokeless tobacco: Never Used   • Tobacco comment: caffeine use: Drinks 4 glasses of Dt coke on avg.    Substance and Sexual Activity   • Alcohol use: No   • Drug use: No   • Sexual activity: Defer       Family History   Problem Relation Age of Onset   • Obesity Mother    • Clotting disorder Mother         Upper extremities   • Alcohol abuse Father    • Cancer Father 63        Esophagus and lung   • Other Father         cardiac disorder   • Heart disease Father    • Kidney disease Sister    • Kidney failure Sister    • Drug abuse Paternal Uncle    • Stroke Sister    • Throat cancer Sister        The following portion of the patient's history were reviewed and updated as appropriate: past medical history, past surgical history, past social history, past family history, allergies, current medications, and problem list.    Review of Systems   Constitution: Negative for diaphoresis, fever and malaise/fatigue.   HENT:  "Negative for congestion, hearing loss, hoarse voice, nosebleeds and sore throat.    Eyes: Negative for photophobia, vision loss in left eye, vision loss in right eye and visual disturbance.   Cardiovascular: Negative for chest pain, dyspnea on exertion, irregular heartbeat, leg swelling, near-syncope, orthopnea, palpitations, paroxysmal nocturnal dyspnea and syncope.   Respiratory: Negative for cough, hemoptysis, shortness of breath, sleep disturbances due to breathing, snoring, sputum production and wheezing.    Endocrine: Negative for cold intolerance, heat intolerance, polydipsia, polyphagia and polyuria.   Hematologic/Lymphatic: Negative for bleeding problem. Does not bruise/bleed easily.   Skin: Negative for color change, dry skin, poor wound healing, rash and suspicious lesions.   Musculoskeletal: Negative for arthritis, back pain, falls, gout, joint pain, joint swelling, muscle cramps, muscle weakness and myalgias.   Gastrointestinal: Negative for bloating, abdominal pain, constipation, diarrhea, dysphagia, melena, nausea and vomiting.   Neurological: Negative for excessive daytime sleepiness, dizziness, headaches, light-headedness, loss of balance, numbness, paresthesias, seizures, vertigo and weakness.   Psychiatric/Behavioral: Positive for memory loss (short term). Negative for depression and substance abuse. The patient is not nervous/anxious.        Allergies   Allergen Reactions   • Aspirin Nausea Only     Pt states he \"can tolerate enteric coated aspirin only.\"    • Citrus      Blisters on mouth     • Combivent [Ipratropium-Albuterol] Other (See Comments)     Throat swelling   • Lactose Intolerance (Gi)    • Statins Mental Status Change     Severe memory impairment         Current Outpatient Medications:   •  clopidogrel (PLAVIX) 75 MG tablet, Take 1 tablet by mouth Daily., Disp: 90 tablet, Rfl: 2  •  hydrochlorothiazide (HYDRODIURIL) 12.5 MG tablet, Take 1 tablet by mouth Daily., Disp: 90 tablet, Rfl: " "3  •  aspirin 81 MG EC tablet, Take 1 tablet by mouth Daily., Disp: 30 tablet, Rfl: 1        Objective:     Vitals:    01/21/20 1138   BP: (!) 156/102   BP Location: Left arm   Patient Position: Sitting   Cuff Size: Adult   Pulse: 61   Resp: 16   SpO2: 97%   Weight: 86.6 kg (191 lb)   Height: 185.4 cm (73\")     Body mass index is 25.2 kg/m².      Physical Exam   Constitutional: He is oriented to person, place, and time. He appears well-developed and well-nourished. No distress.   HENT:   Head: Normocephalic and atraumatic.   Right Ear: External ear normal.   Left Ear: External ear normal.   Nose: Nose normal.   Eyes: Pupils are equal, round, and reactive to light. Conjunctivae are normal. Right eye exhibits no discharge. Left eye exhibits no discharge.   Neck: Normal range of motion. Neck supple. No JVD present. No tracheal deviation present. No thyromegaly present.   Cardiovascular: Normal rate, regular rhythm, normal heart sounds and intact distal pulses. Exam reveals no gallop and no friction rub.   No murmur heard.  Pulmonary/Chest: Effort normal and breath sounds normal. No respiratory distress. He has no wheezes. He has no rales. He exhibits no tenderness.   Abdominal: Soft. Bowel sounds are normal. He exhibits no distension. There is no tenderness.   Musculoskeletal: Normal range of motion. He exhibits no edema, tenderness or deformity.   Lymphadenopathy:     He has no cervical adenopathy.   Neurological: He is alert and oriented to person, place, and time. Coordination normal.   He does struggle with short term memory.   Skin: Skin is warm and dry. No rash noted. No erythema.   Psychiatric: He has a normal mood and affect. His behavior is normal. Judgment and thought content normal.             ECG 12 Lead  Date/Time: 1/21/2020 11:54 AM  Performed by: Vivian Tran APRN  Authorized by: Vivian Tran APRN   Comparison: compared with previous ECG from 1/14/2020  Similar to previous ECG  Rhythm: sinus " rhythm  Rate: normal  Conduction: conduction normal  Q waves: V1 and V2    ST Segments: ST segments normal  T Waves: T waves normal  QRS axis: normal    Clinical impression: abnormal EKG              Assessment:       Diagnosis Plan   1. Cerebrovascular accident (CVA), unspecified mechanism (CMS/HCC)     2. Essential hypertension     3. ELIZABETH (obstructive sleep apnea)     4. Sinoatrial block            Plan:       1.  CVA- He had a left occipital/temporal stroke which was initially presumed to be due to small vessel disease. He was placed on clopidogrel and atorvastatin (which he stopped due to memory loss).  Then, outpatient neurological evaluation led to a further workup.  He was found to have a PFO on THIERRY, and then he had another event on DAPT.  He then had the PFO closed.  Another TIA with expressive aphasia and short-term memory loss 1/14/2020.  He was evaluated by neurology and instructed to take 81 mg of aspirin with his Plavix.    2.  Hypertension- blood pressure is elevated at visit today.  He states he has not taken his medication yet.    3.  ELIZABETH-noncompliant with CPAP    4.  Sinoatrial block- he has intermittent second-degree SA block (type II) which is asymptomatic.    5.  Hyperlipidemia-he denies statin secondary to memory loss from CVA and TIAs.      Keep October appointment with me    As always, it has been a pleasure to participate in your patient's care. Thank you.       Sincerely,       CHINEDU Ruano      Current Outpatient Medications:   •  clopidogrel (PLAVIX) 75 MG tablet, Take 1 tablet by mouth Daily., Disp: 90 tablet, Rfl: 2  •  hydrochlorothiazide (HYDRODIURIL) 12.5 MG tablet, Take 1 tablet by mouth Daily., Disp: 90 tablet, Rfl: 3  •  aspirin 81 MG EC tablet, Take 1 tablet by mouth Daily., Disp: 30 tablet, Rfl: 1    Dictated utilizing Dragon dictation

## 2020-01-21 ENCOUNTER — OFFICE VISIT (OUTPATIENT)
Dept: CARDIOLOGY | Facility: CLINIC | Age: 70
End: 2020-01-21

## 2020-01-21 VITALS
DIASTOLIC BLOOD PRESSURE: 102 MMHG | OXYGEN SATURATION: 97 % | BODY MASS INDEX: 25.31 KG/M2 | WEIGHT: 191 LBS | SYSTOLIC BLOOD PRESSURE: 156 MMHG | HEART RATE: 61 BPM | HEIGHT: 73 IN | RESPIRATION RATE: 16 BRPM

## 2020-01-21 DIAGNOSIS — I45.5 SINOATRIAL BLOCK: ICD-10-CM

## 2020-01-21 DIAGNOSIS — I10 ESSENTIAL HYPERTENSION: ICD-10-CM

## 2020-01-21 DIAGNOSIS — I63.9 CEREBROVASCULAR ACCIDENT (CVA), UNSPECIFIED MECHANISM (HCC): Primary | ICD-10-CM

## 2020-01-21 DIAGNOSIS — G47.33 OSA (OBSTRUCTIVE SLEEP APNEA): ICD-10-CM

## 2020-01-21 PROCEDURE — 93000 ELECTROCARDIOGRAM COMPLETE: CPT | Performed by: NURSE PRACTITIONER

## 2020-01-21 PROCEDURE — 99214 OFFICE O/P EST MOD 30 MIN: CPT | Performed by: NURSE PRACTITIONER

## 2020-01-28 ENCOUNTER — OFFICE VISIT (OUTPATIENT)
Dept: FAMILY MEDICINE CLINIC | Facility: CLINIC | Age: 70
End: 2020-01-28

## 2020-01-28 ENCOUNTER — TELEPHONE (OUTPATIENT)
Dept: FAMILY MEDICINE CLINIC | Facility: CLINIC | Age: 70
End: 2020-01-28

## 2020-01-28 ENCOUNTER — OFFICE VISIT (OUTPATIENT)
Dept: NEUROLOGY | Facility: CLINIC | Age: 70
End: 2020-01-28

## 2020-01-28 VITALS
BODY MASS INDEX: 25.18 KG/M2 | SYSTOLIC BLOOD PRESSURE: 140 MMHG | HEART RATE: 75 BPM | WEIGHT: 190 LBS | DIASTOLIC BLOOD PRESSURE: 90 MMHG | OXYGEN SATURATION: 96 % | HEIGHT: 73 IN

## 2020-01-28 VITALS
DIASTOLIC BLOOD PRESSURE: 78 MMHG | SYSTOLIC BLOOD PRESSURE: 118 MMHG | BODY MASS INDEX: 25.18 KG/M2 | WEIGHT: 190 LBS | HEIGHT: 73 IN | OXYGEN SATURATION: 99 % | HEART RATE: 60 BPM | TEMPERATURE: 97.7 F

## 2020-01-28 DIAGNOSIS — I10 ESSENTIAL HYPERTENSION: ICD-10-CM

## 2020-01-28 DIAGNOSIS — I10 ESSENTIAL HYPERTENSION: Primary | ICD-10-CM

## 2020-01-28 DIAGNOSIS — Z12.83 SKIN CANCER SCREENING: ICD-10-CM

## 2020-01-28 DIAGNOSIS — G47.33 OSA (OBSTRUCTIVE SLEEP APNEA): ICD-10-CM

## 2020-01-28 DIAGNOSIS — I63.9 CEREBROVASCULAR ACCIDENT (CVA), UNSPECIFIED MECHANISM (HCC): Primary | ICD-10-CM

## 2020-01-28 DIAGNOSIS — R56.9 NOCTURNAL SEIZURES (HCC): ICD-10-CM

## 2020-01-28 DIAGNOSIS — E53.8 VITAMIN B12 DEFICIENCY: ICD-10-CM

## 2020-01-28 DIAGNOSIS — F51.04 PSYCHOPHYSIOLOGICAL INSOMNIA: Primary | ICD-10-CM

## 2020-01-28 DIAGNOSIS — I63.9 CEREBROVASCULAR ACCIDENT (CVA), UNSPECIFIED MECHANISM (HCC): ICD-10-CM

## 2020-01-28 DIAGNOSIS — J44.9 CHRONIC OBSTRUCTIVE PULMONARY DISEASE, UNSPECIFIED COPD TYPE (HCC): ICD-10-CM

## 2020-01-28 PROCEDURE — 99214 OFFICE O/P EST MOD 30 MIN: CPT | Performed by: NURSE PRACTITIONER

## 2020-01-28 PROCEDURE — 99213 OFFICE O/P EST LOW 20 MIN: CPT | Performed by: FAMILY MEDICINE

## 2020-01-28 RX ORDER — ASPIRIN 81 MG/1
81 TABLET ORAL DAILY
COMMUNITY
End: 2020-03-21

## 2020-01-28 RX ORDER — ALPRAZOLAM 0.5 MG/1
0.5 TABLET ORAL NIGHTLY
Qty: 30 TABLET | Refills: 2 | Status: SHIPPED | OUTPATIENT
Start: 2020-01-28 | End: 2020-03-21

## 2020-01-28 NOTE — PROGRESS NOTES
Chief Complaint   Patient presents with   • Hypertension     np       Subjective   Ronnell Rizvi is a 70 y.o. male.     History of Present Illness     69 yo male here to establish care as a new patient    PMH CVA, PFO closure, HTN, HLD, COPD/asthma-denies recent exacerbations, ELIZABETH (has been untreated), hx RMSF, possible concern for nocturnal seizures-follows with Neuro not on AED, GERD    Was recently in hospital on 1/14/2020 for  possible TIA with expressive aphasia and short-term memory loss 1/14/2020, symptoms now resolved, imaging was negative, had Neurology f/u today. On ASA and Plavix.     HTN-stable when takes his HCTZ 12.5mg   Not on statin, states concern for memory issues when he was taking atorvastatin. Can recent Cards f/u this Jan.   ELIZABETH-not currently using CPAP    The following portions of the patient's history were reviewed and updated as appropriate: allergies, current medications, past family history, past medical history, past social history, past surgical history and problem list.      Past Medical History:   Diagnosis Date   • Allergic rhinitis    • Anal fissure    • Aortic insufficiency     moderate, THIERRY 2017   • Asthma    • Asthma    • Benign prostatic hypertrophy (BPH) with weak urinary stream 11/1/2016   • Chronic bronchitis (CMS/HCC)    • COPD (chronic obstructive pulmonary disease) (CMS/HCC)    • Emphysema lung (CMS/HCC)    • Fatty liver    • GERD (gastroesophageal reflux disease)    • Hepatitis     thought to be Hepatitis A    • History of pneumonia     With left lower lobe atelectasis and scar tissue, being followed   • HL (hearing loss)    • HTN (hypertension) 1/14/2020   • Hypertension    • Memory loss    • Obstructive sleep apnea syndrome 8/23/2017   • PFO (patent foramen ovale)     s/p percutaneous closure with a 25mm Amplatzer device in December 2018   • Pneumonia     LLL with scar tissue   • 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   • Spinal stenosis    • Stroke (CMS/HCC)     10/2017: left  occipital/temporal   • Vision loss        Past Surgical History:   Procedure Laterality Date   • CARDIAC CATHETERIZATION N/A 2018    Procedure: Patent foramen ovale closure- Abbott;  Surgeon: Deangelo Harris MD;  Location:  [facility];  Service: Cardiology   • CARDIAC CATHETERIZATION N/A 2018    Procedure: Intracardiac echocardiogram;  Surgeon: Deangelo Harris MD;  Location:  [facility];  Service: Cardiology   • CARDIAC ELECTROPHYSIOLOGY PROCEDURE N/A 3/26/2018    Procedure: Loop insertion   CONFIRM RX;  Surgeon: Luis Wyatt MD;  Location:  [facility];  Service: Cardiovascular   • COLONOSCOPY     • HAND SURGERY Bilateral    • INGUINAL HERNIA REPAIR Left    • OTHER SURGICAL HISTORY      inguinal hernia repair for person over age 5   • TONSILLECTOMY         Family History   Problem Relation Age of Onset   • Obesity Mother    • Clotting disorder Mother         Upper extremities   • Alcohol abuse Father    • Cancer Father 63        Esophagus and lung   • Other Father         cardiac disorder   • Heart disease Father    • Kidney disease Sister    • Kidney failure Sister    • Drug abuse Paternal Uncle    • Stroke Sister    • Throat cancer Sister        Social History     Socioeconomic History   • Marital status:      Spouse name: Joey   • Number of children: Not on file   • Years of education: Not on file   • Highest education level: Not on file   Occupational History   • Occupation:      Employer: RETIRED   Tobacco Use   • Smoking status: Former Smoker     Last attempt to quit: 1979     Years since quittin.9   • Smokeless tobacco: Never Used   • Tobacco comment: caffeine use: Drinks 4 glasses of Dt coke on avg.    Substance and Sexual Activity   • Alcohol use: No   • Drug use: No   • Sexual activity: Defer       Current Outpatient Medications on File Prior to Visit   Medication Sig Dispense Refill   •  aspirin 81 MG EC tablet Take 81 mg by mouth Daily.     • clopidogrel (PLAVIX) 75 MG tablet Take 1 tablet by mouth Daily. 90 tablet 2   • Cyanocobalamin 1000 MCG/ML kit Inject  as directed.     • hydrochlorothiazide (HYDRODIURIL) 12.5 MG tablet Take 1 tablet by mouth Daily. 90 tablet 3   • [DISCONTINUED] predniSONE (DELTASONE) 10 MG (21) tablet pack Take  by mouth Daily for 6 days. Use as directed on package 21 tablet 0   • [DISCONTINUED] aspirin 81 MG EC tablet Take 1 tablet by mouth Daily. 30 tablet 1     No current facility-administered medications on file prior to visit.        Review of Systems   Constitutional: Negative for activity change, chills and fever.   HENT: Negative for congestion, postnasal drip and rhinorrhea.    Eyes: Negative for blurred vision and pain.   Respiratory: Negative for cough, chest tightness and shortness of breath.    Cardiovascular: Negative for chest pain.   Gastrointestinal: Negative for abdominal pain, constipation, diarrhea, nausea and vomiting.   Endocrine: Negative for cold intolerance and heat intolerance.   Genitourinary: Negative for decreased urine volume, dysuria and frequency.   Musculoskeletal: Negative for arthralgias, back pain and myalgias.   Skin: Negative for rash and skin lesions.   Neurological: Negative for dizziness and confusion.   Psychiatric/Behavioral: Negative for agitation, behavioral problems and depressed mood.           Vitals:    01/28/20 1249   BP: 118/78   Pulse: 60   Temp: 97.7 °F (36.5 °C)   SpO2: 99%      Objective   Physical Exam   Constitutional: He is oriented to person, place, and time. He appears well-developed and well-nourished.   HENT:   Head: Normocephalic and atraumatic.   Eyes: Pupils are equal, round, and reactive to light. Conjunctivae and EOM are normal.   Neck: Neck supple.   Cardiovascular: Normal rate, regular rhythm and normal heart sounds.   No murmur heard.  Pulmonary/Chest: Effort normal and breath sounds normal. No  respiratory distress.   Abdominal: Soft. Bowel sounds are normal.   Musculoskeletal: Normal range of motion.   Neurological: He is alert and oriented to person, place, and time.   Skin: Skin is warm and dry.   Psychiatric: He has a normal mood and affect.   Nursing note and vitals reviewed.        Assessment/Plan   Problem List Items Addressed This Visit        Cardiovascular and Mediastinum    CVA (cerebral vascular accident) (CMS/[facility])    HTN (hypertension) - Primary       Respiratory    ELIZABETH (obstructive sleep apnea)        CVA-Continue aspirin, Plavix. Stopped statin in the past due to concern for memory issues. Had possible TIA with expressive aphasia and short-term memory loss 1/14/2020, symptoms now resolved, had Neurology f/u today.    ELIZABETH-not using CPAP as prescribed, will to try to start to use CPAP again    HTN-Stable when takes medication HCTZ. Recent bloodwork 1/14/2020 reviewed from hospital visit, A1c normal, Cr normal, blood counts normal     Will refer to Derm for skin check. Due for [facility] visit                Anne Chand MD

## 2020-01-28 NOTE — TELEPHONE ENCOUNTER
Called pt to inform him per Dr. Chand, she will not be starting him on a statin due to his past history of it being dc'd for concern of memory issues.    [facility]

## 2020-01-28 NOTE — PROGRESS NOTES
"DOS: 2020  NAME: Ronnell Rizvi   : 1950  PCP: Chase Haque MD    Chief Complaint   Patient presents with   • Migraine   • Stroke   He is unaccompanied to today's visit.       Neurological Problem and Interval History:  70 y.o. 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      Mr. Rizvi presented to [facility] due to remittent difficulty with speech/word finding issue/confusion started night prior and reoccurred date of admission.  According to the chart patient was unable to remember the names of his dog.  Chart also reflects that he had some occipital head pressure; which patient denies presence of headache during this admission.  ETA of the head and neck were unremarkable.  RI of the brain was also negative for acute findings.  He was discharged home on dual oral antiplatelets which he remains on today.  Neurologically he cleared quickly.  He denies any prior episodes similar to this and none since discharge.  He has no new complaints and/or concerns on my exam.  He reports systolic blood pressures consistently less than 130; today blood pressure 140/90 patient somewhat aggravated by traffic and paperwork required for today's appointment. He denies any concern for depression although he does reports that he is \"short-fused\" @ times.  He has been previously diagnosed with obstructive sleep apnea which he states he is unable to tolerate.  Lengthy discussion regarding risk and benefits and he is willing to try again.  I recommended that he try daily for 2 weeks before completely giving up on therapy.  He reports very poor quality of sleep approximately 2 to 4 " hours which he attributes to ill pets at home that he is caring for throughout the night.  Looks fatigued on my exam today.  Discussed that untreated sleep apnea could be precipitating many of the symptoms that patient has been evaluated for recently.  He denies any other complaints and/or concerns on my exam.  I will plan to see him again September 2020 as previously recommended unless he should have any new complaints or concerns before then.      Review of Systems:        Review of Systems   Constitutional: Negative for activity change, appetite change and unexpected weight change.   HENT: Positive for hearing loss, sinus pressure and tinnitus. Negative for facial swelling, trouble swallowing and voice change.    Eyes: Negative for photophobia, pain and visual disturbance.   Respiratory: Negative for chest tightness, shortness of breath and wheezing.    Cardiovascular: Negative for chest pain, palpitations and leg swelling.   Gastrointestinal: Negative for abdominal pain, nausea and vomiting.   Endocrine: Negative for polydipsia and polyphagia.   Musculoskeletal: Positive for arthralgias and neck stiffness. Negative for back pain, gait problem, joint swelling, myalgias and neck pain.   Skin: Positive for rash.   Neurological: Positive for light-headedness. Negative for dizziness, tremors, seizures, syncope, facial asymmetry, speech difficulty, weakness, numbness and headaches.   Hematological: Does not bruise/bleed easily.   Psychiatric/Behavioral: Negative for agitation, behavioral problems, confusion, decreased concentration, dysphoric mood, hallucinations, self-injury, sleep disturbance and suicidal ideas. The patient is not nervous/anxious and is not hyperactive.          Current Outpatient Medications:   •  aspirin 81 MG EC tablet, Take 1 tablet by mouth Daily., Disp: 30 tablet, Rfl: 1  •  clopidogrel (PLAVIX) 75 MG tablet, Take 1 tablet by mouth Daily., Disp: 90 tablet, Rfl: 2  •  Cyanocobalamin 1000 MCG/ML  "kit, Inject  as directed., Disp: , Rfl:   •  hydrochlorothiazide (HYDRODIURIL) 12.5 MG tablet, Take 1 tablet by mouth Daily., Disp: 90 tablet, Rfl: 3  •  predniSONE (DELTASONE) 10 MG (21) tablet pack, Take  by mouth Daily for 6 days. Use as directed on package, Disp: 21 tablet, Rfl: 0    \"The following portions of the patient's history were reviewed and updated as appropriate: allergies, current medications, past family history, past medical history, past social history, past surgical history and problem list.\"  Review and Interpretation of Imaging:  Reviewed imaging from January 14, 2020; discussed above.     Laboratory Results:             Lab Results   Component Value Date    HGBA1C 5.50 01/15/2020         Lab Results   Component Value Date    CHOL 167 01/15/2020    CHOL 168 12/09/2018    CHOL 142 02/20/2018         Lab Results   Component Value Date    HDL 47 01/15/2020    HDL 46 12/09/2018    HDL 51 02/20/2018         Lab Results   Component Value Date     (H) 01/15/2020     (H) 12/09/2018    LDL 76 02/20/2018         Lab Results   Component Value Date    TRIG 72 01/15/2020    TRIG 75 12/09/2018    TRIG 75 02/20/2018     No results found for: RPR  Lab Results   Component Value Date    TSH 1.730 01/15/2020     Lab Results   Component Value Date    [id number] 838 12/09/2018     Physical Examination: NIHSS: 0     mRS: 0  General Appearance:                      Well developed, well nourished, well groomed, alert, and cooperative.  HEENT:                                   Normocephalic.    Neck and Spine:                             Normal range of motion.  Normal alignment. No mass or tenderness. No bruits.  Cardiac:                                   Regular rate and rhythm. No murmurs.  Peripheral Vasculature:        Radial and pedal pulses are equal and symmetric.  Extremities:                            No edema or deformities. Normal joint ROM.  Skin:                                         No " rashes or birth marks.     Neurological examination:  Higher Integrative  Function:                            Oriented to time, place and person. Normal registration, recall, attention span and concentration. Normal language including comprehension, spontaneous speech, repetition, reading, writing, naming and vocabulary. No neglect with normal visual-spatial function and construction. Normal fund of knowledge and higher integrative function.  CN II:                   Pupils are equal, round, and reactive to light. Normal visual acuity and visual fields.    CN III IV VI:                       Extraocular movements are full without nystagmus.   CN V:                 Normal facial sensation and strength of muscles of mastication.  CN VII:                                    Facial movements are symmetric. No weakness.  CN VIII:                                   Auditory acuity impaired; pt. Very [place] bilaterally  CN IX & X:                              Symmetric palatal movement.  CN XI:               Sternocleidomastoid and trapezius are normal.  No weakness.  CN XII:                                    The tongue is midline.  No atrophy or fasciculations.  Motor:               Normal muscle strength, bulk and tone in upper and lower extremities.  No fasciculations, rigidity, spasticity, or abnormal movements.  Reflexes:                             Plantar responses are flexor.  Sensation:                        Normal to light touch, pinprick, vibration, temperature, and proprioception in arms and legs. Normal graphesthesia and no extinction on DSS.  Station and Gait:                   Normal gait and station.    Coordination:                        Finger to nose test shows no dysmetria.      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  CT/CTA/MRI of the brain all negative for acute findings.  He was placed on dual oral antiplatelets which he remains on.  Previously recommended statin due to prior stroke but unfortunately patient has allergy to statin; describes memory issues no actual rash myalgias and/or anaphylaxis.  Discussed risk and benefits of not using statin medication; also discussed use of Repatha and/or TriCor.  He denies any new complaints and/or concerns.  He reports no other nocturnal episodes and no seizure-like activity.  He continues to not take AED previously recommended although Dr. Trujillo recommended Keppra which I would avoid given his \"short fuse\"; instead would possibly consider Depakote.  Recommendations below.  Call with any questions.  Keep planned follow-up.    Plan:   Continue aspirin, Plavix, B12 as written.   Consider lipid-lowering medication   Recommend use of CPAP/ELIZABETH treatment   Blood pressure control to <130/80   Goal LDL <70-recommend high dose statins-    Serum glucose < 140   Call 911 for stroke any stroke symptoms   Follow-up September 2020; sooner if needed  Ronnell was seen today for migraine and stroke.    Diagnoses and all orders for this visit:    Cerebrovascular accident (CVA), unspecified mechanism (CMS/HCC)    Essential hypertension    ELIZABETH (obstructive sleep apnea)    Vitamin B12 deficiency    Chronic obstructive pulmonary disease, unspecified COPD type (CMS/HCC)    Nocturnal seizures (CMS/HCC)        MDM  Reviewed: previous chart  Reviewed previous: labs, MRI and CT scan  Interpretation: labs, MRI and CT scan          Addenda:  · Called after care spoke with Selam regarding not returning CPAP; patient would like to try again given his risk factors.   · Sent epic inbox message to Dr. Block regarding " patient's desire to attempt to use CPAP again and possible need for sleep aid    Dia Call, APRN  12:51 PM

## 2020-01-29 ENCOUNTER — READMISSION MANAGEMENT (OUTPATIENT)
Dept: CALL CENTER | Facility: HOSPITAL | Age: 70
End: 2020-01-29

## 2020-01-29 NOTE — OUTREACH NOTE
Stroke Week 2 Survey      Responses   Facility patient discharged from?  [facility]   Does the patient have one of the following disease processes/diagnoses(primary or secondary)?  Stroke (TIA)   Week 2 attempt successful?  Yes   Call start time  1402   Call end time  1404   Discharge diagnosis  **TIA (transient ischemic attack   Meds reviewed with patient/caregiver?  Yes   Is the patient taking all medications as directed (includes completed medication regime)?  Yes   Has the patient kept scheduled appointments due by today?  Yes   Psychosocial issues?  No   Does the patient require any assistance with activities of daily living such as eating, bathing, dressing, walking, etc.?  No   Does the patient have any residual symptoms from stroke/TIA?  No   Does the patient understand the diet ordered at discharge?  Yes   What is the patient's perception of their health status since discharge?  Returned to baseline/stable   Nursing interventions  Nurse provided patient education   Is the patient able to teach back FAST for Stroke?  Yes   Is the patient/caregiver able to teach back the risk factors for a stroke?  Smoking, Diabetes, Excessive alcohol intake [Nonsmoker and not diabetes]   If the patient is a current smoker, are they able to teach back resources for cessation?  -- [nonsmoker]   Week 2 call completed?  Yes   Revoked  No further contact(revokes)-requires comment   Graduated/Revoked comments  Back to baseline. No more calls needed.          Maye Esteves RN

## 2020-02-09 ENCOUNTER — CLINICAL SUPPORT NO REQUIREMENTS (OUTPATIENT)
Dept: CARDIOLOGY | Facility: CLINIC | Age: 70
End: 2020-02-09

## 2020-02-09 DIAGNOSIS — I63.9 CRYPTOGENIC STROKE (HCC): Primary | ICD-10-CM

## 2020-02-20 ENCOUNTER — CLINICAL SUPPORT NO REQUIREMENTS (OUTPATIENT)
Dept: CARDIOLOGY | Facility: CLINIC | Age: 70
End: 2020-02-20

## 2020-02-20 DIAGNOSIS — I63.9 CRYPTOGENIC STROKE (HCC): Primary | ICD-10-CM

## 2020-02-20 PROCEDURE — 93298 REM INTERROG DEV EVAL SCRMS: CPT | Performed by: INTERNAL MEDICINE

## 2020-02-20 PROCEDURE — G2066 INTER DEVC REMOTE 30D: HCPCS | Performed by: INTERNAL MEDICINE

## 2020-02-27 ENCOUNTER — TELEPHONE (OUTPATIENT)
Dept: NEUROLOGY | Facility: CLINIC | Age: 70
End: 2020-02-27

## 2020-02-27 NOTE — TELEPHONE ENCOUNTER
PATIENT STATES Tuesday NIGHT, HE WAS SLEEPING IN RECLINER, WIFE WOKE HIM UP TO GO TO BED. THEY WERE TALKING AND HIS WIFE COULDN'T UNDERSTAND WHAT HE WAS SAYING DUE TO SLURRING HIS WORDS, AND THEY DIDN'T MAKE ANY SENSE TO HER.   HE WAS SPEAKING TO HIS BROTHER YESTERDAY 02/26/2020, AND THE SAME THING HAPPENED AND HIS BROTHER COULDN'T UNDERSTAND HIM FOR 30 SECONDS - 1 MINUTE.     HE DENIES ANY NUMBNESS, TINGLING OF FACE OR ANY DROOPING OF HIS FACE.    PLS CALL PT @ [phone]

## 2020-02-27 NOTE — TELEPHONE ENCOUNTER
Called and spoke with patient he stated that he feels fine just had the two episodes. I also asked him if he was having any numbness and tingling or any face drooping he stated it was just the slurred speech. I told him if it happens again or experience any other stroke symptoms to go to the ER.

## 2020-02-28 DIAGNOSIS — R56.9 NOCTURNAL SEIZURES (HCC): Primary | ICD-10-CM

## 2020-02-28 RX ORDER — LEVETIRACETAM 500 MG/1
500 TABLET ORAL 2 TIMES DAILY
Qty: 60 TABLET | Refills: 3 | Status: SHIPPED | OUTPATIENT
Start: 2020-02-28 | End: 2020-03-21

## 2020-02-28 NOTE — TELEPHONE ENCOUNTER
Discussed indication for AED outlined by Dr. Trujillo for suspected nocturnal seizures. Patient agreeable to starting Keppra 5000mg BID; SEs discussed w/ Patient. Seizure precautions discussed w/ patient (see list below). Patient verbalized understanding. Will ask nursing to follow-up w/ patient next week.       SEIZURE INSTRUCTIONS:     Recommended to observe all seizure precautions, including, but not limited to no driving until seizure free for more than 3 months- as per State driving regulation / law;   Avoid all high-risk activity,   Take showers instead of baths,   Avoid swimming without observation,   Avoid open heat sources,   Avoid working at heights and   Avoid engaging in all potentially hazardous activities.   Patient expressed clear understanding.

## 2020-02-28 NOTE — TELEPHONE ENCOUNTER
Spoke with patient.  Discussed concerns for seizures and Dr. Trujillo's recommendations that he start an AED.  He is still very concerned about starting medication due to potential side effects and is requesting to speak with you before he agree to take anything.    Thank you.

## 2020-03-02 ENCOUNTER — CLINICAL SUPPORT NO REQUIREMENTS (OUTPATIENT)
Dept: CARDIOLOGY | Facility: CLINIC | Age: 70
End: 2020-03-02

## 2020-03-02 DIAGNOSIS — I63.9 CRYPTOGENIC STROKE (HCC): Primary | ICD-10-CM

## 2020-04-13 RX ORDER — HYDROCHLOROTHIAZIDE 12.5 MG/1
TABLET ORAL
Qty: 90 TABLET | Refills: 2 | Status: SHIPPED | OUTPATIENT
Start: 2020-04-13 | End: 2020-06-17 | Stop reason: SDUPTHER

## 2020-04-14 ENCOUNTER — CLINICAL SUPPORT NO REQUIREMENTS (OUTPATIENT)
Dept: CARDIOLOGY | Facility: CLINIC | Age: 70
End: 2020-04-14

## 2020-04-14 DIAGNOSIS — I63.9 CRYPTOGENIC STROKE (HCC): Primary | ICD-10-CM

## 2020-04-14 PROCEDURE — G2066 INTER DEVC REMOTE 30D: HCPCS | Performed by: INTERNAL MEDICINE

## 2020-04-14 PROCEDURE — 93298 REM INTERROG DEV EVAL SCRMS: CPT | Performed by: INTERNAL MEDICINE

## 2020-04-16 ENCOUNTER — TELEPHONE (OUTPATIENT)
Dept: CARDIOLOGY | Facility: CLINIC | Age: 70
End: 2020-04-16

## 2020-04-16 NOTE — TELEPHONE ENCOUNTER
Do they have MyChart capability?   Or a smart phone?  I would love to see a pic.  Clopidogrel can cause a torso rash.    HCTZ rash is usually only on sun-exposed body parts.  Has he been in the sun without a shirt?    YULIYA

## 2020-04-16 NOTE — TELEPHONE ENCOUNTER
I spoke to the pt. He will send a picture of his rash. He is aware that Plavix can cause torso rash. HCTZ rash is usually only on sun-expose body parts. He has snot been in the sun with out a shirt.    Thanks Yadi

## 2020-04-16 NOTE — TELEPHONE ENCOUNTER
Pt called to report that about 3-4 months ago he developed a rash on upper torso and his arm that has remained consistent (it doesn't go away and return after taking daily doses).  Pt was informed by his wife that this could be side effects from he Clopidogrel or HCTZ.  Please advise.

## 2020-04-21 NOTE — TELEPHONE ENCOUNTER
I reviewed the pics.  It honestly doesn't look like the morbiliform rash of a clopidogrel allergy.      I think the best thing to do is hold the clopidogrel for 7 days, and see if it gets better.  Ask her to update us in one week.    zara

## 2020-04-21 NOTE — TELEPHONE ENCOUNTER
Yes, I will send them to you via [facility] email.      Note: the pics are of his chest area.

## 2020-04-27 ENCOUNTER — CLINICAL SUPPORT NO REQUIREMENTS (OUTPATIENT)
Dept: CARDIOLOGY | Facility: CLINIC | Age: 70
End: 2020-04-27

## 2020-04-27 DIAGNOSIS — I63.9 CRYPTOGENIC STROKE (HCC): Primary | ICD-10-CM

## 2020-04-28 ENCOUNTER — CLINICAL SUPPORT NO REQUIREMENTS (OUTPATIENT)
Dept: CARDIOLOGY | Facility: CLINIC | Age: 70
End: 2020-04-28

## 2020-04-28 DIAGNOSIS — I63.9 CRYPTOGENIC STROKE (HCC): Primary | ICD-10-CM

## 2020-05-01 ENCOUNTER — CLINICAL SUPPORT NO REQUIREMENTS (OUTPATIENT)
Dept: CARDIOLOGY | Facility: CLINIC | Age: 70
End: 2020-05-01

## 2020-05-01 DIAGNOSIS — I63.9 CRYPTOGENIC STROKE (HCC): Primary | ICD-10-CM

## 2020-06-17 ENCOUNTER — OFFICE VISIT (OUTPATIENT)
Dept: FAMILY MEDICINE CLINIC | Facility: CLINIC | Age: 70
End: 2020-06-17

## 2020-06-17 VITALS
BODY MASS INDEX: 25.31 KG/M2 | SYSTOLIC BLOOD PRESSURE: 110 MMHG | HEIGHT: 73 IN | DIASTOLIC BLOOD PRESSURE: 74 MMHG | HEART RATE: 60 BPM | WEIGHT: 191 LBS | OXYGEN SATURATION: 98 % | TEMPERATURE: 97.7 F

## 2020-06-17 DIAGNOSIS — I63.9 CEREBROVASCULAR ACCIDENT (CVA), UNSPECIFIED MECHANISM (HCC): Primary | ICD-10-CM

## 2020-06-17 DIAGNOSIS — E53.8 VITAMIN B12 DEFICIENCY: ICD-10-CM

## 2020-06-17 DIAGNOSIS — I10 ESSENTIAL HYPERTENSION: ICD-10-CM

## 2020-06-17 DIAGNOSIS — E78.2 MIXED HYPERLIPIDEMIA: ICD-10-CM

## 2020-06-17 PROCEDURE — 99213 OFFICE O/P EST LOW 20 MIN: CPT | Performed by: FAMILY MEDICINE

## 2020-06-17 RX ORDER — HYDROCHLOROTHIAZIDE 12.5 MG/1
12.5 TABLET ORAL DAILY
Qty: 90 TABLET | Refills: 2 | Status: SHIPPED | OUTPATIENT
Start: 2020-06-17 | End: 2021-07-19

## 2020-06-17 RX ORDER — LEVETIRACETAM 500 MG/1
TABLET ORAL
COMMUNITY
Start: 2020-06-12 | End: 2020-08-18

## 2020-06-17 NOTE — PROGRESS NOTES
Chief Complaint   Patient presents with   • Hyperlipidemia   HTN    Subjective   Ronnell Rizvi is a 70 y.o. male.     History of Present Illness     69 yo male here for f/u HTN and med refill     PMH CVA, PFO closure, HTN, HLD, COPD/asthma-denies recent exacerbations, ELIZABETH, possible concern for nocturnal seizures, GERD-stable    CVA hx: Stopped Plavix after developing truncal rash, states rash then resolved. Had been taking ASA had issues with GERD. Stopped statin in the past due to concern for memory issues. Lipid panel with . Will further discuss statin. Had possible TIA with expressive aphasia and short-term memory loss 1/14/2020. No further episodes of this. Has Cards f/u in 3 mo     HTN: Stable, on hctz, tolerates well, asymptomatic and normotensive     Vit B12 def: on B12, tolerates, will recheck prior to AMW    Hx nocturnal seizures. Was placed on Keppra 500mg bid, pt says is taking and no recent seziure like activity. Has Neuro f/u in 3 months.     The following portions of the patient's history were reviewed and updated as appropriate: allergies, current medications, past family history, past medical history, past social history, past surgical history and problem list.      Past Medical History:   Diagnosis Date   • Allergic rhinitis    • Anal fissure    • Aortic insufficiency     moderate, THIERRY 2017   • Asthma    • Asthma    • Benign prostatic hypertrophy (BPH) with weak urinary stream 11/1/2016   • Chronic bronchitis (CMS/HCC)    • COPD (chronic obstructive pulmonary disease) (CMS/HCC)    • Emphysema lung (CMS/HCC)    • Fatty liver    • GERD (gastroesophageal reflux disease)    • Hepatitis     thought to be Hepatitis A    • History of pneumonia     With left lower lobe atelectasis and scar tissue, being followed   • HL (hearing loss)    • HTN (hypertension) 1/14/2020   • Hypertension    • Memory loss    • Obstructive sleep apnea syndrome 8/23/2017   • PFO (patent foramen ovale)     s/p  percutaneous closure with a 25mm Amplatzer device in 2018   • Pneumonia     LLL with scar tissue   • 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   • Spinal stenosis    • Stroke (CMS/HCC)     10/2017: left occipital/temporal   • Vision loss        Past Surgical History:   Procedure Laterality Date   • CARDIAC CATHETERIZATION N/A 2018    Procedure: Patent foramen ovale closure- [employer];  Surgeon: Deangelo Harris MD;  Location:  [facility];  Service: Cardiology   • CARDIAC CATHETERIZATION N/A 2018    Procedure: Intracardiac echocardiogram;  Surgeon: Deangelo Harris MD;  Location:  [facility];  Service: Cardiology   • CARDIAC ELECTROPHYSIOLOGY PROCEDURE N/A 3/26/2018    Procedure: Loop insertion   CONFIRM RX;  Surgeon: Luis Wyatt MD;  Location:  [facility];  Service: Cardiovascular   • COLONOSCOPY     • HAND SURGERY Bilateral    • INGUINAL HERNIA REPAIR Left    • OTHER SURGICAL HISTORY      inguinal hernia repair for person over age 5   • TONSILLECTOMY         Family History   Problem Relation Age of Onset   • Obesity Mother    • Clotting disorder Mother         Upper extremities   • Alcohol abuse Father    • Cancer Father 63        Esophagus and lung   • Other Father         cardiac disorder   • Heart disease Father    • Kidney disease Sister    • Kidney failure Sister    • Drug abuse Paternal Uncle    • Stroke Sister    • Throat cancer Sister        Social History     Socioeconomic History   • Marital status:      Spouse name: Joey   • Number of children: Not on file   • Years of education: Not on file   • Highest education level: Not on file   Occupational History   • Occupation:      Employer: RETIRED   Tobacco Use   • Smoking status: Former Smoker     Last attempt to quit: 1979     Years since quittin.3   • Smokeless tobacco: Never Used   • Tobacco comment: caffeine use: Drinks 4 glasses of Dt coke on avg.     Substance and Sexual Activity   • Alcohol use: No   • Drug use: No   • Sexual activity: Defer       Current Outpatient Medications on File Prior to Visit   Medication Sig Dispense Refill   • Cyanocobalamin 1000 MCG/ML kit Inject  as directed.     • [DISCONTINUED] clopidogrel (PLAVIX) 75 MG tablet Take 1 tablet by mouth Daily. 90 tablet 2   • [DISCONTINUED] hydroCHLOROthiazide (HYDRODIURIL) 12.5 MG tablet TAKE ONE TABLET BY MOUTH DAILY 90 tablet 2   • levETIRAcetam (KEPPRA) 500 MG tablet        No current facility-administered medications on file prior to visit.        Review of Systems   Constitutional: Negative for activity change, chills and fever.   HENT: Negative for congestion, postnasal drip and rhinorrhea.    Eyes: Negative for blurred vision and pain.   Respiratory: Negative for cough, chest tightness and shortness of breath.    Cardiovascular: Negative for chest pain.   Gastrointestinal: Negative for abdominal pain, constipation, diarrhea, nausea and vomiting.   Endocrine: Negative for cold intolerance and heat intolerance.   Genitourinary: Negative for decreased urine volume, dysuria and frequency.   Musculoskeletal: Negative for arthralgias, back pain and myalgias.   Skin: Negative for rash and skin lesions.   Neurological: Negative for dizziness and confusion.   Psychiatric/Behavioral: Negative for agitation, behavioral problems and depressed mood.           Vitals:    06/17/20 1103   BP: 110/74   Pulse: 60   Temp: 97.7 °F (36.5 °C)   SpO2: 98%      Objective   Physical Exam   Constitutional: He is oriented to person, place, and time. He appears well-developed and well-nourished.   HENT:   Head: Normocephalic and atraumatic.   Eyes: Pupils are equal, round, and reactive to light. Conjunctivae and EOM are normal.   Neck: Neck supple.   Cardiovascular: Normal rate, regular rhythm and normal heart sounds.   No murmur heard.  Pulmonary/Chest: Effort normal and breath sounds normal. No respiratory distress.    Abdominal: Soft. Bowel sounds are normal.   Musculoskeletal: Normal range of motion.   Neurological: He is alert and oriented to person, place, and time.   Skin: Skin is warm and dry.   Psychiatric: He has a normal mood and affect.   Nursing note and vitals reviewed.        Assessment/Plan   Problem List Items Addressed This Visit        Cardiovascular and Mediastinum    CVA (cerebral vascular accident) (CMS/[facility]) - Primary    HTN (hypertension)    Relevant Medications    hydroCHLOROthiazide (HYDRODIURIL) 12.5 MG tablet       Digestive    Vitamin B12 deficiency        -Refill HCTZ  -Schedule for AMW visit and labs   -Keep Card f/u, attempt to continue ASA,  May trial ppi        Anne Chand MD

## 2020-06-22 ENCOUNTER — RESULTS ENCOUNTER (OUTPATIENT)
Dept: FAMILY MEDICINE CLINIC | Facility: CLINIC | Age: 70
End: 2020-06-22

## 2020-06-22 DIAGNOSIS — E53.8 VITAMIN B12 DEFICIENCY: ICD-10-CM

## 2020-06-22 DIAGNOSIS — I63.9 CEREBROVASCULAR ACCIDENT (CVA), UNSPECIFIED MECHANISM (HCC): ICD-10-CM

## 2020-06-22 DIAGNOSIS — I10 ESSENTIAL HYPERTENSION: ICD-10-CM

## 2020-06-22 DIAGNOSIS — E78.2 MIXED HYPERLIPIDEMIA: ICD-10-CM

## 2020-06-24 ENCOUNTER — TELEPHONE (OUTPATIENT)
Dept: GASTROENTEROLOGY | Facility: CLINIC | Age: 70
End: 2020-06-24

## 2020-06-24 ENCOUNTER — PREP FOR SURGERY (OUTPATIENT)
Dept: OTHER | Facility: HOSPITAL | Age: 70
End: 2020-06-24

## 2020-06-24 DIAGNOSIS — Z12.11 ENCOUNTER FOR SCREENING FOR MALIGNANT NEOPLASM OF COLON: Primary | ICD-10-CM

## 2020-06-24 NOTE — TELEPHONE ENCOUNTER
FAST TRACK (IN MEDIA) - 5 YEAR RECALL - LAST COLONOSCOPY 07/27/2015 DR. CONKLIN - SCHEDULE AT [place].

## 2020-06-29 PROBLEM — Z12.11 ENCOUNTER FOR SCREENING FOR MALIGNANT NEOPLASM OF COLON: Status: ACTIVE | Noted: 2020-06-29

## 2020-07-02 ENCOUNTER — TELEPHONE (OUTPATIENT)
Dept: CARDIOLOGY | Facility: CLINIC | Age: 70
End: 2020-07-02

## 2020-07-02 DIAGNOSIS — Z45.09 ENCOUNTER FOR LOOP RECORDER AT END OF BATTERY LIFE: Primary | ICD-10-CM

## 2020-07-02 NOTE — TELEPHONE ENCOUNTER
Patient has Confirm loop recorder. Alert received that battery  detected as nearing NAZARIO on 6/30/20.

## 2020-07-08 NOTE — TELEPHONE ENCOUNTER
I called patient, he would like device removed. Please schedule. FYI- I will not be back after today.

## 2020-07-09 PROBLEM — Z45.09 ENCOUNTER FOR LOOP RECORDER AT END OF BATTERY LIFE: Status: ACTIVE | Noted: 2020-07-09

## 2020-07-14 ENCOUNTER — TRANSCRIBE ORDERS (OUTPATIENT)
Dept: ADMINISTRATIVE | Facility: HOSPITAL | Age: 70
End: 2020-07-14

## 2020-07-14 ENCOUNTER — TRANSCRIBE ORDERS (OUTPATIENT)
Dept: LAB | Facility: HOSPITAL | Age: 70
End: 2020-07-14

## 2020-07-14 ENCOUNTER — APPOINTMENT (OUTPATIENT)
Dept: LAB | Facility: HOSPITAL | Age: 70
End: 2020-07-14

## 2020-07-14 ENCOUNTER — LAB (OUTPATIENT)
Dept: LAB | Facility: HOSPITAL | Age: 70
End: 2020-07-14

## 2020-07-14 DIAGNOSIS — Z01.818 OTHER SPECIFIED PRE-OPERATIVE EXAMINATION: Primary | ICD-10-CM

## 2020-07-14 DIAGNOSIS — Z01.818 OTHER SPECIFIED PRE-OPERATIVE EXAMINATION: ICD-10-CM

## 2020-07-14 LAB — SARS-COV-2 RNA PNL SPEC NAA+PROBE: NOT DETECTED

## 2020-07-14 PROCEDURE — 87635 SARS-COV-2 COVID-19 AMP PRB: CPT

## 2020-07-15 ENCOUNTER — HOSPITAL ENCOUNTER (OUTPATIENT)
Facility: HOSPITAL | Age: 70
Setting detail: HOSPITAL OUTPATIENT SURGERY
Discharge: HOME | End: 2020-07-15
Attending: INTERNAL MEDICINE | Admitting: INTERNAL MEDICINE

## 2020-07-15 VITALS
TEMPERATURE: 98.2 F | BODY MASS INDEX: 24.52 KG/M2 | RESPIRATION RATE: 16 BRPM | HEIGHT: 73 IN | SYSTOLIC BLOOD PRESSURE: 143 MMHG | OXYGEN SATURATION: 97 % | DIASTOLIC BLOOD PRESSURE: 74 MMHG | HEART RATE: 64 BPM | WEIGHT: 185 LBS

## 2020-07-15 DIAGNOSIS — Z45.09 ENCOUNTER FOR LOOP RECORDER AT END OF BATTERY LIFE: ICD-10-CM

## 2020-07-15 PROCEDURE — 33286 RMVL SUBQ CAR RHYTHM MNTR: CPT | Performed by: INTERNAL MEDICINE

## 2020-07-15 RX ORDER — LIDOCAINE HYDROCHLORIDE 10 MG/ML
0.1 INJECTION, SOLUTION EPIDURAL; INFILTRATION; INTRACAUDAL; PERINEURAL ONCE AS NEEDED
Status: DISCONTINUED | OUTPATIENT
Start: 2020-07-15 | End: 2020-07-15 | Stop reason: HOSPADM

## 2020-07-15 RX ORDER — SODIUM CHLORIDE 9 MG/ML
75 INJECTION, SOLUTION INTRAVENOUS CONTINUOUS
Status: DISCONTINUED | OUTPATIENT
Start: 2020-07-15 | End: 2020-07-15 | Stop reason: HOSPADM

## 2020-07-15 RX ORDER — SODIUM CHLORIDE 0.9 % (FLUSH) 0.9 %
3 SYRINGE (ML) INJECTION EVERY 12 HOURS SCHEDULED
Status: DISCONTINUED | OUTPATIENT
Start: 2020-07-15 | End: 2020-07-15 | Stop reason: HOSPADM

## 2020-07-15 RX ORDER — LIDOCAINE HYDROCHLORIDE 20 MG/ML
INJECTION, SOLUTION INFILTRATION; PERINEURAL AS NEEDED
Status: DISCONTINUED | OUTPATIENT
Start: 2020-07-15 | End: 2020-07-15 | Stop reason: HOSPADM

## 2020-07-15 RX ORDER — SODIUM CHLORIDE 0.9 % (FLUSH) 0.9 %
10 SYRINGE (ML) INJECTION AS NEEDED
Status: DISCONTINUED | OUTPATIENT
Start: 2020-07-15 | End: 2020-07-15 | Stop reason: HOSPADM

## 2020-07-15 RX ADMIN — SODIUM CHLORIDE 75 ML/HR: 9 INJECTION, SOLUTION INTRAVENOUS at 09:17

## 2020-07-15 NOTE — H&P
Implantable loop recorder (cryptogenic stroke) removal- battery at end of life    _________________________________________________________________________________________    Dear Dr. Haque   HPI: Ronnell Rizvi is a pleasant 70 y.o. male who presents 01/21/2020 for cardiac follow up.   First seen by Dr. Sawant in October 2016 when he was hospitalized for chest pain. A Cardiolite stress was normal (EF 54%). He did have some Wenckebach at the beginning of stress which normalized with exercise.   In October 2017, he had an episode of visual changes that resolved on their own. The next morning it happened again and he came to the ED and was diagnosed with a stroke of the left occipital/temporal lobe. He was found to have some diffuse small vessel disease. An echo wasn't performed but a Zio patch was placed. We were not consulted in the hospital. He was placed on clopidogrel and atorvastatin.   In November 2017, the Zio showed some atrial ectopy (there was a 13 beat run of PACs) but no atrial fibrillation. An echo showed normal LV systolic function and a small PFO.   His studies were then reviewed by a different neurologist, who felt that this was actually embolic. A THIERRY showed a moderate sized PFO and moderate aortic insufficiency but was otherwise normal. A Confirm device was placed (implanted monitor from [employer]). Upon arrival he was noted to have an irregular rhythm (not atrial fibrillation). 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 The monitor failed to show any atrial fibrillation (see below).   In December 2018, he presented with recurrent neurological symptoms. He was found to have a subacute infarct as well as diffuse intracranial atherosclerosis. He underwent percutaneous PFO closure with a 25mm Amplatzer device during that admission.   The implant the monitor has continued to report paroxysmal atrial fibrillation although review of every episode shows that this is not the case. He has short sinus  "pauses as well as type II sinoatrial exit block.   He presented to the emergency department at [facility] on 1/14/2020 complaining of intermittent speech difficulties that were first noticed around 11 PM the night prior.. Pt also c/o HA. Pt denies visual disturbances, trouble swallowing, numbness, weakness, SOA and CP. The pt has a hx of CVA x3 and is anticoagulated on 75 mg Plavix. He started having difficulty \"finding the appropriate words\" last night around 2300 however his sx's resolved shortly after and the pt went to sleep. He woke up this morning feeling normal, with normal speech until 0900, when the pt was heard by his wife to be \"slurred\". He states \"I couldn't talk or use the right words\". After hearing the change in pt's speech, the pt's wife administered 81 mg ASA at 1000. He was brought to the ED for further evaluation. Pt is not a smoker.   CTA of the head and neck:   IMPRESSION:   1. Short segment of high-grade stenosis involving the P1 segment of the   right posterior cerebral artery, similar to that seen on MRA head   12/09/2018.   2. No finding of occlusion of the cervical or intracranial vasculature.   No intracranial aneurysm evident.   3. No findings of acute intracranial abnormality. Moderate small vessel   degenerative white matter changes and old left corona radiata and   occipital lobe infarcts, as before   He was started on aspirin along with his Plavix. An echocardiogram was performed that was unremarkable. He was seen by neurology who felt that he needed to get back on the dual antiplatelet therapy and otherwise had no further recommendations. It was actually felt this could represent a migraine with aura rather than a TIA. The patient has been intolerant to statins in the past and and states because he has had memory loss he would not want to be restarted on a statin at this time. He is to follow-up with neurology as an outpatient. Echo on 1/14/2020 as below:   Left ventricular " wall thickness is consistent with hypertrophy. Sigmoid-shaped ventricular septum is present.   Estimated EF = 60%.   Left ventricular systolic function is normal.   Left ventricular diastolic dysfunction (grade I) consistent with impaired relaxation.   There is mild calcification of the aortic valve.   Mild aortic valve regurgitation is present  He states he has had no further symptoms. He denies any palpitations or shortness of breath. He has no lower extremity edema. He denies any chest pain or chest tightness. He has had no dizziness. He does have some fatigue and gets sleepy during the day. He is diagnosed with ELIZABETH but is unable to wear his CPAP. He had question whether or not he should restart the baby aspirin and I told him yes that was per neurology's recommendations and we would follow those. He was agreeable to do so.   Medical History                                                                                                                                                        Surgical History                                                                                                  Social History                                                                                                                                  Family History   Problem Relation Age of Onset   • Obesity Mother    • Clotting disorder Mother     Upper extremities   • Alcohol abuse Father    • Cancer Father 63    Esophagus and lung   • Other Father     cardiac disorder   • Heart disease Father    • Kidney disease Sister    • Kidney failure Sister    • Drug abuse Paternal Uncle    • Stroke Sister    • Throat cancer Sister      The following portion of the patient's history were reviewed and updated as appropriate: past medical history, past surgical history, past social history, past family history, allergies, current medications, and problem list.   Review of Systems   Constitution: Negative for diaphoresis, fever and  "malaise/fatigue.   HENT: Negative for congestion, hearing loss, hoarse voice, nosebleeds and sore throat.   Eyes: Negative for photophobia, vision loss in left eye, vision loss in right eye and visual disturbance.   Cardiovascular: Negative for chest pain, dyspnea on exertion, irregular heartbeat, leg swelling, near-syncope, orthopnea, palpitations, paroxysmal nocturnal dyspnea and syncope.   Respiratory: Negative for cough, hemoptysis, shortness of breath, sleep disturbances due to breathing, snoring, sputum production and wheezing.   Endocrine: Negative for cold intolerance, heat intolerance, polydipsia, polyphagia and polyuria.   Hematologic/Lymphatic: Negative for bleeding problem. Does not bruise/bleed easily.   Skin: Negative for color change, dry skin, poor wound healing, rash and suspicious lesions.   Musculoskeletal: Negative for arthritis, back pain, falls, gout, joint pain, joint swelling, muscle cramps, muscle weakness and myalgias.   Gastrointestinal: Negative for bloating, abdominal pain, constipation, diarrhea, dysphagia, melena, nausea and vomiting.   Neurological: Negative for excessive daytime sleepiness, dizziness, headaches, light-headedness, loss of balance, numbness, paresthesias, seizures, vertigo and weakness.   Psychiatric/Behavioral: Positive for memory loss (short term). Negative for depression and substance abuse. The patient is not nervous/anxious.           Allergies   Allergen Reactions   • Aspirin Nausea Only     Pt states he \"can tolerate enteric coated aspirin only.\"    • Citrus      Blisters on mouth    • Combivent [Ipratropium-Albuterol] Other (See Comments)     Throat swelling   • Lactose Intolerance (Gi)    • Statins Mental Status Change     Severe memory impairment       Current Outpatient Medications:   • clopidogrel (PLAVIX) 75 MG tablet, Take 1 tablet by mouth Daily., Disp: 90 tablet, Rfl: 2   • hydrochlorothiazide (HYDRODIURIL) 12.5 MG tablet, Take 1 tablet by mouth Daily., " Disp: 90 tablet, Rfl: 3   • aspirin 81 MG EC tablet, Take 1 tablet by mouth Daily., Disp: 30 tablet, Rfl: 1     Objective:     Vitals                                                                Body mass index is 25.2 kg/m².   Physical Exam   Constitutional: He is oriented to person, place, and time. He appears well-developed and well-nourished. No distress.   HENT:   Head: Normocephalic and atraumatic.   Right Ear: External ear normal.   Left Ear: External ear normal.   Nose: Nose normal.   Eyes: Pupils are equal, round, and reactive to light. Conjunctivae are normal. Right eye exhibits no discharge. Left eye exhibits no discharge.   Neck: Normal range of motion. Neck supple. No JVD present. No tracheal deviation present. No thyromegaly present.   Cardiovascular: Normal rate, regular rhythm, normal heart sounds and intact distal pulses. Exam reveals no gallop and no friction rub.   No murmur heard.   Pulmonary/Chest: Effort normal and breath sounds normal. No respiratory distress. He has no wheezes. He has no rales. He exhibits no tenderness.   Abdominal: Soft. Bowel sounds are normal. He exhibits no distension. There is no tenderness.   Musculoskeletal: Normal range of motion. He exhibits no edema, tenderness or deformity.   Lymphadenopathy:   He has no cervical adenopathy.   Neurological: He is alert and oriented to person, place, and time. Coordination normal.   He does struggle with short term memory.   Skin: Skin is warm and dry. No rash noted. No erythema.   Psychiatric: He has a normal mood and affect. His behavior is normal. Judgment and thought content normal.       ECG 12 Lead  Date/Time: 1/21/2020 11:54 AM   Performed by: Vivian Tran APRN   Authorized by: Vivian Tran APRN   Comparison: compared with previous ECG from 1/14/2020   Similar to previous ECG  Rhythm: sinus rhythm  Rate: normal  Conduction: conduction normal   Q waves: V1 and V2   ST Segments: ST segments normal   T Waves: T waves  normal   QRS axis: normal     Clinical impression: abnormal EKG    Assessment:      Diagnosis Plan   1. Cerebrovascular accident (CVA), unspecified mechanism (CMS/HCC)     2. Essential hypertension     3. ELIZABETH (obstructive sleep apnea)     4. Sinoatrial block       Plan:   1. CVA- He had a left occipital/temporal stroke which was initially presumed to be due to small vessel disease. He was placed on clopidogrel and atorvastatin (which he stopped due to memory loss). Then, outpatient neurological evaluation led to a further workup. He was found to have a PFO on THIERRY, and then he had another event on DAPT. He then had the PFO closed. Another TIA with expressive aphasia and short-term memory loss 1/14/2020. He was evaluated by neurology and instructed to take 81 mg of aspirin with his Plavix.   2. Hypertension- blood pressure is elevated at visit today. He states he has not taken his medication yet.   3. ELIZABETH-noncompliant with CPAP   4. Sinoatrial block- he has intermittent second-degree SA block (type II) which is asymptomatic.   5. Hyperlipidemia-he denies statin secondary to memory loss from CVA and TIAs.   Keep October appointment with me   As always, it has been a pleasure to participate in your patient's care. Thank you.   Sincerely,   CHINEDU Ruano     Current Outpatient Medications:   • clopidogrel (PLAVIX) 75 MG tablet, Take 1 tablet by mouth Daily., Disp: 90 tablet, Rfl: 2   • hydrochlorothiazide (HYDRODIURIL) 12.5 MG tablet, Take 1 tablet by mouth Daily., Disp: 90 tablet, Rfl: 3   • aspirin 81 MG EC tablet, Take 1 tablet by mouth Daily., Disp: 30 tablet, Rfl: 1   Dictated utilizing Dragon dictation

## 2020-07-15 NOTE — DISCHARGE INSTRUCTIONS
**Okay to shower and remove outer dressing tomorrow. Allow steri-strips to fall off on their own.**    **No activity restrictions.**

## 2020-08-18 DIAGNOSIS — R56.9 NOCTURNAL SEIZURES (HCC): Primary | ICD-10-CM

## 2020-08-18 RX ORDER — LEVETIRACETAM 500 MG/1
500 TABLET ORAL 2 TIMES DAILY
Qty: 60 TABLET | Refills: 3 | Status: SHIPPED | OUTPATIENT
Start: 2020-08-18 | End: 2020-08-21

## 2020-08-21 RX ORDER — LEVETIRACETAM 500 MG/1
TABLET ORAL
Qty: 60 TABLET | Refills: 2 | Status: SHIPPED | OUTPATIENT
Start: 2020-08-21 | End: 2020-10-12 | Stop reason: HOSPADM

## 2020-09-04 LAB
ALBUMIN SERPL-MCNC: 4.3 G/DL (ref 3.5–5.2)
ALBUMIN/GLOB SERPL: 2.4 G/DL
ALP SERPL-CCNC: 56 U/L (ref 39–117)
ALT SERPL-CCNC: 31 U/L (ref 1–41)
AST SERPL-CCNC: 24 U/L (ref 1–40)
BASOPHILS # BLD AUTO: 0.05 10*3/MM3 (ref 0–0.2)
BASOPHILS NFR BLD AUTO: 0.9 % (ref 0–1.5)
BILIRUB SERPL-MCNC: 0.5 MG/DL (ref 0–1.2)
BUN SERPL-MCNC: 14 MG/DL (ref 8–23)
BUN/CREAT SERPL: 12.1 (ref 7–25)
CALCIUM SERPL-MCNC: 9.1 MG/DL (ref 8.6–10.5)
CHLORIDE SERPL-SCNC: 101 MMOL/L (ref 98–107)
CHOLEST SERPL-MCNC: 197 MG/DL (ref 0–200)
CO2 SERPL-SCNC: 29.3 MMOL/L (ref 22–29)
CREAT SERPL-MCNC: 1.16 MG/DL (ref 0.76–1.27)
EOSINOPHIL # BLD AUTO: 0.21 10*3/MM3 (ref 0–0.4)
EOSINOPHIL NFR BLD AUTO: 3.9 % (ref 0.3–6.2)
ERYTHROCYTE [DISTWIDTH] IN BLOOD BY AUTOMATED COUNT: 12.9 % (ref 12.3–15.4)
FOLATE SERPL-MCNC: 12.5 NG/ML (ref 4.78–24.2)
GLOBULIN SER CALC-MCNC: 1.8 GM/DL
GLUCOSE SERPL-MCNC: 90 MG/DL (ref 65–99)
HCT VFR BLD AUTO: 47.5 % (ref 37.5–51)
HDLC SERPL-MCNC: 52 MG/DL (ref 40–60)
HGB BLD-MCNC: 15.8 G/DL (ref 13–17.7)
IMM GRANULOCYTES # BLD AUTO: 0.01 10*3/MM3 (ref 0–0.05)
IMM GRANULOCYTES NFR BLD AUTO: 0.2 % (ref 0–0.5)
LDLC SERPL CALC-MCNC: 131 MG/DL (ref 0–100)
LYMPHOCYTES # BLD AUTO: 1.6 10*3/MM3 (ref 0.7–3.1)
LYMPHOCYTES NFR BLD AUTO: 29.5 % (ref 19.6–45.3)
MCH RBC QN AUTO: 31.2 PG (ref 26.6–33)
MCHC RBC AUTO-ENTMCNC: 33.3 G/DL (ref 31.5–35.7)
MCV RBC AUTO: 93.9 FL (ref 79–97)
MONOCYTES # BLD AUTO: 0.68 10*3/MM3 (ref 0.1–0.9)
MONOCYTES NFR BLD AUTO: 12.5 % (ref 5–12)
NEUTROPHILS # BLD AUTO: 2.87 10*3/MM3 (ref 1.7–7)
NEUTROPHILS NFR BLD AUTO: 53 % (ref 42.7–76)
NRBC BLD AUTO-RTO: 0 /100 WBC (ref 0–0.2)
PLATELET # BLD AUTO: 234 10*3/MM3 (ref 140–450)
POTASSIUM SERPL-SCNC: 4.1 MMOL/L (ref 3.5–5.2)
PROT SERPL-MCNC: 6.1 G/DL (ref 6–8.5)
RBC # BLD AUTO: 5.06 10*6/MM3 (ref 4.14–5.8)
SODIUM SERPL-SCNC: 138 MMOL/L (ref 136–145)
T4 SERPL-MCNC: 6.48 MCG/DL (ref 4.5–11.7)
TRIGL SERPL-MCNC: 72 MG/DL (ref 0–150)
TSH SERPL DL<=0.005 MIU/L-ACNC: 1.97 UIU/ML (ref 0.27–4.2)
VIT B12 SERPL-MCNC: 903 PG/ML (ref 211–946)
VLDLC SERPL CALC-MCNC: 14.4 MG/DL
WBC # BLD AUTO: 5.42 10*3/MM3 (ref 3.4–10.8)

## 2020-09-04 NOTE — THERAPY EVALUATION
Outpatient Speech Language Pathology   Adult Speech Language Cognitive Initial Evaluation  [place]     Patient Name: Ronnell Rizvi  : 1950  MRN: [id number]  Today's Date: 3/7/2018        Visit Date: 2018   Patient Active Problem List   Diagnosis   • Epididymal pain   • Anemia, unspecified   • Health care maintenance   • Vitamin B12 deficiency   • Chronic fatigue   • Benign prostatic hypertrophy (BPH) with weak urinary stream   • Chronic bronchitis   • Obstructive sleep apnea syndrome   • History of stroke        Past Medical History:   Diagnosis Date   • Allergic rhinitis    • Anal fissure    • Asthma    • B12 deficiency    • Chronic bronchitis    • Emphysema lung    • Fatty liver    • GERD (gastroesophageal reflux disease)    • Heart murmur     possible MVP in past documentation   • Hepatitis     thought to be Hepatitis A    • History of pneumonia     With left lower lobe atelectasis and scar tissue, being followed   • Pneumonia     LLL with scar tissue   • Spinal stenosis    • Stroke     10/2017: left occipital/temporal        Past Surgical History:   Procedure Laterality Date   • COLONOSCOPY     • HAND SURGERY Bilateral    • HERNIA REPAIR     • OTHER SURGICAL HISTORY      inguinal hernia repair for person over age 5   • TONSILLECTOMY           Visit Dx:    ICD-10-CM ICD-9-CM   1. Other speech and language deficits, late effect of cerebrovascular disease(438.19) I69.928 438.19                 Adult Speech Language - 18 1600     Background and History    Reason for Referral Pt presents with complaints of difficulty reading, post stroke. Stroke occured in 2017 with some follow up as outpatient, but no consistent therapy. Pt with some improvement over time, but continues to have decreased speed and persistence with reading activities. Stroke in the area of the left inferolateral occipital lobe to posterolateral temporal lobe per MRI.  -AD    Stated Goals Pt wishes to be able to return  to baseline for reading.  -AD (r) AB (t) AD (c)    Description of Complaint Pt with complaints of difficulty reading post stroke. He reports feeling fatigued after simple reading task, and decreased reading comprehension,   -AD (r) AB (t) AD (c)    Previous Functional Status Functional in all spheres  -AD (r) AB (t) AD (c)    Current Baseline Abilities Pt can read for simple functional tasks, but has difficulty comprehending leisure reading tasks and has decreased speed.   -AD (r) AB (t) AD (c)    Pertinent Medications Clopidogrel, atorvastatin  -AD    Primary Language in the Home english  -AD (r) AB (t) AD (c)    Primary Caregiver Other (comment)   Self  -AD    Informant for the Evaluation Self  -AD (r) AB (t) AD (c)    Comprehension    Recognition WFL: Within Functional Limits  -AD (r) AB (t) AD (c)    Answer Questions WFL: Within Functional Limits  -AD    Commands WFL: Within Functional Limits  -AD (r) AB (t) AD (c)    Conversation WFL: Within Functional Limits  -AD (r) AB (t) AD (c)    Reading Status WFL except;Other (comment)   complaints of not back to baseline abilities  -AD (r) AB (t) AD (c)    Expression    Primary Mode of Expression verbal  -AD (r) AB (t) AD (c)    Dominant Hand Right  -AD    Expressive Language WFL  -AD (r) AB (t) AD (c)    Receptive Language WFL  -AD (r) AB (t) AD (c)    Automatic Speech WFL  -AD (r) AB (t) AD (c)    Repetition WFL  -AD (r) AB (t) AD (c)    Expressive Production WFL  -AD (r) AB (t) AD (c)    Pragmatics Comprehension WFL  -AD (r) AB (t) AD (c)    Pragmatics Production WFL  -AD (r) AB (t) AD (c)    Written Expression WFL  -AD (r) AB (t) AD (c)    Cognitive Communication and Memory    Attention to be assessed in therapy  -AD (r) AB (t) AD (c)    Orientation to be assessed in therapy  -AD (r) AB (t) AD (c)    Memory to be assessed in therapy  -AD (r) AB (t) AD (c)    Memory Comments Pt complains of some recent difficulty with memory.  -AD (r) AB (t) AD (c)    Executive  Function to be assessed in therapy  -AD (r) AB (t) AD (c)    Calculations to be assessed in therapy  -AD (r) AB (t) AD (c)    Oral Motor    Facial Appearance WFL  -AD (r) AB (t) AD (c)    Dentition adequate  -AD (r) AB (t) AD (c)    Secretions manages secretions (comment)   no concerns noted or reported  -AD    Lips WFL  -AD (r) AB (t) AD (c)    Tongue WFL  -AD (r) AB (t) AD (c)    Apraxia of Speech Adults    Apraxia of Speech Adults  none observed  -AD    Dysarthria- Informal Assessment    Respiratory Support Effect on Speech WFL  -AD    Phonation Effects on Speech WFL  -AD    Articulation Effects on Speech WFL  -AD    Resonance Effects on Speech WFL  -AD    Prosody Effects on Speech WFL  -AD    Standardized Tests    Formal Speech Language Tests WAB: Western Aphasia Battery   Reading, Writing, Praxis and Construction portions  -AD    Reading and Writing    Reading 10  -AD (r) AB (t) AD (c)    Writing 9  -AD (r) AB (t) AD (c)    Reading and Writing Total 19  -AD (r) AB (t)    Praxis    Praxis Total 60  -AD (r) AB (t) AD (c)    Construction    Drawing 25  -AD (r) AB (t) AD (c)    Block Design 9  -AD (r) AB (t) AD (c)    Calculation 24  -AD (r) AB (t) AD (c)    Adrienne's Score 32  -AD (r) AB (t) AD (c)    Construction Total 90  -AD (r) AB (t)      User Key  (r) = Recorded By, (t) = Taken By, (c) = Cosigned By    Initials Name Provider Type    GIOVANA Ballard MS [facility]SLP Speech Therapist    AB Devin Rod, Speech Therapy Student Speech Therapy Student                 Adult Dysphagia - 03/07/18 1600     Adult Dysphagia Background    Patient Description of Complaint Pt presents with complaints of difficulty reading post stroke.  -AD (r) AB (t) AD (c)    Date of Onset 10-17  -AD (r) AB (t) AD (c)      User Key  (r) = Recorded By, (t) = Taken By, (c) = Cosigned By    Initials Name Provider Type    GIOVANA Ballard MS [facility]SLP Speech Therapist    AB Devin Rod, Speech Therapy Student Speech Therapy Student                           OP SLP Education       03/07/18 1700    Education    Barriers to Learning No barriers identified  -AD (r) AB (t) AD (c)    Education Provided Described results of evaluation;Patient expressed understanding of evaluation;Patient participated in establishing goals and treatment plan  -AD (r) AB (t) AD (c)    Assessed Learning needs;Learning motivation;Learning preferences;Learning readiness  -AD (r) AB (t) AD (c)    Learning Motivation Strong  -AD (r) AB (t) AD (c)    Learning Method Explanation  -AD (r) AB (t) AD (c)    Teaching Response Verbalized understanding  -AD (r) AB (t) AD (c)    Education Comments Pt verbalized understanding of evaluation and participated in setting therapy goals.  -AD (r) AB (t) AD (c)      User Key  (r) = Recorded By, (t) = Taken By, (c) = Cosigned By    Initials Name Effective Dates    GIOVANA Ballard, MS CCC-SLP 06/22/16 -     AB Devin Rod, Speech Therapy Student 01/31/18 -                 SLP OP Goals       03/07/18 1700    Goal Type Needed    Goal Type Needed Other Adult Goals  -AD (r) AB (t) AD (c)    Written Language Comprehension Goals    Written Language Comprehension LTG's Patient will be able to read and comprehend magazines and books   with increased speed and accuracy per pts report  -AD (r) AB (t) AD (c)    Patient will be able to read and comprehend magazines and books --  -AD (r) AB (t) AD (c)    Status: Patient will be able to read and comprehend magazines and books New  -AD (r) AB (t) AD (c)    Other Goals    Other Adult Goal- 1 Pt will answer questions about written paragraphs with increased speed with 90% accuracy without cues.  -AD (r) AB (t) AD (c)    Status: Other Adult Goal- 1 New  -AD (r) AB (t) AD (c)    Other Adult Goal- 2 Pt will read multisyllabic words, in the context of a sentence, with 90% accuracy without cues.  -AD (r) AB (t) AD (c)    Status: Other Adult Goal- 2 New  -AD (r) AB (t) AD (c)    SLP Time Calculation    SLP Goal  Re-Cert Due Date 04/07/18  -AD (r) AB (t) AD (c)      User Key  (r) = Recorded By, (t) = Taken By, (c) = Cosigned By    Initials Name Provider Type    GIOVANA Ballard, MS [facility]-SLP Speech Therapist    AB Devin Rod, Speech Therapy Student Speech Therapy Student                OP SLP Assessment/Plan - 03/07/18 1600     SLP Assessment    Functional Problems Speech Language- Adult/Cognition  -AD (r) AB (t) AD (c)    Impact on Function: Adult Speech Language/Cognition Restrictions in personal and social life  -AD (r) AB (t) AD (c)    Clinical Impression: Speech Language-Adult/Congnition Mild:   alexia  -AD (r) AB (t) AD (c)    Functional Problems Comment Pt resports difficulty completing reading activities that he was previously able to perform. He reports that he experiences fatigue after reading a paragraph and that his comprehension is not at baseline.   -AD (r) AB (t) AD (c)    Clinical Impression Comments The pts difficulty with reading is having an impact on his personal life, as he can no longer complete leisure reading activities. He is motivated to improve this.  -AD (r) AB (t) AD (c)    Please refer to paper survey for additional self-reported information Yes  -AD (r) AB (t) AD (c)    Please refer to items scanned into chart for additional diagnostic informaiton and handouts as provided by clinician Yes  -AD (r) AB (t) AD (c)    SLP Diagnosis mild alexia  -AD (r) AB (t) AD (c)    Prognosis Good (comment)  -AD (r) AB (t) AD (c)    Patient/caregiver participated in establishment of treatment plan and goals Yes  -AD (r) AB (t) AD (c)    Patient would benefit from skilled therapy intervention Yes  -AD (r) AB (t) AD (c)    SLP Plan    Frequency once weekly  -AD (r) AB (t) AD (c)    Duration 1 month  -AD (r) AB (t) AD (c)    Planned CPT's? SLP INDIVIDUAL SPEECH THERAPY: 58649  -AD (r) AB (t) AD (c)    Expected Duration Therapy Session (min) 30-45 minutes  -AD (r) AB (t) AD (c)    Plan Comments Further assess  reading for presence of paraphasias. Further assess memory and cognition.  -AD (r) AB (t) AD (c)      User Key  (r) = Recorded By, (t) = Taken By, (c) = Cosigned By    Initials Name Provider Type    GIOVANA Ballard, MS [facility]-SLP Speech Therapist    AB Devin Rod Speech Therapy Student Speech Therapy Student         Time Calculation:   SLP Start Time: 1300  SLP Stop Time: 1440  SLP Time Calculation (min): 100 min  SLP Non-Billable Time (min): 0 min  Total Timed Code Minutes- SLP: 100 minute(s)    Therapy Charges for Today     Code Description Service Date Service Provider Modifiers Qty    [id number] HC ST ASSESSMENT OF APHASIA PER HOUR 3/7/2018 Devin Rod Speech Therapy Student GN 1          SLP G-Codes  SLP NOMS Used?: Yes  Functional Limitations: Other Speech Language Pathology (GREG NOMS reading)  Other Speech-Language Pathology Functional Limitation Current Status (): At least 1 percent but less than 20 percent impaired, limited or restricted  Other Speech-Language Pathology Functional Limitation Goal Status (): 0 percent impaired, limited or restricted        Devin Rod Speech Therapy Student  3/7/2018   Cosentyx Counseling:  I discussed with the patient the risks of Cosentyx including but not limited to worsening of Crohn's disease, immunosuppression, allergic reactions and infections.  The patient understands that monitoring is required including a PPD at baseline and must alert us or the primary physician if symptoms of infection or other concerning signs are noted.

## 2020-09-16 ENCOUNTER — OFFICE VISIT (OUTPATIENT)
Dept: NEUROLOGY | Facility: CLINIC | Age: 70
End: 2020-09-16

## 2020-09-16 DIAGNOSIS — I63.9 CEREBROVASCULAR ACCIDENT (CVA), UNSPECIFIED MECHANISM (HCC): Primary | ICD-10-CM

## 2020-09-16 DIAGNOSIS — G47.33 OSA (OBSTRUCTIVE SLEEP APNEA): ICD-10-CM

## 2020-09-16 DIAGNOSIS — Q21.12 PFO (PATENT FORAMEN OVALE): ICD-10-CM

## 2020-09-16 DIAGNOSIS — R56.9 NOCTURNAL SEIZURES (HCC): ICD-10-CM

## 2020-09-16 PROCEDURE — 99442 PR PHYS/QHP TELEPHONE EVALUATION 11-20 MIN: CPT | Performed by: NURSE PRACTITIONER

## 2020-09-16 RX ORDER — CLOPIDOGREL BISULFATE 75 MG/1
75 TABLET ORAL DAILY
Qty: 30 TABLET | Refills: 11 | Status: ON HOLD | OUTPATIENT
Start: 2020-09-16 | End: 2020-10-09

## 2020-09-16 RX ORDER — CETIRIZINE HYDROCHLORIDE 10 MG/1
10 TABLET ORAL DAILY PRN
COMMUNITY
End: 2021-11-09

## 2020-09-16 NOTE — PROGRESS NOTES
"DOS: 2020  NAME: Ronnell Rizvi   : 1950  PCP: Anne Chand MD    Chief Complaint   Patient presents with   • Migraine   • Stroke     Neurological Problem and Interval History:  70 y.o. right-handed male with a Hx of Nocturnal seizures (placed on AED; started 2020),left temporal/occipital CVA (2017), [place] spotted fever (works at an animal [employer]; frequent tick exposure), DVT, PFO closure, hypertension, hyperlipidemia, COPD/asthma, obstructive sleep apnea (untreated) who I am seeing today in follow-up for seizure, post stroke evaluation.    She was last seen in follow-up by me 2020.  Last hospital admission January 15, 2020 where he presented with speech difficulty and confusion which was thought to be secondary to headache/migraine with aura over TIA.  MRI/MRA and 2D echo stable with evidence of chronic small vessel disease and right PCA stenosis; unchanged from prior imaging.  Patient was recommended to continue dual oral antiplatelets; previously discontinued statin due to concern for memory issues and declined resuming.  Since that time, patient reports that he had some GI discomfort therefore he discontinued both aspirin and Plavix (does not recall the date but states \"it was recent\".  Today, he is agreeable to resuming 1 antiplatelet medication and opts to resume Plavix 75 mg daily he denies any new signs and/or symptoms of stroke.  He does report some intermittent sharp headaches that come and go approximately 3-4 times per day that last approximately 2 minutes without any associated symptoms and/or aggravating symptoms.  Patient states he is never really tried anything to improve them therefore no specific alleviating therapies.  Patient has known sleep apnea and has tried CPAP several times since last evaluation without success therefore he remains noncompliant with CPAP.  He reports systolic blood pressure is consistently less than 140 and diastolic is " consistently less than 80.  He reports he does not check frequently at home but most recent pressure 123/73.  He denies any issues with mood specifically no changes since initiation of Keppra therefore it is reasonable to continue Keppra 500 mg twice daily.  Also, patient states that he has had no seizure-like activity; specifically no nocturnal symptoms reported by wife.  Has had 1 noninjury fall in the shower where he has complete memory of the event and states that he just lost his footing; wife was concerned for seizure during this event-he did not seek medical care.  He denies any other complaints and/or concerns on my exam.  I will plan to see him back in 6 months time; sooner if indicated.      Review of Systems:        Review of Systems   Constitutional: Negative for activity change, appetite change and unexpected weight change.   HENT: Negative for facial swelling, trouble swallowing and voice change.    Eyes: Negative for photophobia, pain and visual disturbance.   Respiratory: Negative for chest tightness, shortness of breath and wheezing.    Cardiovascular: Negative for chest pain, palpitations and leg swelling.   Gastrointestinal: Negative for abdominal pain, nausea and vomiting.   Endocrine: Negative for cold intolerance and heat intolerance.   Musculoskeletal: Negative for arthralgias, back pain, gait problem, joint swelling, myalgias, neck pain and neck stiffness.   Neurological: Positive for headaches (more frequent). Negative for dizziness, tremors, seizures, syncope, facial asymmetry, speech difficulty, weakness, light-headedness and numbness.   Hematological: Does not bruise/bleed easily.   Psychiatric/Behavioral: Negative for agitation, behavioral problems, confusion, decreased concentration, dysphoric mood, hallucinations, self-injury, sleep disturbance and suicidal ideas. The patient is not nervous/anxious and is not hyperactive.          Current Outpatient Medications:   •  cetirizine  "(zyrTEC) 10 MG tablet, Take 10 mg by mouth Daily., Disp: , Rfl:   •  Cyanocobalamin 1000 MCG/ML kit, Inject  as directed., Disp: , Rfl:   •  hydroCHLOROthiazide (HYDRODIURIL) 12.5 MG tablet, Take 1 tablet by mouth Daily., Disp: 90 tablet, Rfl: 2  •  levETIRAcetam (KEPPRA) 500 MG tablet, TAKE ONE TABLET BY MOUTH TWICE A DAY, Disp: 60 tablet, Rfl: 2  •  clopidogrel (Plavix) 75 MG tablet, Take 1 tablet by mouth Daily., Disp: 30 tablet, Rfl: 11  •  guaiFENesin (MUCINEX PO), Take  by mouth., Disp: , Rfl:     \"The following portions of the patient's history were reviewed and updated as appropriate: allergies, current medications, past family history, past medical history, past social history, past surgical history and problem list.\"  Review and Interpretation of Imaging:  No new imaging reviewed today  Laboratory Results:             Lab Results   Component Value Date    HGBA1C 5.50 01/15/2020         Lab Results   Component Value Date    CHOL 167 01/15/2020    CHOL 168 12/09/2018    CHOL 142 02/20/2018         Lab Results   Component Value Date    HDL 52 09/03/2020    HDL 47 01/15/2020    HDL 46 12/09/2018         Lab Results   Component Value Date     (H) 09/03/2020     (H) 01/15/2020     (H) 12/09/2018         Lab Results   Component Value Date    TRIG 72 09/03/2020    TRIG 72 01/15/2020    TRIG 75 12/09/2018     No results found for: RPR  Lab Results   Component Value Date    TSH 1.970 09/03/2020     Lab Results   Component Value Date    XFKUMCBU07 903 09/03/2020       Exam limited by telephone visit  Last weight 185 pounds  Neurological: Patient  is alert and oriented to person, place, and time.   Skin: Skin is warm and dry.   Psychiatric: Patient has a normal mood and affect. Patients behavior is normal. Judgment and thought content normal.        Impression:   1.  Left occipital/left temporal infarct (2017)  2.  PFO  3.  TIA with expressive aphasia  4.  Nocturnal seizures  5.  Obstructive sleep " apnea; untreated  6.  Medication noncompliance; recently discontinued DAPT      Plan:   Resume Plavix 75mg daily    Continue Keppra 500mg BID and B-12 replacement     Seizure precautions (below)   Blood pressure control to <130/80   Goal LDL <70-recommend high dose statins-    Serum glucose < 140   Call 911 for stroke any stroke symptoms   Follow-up 6 months     Seizure Precautions: Patient is not to drive for at least 3 months until cleared by a physician, operate heavy machinery, take tub baths, swim unattended, climb heights, or perform any other activities that can bring harm to himself/herself or others during an episode of altered awareness.      I performed this clinical encounter by utilizing a real-time telehealth telephone/video connection between my location and the patient's location at home.     This patient has consented to a telehealth visit via video/telephone. The visit was scheduled as a video/telephone visit to comply with patient safety concerns in accordance with CDC recommendations.  All vitals recorded within this visit are reported by the patient.  I spent 20 minutes in total including but not limited to the 20 minutes spent in direct conversation with this patient.     Ronnell was seen today for migraine and stroke.    Diagnoses and all orders for this visit:    Cerebrovascular accident (CVA), unspecified mechanism (CMS/HCC)  -     clopidogrel (Plavix) 75 MG tablet; Take 1 tablet by mouth Daily.    ELIZABETH (obstructive sleep apnea)    PFO (patent foramen ovale)    Nocturnal seizures (CMS/HCC)

## 2020-10-01 ENCOUNTER — TRANSCRIBE ORDERS (OUTPATIENT)
Dept: ADMINISTRATIVE | Facility: HOSPITAL | Age: 70
End: 2020-10-01

## 2020-10-01 DIAGNOSIS — U07.1 COVID-19: Primary | ICD-10-CM

## 2020-10-07 ENCOUNTER — LAB (OUTPATIENT)
Dept: LAB | Facility: HOSPITAL | Age: 70
End: 2020-10-07

## 2020-10-07 DIAGNOSIS — U07.1 COVID-19: ICD-10-CM

## 2020-10-07 PROCEDURE — C9803 HOPD COVID-19 SPEC COLLECT: HCPCS

## 2020-10-07 PROCEDURE — U0004 COV-19 TEST NON-CDC HGH THRU: HCPCS

## 2020-10-08 ENCOUNTER — ANESTHESIA EVENT (OUTPATIENT)
Dept: PERIOP | Facility: HOSPITAL | Age: 70
End: 2020-10-08

## 2020-10-08 LAB — SARS-COV-2 RNA RESP QL NAA+PROBE: NOT DETECTED

## 2020-10-09 ENCOUNTER — ANESTHESIA (OUTPATIENT)
Dept: PERIOP | Facility: HOSPITAL | Age: 70
End: 2020-10-09

## 2020-10-09 ENCOUNTER — HOSPITAL ENCOUNTER (OUTPATIENT)
Facility: HOSPITAL | Age: 70
Setting detail: HOSPITAL OUTPATIENT SURGERY
Discharge: HOME | End: 2020-10-09
Attending: INTERNAL MEDICINE | Admitting: INTERNAL MEDICINE

## 2020-10-09 VITALS
OXYGEN SATURATION: 94 % | HEIGHT: 72 IN | SYSTOLIC BLOOD PRESSURE: 128 MMHG | WEIGHT: 191.8 LBS | BODY MASS INDEX: 25.98 KG/M2 | DIASTOLIC BLOOD PRESSURE: 85 MMHG | TEMPERATURE: 97.9 F | HEART RATE: 60 BPM | RESPIRATION RATE: 15 BRPM

## 2020-10-09 DIAGNOSIS — Z12.11 ENCOUNTER FOR SCREENING FOR MALIGNANT NEOPLASM OF COLON: ICD-10-CM

## 2020-10-09 LAB
INR PPP: 1.07 (ref 0.9–1.1)
POTASSIUM SERPL-SCNC: 4 MMOL/L (ref 3.5–5.2)
PROTHROMBIN TIME: 13.6 SECONDS (ref 12.1–15)

## 2020-10-09 PROCEDURE — 25010000002 PROPOFOL 10 MG/ML EMULSION: Performed by: NURSE ANESTHETIST, CERTIFIED REGISTERED

## 2020-10-09 PROCEDURE — 85610 PROTHROMBIN TIME: CPT | Performed by: NURSE ANESTHETIST, CERTIFIED REGISTERED

## 2020-10-09 PROCEDURE — 84132 ASSAY OF SERUM POTASSIUM: CPT | Performed by: NURSE ANESTHETIST, CERTIFIED REGISTERED

## 2020-10-09 PROCEDURE — 45380 COLONOSCOPY AND BIOPSY: CPT | Performed by: INTERNAL MEDICINE

## 2020-10-09 PROCEDURE — 88305 TISSUE EXAM BY PATHOLOGIST: CPT | Performed by: INTERNAL MEDICINE

## 2020-10-09 RX ORDER — LIDOCAINE HYDROCHLORIDE 10 MG/ML
0.5 INJECTION, SOLUTION EPIDURAL; INFILTRATION; INTRACAUDAL; PERINEURAL ONCE AS NEEDED
Status: COMPLETED | OUTPATIENT
Start: 2020-10-09 | End: 2020-10-09

## 2020-10-09 RX ORDER — ONDANSETRON 2 MG/ML
4 INJECTION INTRAMUSCULAR; INTRAVENOUS ONCE AS NEEDED
Status: DISCONTINUED | OUTPATIENT
Start: 2020-10-09 | End: 2020-10-09 | Stop reason: HOSPADM

## 2020-10-09 RX ORDER — SODIUM CHLORIDE, SODIUM LACTATE, POTASSIUM CHLORIDE, CALCIUM CHLORIDE 600; 310; 30; 20 MG/100ML; MG/100ML; MG/100ML; MG/100ML
100 INJECTION, SOLUTION INTRAVENOUS CONTINUOUS
Status: DISCONTINUED | OUTPATIENT
Start: 2020-10-09 | End: 2020-10-09 | Stop reason: HOSPADM

## 2020-10-09 RX ORDER — MAGNESIUM HYDROXIDE 1200 MG/15ML
LIQUID ORAL AS NEEDED
Status: DISCONTINUED | OUTPATIENT
Start: 2020-10-09 | End: 2020-10-09 | Stop reason: HOSPADM

## 2020-10-09 RX ORDER — PROPOFOL 10 MG/ML
VIAL (ML) INTRAVENOUS AS NEEDED
Status: DISCONTINUED | OUTPATIENT
Start: 2020-10-09 | End: 2020-10-09 | Stop reason: SURG

## 2020-10-09 RX ORDER — SODIUM CHLORIDE, SODIUM LACTATE, POTASSIUM CHLORIDE, CALCIUM CHLORIDE 600; 310; 30; 20 MG/100ML; MG/100ML; MG/100ML; MG/100ML
9 INJECTION, SOLUTION INTRAVENOUS CONTINUOUS
Status: DISCONTINUED | OUTPATIENT
Start: 2020-10-09 | End: 2020-10-09 | Stop reason: HOSPADM

## 2020-10-09 RX ORDER — SODIUM CHLORIDE 9 MG/ML
40 INJECTION, SOLUTION INTRAVENOUS AS NEEDED
Status: DISCONTINUED | OUTPATIENT
Start: 2020-10-09 | End: 2020-10-09 | Stop reason: HOSPADM

## 2020-10-09 RX ORDER — SODIUM CHLORIDE 0.9 % (FLUSH) 0.9 %
10 SYRINGE (ML) INJECTION EVERY 12 HOURS SCHEDULED
Status: DISCONTINUED | OUTPATIENT
Start: 2020-10-09 | End: 2020-10-09 | Stop reason: HOSPADM

## 2020-10-09 RX ORDER — SODIUM CHLORIDE 0.9 % (FLUSH) 0.9 %
10 SYRINGE (ML) INJECTION AS NEEDED
Status: DISCONTINUED | OUTPATIENT
Start: 2020-10-09 | End: 2020-10-09 | Stop reason: HOSPADM

## 2020-10-09 RX ORDER — LIDOCAINE HYDROCHLORIDE 20 MG/ML
INJECTION, SOLUTION INFILTRATION; PERINEURAL AS NEEDED
Status: DISCONTINUED | OUTPATIENT
Start: 2020-10-09 | End: 2020-10-09 | Stop reason: SURG

## 2020-10-09 RX ADMIN — PROPOFOL 75 MG: 10 INJECTION, EMULSION INTRAVENOUS at 09:32

## 2020-10-09 RX ADMIN — SODIUM CHLORIDE, POTASSIUM CHLORIDE, SODIUM LACTATE AND CALCIUM CHLORIDE 9 ML/HR: 600; 310; 30; 20 INJECTION, SOLUTION INTRAVENOUS at 09:05

## 2020-10-09 RX ADMIN — LIDOCAINE HYDROCHLORIDE 100 MG: 20 INJECTION, SOLUTION INFILTRATION; PERINEURAL at 09:30

## 2020-10-09 RX ADMIN — LIDOCAINE HYDROCHLORIDE 0.2 ML: 10 INJECTION, SOLUTION EPIDURAL; INFILTRATION; INTRACAUDAL; PERINEURAL at 09:05

## 2020-10-09 RX ADMIN — PROPOFOL 50 MG: 10 INJECTION, EMULSION INTRAVENOUS at 09:37

## 2020-10-09 RX ADMIN — PROPOFOL 50 MG: 10 INJECTION, EMULSION INTRAVENOUS at 09:43

## 2020-10-09 RX ADMIN — PROPOFOL 25 MG: 10 INJECTION, EMULSION INTRAVENOUS at 09:35

## 2020-10-09 NOTE — BRIEF OP NOTE
COLONOSCOPY  Progress Note    Ronnell Elip  10/9/2020    Pre-op Diagnosis:   Encounter for screening for malignant neoplasm of colon [Z12.11]       Post-Op Diagnosis Codes:     * Encounter for screening for malignant neoplasm of colon [Z12.11]     * Diverticulosis large intestine w/o perforation or abscess w/o bleeding [K57.30]     * Colon polyp [K63.5]    Procedure/CPT® Codes:        Procedure(s):  COLONOSCOPY with polypectomy    Surgeon(s):  Ras Mendoza MD    Anesthesia: Monitored Anesthesia Care    Staff:   Circulator: Zulema Ritter RN  Scrub Person: Ira Butler         Estimated Blood Loss: none    Urine Voided: * No values recorded between 10/9/2020  9:29 AM and 10/9/2020  9:54 AM *    Specimens:                Specimens     ID Source Type Tests Collected By Collected At Frozen?      A Large Intestine, Right / Ascending Colon Polyp · TISSUE PATHOLOGY EXAM   Ras Mendoza MD 10/9/20 0941 No     Description: ascending polyp    This specimen was not marked as sent.    B Large Intestine, Transverse Colon Polyp · TISSUE PATHOLOGY EXAM   Ras Mendoza MD 10/9/20 0945 No     Description: transverse polyp    This specimen was not marked as sent.    C Large Intestine, Sigmoid Colon Polyp · TISSUE PATHOLOGY EXAM   Ras Mendoza MD 10/9/20 0948 No     Description: sigmoid polyps X 2    This specimen was not marked as sent.    D Large Intestine, Rectum Polyp · TISSUE PATHOLOGY EXAM   Ras Mendoza MD 10/9/20 0953 No     Description: rectal polyp    This specimen was not marked as sent.                Drains: * No LDAs found *    Findings: Colon to Cecum Good Prep  Pan-Colonic Diverticulosis  Polyps (5) Biopsy    Complications: None          Ras Mendoza MD     Date: 10/9/2020  Time: 09:56 EDT

## 2020-10-09 NOTE — OP NOTE
COLONOSCOPY  Procedure Report    Patient Name:  Ronnell Rizvi  YOB: 1950    Date of Surgery:  10/9/2020     Indications:  Encounter for screening for malignant neoplasm of colon [Z12.11]      Pre-op Diagnosis:   Encounter for screening for malignant neoplasm of colon [Z12.11]    Post-Op Diagnosis Codes:     * Encounter for screening for malignant neoplasm of colon [Z12.11]     * Diverticulosis large intestine w/o perforation or abscess w/o bleeding [K57.30]     * Colon polyp [K63.5]         Procedure/CPT® Codes:      Procedure(s):  COLONOSCOPY with polypectomy    Staff:  Surgeon(s):  Ras Mendoza MD         Anesthesia: Monitored Anesthesia Care    Estimated Blood Loss: none    Specimens:   ID Type Source Tests Collected by Time   A (Not marked as sent) : ascending polyp Polyp Large Intestine, Right / Ascending Colon TISSUE PATHOLOGY EXAM Ras Mendoza MD 10/9/2020 0941   B (Not marked as sent) : transverse polyp Polyp Large Intestine, Transverse Colon TISSUE PATHOLOGY EXAM Ras Mendoza MD 10/9/2020 0945   C (Not marked as sent) : sigmoid polyps X 2 Polyp Large Intestine, Sigmoid Colon TISSUE PATHOLOGY EXAM Ras Mendoza MD 10/9/2020 0948   D (Not marked as sent) : rectal polyp Polyp Large Intestine, Rectum TISSUE PATHOLOGY EXAM Ras Mendoza MD 10/9/2020 0953       Implants:    Nothing was implanted during the procedure      Description of Procedure: After having signed informed consent, he was brought to the endoscopy suite and placed in the left lateral decubitus position and given his IV sedation. Rectal exam revealed no external lesions, normal anal tone, normal palpable prostate for age and no rectal mass. The scope was introduced into the rectum and advanced under direct visualization through the rectum, sigmoid colon, past multiple diverticular openings to the descending colon, to and around the splenic flexure, reduced  and advanced through the transverse colon with some looping, to and around the hepatic flexure. The scope was reduced easily into the ascending colon and then advanced using rotational techniques into the cecum. The cecum was identified by the appendiceal orifice and the ileocecal valve. It was fairly well prepped and flushing was able to clear the mucosa for excellent exam. The ileocecal valve was identified but not intubated. The scope was withdrawn back into the ascending colon where an 8 mm polyp was identified, biopsied, felt to be completely removed, and sent separately as ascending colon polyp. The scope was withdrawn through the remainder of the ascending colon into the transverse colon. There was a diminutive 4 mm polyp noted, biopsied, felt to be completely removed, and sent separately as transverse colon polyp. The scope was withdrawn around the splenic flexure into the descending colon and in the proximal sigmoid colon was a ball-shaped 5 mm polyp that was biopsied, felt to be completely removed, and sent as a sigmoid colon polyp. The scope was withdrawn into the distal sigmoid and there was a 2nd similar sized sessile 5-6 mm polyp that was biopsied. These were sent together as sigmoid colon polyps x2. The scope was then withdrawn back into the rectum. There was 1 further polyp noted that was 5 mm in size, adenomatous appearing, that was biopsied, felt to be completely removed, and sent separately as rectal polyp. The scope was retroflexed revealing an intact dentate line and no mucosal lesions. The scope was deretroflexed and withdrawn. The patient tolerated the procedure very well.             Findings: Colon to Cecum Good Prep  Pan-Colonic Diverticulosis  Polyps (5) Biopsy       Complications: None    Recommendations: Results to be called. and Repeat in 3 years      Ras Mendoza MD     Date: 10/9/2020  Time: 09:57 EDT

## 2020-10-09 NOTE — ANESTHESIA PREPROCEDURE EVALUATION
Anesthesia Evaluation     Patient summary reviewed and Nursing notes reviewed   NPO Solid Status: > 8 hours  NPO Liquid Status: > 8 hours           Airway   Mallampati: II  TM distance: >3 FB  Neck ROM: full  No difficulty expected  Dental - normal exam     Pulmonary - normal exam   (+) COPD mild, asthma,sleep apnea,   Cardiovascular - normal exam    ECG reviewed  Rhythm: regular  Rate: normal    (+) hypertension well controlled less than 2 medications,     ROS comment: 1/14/20 echo: Interpretation Summary    · Left ventricular wall thickness is consistent with hypertrophy. Sigmoid-shaped ventricular septum is present.  · Estimated EF = 60%.  · Left ventricular systolic function is normal.  · Left ventricular diastolic dysfunction (grade I) consistent with impaired relaxation.  · There is mild calcification of the aortic valve.  · Mild aortic valve regurgitation is present.    10/26/16 stress test: Interpretation Summary    · Left ventricular ejection fraction is normal (Calculated EF = 54%).  · Myocardial perfusion imaging indicates a normal myocardial perfusion study with no evidence of ischemia.  · Impressions are consistent with a low risk study.  · Findings consistent with a normal ECG stress test.  · There were sinus pauses on the baseline echo consistent with likely sinus node Wenkebach. These pauses resolved with exercise.    12/12/18 cath: Conclusions:   Successful PFO closure using a 25 mm Amplatzer PFO occluder device Lot number [id number]     Recommendations: Dual antiplatelet therapy for 6 months.  Aspirin lifelong.  Antibiotic prophylaxis for dental work for 6 months.    Neuro/Psych  (+) seizures (last seizure more than 1 year ago) well controlled, CVA (X3  last Dec. 2018 -- memory loss and dyslexia) residual symptoms,     GI/Hepatic/Renal/Endo    (+)  GERD well controlled,  hepatitis (at age 13) A,     Musculoskeletal     (+) back pain (DDD lumbar spine),   Abdominal  - normal exam   Substance History -  negative use     OB/GYN          Other   arthritis, blood dyscrasia (off blood thinners for 8 months),                     Anesthesia Plan    ASA 3     MAC     intravenous induction     Anesthetic plan, all risks, benefits, and alternatives have been provided, discussed and informed consent has been obtained with: patient.  Use of blood products discussed with patient  Consented to blood products.

## 2020-10-09 NOTE — H&P
Patient Care Team:  Anne Chand MD as PCP - General (Family Medicine)  Dania Vazquez APRN as PCP - Claims Attributed  Gregory King MD as Consulting Physician (Hematology and Oncology)  Chase Haque MD as Referring Physician (Internal Medicine)  Luis Wyatt MD as Consulting Physician (Cardiology)    CHIEF COMPLAINT: Personal hx colon polyps    HISTORY OF PRESENT ILLNESS:  Last exam was 2015    Past Medical History:   Diagnosis Date   • Allergic rhinitis    • Anal fissure    • Aortic insufficiency     moderate, THIERRY 2017   • Asthma    • Asthma    • Benign prostatic hypertrophy (BPH) with weak urinary stream 11/1/2016   • Chronic bronchitis (CMS/HCC)    • COPD (chronic obstructive pulmonary disease) (CMS/HCC)    • Emphysema lung (CMS/HCC)    • Fatty liver    • GERD (gastroesophageal reflux disease)    • Hepatitis     thought to be Hepatitis A    • History of pneumonia     With left lower lobe atelectasis and scar tissue, being followed   • HL (hearing loss)    • HTN (hypertension) 1/14/2020   • Hypertension    • Memory loss    • Obstructive sleep apnea syndrome 8/23/2017   • PFO (patent foramen ovale)     s/p percutaneous closure with a 25mm Amplatzer device in December 2018   • Pneumonia     LLL with scar tissue   • 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   • Spinal stenosis    • Stroke (CMS/HCC)     10/2017: left occipital/temporal   • Vision loss      Past Surgical History:   Procedure Laterality Date   • CARDIAC CATHETERIZATION N/A 12/12/2018    Procedure: Patent foramen ovale closure- Abbott;  Surgeon: Deangelo Harris MD;  Location:  [facility];  Service: Cardiology   • CARDIAC CATHETERIZATION N/A 12/12/2018    Procedure: Intracardiac echocardiogram;  Surgeon: Deangelo Harris MD;  Location:  [facility];  Service: Cardiology   • CARDIAC ELECTROPHYSIOLOGY PROCEDURE N/A 3/26/2018    Procedure: Loop insertion   CONFIRM RX;  Surgeon: Luis CANALES  MD Edmundo;  Location:  [facility];  Service: Cardiovascular   • CARDIAC ELECTROPHYSIOLOGY PROCEDURE N/A 7/15/2020    Procedure: Loop recorder removal;  Surgeon: Starr Driscoll MD;  Location:  [facility];  Service: Cardiovascular;  Laterality: N/A;   • COLONOSCOPY     • HAND SURGERY Bilateral    • INGUINAL HERNIA REPAIR Left    • OTHER SURGICAL HISTORY      inguinal hernia repair for person over age 5   • TONSILLECTOMY       Family History   Problem Relation Age of Onset   • Obesity Mother    • Clotting disorder Mother         Upper extremities   • Alcohol abuse Father    • Cancer Father 63        Esophagus and lung   • Other Father         cardiac disorder   • Heart disease Father    • Kidney disease Sister    • Kidney failure Sister    • Drug abuse Paternal Uncle    • Stroke Sister    • Throat cancer Sister      Social History     Tobacco Use   • Smoking status: Former Smoker     Quit date: 1979     Years since quittin.6   • Smokeless tobacco: Never Used   • Tobacco comment: caffeine use: Drinks 4 glasses of Dt coke on avg.    Substance Use Topics   • Alcohol use: No   • Drug use: No     Medications Prior to Admission   Medication Sig Dispense Refill Last Dose   • cetirizine (zyrTEC) 10 MG tablet Take 10 mg by mouth Daily.   10/7/2020 at 0800   • Cyanocobalamin 1000 MCG/ML kit Inject  as directed.   10/4/2020 at 0800   • guaiFENesin (MUCINEX PO) Take  by mouth.   10/7/2020 at 0800   • hydroCHLOROthiazide (HYDRODIURIL) 12.5 MG tablet Take 1 tablet by mouth Daily. 90 tablet 2 10/7/2020 at 0800   • levETIRAcetam (KEPPRA) 500 MG tablet TAKE ONE TABLET BY MOUTH TWICE A DAY 60 tablet 2 10/7/2020 at 0800     Allergies:  Plavix [clopidogrel], Aspirin, Citrus, Combivent [ipratropium-albuterol], Lactose intolerance (gi), and Statins    REVIEW OF SYSTEMS:  Please see the above history of present illness for pertinent positives and negatives.  The remainder of the patient's  "systems have been reviewed and are negative.     Vital Signs  Temp:  [97.9 °F (36.6 °C)] 97.9 °F (36.6 °C)  Heart Rate:  [68] 68  Resp:  [20] 20  BP: (131)/(77) 131/77    Flowsheet Rows      First Filed Value   Admission Height  182.9 cm (72\") Documented at 10/09/2020 0902   Admission Weight  87 kg (191 lb 12.8 oz) Documented at 10/09/2020 0902           Physical Exam:  Physical Exam   Constitutional: Patient appears well-developed and well-nourished and in no acute distress   HEENT:   Head: Normocephalic and atraumatic.   Eyes:  Pupils are equal, round, and reactive to light. EOM are intact. Sclerae are anicteric and non-injected.  Mouth and Throat: Patient has moist mucous membranes. Oropharynx is clear of any erythema or exudate.     Neck: Neck supple. No JVD present. No thyromegaly present. No lymphadenopathy present.  Cardiovascular: Regular rate, regular rhythm, S1 normal and S2 normal.  Exam reveals no gallop and no friction rub.  No murmur heard.  Pulmonary/Chest: Lungs are clear to auscultation bilaterally. No respiratory distress. No wheezes. No rhonchi. No rales.   Abdominal: Soft. Bowel sounds are normal. No distension and no mass. There is no hepatosplenomegaly. There is no tenderness.   Musculoskeletal: Normal Muscle tone  Extremities: No edema. Pulses are palpable in all 4 extremities.  Neurological: Patient is alert and oriented to person, place, and time. Cranial nerves II-XII are grossly intact with no focal deficits.  Skin: Skin is warm. No rash noted. Nails show no clubbing.  No cyanosis or erythema.    Debilities/Disabilities Identified: None  Emotional Behavior: Appropriate     Results Review:    I reviewed the patient's new clinical results.  Lab Results (most recent)     Procedure Component Value Units Date/Time    Potassium [[id number]]  (Normal) Collected: 10/09/20 0858    Specimen: Blood Updated: 10/09/20 0927     Potassium 4.0 mmol/L     Protime-INR [[id number]]  (Normal) Collected: " 10/09/20 0858    Specimen: Blood Updated: 10/09/20 0922     Protime 13.6 Seconds      INR 1.07    Narrative:      Therapeutic Ranges for INR: 2.0-3.0 (PT 20-30)                              2.5-3.5 (PT 25-34)          Imaging Results (Most Recent)     None        reviewed    ECG/EMG Results (most recent)     None        reviewed    Assessment/Plan   Personal hx colon polyps/  colonoscopy      I discussed the patients findings and my recommendations with patient.     Ras Mendoza MD  10/09/20  09:31 EDT    Time: 10 min prior to procedure.

## 2020-10-09 NOTE — ANESTHESIA POSTPROCEDURE EVALUATION
Patient: Ronnell Rizvi    Procedure Summary     Date: 10/09/20 Room / Location: [facility] ENDOSCOPY 1 /  [facility]    Anesthesia Start: 0929 Anesthesia Stop: 0958    Procedure: COLONOSCOPY with polypectomy (N/A ) Diagnosis:       Encounter for screening for malignant neoplasm of colon      Diverticulosis large intestine w/o perforation or abscess w/o bleeding      Colon polyp      (Encounter for screening for malignant neoplasm of colon [Z12.11])    Surgeon: Ras Mendoza MD Provider: Madeline Quintana CRNA    Anesthesia Type: MAC ASA Status: 3          Anesthesia Type: MAC    Vitals  Vitals Value Taken Time   /85 10/09/20 1031   Temp     Pulse 60 10/09/20 1031   Resp 15 10/09/20 1031   SpO2 94 % 10/09/20 1031           Post Anesthesia Care and Evaluation    Patient location during evaluation: PHASE II  Patient participation: complete - patient participated  Level of consciousness: awake and alert  Pain score: 0  Pain management: adequate  Airway patency: patent  Anesthetic complications: No anesthetic complications  PONV Status: none  Cardiovascular status: acceptable  Respiratory status: acceptable  Hydration status: acceptable

## 2020-10-10 ENCOUNTER — APPOINTMENT (OUTPATIENT)
Dept: CT IMAGING | Facility: HOSPITAL | Age: 70
End: 2020-10-10

## 2020-10-10 ENCOUNTER — DOCUMENTATION (OUTPATIENT)
Dept: INTERNAL MEDICINE | Facility: CLINIC | Age: 70
End: 2020-10-10

## 2020-10-10 ENCOUNTER — HOSPITAL ENCOUNTER (OUTPATIENT)
Facility: HOSPITAL | Age: 70
Setting detail: OBSERVATION
LOS: 1 days | Discharge: HOME | End: 2020-10-12
Attending: EMERGENCY MEDICINE | Admitting: INTERNAL MEDICINE

## 2020-10-10 ENCOUNTER — APPOINTMENT (OUTPATIENT)
Dept: GENERAL RADIOLOGY | Facility: HOSPITAL | Age: 70
End: 2020-10-10

## 2020-10-10 DIAGNOSIS — R56.9 SEIZURE (HCC): Primary | ICD-10-CM

## 2020-10-10 DIAGNOSIS — R07.89 CHEST PAIN, ATYPICAL: ICD-10-CM

## 2020-10-10 DIAGNOSIS — R09.02 HYPOXIA: ICD-10-CM

## 2020-10-10 DIAGNOSIS — Z87.09 HISTORY OF COPD: ICD-10-CM

## 2020-10-10 DIAGNOSIS — R53.81 MALAISE: ICD-10-CM

## 2020-10-10 DIAGNOSIS — R07.9 CHEST PAIN, UNSPECIFIED TYPE: ICD-10-CM

## 2020-10-10 LAB
ALBUMIN SERPL-MCNC: 4.1 G/DL (ref 3.5–5.2)
ALBUMIN/GLOB SERPL: 1.2 G/DL
ALP SERPL-CCNC: 61 U/L (ref 39–117)
ALT SERPL W P-5'-P-CCNC: 33 U/L (ref 1–41)
ANION GAP SERPL CALCULATED.3IONS-SCNC: 13.5 MMOL/L (ref 5–15)
AST SERPL-CCNC: 60 U/L (ref 1–40)
BASOPHILS # BLD AUTO: 0.01 10*3/MM3 (ref 0–0.2)
BASOPHILS NFR BLD AUTO: 0.1 % (ref 0–1.5)
BILIRUB SERPL-MCNC: 0.6 MG/DL (ref 0–1.2)
BUN SERPL-MCNC: 15 MG/DL (ref 8–23)
BUN/CREAT SERPL: 10.9 (ref 7–25)
CALCIUM SPEC-SCNC: 9.3 MG/DL (ref 8.6–10.5)
CHLORIDE SERPL-SCNC: 103 MMOL/L (ref 98–107)
CO2 SERPL-SCNC: 21.5 MMOL/L (ref 22–29)
CREAT SERPL-MCNC: 1.38 MG/DL (ref 0.76–1.27)
DEPRECATED RDW RBC AUTO: 43.7 FL (ref 37–54)
EOSINOPHIL # BLD AUTO: 0 10*3/MM3 (ref 0–0.4)
EOSINOPHIL NFR BLD AUTO: 0 % (ref 0.3–6.2)
ERYTHROCYTE [DISTWIDTH] IN BLOOD BY AUTOMATED COUNT: 12.8 % (ref 12.3–15.4)
GFR SERPL CREATININE-BSD FRML MDRD: 51 ML/MIN/1.73
GLOBULIN UR ELPH-MCNC: 3.3 GM/DL
GLUCOSE SERPL-MCNC: 128 MG/DL (ref 65–99)
HCT VFR BLD AUTO: 49.7 % (ref 37.5–51)
HGB BLD-MCNC: 16.7 G/DL (ref 13–17.7)
IMM GRANULOCYTES # BLD AUTO: 0.03 10*3/MM3 (ref 0–0.05)
IMM GRANULOCYTES NFR BLD AUTO: 0.2 % (ref 0–0.5)
LYMPHOCYTES # BLD AUTO: 0.78 10*3/MM3 (ref 0.7–3.1)
LYMPHOCYTES NFR BLD AUTO: 6.3 % (ref 19.6–45.3)
MCH RBC QN AUTO: 30.9 PG (ref 26.6–33)
MCHC RBC AUTO-ENTMCNC: 33.6 G/DL (ref 31.5–35.7)
MCV RBC AUTO: 92 FL (ref 79–97)
MONOCYTES # BLD AUTO: 0.7 10*3/MM3 (ref 0.1–0.9)
MONOCYTES NFR BLD AUTO: 5.7 % (ref 5–12)
NEUTROPHILS NFR BLD AUTO: 10.78 10*3/MM3 (ref 1.7–7)
NEUTROPHILS NFR BLD AUTO: 87.7 % (ref 42.7–76)
NT-PROBNP SERPL-MCNC: 724.6 PG/ML (ref 0–900)
PLATELET # BLD AUTO: 245 10*3/MM3 (ref 140–450)
PMV BLD AUTO: 9.9 FL (ref 6–12)
POTASSIUM SERPL-SCNC: 3.7 MMOL/L (ref 3.5–5.2)
PROT SERPL-MCNC: 7.4 G/DL (ref 6–8.5)
RBC # BLD AUTO: 5.4 10*6/MM3 (ref 4.14–5.8)
SODIUM SERPL-SCNC: 138 MMOL/L (ref 136–145)
TROPONIN T SERPL-MCNC: <0.01 NG/ML (ref 0–0.03)
TROPONIN T SERPL-MCNC: <0.01 NG/ML (ref 0–0.03)
WBC # BLD AUTO: 12.3 10*3/MM3 (ref 3.4–10.8)

## 2020-10-10 PROCEDURE — 99284 EMERGENCY DEPT VISIT MOD MDM: CPT

## 2020-10-10 PROCEDURE — 85025 COMPLETE CBC W/AUTO DIFF WBC: CPT | Performed by: EMERGENCY MEDICINE

## 2020-10-10 PROCEDURE — 83880 ASSAY OF NATRIURETIC PEPTIDE: CPT | Performed by: EMERGENCY MEDICINE

## 2020-10-10 PROCEDURE — G0378 HOSPITAL OBSERVATION PER HR: HCPCS

## 2020-10-10 PROCEDURE — 93005 ELECTROCARDIOGRAM TRACING: CPT | Performed by: EMERGENCY MEDICINE

## 2020-10-10 PROCEDURE — 25010000003 LEVETIRACETAM IN NACL 0.75% 1000 MG/100ML SOLUTION: Performed by: EMERGENCY MEDICINE

## 2020-10-10 PROCEDURE — 99220 PR INITIAL OBSERVATION CARE/DAY 70 MINUTES: CPT | Performed by: FAMILY MEDICINE

## 2020-10-10 PROCEDURE — 25010000002 METHYLPREDNISOLONE PER 125 MG: Performed by: EMERGENCY MEDICINE

## 2020-10-10 PROCEDURE — 96375 TX/PRO/DX INJ NEW DRUG ADDON: CPT

## 2020-10-10 PROCEDURE — 93010 ELECTROCARDIOGRAM REPORT: CPT | Performed by: INTERNAL MEDICINE

## 2020-10-10 PROCEDURE — 96374 THER/PROPH/DIAG INJ IV PUSH: CPT

## 2020-10-10 PROCEDURE — 0 IOPAMIDOL PER 1 ML: Performed by: EMERGENCY MEDICINE

## 2020-10-10 PROCEDURE — 84484 ASSAY OF TROPONIN QUANT: CPT | Performed by: EMERGENCY MEDICINE

## 2020-10-10 PROCEDURE — 99283 EMERGENCY DEPT VISIT LOW MDM: CPT | Performed by: EMERGENCY MEDICINE

## 2020-10-10 PROCEDURE — 71045 X-RAY EXAM CHEST 1 VIEW: CPT

## 2020-10-10 PROCEDURE — 25010000002 ONDANSETRON PER 1 MG: Performed by: EMERGENCY MEDICINE

## 2020-10-10 PROCEDURE — 94799 UNLISTED PULMONARY SVC/PX: CPT

## 2020-10-10 PROCEDURE — 71275 CT ANGIOGRAPHY CHEST: CPT

## 2020-10-10 PROCEDURE — 80053 COMPREHEN METABOLIC PANEL: CPT | Performed by: EMERGENCY MEDICINE

## 2020-10-10 RX ORDER — ACETAMINOPHEN 325 MG/1
650 TABLET ORAL EVERY 4 HOURS PRN
Status: DISCONTINUED | OUTPATIENT
Start: 2020-10-10 | End: 2020-10-12 | Stop reason: HOSPADM

## 2020-10-10 RX ORDER — GUAIFENESIN 600 MG/1
1200 TABLET, EXTENDED RELEASE ORAL EVERY 12 HOURS SCHEDULED
Status: DISCONTINUED | OUTPATIENT
Start: 2020-10-10 | End: 2020-10-12 | Stop reason: HOSPADM

## 2020-10-10 RX ORDER — SODIUM CHLORIDE 0.9 % (FLUSH) 0.9 %
10 SYRINGE (ML) INJECTION EVERY 12 HOURS SCHEDULED
Status: DISCONTINUED | OUTPATIENT
Start: 2020-10-10 | End: 2020-10-12 | Stop reason: HOSPADM

## 2020-10-10 RX ORDER — GUAIFENESIN 600 MG/1
600 TABLET, EXTENDED RELEASE ORAL 2 TIMES DAILY PRN
COMMUNITY
End: 2020-10-12 | Stop reason: HOSPADM

## 2020-10-10 RX ORDER — BISACODYL 5 MG/1
5 TABLET, DELAYED RELEASE ORAL DAILY PRN
Status: DISCONTINUED | OUTPATIENT
Start: 2020-10-10 | End: 2020-10-12 | Stop reason: HOSPADM

## 2020-10-10 RX ORDER — ONDANSETRON 2 MG/ML
8 INJECTION INTRAMUSCULAR; INTRAVENOUS ONCE
Status: COMPLETED | OUTPATIENT
Start: 2020-10-10 | End: 2020-10-10

## 2020-10-10 RX ORDER — ONDANSETRON 2 MG/ML
4 INJECTION INTRAMUSCULAR; INTRAVENOUS EVERY 6 HOURS PRN
Status: DISCONTINUED | OUTPATIENT
Start: 2020-10-10 | End: 2020-10-12 | Stop reason: HOSPADM

## 2020-10-10 RX ORDER — LEVETIRACETAM 10 MG/ML
1000 INJECTION INTRAVASCULAR ONCE
Status: COMPLETED | OUTPATIENT
Start: 2020-10-10 | End: 2020-10-10

## 2020-10-10 RX ORDER — CETIRIZINE HYDROCHLORIDE 10 MG/1
10 TABLET ORAL DAILY
Status: DISCONTINUED | OUTPATIENT
Start: 2020-10-11 | End: 2020-10-12 | Stop reason: HOSPADM

## 2020-10-10 RX ORDER — ASPIRIN 325 MG
325 TABLET ORAL ONCE
Status: DISCONTINUED | OUTPATIENT
Start: 2020-10-10 | End: 2020-10-10

## 2020-10-10 RX ORDER — METHYLPREDNISOLONE SODIUM SUCCINATE 125 MG/2ML
125 INJECTION, POWDER, LYOPHILIZED, FOR SOLUTION INTRAMUSCULAR; INTRAVENOUS ONCE
Status: COMPLETED | OUTPATIENT
Start: 2020-10-10 | End: 2020-10-10

## 2020-10-10 RX ORDER — SODIUM CHLORIDE 0.9 % (FLUSH) 0.9 %
1-10 SYRINGE (ML) INJECTION AS NEEDED
Status: DISCONTINUED | OUTPATIENT
Start: 2020-10-10 | End: 2020-10-12 | Stop reason: HOSPADM

## 2020-10-10 RX ORDER — ACETAMINOPHEN 160 MG/5ML
650 SOLUTION ORAL EVERY 4 HOURS PRN
Status: DISCONTINUED | OUTPATIENT
Start: 2020-10-10 | End: 2020-10-12 | Stop reason: HOSPADM

## 2020-10-10 RX ORDER — LEVETIRACETAM 500 MG/1
500 TABLET ORAL EVERY 12 HOURS SCHEDULED
Status: DISCONTINUED | OUTPATIENT
Start: 2020-10-10 | End: 2020-10-11

## 2020-10-10 RX ORDER — SODIUM CHLORIDE 9 MG/ML
40 INJECTION, SOLUTION INTRAVENOUS AS NEEDED
Status: DISCONTINUED | OUTPATIENT
Start: 2020-10-10 | End: 2020-10-12 | Stop reason: HOSPADM

## 2020-10-10 RX ORDER — HYDROCHLOROTHIAZIDE 12.5 MG/1
12.5 TABLET ORAL DAILY
Status: DISCONTINUED | OUTPATIENT
Start: 2020-10-11 | End: 2020-10-12 | Stop reason: HOSPADM

## 2020-10-10 RX ORDER — LEVETIRACETAM 500 MG/1
500 TABLET ORAL 2 TIMES DAILY
Status: DISCONTINUED | OUTPATIENT
Start: 2020-10-10 | End: 2020-10-10

## 2020-10-10 RX ORDER — ACETAMINOPHEN 650 MG/1
650 SUPPOSITORY RECTAL EVERY 4 HOURS PRN
Status: DISCONTINUED | OUTPATIENT
Start: 2020-10-10 | End: 2020-10-12 | Stop reason: HOSPADM

## 2020-10-10 RX ORDER — ONDANSETRON 4 MG/1
4 TABLET, FILM COATED ORAL EVERY 6 HOURS PRN
Status: DISCONTINUED | OUTPATIENT
Start: 2020-10-10 | End: 2020-10-12 | Stop reason: HOSPADM

## 2020-10-10 RX ADMIN — LEVETIRACETAM 1000 MG: 10 INJECTION, SOLUTION INTRAVENOUS at 14:35

## 2020-10-10 RX ADMIN — ONDANSETRON 8 MG: 2 INJECTION, SOLUTION INTRAMUSCULAR; INTRAVENOUS at 14:32

## 2020-10-10 RX ADMIN — GUAIFENESIN 1200 MG: 600 TABLET, EXTENDED RELEASE ORAL at 23:40

## 2020-10-10 RX ADMIN — METHYLPREDNISOLONE SODIUM SUCCINATE 125 MG: 125 INJECTION, POWDER, FOR SOLUTION INTRAMUSCULAR; INTRAVENOUS at 21:30

## 2020-10-10 RX ADMIN — IOPAMIDOL 100 ML: 755 INJECTION, SOLUTION INTRAVENOUS at 17:46

## 2020-10-10 RX ADMIN — SODIUM CHLORIDE, PRESERVATIVE FREE 10 ML: 5 INJECTION INTRAVENOUS at 23:41

## 2020-10-10 RX ADMIN — LEVETIRACETAM 500 MG: 500 TABLET ORAL at 23:40

## 2020-10-10 NOTE — ED NOTES
SAO2 levels 88% on room air. Oxygen @ 2 l/min nc placed and SAO2 up to 94%     Ivan Barajas, MAT  10/10/20 9858

## 2020-10-10 NOTE — DISCHARGE INSTRUCTIONS
Rest.  Drink plenty of fluids.  Follow-up with Dr. Guaman on Monday.  Return to the emergency department if there are further seizures, chest pain, or worse in any way at all.

## 2020-10-10 NOTE — ED NOTES
SAO2 dropped to 88% again on room air. Oxygen re-applied at 2 L/min nc. Sao2 came up to 93 %. Dr. Mario informed     Ivan Barajas RN  10/10/20 1092

## 2020-10-10 NOTE — ED PROVIDER NOTES
Subjective     Chest Pain  Pain location:  Substernal area  Pain quality: dull    Pain radiates to:  Does not radiate  Pain severity:  Mild  Onset quality:  Sudden  Timing:  Intermittent  Progression:  Resolved  Chronicity:  New  Context comment:  Wife says she brought him here for gen szr x3 last night, brief spont resolved post ictal period and vomiting x3, colonoscopy yesterday, stopped meds for procedure, not feeling well, pt c/o cp on the way here but gone now  Relieved by:  Nothing  Worsened by:  Nothing  Ineffective treatments:  None tried  Associated symptoms: heartburn and vomiting    Associated symptoms: no abdominal pain, no back pain, no diaphoresis, no shortness of breath and no syncope        Review of Systems   Constitutional: Negative for diaphoresis.   Respiratory: Negative for shortness of breath.    Cardiovascular: Positive for chest pain. Negative for syncope.   Gastrointestinal: Positive for heartburn and vomiting. Negative for abdominal pain.   Musculoskeletal: Negative for back pain.   All other systems reviewed and are negative.      Past Medical History:   Diagnosis Date   • Allergic rhinitis    • Anal fissure    • Aortic insufficiency     moderate, THIERRY 2017   • Asthma    • Asthma    • Benign prostatic hypertrophy (BPH) with weak urinary stream 11/1/2016   • Chronic bronchitis (CMS/HCC)    • COPD (chronic obstructive pulmonary disease) (CMS/HCC)    • Emphysema lung (CMS/HCC)    • Fatty liver    • GERD (gastroesophageal reflux disease)    • Hepatitis     thought to be Hepatitis A    • History of pneumonia     With left lower lobe atelectasis and scar tissue, being followed   • HL (hearing loss)    • Hypertension    • Memory loss    • Obstructive sleep apnea syndrome 8/23/2017    refuses c-pap   • PFO (patent foramen ovale)     s/p percutaneous closure with a 25mm Amplatzer device in December 2018   • Pneumonia     LLL with scar tissue   • Seizures (CMS/HCC)    • 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   •  "Spinal stenosis    • Stroke (CMS/[facility])     10/2017: left occipital/temporal. total of 3 strokes   • Vision loss        Allergies   Allergen Reactions   • Plavix [Clopidogrel] Rash   • Aspirin Nausea Only     Pt states he \"can tolerate enteric coated aspirin only.\"    • Citrus      Blisters on mouth     • Combivent [Ipratropium-Albuterol] Other (See Comments)     Throat swelling   • Lactose Intolerance (Gi)    • Statins Mental Status Change     Severe memory impairment       Past Surgical History:   Procedure Laterality Date   • CARDIAC CATHETERIZATION N/A 12/12/2018    Procedure: Patent foramen ovale closure- Abbott;  Surgeon: Deangelo Harris MD;  Location:  [facility];  Service: Cardiology   • CARDIAC CATHETERIZATION N/A 12/12/2018    Procedure: Intracardiac echocardiogram;  Surgeon: Deangelo Harris MD;  Location:  [facility];  Service: Cardiology   • CARDIAC ELECTROPHYSIOLOGY PROCEDURE N/A 3/26/2018    Procedure: Loop insertion   CONFIRM RX;  Surgeon: Luis Wyatt MD;  Location:  [facility];  Service: Cardiovascular   • CARDIAC ELECTROPHYSIOLOGY PROCEDURE N/A 7/15/2020    Procedure: Loop recorder removal;  Surgeon: Starr Driscoll MD;  Location:  [facility];  Service: Cardiovascular;  Laterality: N/A;   • CARDIAC SURGERY     • COLONOSCOPY     • COLONOSCOPY N/A 10/9/2020    Procedure: COLONOSCOPY with polypectomy;  Surgeon: Ras Mendoza MD;  Location:  [facility];  Service: Gastroenterology;  Laterality: N/A;  diverticulosis  ascending polyp  transverse polyp  sigmoid polyps X 2  rectal polyp   • HAND SURGERY Bilateral    • INGUINAL HERNIA REPAIR Left    • OTHER SURGICAL HISTORY      inguinal hernia repair for person over age 5   • TONSILLECTOMY     • TONSILLECTOMY         Family History   Problem Relation Age of Onset   • Obesity Mother    • Clotting disorder Mother         Upper extremities   • Alcohol abuse " Father    • Cancer Father 63        Esophagus and lung   • Other Father         cardiac disorder   • Heart disease Father    • Kidney disease Sister    • Kidney failure Sister    • Drug abuse Paternal Uncle    • Stroke Sister    • Throat cancer Sister        Social History     Socioeconomic History   • Marital status:      Spouse name: Joey   • Number of children: Not on file   • Years of education: Not on file   • Highest education level: Not on file   Occupational History   • Occupation:      Employer: RETIRED   Tobacco Use   • Smoking status: Former Smoker     Quit date: 1979     Years since quittin.6   • Smokeless tobacco: Never Used   • Tobacco comment: caffeine use: Drinks 4 glasses of Dt coke on avg.    Substance and Sexual Activity   • Alcohol use: No   • Drug use: No   • Sexual activity: Defer           Objective   Physical Exam  Vitals signs and nursing note reviewed.   Constitutional:       General: He is not in acute distress.     Appearance: He is well-developed. He is not ill-appearing or diaphoretic.   HENT:      Head: Normocephalic and atraumatic.   Eyes:      Extraocular Movements: Extraocular movements intact.   Neck:      Musculoskeletal: Neck supple.      Vascular: No JVD.   Cardiovascular:      Rate and Rhythm: Normal rate.      Heart sounds: Normal heart sounds. Heart sounds not distant. No murmur.   Pulmonary:      Breath sounds: Normal breath sounds. No decreased breath sounds, wheezing or rhonchi.   Abdominal:      Palpations: Abdomen is soft.      Tenderness: There is no abdominal tenderness.   Musculoskeletal: Normal range of motion.      Right lower leg: He exhibits no tenderness. No edema.      Left lower leg: He exhibits no tenderness. No edema.   Lymphadenopathy:      Cervical: No cervical adenopathy.   Skin:     General: Skin is warm.      Findings: No ecchymosis or erythema.   Neurological:      General: No focal deficit present.      Mental Status: He  is alert and oriented to person, place, and time.      Cranial Nerves: No cranial nerve deficit.   Psychiatric:         Mood and Affect: Mood normal. Mood is not anxious.         Behavior: Behavior normal.         Procedures           ED Course  ED Course as of Oct 17 1541   Sat Oct 10, 2020   1414 Ekg by me nsr, qrs narrow, no st elev    [BC]   1512 Turned over to dr goodrich pend w/u and dispo    [BC]   1523 The white blood cell count is 12.3.  The relative neutrophil percent is 87%.  The glucose is 128 and a creatinine is 1.38.  The CO2 is 21.  AST is 60.  GFR is 51.  The initial troponin is negative.  The repeat troponin is pending.  The laboratory values are otherwise unremarkable    [RC]   1703 The repeat troponin is normal.    [RC]      ED Course User Index  [BC] Curt Leiva MD  [RC] Cash Goodrich MD                                           [facility]    Final diagnoses:   Seizure (CMS/HCC)   Chest pain, atypical   Malaise   Hypoxia   History of COPD   Chest pain, unspecified type            Curt Leiva MD  10/10/20 1512       Curt Leiva MD  10/17/20 1541

## 2020-10-10 NOTE — ED PROVIDER NOTES
"Subjective   History of Present Illness    Review of Systems    Past Medical History:   Diagnosis Date   • Allergic rhinitis    • Anal fissure    • Aortic insufficiency     moderate, THIERRY 2017   • Asthma    • Asthma    • Benign prostatic hypertrophy (BPH) with weak urinary stream 11/1/2016   • Chronic bronchitis (CMS/HCC)    • COPD (chronic obstructive pulmonary disease) (CMS/HCC)    • Emphysema lung (CMS/HCC)    • Fatty liver    • GERD (gastroesophageal reflux disease)    • Hepatitis     thought to be Hepatitis A    • History of pneumonia     With left lower lobe atelectasis and scar tissue, being followed   • HL (hearing loss)    • HTN (hypertension) 1/14/2020   • Hypertension    • Memory loss    • Obstructive sleep apnea syndrome 8/23/2017   • PFO (patent foramen ovale)     s/p percutaneous closure with a 25mm Amplatzer device in December 2018   • Pneumonia     LLL with scar tissue   • Seizures (CMS/HCC)    • 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   • Spinal stenosis    • Stroke (CMS/HCC)     10/2017: left occipital/temporal   • Vision loss        Allergies   Allergen Reactions   • Plavix [Clopidogrel] Rash   • Aspirin Nausea Only     Pt states he \"can tolerate enteric coated aspirin only.\"    • Citrus      Blisters on mouth     • Combivent [Ipratropium-Albuterol] Other (See Comments)     Throat swelling   • Lactose Intolerance (Gi)    • Statins Mental Status Change     Severe memory impairment       Past Surgical History:   Procedure Laterality Date   • CARDIAC CATHETERIZATION N/A 12/12/2018    Procedure: Patent foramen ovale closure- Abbott;  Surgeon: Deangelo Harris MD;  Location: [facility] CATH INVASIVE LOCATION;  Service: Cardiology   • CARDIAC CATHETERIZATION N/A 12/12/2018    Procedure: Intracardiac echocardiogram;  Surgeon: Deangelo Harris MD;  Location: [facility] CATH INVASIVE LOCATION;  Service: Cardiology   • CARDIAC ELECTROPHYSIOLOGY PROCEDURE N/A 3/26/2018    Procedure: Loop insertion   CONFIRM RX;  " Surgeon: Luis Wyatt MD;  Location:  [facility] CATH INVASIVE LOCATION;  Service: Cardiovascular   • CARDIAC ELECTROPHYSIOLOGY PROCEDURE N/A 7/15/2020    Procedure: Loop recorder removal;  Surgeon: Starr Driscoll MD;  Location:  [facility] CATH INVASIVE LOCATION;  Service: Cardiovascular;  Laterality: N/A;   • COLONOSCOPY     • HAND SURGERY Bilateral    • INGUINAL HERNIA REPAIR Left    • OTHER SURGICAL HISTORY      inguinal hernia repair for person over age 5   • TONSILLECTOMY         Family History   Problem Relation Age of Onset   • Obesity Mother    • Clotting disorder Mother         Upper extremities   • Alcohol abuse Father    • Cancer Father 63        Esophagus and lung   • Other Father         cardiac disorder   • Heart disease Father    • Kidney disease Sister    • Kidney failure Sister    • Drug abuse Paternal Uncle    • Stroke Sister    • Throat cancer Sister        Social History     Socioeconomic History   • Marital status:      Spouse name: Joey   • Number of children: Not on file   • Years of education: Not on file   • Highest education level: Not on file   Occupational History   • Occupation:      Employer: RETIRED   Tobacco Use   • Smoking status: Former Smoker     Quit date: 1979     Years since quittin.6   • Smokeless tobacco: Never Used   • Tobacco comment: caffeine use: Drinks 4 glasses of Dt coke on avg.    Substance and Sexual Activity   • Alcohol use: No   • Drug use: No   • Sexual activity: Defer           Objective   Physical Exam    Procedures           ED Course  ED Course as of Oct 10 1709   Sat Oct 10, 2020   1414 Ekg by me nsr, qrs narrow, no st elev    [BC]   1512 Turned over to dr goodrich pend w/u and dispo    [BC]   1523 The white blood cell count is 12.3.  The relative neutrophil percent is 87%.  The glucose is 128 and a creatinine is 1.38.  The CO2 is 21.  AST is 60.  GFR is 51.  The initial troponin is negative.  The repeat troponin is pending.  The  laboratory values are otherwise unremarkable    [RC]   1703 The repeat troponin is normal.    [RC]      ED Course User Index  [BC] Curt Leiva MD  [RC] Cash Mario MD      16:39 EDT, 10/10/20:  Repeat EKG was obtained at 1632 and read by me at 1634.  EKG shows normal sinus rhythm with rate 82.  There is a normal axis with left ventricular hypertrophy and repolarization abnormality.  The MI is prolonged at 219 ms, the QRS and QT intervals are unremarkable.  There is no ectopy.  There is no acute ST elevation or depression.  The EKG was compared to an EKG dated October 10, 2020 at 1412 and is essentially unchanged.    17:09 EDT, 10/10/20:  The patient was reassessed.  He has no complaints.  He has no chest pain.  Her sats are 88%.  Upon ambulation his sats dropped to 87%..  Lung exam: Clear to auscultation equal bilaterally.  Neurological exam: Conscious alert oriented x4 with no focal deficits noted.    17:19 EDT, 10/10/20:  We will obtain a CT angiogram of the chest.  The lungs are clear on exam.  He has no complaint of shortness of breath.    17:09 EDT, 10/10/20:  Patient's diagnosis of malaise and seizure and atypical chest pain was discussed with him.  The patient should rest.  He should drink lots of fluids.  He should resume his usual seizure medications.  Patient should follow-up with his primary care provider on Monday.  Return to the emergency department if he has further seizures, or worse in any way at all.  She should rinse and spit 4 ounces of a half-and-half mixture of hydrogen peroxide and water 2-3 times a day for 5 days.  He should return to the emergency department if he is worse in any way at all.  All the patient's questions are answered he will be discharged in good condition.    20:48 EDT, 10/10/20:  There is no Xopenex available in the hospital.    21:27 EDT, 10/10/20:  The patient had marginal improvement with the incentive spirometer.  The patient has throat swelling with  bari conn.  I spoke with , on-call for pulmonary at [facility].  Commended giving the patient Solu-Medrol, and Pulmicort nebulizer and admit the patient and they will consult on him in the morning.  Patient will be placed on 2 L of oxygen.  On 2 L of oxygen his sats are 93 to 94%.  The plan will be to continue the incentive spirometer.    21:38 EDT, 10/10/20:  I spoke with Dr. Summers, who will admit the patient.                                     MDM    Final diagnoses:   Seizure (CMS/HCC)   Chest pain, atypical   Malaise   Hypoxia   History of COPD   Chest pain, unspecified type            Cash Mario MD  10/10/20 1712       Cash Mario MD  10/10/20 1730       Cash Mario MD  10/10/20 2130       Cash Mario MD  10/10/20 2138

## 2020-10-10 NOTE — ED NOTES
Dr. Mario removed oxygen and SAO2 dropped to 89%. Dr. Mario informed and requested I take the patient for a walk and see what oxygen level drops to. Portable Sao2 placed on patient and patient ambulated in hallway. SAO2 dropped to 87% RA. Pt placed back in bed. Oxygen re-applied @ 2 L/min nc and Dr. Mario notified     Ivan Barajas RN  10/10/20 2106

## 2020-10-10 NOTE — PROGRESS NOTES
"On-call coverage:    70-year-old male with a complicated past medical history including history of CVA, aortic insufficiency, nocturnal seizure disorder, COPD, hypertension, sinoatrial block, among others, who his wife calls with concern for \"not doing well\".    Patient had colonoscopy by Dr. Mendoza yesterday, since that procedure patient has been confused over baseline not tolerating p.o. intake, (1 cup of water in the last 24 hours), weak, with epigastric discomfort. Additionally, he had 3 seizure-like episodes last night with postictal phase lasting approximately 20 to 30 minutes each episode. He has not had a nocturnal seizure in over 1 year. Wife believes he is adherent to levetiracetam 500 mg twice daily.    Mild confusion has improved somewhat throughout the morning he still remains very weak and is not tolerating p.o. Denies fever, chills, cough, shortness of breath. Positive is a substernal burning, belching, nausea, malaise, weakness, headache. Baseline partial aphasia.    Due to his complicated past medical history and recent colonoscopy,with recent observed seizure-like episodes and poor p.o., I recommend they proceed to the emergency room for further evaluation."

## 2020-10-11 ENCOUNTER — APPOINTMENT (OUTPATIENT)
Dept: CT IMAGING | Facility: HOSPITAL | Age: 70
End: 2020-10-11

## 2020-10-11 PROCEDURE — 70450 CT HEAD/BRAIN W/O DYE: CPT

## 2020-10-11 PROCEDURE — 99214 OFFICE O/P EST MOD 30 MIN: CPT | Performed by: PSYCHIATRY & NEUROLOGY

## 2020-10-11 PROCEDURE — G0378 HOSPITAL OBSERVATION PER HR: HCPCS

## 2020-10-11 PROCEDURE — 94799 UNLISTED PULMONARY SVC/PX: CPT

## 2020-10-11 PROCEDURE — 99225 PR SBSQ OBSERVATION CARE/DAY 25 MINUTES: CPT | Performed by: INTERNAL MEDICINE

## 2020-10-11 RX ORDER — PHENYTOIN SODIUM 100 MG/1
200 CAPSULE, EXTENDED RELEASE ORAL ONCE
Status: COMPLETED | OUTPATIENT
Start: 2020-10-11 | End: 2020-10-11

## 2020-10-11 RX ORDER — PHENYTOIN SODIUM 100 MG/1
300 CAPSULE, EXTENDED RELEASE ORAL NIGHTLY
Status: DISCONTINUED | OUTPATIENT
Start: 2020-10-11 | End: 2020-10-12 | Stop reason: HOSPADM

## 2020-10-11 RX ORDER — ASPIRIN 81 MG/1
81 TABLET ORAL DAILY
Status: DISCONTINUED | OUTPATIENT
Start: 2020-10-11 | End: 2020-10-12 | Stop reason: HOSPADM

## 2020-10-11 RX ADMIN — PHENYTOIN SODIUM 200 MG: 100 CAPSULE ORAL at 12:44

## 2020-10-11 RX ADMIN — LEVETIRACETAM 500 MG: 500 TABLET ORAL at 10:06

## 2020-10-11 RX ADMIN — GUAIFENESIN 1200 MG: 600 TABLET, EXTENDED RELEASE ORAL at 10:06

## 2020-10-11 RX ADMIN — CETIRIZINE HYDROCHLORIDE 10 MG: 10 TABLET, FILM COATED ORAL at 10:05

## 2020-10-11 RX ADMIN — HYDROCHLOROTHIAZIDE 12.5 MG: 12.5 TABLET ORAL at 10:06

## 2020-10-11 RX ADMIN — SODIUM CHLORIDE, PRESERVATIVE FREE 10 ML: 5 INJECTION INTRAVENOUS at 10:07

## 2020-10-11 RX ADMIN — PHENYTOIN SODIUM 300 MG: 100 CAPSULE ORAL at 21:25

## 2020-10-11 RX ADMIN — ASPIRIN 81 MG: 81 TABLET, COATED ORAL at 10:06

## 2020-10-11 RX ADMIN — SODIUM CHLORIDE, PRESERVATIVE FREE 10 ML: 5 INJECTION INTRAVENOUS at 21:27

## 2020-10-11 RX ADMIN — GUAIFENESIN 1200 MG: 600 TABLET, EXTENDED RELEASE ORAL at 21:25

## 2020-10-11 NOTE — CONSULTS
Patient Identification:  NAME:  Ronnell Rizvi  Age:  70 y.o.   Sex:  male   :  1950   MRN:  [id number]       Chief complaint: He does not have one, reason for consult 3 seizures    History of present illness: This patient is 70-year-old right-handed white male with a past history of stroke history of asthma COPD who is been on Keppra for several years and it was discontinued within this last year because he had a rash she also felt it made him feel drunk when he got up to go the bathroom at night he has not been on Keppra at least for the last 5 months if not longer early Saturday morning late Friday night he was witnessed to have 3 generalized convulsions tonic-clonic in quality duration about a minute or so 20 minutes in between them and then he had recurrent seizures there was tongue biting and incontinence is associated symptoms modifying factors he has been placed back on the Keppra but he does not want to stay on Keppra since he thinks that caused a rash and made him feel drunk.  Location it was time generalized quality tonic-clonic context patient has known seizure disorder previous MRI scans showed old stroke      Past medical history:  Past Medical History:   Diagnosis Date   • Allergic rhinitis    • Anal fissure    • Aortic insufficiency     moderate, THIERRY    • Asthma    • Asthma    • Benign prostatic hypertrophy (BPH) with weak urinary stream 2016   • Chronic bronchitis (CMS/HCC)    • COPD (chronic obstructive pulmonary disease) (CMS/HCC)    • Emphysema lung (CMS/HCC)    • Fatty liver    • GERD (gastroesophageal reflux disease)    • Hepatitis     thought to be Hepatitis A    • History of pneumonia     With left lower lobe atelectasis and scar tissue, being followed   • HL (hearing loss)    • Hypertension    • Memory loss    • Obstructive sleep apnea syndrome 2017    refuses c-pap   • PFO (patent foramen ovale)     s/p percutaneous closure with a 25mm Amplatzer device in December  2018   • Pneumonia     LLL with scar tissue   • Seizures (CMS/HCC)    • 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   • Spinal stenosis    • Stroke (CMS/HCC)     10/2017: left occipital/temporal. total of 3 strokes   • Vision loss        Past surgical history:  Past Surgical History:   Procedure Laterality Date   • CARDIAC CATHETERIZATION N/A 12/12/2018    Procedure: Patent foramen ovale closure- Abbott;  Surgeon: Deangelo Harris MD;  Location:  [facility] CATH INVASIVE LOCATION;  Service: Cardiology   • CARDIAC CATHETERIZATION N/A 12/12/2018    Procedure: Intracardiac echocardiogram;  Surgeon: Deangelo Harris MD;  Location:  [facility] CATH INVASIVE LOCATION;  Service: Cardiology   • CARDIAC ELECTROPHYSIOLOGY PROCEDURE N/A 3/26/2018    Procedure: Loop insertion   CONFIRM RX;  Surgeon: Luis Wyatt MD;  Location:  [facility] CATH INVASIVE LOCATION;  Service: Cardiovascular   • CARDIAC ELECTROPHYSIOLOGY PROCEDURE N/A 7/15/2020    Procedure: Loop recorder removal;  Surgeon: Starr Driscoll MD;  Location:  [facility] CATH INVASIVE LOCATION;  Service: Cardiovascular;  Laterality: N/A;   • CARDIAC SURGERY     • COLONOSCOPY     • HAND SURGERY Bilateral    • INGUINAL HERNIA REPAIR Left    • OTHER SURGICAL HISTORY      inguinal hernia repair for person over age 5   • TONSILLECTOMY     • TONSILLECTOMY         Allergies:  Plavix [clopidogrel], Aspirin, Citrus, Combivent [ipratropium-albuterol], Lactose intolerance (gi), and Statins    Home medications:  Medications Prior to Admission   Medication Sig Dispense Refill Last Dose   • cetirizine (zyrTEC) 10 MG tablet Take 10 mg by mouth Daily As Needed.   10/8/2020 at 0900   • Cyanocobalamin 1000 MCG/ML kit Inject  as directed 1 (One) Time Per Week. 1/4 of a cc IM every stacy   10/4/2020 at Unknown time   • guaiFENesin (MUCINEX PO) Take  by mouth.   10/9/2020 at Unknown time   • guaiFENesin (MUCINEX) 600 MG 12 hr tablet Take 600 mg by mouth 2 (Two) Times a Day As Needed for Cough.   10/5/2020 at  0900   • hydroCHLOROthiazide (HYDRODIURIL) 12.5 MG tablet Take 1 tablet by mouth Daily. 90 tablet 2 10/8/2020 at 0900   • levETIRAcetam (KEPPRA) 500 MG tablet TAKE ONE TABLET BY MOUTH TWICE A DAY (Patient not taking: Reported on 10/10/2020) 60 tablet 2 Not Taking at Unknown time        Hospital medications:  aspirin, 81 mg, Oral, Daily  cetirizine, 10 mg, Oral, Daily  guaiFENesin, 1,200 mg, Oral, Q12H  hydroCHLOROthiazide, 12.5 mg, Oral, Daily  phenytoin, 200 mg, Oral, Once  phenytoin, 300 mg, Oral, Nightly  sodium chloride, 10 mL, Intravenous, Q12H         •  acetaminophen **OR** acetaminophen **OR** acetaminophen  •  bisacodyl  •  magnesium hydroxide  •  ondansetron **OR** ondansetron  •  sodium chloride  •  sodium chloride    Family history:  Family History   Problem Relation Age of Onset   • Obesity Mother    • Clotting disorder Mother         Upper extremities   • Alcohol abuse Father    • Cancer Father 63        Esophagus and lung   • Other Father         cardiac disorder   • Heart disease Father    • Kidney disease Sister    • Kidney failure Sister    • Drug abuse Paternal Uncle    • Stroke Sister    • Throat cancer Sister        Social history:  Social History     Tobacco Use   • Smoking status: Former Smoker     Quit date: 1979     Years since quittin.6   • Smokeless tobacco: Never Used   • Tobacco comment: caffeine use: Drinks 4 glasses of Dt coke on avg.    Substance Use Topics   • Alcohol use: No   • Drug use: No       Review of systems:    He has bitten his tongue and that sore but no hair eyes ears nose throat skin bone joint  lymphatic hematologic oncologic complaints no neck pain chest pain abdominal pain bowel bladder incontinence fever chills or rash    Objective:  Vitals Ranges:   Temp:  [97.5 °F (36.4 °C)-99.6 °F (37.6 °C)] 97.8 °F (36.6 °C)  Heart Rate:  [73-87] 76  Resp:  [16-20] 17  BP: (116-162)/(56-88) 116/58      Physical Exam:  Awake alert oriented x3 in no distress fund of  knowledge good attention span concentration good recent remote memory good language function normal well-developed well-nourished in no distress pupils 2 constricting to one 1/2 unable to visualize disc retinas extraocular movements are full without nystagmus nasolabial folds palate tongue symmetrical normal hearing facial sensation head turning shoulder shrug motor 5 out of 5 x 4 extremities no pronator drift atrophy fasciculations rigidity or bradykinesia reflexes trace throughout symmetrical toes downgoing bilaterally sensation normal light touch face both arms both legs coordination normal in the upper extremities Station and gait was not tested for fear I would let him fall heart is regular without murmur neck supple without bruits extremities no clubbing cyanosis edema visual acuity seems to be normal at 6 feet    Results review:   I reviewed the patient's new clinical results.    Data review:  Lab Results (last 24 hours)     Procedure Component Value Units Date/Time    BNP [[id number]]  (Normal) Collected: 10/10/20 1426    Specimen: Blood Updated: 10/10/20 1746     proBNP 724.6 pg/mL     Narrative:      Among patients with dyspnea, NT-proBNP is highly sensitive for the detection of acute congestive heart failure. In addition NT-proBNP of <300 pg/ml effectively rules out acute congestive heart failure with 99% negative predictive value.    Results may be falsely decreased if patient taking Biotin.      Troponin [[id number]]  (Normal) Collected: 10/10/20 1621    Specimen: Blood Updated: 10/10/20 1645     Troponin T <0.010 ng/mL     Narrative:      Troponin T Reference Range:  <= 0.03 ng/mL-   Negative for AMI  >0.03 ng/mL-     Abnormal for myocardial necrosis.  Clinicians would have to utilize clinical acumen, EKG, Troponin and serial changes to determine if it is an Acute Myocardial Infarction or myocardial injury due to an underlying chronic condition.       Results may be falsely decreased if patient taking  Biotin.      Troponin [[id number]]  (Normal) Collected: 10/10/20 1426    Specimen: Blood Updated: 10/10/20 1509     Troponin T <0.010 ng/mL     Narrative:      Troponin T Reference Range:  <= 0.03 ng/mL-   Negative for AMI  >0.03 ng/mL-     Abnormal for myocardial necrosis.  Clinicians would have to utilize clinical acumen, EKG, Troponin and serial changes to determine if it is an Acute Myocardial Infarction or myocardial injury due to an underlying chronic condition.       Results may be falsely decreased if patient taking Biotin.      Comprehensive Metabolic Panel [[id number]]  (Abnormal) Collected: 10/10/20 1426    Specimen: Blood Updated: 10/10/20 1507     Glucose 128 mg/dL      BUN 15 mg/dL      Creatinine 1.38 mg/dL      Sodium 138 mmol/L      Potassium 3.7 mmol/L      Chloride 103 mmol/L      CO2 21.5 mmol/L      Calcium 9.3 mg/dL      Total Protein 7.4 g/dL      Albumin 4.10 g/dL      ALT (SGPT) 33 U/L      AST (SGOT) 60 U/L      Alkaline Phosphatase 61 U/L      Total Bilirubin 0.6 mg/dL      eGFR Non African Amer 51 mL/min/1.73      Globulin 3.3 gm/dL      A/G Ratio 1.2 g/dL      BUN/Creatinine Ratio 10.9     Anion Gap 13.5 mmol/L     Narrative:      GFR Normal >60  Chronic Kidney Disease <60  Kidney Failure <15      CBC & Differential [[id number]]  (Abnormal) Collected: 10/10/20 1426    Specimen: Blood Updated: 10/10/20 1448    Narrative:      The following orders were created for panel order CBC & Differential.  Procedure                               Abnormality         Status                     ---------                               -----------         ------                     CBC Auto Differential[[id number]]        Abnormal            Final result                 Please view results for these tests on the individual orders.    CBC Auto Differential [[id number]]  (Abnormal) Collected: 10/10/20 1426    Specimen: Blood Updated: 10/10/20 1448     WBC 12.30 10*3/mm3      RBC 5.40 10*6/mm3      Hemoglobin  16.7 g/dL      Hematocrit 49.7 %      MCV 92.0 fL      MCH 30.9 pg      MCHC 33.6 g/dL      RDW 12.8 %      RDW-SD 43.7 fl      MPV 9.9 fL      Platelets 245 10*3/mm3      Neutrophil % 87.7 %      Lymphocyte % 6.3 %      Monocyte % 5.7 %      Eosinophil % 0.0 %      Basophil % 0.1 %      Immature Grans % 0.2 %      Neutrophils, Absolute 10.78 10*3/mm3      Lymphocytes, Absolute 0.78 10*3/mm3      Monocytes, Absolute 0.70 10*3/mm3      Eosinophils, Absolute 0.00 10*3/mm3      Basophils, Absolute 0.01 10*3/mm3      Immature Grans, Absolute 0.03 10*3/mm3            Imaging:  Imaging Results (Last 24 Hours)     Procedure Component Value Units Date/Time    CT Angiogram Chest [[id number]] Collected: 10/10/20 1828     Updated: 10/10/20 1833    Narrative:      CTA Chest    INDICATION:   Low oxygen saturation with convulsions, chest pain, malaise, chest pain since yesterday    TECHNIQUE:   CT angiogram of the chest with IV contrast. 3-D reconstructions were obtained and reviewed.   Radiation dose reduction techniques included automated exposure control or exposure modulation based on body size. Count of known CT and cardiac nuc med studies  performed in previous 12 months: 0.     COMPARISON:   Same-day chest radiograph    FINDINGS:   Adequate opacification of the pulmonary arteries with no filling defects. Thoracic aorta normal in course and caliber without dissection. Heart size is normal. No pericardial effusion.    Lungs demonstrate mild emphysematous changes predominantly at the lung apices. Bands of consolidation in the lung bases, right greater than left likely reflecting atelectasis but otherwise no confluent consolidation seen. The tracheobronchial tree is  patent. No pleural effusion. No pneumothorax.    Small hiatal hernia. Visualized upper abdomen is otherwise unremarkable.    No acute osseous abnormality.      Impression:      1.  No pulmonary embolus.  2.  Bands of consolidation in the right greater than left  base, most likely reflecting atelectasis. Otherwise no confluent infiltrate.    Signer Name: PAULA FUNEZ MD   Signed: 10/10/2020 6:28 PM   Workstation Name: [facility]    [facility]    XR Chest 1 View [[id number]] Collected: 10/10/20 1457     Updated: 10/10/20 1524    Narrative:      CR Chest 1 Vw    INDICATION:   Chest pain starting last night     COMPARISON:    12/8/2018    FINDINGS:  Single portable AP view(s) of the chest.  Normal cardiomediastinal contours. Favor bibasilar atelectasis involving primarily left base and right cardiophrenic angle. Otherwise clear lungs. No acute displaced fracture or dislocation.      Impression:      Favor atelectasis in the lung bases without definite acute process.    Signer Name: PAULA FUNEZ MD   Signed: 10/10/2020 2:57 PM   Workstation Name: [facility]    [facility]         PPE worn by me the patient and his wife washed up before washed up afterwards disposed of everything properly was not within 6 feet of him for more than a few minutes during my exam no aerosols were used    Assessment and Plan:     Patient is suffered 3 seizures in the middle of the night that were generalized convulsions and about 20 minutes apart.  He did bite his tongue and there was some urinary incontinence and had significant postictal lethargy.    This is a patient who has had seizures for at least several years and is followed by [facility] neurology and has been on Keppra.  At this point it is presumed that his seizure disorder comes from previous history of cortical stroke.    Nonetheless there was some question earlier in the year whether or not he had an allergic reaction to the Keppra earlier this year.  Note he had been on it for apparently a year or 2 before that without any problems nonetheless he stopped the Keppra and has not been taking any seizure medicine at least for the last 5 months or so.  He also noted the Keppra made him feel  drunk when he got up at night and that would be another reason it was discontinued.  At this time he has had recurrent seizures and needs to be on seizure medicine I would prefer at this point to use Dilantin he will get 200 mg now orally and then will be started on 300 mg p.o. nightly he has been warned of all possible side effects including allergic reaction or toxicity and if he feels drunk or drowsy on this he will have a level checked.  I will check a plain CT of the head at this time and note that his creatinine is up slightly.  According to his wife he has had MRI scans and a complete work-up prior to this and it is noted previous MRI scans have shown an old infarct in the left occipital or left periventricular region.  He has also been seen by Dia Call at [facility] and has an appointment to see her or the group there coming up and this is appropriate and they can perform further diagnostic testing at that time if they so desire.  In short, we will discontinue the Keppra, begin the Dilantin 300 mg p.o. nightly he knows not to drive for a total of 90 days and until given clearance by an outpatient MD he has an appointment for follow-up with [facility] and Dia Call thanks      Krunal Huerta MD  10/11/20  11:14 EDT

## 2020-10-11 NOTE — PLAN OF CARE
Goal Outcome Evaluation:  Plan of Care Reviewed With: patient     Outcome Summary: no seizure activity. head ct completed. MD called with results no new orders. started Dilantin today

## 2020-10-11 NOTE — PROGRESS NOTES
[facility] HOSPITALIST TEAM   PROGRESS NOTE      Patient Care Team:  Anne Chand MD as PCP - General (Family Medicine)  Dania Vazquez APRN as PCP - Claims Attributed  Gregory King MD as Consulting Physician (Hematology and Oncology)  Chase Haque MD as Referring Physician (Internal Medicine)  Luis Wyatt MD as Consulting Physician (Cardiology)    Patient seen at bedside    Hospitalist Team      Patient Care Team:  Provider, No Known as PCP - General          Subjective      Presenting History and Chief Complaints:      Chief Complaint:      Seizures    Admission History:    As per Dr Suarez' admission Note:  70-year-old white male with history of grand mal seizure disorder last 1 year ago who came to the emergency because he had 3 seizures last night.  History is obtained from his wife as well as the patient.  Patient apparently had 3 episodes of grand mal seizure activities lasting about 20 minutes about an hour apart.  He was postictal in between had some urinary incontinence.  He patient was apparently a little slow to respond and having trouble with some recent memory and some confusion therefore his wife made him come to the emergency room.  Patient states he is still having some difficulty with certain words but no other complaints at this time denying shortness of breath chest pain.  He has been having some chest congestion some allergy symptoms recently.  Patient's plan to be hypoxic in the emergency room is being admitted for further evaluation treatment.  Patient does have history of COPD but is on no treatment as apparently is allergic to duo nebs     Patient had a colonoscopy Friday.  He states he is not been taking his seizure medication since last year because he thought he he had a drug rash and he contacted his cardiologist who told him to stop his Keppra.  I reviewed his old medical records and actually the medication that was discontinued was  "his Plavix but nevertheless has not been doing any seizure medications since May 2020.    Interval History and ROS:     Patient States He had 3 seizures on last Friday and he had stopped Keppra a year ago thinking that it was causing rash. Was seen by Dr Huerta thi morning. Breathing has improved now  Patient Complaints: As above  Patient Denies:  Denies any sx of fever, headache, chest pain, shortness of breath, abdominal pain or n/v  History taken from: Patient      Objective    Vital Signs  Temp:  [97.5 °F (36.4 °C)-99.6 °F (37.6 °C)] 97.8 °F (36.6 °C)  Heart Rate:  [70-84] 70  Resp:  [16-20] 17  BP: (116-147)/(56-82) 116/58    Flowsheet Rows      First Filed Value   Admission Height  182.9 cm (72\") Documented at 10/10/2020 1411   Admission Weight  83.9 kg (185 lb) Documented at 10/10/2020 1411              PHYSICAL EXAMINATION:        VS: As documented above  PE: GEN - A&O x 3, in NAD   HEENT - Normocephalic, PERRL, EOMI   CV - RRR, S1+, S2+. with no m/r/g  RESP - CTAB x no rales or rhonchi    ABD - s/nt/nd, NABS, no HSMeg, no palpable masses   MS - MAEW(moves all extremities well) , 5/5 strength UE/LE   EXT - No edema, cyanosis or clubbing  NEURO - CN II-XII intact, normal motor and sensory examinations without any focal  neurologic deficits.  PSYCH - affect, mood congruent and appropriate           Results Review:     I reviewed the patient's new clinical results.    Lab Results (last 24 hours)     Procedure Component Value Units Date/Time    BNP [[id number]]  (Normal) Collected: 10/10/20 1426    Specimen: Blood Updated: 10/10/20 1746     proBNP 724.6 pg/mL     Narrative:      Among patients with dyspnea, NT-proBNP is highly sensitive for the detection of acute congestive heart failure. In addition NT-proBNP of <300 pg/ml effectively rules out acute congestive heart failure with 99% negative predictive value.    Results may be falsely decreased if patient taking Biotin.      Troponin [[id number]]  (Normal) " Collected: 10/10/20 1621    Specimen: Blood Updated: 10/10/20 1645     Troponin T <0.010 ng/mL     Narrative:      Troponin T Reference Range:  <= 0.03 ng/mL-   Negative for AMI  >0.03 ng/mL-     Abnormal for myocardial necrosis.  Clinicians would have to utilize clinical acumen, EKG, Troponin and serial changes to determine if it is an Acute Myocardial Infarction or myocardial injury due to an underlying chronic condition.       Results may be falsely decreased if patient taking Biotin.            Imaging Results (Last 24 Hours)     Procedure Component Value Units Date/Time    CT Head Without Contrast [[id number]] Collected: 10/11/20 1220     Updated: 10/11/20 1222    Narrative:      PROCEDURE: CT Head WO    COMPARISON: December 2018     INDICATIONS: Seizure 3 days ago, nontraumatic (Age >= 41y);Unspecified convulsions; Other chest pain; Other malaise; Hypoxemia;      TECHNIQUE:  CT examination of the brain is performed without IV contrast.  Radiation dose reduction techniques included automated exposure control or exposure modulation based on body size.   Count of known CT and cardiac nuc med studies performed in previous 12 months: 1.          FINDINGS:   There is no evidence of acute intracranial hemorrhage, mass effect or midline shift. No intra-axial or extra-axial fluid collections. There is mild cortical atrophy. Periventricular low attenuation is likely secondary to small vessel ischemic  disease. Previous infarction in the left parietal lobe noted. The ventricular system is not dilated.  The calvarium is intact.         Impression:      No acute intracranial process. Chronic ischemic change.    Signer Name: Sirena Burgos MD   Signed: 10/11/2020 12:20 PM   Workstation Name: [facility]    [facility]    CT Angiogram Chest [[id number]] Collected: 10/10/20 1828     Updated: 10/10/20 1833    Narrative:      CTA Chest    INDICATION:   Low oxygen saturation with convulsions, chest pain,  malaise, chest pain since yesterday    TECHNIQUE:   CT angiogram of the chest with IV contrast. 3-D reconstructions were obtained and reviewed.   Radiation dose reduction techniques included automated exposure control or exposure modulation based on body size. Count of known CT and cardiac nuc med studies  performed in previous 12 months: 0.     COMPARISON:   Same-day chest radiograph    FINDINGS:   Adequate opacification of the pulmonary arteries with no filling defects. Thoracic aorta normal in course and caliber without dissection. Heart size is normal. No pericardial effusion.    Lungs demonstrate mild emphysematous changes predominantly at the lung apices. Bands of consolidation in the lung bases, right greater than left likely reflecting atelectasis but otherwise no confluent consolidation seen. The tracheobronchial tree is  patent. No pleural effusion. No pneumothorax.    Small hiatal hernia. Visualized upper abdomen is otherwise unremarkable.    No acute osseous abnormality.      Impression:      1.  No pulmonary embolus.  2.  Bands of consolidation in the right greater than left base, most likely reflecting atelectasis. Otherwise no confluent infiltrate.    Signer Name: PAULA FUNEZ MD   Signed: 10/10/2020 6:28 PM   [facility]    [facility]          Xray reviewed personally by physician.      ECG reviewed personally by physician  ECG/EMG Results (most recent)     Procedure Component Value Units Date/Time    ECG 12 Lead [[id number]] Collected: 10/10/20 1632     Updated: 10/10/20 1849    Narrative:      HEART RATE= 82  bpm  RR Interval= 728  ms  DC Interval= 218  ms  P Horizontal Axis= -1  deg  P Front Axis= 20  deg  QRSD Interval= 89  ms  QT Interval= 381  ms  QRS Axis= -27  deg  T Wave Axis= 109  deg  - ABNORMAL ECG -  Sinus rhythm  Borderline prolonged DC interval  Probable LVH with secondary repol abnrm  NO SIGNIFICANT CHANGE FROM PREVIOUS ECG  Electronically  Signed By: Shira Lovelace ([facility]) 10-Oct-2020 18:35:11  Date and Time of Study: 2020-10-10 16:32:33    ECG 12 Lead [[id number]] Collected: 10/10/20 1412     Updated: 10/10/20 1850    Narrative:      HEART RATE= 89  bpm  RR Interval= 676  ms  HI Interval= 227  ms  P Horizontal Axis= 5  deg  P Front Axis= 33  deg  QRSD Interval= 86  ms  QT Interval= 362  ms  QRS Axis= -26  deg  T Wave Axis= 100  deg  - ABNORMAL ECG -  Sinus rhythm  Prolonged HI interval  Borderline left axis deviation  Nonspecific T abnormalities, lateral leads  NO SIGNIFICANT CHANGE FROM PREVIOUS ECG  Electronically Signed By: Shira Lovelace ([facility]) 10-Oct-2020 18:35:23  Date and Time of Study: 2020-10-10 14:12:58          Medication Review:   I have reviewed the patient's current medication list    Current Facility-Administered Medications:   •  acetaminophen (TYLENOL) tablet 650 mg, 650 mg, Oral, Q4H PRN **OR** acetaminophen (TYLENOL) 160 MG/5ML solution 650 mg, 650 mg, Oral, Q4H PRN **OR** acetaminophen (TYLENOL) suppository 650 mg, 650 mg, Rectal, Q4H PRN, Caden Suarez MD  •  aspirin EC tablet 81 mg, 81 mg, Oral, Daily, Caden Suarez MD, 81 mg at 10/11/20 1006  •  bisacodyl (DULCOLAX) EC tablet 5 mg, 5 mg, Oral, Daily PRN, Caden Suarez MD  •  cetirizine (zyrTEC) tablet 10 mg, 10 mg, Oral, Daily, Caden Suarez MD, 10 mg at 10/11/20 1005  •  guaiFENesin (MUCINEX) 12 hr tablet 1,200 mg, 1,200 mg, Oral, Q12H, Caden Suarez MD, 1,200 mg at 10/11/20 1006  •  hydroCHLOROthiazide (HYDRODIURIL) tablet 12.5 mg, 12.5 mg, Oral, Daily, Caden Suarez MD, 12.5 mg at 10/11/20 1006  •  magnesium hydroxide (MILK OF MAGNESIA) suspension 2400 mg/10mL 10 mL, 10 mL, Oral, Daily PRN, Caden Suarez MD  •  ondansetron (ZOFRAN) tablet 4 mg, 4 mg, Oral, Q6H PRN **OR** ondansetron (ZOFRAN) injection 4 mg, 4 mg, Intravenous, Q6H PRN, Caden Suarez MD  •  phenytoin  (DILANTIN) ER capsule 300 mg, 300 mg, Oral, Nightly, Krunal Huerta MD  •  sodium chloride 0.9 % flush 1-10 mL, 1-10 mL, Intravenous, PRN, Caden Suarez MD  •  sodium chloride 0.9 % flush 10 mL, 10 mL, Intravenous, Q12H, Caden Suarez MD, 10 mL at 10/11/20 1007  •  sodium chloride 0.9 % infusion 40 mL, 40 mL, Intravenous, PRN, Caden Suarez MD      Assessment/Plan           PLAN:    -Labs and diagnostic tests reviewed: YES    -Diagnostic tests reviewed: YES    -Consultations: Pulmomary and Neurology    -Any new recommendations: As per consultants    -New Labs ordered: CBC and CMP    -New diagnostic tests ordered: N/A    -Any changes in medications:  MEDICATION CHANGES:   New Medications added: Dilantin   Medication Dose changed: N/A   Medications deleted: Keppra    -Placement issues: N/A    -Patient is clinically and hemodynamically stable    -To continue current management and supportive care    -Will follow patient closely    -Nothing new to add for right now    -Discharge planning issues: Patient should be able to go back home once ready for discharge      DIAGNOSES:      PRIMARY DIAGNOSES:          Acute hypoxic respiratory failure uncertain etiology likely due to hypoventilation chest x-ray is normal.  Patient is allergic to DuoNeb's we will try Pulmicort incentive spirometry and get pulmonary evaluate the patient. Was seen by Pulmonary today      Grand mal seizure disorder patient appears to be slightly postictal with some mild confusion difficulty with some recent memory.  His wife states he has had some problems since his stroke last year but seems to have gotten worse since his seizure yesterday.  Patient has been given IV Keppra in the emergency room.  I discussed with him that he has no allergy to Keppra and his allergy is actually Plavix and will resume p.o. Keppra will get neurology see patient consultation tomorrow. Was seen by Dr Huerta today. He decided to D/C  Keppra and start patient on Dilantin     Hypertension controlled continue with hydrochlorothiazide    Mild allergic rhinitis continue with nonsedating antihistamine and Mucinex     History of PFO status post percutaneous closure in December 2018     History of recurrent CVA 2018 was on dual antiplatelet therapy and had recurrent TIA January 2020 on oral aspirin due to Plavix allergy no statin because of statin intolerance     COPD with no evidence of exacerbation incentive spirometry Pulmicort pulmonary be consulted     Obstructive sleep apnea not using CPAP    Anthropometric Analysis: BMI:  24.38     CODE Status: FULL CODE    Tobacco use: Former. Quit in 1979    Alcohol intake: N/A    Illegal Drug use: N/A    DVT Prophylaxis: Inj Enoxaparin and SCDs          ?     SECONDARY DIAGNOSES:  ?     As above      SURGICAL DIAGNOSES:      As per Problem List    Plan for disposition: Patient should be able to go back to Home once ready for discharge    Ford Euceda MD  10/11/20  16:45 EDT            Ford Euceda M.D.; MS; FACP; MPH; PALAK  Internal Medicine/ Hospitalist          Time: 30  minutes

## 2020-10-11 NOTE — PROGRESS NOTES
CONSULT NOTE    Patient Identification:  Ronnell Rizvi  70 y.o.  male  1950  [id number]            Requesting physician: Dr Ford Euceda    Reason for Consultation:  COPD, hypoxic resp failure    CC: seizure    History of Present Illness:  Patient is a very nice 70-year-old male with previous medical history of COPD that really says never really bothered him before he says that when he thinks back on it a year ago was able to walk his driveway without difficulty however over the past 2 months he is noticed that it is a little more difficult for him.  No frequent acute exacerbations.  He had been tried on Combivent before as needed he said he rarely used it however 3 weeks into using it had an episode where he describes very severe throat swelling after using the Combivent which she describes to be a fairly traumatic and impressive event.  He presented after seizure and is admitted for this work up.     At present time he denies any chest pain chest tightness wheezing cough sputum production fevers or chills.      Review of Systems:  CONSTITUTIONAL:  Denies fevers or chills  EYE:  No new vision changes  EAR:  No change in hearing  CARDIAC:  No chest pain  PULMONARY:  No productive cough or shortness of breath  GI:  No diarrhea, hematemesis or hematochezia,  RENAL:  No dysuria or urinary frequency  MUSCULOSKELETAL:  No musculoskeletal complaints  ENDOCRINE:  No heat or cold intolerance  INTEGUMENTARY: No skin rashes  NEUROLOGICAL:  No dizziness or confusion.  +seizure activity  PSYCHIATRIC:  No new anxiety or depression  12 system review of systems performed and all else negative    Past Medical History:   Diagnosis Date   • Allergic rhinitis    • Anal fissure    • Aortic insufficiency     moderate, THIERRY 2017   • Asthma    • Asthma    • Benign prostatic hypertrophy (BPH) with weak urinary stream 11/1/2016   • Chronic bronchitis (CMS/[facility])    • COPD (chronic obstructive pulmonary disease) (CMS/[facility])    •  Emphysema lung (CMS/HCC)    • Fatty liver    • GERD (gastroesophageal reflux disease)    • Hepatitis     thought to be Hepatitis A    • History of pneumonia     With left lower lobe atelectasis and scar tissue, being followed   • HL (hearing loss)    • Hypertension    • Memory loss    • Obstructive sleep apnea syndrome 8/23/2017    refuses c-pap   • PFO (patent foramen ovale)     s/p percutaneous closure with a 25mm Amplatzer device in December 2018   • Pneumonia     LLL with scar tissue   • Seizures (CMS/HCC)    • 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   • Spinal stenosis    • Stroke (CMS/HCC)     10/2017: left occipital/temporal. total of 3 strokes   • Vision loss        Past Surgical History:   Procedure Laterality Date   • CARDIAC CATHETERIZATION N/A 12/12/2018    Procedure: Patent foramen ovale closure- Abbott;  Surgeon: Deangelo Harris MD;  Location:  [facility];  Service: Cardiology   • CARDIAC CATHETERIZATION N/A 12/12/2018    Procedure: Intracardiac echocardiogram;  Surgeon: Deangelo Harris MD;  Location:  [facility];  Service: Cardiology   • CARDIAC ELECTROPHYSIOLOGY PROCEDURE N/A 3/26/2018    Procedure: Loop insertion   CONFIRM RX;  Surgeon: Luis Wyatt MD;  Location:  [facility];  Service: Cardiovascular   • CARDIAC ELECTROPHYSIOLOGY PROCEDURE N/A 7/15/2020    Procedure: Loop recorder removal;  Surgeon: Starr Driscoll MD;  Location:  [facility];  Service: Cardiovascular;  Laterality: N/A;   • CARDIAC SURGERY     • COLONOSCOPY     • HAND SURGERY Bilateral    • INGUINAL HERNIA REPAIR Left    • OTHER SURGICAL HISTORY      inguinal hernia repair for person over age 5   • TONSILLECTOMY     • TONSILLECTOMY          Medications Prior to Admission   Medication Sig Dispense Refill Last Dose   • cetirizine (zyrTEC) 10 MG tablet Take 10 mg by mouth Daily As Needed.   10/8/2020 at 0900   • Cyanocobalamin 1000 MCG/ML kit Inject  as  "directed 1 (One) Time Per Week. /4 of a cc IM every stacy   10/4/2020 at Unknown time   • guaiFENesin (MUCINEX PO) Take  by mouth.   10/9/2020 at Unknown time   • guaiFENesin (MUCINEX) 600 MG 12 hr tablet Take 600 mg by mouth 2 (Two) Times a Day As Needed for Cough.   10/5/2020 at 0900   • hydroCHLOROthiazide (HYDRODIURIL) 12.5 MG tablet Take 1 tablet by mouth Daily. 90 tablet 2 10/8/2020 at 0900   • levETIRAcetam (KEPPRA) 500 MG tablet TAKE ONE TABLET BY MOUTH TWICE A DAY (Patient not taking: Reported on 10/10/2020) 60 tablet 2 Not Taking at Unknown time       Allergies   Allergen Reactions   • Plavix [Clopidogrel] Rash   • Aspirin Nausea Only     Pt states he \"can tolerate enteric coated aspirin only.\"    • Citrus      Blisters on mouth     • Combivent [Ipratropium-Albuterol] Other (See Comments)     Throat swelling   • Lactose Intolerance (Gi)    • Statins Mental Status Change     Severe memory impairment       Social History     Socioeconomic History   • Marital status:      Spouse name: Joey   • Number of children: Not on file   • Years of education: Not on file   • Highest education level: Not on file   Occupational History   • Occupation:      Employer: RETIRED   Tobacco Use   • Smoking status: Former Smoker     Quit date: 1979     Years since quittin.6   • Smokeless tobacco: Never Used   • Tobacco comment: caffeine use: Drinks 4 glasses of Dt coke on avg.    Substance and Sexual Activity   • Alcohol use: No   • Drug use: No   • Sexual activity: Defer       Family History   Problem Relation Age of Onset   • Obesity Mother    • Clotting disorder Mother         Upper extremities   • Alcohol abuse Father    • Cancer Father 63        Esophagus and lung   • Other Father         cardiac disorder   • Heart disease Father    • Kidney disease Sister    • Kidney failure Sister    • Drug abuse Paternal Uncle    • Stroke Sister    • Throat cancer Sister        Physical Exam:  /67 (BP " "Location: Left arm, Patient Position: Lying)   Pulse 82   Temp 98.4 °F (36.9 °C) (Oral)   Resp 16   Ht 185.4 cm (73\")   Wt 83.8 kg (184 lb 12.8 oz)   SpO2 90%   BMI 24.38 kg/m²   Body mass index is 24.38 kg/m².   General appearance: NAD, conversant   Eyes: anicteric sclerae, moist conjunctivae; no lid-lag; PERRLA  HENT: Atraumatic; oropharynx clear with moist mucous membranes and no mucosal ulcerations; normal hard and soft palate  Neck: Trachea midline; FROM, supple, no thyromegaly or lymphadenopathy  Lungs: CTA without wheeze or crackles, with normal respiratory effort and no intercostal retractions  CV: RRR, no MRGs   Abdomen: Soft, non-tender; no masses or HSM  Extremities: No peripheral edema or extremity lymphadenopathy  Skin: Normal temperature, turgor and texture; no rash, ulcers or subcutaneous nodules  Psych: Appropriate affect, alert and oriented to person, place and time    LABS:  Results from last 7 days   Lab Units 10/10/20  1426   WBC 10*3/mm3 12.30*   HEMOGLOBIN g/dL 16.7   PLATELETS 10*3/mm3 245     Results from last 7 days   Lab Units 10/10/20  1426 10/09/20  0858   SODIUM mmol/L 138  --    POTASSIUM mmol/L 3.7 4.0   CHLORIDE mmol/L 103  --    CO2 mmol/L 21.5*  --    BUN mg/dL 15  --    CREATININE mg/dL 1.38*  --    GLUCOSE mg/dL 128*  --    CALCIUM mg/dL 9.3  --    Estimated Creatinine Clearance: 58.2 mL/min (A) (by C-G formula based on SCr of 1.38 mg/dL (H)).    Imaging: I personally visualized the images of scans/x-rays performed within last 3 days.  Imaging Results (Most Recent)     Procedure Component Value Units Date/Time    CT Angiogram Chest [[id number]] Collected: 10/10/20 1828     Updated: 10/10/20 1833    Narrative:      CTA Chest    INDICATION:   Low oxygen saturation with convulsions, chest pain, malaise, chest pain since yesterday    TECHNIQUE:   CT angiogram of the chest with IV contrast. 3-D reconstructions were obtained and reviewed.   Radiation dose reduction techniques " included automated exposure control or exposure modulation based on body size. Count of known CT and cardiac nuc med studies  performed in previous 12 months: 0.     COMPARISON:   Same-day chest radiograph    FINDINGS:   Adequate opacification of the pulmonary arteries with no filling defects. Thoracic aorta normal in course and caliber without dissection. Heart size is normal. No pericardial effusion.    Lungs demonstrate mild emphysematous changes predominantly at the lung apices. Bands of consolidation in the lung bases, right greater than left likely reflecting atelectasis but otherwise no confluent consolidation seen. The tracheobronchial tree is  patent. No pleural effusion. No pneumothorax.    Small hiatal hernia. Visualized upper abdomen is otherwise unremarkable.    No acute osseous abnormality.      Impression:      1.  No pulmonary embolus.  2.  Bands of consolidation in the right greater than left base, most likely reflecting atelectasis. Otherwise no confluent infiltrate.    Signer Name: PAULA FUNEZ MD   Signed: 10/10/2020 6:28 PM   Workstation Name: [facility]    [facility]    XR Chest 1 View [[id number]] Collected: 10/10/20 1457     Updated: 10/10/20 1524    Narrative:      CR Chest 1 Vw    INDICATION:   Chest pain starting last night     COMPARISON:    12/8/2018    FINDINGS:  Single portable AP view(s) of the chest.  Normal cardiomediastinal contours. Favor bibasilar atelectasis involving primarily left base and right cardiophrenic angle. Otherwise clear lungs. No acute displaced fracture or dislocation.      Impression:      Favor atelectasis in the lung bases without definite acute process.    Signer Name: PAULA FUNEZ MD   Signed: 10/10/2020 2:57 PM   Workstation Name: [facility]    Radiology [facility]          Assessment / Recommendations:  COPD with emphysema  Severe allergy to albuterol/ipratropium  Atelectasis  ELIZABETH non compliant with cpap  Hypoxic resp  failure - acute vs chronic  atelectasis  Patient Active Problem List   Diagnosis   • Anemia, unspecified   • Health care maintenance   • Vitamin B12 deficiency   • Chronic fatigue   • Benign prostatic hypertrophy (BPH) with weak urinary stream   • Chronic bronchitis (CMS/[facility])   • ELIZABETH (obstructive sleep apnea)   • Sinoatrial block   • PFO (patent foramen ovale)   • Aortic insufficiency   • Cerebral aneurysm, nonruptured   • CVA (cerebral vascular accident) (CMS/[facility])   • TIA (transient ischemic attack)   • Intracranial vascular stenosis   • Sinus pause   • Status post placement of implantable loop recorder   • TIA on medication   • HTN (hypertension)   • COPD (chronic obstructive pulmonary disease) (CMS/[facility])   • Nocturnal seizures (CMS/[facility])   • Encounter for screening for malignant neoplasm of colon   • Encounter for loop recorder at end of battery life   • Seizure (CMS/[facility])       He has no wheeze on exam at present time his imaging shows no signs of infection  Given the severity of his reaction to beta agonist and anticholinergic medication very hesitant to rechallenge this.  Agree with nebulized budesonide is probably her best option  Encourage incentive spirometry  Will need outpatient follow-up with full pulmonary function testing  Thanks very much for asking us to see this nice gentleman  D/w referring MD.    Murtaza Jacinto MD  [facility]  10/11/20  08:44 EDT

## 2020-10-11 NOTE — NURSING NOTE
Discharge Planning Assessment  [facility]     Patient Name: Ronnell Rizvi  MRN: [id number]  Today's Date: 10/11/2020    Admit Date: 10/10/2020    Discharge Needs Assessment     Row Name 10/11/20 1129       Living Environment    Lives With  spouse    Name(s) of Who Lives With Patient  Wife - Joey    Current Living Arrangements  home/apartment/condo    Primary Care Provided by  self    Provides Primary Care For  no one    Family Caregiver if Needed  spouse    Family Caregiver Names  Joey    Quality of Family Relationships  helpful;involved;supportive    Able to Return to Prior Arrangements  yes       Resource/Environmental Concerns    Resource/Environmental Concerns  none       Transition Planning    Patient/Family Anticipates Transition to  home with family    Transportation Anticipated  family or friend will provide       Discharge Needs Assessment    Readmission Within the Last 30 Days  no previous admission in last 30 days    Equipment Currently Used at Home  none    Concerns to be Addressed  discharge planning    Anticipated Changes Related to Illness  none    Equipment Needed After Discharge  none    Provided Post Acute Provider List?  N/A    N/A Provider List Comment  No needs at this time        Discharge Plan     Row Name 10/11/20 0750       Plan    Plan  Home when stable    Patient/Family in Agreement with Plan  yes    Plan Comments  Spoke with Mr Rizvi at bedside.  He and his wife live in a home in [place].  Facesheet verified.  He does not have any DME or oxygen at home.  He is independent with his ADL's and he drives himself.  His pharmacy is [facility] in [place] and there are no issues with paying for hi prescriptions.  He has an advanced directive on file here at the hospital.  Plan is home when he is stable.  Will continue to follow        Continued Care and Services - Admitted Since 10/10/2020    Coordination has not been started for this encounter.         Demographic Summary     Row Name  10/11/20 1128       General Information    Admission Type  observation    Arrived From  home    Required Notices Provided  Observation Status Notice Signed by patient    Referral Source  admission list    Reason for Consult  discharge planning    Preferred Language  English     Used During This Interaction  no       Contact Information    Permission Granted to Share Info With          Functional Status    No documentation.       Psychosocial    No documentation.       Abuse/Neglect    No documentation.       Legal    No documentation.       Substance Abuse    No documentation.       Patient Forms     Row Name 10/11/20 1128       Patient Forms    Patient Observation Letter  Delivered    Delivered to  Patient    Method of delivery  In person            Deena Harrell RN

## 2020-10-11 NOTE — PLAN OF CARE
Goal Outcome Evaluation:  Plan of Care Reviewed With: patient     Outcome Summary: Pt admitted last evening. Pt admittedly reports a poor memory/forgetful since last stroke.  No defecits noted besides memory. Is alert/oriented x4. No c/o pain.  O2 remains at 2L n/c throughout night.   Appears to have slept. Has a small sore to right lower lip which he said he bit with the seizure. No rash noted from Keppra. Voiding without problems.

## 2020-10-11 NOTE — ED NOTES
Respiratory to come give patient a Zopinex mini neb and teach patient on incentive spirometry     Ivan Barajas, RN  10/10/20 2035

## 2020-10-11 NOTE — PROGRESS NOTES
Patient: Ronnell SAN Belkys  Procedure(s) with comments:  COLONOSCOPY with polypectomy - diverticulosis  ascending polyp  transverse polyp  sigmoid polyps X 2  rectal polyp  Anesthesia type: MAC    Patient location: [facility] Surgical Floor  Vitals:    10/09/20 1008 10/09/20 1010 10/09/20 1020 10/09/20 1031   BP: 123/82 109/75 121/75 128/85   BP Location:       Patient Position:       Pulse: 75 70 68 60   Resp: 15  15 15   Temp:       TempSrc:       SpO2: 93%  94% 94%   Weight:       Height:         Level of consciousness: awake, alert and oriented    Post-anesthesia pain: adequate analgesia  Airway patency: patent  Respiratory: unassisted  Cardiovascular: stable and blood pressure at baseline  Hydration: euvolemic    Anesthetic complications: no

## 2020-10-11 NOTE — PLAN OF CARE
Continued Stay Note  [facility]     Patient Name: Ronnell Rizvi  MRN: [id number]  Today's Date: 10/11/2020    Admit Date: 10/10/2020    Discharge Plan       Row Name 10/11/20 0090       Plan    Plan  Home when stable    Patient/Family in Agreement with Plan  yes    Plan Comments  Spoke with Mr Rizvi at bedside.  He and his wife live in a home in [place].  Facesheet verified.  He does not have any DME or oxygen at home.  He is independent with his ADL's and he drives himself.  [facility] [place] and there are no issues with paying for hi prescriptions.  He has an advanced directive on file here at the hospital.  Plan is home when he is stable.  Will continue to follow          Discharge Codes    No documentation.             Deena Harrell RN

## 2020-10-11 NOTE — H&P
HISTORY AND PHYSICAL      Patient Care Team:  Anne Chand MD as PCP - General (Family Medicine)  Dania Vazquez APRN as PCP - Claims Attributed  Gregory King MD as Consulting Physician (Hematology and Oncology)  Chase Haque MD as Referring Physician (Internal Medicine)  Luis Wyatt MD as Consulting Physician (Cardiology)    CHIEF COMPLAINT: Seizures    HISTORY OF PRESENT ILLNESS:    70-year-old white male with history of grand mal seizure disorder last 1 year ago who came to the emergency because he had 3 seizures last night.  History is obtained from his wife as well as the patient.  Patient apparently had 3 episodes of grand mal seizure activities lasting about 20 minutes about an hour apart.  He was postictal in between had some urinary incontinence.  He patient was apparently a little slow to respond and having trouble with some recent memory and some confusion therefore his wife made him come to the emergency room.  Patient states he is still having some difficulty with certain words but no other complaints at this time denying shortness of breath chest pain.  He has been having some chest congestion some allergy symptoms recently.  Patient's plan to be hypoxic in the emergency room is being admitted for further evaluation treatment.  Patient does have history of COPD but is on no treatment as apparently is allergic to duo nebs    Patient had a colonoscopy Friday.  He states he is not been taking his seizure medication since last year because he thought he he had a drug rash and he contacted his cardiologist who told him to stop his Keppra.  I reviewed his old medical records and actually the medication that was discontinued was his Plavix but nevertheless has not been doing any seizure medications since May 2020.    Past Medical History:   Diagnosis Date   • Allergic rhinitis    • Anal fissure    • Aortic insufficiency     moderate, THIERRY 2017   • Asthma    • Asthma    •  Benign prostatic hypertrophy (BPH) with weak urinary stream 11/1/2016   • Chronic bronchitis (CMS/HCC)    • COPD (chronic obstructive pulmonary disease) (CMS/HCC)    • Emphysema lung (CMS/HCC)    • Fatty liver    • GERD (gastroesophageal reflux disease)    • Hepatitis     thought to be Hepatitis A    • History of pneumonia     With left lower lobe atelectasis and scar tissue, being followed   • HL (hearing loss)    • HTN (hypertension) 1/14/2020   • Hypertension    • Memory loss    • Obstructive sleep apnea syndrome 8/23/2017   • PFO (patent foramen ovale)     s/p percutaneous closure with a 25mm Amplatzer device in December 2018   • Pneumonia     LLL with scar tissue   • Seizures (CMS/HCC)    • 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   • Spinal stenosis    • Stroke (CMS/HCC)     10/2017: left occipital/temporal   • Vision loss      Past Surgical History:   Procedure Laterality Date   • CARDIAC CATHETERIZATION N/A 12/12/2018    Procedure: Patent foramen ovale closure- Abbott;  Surgeon: Deangelo Harris MD;  [facility];  Service: Cardiology   • CARDIAC CATHETERIZATION N/A 12/12/2018    Procedure: Intracardiac echocardiogram;  Surgeon: Deangelo Harris MD;  Location:  [facility] CATH INVASIVE LOCATION;  Service: Cardiology   • CARDIAC ELECTROPHYSIOLOGY PROCEDURE N/A 3/26/2018    Procedure: Loop insertion   CONFIRM RX;  Surgeon: Luis Wyatt MD;  Location:  [facility] CATH INVASIVE LOCATION;  Service: Cardiovascular   • CARDIAC ELECTROPHYSIOLOGY PROCEDURE N/A 7/15/2020    Procedure: Loop recorder removal;  Surgeon: Starr Driscoll MD;  Location: [facility] CATH INVASIVE LOCATION;  Service: Cardiovascular;  Laterality: N/A;   • COLONOSCOPY     • HAND SURGERY Bilateral    • INGUINAL HERNIA REPAIR Left    • OTHER SURGICAL HISTORY      inguinal hernia repair for person over age 5   • TONSILLECTOMY       Family History   Problem Relation Age of Onset   • Obesity Mother    • Clotting disorder Mother          Upper extremities   • Alcohol abuse Father    • Cancer Father 63        Esophagus and lung   • Other Father         cardiac disorder   • Heart disease Father    • Kidney disease Sister    • Kidney failure Sister    • Drug abuse Paternal Uncle    • Stroke Sister    • Throat cancer Sister      Social History     Tobacco Use   • Smoking status: Former Smoker     Quit date: 1979     Years since quittin.6   • Smokeless tobacco: Never Used   • Tobacco comment: caffeine use: Drinks 4 glasses of Dt coke on avg.    Substance Use Topics   • Alcohol use: No   • Drug use: No     Medications Prior to Admission   Medication Sig Dispense Refill Last Dose   • cetirizine (zyrTEC) 10 MG tablet Take 10 mg by mouth Daily As Needed.   10/8/2020 at 0900   • Cyanocobalamin 1000 MCG/ML kit Inject  as directed 1 (One) Time Per Week. /4 of a cc IM every stacy   10/4/2020 at Unknown time   • guaiFENesin (MUCINEX PO) Take  by mouth.   10/9/2020 at Unknown time   • guaiFENesin (MUCINEX) 600 MG 12 hr tablet Take 600 mg by mouth 2 (Two) Times a Day As Needed for Cough.   10/5/2020 at 0900   • hydroCHLOROthiazide (HYDRODIURIL) 12.5 MG tablet Take 1 tablet by mouth Daily. 90 tablet 2 10/8/2020 at 0900   • levETIRAcetam (KEPPRA) 500 MG tablet TAKE ONE TABLET BY MOUTH TWICE A DAY (Patient not taking: Reported on 10/10/2020) 60 tablet 2 Not Taking at Unknown time     Allergies:  Plavix [clopidogrel], Aspirin, Citrus, Combivent [ipratropium-albuterol], Lactose intolerance (gi), and Statins     Review of Systems   Constitutional: Negative for activity change, appetite change and fatigue.   HENT: Positive for postnasal drip and sneezing. Negative for congestion.    Respiratory: Negative for cough, chest tightness, shortness of breath and wheezing.    Cardiovascular: Negative for chest pain.   Gastrointestinal: Negative for abdominal distention, abdominal pain, diarrhea, nausea and vomiting.   Endocrine: Negative for polyphagia and polyuria.  "  Genitourinary: Negative for frequency.   Skin: Negative for rash.   Neurological: Negative for light-headedness.   Hematological: Does not bruise/bleed easily.   Psychiatric/Behavioral: Positive for confusion. Negative for agitation and behavioral problems.       Vital Signs  Temp:  [97.9 °F (36.6 °C)-99.6 °F (37.6 °C)] 97.9 °F (36.6 °C)  Heart Rate:  [73-87] 79  Resp:  [16-20] 16  BP: (116-162)/(65-88) 116/65  Oxygen Therapy  SpO2: 94 %  Pulse Oximetry Type: Continuous  Device (Oxygen Therapy): nasal cannula  Flow (L/min): 2}  Body mass index is 24.38 kg/m².  Flowsheet Rows      First Filed Value   Admission Height  182.9 cm (72\") Documented at 10/10/2020 1411   Admission Weight  83.9 kg (185 lb) Documented at 10/10/2020 1411                 Physical Exam  Vitals signs and nursing note reviewed.   Constitutional:       Appearance: He is well-developed.   HENT:      Head: Normocephalic.   Eyes:      Conjunctiva/sclera: Conjunctivae normal.   Neck:      Musculoskeletal: Normal range of motion.      Thyroid: No thyromegaly.      Vascular: No JVD.   Cardiovascular:      Rate and Rhythm: Normal rate and regular rhythm.      Heart sounds: Normal heart sounds. No murmur.   Pulmonary:      Effort: Pulmonary effort is normal. No respiratory distress.      Breath sounds: Normal breath sounds. No wheezing or rales.   Abdominal:      General: Bowel sounds are normal. There is no distension.      Palpations: Abdomen is soft.      Tenderness: There is no abdominal tenderness. There is no guarding.   Skin:     General: Skin is warm and dry.      Findings: No rash.   Neurological:      Mental Status: He is alert.          Debilities/Disabilities Identified: None    Emotional Behavior: Appropriate    Results Review:    I reviewed the patient's new clinical results.  Lab Results (most recent)     Procedure Component Value Units Date/Time    BNP [[id number]]  (Normal) Collected: 10/10/20 1426    Specimen: Blood Updated: 10/10/20 " 1746     proBNP 724.6 pg/mL     Narrative:      Among patients with dyspnea, NT-proBNP is highly sensitive for the detection of acute congestive heart failure. In addition NT-proBNP of <300 pg/ml effectively rules out acute congestive heart failure with 99% negative predictive value.    Results may be falsely decreased if patient taking Biotin.      Troponin [[id number]]  (Normal) Collected: 10/10/20 1621    Specimen: Blood Updated: 10/10/20 1645     Troponin T <0.010 ng/mL     Narrative:      Troponin T Reference Range:  <= 0.03 ng/mL-   Negative for AMI  >0.03 ng/mL-     Abnormal for myocardial necrosis.  Clinicians would have to utilize clinical acumen, EKG, Troponin and serial changes to determine if it is an Acute Myocardial Infarction or myocardial injury due to an underlying chronic condition.       Results may be falsely decreased if patient taking Biotin.      Troponin [[id number]]  (Normal) Collected: 10/10/20 1426    Specimen: Blood Updated: 10/10/20 1509     Troponin T <0.010 ng/mL     Narrative:      Troponin T Reference Range:  <= 0.03 ng/mL-   Negative for AMI  >0.03 ng/mL-     Abnormal for myocardial necrosis.  Clinicians would have to utilize clinical acumen, EKG, Troponin and serial changes to determine if it is an Acute Myocardial Infarction or myocardial injury due to an underlying chronic condition.       Results may be falsely decreased if patient taking Biotin.      Comprehensive Metabolic Panel [[id number]]  (Abnormal) Collected: 10/10/20 1426    Specimen: Blood Updated: 10/10/20 1507     Glucose 128 mg/dL      BUN 15 mg/dL      Creatinine 1.38 mg/dL      Sodium 138 mmol/L      Potassium 3.7 mmol/L      Chloride 103 mmol/L      CO2 21.5 mmol/L      Calcium 9.3 mg/dL      Total Protein 7.4 g/dL      Albumin 4.10 g/dL      ALT (SGPT) 33 U/L      AST (SGOT) 60 U/L      Alkaline Phosphatase 61 U/L      Total Bilirubin 0.6 mg/dL      eGFR Non African Amer 51 mL/min/1.73      Globulin 3.3 gm/dL       A/G Ratio 1.2 g/dL      BUN/Creatinine Ratio 10.9     Anion Gap 13.5 mmol/L     Narrative:      GFR Normal >60  Chronic Kidney Disease <60  Kidney Failure <15      CBC & Differential [[id number]]  (Abnormal) Collected: 10/10/20 1426    Specimen: Blood Updated: 10/10/20 1448    Narrative:      The following orders were created for panel order CBC & Differential.  Procedure                               Abnormality         Status                     ---------                               -----------         ------                     CBC Auto Differential[[id number]]        Abnormal            Final result                 Please view results for these tests on the individual orders.    CBC Auto Differential [[id number]]  (Abnormal) Collected: 10/10/20 1426    Specimen: Blood Updated: 10/10/20 1448     WBC 12.30 10*3/mm3      RBC 5.40 10*6/mm3      Hemoglobin 16.7 g/dL      Hematocrit 49.7 %      MCV 92.0 fL      MCH 30.9 pg      MCHC 33.6 g/dL      RDW 12.8 %      RDW-SD 43.7 fl      MPV 9.9 fL      Platelets 245 10*3/mm3      Neutrophil % 87.7 %      Lymphocyte % 6.3 %      Monocyte % 5.7 %      Eosinophil % 0.0 %      Basophil % 0.1 %      Immature Grans % 0.2 %      Neutrophils, Absolute 10.78 10*3/mm3      Lymphocytes, Absolute 0.78 10*3/mm3      Monocytes, Absolute 0.70 10*3/mm3      Eosinophils, Absolute 0.00 10*3/mm3      Basophils, Absolute 0.01 10*3/mm3      Immature Grans, Absolute 0.03 10*3/mm3           Imaging Results (Most Recent)     Procedure Component Value Units Date/Time    CT Angiogram Chest [[id number]] Collected: 10/10/20 1828     Updated: 10/10/20 1833    Narrative:      CTA Chest    INDICATION:   Low oxygen saturation with convulsions, chest pain, malaise, chest pain since yesterday    TECHNIQUE:   CT angiogram of the chest with IV contrast. 3-D reconstructions were obtained and reviewed.   Radiation dose reduction techniques included automated exposure control or exposure modulation based on body  size. Count of known CT and cardiac nuc med studies  performed in previous 12 months: 0.     COMPARISON:   Same-day chest radiograph    FINDINGS:   Adequate opacification of the pulmonary arteries with no filling defects. Thoracic aorta normal in course and caliber without dissection. Heart size is normal. No pericardial effusion.    Lungs demonstrate mild emphysematous changes predominantly at the lung apices. Bands of consolidation in the lung bases, right greater than left likely reflecting atelectasis but otherwise no confluent consolidation seen. The tracheobronchial tree is  patent. No pleural effusion. No pneumothorax.    Small hiatal hernia. Visualized upper abdomen is otherwise unremarkable.    No acute osseous abnormality.      Impression:      1.  No pulmonary embolus.  2.  Bands of consolidation in the right greater than left base, most likely reflecting atelectasis. Otherwise no confluent infiltrate.    Signer Name: PAULA FUNEZ MD   Signed: 10/10/2020 6:28 PM   Workstation Name: [facility]    [facility]    XR Chest 1 View [[id number]] Collected: 10/10/20 1457     Updated: 10/10/20 1524    Narrative:      CR Chest 1 Vw    INDICATION:   Chest pain starting last night     COMPARISON:    12/8/2018    FINDINGS:  Single portable AP view(s) of the chest.  Normal cardiomediastinal contours. Favor bibasilar atelectasis involving primarily left base and right cardiophrenic angle. Otherwise clear lungs. No acute displaced fracture or dislocation.      Impression:      Favor atelectasis in the lung bases without definite acute process.    Signer Name: PAULA FUNEZ MD   Signed: 10/10/2020 2:57 PM   Workstation Name: [facility]    [facility]        reviewed    ECG/EMG Results (most recent)     Procedure Component Value Units Date/Time    ECG 12 Lead [[id number]] Collected: 10/10/20 1632     Updated: 10/10/20 6939    Narrative:      HEART RATE= 82  bpm  RR Interval= 728   ms  FL Interval= 218  ms  P Horizontal Axis= -1  deg  P Front Axis= 20  deg  QRSD Interval= 89  ms  QT Interval= 381  ms  QRS Axis= -27  deg  T Wave Axis= 109  deg  - ABNORMAL ECG -  Sinus rhythm  Borderline prolonged FL interval  Probable LVH with secondary repol abnrm  NO SIGNIFICANT CHANGE FROM PREVIOUS ECG  Electronically Signed By: Shira Lovelace ([facility]) 10-Oct-2020 18:35:11  Date and Time of Study: 2020-10-10 16:32:33    ECG 12 Lead [[id number]] Collected: 10/10/20 1412     Updated: 10/10/20 1850    Narrative:      HEART RATE= 89  bpm  RR Interval= 676  ms  FL Interval= 227  ms  P Horizontal Axis= 5  deg  P Front Axis= 33  deg  QRSD Interval= 86  ms  QT Interval= 362  ms  QRS Axis= -26  deg  T Wave Axis= 100  deg  - ABNORMAL ECG -  Sinus rhythm  Prolonged FL interval  Borderline left axis deviation  Nonspecific T abnormalities, lateral leads  NO SIGNIFICANT CHANGE FROM PREVIOUS ECG  Electronically Signed By: Shira Lovelace ([facility]) 10-Oct-2020 18:35:23  Date and Time of Study: 2020-10-10 14:12:58        reviewed    Assessment/Plan       1.  Acute hypoxic respiratory failure uncertain etiology likely due to hypoventilation chest x-ray is normal.  Patient is allergic to DuoNeb's we will try Pulmicort incentive spirometry and get pulmonary evaluate the patient.    2.  Grand mal seizure disorder patient appears to be slightly postictal with some mild confusion difficulty with some recent memory.  His wife states he has had some problems since his stroke last year but seems to have gotten worse since his seizure yesterday.  Patient has been given IV Keppra in the emergency room.  I discussed with him that he has no allergy to Keppra and his allergy is actually Plavix and will resume p.o. Keppra will get neurology see patient consultation tomorrow    3.  Hypertension controlled continue with hydrochlorothiazide    4.  Mild allergic rhinitis continue with nonsedating antihistamine and Mucinex      5.  History of PFO  status post percutaneous closure in December 2018      6.  History of recurrent CVA 2018 was on dual antiplatelet therapy and had recurrent TIA January 2020 on oral aspirin due to Plavix allergy no statin because of statin intolerance    7.  COPD with no evidence of exacerbation incentive spirometry Pulmicort pulmonary be consulted    8.  Obstructive sleep apnea not using CPAP    9.  DVT prophylaxis SCD      I discussed the patients findings and my recommendations with patient    Caden Suarez MD  10/10/20  22:59 EDT    Time: 45 minutes spent reviewing old records  Much of this encounter note is an electronic transcription/translation of spoken language to printed text using Dragon Software

## 2020-10-11 NOTE — ED NOTES
Dr. Mario removed oxygen and SAO2 dropped to 88% again. Oxygen at 2 L/min nc re-applied     Ivan Barajas RN  10/10/20 0459

## 2020-10-12 ENCOUNTER — READMISSION MANAGEMENT (OUTPATIENT)
Dept: CALL CENTER | Facility: HOSPITAL | Age: 70
End: 2020-10-12

## 2020-10-12 VITALS
BODY MASS INDEX: 24.49 KG/M2 | RESPIRATION RATE: 16 BRPM | HEART RATE: 66 BPM | DIASTOLIC BLOOD PRESSURE: 62 MMHG | TEMPERATURE: 97.2 F | WEIGHT: 184.8 LBS | HEIGHT: 73 IN | OXYGEN SATURATION: 94 % | SYSTOLIC BLOOD PRESSURE: 110 MMHG

## 2020-10-12 LAB
ALBUMIN SERPL-MCNC: 3.3 G/DL (ref 3.5–5.2)
ALBUMIN/GLOB SERPL: 1.3 G/DL
ALP SERPL-CCNC: 43 U/L (ref 39–117)
ALT SERPL W P-5'-P-CCNC: 56 U/L (ref 1–41)
ANION GAP SERPL CALCULATED.3IONS-SCNC: 9.2 MMOL/L (ref 5–15)
AST SERPL-CCNC: 164 U/L (ref 1–40)
BASOPHILS # BLD AUTO: 0.02 10*3/MM3 (ref 0–0.2)
BASOPHILS NFR BLD AUTO: 0.2 % (ref 0–1.5)
BILIRUB SERPL-MCNC: 0.5 MG/DL (ref 0–1.2)
BUN SERPL-MCNC: 29 MG/DL (ref 8–23)
BUN/CREAT SERPL: 19.7 (ref 7–25)
CALCIUM SPEC-SCNC: 8.8 MG/DL (ref 8.6–10.5)
CHLORIDE SERPL-SCNC: 103 MMOL/L (ref 98–107)
CO2 SERPL-SCNC: 24.8 MMOL/L (ref 22–29)
CREAT SERPL-MCNC: 1.47 MG/DL (ref 0.76–1.27)
CYTO UR: NORMAL
DEPRECATED RDW RBC AUTO: 44.5 FL (ref 37–54)
EOSINOPHIL # BLD AUTO: 0.03 10*3/MM3 (ref 0–0.4)
EOSINOPHIL NFR BLD AUTO: 0.3 % (ref 0.3–6.2)
ERYTHROCYTE [DISTWIDTH] IN BLOOD BY AUTOMATED COUNT: 13 % (ref 12.3–15.4)
GFR SERPL CREATININE-BSD FRML MDRD: 47 ML/MIN/1.73
GLOBULIN UR ELPH-MCNC: 2.6 GM/DL
GLUCOSE SERPL-MCNC: 101 MG/DL (ref 65–99)
HCT VFR BLD AUTO: 45.7 % (ref 37.5–51)
HGB BLD-MCNC: 14.9 G/DL (ref 13–17.7)
IMM GRANULOCYTES # BLD AUTO: 0.06 10*3/MM3 (ref 0–0.05)
IMM GRANULOCYTES NFR BLD AUTO: 0.5 % (ref 0–0.5)
LAB AP CASE REPORT: NORMAL
LAB AP DIAGNOSIS COMMENT: NORMAL
LYMPHOCYTES # BLD AUTO: 1.95 10*3/MM3 (ref 0.7–3.1)
LYMPHOCYTES NFR BLD AUTO: 16.8 % (ref 19.6–45.3)
MCH RBC QN AUTO: 30.7 PG (ref 26.6–33)
MCHC RBC AUTO-ENTMCNC: 32.6 G/DL (ref 31.5–35.7)
MCV RBC AUTO: 94 FL (ref 79–97)
MONOCYTES # BLD AUTO: 1.11 10*3/MM3 (ref 0.1–0.9)
MONOCYTES NFR BLD AUTO: 9.6 % (ref 5–12)
NEUTROPHILS NFR BLD AUTO: 72.6 % (ref 42.7–76)
NEUTROPHILS NFR BLD AUTO: 8.41 10*3/MM3 (ref 1.7–7)
NRBC BLD AUTO-RTO: 0 /100 WBC (ref 0–0.2)
PATH REPORT.FINAL DX SPEC: NORMAL
PATH REPORT.GROSS SPEC: NORMAL
PLATELET # BLD AUTO: 209 10*3/MM3 (ref 140–450)
PMV BLD AUTO: 10.4 FL (ref 6–12)
POTASSIUM SERPL-SCNC: 3.7 MMOL/L (ref 3.5–5.2)
PROT SERPL-MCNC: 5.9 G/DL (ref 6–8.5)
RBC # BLD AUTO: 4.86 10*6/MM3 (ref 4.14–5.8)
SODIUM SERPL-SCNC: 137 MMOL/L (ref 136–145)
WBC # BLD AUTO: 11.58 10*3/MM3 (ref 3.4–10.8)

## 2020-10-12 PROCEDURE — 85025 COMPLETE CBC W/AUTO DIFF WBC: CPT | Performed by: INTERNAL MEDICINE

## 2020-10-12 PROCEDURE — 80053 COMPREHEN METABOLIC PANEL: CPT | Performed by: INTERNAL MEDICINE

## 2020-10-12 PROCEDURE — 80186 ASSAY OF PHENYTOIN FREE: CPT | Performed by: INTERNAL MEDICINE

## 2020-10-12 PROCEDURE — G0378 HOSPITAL OBSERVATION PER HR: HCPCS

## 2020-10-12 PROCEDURE — 99217 PR OBSERVATION CARE DISCHARGE MANAGEMENT: CPT | Performed by: INTERNAL MEDICINE

## 2020-10-12 RX ORDER — GUAIFENESIN 600 MG/1
1200 TABLET, EXTENDED RELEASE ORAL EVERY 12 HOURS SCHEDULED
Qty: 30 TABLET | Refills: 1
Start: 2020-10-12 | End: 2020-12-02

## 2020-10-12 RX ORDER — ASPIRIN 81 MG/1
81 TABLET ORAL DAILY
Qty: 30 TABLET | Refills: 0 | Status: SHIPPED | OUTPATIENT
Start: 2020-10-13 | End: 2020-11-12

## 2020-10-12 RX ORDER — PHENYTOIN SODIUM 300 MG/1
300 CAPSULE, EXTENDED RELEASE ORAL NIGHTLY
Qty: 30 CAPSULE | Refills: 0 | Status: SHIPPED | OUTPATIENT
Start: 2020-10-12 | End: 2020-11-11 | Stop reason: SDUPTHER

## 2020-10-12 RX ADMIN — ASPIRIN 81 MG: 81 TABLET, COATED ORAL at 08:51

## 2020-10-12 RX ADMIN — GUAIFENESIN 1200 MG: 600 TABLET, EXTENDED RELEASE ORAL at 08:51

## 2020-10-12 RX ADMIN — CETIRIZINE HYDROCHLORIDE 10 MG: 10 TABLET, FILM COATED ORAL at 08:51

## 2020-10-12 RX ADMIN — SODIUM CHLORIDE, PRESERVATIVE FREE 10 ML: 5 INJECTION INTRAVENOUS at 08:51

## 2020-10-12 RX ADMIN — HYDROCHLOROTHIAZIDE 12.5 MG: 12.5 TABLET ORAL at 08:51

## 2020-10-12 NOTE — PROGRESS NOTES
"  Daily Progress Note.   [facility] MED SURG  10/12/2020    Patient:  Name:  Ronnell Rizvi  MRN:  [id number]  1950  70 y.o.  male         CC:  seizure    Interval History:  Pt feels better no seizure activity worsening soa, cough, fever or chest pain.  Says he feels okay  ROS: No fever, no diarrhea, no chest pain  PMFSSH: no change    Physical Exam:  /62 (BP Location: Left arm, Patient Position: Sitting)   Pulse 66   Temp 97.2 °F (36.2 °C) (Oral)   Resp 16   Ht 185.4 cm (73\")   Wt 83.8 kg (184 lb 12.8 oz)   SpO2 94%   BMI 24.38 kg/m²   Body mass index is 24.38 kg/m².    Intake/Output Summary (Last 24 hours) at 10/12/2020 1810  Last data filed at 10/12/2020 1320  Gross per 24 hour   Intake 1440 ml   Output --   Net 1440 ml     General appearance: NAD, conversant   Eyes: anicteric sclerae, moist conjunctivae; no lid-lag; PERRLA  HENT: Atraumatic; oropharynx clear with moist mucous membranes and no mucosal ulcerations; normal hard and soft palate  Neck: Trachea midline; FROM, supple, no thyromegaly or lymphadenopathy  Lungs: CTA without wheeze or crackles, with normal respiratory effort and no intercostal retractions  CV: RRR, no MRGs   Abdomen: Soft, non-tender; no masses or HSM  Extremities: No peripheral edema or extremity lymphadenopathy  Skin: Normal temperature, turgor and texture; no rash, ulcers or subcutaneous nodules  Psych: Appropriate affect, alert and oriented to person, place and time       Data Review:  Notable Labs:  Results from last 7 days   Lab Units 10/12/20  0407 10/10/20  1426   WBC 10*3/mm3 11.58* 12.30*   HEMOGLOBIN g/dL 14.9 16.7   PLATELETS 10*3/mm3 209 245     Results from last 7 days   Lab Units 10/12/20  0407 10/10/20  1426 10/09/20  0858   SODIUM mmol/L 137 138  --    POTASSIUM mmol/L 3.7 3.7 4.0   CHLORIDE mmol/L 103 103  --    CO2 mmol/L 24.8 21.5*  --    BUN mg/dL 29* 15  --    CREATININE mg/dL 1.47* 1.38*  --    GLUCOSE mg/dL 101* 128*  --    CALCIUM " mg/dL 8.8 9.3  --    Estimated Creatinine Clearance: 54.6 mL/min (A) (by C-G formula based on SCr of 1.47 mg/dL (H)).    Results from last 7 days   Lab Units 10/12/20  0407 10/10/20  1426   AST (SGOT) U/L 164* 60*   ALT (SGPT) U/L 56* 33   PLATELETS 10*3/mm3 209 245             Imaging:  Reviewed chest images personally from past 3 days    Scheduled meds:    aspirin, 81 mg, Oral, Daily  cetirizine, 10 mg, Oral, Daily  guaiFENesin, 1,200 mg, Oral, Q12H  hydroCHLOROthiazide, 12.5 mg, Oral, Daily  phenytoin, 300 mg, Oral, Nightly  sodium chloride, 10 mL, Intravenous, Q12H        ASSESSMENT  /  PLAN:  COPD with emphysema  Severe allergy to albuterol/ipratropium  Atelectasis  ELIZABETH non compliant with cpap  Hypoxic resp failure - acute vs chronic  atelectasis  Patient Active Problem List   Diagnosis   • Anemia, unspecified   • Health care maintenance   • Vitamin B12 deficiency   • Chronic fatigue   • Benign prostatic hypertrophy (BPH) with weak urinary stream   • Chronic bronchitis (CMS/HCC)   • ELIZABETH (obstructive sleep apnea)   • Sinoatrial block   • PFO (patent foramen ovale)   • Aortic insufficiency   • Cerebral aneurysm, nonruptured   • CVA (cerebral vascular accident) (CMS/HCC)   • TIA (transient ischemic attack)   • Intracranial vascular stenosis   • Sinus pause   • Status post placement of implantable loop recorder   • TIA on medication   • HTN (hypertension)   • COPD (chronic obstructive pulmonary disease) (CMS/HCC)   • Nocturnal seizures (CMS/HCC)   • Encounter for screening for malignant neoplasm of colon   • Encounter for loop recorder at end of battery life   • Seizure (CMS/HCC)      He has no wheeze on exam at present time his imaging shows no signs of infection  Given the severity of his reaction to beta agonist and anticholinergic medication very hesitant to rechallenge this.  Agree with nebulized budesonide is probably her best option  Encourage incentive spirometry  Will need outpatient follow-up with full  pulmonary function testing    D/w patients wife at bedside.      Murtaza Jacinto MD  [facility]  10/12/20  18:10 EDT

## 2020-10-12 NOTE — PLAN OF CARE
Goal Outcome Evaluation:  Plan of Care Reviewed With: patient  Progress: improving  Outcome Summary: patient has been cleared for discharge home

## 2020-10-12 NOTE — PLAN OF CARE
Goal Outcome Evaluation:  Plan of Care Reviewed With: patient  Progress: improving  Outcome Summary: Pt VSS, am labs pending. Pt denies pain, no seizures overnight. Pt resting at this time.

## 2020-10-12 NOTE — DISCHARGE SUMMARY
"  Ronnell SAN Bryce  1950  [id number]      Hospitalists Discharge Summary    Date of Admission: 10/10/2020  Date of Discharge:  10/12/2020    Primary Discharge Diagnoses: Recurrent nocturnal seizures due to medical noncompliance    Secondary Discharge Diagnoses:   Acute hypoxic Respiratory Failure while Post Ictal  ELIZABETH non-compliant with cpap  HTN  COPD  H/o left temporal/occipital CVA (2017)  H/o RMSF  S/p PFO Closure      PCP  Patient Care Team:  Anne Chand MD as PCP - General (Family Medicine)  Dania Vazquez APRN as PCP - Claims Attributed  Gregory King MD as Consulting Physician (Hematology and Oncology)  Chase Haque MD as Referring Physician (Internal Medicine)  Luis Wyatt MD as Consulting Physician (Cardiology)    Consults:   Consults     Date and Time Order Name Status Description    10/10/2020 2254 Inpatient Neurology Consult Completed     10/10/2020 2225 Inpatient Pulmonology Consult            CC: Seizures      History of Present Illness:  As per H&P:    \"70-year-old white male with history of grand mal seizure disorder last 1 year ago who came to the emergency because he had 3 seizures last night.  History is obtained from his wife as well as the patient.  Patient apparently had 3 episodes of grand mal seizure activities lasting about 20 minutes about an hour apart.  He was postictal in between had some urinary incontinence.  He patient was apparently a little slow to respond and having trouble with some recent memory and some confusion therefore his wife made him come to the emergency room.  Patient states he is still having some difficulty with certain words but no other complaints at this time denying shortness of breath chest pain.  He has been having some chest congestion some allergy symptoms recently.  Patient's presented hypoxic in the emergency room is being admitted for further evaluation treatment.  Patient does have history of COPD but is on no treatment as " "apparently is allergic to duo nebs     Patient had a colonoscopy Friday.  He states he is not been taking his seizure medication since last year because he thought he he had a drug rash and he contacted his cardiologist who told him to stop his Keppra.  I reviewed his old medical records and actually the medication that was discontinued was his Plavix but nevertheless has not been doing any seizure medications since May 2020.\"    Hospital Course    She had no further seizures after initiation of a seizure meds, initially given Keppra down in the ER, but changed to Dilantin prior to discharge.  Hypoxic respiratory failure resolved when patient was more awake and alert, after his post ictal phase.    Today on exam, pt asked if he should use a bite block at night to keep himself from biting his tongue since his seizures 'only happen at night'.  Also, initially when asked how long he was to avoid driving, to verify he understood both Dr. Dang's directions given the day prior, and his nurse practitioner directions from the month prior at his neurology appointment, the patient replied \"2 or 3 hours\".  Told him that that was incorrect, and that it was least 90 days seizure-free, and after he was cleared by his neurologist.  Patient then claimed that he was \"an excellent \".  Reinforced this was not about his ability, but about not causing himself or others death or dismemberment should he have a seizure while driving.  Before discharge, patient did demonstrate understanding using teach back that he was to not drive for at least 90 days.    Pt and his wife believe he has an allergy to Keppra because he believes he has had a drug rash to Keppra, and it has made him 'feel drunk' in the past. Dilantin was sent to his pharmacy, which was out of stock of that medication, but they did not inform the pt's wife when she went to  his medications, because they had already refilled his keppra recently. Wife called " med-surg after she was home when she was reading the discharge instructions, plan was made for pt to take his keppra dose for tonight, and then  the dilantin at a different pharmacy the next morning.       Physical Exam at Discharge  Vital Signs  Temp:  [97.2 °F (36.2 °C)-97.5 °F (36.4 °C)] 97.2 °F (36.2 °C)  Heart Rate:  [66-71] 66  Resp:  [16-17] 16  BP: ()/(54-66) 110/62    Physical Exam:  Physical Exam  Vitals signs and nursing note reviewed.   Constitutional:       General: He is not in acute distress.     Appearance: Normal appearance. He is normal weight. He is not ill-appearing, toxic-appearing or diaphoretic.   Eyes:      Pupils: Pupils are equal, round, and reactive to light.   Cardiovascular:      Rate and Rhythm: Normal rate and regular rhythm.      Pulses: Normal pulses.      Heart sounds: No murmur.   Pulmonary:      Effort: Pulmonary effort is normal. No respiratory distress.      Breath sounds: No wheezing, rhonchi or rales.      Comments: No wheezes, diminished mostly at the bases  Abdominal:      General: Abdomen is flat. Bowel sounds are normal. There is no distension.      Palpations: Abdomen is soft.      Tenderness: There is no abdominal tenderness. There is no guarding.   Musculoskeletal:      Right lower leg: No edema.      Left lower leg: No edema.   Skin:     General: Skin is warm and dry.      Coloration: Skin is not jaundiced.   Neurological:      General: No focal deficit present.      Mental Status: He is alert and oriented to person, place, and time. Mental status is at baseline.      Cranial Nerves: No cranial nerve deficit.      Motor: No weakness.   Psychiatric:      Comments: Full affect, deferred mood         Operations and Procedures Performed:        Allergies:  is allergic to plavix [clopidogrel]; aspirin; citrus; combivent [ipratropium-albuterol]; lactose intolerance (gi); and statins.    Azar  reviewed    Discharge Medications:     Discharge Medications         New Medications      Instructions Start Date   aspirin 81 MG EC tablet   81 mg, Oral, Daily   Start Date: October 13, 2020     phenytoin extended 300 MG ER capsule  Commonly known as: DILANTIN   300 mg, Oral, Nightly         Changes to Medications      Instructions Start Date   guaiFENesin 600 MG 12 hr tablet  Commonly known as: Mucinex  What changed:   · medication strength  · how much to take  · when to take this  · Another medication with the same name was removed. Continue taking this medication, and follow the directions you see here.   1,200 mg, Oral, Every 12 Hours Scheduled         Continue These Medications      Instructions Start Date   cetirizine 10 MG tablet  Commonly known as: zyrTEC   10 mg, Oral, Daily PRN      Cyanocobalamin 1000 MCG/ML kit   Injection, Weekly, 1/4 of a cc IM every sunday      hydroCHLOROthiazide 12.5 MG tablet  Commonly known as: HYDRODIURIL   12.5 mg, Oral, Daily         Stop These Medications    levETIRAcetam 500 MG tablet  Commonly known as: KEPPRA            Last Lab Results:   Lab Results (last 72 hours)     Procedure Component Value Units Date/Time    Comprehensive Metabolic Panel [[id number]]  (Abnormal) Collected: 10/12/20 0407    Specimen: Blood Updated: 10/12/20 0554     Glucose 101 mg/dL      BUN 29 mg/dL      Creatinine 1.47 mg/dL      Sodium 137 mmol/L      Potassium 3.7 mmol/L      Chloride 103 mmol/L      CO2 24.8 mmol/L      Calcium 8.8 mg/dL      Total Protein 5.9 g/dL      Albumin 3.30 g/dL      ALT (SGPT) 56 U/L      AST (SGOT) 164 U/L      Alkaline Phosphatase 43 U/L      Total Bilirubin 0.5 mg/dL      eGFR Non African Amer 47 mL/min/1.73      Globulin 2.6 gm/dL      A/G Ratio 1.3 g/dL      BUN/Creatinine Ratio 19.7     Anion Gap 9.2 mmol/L     Narrative:      GFR Normal >60  Chronic Kidney Disease <60  Kidney Failure <15      CBC & Differential [[id number]]  (Abnormal) Collected: 10/12/20 0407    Specimen: Blood Updated: 10/12/20 0522    Narrative:      The  following orders were created for panel order CBC & Differential.  Procedure                               Abnormality         Status                     ---------                               -----------         ------                     CBC Auto Differential[[id number]]        Abnormal            Final result                 Please view results for these tests on the individual orders.    CBC Auto Differential [[id number]]  (Abnormal) Collected: 10/12/20 0407    Specimen: Blood Updated: 10/12/20 0522     WBC 11.58 10*3/mm3      RBC 4.86 10*6/mm3      Hemoglobin 14.9 g/dL      Hematocrit 45.7 %      MCV 94.0 fL      MCH 30.7 pg      MCHC 32.6 g/dL      RDW 13.0 %      RDW-SD 44.5 fl      MPV 10.4 fL      Platelets 209 10*3/mm3      Neutrophil % 72.6 %      Lymphocyte % 16.8 %      Monocyte % 9.6 %      Eosinophil % 0.3 %      Basophil % 0.2 %      Immature Grans % 0.5 %      Neutrophils, Absolute 8.41 10*3/mm3      Lymphocytes, Absolute 1.95 10*3/mm3      Monocytes, Absolute 1.11 10*3/mm3      Eosinophils, Absolute 0.03 10*3/mm3      Basophils, Absolute 0.02 10*3/mm3      Immature Grans, Absolute 0.06 10*3/mm3      nRBC 0.0 /100 WBC     Phenytoin Level, Free [[id number]] Collected: 10/12/20 0407    Specimen: Blood Updated: 10/12/20 0518    BNP [[id number]]  (Normal) Collected: 10/10/20 1426    Specimen: Blood Updated: 10/10/20 1746     proBNP 724.6 pg/mL     Narrative:      Among patients with dyspnea, NT-proBNP is highly sensitive for the detection of acute congestive heart failure. In addition NT-proBNP of <300 pg/ml effectively rules out acute congestive heart failure with 99% negative predictive value.    Results may be falsely decreased if patient taking Biotin.      Troponin [[id number]]  (Normal) Collected: 10/10/20 1621    Specimen: Blood Updated: 10/10/20 1645     Troponin T <0.010 ng/mL     Narrative:      Troponin T Reference Range:  <= 0.03 ng/mL-   Negative for AMI  >0.03 ng/mL-     Abnormal for  myocardial necrosis.  Clinicians would have to utilize clinical acumen, EKG, Troponin and serial changes to determine if it is an Acute Myocardial Infarction or myocardial injury due to an underlying chronic condition.       Results may be falsely decreased if patient taking Biotin.      Troponin [[id number]]  (Normal) Collected: 10/10/20 1426    Specimen: Blood Updated: 10/10/20 1509     Troponin T <0.010 ng/mL     Narrative:      Troponin T Reference Range:  <= 0.03 ng/mL-   Negative for AMI  >0.03 ng/mL-     Abnormal for myocardial necrosis.  Clinicians would have to utilize clinical acumen, EKG, Troponin and serial changes to determine if it is an Acute Myocardial Infarction or myocardial injury due to an underlying chronic condition.       Results may be falsely decreased if patient taking Biotin.      Comprehensive Metabolic Panel [[id number]]  (Abnormal) Collected: 10/10/20 1426    Specimen: Blood Updated: 10/10/20 1507     Glucose 128 mg/dL      BUN 15 mg/dL      Creatinine 1.38 mg/dL      Sodium 138 mmol/L      Potassium 3.7 mmol/L      Chloride 103 mmol/L      CO2 21.5 mmol/L      Calcium 9.3 mg/dL      Total Protein 7.4 g/dL      Albumin 4.10 g/dL      ALT (SGPT) 33 U/L      AST (SGOT) 60 U/L      Alkaline Phosphatase 61 U/L      Total Bilirubin 0.6 mg/dL      eGFR Non African Amer 51 mL/min/1.73      Globulin 3.3 gm/dL      A/G Ratio 1.2 g/dL      BUN/Creatinine Ratio 10.9     Anion Gap 13.5 mmol/L     Narrative:      GFR Normal >60  Chronic Kidney Disease <60  Kidney Failure <15      CBC & Differential [[id number]]  (Abnormal) Collected: 10/10/20 1426    Specimen: Blood Updated: 10/10/20 1448    Narrative:      The following orders were created for panel order CBC & Differential.  Procedure                               Abnormality         Status                     ---------                               -----------         ------                     CBC Auto Differential[[id number]]        Abnormal             Final result                 Please view results for these tests on the individual orders.    CBC Auto Differential [[id number]]  (Abnormal) Collected: 10/10/20 1426    Specimen: Blood Updated: 10/10/20 1448     WBC 12.30 10*3/mm3      RBC 5.40 10*6/mm3      Hemoglobin 16.7 g/dL      Hematocrit 49.7 %      MCV 92.0 fL      MCH 30.9 pg      MCHC 33.6 g/dL      RDW 12.8 %      RDW-SD 43.7 fl      MPV 9.9 fL      Platelets 245 10*3/mm3      Neutrophil % 87.7 %      Lymphocyte % 6.3 %      Monocyte % 5.7 %      Eosinophil % 0.0 %      Basophil % 0.1 %      Immature Grans % 0.2 %      Neutrophils, Absolute 10.78 10*3/mm3      Lymphocytes, Absolute 0.78 10*3/mm3      Monocytes, Absolute 0.70 10*3/mm3      Eosinophils, Absolute 0.00 10*3/mm3      Basophils, Absolute 0.01 10*3/mm3      Immature Grans, Absolute 0.03 10*3/mm3         Ct Head Without Contrast    Result Date: 10/11/2020  Narrative: PROCEDURE: CT Head WO COMPARISON: December 2018 INDICATIONS: Seizure 3 days ago, nontraumatic (Age >= 41y);Unspecified convulsions; Other chest pain; Other malaise; Hypoxemia; TECHNIQUE:  CT examination of the brain is performed without IV contrast. Radiation dose reduction techniques included automated exposure control or exposure modulation based on body size.  Count of known CT and cardiac nuc med studies performed in previous 12 months: 1.  FINDINGS:   There is no evidence of acute intracranial hemorrhage, mass effect or midline shift. No intra-axial or extra-axial fluid collections. There is mild cortical atrophy. Periventricular low attenuation is likely secondary to small vessel ischemic disease. Previous infarction in the left parietal lobe noted. The ventricular system is not dilated.  The calvarium is intact.     Impression: No acute intracranial process. Chronic ischemic change. Signer Name: Sirena Burgos MD  Signed: 10/11/2020 12:20 PM  Workstation Name: [facility]-  [facility]    Xr Chest 1  View    Result Date: 10/10/2020  Narrative: CR Chest 1 Vw INDICATION: Chest pain starting last night COMPARISON:  12/8/2018 FINDINGS: Single portable AP view(s) of the chest.  Normal cardiomediastinal contours. Favor bibasilar atelectasis involving primarily left base and right cardiophrenic angle. Otherwise clear lungs. No acute displaced fracture or dislocation.     Impression: Favor atelectasis in the lung bases without definite acute process. Signer Name: PAULA FUNEZ MD  Signed: 10/10/2020 2:57 PM  Workstation Name: [employer]  [facility]    Ct Angiogram Chest    Result Date: 10/10/2020  Narrative: CTA Chest INDICATION: Low oxygen saturation with convulsions, chest pain, malaise, chest pain since yesterday TECHNIQUE: CT angiogram of the chest with IV contrast. 3-D reconstructions were obtained and reviewed.   Radiation dose reduction techniques included automated exposure control or exposure modulation based on body size. Count of known CT and cardiac nuc med studies performed in previous 12 months: 0. COMPARISON: Same-day chest radiograph FINDINGS: Adequate opacification of the pulmonary arteries with no filling defects. Thoracic aorta normal in course and caliber without dissection. Heart size is normal. No pericardial effusion. Lungs demonstrate mild emphysematous changes predominantly at the lung apices. Bands of consolidation in the lung bases, right greater than left likely reflecting atelectasis but otherwise no confluent consolidation seen. The tracheobronchial tree is patent. No pleural effusion. No pneumothorax. Small hiatal hernia. Visualized upper abdomen is otherwise unremarkable. No acute osseous abnormality.     Impression: 1.  No pulmonary embolus. 2.  Bands of consolidation in the right greater than left base, most likely reflecting atelectasis. Otherwise no confluent infiltrate. Signer Name: PAULA FUNEZ MD  Signed: 10/10/2020 6:28 PM  Workstation Name: [employer]   [facility]      Condition on Discharge: Good, seizure-free    Discharge Disposition  To home    Visiting Nurse:    No     Home PT/OT:  No     Home Safety Evaluation:  No     DME  None    Discharge Diet:      Dietary Orders (From admission, onward)     Start     Ordered    10/10/20 2224  Diet Regular  Diet Effective Now     Question:  Diet Texture / Consistency  Answer:  Regular    10/10/20 2224                Activity at Discharge:  no driving until sees physician, neurologist in at least 90 days      Pre-discharge education  Driving restriction    Follow-up Appointments  Future Appointments   Date Time Provider Department Center   11/3/2020 10:30 AM Vivian Tran APRN [facility] None   12/16/2020  4:00 PM Anne Chand MD [facility] None   3/30/2021  1:00 PM Dia Call APRN [facility]     Additional Instructions for the Follow-ups that You Need to Schedule     Discharge Follow-up with PCP   As directed       Currently Documented PCP:    Anne Chand MD    PCP Phone Number:    [phone]     Follow Up Details: 1 week         Discharge Follow-up with Specified Provider: His usual Neurologist; 6 Weeks   As directed      To: His usual Neurologist    Follow Up: 6 Weeks               Test Results Pending at Discharge  Pending Labs     Order Current Status    Phenytoin Level, Free In process           Geovany Anaya MD  10/12/20  17:54 EDT    Time: Discharge > 30 min patient new to this provider, review of records, bedside discussion with the patient, coordination with case management, post discharge coordination with nursing and pharmacy to coordinate medications

## 2020-10-12 NOTE — OUTREACH NOTE
Prep Survey      Responses   [facility] patient discharged from?  [place]   Is LACE score < 7 ?  Yes   Eligibility  [facility]  [place]   Date of Admission  10/10/20   Date of Discharge  10/12/20   Discharge Disposition  Home or Self Care   Discharge diagnosis  grand mal seizures,  Hx seizures, acute hypoxic resp failure   Does the patient have one of the following disease processes/diagnoses(primary or secondary)?  Other   Does the patient have Home health ordered?  No   Is there a DME ordered?  No   Prep survey completed?  Yes          Yennifer Correa RN

## 2020-10-13 ENCOUNTER — TRANSITIONAL CARE MANAGEMENT TELEPHONE ENCOUNTER (OUTPATIENT)
Dept: CALL CENTER | Facility: HOSPITAL | Age: 70
End: 2020-10-13

## 2020-10-13 NOTE — OUTREACH NOTE
Call Center TCM Note      Responses   [facility] patient discharged from?  [place]   Does the patient have one of the following disease processes/diagnoses(primary or secondary)?  Other   TCM attempt successful?  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  Yes   Is the patient having any side effects they believe may be caused by any medication additions or changes?  No   Does the patient have all medications ordered at discharge?  Yes   Is the patient taking all medications as directed (includes completed medication regime)?  Yes   Medication comments  New med Dilantin was not available at any pharmacy, but [employer] has ordered for pt, and will be ready today per pt.   Does the patient have a primary care provider?   Yes   Does the patient have an appointment with their PCP within 7 days of discharge?  No   Comments regarding PCP  Lavinia Chand MD   Comments  Dr Chand has no openings so I will have ofc call pt to sched TCM [facility]   Has home health visited the patient within 72 hours of discharge?  N/A   Did the patient receive a copy of their discharge instructions?  Yes   Nursing interventions  Reviewed instructions with patient   What is the patient's perception of their health status since discharge?  Improving   Is the patient/caregiver able to teach back signs and symptoms related to disease process for when to call PCP?  Yes   Is the patient/caregiver able to teach back signs and symptoms related to disease process for when to call 911?  Yes   Is the patient/caregiver able to teach back the hierarchy of who to call/visit for symptoms/problems? PCP, Specialist, Home health nurse, Urgent Care, ED, 911  Yes   TCM call completed?  Yes   Wrap up additional comments  Pt states he is feeling better, spoke slowly but clearly and no issues with word finding. Pt would like fwp with PCP, no openings I could access so will have ofc  call pt to [facility]          Deena Jennings MA    10/13/2020, 11:03 EDT

## 2020-10-13 NOTE — NURSING NOTE
Case Management Discharge Note      Final Note: D/C home    Provided Post Acute Provider List?: N/A  N/A Provider List Comment: No needs at this time    Selected Continued Care - Discharged on 10/12/2020 Admission date: 10/10/2020 - Discharge disposition: Home or Self Care    Destination    No services have been selected for the patient.              Durable Medical Equipment    No services have been selected for the patient.              Dialysis/Infusion    No services have been selected for the patient.              Home Medical Care    No services have been selected for the patient.              Therapy    No services have been selected for the patient.              Community Resources    No services have been selected for the patient.                       Final Discharge Disposition Code: 01 - home or self-care

## 2020-10-14 LAB — PHENYTOIN FREE SERPL-MCNC: NORMAL UG/ML (ref 1–2)

## 2020-10-26 ENCOUNTER — OFFICE VISIT (OUTPATIENT)
Dept: FAMILY MEDICINE CLINIC | Facility: CLINIC | Age: 70
End: 2020-10-26

## 2020-10-26 VITALS
RESPIRATION RATE: 16 BRPM | TEMPERATURE: 98.2 F | HEIGHT: 73 IN | BODY MASS INDEX: 24.92 KG/M2 | SYSTOLIC BLOOD PRESSURE: 120 MMHG | OXYGEN SATURATION: 98 % | HEART RATE: 76 BPM | WEIGHT: 188 LBS | DIASTOLIC BLOOD PRESSURE: 70 MMHG

## 2020-10-26 DIAGNOSIS — D72.828 OTHER ELEVATED WHITE BLOOD CELL (WBC) COUNT: ICD-10-CM

## 2020-10-26 DIAGNOSIS — R79.89 ELEVATED SERUM CREATININE: ICD-10-CM

## 2020-10-26 DIAGNOSIS — R56.9 SEIZURE (HCC): Primary | ICD-10-CM

## 2020-10-26 PROCEDURE — 99213 OFFICE O/P EST LOW 20 MIN: CPT | Performed by: NURSE PRACTITIONER

## 2020-10-26 NOTE — PROGRESS NOTES
"Patient ID: Ronnell Rizvi is a 70 y.o. male     Patient Care Team:  Anne Chand MD as PCP - General (Family Medicine)  Gregory King MD as Consulting Physician (Hematology and Oncology)  Chase Haque MD as Referring Physician (Internal Medicine)  Luis Wyatt MD as Consulting Physician (Cardiology)    Subjective     Chief Complaint   Patient presents with   • Hospital Follow Up Visit   • Seizures       History of Present Illness    Ronnell Rizvi presents to the office today for hospital follow-up.  Date of Admission: 10/10/2020  Date of Discharge: 10/12/2020  Reviewed discharge summary from recent hospitalization.  Patient was seen in the emergency room due to 3 seizures.  He has history of seizure disorder.  Each episode lasted approximately 20 minutes about an hour and apart.  He had postictal symptoms along with urinary incontinence.  He previously was being treated with Keppra for his seizure disorder however he stopped taking medication due to possible allergic reaction causing a rash and \"felt drunk\".  He was off his seizure medication since May 2020.  He was evaluated per neurology during hospitalization.  Started on Dilantin.  No further seizure activity.  States he has been doing well since he was discharged home.  He continues to refrain from driving.  His wife drove him to office appointment today.  Has not noticed any adverse reaction to the Dilantin such as rash or other concerns.    He denies any complaints of fever, chills, cough, chest pain, shortness of air, abdominal pain, nausea, or any other concerns.     The following portions of the patient's history were reviewed and updated as appropriate: allergies, current medications, past family history, past medical history, past social history, past surgical history and problem list.       Review of Systems   Constitution: Negative.   HENT: Negative.    Eyes: Negative.    Cardiovascular: Negative.    Respiratory: Negative.    "   Endocrine: Negative.    Hematologic/Lymphatic: Negative.    Skin: Negative.    Musculoskeletal: Negative.    Gastrointestinal: Negative.    Genitourinary: Negative.    Neurological: Positive for seizures.   Psychiatric/Behavioral: Negative.        Vitals:    10/26/20 1309   BP: 120/70   Pulse: 76   Resp: 16   Temp: 98.2 °F (36.8 °C)   SpO2: 98%       Documented weights    10/26/20 1309   Weight: 85.3 kg (188 lb)     Body mass index is 24.8 kg/m².    Results for orders placed or performed in visit on 10/26/20   Comprehensive Metabolic Panel    Specimen: Blood   Result Value Ref Range    Glucose 83 65 - 99 mg/dL    BUN 14 8 - 27 mg/dL    Creatinine 1.08 0.76 - 1.27 mg/dL    eGFR Non African Am 69 >59 mL/min/1.73    eGFR African Am 79 >59 mL/min/1.73    BUN/Creatinine Ratio 13 10 - 24    Sodium 141 134 - 144 mmol/L    Potassium 4.5 3.5 - 5.2 mmol/L    Chloride 100 96 - 106 mmol/L    Total CO2 28 20 - 29 mmol/L    Calcium 9.3 8.6 - 10.2 mg/dL    Total Protein 6.6 6.0 - 8.5 g/dL    Albumin 4.0 3.7 - 4.7 g/dL    Globulin 2.6 1.5 - 4.5 g/dL    A/G Ratio 1.5 1.2 - 2.2    Total Bilirubin <0.2 0.0 - 1.2 mg/dL    Alkaline Phosphatase 73 39 - 117 IU/L    AST (SGOT) 19 0 - 40 IU/L    ALT (SGPT) 25 0 - 44 IU/L   CBC & Differential    Specimen: Blood   Result Value Ref Range    WBC 6.1 3.4 - 10.8 x10E3/uL    RBC 4.98 4.14 - 5.80 x10E6/uL    Hemoglobin 15.4 13.0 - 17.7 g/dL    Hematocrit 46.2 37.5 - 51.0 %    MCV 93 79 - 97 fL    MCH 30.9 26.6 - 33.0 pg    MCHC 33.3 31.5 - 35.7 g/dL    RDW 13.0 11.6 - 15.4 %    Platelets 294 150 - 450 x10E3/uL    Neutrophil Rel % 63 Not Estab. %    Lymphocyte Rel % 22 Not Estab. %    Monocyte Rel % 9 Not Estab. %    Eosinophil Rel % 4 Not Estab. %    Basophil Rel % 1 Not Estab. %    Neutrophils Absolute 3.8 1.4 - 7.0 x10E3/uL    Lymphocytes Absolute 1.4 0.7 - 3.1 x10E3/uL    Monocytes Absolute 0.6 0.1 - 0.9 x10E3/uL    Eosinophils Absolute 0.2 0.0 - 0.4 x10E3/uL    Basophils Absolute 0.1 0.0 -  0.2 x10E3/uL    Immature Granulocyte Rel % 1 Not Estab. %    Immature Grans Absolute 0.0 0.0 - 0.1 x10E3/uL       Objective     Physical Exam  HENT:      Head: Normocephalic and atraumatic.   Eyes:      Extraocular Movements: Extraocular movements intact.      Pupils: Pupils are equal, round, and reactive to light.   Cardiovascular:      Rate and Rhythm: Normal rate and regular rhythm.      Heart sounds: No murmur.   Pulmonary:      Effort: Pulmonary effort is normal.      Breath sounds: Normal breath sounds. No wheezing.   Skin:     General: Skin is warm and dry.   Neurological:      General: No focal deficit present.      Mental Status: He is alert and oriented to person, place, and time.   Psychiatric:         Mood and Affect: Mood normal.         Behavior: Behavior normal.       Current outpatient and discharge medications have been reconciled for the patient.  Reviewed by: CHINEDU Mckay      Assessment/Plan     Assessment/Plan     Diagnoses and all orders for this visit:    1. Seizure (CMS/HCC) (Primary)    2. Elevated serum creatinine  -     Comprehensive Metabolic Panel    3. Other elevated white blood cell (WBC) count  -     CBC & Differential        Summary:  Ronnell Rizvi presents office today for hospital follow-up.  He was hospitalized due to seizures.  He had stopped taking his Keppra in May 2020 on his own due to possible allergic reaction.  He was started on Dilantin during hospitalization.  No further episodes of seizures.  Denies any new problems or concerns.  Reviewed records from recent hospitalization.  Noted elevated creatinine and white blood count.  Instructed we will recheck CBC and CMP in office today and call him with results.  Also reiterated to not drive for at least 90 days and until cleared from neurology standpoint.  Patient verbalized understanding.  His next appointment scheduled with neurology is not till March 30, 2021.  This appointment was scheduled prior to recent seizure  activity.  Encouraged him to contact neurology office to schedule certain appointment due to unable to drive until cleared by neurology after 90 days.   We will have him return to office as previously scheduled for follow-up appoint with Dr. Chand in December.    In the meantime, instructed to contact us sooner for any problems or concerns.    Patient Instructions   Seizure, Adult  A seizure is a sudden burst of abnormal electrical activity in the brain. Seizures usually last from 30 seconds to 2 minutes. They can cause many different symptoms.  Usually, seizures are not harmful unless they last a long time.  What are the causes?  Common causes of this condition include:  · Fever or infection.  · Conditions that affect the brain, such as:  ? A brain abnormality that you were born with.  ? A brain or head injury.  ? Bleeding in the brain.  ? A tumor.  ? Stroke.  ? Brain disorders such as autism or cerebral palsy.  · Low blood sugar.  · Conditions that are passed from parent to child (are inherited).  · Problems with substances, such as:  ? Having a reaction to a drug or a medicine.  ? Suddenly stopping the use of a substance (withdrawal).  In some cases, the cause may not be known. A person who has repeated seizures over time without a clear cause has a condition called epilepsy.  What increases the risk?  You are more likely to get this condition if you have:  · A family history of epilepsy.  · Had a seizure in the past.  · A brain disorder.  · A history of head injury, lack of oxygen at birth, or strokes.  What are the signs or symptoms?  There are many types of seizures. The symptoms vary depending on the type of seizure you have. Examples of symptoms during a seizure include:  · Shaking (convulsions).  · Stiffness in the body.  · Passing out (losing consciousness).  · Head nodding.  · Staring.  · Not responding to sound or touch.  · Loss of bladder control and bowel control.  Some people have symptoms right  before and right after a seizure happens.  Symptoms before a seizure may include:  · Fear.  · Worry (anxiety).  · Feeling like you may vomit (nauseous).  · Feeling like the room is spinning (vertigo).  · Feeling like you saw or heard something before (déjà vu).  · Odd tastes or smells.  · Changes in how you see. You may see flashing lights or spots.  Symptoms after a seizure happens can include:  · Confusion.  · Sleepiness.  · Headache.  · Weakness on one side of the body.  How is this treated?  Most seizures will stop on their own in under 5 minutes. In these cases, no treatment is needed. Seizures that last longer than 5 minutes will usually need treatment. Treatment can include:  · Medicines given through an IV tube.  · Avoiding things that are known to cause your seizures. These can include medicines that you take for another condition.  · Medicines to treat epilepsy.  · Surgery to stop the seizures. This may be needed if medicines do not help.  Follow these instructions at home:  Medicines  · Take over-the-counter and prescription medicines only as told by your doctor.  · Do not eat or drink anything that may keep your medicine from working, such as alcohol.  Activity  · Do not do any activities that would be dangerous if you had another seizure, like driving or swimming. Wait until your doctor says it is safe for you to do them.  · If you live in the [place] ask your local DMV (department of [employer] vehicles) when you can drive.  · Get plenty of rest.  Teaching others  Teach friends and family what to do when you have a seizure. They should:  · Lay you on the ground.  · Protect your head and body.  · Loosen any tight clothing around your neck.  · Turn you on your side.  · Not hold you down.  · Not put anything into your mouth.  · Know whether or not you need emergency care.  · Stay with you until you are better.    General instructions  · Contact your doctor each time you have a seizure.  · Avoid anything that  gives you seizures.  · Keep a seizure diary. Write down:  ? What you think caused each seizure.  ? What you remember about each seizure.  · Keep all follow-up visits as told by your doctor. This is important.  Contact a doctor if:  · You have another seizure.  · You have seizures more often.  · There is any change in what happens during your seizures.  · You keep having seizures with treatment.  · You have symptoms of being sick or having an infection.  Get help right away if:  · You have a seizure that:  ? Lasts longer than 5 minutes.  ? Is different than seizures you had before.  ? Makes it harder to breathe.  ? Happens after you hurt your head.  · You have any of these symptoms after a seizure:  ? Not being able to speak.  ? Not being able to use a part of your body.  ? Confusion.  ? A bad headache.  · You have two or more seizures in a row.  · You do not wake up right after a seizure.  · You get hurt during a seizure.  These symptoms may be an emergency. Do not wait to see if the symptoms will go away. Get medical help right away. Call your local emergency services (911 in the [place] Do not drive yourself to the hospital.  Summary  · Seizures usually last from 30 seconds to 2 minutes. Usually, they are not harmful unless they last a long time.  · Do not eat or drink anything that may keep your medicine from working, such as alcohol.  · Teach friends and family what to do when you have a seizure.  · Contact your doctor each time you have a seizure.  This information is not intended to replace advice given to you by your health care provider. Make sure you discuss any questions you have with your health care provider.  Document Released: 06/05/2009 Document Revised: 03/06/2020 Document Reviewed: 03/06/2020  Elsevier Patient Education © 2020 [employer] Inc.          Leonarda Briceno, APRN  Family Medicine  [facility] [place]  10/27/20  13:12 EDT

## 2020-10-27 ENCOUNTER — TELEPHONE (OUTPATIENT)
Dept: NEUROLOGY | Facility: CLINIC | Age: 70
End: 2020-10-27

## 2020-10-27 LAB
ALBUMIN SERPL-MCNC: 4 G/DL (ref 3.7–4.7)
ALBUMIN/GLOB SERPL: 1.5 {RATIO} (ref 1.2–2.2)
ALP SERPL-CCNC: 73 IU/L (ref 39–117)
ALT SERPL-CCNC: 25 IU/L (ref 0–44)
AST SERPL-CCNC: 19 IU/L (ref 0–40)
BASOPHILS # BLD AUTO: 0.1 X10E3/UL (ref 0–0.2)
BASOPHILS NFR BLD AUTO: 1 %
BILIRUB SERPL-MCNC: <0.2 MG/DL (ref 0–1.2)
BUN SERPL-MCNC: 14 MG/DL (ref 8–27)
BUN/CREAT SERPL: 13 (ref 10–24)
CALCIUM SERPL-MCNC: 9.3 MG/DL (ref 8.6–10.2)
CHLORIDE SERPL-SCNC: 100 MMOL/L (ref 96–106)
CO2 SERPL-SCNC: 28 MMOL/L (ref 20–29)
CREAT SERPL-MCNC: 1.08 MG/DL (ref 0.76–1.27)
EOSINOPHIL # BLD AUTO: 0.2 X10E3/UL (ref 0–0.4)
EOSINOPHIL NFR BLD AUTO: 4 %
ERYTHROCYTE [DISTWIDTH] IN BLOOD BY AUTOMATED COUNT: 13 % (ref 11.6–15.4)
GLOBULIN SER CALC-MCNC: 2.6 G/DL (ref 1.5–4.5)
GLUCOSE SERPL-MCNC: 83 MG/DL (ref 65–99)
HCT VFR BLD AUTO: 46.2 % (ref 37.5–51)
HGB BLD-MCNC: 15.4 G/DL (ref 13–17.7)
IMM GRANULOCYTES # BLD AUTO: 0 X10E3/UL (ref 0–0.1)
IMM GRANULOCYTES NFR BLD AUTO: 1 %
LYMPHOCYTES # BLD AUTO: 1.4 X10E3/UL (ref 0.7–3.1)
LYMPHOCYTES NFR BLD AUTO: 22 %
MCH RBC QN AUTO: 30.9 PG (ref 26.6–33)
MCHC RBC AUTO-ENTMCNC: 33.3 G/DL (ref 31.5–35.7)
MCV RBC AUTO: 93 FL (ref 79–97)
MONOCYTES # BLD AUTO: 0.6 X10E3/UL (ref 0.1–0.9)
MONOCYTES NFR BLD AUTO: 9 %
NEUTROPHILS # BLD AUTO: 3.8 X10E3/UL (ref 1.4–7)
NEUTROPHILS NFR BLD AUTO: 63 %
PLATELET # BLD AUTO: 294 X10E3/UL (ref 150–450)
POTASSIUM SERPL-SCNC: 4.5 MMOL/L (ref 3.5–5.2)
PROT SERPL-MCNC: 6.6 G/DL (ref 6–8.5)
RBC # BLD AUTO: 4.98 X10E6/UL (ref 4.14–5.8)
SODIUM SERPL-SCNC: 141 MMOL/L (ref 134–144)
WBC # BLD AUTO: 6.1 X10E3/UL (ref 3.4–10.8)

## 2020-10-27 NOTE — PATIENT INSTRUCTIONS
Seizure, Adult  A seizure is a sudden burst of abnormal electrical activity in the brain. Seizures usually last from 30 seconds to 2 minutes. They can cause many different symptoms.  Usually, seizures are not harmful unless they last a long time.  What are the causes?  Common causes of this condition include:  · Fever or infection.  · Conditions that affect the brain, such as:  ? A brain abnormality that you were born with.  ? A brain or head injury.  ? Bleeding in the brain.  ? A tumor.  ? Stroke.  ? Brain disorders such as autism or cerebral palsy.  · Low blood sugar.  · Conditions that are passed from parent to child (are inherited).  · Problems with substances, such as:  ? Having a reaction to a drug or a medicine.  ? Suddenly stopping the use of a substance (withdrawal).  In some cases, the cause may not be known. A person who has repeated seizures over time without a clear cause has a condition called epilepsy.  What increases the risk?  You are more likely to get this condition if you have:  · A family history of epilepsy.  · Had a seizure in the past.  · A brain disorder.  · A history of head injury, lack of oxygen at birth, or strokes.  What are the signs or symptoms?  There are many types of seizures. The symptoms vary depending on the type of seizure you have. Examples of symptoms during a seizure include:  · Shaking (convulsions).  · Stiffness in the body.  · Passing out (losing consciousness).  · Head nodding.  · Staring.  · Not responding to sound or touch.  · Loss of bladder control and bowel control.  Some people have symptoms right before and right after a seizure happens.  Symptoms before a seizure may include:  · Fear.  · Worry (anxiety).  · Feeling like you may vomit (nauseous).  · Feeling like the room is spinning (vertigo).  · Feeling like you saw or heard something before (déjà vu).  · Odd tastes or smells.  · Changes in how you see. You may see flashing lights or spots.  Symptoms after a  seizure happens can include:  · Confusion.  · Sleepiness.  · Headache.  · Weakness on one side of the body.  How is this treated?  Most seizures will stop on their own in under 5 minutes. In these cases, no treatment is needed. Seizures that last longer than 5 minutes will usually need treatment. Treatment can include:  · Medicines given through an IV tube.  · Avoiding things that are known to cause your seizures. These can include medicines that you take for another condition.  · Medicines to treat epilepsy.  · Surgery to stop the seizures. This may be needed if medicines do not help.  Follow these instructions at home:  Medicines  · Take over-the-counter and prescription medicines only as told by your doctor.  · Do not eat or drink anything that may keep your medicine from working, such as alcohol.  Activity  · Do not do any activities that would be dangerous if you had another seizure, like driving or swimming. Wait until your doctor says it is safe for you to do them.  · If you live in the [place] ask your local DMV (department of [employer]) when you can drive.  · Get plenty of rest.  Teaching others  Teach friends and family what to do when you have a seizure. They should:  · Lay you on the ground.  · Protect your head and body.  · Loosen any tight clothing around your neck.  · Turn you on your side.  · Not hold you down.  · Not put anything into your mouth.  · Know whether or not you need emergency care.  · Stay with you until you are better.    General instructions  · Contact your doctor each time you have a seizure.  · Avoid anything that gives you seizures.  · Keep a seizure diary. Write down:  ? What you think caused each seizure.  ? What you remember about each seizure.  · Keep all follow-up visits as told by your doctor. This is important.  Contact a doctor if:  · You have another seizure.  · You have seizures more often.  · There is any change in what happens during your seizures.  · You keep having  seizures with treatment.  · You have symptoms of being sick or having an infection.  Get help right away if:  · You have a seizure that:  ? Lasts longer than 5 minutes.  ? Is different than seizures you had before.  ? Makes it harder to breathe.  ? Happens after you hurt your head.  · You have any of these symptoms after a seizure:  ? Not being able to speak.  ? Not being able to use a part of your body.  ? Confusion.  ? A bad headache.  · You have two or more seizures in a row.  · You do not wake up right after a seizure.  · You get hurt during a seizure.  These symptoms may be an emergency. Do not wait to see if the symptoms will go away. Get medical help right away. Call your local emergency services (911 in the [place] Do not drive yourself to the hospital.  Summary  · Seizures usually last from 30 seconds to 2 minutes. Usually, they are not harmful unless they last a long time.  · Do not eat or drink anything that may keep your medicine from working, such as alcohol.  · Teach friends and family what to do when you have a seizure.  · Contact your doctor each time you have a seizure.  This information is not intended to replace advice given to you by your health care provider. Make sure you discuss any questions you have with your health care provider.  Document Released: 06/05/2009 Document Revised: 03/06/2020 Document Reviewed: 03/06/2020  Elsevier Patient Education © 2020 Else[employer] Inc.

## 2020-10-27 NOTE — TELEPHONE ENCOUNTER
Pt in [facility] in early October due to 3 szs in the middle of the night. Dilantin 300 mg hs was prescribed. Pt not taking Keppra. I scheduled pt for 02/23/21 and added to the wait list. Pt would like in earlier. He is not driving and wants to be released at his 90 days.

## 2020-10-27 NOTE — TELEPHONE ENCOUNTER
PT CALLED IN REQUESTING A SOONER APPT THAN THE ONE FILIBERTO WITH CHINEDU SOTO IN [place]. HE STATES HE HAD THREE SZ IN THE MIDDLE OF HIS SLEEP EARLIER THIS MONTH AND ENDED UP GOING TO  IN [place] AND WAS THERE FOR THREE DAYS. PT HAS NOT HAD A SZ SINCE BEING IN THE HOSPITAL AND STATES HIS MEDICATION WAS CHANGED TO DILANTIN 300MG THAT HE TAKES ONCE PER DAY. PLEASE REVIEW AND ADVISE      CALL BACK- [phone]

## 2020-10-29 NOTE — PROGRESS NOTES
Notify patient his recent lab test have improved.  His white count is now normal at 6.1 compared to 11.58 previously.  Creatinine level has also improved indicating normal kidney function test.  Liver function tests have also improved.

## 2020-10-30 ENCOUNTER — TRANSCRIBE ORDERS (OUTPATIENT)
Dept: ADMINISTRATIVE | Facility: HOSPITAL | Age: 70
End: 2020-10-30

## 2020-10-30 DIAGNOSIS — R06.02 SOB (SHORTNESS OF BREATH): Primary | ICD-10-CM

## 2020-11-02 NOTE — PROGRESS NOTES
Date of Office Visit: 2020  Encounter Provider: CHINEDU Ruano  Place of Service: [facility] CARDIOLOGY  Patient Name: Ronnell Rizvi  :1950  Primary Cardiologist: Dr. Sawant    CC:  Annual cardiac evaluation    Dear Dr. Chand    HPI: Ronnell Rizvi is a pleasant 70 y.o. male who presents 2020 for cardiac follow up.   First seen by Dr. Sawant in 2016 when he was hospitalized for chest pain.  A Cardiolite stress was normal (EF 54%).  He did have some Wenckebach at the beginning of stress which normalized with exercise.     In 2017, he had an episode of visual changes that resolved on their own.  The next morning it happened again and he came to the ED and was diagnosed with a stroke of the left occipital/temporal lobe.  He was found to have some diffuse small vessel disease.  An echo wasn't performed but a Zio patch was placed.  We were not consulted in the hospital.  He was placed on clopidogrel and atorvastatin.     In 2017, the Zio showed some atrial ectopy (there was a 13 beat run of PACs) but no atrial fibrillation.  An echo showed normal LV systolic function and a small PFO.     His studies were then reviewed by a different neurologist, who felt that this was actually embolic. A THIERRY showed a moderate sized PFO and moderate aortic insufficiency but was otherwise normal. A Confirm device was placed (implanted monitor from [employer]).  Upon arrival he was noted to have an irregular rhythm (not atrial fibrillation).  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  The monitor failed to show any atrial fibrillation (see below).     In 2018, he presented with recurrent neurological symptoms.  He was found to have a subacute infarct as well as diffuse intracranial atherosclerosis.  He underwent percutaneous PFO closure with a 25mm Amplatzer device during that admission.     The implant the monitor has continued to report paroxysmal  "atrial fibrillation although review of every episode shows that this is not the case.  He has short sinus pauses as well as type II sinoatrial exit block.     He presented to the emergency department at [facility] on 1/14/2020  complaining of intermittent speech difficulties that were first noticed around 11 PM the night prior.. Pt also c/o HA. Pt denies visual disturbances, trouble swallowing, numbness, weakness, SOA and CP. The pt has a hx of CVA x3 and is anticoagulated on 75 mg Plavix. He started having difficulty \"finding the appropriate words\" last night around 2300 however his sx's resolved shortly after and the pt went to sleep. He woke up this morning feeling normal, with normal speech until 0900, when the pt was heard by his wife to be \"slurred\". He states \"I couldn't talk or use the right words\". After hearing the change in pt's speech, the pt's wife administered 81 mg ASA at 1000. He was brought to the ED for further evaluation.  Pt is not a smoker.       CTA of the head and neck:  IMPRESSION:  1.  Short segment of high-grade stenosis involving the P1 segment of the  right posterior cerebral artery, similar to that seen on MRA head  12/09/2018.   2.  No finding of occlusion of the cervical or intracranial vasculature.  No intracranial aneurysm evident.  3.  No findings of acute intracranial abnormality. Moderate small vessel  degenerative white matter changes and old left corona radiata and  occipital lobe infarcts, as before     He was started on aspirin along with his Plavix.  An echocardiogram was performed that was unremarkable.  He was seen by neurology who felt that he needed to get back on the dual antiplatelet therapy and otherwise had no further recommendations.  It was actually felt this could represent a migraine with aura rather than a TIA.  The patient has been intolerant to statins in the past and and states because he has had memory loss he would not want to be restarted on a statin " "at this time.  He is to follow-up with neurology as an outpatient.  Echo on 1/14/2020 as below:     · Left ventricular wall thickness is consistent with hypertrophy. Sigmoid-shaped ventricular septum is present.  · Estimated EF = 60%.  · Left ventricular systolic function is normal.  · Left ventricular diastolic dysfunction (grade I) consistent with impaired relaxation.  · There is mild calcification of the aortic valve.  · Mild aortic valve regurgitation is present     Patient had a colonoscopy with Dr. Mendoza on 10/9/2020.  That evening he had a series of 3 seizures.  The on-call physician instructed the patient to go to the emergency room.  He did present to the emergency room at [facility] on 10/10/2020.  The wife had witnessed 3 seizures the night before apparently that lasted approximately 20 minutes about 1 hour apart.  He was postictal in between and had some urinary incontinence.  He was apparently a little slow to respond and having trouble with some memory issues and confusions.  He was found to be hypoxic upon admission to the emergency room and he was admitted for further evaluation and treatment.  He does have a history of COPD.  It was found that he had not been taking his seizure medications since May 2020.  He thought he had a drug reaction to the Keppra but instead it was felt that it was probably the Plavix.  Therefore he  Had stopped his Keppra.  During his admission, he had no further seizures.  He initially was given Keppra in the ER but that was changed to Dilantin prior to his discharge.  His hypoxic  respiratory failure resolved when the patient was more awake and alert after his postictal phase.  He was seen by neurology and was instructed that he could not drive for at least 90 days seizure-free.  He has an appointment to follow-up with neurology in February 2021.  The CT of the head did show no acute intracranial process.  Chronic ischemic change.  Chest x-ray showed \"favor " "atelectasis in the lung bases without definitive acute process\".  A CT angiogram of the chest showed no pulmonary embolus, bands of consolidation in the right greater than left base most likely reflecting atelectasis.  Otherwise no confluent infiltrate.    He states he is having trouble finding some of his words.  He is also having trouble with some short-term memory.  Otherwise he states he is actually feeling pretty well.  He denies any palpitations or shortness of breath.  He denies any lower extremity edema, dizziness or lightheadedness.  He does state he is fatigued but that is improving.  He stated that he has been having some intermittent left-sided discomfort that he describes as pressure and definitely not pain.  He states it also does not feel tight.  He almost questions whether or not it is muscular in nature, it does not last very long just seconds.  He is noncompliant with his CPAP.  He has been taking his medications as directed.  He denies any unexplained bleeding, dark or tarry stools.    Past Medical History:   Diagnosis Date   • Allergic rhinitis    • Anal fissure    • Aortic insufficiency     moderate, THIERRY 2017   • Asthma    • Asthma    • Benign prostatic hypertrophy (BPH) with weak urinary stream 11/1/2016   • Chronic bronchitis (CMS/HCC)    • Colon polyp    • COPD (chronic obstructive pulmonary disease) (CMS/HCC)    • Emphysema lung (CMS/HCC)    • Fatty liver    • GERD (gastroesophageal reflux disease)    • Hepatitis     thought to be Hepatitis A    • History of pneumonia     With left lower lobe atelectasis and scar tissue, being followed   • HL (hearing loss)    • Hypertension    • Memory loss    • Obstructive sleep apnea syndrome 8/23/2017    refuses c-pap   • PFO (patent foramen ovale)     s/p percutaneous closure with a 25mm Amplatzer device in December 2018   • Pneumonia     LLL with scar tissue   • Seizures (CMS/HCC)    • 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   • Spinal stenosis    • Stroke " (CMS/[facility])     10/2017: left occipital/temporal. total of 3 strokes   • Vision loss        Past Surgical History:   Procedure Laterality Date   • CARDIAC CATHETERIZATION N/A 2018    Procedure: Patent foramen ovale closure- [employer];  Surgeon: Deangelo Harris MD;  Location:  [facility];  Service: Cardiology   • CARDIAC CATHETERIZATION N/A 2018    Procedure: Intracardiac echocardiogram;  Surgeon: Deangelo Harris MD;  Location:  [facility];  Service: Cardiology   • CARDIAC ELECTROPHYSIOLOGY PROCEDURE N/A 3/26/2018    Procedure: Loop insertion   CONFIRM RX;  Surgeon: Luis Wyatt MD;  Location:  [facility];  Service: Cardiovascular   • CARDIAC ELECTROPHYSIOLOGY PROCEDURE N/A 7/15/2020    Procedure: Loop recorder removal;  Surgeon: Starr Driscoll MD;  Location:  [facility];  Service: Cardiovascular;  Laterality: N/A;   • CARDIAC SURGERY     • COLONOSCOPY     • COLONOSCOPY N/A 10/9/2020    Procedure: COLONOSCOPY with polypectomy;  Surgeon: Ras Mendoza MD;  Location: [facility] OR;  Service: Gastroenterology;  Laterality: N/A;  diverticulosis  ascending polyp  transverse polyp  sigmoid polyps X 2  rectal polyp   • HAND SURGERY Bilateral    • INGUINAL HERNIA REPAIR Left    • OTHER SURGICAL HISTORY      inguinal hernia repair for person over age 5   • TONSILLECTOMY     • TONSILLECTOMY         Social History     Socioeconomic History   • Marital status:      Spouse name: Joey   • Number of children: Not on file   • Years of education: Not on file   • Highest education level: Not on file   Occupational History   • Occupation:      Employer: RETIRED   Tobacco Use   • Smoking status: Former Smoker     Quit date: 1979     Years since quittin.7   • Smokeless tobacco: Never Used   • Tobacco comment: caffeine use: Drinks 4 glasses of Dt coke on avg.    Substance and Sexual Activity   •  Alcohol use: No   • Drug use: No   • Sexual activity: Defer       Family History   Problem Relation Age of Onset   • Obesity Mother    • Clotting disorder Mother         Upper extremities   • Alcohol abuse Father    • Cancer Father 63        Esophagus and lung   • Other Father         cardiac disorder   • Heart disease Father    • Kidney disease Sister    • Kidney failure Sister    • Drug abuse Paternal Uncle    • Stroke Sister    • Throat cancer Sister        The following portion of the patient's history were reviewed and updated as appropriate: past medical history, past surgical history, past social history, past family history, allergies, current medications, and problem list.    Review of Systems   Constitution: Positive for malaise/fatigue. Negative for diaphoresis and fever.   HENT: Negative for congestion, hearing loss, hoarse voice, nosebleeds and sore throat.    Eyes: Negative for photophobia, vision loss in left eye, vision loss in right eye and visual disturbance.   Cardiovascular: Positive for chest pain (intermittent pressure). Negative for dyspnea on exertion, irregular heartbeat, leg swelling, near-syncope, orthopnea, palpitations, paroxysmal nocturnal dyspnea and syncope.   Respiratory: Negative for cough, hemoptysis, shortness of breath, sleep disturbances due to breathing, snoring, sputum production and wheezing.    Endocrine: Negative for cold intolerance, heat intolerance, polydipsia, polyphagia and polyuria.   Hematologic/Lymphatic: Negative for bleeding problem. Does not bruise/bleed easily.   Skin: Negative for color change, dry skin, poor wound healing, rash and suspicious lesions.   Musculoskeletal: Negative for arthritis, back pain, falls, gout, joint pain, joint swelling, muscle cramps, muscle weakness and myalgias.   Gastrointestinal: Negative for bloating, abdominal pain, constipation, diarrhea, dysphagia, melena, nausea and vomiting.   Neurological: Negative for excessive daytime  "sleepiness, dizziness, headaches, light-headedness, loss of balance, numbness, paresthesias, seizures, vertigo and weakness.   Psychiatric/Behavioral: Positive for memory loss. Negative for depression and substance abuse. The patient is not nervous/anxious.        Allergies   Allergen Reactions   • Plavix [Clopidogrel] Rash   • Aspirin Nausea Only     Pt states he \"can tolerate enteric coated aspirin only.\"    • Citrus      Blisters on mouth     • Combivent [Ipratropium-Albuterol] Other (See Comments)     Throat swelling   • Lactose Intolerance (Gi)    • Statins Mental Status Change     Severe memory impairment         Current Outpatient Medications:   •  aspirin 81 MG EC tablet, Take 1 tablet by mouth Daily for 30 days., Disp: 30 tablet, Rfl: 0  •  cetirizine (zyrTEC) 10 MG tablet, Take 10 mg by mouth Daily As Needed., Disp: , Rfl:   •  Cyanocobalamin 1000 MCG/ML kit, Inject  as directed 1 (One) Time Per Week. 1/4 of a cc IM every sunday, Disp: , Rfl:   •  guaiFENesin (Mucinex) 600 MG 12 hr tablet, Take 2 tablets by mouth Every 12 (Twelve) Hours., Disp: 30 tablet, Rfl: 1  •  hydroCHLOROthiazide (HYDRODIURIL) 12.5 MG tablet, Take 1 tablet by mouth Daily., Disp: 90 tablet, Rfl: 2  •  phenytoin (DILANTIN) 300 MG ER capsule, Take 1 capsule by mouth Every Night for 30 days., Disp: 30 capsule, Rfl: 0        Objective:     Vitals:    11/03/20 1033   BP: 120/70   Pulse: 65   Resp: 16   Weight: 83.9 kg (185 lb)   Height: 185.4 cm (73\")     Body mass index is 24.41 kg/m².      Vitals signs reviewed.   Constitutional:       General: Not in acute distress.     Appearance: Normal and healthy appearance. Well-developed, normal weight and not in distress.   Eyes:      General:         Right eye: No discharge.         Left eye: No discharge.      Conjunctiva/sclera: Conjunctivae normal.   HENT:      Head: Normocephalic and atraumatic.      Right Ear: External ear normal.      Left Ear: External ear normal.      Nose: Nose normal. "   Neck:      Musculoskeletal: Normal range of motion and neck supple.      Thyroid: No thyromegaly.      Vascular: No JVD.      Trachea: No tracheal deviation.      Lymphadenopathy: No cervical adenopathy.   Pulmonary:      Effort: Pulmonary effort is normal. No respiratory distress.      Breath sounds: Normal breath sounds. No wheezing. No rales.   Chest:      Chest wall: Not tender to palpatation.   Cardiovascular:      Normal rate. Regular rhythm.      No gallop.   Pulses:     Intact distal pulses.   Abdominal:      General: There is no distension.      Palpations: Abdomen is soft.      Tenderness: There is no abdominal tenderness.   Musculoskeletal: Normal range of motion.         General: No tenderness or deformity.   Skin:     General: Skin is warm and dry.      Findings: No erythema or rash.   Neurological:      Mental Status: Alert and oriented to person, place, and time.      Coordination: Coordination normal.   Psychiatric:         Behavior: Behavior normal. Behavior is cooperative.         Thought Content: Thought content normal.         Judgment: Judgment normal.               ECG 12 Lead    Date/Time: 11/3/2020 10:41 AM  Performed by: Vivian Tran APRN  Authorized by: Vivian Tran APRN   Comparison: compared with previous ECG from 10/10/2020  Similar to previous ECG  Rhythm: sinus rhythm  Rate: normal  Conduction: 1st degree AV block  Q waves: V2    T Waves: T waves normal  T flattening: aVF  QRS axis: left    Clinical impression: abnormal EKG              Assessment:       Diagnosis Plan   1. Nonrheumatic aortic valve insufficiency  Stress Test With Myocardial Perfusion One Day   2. Cerebrovascular accident (CVA), unspecified mechanism (CMS/HCC)     3. Essential hypertension     4. PFO (patent foramen ovale)     5. Sinoatrial block     6. Precordial pain  Stress Test With Myocardial Perfusion One Day   7. ELIZABETH (obstructive sleep apnea)     8. Chronic obstructive pulmonary disease, unspecified COPD  type (CMS/HCC)     9. Seizure (CMS/HCC)     10. Status post placement of implantable loop recorder            Plan:       1.  CVA/PFO- He had a left occipital/temporal stroke which was initially presumed to be due to small vessel disease. He was placed on clopidogrel and atorvastatin (which he stopped due to memory loss).  Then, outpatient neurological evaluation led to a further workup.  He was found to have a PFO on THIERRY, and then he had another event on DAPT.  He then had the PFO closed.  Another TIA with expressive aphasia and short-term memory loss 1/14/2020.  He was evaluated by neurology and instructed to take 81 mg of aspirin with his Plavix.  He stopped the Plavix due to a possible drug reaction.  He is on 81 mg of aspirin.     2.  Hypertension- well controlled     3.  ELIZABETH-noncompliant with CPAP     4.  Sinoatrial block- he has intermittent second-degree SA block (type II) which is asymptomatic.     5.  Hyperlipidemia-he denies statin secondary to memory loss from CVA and TIAs.    6.  Seizures - he had stopped taking his Keppra in May 2020.  He is now on Dilantin and cannot drive for 90 days seizure free.    7.  S/P placement of loop recorder.  - This was explanted 7/15/2020    8.  COPD    9.  Atypical chest pain - brief episodes of chest pressure with and without activity.  He states he thinks it is actually more at rest. Check stress test.    Chest stress test   RTO in 1 year with [facility]    As always, it has been a pleasure to participate in your patient's care. Thank you.       Sincerely,       CHINEDU Ruano      Current Outpatient Medications:   •  aspirin 81 MG EC tablet, Take 1 tablet by mouth Daily for 30 days., Disp: 30 tablet, Rfl: 0  •  cetirizine (zyrTEC) 10 MG tablet, Take 10 mg by mouth Daily As Needed., Disp: , Rfl:   •  Cyanocobalamin 1000 MCG/ML kit, Inject  as directed 1 (One) Time Per Week. 1/4 of a cc IM every sunday, Disp: , Rfl:   •  guaiFENesin (Mucinex) 600 MG 12 hr tablet, Take 2  tablets by mouth Every 12 (Twelve) Hours., Disp: 30 tablet, Rfl: 1  •  hydroCHLOROthiazide (HYDRODIURIL) 12.5 MG tablet, Take 1 tablet by mouth Daily., Disp: 90 tablet, Rfl: 2  •  phenytoin (DILANTIN) 300 MG ER capsule, Take 1 capsule by mouth Every Night for 30 days., Disp: 30 capsule, Rfl: 0  Dictated utilizing Dragon dictation

## 2020-11-03 ENCOUNTER — OFFICE VISIT (OUTPATIENT)
Dept: CARDIOLOGY | Facility: CLINIC | Age: 70
End: 2020-11-03

## 2020-11-03 VITALS
DIASTOLIC BLOOD PRESSURE: 70 MMHG | HEART RATE: 65 BPM | WEIGHT: 185 LBS | RESPIRATION RATE: 16 BRPM | HEIGHT: 73 IN | SYSTOLIC BLOOD PRESSURE: 120 MMHG | BODY MASS INDEX: 24.52 KG/M2

## 2020-11-03 DIAGNOSIS — R56.9 SEIZURE (HCC): ICD-10-CM

## 2020-11-03 DIAGNOSIS — Z95.818 STATUS POST PLACEMENT OF IMPLANTABLE LOOP RECORDER: ICD-10-CM

## 2020-11-03 DIAGNOSIS — I63.9 CEREBROVASCULAR ACCIDENT (CVA), UNSPECIFIED MECHANISM (HCC): ICD-10-CM

## 2020-11-03 DIAGNOSIS — Q21.12 PFO (PATENT FORAMEN OVALE): ICD-10-CM

## 2020-11-03 DIAGNOSIS — I45.5 SINOATRIAL BLOCK: ICD-10-CM

## 2020-11-03 DIAGNOSIS — G47.33 OSA (OBSTRUCTIVE SLEEP APNEA): ICD-10-CM

## 2020-11-03 DIAGNOSIS — I35.1 NONRHEUMATIC AORTIC VALVE INSUFFICIENCY: Primary | ICD-10-CM

## 2020-11-03 DIAGNOSIS — I10 ESSENTIAL HYPERTENSION: ICD-10-CM

## 2020-11-03 DIAGNOSIS — R07.2 PRECORDIAL PAIN: ICD-10-CM

## 2020-11-03 DIAGNOSIS — J44.9 CHRONIC OBSTRUCTIVE PULMONARY DISEASE, UNSPECIFIED COPD TYPE (HCC): ICD-10-CM

## 2020-11-03 PROCEDURE — 99214 OFFICE O/P EST MOD 30 MIN: CPT | Performed by: NURSE PRACTITIONER

## 2020-11-03 PROCEDURE — 93000 ELECTROCARDIOGRAM COMPLETE: CPT | Performed by: NURSE PRACTITIONER

## 2020-11-11 ENCOUNTER — TELEPHONE (OUTPATIENT)
Dept: NEUROLOGY | Facility: CLINIC | Age: 70
End: 2020-11-11

## 2020-11-11 DIAGNOSIS — R56.9 SEIZURE (HCC): Primary | ICD-10-CM

## 2020-11-11 DIAGNOSIS — Z51.81 MEDICATION MONITORING ENCOUNTER: ICD-10-CM

## 2020-11-11 RX ORDER — PHENYTOIN SODIUM 300 MG/1
300 CAPSULE, EXTENDED RELEASE ORAL NIGHTLY
Qty: 30 CAPSULE | Refills: 3 | Status: SHIPPED | OUTPATIENT
Start: 2020-11-11 | End: 2020-11-23

## 2020-11-11 RX ORDER — PHENYTOIN SODIUM 300 MG/1
300 CAPSULE, EXTENDED RELEASE ORAL NIGHTLY
Qty: 30 CAPSULE | Refills: 0 | Status: CANCELLED | OUTPATIENT
Start: 2020-11-11 | End: 2020-12-11

## 2020-11-11 NOTE — TELEPHONE ENCOUNTER
PT IS CALLING STATING HE WAS ADMITTED TO THE ER ON 10/10/20-10/13/20. HE WAS PRESCRIBED PHENYLOIN (DILANTIN) 300 MG ER CAPSULE. THE MEDICATION SHOWS THAT IT WAS PRESCRIBED BY MICHAEL LEON. HE ONLY HAS A FEW CAPSULES LEFT AND WOULD LIKE TO HAVE A PRESCRIPTION REFILL AS SOON AS POSSIBLE.    GABINO ANDRADE CAN BE REACHED AT [phone]    PLEASE ADVISE.

## 2020-11-11 NOTE — TELEPHONE ENCOUNTER
RX sent over. Ask patient to get dilantin level in next 5- 10 days. Lab orders placed. He can get this done at [place]

## 2020-11-12 NOTE — TELEPHONE ENCOUNTER
Informed pt of rx and instructed pt to have levels drawn in the next 5-10 days. Pt agrees and verbalized understanding.

## 2020-11-17 ENCOUNTER — APPOINTMENT (OUTPATIENT)
Dept: NUCLEAR MEDICINE | Facility: HOSPITAL | Age: 70
End: 2020-11-17

## 2020-11-17 ENCOUNTER — APPOINTMENT (OUTPATIENT)
Dept: CARDIOLOGY | Facility: HOSPITAL | Age: 70
End: 2020-11-17

## 2020-11-19 ENCOUNTER — LAB (OUTPATIENT)
Dept: LAB | Facility: HOSPITAL | Age: 70
End: 2020-11-19

## 2020-11-19 DIAGNOSIS — R56.9 SEIZURE (HCC): ICD-10-CM

## 2020-11-19 DIAGNOSIS — Z51.81 MEDICATION MONITORING ENCOUNTER: ICD-10-CM

## 2020-11-19 PROCEDURE — 36415 COLL VENOUS BLD VENIPUNCTURE: CPT

## 2020-11-19 PROCEDURE — 80185 ASSAY OF PHENYTOIN TOTAL: CPT

## 2020-11-19 PROCEDURE — 80186 ASSAY OF PHENYTOIN FREE: CPT

## 2020-11-23 ENCOUNTER — HOSPITAL ENCOUNTER (OUTPATIENT)
Dept: NUCLEAR MEDICINE | Facility: HOSPITAL | Age: 70
Discharge: HOME | End: 2020-11-23

## 2020-11-23 ENCOUNTER — HOSPITAL ENCOUNTER (OUTPATIENT)
Dept: CARDIOLOGY | Facility: HOSPITAL | Age: 70
Discharge: HOME | End: 2020-11-23

## 2020-11-23 DIAGNOSIS — R56.9 SEIZURE (HCC): ICD-10-CM

## 2020-11-23 DIAGNOSIS — R56.9 SEIZURE (HCC): Primary | ICD-10-CM

## 2020-11-23 DIAGNOSIS — Z51.81 MEDICATION MONITORING ENCOUNTER: Primary | ICD-10-CM

## 2020-11-23 DIAGNOSIS — I35.1 NONRHEUMATIC AORTIC VALVE INSUFFICIENCY: ICD-10-CM

## 2020-11-23 DIAGNOSIS — R07.2 PRECORDIAL PAIN: ICD-10-CM

## 2020-11-23 LAB
BH CV NUCLEAR PRIOR STUDY: 3
BH CV STRESS BP STAGE 1: NORMAL
BH CV STRESS BP STAGE 2: NORMAL
BH CV STRESS BP STAGE 3: NORMAL
BH CV STRESS DURATION MIN STAGE 1: 3
BH CV STRESS DURATION MIN STAGE 2: 3
BH CV STRESS DURATION MIN STAGE 3: 1
BH CV STRESS DURATION SEC STAGE 1: 0
BH CV STRESS DURATION SEC STAGE 2: 0
BH CV STRESS DURATION SEC STAGE 3: 18
BH CV STRESS GRADE STAGE 1: 10
BH CV STRESS GRADE STAGE 2: 12
BH CV STRESS GRADE STAGE 3: 14
BH CV STRESS HR STAGE 1: 103
BH CV STRESS HR STAGE 2: 125
BH CV STRESS HR STAGE 3: 138
BH CV STRESS METS STAGE 1: 5
BH CV STRESS METS STAGE 2: 7.5
BH CV STRESS METS STAGE 3: 10
BH CV STRESS PROTOCOL 1: NORMAL
BH CV STRESS RECOVERY BP: NORMAL MMHG
BH CV STRESS RECOVERY HR: 86 BPM
BH CV STRESS SPEED STAGE 1: 1.7
BH CV STRESS SPEED STAGE 2: 2.5
BH CV STRESS SPEED STAGE 3: 3.4
BH CV STRESS STAGE 1: 1
BH CV STRESS STAGE 2: 2
BH CV STRESS STAGE 3: 3
LV EF NUC BP: 54 %
MAXIMAL PREDICTED HEART RATE: 149 BPM
PERCENT MAX PREDICTED HR: 93.96 %
PHENYTOIN FREE SERPL-MCNC: ABNORMAL UG/ML (ref 1–2)
PHENYTOIN SERPL-MCNC: 5.9 UG/ML (ref 10–20)
STRESS BASELINE BP: NORMAL MMHG
STRESS BASELINE HR: 58 BPM
STRESS O2 SAT REST: 97 %
STRESS PERCENT HR: 111 %
STRESS POST ESTIMATED WORKLOAD: 9.1 METS
STRESS POST EXERCISE DUR MIN: 7 MIN
STRESS POST EXERCISE DUR SEC: 18 SEC
STRESS POST O2 SAT PEAK: 97 %
STRESS POST PEAK BP: NORMAL MMHG
STRESS POST PEAK HR: 140 BPM
STRESS TARGET HR: 127 BPM

## 2020-11-23 PROCEDURE — A9500 TC99M SESTAMIBI: HCPCS | Performed by: NURSE PRACTITIONER

## 2020-11-23 PROCEDURE — 0 TECHNETIUM SESTAMIBI: Performed by: NURSE PRACTITIONER

## 2020-11-23 PROCEDURE — 93018 CV STRESS TEST I&R ONLY: CPT | Performed by: INTERNAL MEDICINE

## 2020-11-23 PROCEDURE — 78452 HT MUSCLE IMAGE SPECT MULT: CPT

## 2020-11-23 PROCEDURE — 93016 CV STRESS TEST SUPVJ ONLY: CPT | Performed by: INTERNAL MEDICINE

## 2020-11-23 PROCEDURE — 93017 CV STRESS TEST TRACING ONLY: CPT

## 2020-11-23 PROCEDURE — 78452 HT MUSCLE IMAGE SPECT MULT: CPT | Performed by: INTERNAL MEDICINE

## 2020-11-23 RX ORDER — PHENYTOIN SODIUM 100 MG/1
CAPSULE, EXTENDED RELEASE ORAL
Qty: 37 CAPSULE | Refills: 0 | Status: SHIPPED | OUTPATIENT
Start: 2020-11-23 | End: 2020-11-23 | Stop reason: SDUPTHER

## 2020-11-23 RX ORDER — PHENYTOIN SODIUM 300 MG/1
CAPSULE, EXTENDED RELEASE ORAL
Qty: 35 CAPSULE | Refills: 2 | Status: SHIPPED | OUTPATIENT
Start: 2020-11-23 | End: 2021-01-07 | Stop reason: SDUPTHER

## 2020-11-23 RX ADMIN — TECHNETIUM TC 99M SESTAMIBI 1 DOSE: 1 INJECTION INTRAVENOUS at 09:22

## 2020-11-23 RX ADMIN — TECHNETIUM TC 99M SESTAMIBI 1 DOSE: 1 INJECTION INTRAVENOUS at 07:59

## 2020-11-23 NOTE — TELEPHONE ENCOUNTER
PT SAYS HE NORMALLY TAKES HIS DILANTIN PILL AT 9PM AND WOULD LIKE TO KNOW IF THAT WOULD AFFECT THE LEVEL IN HIS BLOOD WHEN HE TAKES THE TEST AT 3PM. PLEASE REVIEW AND ADVISE.     CALL [phone]

## 2020-11-24 ENCOUNTER — TELEPHONE (OUTPATIENT)
Dept: CARDIOLOGY | Facility: CLINIC | Age: 70
End: 2020-11-24

## 2020-11-24 NOTE — TELEPHONE ENCOUNTER
Dia- what is your goal BP for this patient so I can let him know when I call his stress test results?

## 2020-11-24 NOTE — TELEPHONE ENCOUNTER
Cas is out of the office until Monday. Can you contact the Pt with results please.      Assessment:        Diagnosis Plan   1. Nonrheumatic aortic valve insufficiency  Stress Test With Myocardial Perfusion One Day   2. Cerebrovascular accident (CVA), unspecified mechanism (CMS/HCC)      3. Essential hypertension      4. PFO (patent foramen ovale)      5. Sinoatrial block      6. Precordial pain  Stress Test With Myocardial Perfusion One Day   7. ELIZABETH (obstructive sleep apnea)      8. Chronic obstructive pulmonary disease, unspecified COPD type (CMS/HCC)      9. Seizure (CMS/HCC)      10. Status post placement of implantable loop recorder               Plan:       1.  CVA/PFO- He had a left occipital/temporal stroke which was initially presumed to be due to small vessel disease. He was placed on clopidogrel and atorvastatin (which he stopped due to memory loss).  Then, outpatient neurological evaluation led to a further workup.  He was found to have a PFO on THIERRY, and then he had another event on DAPT.  He then had the PFO closed.  Another TIA with expressive aphasia and short-term memory loss 1/14/2020.  He was evaluated by neurology and instructed to take 81 mg of aspirin with his Plavix.  He stopped the Plavix due to a possible drug reaction.  He is on 81 mg of aspirin.     2.  Hypertension- well controlled     3.  ELIZABETH-noncompliant with CPAP     4.  Sinoatrial block- he has intermittent second-degree SA block (type II) which is asymptomatic.     5.  Hyperlipidemia-he denies statin secondary to memory loss from CVA and TIAs.     6.  Seizures - he had stopped taking his Keppra in May 2020.  He is now on Dilantin and cannot drive for 90 days seizure free.     7.  S/P placement of loop recorder.  - This was explanted 7/15/2020     8.  COPD     9.  Atypical chest pain - brief episodes of chest pressure with and without activity.  He states he thinks it is actually more at rest. Check stress test.     Chest stress  test   RTO in 1 year with JK     As always, it has been a pleasure to participate in your patient's care. Thank you.         Sincerely,         CHINEDU Ruano      Interpretation Summary    · Left ventricular ejection fraction is normal. (Calculated EF = 54%).  · Myocardial perfusion imaging indicates a normal myocardial perfusion study with no evidence of ischemia.  · Impressions are consistent with a low risk study.      Patient Hx Of Height, Weight, and Vitals    Height Weight BSA (Calculated - sq m) BMI (kg/m2) Pulse BP           Reason for Exam    Chest Pain   Dx: Nonrheumatic aortic valve insufficiency [I35.1 (ICD-10-CM)]; Precordial pain [R07.2 (ICD-10-CM)]    Stress Data    Stage Heart Rate (BPM) Blood Pressure (mmHg) Minutes Seconds Grade (%) Speed (MPH)   1        103        159/73        3        0        10        1.7          2        125        176/73        3        0        12        2.5          3        138        194/72        1        18        14        3.4             Stress Measurements    Baseline Vitals   Baseline HR 58 bpm      Baseline /77 mmHg      O2 sat rest 97 %       Peak Stress Vitals   Peak  bpm      Peak /85 mmHg      O2 sat peak 97 %       Recovery Vitals   Recovery HR 86 bpm      Recovery /85 mmHg       Exercise Data   Target HR (85%) 127 bpm      Max. Pred. HR (100%) 149 bpm      Percent Max Pred HR 93.96 %      Exercise duration (min) 7 min      Exercise duration (sec) 18 sec      Estimated workload 9.1 METS         Study Description    Nuclear Study Description A 1-day rest/stress protocol myocardial perfusion imaging study was performed. A peripheral IV was started in the right antecubital fossa. While at rest, the patient was injected intravenously with 11.2 mCi of technetium sestamibi at 07:30 EST. Rest imaging was performed approximately 30 minutes after the injection. While at peak stress, the patient was injected intravenously with 33.7 mCi of  technetium sestamibi at 09:00 EST. Stress imaging was performed approximately 30 minutes after the injection. The total amount of radiation received in the study is about 13.58 mSv.No prior studies were available for comparison   Nuclear Perfusion Images Overall image quality is good. There are no artifacts present.   Imaging Contrast/Medications:     technetium sestamibi (CARDIOLITE) injection 1 dose   Given: 1 dose Intravenous    technetium sestamibi (CARDIOLITE) injection 1 dose   Given: 1 dose Intravenous   Stress Procedure    Rest ECG Baseline ECG rhythm of sinus bradycardia noted. Dropped beats.   NC interval = 0.2 ms. QRS complex = 0.06 ms.   Stress Description A stress test was performed following the Dany protocol.   The patient reached the end of the protocol and achieved the target heart rate.   The patient experienced no angina during the stress test.   Low risk for ischemic heart disease.   Blood pressure demonstrated a hypertensive response to stress. Heart rate demonstrated a normal response to stress. Overall, the patient's exercise capacity was normal.   Stress ECG Stress ECG rhythm of sinus tachycardia noted.   There were no arrhythmias during stress.   Stress ECG was interpretable.   Recovery ECG During recovery, the patient complained of the expected effects following stress.   Sinus rhythm was noted during recovery. Arrhythmias during recovery: rare PAC's.   Stress Findings No ECG evidence of myocardial ischemia.Negative clinical evidence of myocardial ischemia.   Nuclear Perfusion Findings    Study Impression Myocardial perfusion imaging indicates a normal myocardial perfusion study with no evidence of ischemia. Impressions are consistent with a low risk study.   Rest Perfusion Defect 1 No rest myocardial perfusion defect noted.   Stress Perfusion Defect 1 No stress myocardial perfusion defect noted.   Ventricle Size / Description Left ventricular ejection fraction is normal.  (Calculated EF =  54%).

## 2020-11-24 NOTE — TELEPHONE ENCOUNTER
I reviewed stress test results with patient and hypertensive response to stress.  I recommend he check his blood pressure at home periodically.  Goal blood pressure for us and per neurology note from September 2020 with his history of CVA was 130/80 or below.  We reviewed low-sodium diet.  He will call with worsening symptoms otherwise no changes from our standpoint.   Patient verbalized understanding.

## 2020-12-02 ENCOUNTER — OFFICE VISIT (OUTPATIENT)
Dept: FAMILY MEDICINE CLINIC | Facility: CLINIC | Age: 70
End: 2020-12-02

## 2020-12-02 VITALS
DIASTOLIC BLOOD PRESSURE: 80 MMHG | WEIGHT: 189.9 LBS | BODY MASS INDEX: 25.17 KG/M2 | OXYGEN SATURATION: 96 % | HEIGHT: 73 IN | TEMPERATURE: 97.6 F | HEART RATE: 70 BPM | SYSTOLIC BLOOD PRESSURE: 120 MMHG

## 2020-12-02 DIAGNOSIS — Z00.00 ENCOUNTER FOR MEDICARE ANNUAL WELLNESS EXAM: Primary | ICD-10-CM

## 2020-12-02 PROCEDURE — G0438 PPPS, INITIAL VISIT: HCPCS | Performed by: FAMILY MEDICINE

## 2020-12-02 NOTE — PROGRESS NOTES
The ABCs of the Annual Wellness Visit  Subsequent Medicare Wellness Visit    Chief Complaint   Patient presents with   • Annual Exam       Subjective   History of Present Illness:  Ronnell Rizvi is a 70 y.o. male who presents for a Subsequent Medicare Wellness Visit.       CVA/PFO- He had a left occipital/temporal stroke. He was found to have a PFO/then had the PFO closed.  Another TIA with expressive aphasia and short-term memory loss 2020.    He is on 81 mg of aspirin.     Hypertension- well controlled     ELIZABETH-noncompliant with CPAP     Hyperlipidemia-he denies statin secondary to memory loss from CVA and TIAs.      Seizures - he had stopped taking his Keppra in May 2020.  He is now on Dilantin and cannot drive for 90 days seizure free. Recent increase in dose and has a level draw tomorrow     COPD-stable     Atypical chest pain - brief episodes of chest pressure with and without activity.  He states he thinks it is actually more at rest. Check stress test.    HEALTH RISK ASSESSMENT    Recent Hospitalizations:  Recently treated at the following:  Other: Nondenominational seizure episode    Current Medical Providers:  Patient Care Team:  Anne Chand MD as PCP - General (Family Medicine)  Gregory King MD as Consulting Physician (Hematology and Oncology)  Chase Haque MD as Referring Physician (Internal Medicine)  Luis Wyatt MD as Consulting Physician (Cardiology)    Smoking Status:  Social History     Tobacco Use   Smoking Status Former Smoker   • Quit date: 1979   • Years since quittin.8   Smokeless Tobacco Never Used   Tobacco Comment    caffeine use: Drinks 4 glasses of Dt coke on avg.        Alcohol Consumption:  Social History     Substance and Sexual Activity   Alcohol Use No       Depression Screen:   PHQ-2/PHQ-9 Depression Screening 2020   Little interest or pleasure in doing things 0   Feeling down, depressed, or hopeless 0   Trouble falling or staying asleep, or  sleeping too much 1   Feeling tired or having little energy 1   Poor appetite or overeating 0   Feeling bad about yourself - or that you are a failure or have let yourself or your family down 0   Trouble concentrating on things, such as reading the newspaper or watching television 0   Moving or speaking so slowly that other people could have noticed. Or the opposite - being so fidgety or restless that you have been moving around a lot more than usual 0   Thoughts that you would be better off dead, or of hurting yourself in some way 0   Total Score 2   If you checked off any problems, how difficult have these problems made it for you to do your work, take care of things at home, or get along with other people? Not difficult at all       Fall Risk Screen:  SARAH Fall Risk Assessment was completed, and patient is at LOW risk for falls.Assessment completed on:12/2/2020    Health Habits and Functional and Cognitive Screening:  Functional & Cognitive Status 12/2/2020   Do you have difficulty preparing food and eating? No   Do you have difficulty bathing yourself, getting dressed or grooming yourself? No   Do you have difficulty using the toilet? No   Do you have difficulty moving around from place to place? No   Do you have trouble with steps or getting out of a bed or a chair? No   Current Diet Well Balanced Diet   Dental Exam Up to date   Eye Exam Up to date   Exercise (times per week) 7 times per week   Current Exercises Include Walking   Do you need help using the phone?  No   Are you deaf or do you have serious difficulty hearing?  Yes   Do you need help with transportation? No   Do you need help shopping? No   Do you need help preparing meals?  No   Do you need help with housework?  No   Do you need help with laundry? No   Do you need help taking your medications? No   Do you need help managing money? No   Do you ever drive or ride in a car without wearing a seat belt? No   Have you felt unusual stress, anger or  loneliness in the last month? No   Who do you live with? Spouse   If you need help, do you have trouble finding someone available to you? No   Have you been bothered in the last four weeks by sexual problems? No   Do you have difficulty concentrating, remembering or making decisions? Yes         Does the patient have evidence of cognitive impairment? No    Asprin use counseling:Taking ASA appropriately as indicated    Age-appropriate Screening Schedule:  Refer to the list below for future screening recommendations based on patient's age, sex and/or medical conditions. Orders for these recommended tests are listed in the plan section. The patient has been provided with a written plan.    Health Maintenance   Topic Date Due   • ZOSTER VACCINE (3 of 3) 12/27/2016   • COLONOSCOPY  10/09/2025   • TDAP/TD VACCINES (4 - Td) 06/21/2030   • INFLUENZA VACCINE  Completed          The following portions of the patient's history were reviewed and updated as appropriate: problem list.    Outpatient Medications Prior to Visit   Medication Sig Dispense Refill   • cetirizine (zyrTEC) 10 MG tablet Take 10 mg by mouth Daily As Needed.     • Cyanocobalamin 1000 MCG/ML kit Inject  as directed 1 (One) Time Per Week. 1/4 of a cc IM every sunday     • hydroCHLOROthiazide (HYDRODIURIL) 12.5 MG tablet Take 1 tablet by mouth Daily. 90 tablet 2   • phenytoin extended (DILANTIN) 300 MG ER capsule 600mg qhs x 5 days then 300mg qhs Repeat dilantin level in 14 days 35 capsule 2   • guaiFENesin (Mucinex) 600 MG 12 hr tablet Take 2 tablets by mouth Every 12 (Twelve) Hours. 30 tablet 1     No facility-administered medications prior to visit.        Patient Active Problem List   Diagnosis   • Anemia, unspecified   • Health care maintenance   • Vitamin B12 deficiency   • Chronic fatigue   • Benign prostatic hypertrophy (BPH) with weak urinary stream   • Chronic bronchitis (CMS/HCC)   • ELIZABETH (obstructive sleep apnea)   • Sinoatrial block   • PFO (patent  "foramen ovale)   • Aortic insufficiency   • Cerebral aneurysm, nonruptured   • CVA (cerebral vascular accident) (CMS/[facility])   • TIA (transient ischemic attack)   • Intracranial vascular stenosis   • Sinus pause   • Status post placement of implantable loop recorder   • TIA on medication   • HTN (hypertension)   • COPD (chronic obstructive pulmonary disease) (CMS/[facility])   • Nocturnal seizures (CMS/[facility])   • Encounter for screening for malignant neoplasm of colon   • Encounter for loop recorder at end of battery life   • Seizure (CMS/[facility])       Advanced Care Planning:  ACP discussion was held with the patient during this visit. Patient does not have an advance directive, information provided.    Review of Systems   Constitutional: Negative for appetite change, chills and fever.   HENT: Negative for congestion.    Eyes: Negative for discharge.   Respiratory: Negative for chest tightness and wheezing.    Cardiovascular: Negative for chest pain.   Gastrointestinal: Negative for abdominal pain.   Genitourinary: Negative for decreased urine volume, flank pain and frequency.   Musculoskeletal: Negative for arthralgias.   Skin: Negative for color change.   Neurological: Negative for weakness.   Psychiatric/Behavioral: The patient is not nervous/anxious and is not hyperactive.        Compared to one year ago, the patient feels his physical health is the same.  Compared to one year ago, the patient feels his mental health is the same.    Reviewed chart for potential of high risk medication in the elderly: yes  Reviewed chart for potential of harmful drug interactions in the elderly:yes    Objective         Vitals:    12/02/20 1108   BP: 120/80   Pulse: 70   Temp: 97.6 °F (36.4 °C)   SpO2: 96%   Weight: 86.1 kg (189 lb 14.4 oz)   Height: 185.4 cm (73\")       Body mass index is 25.05 kg/m².  Discussed the patient's BMI with him. The BMI is in the acceptable range.    Physical Exam  Vitals signs and nursing note reviewed. "   Constitutional:       Appearance: He is well-developed.   HENT:      Head: Normocephalic.   Neck:      Musculoskeletal: Neck supple.   Cardiovascular:      Rate and Rhythm: Normal rate and regular rhythm.      Heart sounds: Normal heart sounds. No murmur.   Pulmonary:      Effort: Pulmonary effort is normal. No respiratory distress.   Abdominal:      Palpations: Abdomen is soft.   Musculoskeletal: Normal range of motion.   Skin:     General: Skin is warm.   Neurological:      Mental Status: He is alert and oriented to person, place, and time.         Lab Results   Component Value Date    GLU 83 10/26/2020        Assessment/Plan   Medicare Risks and Personalized Health Plan  CMS Preventative Services Quick Reference  Chronic Pain   Colon Cancer Screening  Dementia/Memory   Depression/Dysphoria  Fall Risk  Immunizations Discussed/Encouraged (specific immunizations; Prevnar and Shingrix )  Inadequate Social Support, Isolation, Loneliness, Lack of Transportation, Financial Difficulties, or Caregiver Stress   Inactivity/Sedentary  Lung Cancer Risk  Obesity/Overweight   Osteoprorosis Risk  Polypharmacy       The above risks/problems have been discussed with the patient.  Pertinent information has been shared with the patient in the After Visit Summary.  Follow up plans and orders are seen below in the Assessment/Plan Section.    Diagnoses and all orders for this visit:    1. Encounter for Medicare annual wellness exam (Primary)      Follow Up:    An After Visit Summary and PPPS were given to the patient.

## 2020-12-03 ENCOUNTER — LAB (OUTPATIENT)
Dept: LAB | Facility: HOSPITAL | Age: 70
End: 2020-12-03

## 2020-12-03 DIAGNOSIS — Z51.81 MEDICATION MONITORING ENCOUNTER: ICD-10-CM

## 2020-12-03 LAB — PHENYTOIN SERPL-MCNC: 20.8 MCG/ML (ref 10–20)

## 2020-12-03 PROCEDURE — 36415 COLL VENOUS BLD VENIPUNCTURE: CPT

## 2020-12-03 PROCEDURE — 80185 ASSAY OF PHENYTOIN TOTAL: CPT

## 2020-12-04 ENCOUNTER — TELEPHONE (OUTPATIENT)
Dept: NEUROLOGY | Facility: CLINIC | Age: 70
End: 2020-12-04

## 2020-12-04 DIAGNOSIS — Z51.81 MEDICATION MONITORING ENCOUNTER: Primary | ICD-10-CM

## 2020-12-04 NOTE — TELEPHONE ENCOUNTER
----- Message from CHINEDU Whitaker sent at 12/4/2020 10:57 AM EST -----  Will you confirm his current dose of dilantin is? Dilantin level mildly above range. See if he is having seizure-like activity or other complaints. Call me at home w/ update. THX   ----- Message -----  From: Lab, Background User  Sent: 12/3/2020   4:30 PM EST  To: CHINEDU Whitaker

## 2020-12-04 NOTE — TELEPHONE ENCOUNTER
Spoke with patient regarding dilantin level.  Rx was written for Dilantin 600 mg qhs x 5 days then 300mg qhs.  Patient currently taking 600mg daily. Patient had discarded new rx bottle after picking up medication from the pharmacy and did not realize he was to decrease to 300mg.  Patient does feel that Dilantin is making him tired and dizzy.    Spoke with CHINEDU Muñoz.  Confirmed that patient is to decrease to 300mg qhs.  Ensure that he does not miss a dose and if a dose is missed, to take 600mg the next day.  Repeat lab in one month.    Spoke with patient regarding recommendations.  Patient verbalized understanding and will call with any new/acute issues.

## 2021-01-07 DIAGNOSIS — R56.9 SEIZURE (HCC): ICD-10-CM

## 2021-01-08 ENCOUNTER — LAB (OUTPATIENT)
Dept: LAB | Facility: HOSPITAL | Age: 71
End: 2021-01-08

## 2021-01-08 DIAGNOSIS — Z51.81 MEDICATION MONITORING ENCOUNTER: ICD-10-CM

## 2021-01-08 LAB — PHENYTOIN SERPL-MCNC: 11.7 MCG/ML (ref 10–20)

## 2021-01-08 PROCEDURE — 80185 ASSAY OF PHENYTOIN TOTAL: CPT

## 2021-01-08 PROCEDURE — 36415 COLL VENOUS BLD VENIPUNCTURE: CPT

## 2021-01-08 RX ORDER — PHENYTOIN SODIUM 300 MG/1
CAPSULE, EXTENDED RELEASE ORAL
Qty: 35 CAPSULE | Refills: 2 | Status: SHIPPED | OUTPATIENT
Start: 2021-01-08 | End: 2021-05-10 | Stop reason: SDUPTHER

## 2021-01-11 ENCOUNTER — TELEPHONE (OUTPATIENT)
Dept: NEUROLOGY | Facility: CLINIC | Age: 71
End: 2021-01-11

## 2021-01-11 DIAGNOSIS — Z51.81 ENCOUNTER FOR THERAPEUTIC DRUG MONITORING: Primary | ICD-10-CM

## 2021-01-11 DIAGNOSIS — R56.9 SEIZURE (HCC): ICD-10-CM

## 2021-01-11 NOTE — TELEPHONE ENCOUNTER
----- Message from CHINEDU Whitaker sent at 1/8/2021  4:25 PM EST -----  Therapeutic drug level; no changes indicated at this time; keep planned follow-up

## 2021-01-25 ENCOUNTER — LAB (OUTPATIENT)
Dept: LAB | Facility: HOSPITAL | Age: 71
End: 2021-01-25

## 2021-01-25 DIAGNOSIS — R56.9 SEIZURE (HCC): ICD-10-CM

## 2021-01-25 DIAGNOSIS — Z51.81 ENCOUNTER FOR THERAPEUTIC DRUG MONITORING: ICD-10-CM

## 2021-01-25 LAB — PHENYTOIN SERPL-MCNC: 4.5 MCG/ML (ref 10–20)

## 2021-01-25 PROCEDURE — 36415 COLL VENOUS BLD VENIPUNCTURE: CPT

## 2021-01-25 PROCEDURE — 80185 ASSAY OF PHENYTOIN TOTAL: CPT

## 2021-01-26 ENCOUNTER — TELEPHONE (OUTPATIENT)
Dept: NEUROLOGY | Facility: CLINIC | Age: 71
End: 2021-01-26

## 2021-01-26 DIAGNOSIS — R56.9 NOCTURNAL SEIZURES (HCC): Primary | ICD-10-CM

## 2021-01-26 DIAGNOSIS — Z51.81 MEDICATION MONITORING ENCOUNTER: ICD-10-CM

## 2021-01-26 NOTE — TELEPHONE ENCOUNTER
----- Message from CHINEDU Whitaker sent at 1/26/2021  8:46 AM EST -----  Level subtherapeutic; please check w/ patient to access seizure-like activity/missed doses of meds and current daily dilantin dose. Thanks!

## 2021-01-26 NOTE — TELEPHONE ENCOUNTER
Spoke with patient regarding subtherapeutic dilantin level.  Patient denies any seizures or seizures-like activity.  He is currently taking Dilantin 300mg qhs and denies missing any doses.

## 2021-01-26 NOTE — TELEPHONE ENCOUNTER
Attempted to reach patient to discuss medication increase and repeat blood work.  Left message to call back.

## 2021-01-27 NOTE — TELEPHONE ENCOUNTER
Spoke with patient regarding the increase in Dilantin to 600mg qhs x 3 nights and repeat lab in 7-10 days.  Patient voiced understanding.

## 2021-02-08 ENCOUNTER — LAB (OUTPATIENT)
Dept: LAB | Facility: HOSPITAL | Age: 71
End: 2021-02-08

## 2021-02-08 DIAGNOSIS — Z51.81 MEDICATION MONITORING ENCOUNTER: ICD-10-CM

## 2021-02-08 DIAGNOSIS — R56.9 NOCTURNAL SEIZURES (HCC): ICD-10-CM

## 2021-02-08 LAB — PHENYTOIN SERPL-MCNC: 5.3 MCG/ML (ref 10–20)

## 2021-02-08 PROCEDURE — 80185 ASSAY OF PHENYTOIN TOTAL: CPT

## 2021-02-08 PROCEDURE — 36415 COLL VENOUS BLD VENIPUNCTURE: CPT

## 2021-02-09 ENCOUNTER — TELEPHONE (OUTPATIENT)
Dept: NEUROLOGY | Facility: CLINIC | Age: 71
End: 2021-02-09

## 2021-02-09 NOTE — TELEPHONE ENCOUNTER
----- Message from CHINEDU Whitaker sent at 2/9/2021 12:23 PM EST -----  Dilantin level still low. Please confirm current dose before I make recommendations. THX.    ----- Message -----  From: Lab, Background User  Sent: 2/8/2021   7:41 PM EST  To: CHINEDU Whitaker

## 2021-02-09 NOTE — TELEPHONE ENCOUNTER
"Patient returned call, reviewed current Dilantin level.  Confirmed that he is still currently taking Dilantin 300mg qhs and did take 600mg x3 days as previously instructed.  He states he has not missed a dose and denies any seizures or seizure-like activity.    Patient asked that I relay to you that he does not tolerate the increase to 600mg, stating that it makes him a \"zombie\" the entire day, despite taking it at bedtime, and that he falls asleep every time he sits down.  He states that he would not like to increase the dose if possible but would try another medication but not Keppra as it made him very itchy previously.  "

## 2021-02-09 NOTE — TELEPHONE ENCOUNTER
Patient states he is willing to try Dilantin 400mg qhs but wanted to let you know that he will decrease it back to 300mg qhs if issues arise.  I asked that he please call to let us know that he is changing the dose and he voiced understanding.  Also let him know he will need to get lab rechecked in one month.

## 2021-02-09 NOTE — TELEPHONE ENCOUNTER
Attempted to reach patient to discuss lab results and confirm Dilantin dose.  Left message to call back.

## 2021-02-10 ENCOUNTER — TELEPHONE (OUTPATIENT)
Dept: NEUROLOGY | Facility: CLINIC | Age: 71
End: 2021-02-10

## 2021-02-10 DIAGNOSIS — R56.9 NOCTURNAL SEIZURES (HCC): ICD-10-CM

## 2021-02-10 DIAGNOSIS — R56.9 SEIZURE (HCC): Primary | ICD-10-CM

## 2021-02-10 RX ORDER — PHENYTOIN SODIUM 100 MG/1
100 CAPSULE, EXTENDED RELEASE ORAL DAILY
Qty: 90 CAPSULE | Refills: 2 | Status: SHIPPED | OUTPATIENT
Start: 2021-02-10 | End: 2021-05-10 | Stop reason: SDUPTHER

## 2021-02-10 NOTE — TELEPHONE ENCOUNTER
----- Message from CHINEDU Whitaker sent at 2/10/2021 11:13 AM EST -----  Will increase Dilantin to 400mg per day

## 2021-02-11 ENCOUNTER — IMMUNIZATION (OUTPATIENT)
Dept: VACCINE CLINIC | Facility: HOSPITAL | Age: 71
End: 2021-02-11

## 2021-02-11 PROCEDURE — 0001A: CPT | Performed by: INTERNAL MEDICINE

## 2021-02-11 PROCEDURE — 91300 HC SARSCOV02 VAC 30MCG/0.3ML IM: CPT | Performed by: INTERNAL MEDICINE

## 2021-02-19 ENCOUNTER — TRANSCRIBE ORDERS (OUTPATIENT)
Dept: OBSTETRICS AND GYNECOLOGY | Facility: CLINIC | Age: 71
End: 2021-02-19

## 2021-02-19 DIAGNOSIS — Z01.818 OTHER SPECIFIED PRE-OPERATIVE EXAMINATION: Primary | ICD-10-CM

## 2021-02-22 ENCOUNTER — LAB (OUTPATIENT)
Dept: LAB | Facility: HOSPITAL | Age: 71
End: 2021-02-22

## 2021-02-22 DIAGNOSIS — Z01.818 OTHER SPECIFIED PRE-OPERATIVE EXAMINATION: ICD-10-CM

## 2021-02-22 LAB — SARS-COV-2 RNA PNL SPEC NAA+PROBE: NOT DETECTED

## 2021-02-22 PROCEDURE — 87635 SARS-COV-2 COVID-19 AMP PRB: CPT

## 2021-02-22 PROCEDURE — C9803 HOPD COVID-19 SPEC COLLECT: HCPCS

## 2021-02-23 ENCOUNTER — OFFICE VISIT (OUTPATIENT)
Dept: NEUROLOGY | Facility: CLINIC | Age: 71
End: 2021-02-23

## 2021-02-23 VITALS
WEIGHT: 189 LBS | HEIGHT: 73 IN | SYSTOLIC BLOOD PRESSURE: 122 MMHG | HEART RATE: 44 BPM | BODY MASS INDEX: 25.05 KG/M2 | DIASTOLIC BLOOD PRESSURE: 82 MMHG | OXYGEN SATURATION: 98 %

## 2021-02-23 DIAGNOSIS — I48.91 ATRIAL FIBRILLATION, UNSPECIFIED TYPE (HCC): ICD-10-CM

## 2021-02-23 DIAGNOSIS — R56.9 SEIZURE (HCC): ICD-10-CM

## 2021-02-23 DIAGNOSIS — G47.33 OSA (OBSTRUCTIVE SLEEP APNEA): ICD-10-CM

## 2021-02-23 DIAGNOSIS — I63.9 CEREBROVASCULAR ACCIDENT (CVA), UNSPECIFIED MECHANISM (HCC): Primary | ICD-10-CM

## 2021-02-23 DIAGNOSIS — Z91.199 NON-COMPLIANCE: ICD-10-CM

## 2021-02-23 DIAGNOSIS — E53.8 VITAMIN B12 DEFICIENCY: ICD-10-CM

## 2021-02-23 DIAGNOSIS — Q21.12 PFO (PATENT FORAMEN OVALE): ICD-10-CM

## 2021-02-23 DIAGNOSIS — I10 ESSENTIAL HYPERTENSION: ICD-10-CM

## 2021-02-23 PROCEDURE — 99214 OFFICE O/P EST MOD 30 MIN: CPT | Performed by: NURSE PRACTITIONER

## 2021-02-23 NOTE — PROGRESS NOTES
DOS: 2021  NAME: Ronnell Rizvi   : 1950  PCP: Anne Chand MD    Chief Complaint   Patient presents with   • Seizures   • Stroke   • Migraine      Patient is unaccompanied to today's visit.     Neurological Problem and Interval History:  71 y.o. right-handed male with a Hx of Nocturnal seizure (started on AED 2020; initially started on Keppra since switched to Dilantin due to breakthrough seizures), left occipital/temporal CVA () status post PFO closure and Linq placement (since removed), Thompsons spotted fever (with frequent tick exposure) DVT, hypertension, hyperlipidemia, COPD/asthma, obstructive sleep apnea (untreated) him seen today in follow-up for CVA and seizures.     Patient was last seen in follow-up by me 2020.  Since that time he was admitted 2020 with breakthrough seizure; as noted above patient was initially started on Keppra 2020 and had breakthrough seizures in this admission and was switched to Dilantin by neurologist at that time.  He remains on Dilantin; 300-600; currently on 400 mg nightly most recent Dilantin 2021 5.8 therefore Dilantin increased from 300 mg nightly to 400 mg nightly.  Patient reports sedative side effect with dose increase; had difficulty tolerating 600 mg nightly.  Patient previously recommended to be on dual oral antiplatelets and statin and since that time statin discontinued due to worsening memory concerns; patient declined resuming even at low dose.  Patient discontinued aspirin due to (presumed) rash with this medication which resolved after discontinuation.  He reports GI discomfort with aspirin and he inconsistently takes it he reports taking his medication about 14 days/month often every other week.  Discussed secondary stroke prevention measures which include statin and antiplatelet; patient verbalized understanding although he states he probably will not do anything differently.  He denies any  "hospitalizations other than the one discussed above.  He denies any recent illnesses.  He reports he recently received Covid vaccine and had no side effects; Next 1 planned March 2021.  He continues to work/volunteer at animal shelter.  He has resumed driving since his last episode; discussed no driving for 90 days per [place] state law with each event.  He denies any seizure-like activity since October 2020 he also reports compliance with AEDs \"I never miss a dose\".  He is noncompliant with use of CPAP; known sleep apnea states \"I cannot sleep without thing on\".  I discussed risk and benefits of untreated sleep apnea patient verbalizes understanding although he states will not ever be able to tolerate that medication.  He reports intermittent floaters; persistent in right eye has been seen by ophthalmology with no further recommendations.  He also reports feeling of jitteriness at times; he is followed as an outpatient by cardiology.  He reports he has decreased caffeine use only has a Diet Coke in the morning and ginger ale throughout the day.  Heart rate on arrival was in the 40s; repeat heart rate 65.  I recommend that he reach out to cardiology to notify them of his complaints.  Of note patient had stress test 11/20 which resulted in low risk study.  Also had abnormal Holter 10/2017 which showed 13 beat run of A. fib; Linq since removed which revealed no arrhythmias during that time and patient has undergone PFO closure.  CT the head and chest done during October admission reviewed; both negative for acute findings.  Patient denies any other complaints under concerns.  I will plan to see him back in 6 months time; sooner if indicated.      Review of Systems:        Review of Systems   Constitutional: Negative for activity change, appetite change and unexpected weight change.   HENT: Negative for facial swelling, trouble swallowing and voice change.    Eyes: Positive for visual disturbance (floaters). Negative " "for photophobia and pain.   Respiratory: Negative for chest tightness, shortness of breath and wheezing.    Cardiovascular: Negative for chest pain, palpitations and leg swelling.   Gastrointestinal: Negative for abdominal pain, nausea and vomiting.   Endocrine: Negative for cold intolerance and heat intolerance.   Musculoskeletal: Negative for arthralgias, back pain, gait problem, joint swelling, myalgias, neck pain and neck stiffness.   Neurological: Negative for dizziness, tremors, seizures, syncope, facial asymmetry, speech difficulty, weakness, light-headedness, numbness and headaches.   Hematological: Does not bruise/bleed easily.   Psychiatric/Behavioral: Negative for agitation, behavioral problems, confusion, decreased concentration, dysphoric mood, hallucinations, self-injury, sleep disturbance and suicidal ideas. The patient is not nervous/anxious and is not hyperactive.          Current Outpatient Medications:   •  cetirizine (zyrTEC) 10 MG tablet, Take 10 mg by mouth Daily As Needed., Disp: , Rfl:   •  Cyanocobalamin 1000 MCG/ML kit, Inject  as directed 1 (One) Time Per Week. 1/4 of a cc IM every sunday, Disp: , Rfl:   •  hydroCHLOROthiazide (HYDRODIURIL) 12.5 MG tablet, Take 1 tablet by mouth Daily., Disp: 90 tablet, Rfl: 2  •  phenytoin (Dilantin) 100 MG ER capsule, Take 1 capsule by mouth Daily., Disp: 90 capsule, Rfl: 2  •  phenytoin extended (DILANTIN) 300 MG ER capsule, 600mg qhs x 5 days then 300mg qhs Repeat dilantin level in 14 days (Patient taking differently: 600mg qhs x 5 days then 300mg qhs Repeat dilantin level in 14 days  Pt reports taking 400 mg daily), Disp: 35 capsule, Rfl: 2    \"The following portions of the patient's history were reviewed and updated as appropriate: allergies, current medications, past family history, past medical history, past social history, past surgical history and problem list.\"  Review and Interpretation of Imaging:  PROCEDURE: CT Head WO     COMPARISON: " December 2018      INDICATIONS: Seizure 3 days ago, nontraumatic (Age >= 41y);Unspecified convulsions; Other chest pain; Other malaise; Hypoxemia;        TECHNIQUE:  CT examination of the brain is performed without IV contrast.  Radiation dose reduction techniques included automated exposure control or exposure modulation based on body size.   Count of known CT and cardiac nuc med studies performed in previous 12 months: 1.            FINDINGS:   There is no evidence of acute intracranial hemorrhage, mass effect or midline shift. No intra-axial or extra-axial fluid collections. There is mild cortical atrophy. Periventricular low attenuation is likely secondary to small vessel ischemic  disease. Previous infarction in the left parietal lobe noted. The ventricular system is not dilated.  The calvarium is intact.         IMPRESSION:  No acute intracranial process. Chronic ischemic change.     Signer Name: Sirena Burgos MD   Signed: 10/11/2020 12:20 PM   Workstation Name: [facility]-    [facility]     CTA Chest     INDICATION:   Low oxygen saturation with convulsions, chest pain, malaise, chest pain since yesterday     TECHNIQUE:   CT angiogram of the chest with IV contrast. 3-D reconstructions were obtained and reviewed.   Radiation dose reduction techniques included automated exposure control or exposure modulation based on body size. Count of known CT and cardiac nuc med studies  performed in previous 12 months: 0.      COMPARISON:   Same-day chest radiograph     FINDINGS:   Adequate opacification of the pulmonary arteries with no filling defects. Thoracic aorta normal in course and caliber without dissection. Heart size is normal. No pericardial effusion.     Lungs demonstrate mild emphysematous changes predominantly at the lung apices. Bands of consolidation in the lung bases, right greater than left likely reflecting atelectasis but otherwise no confluent consolidation seen. The  tracheobronchial tree is  patent. No pleural effusion. No pneumothorax.     Small hiatal hernia. Visualized upper abdomen is otherwise unremarkable.     No acute osseous abnormality.     IMPRESSION:  1.  No pulmonary embolus.  2.  Bands of consolidation in the right greater than left base, most likely reflecting atelectasis. Otherwise no confluent infiltrate.     Signer Name: PAULA FUNEZ MD   Signed: 10/10/2020 6:28 PM   [facility]    [facility]     Lab Results   Component Value Date    [id number] 776 11/01/2016     Interpretation Summary   · Left ventricular systolic function is normal. Estimated EF = 64%.  · Mild aortic valve regurgitation is present.  · There is chordal KYRA without LVOT obstruction.  · Saline test results are positive and indicate an atrial level shunt     Interpretation Summary   · No evidence of a left atrial appendage thrombus was present.  · Moderate patent foramen ovale present. Saline test results are positive.  · Left ventricular systolic function is normal.  · Moderate aortic valve regurgitation is present.  · Mild mitral valve regurgitation is present          Laboratory Results:             Lab Results   Component Value Date    HGBA1C 5.50 01/15/2020         Lab Results   Component Value Date    CHOL 167 01/15/2020    CHOL 168 12/09/2018    CHOL 142 02/20/2018         Lab Results   Component Value Date    HDL 52 09/03/2020    HDL 47 01/15/2020    HDL 46 12/09/2018         Lab Results   Component Value Date     (H) 09/03/2020     (H) 01/15/2020     (H) 12/09/2018         Lab Results   Component Value Date    TRIG 72 09/03/2020    TRIG 72 01/15/2020    TRIG 75 12/09/2018     No results found for: RPR  Lab Results   Component Value Date    TSH 1.970 09/03/2020     Lab Results   Component Value Date    [id number] 903 09/03/2020     Physical Examination:   General Appearance:                        Well developed, well  nourished, well groomed, alert, and cooperative.  HEENT:                                                          Normocephalic.    Neck and Spine:                                 Normal range of motion.  Normal alignment. No mass or tenderness. No bruits.  Cardiac:                                                         Regular rate and rhythm. No murmurs.  Peripheral Vasculature:                    Radial and pedal pulses are equal and symmetric.  Extremities:                                        No edema or deformities. Normal joint ROM.  Skin:                                                               No rashes or birth marks.     Neurological examination:  Higher Integrative  Function:                               Oriented to time, place and person. Normal registration, recall, attention span and concentration. Normal language including comprehension, spontaneous speech, repetition, reading, writing, naming and vocabulary. No neglect with normal visual-spatial function and construction. Normal fund of knowledge and higher integrative function.  CN II:                            Pupils are equal, round, and reactive to light. Normal visual acuity and visual fields.    CN III IV VI:                             Extraocular movements are full without nystagmus.   CN V:                           Normal facial sensation and strength of muscles of mastication.  CN VII:                                    Facial movements are symmetric. No weakness.  CN VIII:                                   Auditory acuity impaired; pt. Very [place] bilaterally  CN IX & X:                              Symmetric palatal movement.  CN XI:                          Sternocleidomastoid and trapezius are normal.  No weakness.  CN XII:                                    The tongue is midline.  No atrophy or fasciculations.  Motor:                          Normal muscle strength, bulk and tone in upper and lower extremities.     Reflexes:                                Plantar responses are flexor.  Sensation:                             Normal to light touch in arms and legs.   Station and Gait:                   Normal gait and station.    Coordination:                        Finger to nose test shows no dysmetria.        Diagnoses:    1.  Left occipital/left temporal infarct (2017); cryptogenic  2.  PFO s/p closure   3.  TIA with expressive aphasia  4.  Nocturnal seizures; previously on Keppra with breakthrough seizure now on Dilantin  5.  Obstructive sleep apnea entheses moderate); untreated   6.  Medication noncompliance; recently discontinued DAPT  7.  Low serum B12       Plan:   Recommend resuming low-dose aspirin at minimum; prior rash with Plavix (presumed) and worsening memory while on statin therefore declines use   Continue Dilantin for 100 mg nightly; get repeat Dilantin level as previously recommended will further advise on dosing thereafter   Continue B12 replaced   Blood pressure control to <130/80   Goal LDL <70-recommend high dose statins-    Serum glucose < 140   Call 911 for stroke any stroke symptoms   Follow-up 6 months        Diagnoses and all orders for this visit:    1. Cerebrovascular accident (CVA), unspecified mechanism (CMS/HCC) (Primary)    2. Seizure (CMS/HCC)    3. ELIZABETH (obstructive sleep apnea)    4. Non-compliance    5. Vitamin B12 deficiency    6. PFO (patent foramen ovale)    7. Essential hypertension    8. Atrial fibrillation, unspecified type (CMS/HCC)        MDM  Reviewed: previous chart, nursing note and vitals  Reviewed previous: labs and CT scan  Interpretation: labs

## 2021-02-24 ENCOUNTER — HOSPITAL ENCOUNTER (OUTPATIENT)
Dept: PULMONOLOGY | Facility: HOSPITAL | Age: 71
End: 2021-02-24

## 2021-02-25 ENCOUNTER — HOSPITAL ENCOUNTER (OUTPATIENT)
Dept: PULMONOLOGY | Facility: HOSPITAL | Age: 71
Discharge: HOME | End: 2021-02-25
Admitting: INTERNAL MEDICINE

## 2021-02-25 DIAGNOSIS — R06.02 SOB (SHORTNESS OF BREATH): ICD-10-CM

## 2021-02-25 PROCEDURE — 94010 BREATHING CAPACITY TEST: CPT

## 2021-02-25 RX ORDER — ALBUTEROL SULFATE 90 UG/1
4 AEROSOL, METERED RESPIRATORY (INHALATION) ONCE
Status: DISCONTINUED | OUTPATIENT
Start: 2021-02-25 | End: 2021-02-26 | Stop reason: HOSPADM

## 2021-03-03 ENCOUNTER — OFFICE VISIT (OUTPATIENT)
Dept: FAMILY MEDICINE CLINIC | Facility: CLINIC | Age: 71
End: 2021-03-03

## 2021-03-03 VITALS
HEIGHT: 73 IN | WEIGHT: 189 LBS | SYSTOLIC BLOOD PRESSURE: 120 MMHG | DIASTOLIC BLOOD PRESSURE: 72 MMHG | OXYGEN SATURATION: 99 % | BODY MASS INDEX: 25.05 KG/M2 | HEART RATE: 69 BPM

## 2021-03-03 DIAGNOSIS — G47.33 OSA (OBSTRUCTIVE SLEEP APNEA): Primary | ICD-10-CM

## 2021-03-03 DIAGNOSIS — I10 ESSENTIAL HYPERTENSION: ICD-10-CM

## 2021-03-03 DIAGNOSIS — I63.9 CEREBROVASCULAR ACCIDENT (CVA), UNSPECIFIED MECHANISM (HCC): ICD-10-CM

## 2021-03-03 PROCEDURE — 99214 OFFICE O/P EST MOD 30 MIN: CPT | Performed by: FAMILY MEDICINE

## 2021-03-03 NOTE — PROGRESS NOTES
Chief Complaint   Patient presents with   • Hypertension       Subjective   Ronnell Rizvi is a 71 y.o. male.     History of Present Illness   71-year-old male here for follow-up regarding hypertension and ELIZABETH      CVA/PFO and seizure hx-no recent seizure like episodes.  Had recent neurology follow-up and they adjusted his Dilantin dose. He had a left occipital/temporal stroke. He was found to have a PFO/then had the PFO closed.  Another TIA with expressive aphasia and short-term memory loss 1/14/2020.    He is on 81 mg of aspirin.  He at times has difficulty taking the aspirin due to acid reflux issues but is not interested in acid reflux medication.     Hypertension- well controlled.  Does not need to take his hydrochlorothiazide daily.  Has been normotensive and asymptomatic     ELIZABETH-noncompliant with CPAP     Hyperlipidemia-he denies statin secondary to memory loss from CVA and TIAs.  Reviewed his last lipid panel 6 months and will check again in another 6 months.  Total cholesterol was normal and his LDL was 130.    COPD-stable, no acute issues, he is awaiting recent spirometry results      The following portions of the patient's history were reviewed and updated as appropriate: allergies, current medications, past family history, past medical history, past social history, past surgical history and problem list.      Past Medical History:   Diagnosis Date   • Allergic rhinitis    • Anal fissure    • Aortic insufficiency     moderate, THIERRY 2017   • Asthma    • Asthma    • Benign prostatic hypertrophy (BPH) with weak urinary stream 11/1/2016   • Chronic bronchitis (CMS/HCC)    • Colon polyp    • COPD (chronic obstructive pulmonary disease) (CMS/HCC)    • Emphysema lung (CMS/HCC)    • Fatty liver    • GERD (gastroesophageal reflux disease)    • Hepatitis     thought to be Hepatitis A    • History of pneumonia     With left lower lobe atelectasis and scar tissue, being followed   • HL (hearing loss)    • Hypertension     • Memory loss    • Obstructive sleep apnea syndrome 8/23/2017    refuses c-pap   • PFO (patent foramen ovale)     s/p percutaneous closure with a 25mm Amplatzer device in December 2018   • Pneumonia     LLL with scar tissue   • Seizures (CMS/HCC)    • 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   • Spinal stenosis    • Stroke (CMS/HCC)     10/2017: left occipital/temporal. total of 3 strokes   • Vision loss        Past Surgical History:   Procedure Laterality Date   • CARDIAC CATHETERIZATION N/A 12/12/2018    Procedure: Patent foramen ovale closure- [employer];  Surgeon: Deangelo Harris MD;  Location:  [facility];  Service: Cardiology   • CARDIAC CATHETERIZATION N/A 12/12/2018    Procedure: Intracardiac echocardiogram;  Surgeon: Deangelo Harris MD;  Location:  [facility];  Service: Cardiology   • CARDIAC ELECTROPHYSIOLOGY PROCEDURE N/A 3/26/2018    Procedure: Loop insertion   CONFIRM RX;  Surgeon: Luis Wyatt MD;  Location:  [facility];  Service: Cardiovascular   • CARDIAC ELECTROPHYSIOLOGY PROCEDURE N/A 7/15/2020    Procedure: Loop recorder removal;  Surgeon: Starr Driscoll MD;  Location:  [facility];  Service: Cardiovascular;  Laterality: N/A;   • CARDIAC SURGERY     • COLONOSCOPY     • COLONOSCOPY N/A 10/9/2020    Procedure: COLONOSCOPY with polypectomy;  Surgeon: Ras Mendoza MD;  Location:  [facility];  Service: Gastroenterology;  Laterality: N/A;  diverticulosis  ascending polyp  transverse polyp  sigmoid polyps X 2  rectal polyp   • HAND SURGERY Bilateral    • INGUINAL HERNIA REPAIR Left    • OTHER SURGICAL HISTORY      inguinal hernia repair for person over age 5   • TONSILLECTOMY     • TONSILLECTOMY         Family History   Problem Relation Age of Onset   • Obesity Mother    • Clotting disorder Mother         Upper extremities   • Alcohol abuse Father    • Cancer Father 63        Esophagus and lung   • Other Father          cardiac disorder   • Heart disease Father    • Kidney disease Sister    • Kidney failure Sister    • Drug abuse Paternal Uncle    • Stroke Sister    • Throat cancer Sister        Social History     Socioeconomic History   • Marital status:      Spouse name: Joey   • Number of children: Not on file   • Years of education: Not on file   • Highest education level: Not on file   Occupational History   • Occupation:      Employer: RETIRED   Tobacco Use   • Smoking status: Former Smoker     Quit date: 1979     Years since quittin.0   • Smokeless tobacco: Never Used   • Tobacco comment: caffeine use: Drinks 4 glasses of Dt coke on avg.    Substance and Sexual Activity   • Alcohol use: No   • Drug use: No   • Sexual activity: Defer       Current Outpatient Medications on File Prior to Visit   Medication Sig Dispense Refill   • cetirizine (zyrTEC) 10 MG tablet Take 10 mg by mouth Daily As Needed.     • Cyanocobalamin 1000 MCG/ML kit Inject  as directed 1 (One) Time Per Week. 1/4 of a cc IM every      • hydroCHLOROthiazide (HYDRODIURIL) 12.5 MG tablet Take 1 tablet by mouth Daily. 90 tablet 2   • phenytoin (Dilantin) 100 MG ER capsule Take 1 capsule by mouth Daily. 90 capsule 2   • phenytoin extended (DILANTIN) 300 MG ER capsule 600mg qhs x 5 days then 300mg qhs Repeat dilantin level in 14 days (Patient taking differently: 600mg qhs x 5 days then 300mg qhs Repeat dilantin level in 14 days    Pt reports taking 400 mg daily) 35 capsule 2     No current facility-administered medications on file prior to visit.        Review of Systems   Constitutional: Negative for activity change, chills and fever.   HENT: Negative for congestion, postnasal drip and rhinorrhea.    Eyes: Negative for blurred vision and pain.   Respiratory: Negative for cough, chest tightness and shortness of breath.    Cardiovascular: Negative for chest pain.   Gastrointestinal: Negative for abdominal pain, constipation,  diarrhea, nausea and vomiting.   Endocrine: Negative for cold intolerance and heat intolerance.   Genitourinary: Negative for decreased urine volume, dysuria and frequency.   Musculoskeletal: Negative for arthralgias, back pain and myalgias.   Skin: Negative for rash and skin lesions.   Neurological: Negative for dizziness and confusion.   Psychiatric/Behavioral: Negative for agitation, behavioral problems and depressed mood.           Vitals:    03/03/21 1105   BP: 120/72   Pulse: 69   SpO2: 99%      Objective   Physical Exam  Vitals signs and nursing note reviewed.   Constitutional:       Appearance: He is well-developed.   HENT:      Head: Normocephalic and atraumatic.   Eyes:      Conjunctiva/sclera: Conjunctivae normal.      Pupils: Pupils are equal, round, and reactive to light.   Neck:      Musculoskeletal: Neck supple.   Cardiovascular:      Rate and Rhythm: Normal rate and regular rhythm.      Heart sounds: Normal heart sounds. No murmur.   Pulmonary:      Effort: Pulmonary effort is normal. No respiratory distress.      Breath sounds: Normal breath sounds.   Abdominal:      General: Bowel sounds are normal.      Palpations: Abdomen is soft.   Musculoskeletal: Normal range of motion.   Skin:     General: Skin is warm and dry.   Neurological:      Mental Status: He is alert and oriented to person, place, and time.           Assessment/Plan   Problems Addressed this Visit        Cardiac and Vasculature    HTN (hypertension)       Neuro    CVA (cerebral vascular accident) (CMS/[facility])       Sleep    ELIZABETH (obstructive sleep apnea) - Primary      Diagnoses       Codes Comments    ELIZABETH (obstructive sleep apnea)    -  Primary ICD-10-CM: G47.33  ICD-9-CM: 327.23     Essential hypertension     ICD-10-CM: I10  ICD-9-CM: 401.9     Cerebrovascular accident (CVA), unspecified mechanism (CMS/HCC)     ICD-10-CM: I63.9  ICD-9-CM: 434.91         Hypertension stable.  Denies any recent concerns for any TIA-like  episodes  ELIZABETH-noncompliant with CPAP    Overall is active and involved with the [employer].  Feels well.  Does not want to take medications that make him feel bad or have adverse effects. No acute concerns this visit normotensive and stable. Getting his covid vaccination     Anne Chand MD

## 2021-03-04 ENCOUNTER — IMMUNIZATION (OUTPATIENT)
Dept: VACCINE CLINIC | Facility: HOSPITAL | Age: 71
End: 2021-03-04

## 2021-03-04 PROCEDURE — 91300 HC SARSCOV02 VAC 30MCG/0.3ML IM: CPT | Performed by: INTERNAL MEDICINE

## 2021-03-04 PROCEDURE — 0002A: CPT | Performed by: INTERNAL MEDICINE

## 2021-03-10 ENCOUNTER — LAB (OUTPATIENT)
Dept: LAB | Facility: HOSPITAL | Age: 71
End: 2021-03-10

## 2021-03-10 DIAGNOSIS — R56.9 SEIZURE (HCC): ICD-10-CM

## 2021-03-10 LAB — PHENYTOIN SERPL-MCNC: 8.8 MCG/ML (ref 10–20)

## 2021-03-10 PROCEDURE — 80185 ASSAY OF PHENYTOIN TOTAL: CPT

## 2021-03-10 PROCEDURE — 36415 COLL VENOUS BLD VENIPUNCTURE: CPT

## 2021-03-15 ENCOUNTER — TELEPHONE (OUTPATIENT)
Dept: NEUROLOGY | Facility: CLINIC | Age: 71
End: 2021-03-15

## 2021-03-15 DIAGNOSIS — R56.9 SEIZURE (HCC): Primary | ICD-10-CM

## 2021-03-15 NOTE — TELEPHONE ENCOUNTER
"Spoke with patient regarding Dilantin level results. Advised that he increase dose to 500mg qhs per CHINEDU Muñoz if he is able to tolerate.  Patient states he will attempt to increase dose but is concerned that dose is close to 600mg which he states \"makes me a zombie.\"  Patient aware to repeat lab in one month.  "

## 2021-03-15 NOTE — TELEPHONE ENCOUNTER
----- Message from CHINEDU Whitaker sent at 3/15/2021  3:07 PM EDT -----  Level still subtherapeutic; have patient take 500mg daily if he is able to tolerate; recommend repeat level in 1 month.

## 2021-05-10 DIAGNOSIS — R56.9 SEIZURE (HCC): ICD-10-CM

## 2021-05-10 DIAGNOSIS — R56.9 NOCTURNAL SEIZURES (HCC): ICD-10-CM

## 2021-05-10 RX ORDER — PHENYTOIN SODIUM 300 MG/1
CAPSULE, EXTENDED RELEASE ORAL
Qty: 35 CAPSULE | Refills: 2 | Status: SHIPPED | OUTPATIENT
Start: 2021-05-10 | End: 2021-05-10

## 2021-05-10 RX ORDER — PHENYTOIN SODIUM 300 MG/1
CAPSULE, EXTENDED RELEASE ORAL
Qty: 35 CAPSULE | Refills: 2 | Status: SHIPPED | OUTPATIENT
Start: 2021-05-10 | End: 2021-05-10 | Stop reason: SDUPTHER

## 2021-05-10 RX ORDER — PHENYTOIN SODIUM 300 MG/1
300 CAPSULE, EXTENDED RELEASE ORAL DAILY
Qty: 30 CAPSULE | Refills: 2 | Status: SHIPPED | OUTPATIENT
Start: 2021-05-10 | End: 2021-11-18 | Stop reason: SDUPTHER

## 2021-05-10 RX ORDER — PHENYTOIN SODIUM 100 MG/1
100 CAPSULE, EXTENDED RELEASE ORAL DAILY
Qty: 90 CAPSULE | Refills: 2 | Status: SHIPPED | OUTPATIENT
Start: 2021-05-10 | End: 2021-11-09

## 2021-05-10 RX ORDER — PHENYTOIN SODIUM 100 MG/1
100 CAPSULE, EXTENDED RELEASE ORAL DAILY
Qty: 90 CAPSULE | Refills: 2 | Status: SHIPPED | OUTPATIENT
Start: 2021-05-10 | End: 2021-05-10 | Stop reason: SDUPTHER

## 2021-05-10 NOTE — TELEPHONE ENCOUNTER
Pt sts he is currently taking 400 mg at 9:30 pm. He will not be able to have drawn 1 hr before draw.     Thank you

## 2021-05-11 ENCOUNTER — LAB (OUTPATIENT)
Dept: LAB | Facility: HOSPITAL | Age: 71
End: 2021-05-11

## 2021-05-11 DIAGNOSIS — R56.9 SEIZURE (HCC): ICD-10-CM

## 2021-05-11 PROCEDURE — 80185 ASSAY OF PHENYTOIN TOTAL: CPT

## 2021-05-11 PROCEDURE — 36415 COLL VENOUS BLD VENIPUNCTURE: CPT

## 2021-05-13 ENCOUNTER — TELEPHONE (OUTPATIENT)
Dept: NEUROLOGY | Facility: CLINIC | Age: 71
End: 2021-05-13

## 2021-05-13 NOTE — TELEPHONE ENCOUNTER
Spoke with patient regarding Dilantin level and to continue current dosing.  Scheduled follow up appointment for 8/24/21.

## 2021-05-13 NOTE — TELEPHONE ENCOUNTER
----- Message from CHINEDU Whitaker sent at 5/12/2021  3:54 PM EDT -----  Level therapeutic; low normal.  Continue current Dilantin dosing and follow-up with me as previously recommended

## 2021-07-20 RX ORDER — HYDROCHLOROTHIAZIDE 12.5 MG/1
TABLET ORAL
Qty: 90 TABLET | Refills: 0 | Status: SHIPPED | OUTPATIENT
Start: 2021-07-20 | End: 2021-09-15 | Stop reason: SDUPTHER

## 2021-08-09 DIAGNOSIS — R56.9 SEIZURE (HCC): ICD-10-CM

## 2021-08-09 DIAGNOSIS — R56.9 NOCTURNAL SEIZURES (HCC): ICD-10-CM

## 2021-08-09 RX ORDER — PHENYTOIN SODIUM 100 MG/1
100 CAPSULE, EXTENDED RELEASE ORAL DAILY
Qty: 90 CAPSULE | Refills: 2 | OUTPATIENT
Start: 2021-08-09 | End: 2022-08-09

## 2021-08-09 NOTE — TELEPHONE ENCOUNTER
Called pt to inform pharmacy submitted an over-ride - lost rx - insurance approved. rx will be ready for pt to p/u. Pt verbalized understanding.

## 2021-08-09 NOTE — TELEPHONE ENCOUNTER
Caller: Ronnell Rizvi    Relationship: Self    Best call back number: [phone]    Medication needed:   Requested Prescriptions     Pending Prescriptions Disp Refills   • phenytoin (Dilantin) 100 MG ER capsule 90 capsule 2     Sig: Take 1 capsule by mouth Daily.       When do you need the refill by: ASAP    What additional details did the patient provide when requesting the medication: PT STATES PHARMACY INFORMED HIM THAT HE JUST RECENTLY PICKED UP REFILL AND CANNOT  AGAIN UNTIL September. PT STATES HE HAS TORN HIS HOUSE AND TRUCK APART AND CANNOT FIND THE MEDICATION ANYWHERE. PT BELIEVES HE MAY HAVE LEFT THE MEDICATION IN THE CART AT THE [facility] AND IT NEVER MADE IT TO HIS HOME. PT STATES HE HAS A TABLET FOR TONIGHT AND TOMORROW NIGHT. PT STATES NO REFILLS ARE NEEDED OF 300MG MEDICATION AT THIS TIME.    Does the patient have less than a 3 day supply:    [x] Yes  [] No    What is the patient's preferred pharmacy: [address] [facility] - [phone]  - [phone]     LAST APPT: 5/11/21  FUTURE APPT: 8/24/21    PLEASE REVIEW AND ADVISE.

## 2021-08-13 DIAGNOSIS — E78.2 MIXED HYPERLIPIDEMIA: ICD-10-CM

## 2021-08-13 DIAGNOSIS — Z13.29 SCREENING FOR THYROID DISORDER: ICD-10-CM

## 2021-08-13 DIAGNOSIS — D51.3 OTHER DIETARY VITAMIN B12 DEFICIENCY ANEMIA: ICD-10-CM

## 2021-08-13 DIAGNOSIS — I10 ESSENTIAL HYPERTENSION: Primary | ICD-10-CM

## 2021-08-13 DIAGNOSIS — Z12.5 SCREENING FOR MALIGNANT NEOPLASM OF PROSTATE: ICD-10-CM

## 2021-08-24 ENCOUNTER — OFFICE VISIT (OUTPATIENT)
Dept: NEUROLOGY | Facility: CLINIC | Age: 71
End: 2021-08-24

## 2021-08-24 ENCOUNTER — LAB (OUTPATIENT)
Dept: LAB | Facility: HOSPITAL | Age: 71
End: 2021-08-24

## 2021-08-24 VITALS
SYSTOLIC BLOOD PRESSURE: 130 MMHG | BODY MASS INDEX: 24.54 KG/M2 | HEART RATE: 57 BPM | WEIGHT: 186 LBS | DIASTOLIC BLOOD PRESSURE: 80 MMHG | OXYGEN SATURATION: 96 %

## 2021-08-24 DIAGNOSIS — Z51.81 MEDICATION MONITORING ENCOUNTER: ICD-10-CM

## 2021-08-24 DIAGNOSIS — R56.9 NOCTURNAL SEIZURES (HCC): ICD-10-CM

## 2021-08-24 DIAGNOSIS — G47.33 OSA (OBSTRUCTIVE SLEEP APNEA): ICD-10-CM

## 2021-08-24 DIAGNOSIS — I10 ESSENTIAL HYPERTENSION: ICD-10-CM

## 2021-08-24 DIAGNOSIS — H91.93 BILATERAL HEARING LOSS, UNSPECIFIED HEARING LOSS TYPE: ICD-10-CM

## 2021-08-24 DIAGNOSIS — Z86.73 HISTORY OF STROKE: Primary | ICD-10-CM

## 2021-08-24 DIAGNOSIS — I67.9 INTRACRANIAL VASCULAR STENOSIS: Chronic | ICD-10-CM

## 2021-08-24 LAB — PHENYTOIN SERPL-MCNC: 8.9 MCG/ML (ref 10–20)

## 2021-08-24 PROCEDURE — 80186 ASSAY OF PHENYTOIN FREE: CPT

## 2021-08-24 PROCEDURE — 36415 COLL VENOUS BLD VENIPUNCTURE: CPT

## 2021-08-24 PROCEDURE — 99214 OFFICE O/P EST MOD 30 MIN: CPT | Performed by: NURSE PRACTITIONER

## 2021-08-24 PROCEDURE — 80185 ASSAY OF PHENYTOIN TOTAL: CPT

## 2021-08-24 RX ORDER — FEXOFENADINE HYDROCHLORIDE 60 MG/1
60 TABLET, FILM COATED ORAL DAILY
COMMUNITY
End: 2021-10-12 | Stop reason: ALTCHOICE

## 2021-08-24 NOTE — PROGRESS NOTES
"DOS: 2021  NAME: Ronnell Rizvi   : 1950  PCP: Anne Chand MD    Chief Complaint   Patient presents with   • Seizures   • Stroke        Neurological Problem and Interval History:  71 y.o. 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     Since last evaluation, patient denies any seizures; last seizures 2020.  He remains on Dilantin; last Dilantin level may 20 2110.4-therapeutic.  He is compliant with medications.  Also, he denies any new signs and/or symptoms of stroke specifically no unilateral weakness, slurred speech and/or vision loss.  He denies any falls/illnesses and/or hospitalizations.  He has discontinued aspirin due to side effects as \"it is so hard on my stomach\".  Previously tried Plavix which he feels caused development of new rash and does not take a statin due to side effects as well.;  Discussed secondary stroke prevention and risk of not using antiplatelet and statin medication-patient verbalizes understanding of need for these medications.  Patient has had loop recorder removed; no arrhythmias were found during time of implantation.  He has upcoming appointment with cardiology in November.  He denies any cardiac complaints under concerns specifically no palpitations and no shortness of breath.  However, he does report some concern for poor memory/sluggishness and fatigue.  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  Reports approximately 5 " hours of sleep per night.  He has known sleep apnea although unable to treat due to inability to tolerate facemask.  Will obtain Dilantin level and further advised patient regarding the ED.  Patient verbalizes understanding and agrees to current treatment plan.      Review of Systems:        Review of Systems   Constitutional: Negative for activity change, appetite change and unexpected weight change.   HENT: Negative for facial swelling, trouble swallowing and voice change.    Eyes: Negative for photophobia, pain and visual disturbance.   Respiratory: Negative for chest tightness, shortness of breath and wheezing.    Cardiovascular: Negative for chest pain, palpitations and leg swelling.   Gastrointestinal: Negative for abdominal pain, nausea and vomiting.   Endocrine: Negative for cold intolerance and heat intolerance.   Musculoskeletal: Negative for arthralgias, back pain, gait problem, joint swelling, myalgias, neck pain and neck stiffness.   Neurological: Negative for dizziness, tremors, seizures, syncope, facial asymmetry, speech difficulty, weakness, light-headedness, numbness and headaches.   Hematological: Does not bruise/bleed easily.   Psychiatric/Behavioral: Negative for agitation, behavioral problems, confusion, decreased concentration, dysphoric mood, hallucinations, self-injury, sleep disturbance and suicidal ideas. The patient is not nervous/anxious and is not hyperactive.          Current Outpatient Medications:   •  Cyanocobalamin 1000 MCG/ML kit, Inject  as directed 1 (One) Time Per Week. 1/4 of a cc IM every sunday, Disp: , Rfl:   •  fexofenadine (ALLEGRA) 60 MG tablet, Take 60 mg by mouth Daily., Disp: , Rfl:   •  hydroCHLOROthiazide (HYDRODIURIL) 12.5 MG tablet, TAKE ONE TABLET BY MOUTH DAILY, Disp: 90 tablet, Rfl: 0  •  phenytoin (Dilantin) 100 MG ER capsule, Take 1 capsule by mouth Daily., Disp: 90 capsule, Rfl: 2  •  phenytoin extended (DILANTIN) 300 MG ER capsule, Take 1 capsule by mouth  "Daily., Disp: 30 capsule, Rfl: 2  •  cetirizine (zyrTEC) 10 MG tablet, Take 10 mg by mouth Daily As Needed., Disp: , Rfl:     \"The following portions of the patient's history were reviewed and updated as appropriate: allergies, current medications, past family history, past medical history, past social history, past surgical history and problem list.\"  Review and Interpretation of Imaging:  No new imaging reviewed.  Laboratory Results:             Lab Results   Component Value Date    HGBA1C 5.50 01/15/2020         Lab Results   Component Value Date    CHOL 167 01/15/2020    CHOL 168 12/09/2018    CHOL 142 02/20/2018         Lab Results   Component Value Date    HDL 52 09/03/2020    HDL 47 01/15/2020    HDL 46 12/09/2018         Lab Results   Component Value Date     (H) 09/03/2020     (H) 01/15/2020     (H) 12/09/2018         Lab Results   Component Value Date    TRIG 72 09/03/2020    TRIG 72 01/15/2020    TRIG 75 12/09/2018     No results found for: RPR  Lab Results   Component Value Date    TSH 1.970 09/03/2020     Lab Results   Component Value Date    LEERDWGR82 903 09/03/2020     Physical Examination:   General Appearance:                                   Well developed, well nourished, well groomed, alert, and cooperative.  HEENT:                                                          Normocephalic.    Neck and Spine:                                   Normal range of motion.  Normal alignment. No mass or tenderness. No bruits.  Cardiac:                                                         Regular rate and rhythm. No murmurs.  Peripheral Vasculature:                               Radial and pedal pulses are equal and symmetric.  Extremities:                                                   No edema or deformities. Normal joint ROM.  Skin:                                                               No rashes or birth marks.     Neurological examination:  Higher " Integrative  Function:                                  Oriented to time, place and person. Normal registration, recall, attention span and concentration. Normal language including comprehension, spontaneous speech, repetition, reading, writing, naming and vocabulary. No neglect with normal visual-spatial function and construction. Normal fund of knowledge and higher integrative function.  CN II:                                   Pupils are equal, round, and reactive to light. Normal visual acuity and visual fields.    CN III IV VI:                             Extraocular movements are full without nystagmus.   CN V:                                    Normal facial sensation and strength of muscles of mastication.  CN VII:                                    Facial movements are symmetric. No weakness.  CN VIII:                                   Auditory acuity impaired; pt. Very [place] bilaterally  CN IX & X:                              Symmetric palatal movement.  CN XI:                                    Sternocleidomastoid and trapezius are normal.  No weakness.  CN XII:                                    The tongue is midline.  No atrophy or fasciculations.  Motor:                                    Normal muscle strength, bulk and tone in upper and lower extremities.    Reflexes:                                Plantar responses are flexor.  Sensation:                                  Normal to light touch in arms and legs.   Station and Gait:                   Normal gait and station.    Coordination:                        Finger to nose test shows no dysmetria.   Exam unchanged from prior.    Diagnoses / Discussion:    1.  History of left occipital/left temporal infarct (2017) etiology thought to be cryptogenic  2.  Status post PFO closure  3.  History of TIA with expressive aphasia  4.  Nocturnal seizures; previously on Keppra with breakthrough seizures now on Dilantin and has remained seizure-free for greater  "than 1 year  5.  Low serum B12  6.  At risk for medication noncompliance     Plan:     · Patient discontinued aspirin due to side effects; risk and benefits of not taking antiplatelets discussed with patient; verbalizes understanding but unable to  take according to him-previous presumed side effect to Plavix \"rash\".  · Dilantin level in the next 1 week  · For now continue Dilantin 400 mg ER daily  · Seizure precautions discussed specifically [place] state law regarding driving after seizure  · Continue B12  · Declined statin due to side effects  · Blood pressure control to <130/80  · Goal LDL <70-recommend high dose statins-   · Serum glucose < 140  · Call 911 for stroke any stroke symptoms  · Follow-up me in 3-6 months; sooner if needed   ·   Diagnoses and all orders for this visit:    1. History of stroke (Primary)    2. Medication monitoring encounter  -     Phenytoin Level, Total; Future  -     Phenytoin Level, Free; Future    3. ELIZABETH (obstructive sleep apnea)    4. Intracranial vascular stenosis    5. Nocturnal seizures (CMS/HCC)    6. Essential hypertension    7. Bilateral hearing loss, unspecified hearing loss type          "

## 2021-08-27 LAB — PHENYTOIN FREE SERPL-MCNC: 0.6 UG/ML (ref 1–2)

## 2021-09-01 ENCOUNTER — TELEPHONE (OUTPATIENT)
Dept: NEUROLOGY | Facility: CLINIC | Age: 71
End: 2021-09-01

## 2021-09-01 NOTE — TELEPHONE ENCOUNTER
----- Message from CHINEDU Whitaker sent at 9/1/2021 11:56 AM EDT -----  Assess to see if patient is having any episodes/breakthrough seizures.  Notify him that Dilantin level is subtherapeutic and we should consider increasing the dose if so.  I know he has declined this in the past due to sleepy side effects of medication.

## 2021-09-09 LAB
ALBUMIN SERPL-MCNC: 4.3 G/DL (ref 3.5–5.2)
ALBUMIN/GLOB SERPL: 2.5 G/DL
ALP SERPL-CCNC: 112 U/L (ref 39–117)
ALT SERPL-CCNC: 14 U/L (ref 1–41)
AST SERPL-CCNC: 17 U/L (ref 1–40)
BASOPHILS # BLD AUTO: 0.03 10*3/MM3 (ref 0–0.2)
BASOPHILS NFR BLD AUTO: 0.6 % (ref 0–1.5)
BILIRUB SERPL-MCNC: 0.2 MG/DL (ref 0–1.2)
BUN SERPL-MCNC: 14 MG/DL (ref 8–23)
BUN/CREAT SERPL: 12.6 (ref 7–25)
CALCIUM SERPL-MCNC: 9.2 MG/DL (ref 8.6–10.5)
CHLORIDE SERPL-SCNC: 101 MMOL/L (ref 98–107)
CHOLEST SERPL-MCNC: 277 MG/DL (ref 0–200)
CO2 SERPL-SCNC: 29.9 MMOL/L (ref 22–29)
CREAT SERPL-MCNC: 1.11 MG/DL (ref 0.76–1.27)
EOSINOPHIL # BLD AUTO: 0.1 10*3/MM3 (ref 0–0.4)
EOSINOPHIL NFR BLD AUTO: 2.1 % (ref 0.3–6.2)
ERYTHROCYTE [DISTWIDTH] IN BLOOD BY AUTOMATED COUNT: 12.7 % (ref 12.3–15.4)
GLOBULIN SER CALC-MCNC: 1.7 GM/DL
GLUCOSE SERPL-MCNC: 90 MG/DL (ref 65–99)
HCT VFR BLD AUTO: 50 % (ref 37.5–51)
HDLC SERPL-MCNC: 58 MG/DL (ref 40–60)
HGB BLD-MCNC: 16.4 G/DL (ref 13–17.7)
IMM GRANULOCYTES # BLD AUTO: 0.01 10*3/MM3 (ref 0–0.05)
IMM GRANULOCYTES NFR BLD AUTO: 0.2 % (ref 0–0.5)
LDLC SERPL CALC-MCNC: 199 MG/DL (ref 0–100)
LYMPHOCYTES # BLD AUTO: 1.43 10*3/MM3 (ref 0.7–3.1)
LYMPHOCYTES NFR BLD AUTO: 29.7 % (ref 19.6–45.3)
MCH RBC QN AUTO: 31.6 PG (ref 26.6–33)
MCHC RBC AUTO-ENTMCNC: 32.8 G/DL (ref 31.5–35.7)
MCV RBC AUTO: 96.3 FL (ref 79–97)
MONOCYTES # BLD AUTO: 0.62 10*3/MM3 (ref 0.1–0.9)
MONOCYTES NFR BLD AUTO: 12.9 % (ref 5–12)
NEUTROPHILS # BLD AUTO: 2.63 10*3/MM3 (ref 1.7–7)
NEUTROPHILS NFR BLD AUTO: 54.5 % (ref 42.7–76)
NRBC BLD AUTO-RTO: 0 /100 WBC (ref 0–0.2)
PLATELET # BLD AUTO: 197 10*3/MM3 (ref 140–450)
POTASSIUM SERPL-SCNC: 4.1 MMOL/L (ref 3.5–5.2)
PROT SERPL-MCNC: 6 G/DL (ref 6–8.5)
PSA SERPL-MCNC: 0.69 NG/ML (ref 0–4)
RBC # BLD AUTO: 5.19 10*6/MM3 (ref 4.14–5.8)
SODIUM SERPL-SCNC: 138 MMOL/L (ref 136–145)
TRIGL SERPL-MCNC: 115 MG/DL (ref 0–150)
TSH SERPL DL<=0.005 MIU/L-ACNC: 3.67 UIU/ML (ref 0.27–4.2)
VLDLC SERPL CALC-MCNC: 20 MG/DL (ref 5–40)
WBC # BLD AUTO: 4.82 10*3/MM3 (ref 3.4–10.8)

## 2021-09-15 ENCOUNTER — OFFICE VISIT (OUTPATIENT)
Dept: FAMILY MEDICINE CLINIC | Facility: CLINIC | Age: 71
End: 2021-09-15

## 2021-09-15 VITALS
DIASTOLIC BLOOD PRESSURE: 64 MMHG | BODY MASS INDEX: 24.78 KG/M2 | HEIGHT: 73 IN | WEIGHT: 187 LBS | SYSTOLIC BLOOD PRESSURE: 126 MMHG | TEMPERATURE: 97.8 F | HEART RATE: 67 BPM | OXYGEN SATURATION: 97 %

## 2021-09-15 DIAGNOSIS — I63.89 CEREBROVASCULAR ACCIDENT (CVA) DUE TO OTHER MECHANISM (HCC): ICD-10-CM

## 2021-09-15 DIAGNOSIS — E78.2 MIXED HYPERLIPIDEMIA: ICD-10-CM

## 2021-09-15 DIAGNOSIS — I10 ESSENTIAL HYPERTENSION: Primary | ICD-10-CM

## 2021-09-15 PROCEDURE — 99214 OFFICE O/P EST MOD 30 MIN: CPT | Performed by: FAMILY MEDICINE

## 2021-09-15 RX ORDER — HYDROCHLOROTHIAZIDE 12.5 MG/1
12.5 TABLET ORAL DAILY
Qty: 90 TABLET | Refills: 1 | Status: SHIPPED | OUTPATIENT
Start: 2021-09-15 | End: 2021-12-29 | Stop reason: SDUPTHER

## 2021-09-15 NOTE — PROGRESS NOTES
Chief Complaint   Patient presents with   • Hypertension   • Seizures       Subjective   Ronnell Rizvi is a 71 y.o. male.     History of Present Illness   71 y.o. right-handed male with a Hx of Nocturnal seizures (started on AED February 2020; initially started on Keppra since switched to Dilantin due to breakthrough seizure), history of left occipital/temporal CVA (2017) status post PFO closure and Linq placement (since removed; no arrhythmias identified    History of hypertension, hyperlipidemia, COPD, sleep apnea/untreated    -Follow-up with neurology at the end of August, on Dilantin  -Discontinued aspirin due to side effects, states unable to tolerate Plavix    -Not on statin declines due to side effects/cholesterol has greatly increased with a total cholesterol of 277 and an LDL of 199    Has been eating daily fast food with burgers and fries and sometimes twice a day make flurries from [employer] which is reflected in his lipid panel    Hypertension: Stable    The following portions of the patient's history were reviewed and updated as appropriate: allergies, current medications, past family history, past medical history, past social history, past surgical history and problem list.      Past Medical History:   Diagnosis Date   • Allergic rhinitis    • Anal fissure    • Aortic insufficiency     moderate, THIERRY 2017   • Asthma    • Asthma    • Benign prostatic hypertrophy (BPH) with weak urinary stream 11/1/2016   • Chronic bronchitis (CMS/HCC)    • Colon polyp    • COPD (chronic obstructive pulmonary disease) (CMS/HCC)    • Emphysema lung (CMS/HCC)    • Fatty liver    • GERD (gastroesophageal reflux disease)    • Hepatitis     thought to be Hepatitis A    • History of pneumonia     With left lower lobe atelectasis and scar tissue, being followed   • HL (hearing loss)    • Hypertension    • Memory loss    • Obstructive sleep apnea syndrome 8/23/2017    refuses c-pap   • PFO (patent foramen ovale)     s/p  percutaneous closure with a 25mm Amplatzer device in December 2018   • Pneumonia     LLL with scar tissue   • Seizures (CMS/HCC)    • 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   • Spinal stenosis    • Stroke (CMS/HCC)     10/2017: left occipital/temporal. total of 3 strokes   • Vision loss        Past Surgical History:   Procedure Laterality Date   • CARDIAC CATHETERIZATION N/A 12/12/2018    Procedure: Patent foramen ovale closure- [employer];  Surgeon: Deangelo Harris MD;  Location:  [facility];  Service: Cardiology   • CARDIAC CATHETERIZATION N/A 12/12/2018    Procedure: Intracardiac echocardiogram;  Surgeon: Deangelo Harris MD;  Location:  [facility];  Service: Cardiology   • CARDIAC ELECTROPHYSIOLOGY PROCEDURE N/A 3/26/2018    Procedure: Loop insertion   CONFIRM RX;  Surgeon: Luis Wyatt MD;  Location:  [facility];  Service: Cardiovascular   • CARDIAC ELECTROPHYSIOLOGY PROCEDURE N/A 7/15/2020    Procedure: Loop recorder removal;  Surgeon: Starr Driscoll MD;  Location:  [facility];  Service: Cardiovascular;  Laterality: N/A;   • CARDIAC SURGERY     • COLONOSCOPY     • COLONOSCOPY N/A 10/9/2020    Procedure: COLONOSCOPY with polypectomy;  Surgeon: Ras Mendoza MD;  Location: [facility];  Service: Gastroenterology;  Laterality: N/A;  diverticulosis  ascending polyp  transverse polyp  sigmoid polyps X 2  rectal polyp   • HAND SURGERY Bilateral    • INGUINAL HERNIA REPAIR Left    • OTHER SURGICAL HISTORY      inguinal hernia repair for person over age 5   • TONSILLECTOMY     • TONSILLECTOMY         Family History   Problem Relation Age of Onset   • Obesity Mother    • Clotting disorder Mother         Upper extremities   • Alcohol abuse Father    • Cancer Father 63        Esophagus and lung   • Other Father         cardiac disorder   • Heart disease Father    • Kidney disease Sister    • Kidney failure Sister    • Drug abuse  Paternal Uncle    • Stroke Sister    • Throat cancer Sister        Social History     Socioeconomic History   • Marital status:      Spouse name: Joey   • Number of children: Not on file   • Years of education: Not on file   • Highest education level: Not on file   Tobacco Use   • Smoking status: Former Smoker     Quit date: 1979     Years since quittin.5   • Smokeless tobacco: Never Used   • Tobacco comment: caffeine use: Drinks 4 glasses of Dt coke on avg.    Vaping Use   • Vaping Use: Never used   Substance and Sexual Activity   • Alcohol use: No   • Drug use: No   • Sexual activity: Defer       Current Outpatient Medications on File Prior to Visit   Medication Sig Dispense Refill   • Cyanocobalamin 1000 MCG/ML kit Inject  as directed 1 (One) Time Per Week. / of a cc IM every      • fexofenadine (ALLEGRA) 60 MG tablet Take 60 mg by mouth Daily.     • phenytoin (Dilantin) 100 MG ER capsule Take 1 capsule by mouth Daily. 90 capsule 2   • phenytoin extended (DILANTIN) 300 MG ER capsule Take 1 capsule by mouth Daily. 30 capsule 2   • [DISCONTINUED] hydroCHLOROthiazide (HYDRODIURIL) 12.5 MG tablet TAKE ONE TABLET BY MOUTH DAILY 90 tablet 0   • cetirizine (zyrTEC) 10 MG tablet Take 10 mg by mouth Daily As Needed.       No current facility-administered medications on file prior to visit.       Review of Systems   Constitutional: Negative for activity change, chills and fever.   HENT: Negative for congestion, postnasal drip and rhinorrhea.    Eyes: Negative for blurred vision and pain.   Respiratory: Negative for cough, chest tightness and shortness of breath.    Cardiovascular: Negative for chest pain.   Gastrointestinal: Negative for abdominal pain, constipation, diarrhea, nausea and vomiting.   Endocrine: Negative for cold intolerance and heat intolerance.   Genitourinary: Negative for decreased urine volume, dysuria and frequency.   Musculoskeletal: Negative for arthralgias, back pain and  myalgias.   Skin: Negative for rash and skin lesions.   Neurological: Negative for dizziness and confusion.   Psychiatric/Behavioral: Negative for agitation, behavioral problems and depressed mood.           Vitals:    09/15/21 1342   BP: 126/64   Pulse: 67   Temp: 97.8 °F (36.6 °C)   SpO2: 97%      Objective   Physical Exam  Vitals and nursing note reviewed.   Constitutional:       Appearance: He is well-developed.   Cardiovascular:      Rate and Rhythm: Normal rate and regular rhythm.      Heart sounds: Normal heart sounds. No murmur heard.     Pulmonary:      Effort: Pulmonary effort is normal. No respiratory distress.      Breath sounds: Normal breath sounds.   Abdominal:      General: Bowel sounds are normal.      Palpations: Abdomen is soft.   Musculoskeletal:         General: Normal range of motion.      Cervical back: Neck supple.   Skin:     General: Skin is warm and dry.   Neurological:      Mental Status: He is alert and oriented to person, place, and time.           Assessment/Plan   Problems Addressed this Visit        Cardiac and Vasculature    HTN (hypertension) - Primary    Relevant Medications    hydroCHLOROthiazide (HYDRODIURIL) 12.5 MG tablet       Neuro    CVA (cerebral vascular accident) (CMS/HCC)      Other Visit Diagnoses     Mixed hyperlipidemia          Diagnoses       Codes Comments    Essential hypertension    -  Primary ICD-10-CM: I10  ICD-9-CM: 401.9     Mixed hyperlipidemia     ICD-10-CM: E78.2  ICD-9-CM: 272.2     Cerebrovascular accident (CVA) due to other mechanism (CMS/HCC)     ICD-10-CM: I63.89  ICD-9-CM: 434.91         -Stable hypertension  -Uncontrolled hyperlipidemia with history of CVA worsened acutely due to very poor diet.  Declines statin at this time after discussion but is willing to initiate significant dietary changes with closer follow-up           Return in about 3 months (around 12/15/2021) for Recheck lipids.      Anne Chand MD

## 2021-11-09 ENCOUNTER — OFFICE VISIT (OUTPATIENT)
Dept: CARDIOLOGY | Facility: CLINIC | Age: 71
End: 2021-11-09

## 2021-11-09 VITALS
SYSTOLIC BLOOD PRESSURE: 140 MMHG | WEIGHT: 185 LBS | HEART RATE: 61 BPM | OXYGEN SATURATION: 95 % | HEIGHT: 72 IN | BODY MASS INDEX: 25.06 KG/M2 | DIASTOLIC BLOOD PRESSURE: 80 MMHG | RESPIRATION RATE: 16 BRPM

## 2021-11-09 DIAGNOSIS — I10 PRIMARY HYPERTENSION: ICD-10-CM

## 2021-11-09 DIAGNOSIS — G45.9 TIA (TRANSIENT ISCHEMIC ATTACK): Primary | ICD-10-CM

## 2021-11-09 DIAGNOSIS — I67.9 INTRACRANIAL VASCULAR STENOSIS: Chronic | ICD-10-CM

## 2021-11-09 DIAGNOSIS — I45.5 SINOATRIAL BLOCK: ICD-10-CM

## 2021-11-09 DIAGNOSIS — Z87.74 S/P PERCUTANEOUS PATENT FORAMEN OVALE CLOSURE: ICD-10-CM

## 2021-11-09 PROBLEM — Q21.12 PFO (PATENT FORAMEN OVALE): Status: RESOLVED | Noted: 2018-10-09 | Resolved: 2021-11-09

## 2021-11-09 PROBLEM — Z45.09 ENCOUNTER FOR LOOP RECORDER AT END OF BATTERY LIFE: Status: RESOLVED | Noted: 2020-07-09 | Resolved: 2021-11-09

## 2021-11-09 PROCEDURE — 99214 OFFICE O/P EST MOD 30 MIN: CPT | Performed by: INTERNAL MEDICINE

## 2021-11-09 PROCEDURE — 93000 ELECTROCARDIOGRAM COMPLETE: CPT | Performed by: INTERNAL MEDICINE

## 2021-11-09 RX ORDER — CLOPIDOGREL BISULFATE 75 MG/1
75 TABLET ORAL DAILY
Qty: 30 TABLET | Refills: 11 | Status: SHIPPED | OUTPATIENT
Start: 2021-11-09 | End: 2023-01-30 | Stop reason: SDUPTHER

## 2021-11-09 RX ORDER — GUAIFENESIN 600 MG/1
1200 TABLET, EXTENDED RELEASE ORAL 2 TIMES DAILY
COMMUNITY
End: 2022-03-08

## 2021-11-09 NOTE — PROGRESS NOTES
Date of Office Visit: 2021    Encounter Provider: Christopher Sawant MD  Place of Service: [facility] CARDIOLOGY  Patient Name: Ronnell Rizvi  :1950    Chief Complaint   Patient presents with   • cerebrovasuclar accident (CVA)   :     HPI: Ronnell Rizvi is a 71 y.o. male who presents today to follow up. I have reviewed prior notes and there are no changes except for any new updates described below. I have also reviewed any information entered into the medical record by the patient or by ancillary staff.     I first met him in 2016 when he was hospitalized for chest pain.  A Cardiolite stress was normal (EF 54%).  He did have some Wenckebach at the beginning of stress which normalized with exercise.    In 2017, he had an episode of visual changes that resolved on their own.  The next morning it happened again and he came to the ED and was diagnosed with a stroke of the left occipital/temporal lobe.  He was found to have some diffuse small vessel disease.  An echo wasn't performed but a Zio patch was placed.  We were not consulted in the hospital.  He was placed on clopidogrel and atorvastatin.    In 2017, the Zio showed some atrial ectopy (there was a 13 beat run of PACs) but no atrial fibrillation.  An echo showed normal LV systolic function and a small PFO.    His studies were then reviewed by a different neurologist, who felt that this was actually embolic. A THIERRY showed a moderate sized PFO and moderate aortic insufficiency but was otherwise normal. A Confirm device was placed (implanted monitor from [employer]).  Upon arrival he was noted to have an irregular rhythm (not atrial fibrillation).  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  The monitor failed to show any atrial fibrillation (see below).    In 2018, he presented with recurrent neurological symptoms.  He was found to have a subacute infarct as well as diffuse intracranial  "atherosclerosis.  He underwent percutaneous PFO closure with a 25mm Amplatzer device during that admission.    His loop recorder showed episodes of type II sinoatrial exit block; the loop was removed in 2020.  He also had a normal perfusion stress test in 2020 (he reported atypical chest pain at the time).     He feels well and denies any cardiac complaints at all.  He denies palpitations, lightheadedness, syncope, edema, chest pain, dyspnea, or recurrent stroke symptoms. He is not currently taking any antiplatelet agents. He states that aspirin causes GI upset, and that he doesn't remember why he stopped clopidogrel. The chart reports a \"rash\" but he denies this.     Past Medical History:   Diagnosis Date   • Allergic rhinitis    • Anal fissure    • Aortic insufficiency     moderate, THIERRY 2017   • Asthma    • Asthma    • Benign prostatic hypertrophy (BPH) with weak urinary stream 11/1/2016   • Chronic bronchitis (HCC)    • Colon polyp    • COPD (chronic obstructive pulmonary disease) (HCC)    • Emphysema lung (HCC)    • Fatty liver    • GERD (gastroesophageal reflux disease)    • Hepatitis     thought to be Hepatitis A    • History of pneumonia     With left lower lobe atelectasis and scar tissue, being followed   • HL (hearing loss)    • Hypertension    • Memory loss    • Obstructive sleep apnea syndrome 8/23/2017    refuses c-pap   • PFO (patent foramen ovale)     s/p percutaneous closure with a 25mm Amplatzer device in December 2018   • Pneumonia     LLL with scar tissue   • Seizures (HCC)    • 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   • Spinal stenosis    • Stroke (HCC)     10/2017: left occipital/temporal. total of 3 strokes   • Vision loss        Past Surgical History:   Procedure Laterality Date   • CARDIAC CATHETERIZATION N/A 12/12/2018    Procedure: Patent foramen ovale closure- Abbott;  Surgeon: Deangelo Harris MD;  Location: [facility] CATH INVASIVE LOCATION;  Service: Cardiology   • CARDIAC CATHETERIZATION N/A " 2018    Procedure: Intracardiac echocardiogram;  Surgeon: Deangelo Harris MD;  Location:  [facility] CATH INVASIVE LOCATION;  Service: Cardiology   • CARDIAC ELECTROPHYSIOLOGY PROCEDURE N/A 3/26/2018    Procedure: Loop insertion   CONFIRM RX;  Surgeon: Luis Wyatt MD;  Location:  [facility] CATH INVASIVE LOCATION;  Service: Cardiovascular   • CARDIAC ELECTROPHYSIOLOGY PROCEDURE N/A 7/15/2020    Procedure: Loop recorder removal;  Surgeon: Starr Driscoll MD;  Location:  [facility] CATH INVASIVE LOCATION;  Service: Cardiovascular;  Laterality: N/A;   • CARDIAC SURGERY     • COLONOSCOPY     • COLONOSCOPY N/A 10/9/2020    Procedure: COLONOSCOPY with polypectomy;  Surgeon: Ras Mendoza MD;  Location:  [facility] OR;  Service: Gastroenterology;  Laterality: N/A;  diverticulosis  ascending polyp  transverse polyp  sigmoid polyps X 2  rectal polyp   • HAND SURGERY Bilateral    • INGUINAL HERNIA REPAIR Left    • OTHER SURGICAL HISTORY      inguinal hernia repair for person over age 5   • TONSILLECTOMY     • TONSILLECTOMY         Social History     Socioeconomic History   • Marital status:      Spouse name: Joey   Tobacco Use   • Smoking status: Former Smoker     Quit date: 1979     Years since quittin.7   • Smokeless tobacco: Never Used   • Tobacco comment: caffeine use: Drinks 4 glasses of Dt coke on avg.    Vaping Use   • Vaping Use: Never used   Substance and Sexual Activity   • Alcohol use: No   • Drug use: No   • Sexual activity: Defer       Family History   Problem Relation Age of Onset   • Obesity Mother    • Clotting disorder Mother         Upper extremities   • Alcohol abuse Father    • Cancer Father 63        Esophagus and lung   • Other Father         cardiac disorder   • Heart disease Father    • Kidney disease Sister    • Kidney failure Sister    • Drug abuse Paternal Uncle    • Stroke Sister    • Throat cancer Sister        Review of Systems   HENT: Positive for hearing loss.   "  Cardiovascular: Negative for chest pain.   Respiratory: Positive for snoring. Negative for shortness of breath.    Neurological: Negative for dizziness and light-headedness.        As per HPI   Psychiatric/Behavioral: Positive for memory loss.   All other systems reviewed and are negative.      Allergies   Allergen Reactions   • Plavix [Clopidogrel] Itching   • Aspirin Nausea Only     Pt states he \"can tolerate enteric coated aspirin only.\"    • Citrus      Blisters on mouth     • Combivent [Ipratropium-Albuterol] Other (See Comments)     Throat swelling   • Lactose Intolerance (Gi)    • Statins Mental Status Change     Severe memory impairment         Current Outpatient Medications:   •  Cyanocobalamin 1000 MCG/ML kit, Inject  as directed 1 (One) Time Per Week. 1/4 of a cc IM every sunday, Disp: , Rfl:   •  guaiFENesin (MUCINEX) 600 MG 12 hr tablet, Take 1,200 mg by mouth 2 (Two) Times a Day., Disp: , Rfl:   •  hydroCHLOROthiazide (HYDRODIURIL) 12.5 MG tablet, Take 1 tablet by mouth Daily., Disp: 90 tablet, Rfl: 1  •  levocetirizine (Xyzal) 5 MG tablet, Take 1 tablet by mouth Every Evening for 30 days., Disp: 30 tablet, Rfl: 0  •  phenytoin extended (DILANTIN) 300 MG ER capsule, Take 1 capsule by mouth Daily., Disp: 30 capsule, Rfl: 2     Objective:     Vitals:    11/09/21 1108   BP: 140/80   Pulse: 61   Resp: 16   SpO2: 95%   Weight: 83.9 kg (185 lb)   Height: 182.9 cm (72\")     Body mass index is 25.09 kg/m².    Physical Exam  Vitals reviewed.   Constitutional:       Appearance: He is well-developed.   HENT:      Head: Normocephalic.      Nose: Nose normal.      Mouth/Throat:      Comments: Masked  Eyes:      Conjunctiva/sclera: Conjunctivae normal.   Neck:      Vascular: No JVD.   Cardiovascular:      Rate and Rhythm: Normal rate and regular rhythm.      Pulses: Normal pulses and intact distal pulses.      Heart sounds: Normal heart sounds. No murmur heard.      Pulmonary:      Effort: Pulmonary effort is " normal.      Breath sounds: Normal breath sounds.   Abdominal:      Palpations: Abdomen is soft.      Tenderness: There is no abdominal tenderness.   Musculoskeletal:         General: Normal range of motion.      Cervical back: Normal range of motion.   Skin:     General: Skin is warm and dry.      Findings: No rash.   Neurological:      General: No focal deficit present.      Mental Status: He is alert and oriented to person, place, and time.      Cranial Nerves: No cranial nerve deficit.   Psychiatric:         Mood and Affect: Mood normal.         Behavior: Behavior normal.         Thought Content: Thought content normal.           ECG 12 Lead    Date/Time: 11/9/2021 11:24 AM  Performed by: Christopher Sawant MD  Authorized by: Christopher Sawant MD   Comparison: compared with previous ECG   Similar to previous ECG  Rhythm: sinus rhythm  Conduction: conduction normal  ST Flattening: all  T Waves: T waves normal  QRS axis: normal  Other: no other findings    Clinical impression: abnormal EKG  Comments: Sinus pause            Assessment:      Diagnoses and all orders for this visit:    1. TIA (transient ischemic attack) (Primary)  -     ECG 12 Lead    2. S/P percutaneous patent foramen ovale closure    3. Intracranial vascular stenosis    4. Sinoatrial block    5. Primary hypertension              Plan:       He had a left occipital/temporal stroke which was initially presumed to be due to small vessel disease. He was placed on clopidogrel and atorvastatin (which he stopped due to memory loss).  Then, outpatient neurological evaluation led to a further workup.  He was found to have a PFO on THIERRY, and then he had another event on DAPT.  He then had the PFO closed. He doesn't remember why he's not on an antiplatelet agent. I can't find any proof that clopidogrel caused a rash. I'm going to resume it and have him come back in four weeks for assessment. If it causes a rash, and as he doesn't tolerate aspirin, he'll need  ticagrelor 60mg BID.    He has sinus node disease but does not have symptoms of higher level block. We'll observe.    His BP will be rechecked at the next visit.     Sincerely,       Christopher Sawant MD

## 2021-11-18 DIAGNOSIS — R56.9 SEIZURE (HCC): ICD-10-CM

## 2021-11-18 RX ORDER — PHENYTOIN SODIUM 300 MG/1
300 CAPSULE, EXTENDED RELEASE ORAL DAILY
Qty: 30 CAPSULE | Refills: 2 | Status: SHIPPED | OUTPATIENT
Start: 2021-11-18 | End: 2022-02-16 | Stop reason: SDUPTHER

## 2021-12-07 ENCOUNTER — OFFICE VISIT (OUTPATIENT)
Dept: CARDIOLOGY | Facility: CLINIC | Age: 71
End: 2021-12-07

## 2021-12-07 VITALS
BODY MASS INDEX: 24.79 KG/M2 | HEART RATE: 64 BPM | DIASTOLIC BLOOD PRESSURE: 70 MMHG | OXYGEN SATURATION: 97 % | RESPIRATION RATE: 16 BRPM | WEIGHT: 183 LBS | SYSTOLIC BLOOD PRESSURE: 124 MMHG | HEIGHT: 72 IN

## 2021-12-07 DIAGNOSIS — G47.33 OSA (OBSTRUCTIVE SLEEP APNEA): ICD-10-CM

## 2021-12-07 DIAGNOSIS — Z87.74 S/P PERCUTANEOUS PATENT FORAMEN OVALE CLOSURE: ICD-10-CM

## 2021-12-07 DIAGNOSIS — I10 PRIMARY HYPERTENSION: ICD-10-CM

## 2021-12-07 DIAGNOSIS — I35.1 NONRHEUMATIC AORTIC VALVE INSUFFICIENCY: Primary | ICD-10-CM

## 2021-12-07 DIAGNOSIS — I45.5 SINOATRIAL BLOCK: ICD-10-CM

## 2021-12-07 DIAGNOSIS — G45.9 TIA (TRANSIENT ISCHEMIC ATTACK): ICD-10-CM

## 2021-12-07 PROCEDURE — 93000 ELECTROCARDIOGRAM COMPLETE: CPT | Performed by: NURSE PRACTITIONER

## 2021-12-07 PROCEDURE — 99214 OFFICE O/P EST MOD 30 MIN: CPT | Performed by: NURSE PRACTITIONER

## 2021-12-07 NOTE — PROGRESS NOTES
Date of Office Visit: 2021  Encounter Provider: CHINEDU Ruano  Place of Service: [facility] CARDIOLOGY  Patient Name: Ronnell Rizvi  :1950  Primary Cardiologist: Dr. Sawant    CC:  4 week follow up    Dear Dr. Chand    HPI: Ronnell Rizvi is a pleasant 71 y.o. male who presents 2021 for cardiac follow up.  I reviewed his past medical records including notes, labs and testing in preparation for today's visit.    I first met him in 2016 when he was hospitalized for chest pain.  A Cardiolite stress was normal (EF 54%).  He did have some Wenckebach at the beginning of stress which normalized with exercise.     In 2017, he had an episode of visual changes that resolved on their own.  The next morning it happened again and he came to the ED and was diagnosed with a stroke of the left occipital/temporal lobe.  He was found to have some diffuse small vessel disease.  An echo wasn't performed but a Zio patch was placed.  We were not consulted in the hospital.  He was placed on clopidogrel and atorvastatin.  He stopped the atorvastatin secondary to memory loss.     In 2017, the Zio showed some atrial ectopy (there was a 13 beat run of PACs) but no atrial fibrillation.  An echo showed normal LV systolic function and a small PFO.     His studies were then reviewed by a different neurologist, who felt that this was actually embolic. A THIERRY showed a moderate sized PFO and moderate aortic insufficiency but was otherwise normal. A Confirm device was placed (implanted monitor from [employer]).  Upon arrival he was noted to have an irregular rhythm (not atrial fibrillation).  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  The monitor failed to show any atrial fibrillation.       In 2018, he presented with recurrent neurological symptoms.  He was found to have a subacute infarct as well as diffuse intracranial atherosclerosis.  He underwent  "percutaneous PFO closure with a 25mm Amplatzer device during that admission.     His loop recorder showed episodes of type II sinoatrial exit block; the loop was removed in 2020.  He also had a normal perfusion stress test in 2020 (he reported atypical chest pain at the time).      He saw Dr. Sawant 11/9/2021.  He was doing well overall.  He had actually been started on dual antiplatelet therapy after the PFO was closed.  At his visit, he was found that he was not on any antiplatelet agent.  We cannot find any proof that the clopidogrel caused a rash.  Dr. Sawant restarted the clopidogrel 75 mg daily.    He presents today.  He does have short-term memory loss.  But he states that he has not had a rash since restarting the Plavix.  He states he has had to adjust his seizure medications because the higher dose causes him to be tired all the time and he states some days he just simply could not get out of bed.  He is currently on 300 mg and states \"I can function on that\".  He denies any palpitations, shortness of breath, lower extremity edema.  He is not had any dizziness, lightheadedness, syncope or presyncopal episodes.  He denies any chest pain or chest pressure.  He has not had any seizures.  He does state he states fatigue but he is able to function.  Blood pressures well controlled.  He has no rash.      Past Medical History:   Diagnosis Date   • Allergic rhinitis    • Anal fissure    • Aortic insufficiency     moderate, THIERRY 2017   • Asthma    • Asthma    • Benign prostatic hypertrophy (BPH) with weak urinary stream 11/1/2016   • Chronic bronchitis (HCC)    • Colon polyp    • COPD (chronic obstructive pulmonary disease) (HCC)    • Emphysema lung (HCC)    • Fatty liver    • GERD (gastroesophageal reflux disease)    • Hepatitis     thought to be Hepatitis A    • History of pneumonia     With left lower lobe atelectasis and scar tissue, being followed   • HL (hearing loss)    • Hypertension    • Memory loss    • " Obstructive sleep apnea syndrome 2017    refuses c-pap   • PFO (patent foramen ovale)     s/p percutaneous closure with a 25mm Amplatzer device in 2018   • Pneumonia     LLL with scar tissue   • Seizures (HCC)    • 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   • Spinal stenosis    • Stroke (HCC)     10/2017: left occipital/temporal. total of 3 strokes   • Vision loss        Past Surgical History:   Procedure Laterality Date   • CARDIAC CATHETERIZATION N/A 2018    Procedure: Patent foramen ovale closure- [employer];  Surgeon: Deangelo Harris MD;  Location:  [facility] CATH INVASIVE LOCATION;  Service: Cardiology   • CARDIAC CATHETERIZATION N/A 2018    Procedure: Intracardiac echocardiogram;  Surgeon: Deangelo Harris MD;  [facility];  Service: Cardiology   • CARDIAC ELECTROPHYSIOLOGY PROCEDURE N/A 3/26/2018    Procedure: Loop insertion   CONFIRM RX;  Surgeon: Luis Wyatt MD;  Location:  [facility] CATH INVASIVE LOCATION;  Service: Cardiovascular   • CARDIAC ELECTROPHYSIOLOGY PROCEDURE N/A 7/15/2020    Procedure: Loop recorder removal;  Surgeon: Starr Driscoll MD;  Location:  [facility] CATH INVASIVE LOCATION;  Service: Cardiovascular;  Laterality: N/A;   • CARDIAC SURGERY     • COLONOSCOPY     • COLONOSCOPY N/A 10/9/2020    Procedure: COLONOSCOPY with polypectomy;  Surgeon: Ras Mendoza MD;  Location: [facility];  Service: Gastroenterology;  Laterality: N/A;  diverticulosis  ascending polyp  transverse polyp  sigmoid polyps X 2  rectal polyp   • HAND SURGERY Bilateral    • INGUINAL HERNIA REPAIR Left    • OTHER SURGICAL HISTORY      inguinal hernia repair for person over age 5   • TONSILLECTOMY     • TONSILLECTOMY         Social History     Socioeconomic History   • Marital status:      Spouse name: Joey   Tobacco Use   • Smoking status: Former Smoker     Quit date: 1979     Years since quittin.8   • Smokeless tobacco: Never Used   • Tobacco  comment: caffeine use: Drinks 4 glasses of Dt coke on avg.    Vaping Use   • Vaping Use: Never used   Substance and Sexual Activity   • Alcohol use: No   • Drug use: No   • Sexual activity: Defer       Family History   Problem Relation Age of Onset   • Obesity Mother    • Clotting disorder Mother         Upper extremities   • Alcohol abuse Father    • Cancer Father 63        Esophagus and lung   • Other Father         cardiac disorder   • Heart disease Father    • Kidney disease Sister    • Kidney failure Sister    • Drug abuse Paternal Uncle    • Stroke Sister    • Throat cancer Sister        The following portion of the patient's history were reviewed and updated as appropriate: past medical history, past surgical history, past social history, past family history, allergies, current medications, and problem list.    Review of Systems   Constitutional: Positive for malaise/fatigue. Negative for diaphoresis and fever.   HENT: Negative for congestion, hearing loss, hoarse voice, nosebleeds and sore throat.    Eyes: Negative for photophobia, vision loss in left eye, vision loss in right eye and visual disturbance.   Cardiovascular: Negative for chest pain, dyspnea on exertion, irregular heartbeat, leg swelling, near-syncope, orthopnea, palpitations, paroxysmal nocturnal dyspnea and syncope.   Respiratory: Negative for cough, hemoptysis, shortness of breath, sleep disturbances due to breathing, snoring, sputum production and wheezing.    Endocrine: Negative for cold intolerance, heat intolerance, polydipsia, polyphagia and polyuria.   Hematologic/Lymphatic: Negative for bleeding problem. Does not bruise/bleed easily.   Skin: Negative for color change, dry skin, poor wound healing, rash and suspicious lesions.   Musculoskeletal: Negative for arthritis, back pain, falls, gout, joint pain, joint swelling, muscle cramps, muscle weakness and myalgias.   Gastrointestinal: Negative for bloating, abdominal pain, constipation,  "diarrhea, dysphagia, melena, nausea and vomiting.   Neurological: Negative for excessive daytime sleepiness, dizziness, headaches, light-headedness, loss of balance, numbness, paresthesias, seizures, vertigo and weakness.   Psychiatric/Behavioral: Negative for depression, memory loss and substance abuse. The patient is not nervous/anxious.        Allergies   Allergen Reactions   • Plavix [Clopidogrel] Itching   • Aspirin Nausea Only     Pt states he \"can tolerate enteric coated aspirin only.\"    • Citrus      Blisters on mouth     • Combivent [Ipratropium-Albuterol] Other (See Comments)     Throat swelling   • Lactose Intolerance (Gi)    • Statins Mental Status Change     Severe memory impairment         Current Outpatient Medications:   •  clopidogrel (PLAVIX) 75 MG tablet, Take 1 tablet by mouth Daily., Disp: 30 tablet, Rfl: 11  •  Cyanocobalamin 1000 MCG/ML kit, Inject  as directed 1 (One) Time Per Week. 1/4 of a cc IM every sunday, Disp: , Rfl:   •  guaiFENesin (MUCINEX) 600 MG 12 hr tablet, Take 1,200 mg by mouth 2 (Two) Times a Day., Disp: , Rfl:   •  hydroCHLOROthiazide (HYDRODIURIL) 12.5 MG tablet, Take 1 tablet by mouth Daily., Disp: 90 tablet, Rfl: 1  •  phenytoin extended (DILANTIN) 300 MG ER capsule, Take 1 capsule by mouth Daily., Disp: 30 capsule, Rfl: 2  •  levocetirizine (Xyzal) 5 MG tablet, Take 1 tablet by mouth Every Evening for 30 days., Disp: 30 tablet, Rfl: 0        Objective:     Vitals:    12/07/21 1054   BP: 124/70   Pulse: 64   Resp: 16   SpO2: 97%   Weight: 83 kg (183 lb)   Height: 182.9 cm (72\")     Body mass index is 24.82 kg/m².      Vitals reviewed.   Constitutional:       General: Not in acute distress.     Appearance: Healthy appearance. Well-developed.   Eyes:      General:         Right eye: No discharge.         Left eye: No discharge.      Conjunctiva/sclera: Conjunctivae normal.   HENT:      Head: Normocephalic and atraumatic.      Right Ear: External ear normal. Decreased " hearing noted.      Left Ear: External ear normal. Decreased hearing noted.      Nose: Nose normal.   Neck:      Thyroid: No thyromegaly.      Vascular: No JVD.      Trachea: No tracheal deviation.      Lymphadenopathy: No cervical adenopathy.   Pulmonary:      Effort: Pulmonary effort is normal. No respiratory distress.      Breath sounds: Normal breath sounds. No wheezing. No rales.   Chest:      Chest wall: Not tender to palpatation.   Cardiovascular:      Normal rate. Regular rhythm.      No gallop.   Pulses:     Intact distal pulses.   Edema:     Peripheral edema absent.   Abdominal:      General: There is no distension.      Palpations: Abdomen is soft.      Tenderness: There is no abdominal tenderness.   Musculoskeletal: Normal range of motion.         General: No tenderness or deformity.      Cervical back: Normal range of motion and neck supple. Skin:     General: Skin is warm and dry.      Findings: No erythema or rash.   Neurological:      Mental Status: Alert and oriented to person, place, and time.      Coordination: Coordination normal.   Psychiatric:         Attention and Perception: Attention normal.         Mood and Affect: Mood normal.         Speech: Speech normal.         Behavior: Behavior normal. Behavior is cooperative.         Cognition and Memory: Memory is impaired.               ECG 12 Lead    Date/Time: 12/7/2021 11:09 AM  Performed by: Vivian Tran APRN  Authorized by: Vivian Tran APRN   Comparison: compared with previous ECG from 11/9/2021  Similar to previous ECG  Rhythm: sinus rhythm  Rate: normal  Conduction: 1st degree AV block  Q waves: III, aVF, V1, V2 and V3    ST Segments: ST segments normal  T Waves: T waves normal  QRS axis: left    Clinical impression: abnormal EKG              Assessment:       Diagnosis Plan   1. Nonrheumatic aortic valve insufficiency     2. Primary hypertension     3. S/P percutaneous patent foramen ovale closure     4. Sinoatrial block     5. ELIZABETH  "(obstructive sleep apnea)     6. TIA (transient ischemic attack)            Plan:         1.  CVA/PFO- He had a left occipital/temporal stroke which was initially presumed to be due to small vessel disease. He was placed on clopidogrel and atorvastatin (which he stopped due to memory loss).  Then, outpatient neurological evaluation led to a further workup.  He was found to have a PFO on THIERRY, and then he had another event on DAPT.  He then had the PFO closed.  Another TIA with expressive aphasia and short-term memory loss 1/14/2020.  He was evaluated by neurology and instructed to take 81 mg of aspirin with his Plavix.  He stopped the Plavix due to a possible drug reaction.    He stopped the aspirin secondary to esophageal/stomach upset.  He was restarted on Plavix 1 month ago and is denied any unexplained itching, rash or allergic reactions.     2.  Hypertension-well controlled     3.  ELIZABETH-noncompliant with CPAP.  Does not use it     4.  Sinoatrial block- he has intermittent second-degree SA block (type II) which is asymptomatic.     5.  Hyperlipidemia-he stopped the statin secondary to memory loss and from CVA and TIAs.     6.  Seizures - he had stopped taking his Keppra in May 2020.  He is now on Dilantin and cannot drive for 90 days seizure free.  He can only tolerate 300 mg daily, greater than that and he is so fatigued\" cannot function or even get out of bed\".     7.  S/P placement of loop recorder.  - This was explanted 7/15/2020     8.  COPD-denies shortness of breath.    Tolerating Plavix without rash or allergic reaction  RTO in 4 months with JK    As always, it has been a pleasure to participate in your patient's care. Thank you.       Sincerely,       CHINEDU Ruano      Current Outpatient Medications:   •  clopidogrel (PLAVIX) 75 MG tablet, Take 1 tablet by mouth Daily., Disp: 30 tablet, Rfl: 11  •  Cyanocobalamin 1000 MCG/ML kit, Inject  as directed 1 (One) Time Per Week. 1/4 of a cc IM every sunday, " Disp: , Rfl:   •  guaiFENesin (MUCINEX) 600 MG 12 hr tablet, Take 1,200 mg by mouth 2 (Two) Times a Day., Disp: , Rfl:   •  hydroCHLOROthiazide (HYDRODIURIL) 12.5 MG tablet, Take 1 tablet by mouth Daily., Disp: 90 tablet, Rfl: 1  •  phenytoin extended (DILANTIN) 300 MG ER capsule, Take 1 capsule by mouth Daily., Disp: 30 capsule, Rfl: 2  •  levocetirizine (Xyzal) 5 MG tablet, Take 1 tablet by mouth Every Evening for 30 days., Disp: 30 tablet, Rfl: 0      Dictated utilizing Dragon dictation

## 2021-12-23 DIAGNOSIS — Z00.00 HEALTH CARE MAINTENANCE: Primary | ICD-10-CM

## 2021-12-23 DIAGNOSIS — I10 ESSENTIAL HYPERTENSION: ICD-10-CM

## 2021-12-23 DIAGNOSIS — E78.2 MIXED HYPERLIPIDEMIA: ICD-10-CM

## 2021-12-28 LAB
ALBUMIN SERPL-MCNC: 4.1 G/DL (ref 3.7–4.7)
ALBUMIN/GLOB SERPL: 1.9 {RATIO} (ref 1.2–2.2)
ALP SERPL-CCNC: 96 IU/L (ref 44–121)
ALT SERPL-CCNC: 14 IU/L (ref 0–44)
AST SERPL-CCNC: 16 IU/L (ref 0–40)
BILIRUB SERPL-MCNC: 0.2 MG/DL (ref 0–1.2)
BUN SERPL-MCNC: 14 MG/DL (ref 8–27)
BUN/CREAT SERPL: 12 (ref 10–24)
CALCIUM SERPL-MCNC: 9.3 MG/DL (ref 8.6–10.2)
CHLORIDE SERPL-SCNC: 102 MMOL/L (ref 96–106)
CHOLEST SERPL-MCNC: 267 MG/DL (ref 100–199)
CO2 SERPL-SCNC: 26 MMOL/L (ref 20–29)
CREAT SERPL-MCNC: 1.14 MG/DL (ref 0.76–1.27)
GLOBULIN SER CALC-MCNC: 2.2 G/DL (ref 1.5–4.5)
GLUCOSE SERPL-MCNC: 97 MG/DL (ref 65–99)
HDLC SERPL-MCNC: 64 MG/DL
LDLC SERPL CALC-MCNC: 188 MG/DL (ref 0–99)
POTASSIUM SERPL-SCNC: 4.7 MMOL/L (ref 3.5–5.2)
PROT SERPL-MCNC: 6.3 G/DL (ref 6–8.5)
SODIUM SERPL-SCNC: 141 MMOL/L (ref 134–144)
TRIGL SERPL-MCNC: 88 MG/DL (ref 0–149)
VLDLC SERPL CALC-MCNC: 15 MG/DL (ref 5–40)

## 2021-12-29 ENCOUNTER — OFFICE VISIT (OUTPATIENT)
Dept: FAMILY MEDICINE CLINIC | Facility: CLINIC | Age: 71
End: 2021-12-29

## 2021-12-29 VITALS
DIASTOLIC BLOOD PRESSURE: 80 MMHG | HEART RATE: 66 BPM | RESPIRATION RATE: 16 BRPM | HEIGHT: 72 IN | OXYGEN SATURATION: 98 % | TEMPERATURE: 98.4 F | SYSTOLIC BLOOD PRESSURE: 134 MMHG | WEIGHT: 188 LBS | BODY MASS INDEX: 25.47 KG/M2

## 2021-12-29 DIAGNOSIS — G47.33 OSA (OBSTRUCTIVE SLEEP APNEA): ICD-10-CM

## 2021-12-29 DIAGNOSIS — I10 PRIMARY HYPERTENSION: Primary | ICD-10-CM

## 2021-12-29 DIAGNOSIS — E78.2 MIXED HYPERLIPIDEMIA: ICD-10-CM

## 2021-12-29 DIAGNOSIS — I10 ESSENTIAL HYPERTENSION: ICD-10-CM

## 2021-12-29 PROCEDURE — 99214 OFFICE O/P EST MOD 30 MIN: CPT | Performed by: FAMILY MEDICINE

## 2021-12-29 RX ORDER — SIMVASTATIN 5 MG
5 TABLET ORAL NIGHTLY
Qty: 90 TABLET | Refills: 1 | Status: SHIPPED | OUTPATIENT
Start: 2021-12-29 | End: 2022-03-29

## 2021-12-29 RX ORDER — HYDROCHLOROTHIAZIDE 12.5 MG/1
12.5 TABLET ORAL DAILY
Qty: 90 TABLET | Refills: 1 | Status: SHIPPED | OUTPATIENT
Start: 2021-12-29 | End: 2022-03-29 | Stop reason: SDUPTHER

## 2021-12-29 NOTE — PROGRESS NOTES
Chief Complaint   Patient presents with   • Hyperlipidemia       Subjective   Ronnell Rizvi is a 71 y.o. male.     History of Present Illness    left occipital/temporal stroke, s/p PFO closed. Hx TIA, on plavix and doing OK.     HTN-well controlled, asymptomatic     ELIZABETH-does not use cpap     HLA:  SE with statins, has declined use. , TCholesterol 267     Normal renal function, fasting blood sugars and liver function     Memory loss, hx CVA and TIAs    Seizures     COPD-no symptoms    No acute concerns       The following portions of the patient's history were reviewed and updated as appropriate: allergies, current medications, past family history, past medical history, past social history, past surgical history and problem list.      Past Medical History:   Diagnosis Date   • Allergic rhinitis    • Anal fissure    • Aortic insufficiency     moderate, THIERRY 2017   • Asthma    • Asthma    • Benign prostatic hypertrophy (BPH) with weak urinary stream 11/1/2016   • Chronic bronchitis (HCC)    • Colon polyp    • COPD (chronic obstructive pulmonary disease) (HCC)    • Emphysema lung (HCC)    • Fatty liver    • GERD (gastroesophageal reflux disease)    • Hepatitis     thought to be Hepatitis A    • History of pneumonia     With left lower lobe atelectasis and scar tissue, being followed   • HL (hearing loss)    • Hypertension    • Memory loss    • Obstructive sleep apnea syndrome 8/23/2017    refuses c-pap   • PFO (patent foramen ovale)     s/p percutaneous closure with a 25mm Amplatzer device in December 2018   • Pneumonia     LLL with scar tissue   • Seizures (HCC)    • 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   • Spinal stenosis    • Stroke (HCC)     10/2017: left occipital/temporal. total of 3 strokes   • Vision loss        Past Surgical History:   Procedure Laterality Date   • CARDIAC CATHETERIZATION N/A 12/12/2018    Procedure: Patent foramen ovale closure- Abbott;  Surgeon: Deangelo Harris MD;  Location: [facility]  INVASIVE LOCATION;  Service: Cardiology   • CARDIAC CATHETERIZATION N/A 2018    Procedure: Intracardiac echocardiogram;  Surgeon: Deangelo Harris MD;  Location:  [facility] CATH INVASIVE LOCATION;  Service: Cardiology   • CARDIAC ELECTROPHYSIOLOGY PROCEDURE N/A 3/26/2018    Procedure: Loop insertion   CONFIRM RX;  Surgeon: Luis Wyatt MD;  Location:  [facility] INVASIVE LOCATION;  Service: Cardiovascular   • CARDIAC ELECTROPHYSIOLOGY PROCEDURE N/A 7/15/2020    Procedure: Loop recorder removal;  Surgeon: Starr Driscoll MD;  Location:  [facility] INVASIVE LOCATION;  Service: Cardiovascular;  Laterality: N/A;   • CARDIAC SURGERY     • COLONOSCOPY     • COLONOSCOPY N/A 10/9/2020    Procedure: COLONOSCOPY with polypectomy;  Surgeon: Ras Mendoza MD;  Location:  [facility] OR;  Service: Gastroenterology;  Laterality: N/A;  diverticulosis  ascending polyp  transverse polyp  sigmoid polyps X 2  rectal polyp   • HAND SURGERY Bilateral    • INGUINAL HERNIA REPAIR Left    • OTHER SURGICAL HISTORY      inguinal hernia repair for person over age 5   • TONSILLECTOMY     • TONSILLECTOMY         Family History   Problem Relation Age of Onset   • Obesity Mother    • Clotting disorder Mother         Upper extremities   • Alcohol abuse Father    • Cancer Father 63        Esophagus and lung   • Other Father         cardiac disorder   • Heart disease Father    • Kidney disease Sister    • Kidney failure Sister    • Drug abuse Paternal Uncle    • Stroke Sister    • Throat cancer Sister        Social History     Socioeconomic History   • Marital status:      Spouse name: Joey   Tobacco Use   • Smoking status: Former Smoker     Quit date: 1979     Years since quittin.8   • Smokeless tobacco: Never Used   • Tobacco comment: caffeine use: Drinks 4 glasses of Dt coke on avg.    Vaping Use   • Vaping Use: Never used   Substance and Sexual Activity   • Alcohol use: No   • Drug use: No   • Sexual  activity: Defer       Current Outpatient Medications on File Prior to Visit   Medication Sig Dispense Refill   • clopidogrel (PLAVIX) 75 MG tablet Take 1 tablet by mouth Daily. 30 tablet 11   • Cyanocobalamin 1000 MCG/ML kit Inject  as directed 1 (One) Time Per Week. 1/4 of a cc IM every sunday     • guaiFENesin (MUCINEX) 600 MG 12 hr tablet Take 1,200 mg by mouth 2 (Two) Times a Day.     • hydroCHLOROthiazide (HYDRODIURIL) 12.5 MG tablet Take 1 tablet by mouth Daily. 90 tablet 1   • phenytoin extended (DILANTIN) 300 MG ER capsule Take 1 capsule by mouth Daily. 30 capsule 2   • levocetirizine (Xyzal) 5 MG tablet Take 1 tablet by mouth Every Evening for 30 days. 30 tablet 0     No current facility-administered medications on file prior to visit.       Review of Systems   Constitutional: Negative for fever.   HENT: Negative for congestion.    Respiratory: Negative for shortness of breath.    Cardiovascular: Negative for chest pain.   Gastrointestinal: Negative for abdominal pain.   Genitourinary: Negative for urgency.   Musculoskeletal: Negative for back pain.   Skin: Negative for rash.   Neurological: Negative for weakness.   Psychiatric/Behavioral: Negative for depressed mood.           Vitals:    12/29/21 1055   BP: 134/80   Pulse: 66   Resp: 16   Temp: 98.4 °F (36.9 °C)   SpO2: 98%      Objective   Physical Exam  Vitals and nursing note reviewed.   Constitutional:       Appearance: He is well-developed.   HENT:      Head: Normocephalic.   Cardiovascular:      Rate and Rhythm: Normal rate and regular rhythm.      Heart sounds: Normal heart sounds. No murmur heard.      Pulmonary:      Effort: Pulmonary effort is normal. No respiratory distress.      Breath sounds: Normal breath sounds.   Abdominal:      General: Bowel sounds are normal.      Palpations: Abdomen is soft.   Musculoskeletal:         General: Normal range of motion.      Cervical back: Neck supple.   Skin:     General: Skin is warm and dry.    Neurological:      Mental Status: He is alert and oriented to person, place, and time.           Assessment/Plan   Problems Addressed this Visit        Cardiac and Vasculature    HTN (hypertension) - Primary       Sleep    ELIZABETH (obstructive sleep apnea)      Other Visit Diagnoses     Mixed hyperlipidemia          Diagnoses       Codes Comments    Primary hypertension    -  Primary ICD-10-CM: I10  ICD-9-CM: 401.9     ELIZABETH (obstructive sleep apnea)     ICD-10-CM: G47.33  ICD-9-CM: 327.23     Mixed hyperlipidemia     ICD-10-CM: E78.2  ICD-9-CM: 272.2         May trial low dose statin q other day, notify if any SE or issues  Stable htn  Otherwise doing well     F/u AMW visit in 6 mo     Anne Chand MD

## 2022-02-08 ENCOUNTER — OFFICE VISIT (OUTPATIENT)
Dept: FAMILY MEDICINE CLINIC | Facility: CLINIC | Age: 72
End: 2022-02-08

## 2022-02-08 VITALS — TEMPERATURE: 99.3 F | HEART RATE: 88 BPM | BODY MASS INDEX: 23.75 KG/M2 | OXYGEN SATURATION: 98 % | HEIGHT: 73 IN

## 2022-02-08 DIAGNOSIS — U07.1 COVID-19 VIRUS INFECTION: Primary | ICD-10-CM

## 2022-02-08 PROCEDURE — 99213 OFFICE O/P EST LOW 20 MIN: CPT | Performed by: FAMILY MEDICINE

## 2022-02-08 NOTE — PROGRESS NOTES
"  Subjective   Ronnell Rizvi is a 72 y.o. male who is here for   Chief Complaint   Patient presents with   • covid positive      sinus congestio, cough,low grade fever   .     History of Present Illness   Patient comes in to follow-up on his COVID-19 infection.  Swab positive urgent care on February 5.  Not sure where he contracted a virus.  He has had 3 COVID-19 vaccines.  He has had some sinus congestion otherwise he feels normal.  Does have a history of COPD does not require medications.  Has had no shortness of breath.  He has had typical cough no fever.  Denies headaches or GI distress  Wants me to listen to his lungs      The following portions of the patient's history were reviewed and updated as appropriate: allergies, current medications, past family history, past medical history, past social history, past surgical history and problem list.    Review of Systems    Objective   Vitals:    02/08/22 1622   Pulse: 88   Temp: 99.3 °F (37.4 °C)   TempSrc: Temporal   SpO2: 98%   Height: 185.4 cm (73\")      Physical Exam  Vitals reviewed.   Cardiovascular:      Rate and Rhythm: Normal rate.   Pulmonary:      Effort: Pulmonary effort is normal. No respiratory distress.      Breath sounds: No wheezing or rales.   Neurological:      Mental Status: He is alert.       COVID-19,[facility],NP/OP Swab in Transport Media or ESwab 72 HR TAT - Swab, Nasopharynx (02/05/2022 13:51)    Assessment/Plan   Diagnoses and all orders for this visit:    1. COVID-19 virus infection (Primary)    Mild COVID-19 infection.  Continue his OTC Mucinex.  Finish a 5-day home quarantine    There are no Patient Instructions on file for this visit.    There are no discontinued medications.     No follow-ups on file.    Dr. Jace Denny  [place], [place]    "

## 2022-02-16 DIAGNOSIS — R56.9 SEIZURE: ICD-10-CM

## 2022-02-16 RX ORDER — PHENYTOIN SODIUM 300 MG/1
300 CAPSULE, EXTENDED RELEASE ORAL DAILY
Qty: 30 CAPSULE | Refills: 0 | Status: SHIPPED | OUTPATIENT
Start: 2022-02-16 | End: 2022-03-24 | Stop reason: SDUPTHER

## 2022-03-08 ENCOUNTER — OFFICE VISIT (OUTPATIENT)
Dept: NEUROLOGY | Facility: CLINIC | Age: 72
End: 2022-03-08

## 2022-03-08 VITALS
OXYGEN SATURATION: 93 % | HEIGHT: 73 IN | BODY MASS INDEX: 25.82 KG/M2 | DIASTOLIC BLOOD PRESSURE: 82 MMHG | WEIGHT: 194.8 LBS | SYSTOLIC BLOOD PRESSURE: 124 MMHG | HEART RATE: 66 BPM

## 2022-03-08 DIAGNOSIS — R56.9 NOCTURNAL SEIZURES: ICD-10-CM

## 2022-03-08 DIAGNOSIS — Q21.12 PFO (PATENT FORAMEN OVALE): ICD-10-CM

## 2022-03-08 DIAGNOSIS — R29.6 FALLS FREQUENTLY: ICD-10-CM

## 2022-03-08 DIAGNOSIS — Z87.74 S/P PATENT FORAMEN OVALE CLOSURE: ICD-10-CM

## 2022-03-08 DIAGNOSIS — Z86.73 HISTORY OF STROKE: Primary | ICD-10-CM

## 2022-03-08 DIAGNOSIS — Z51.81 MEDICATION MONITORING ENCOUNTER: ICD-10-CM

## 2022-03-08 DIAGNOSIS — H91.93 BILATERAL HEARING LOSS, UNSPECIFIED HEARING LOSS TYPE: ICD-10-CM

## 2022-03-08 PROCEDURE — 99214 OFFICE O/P EST MOD 30 MIN: CPT | Performed by: NURSE PRACTITIONER

## 2022-03-08 NOTE — PROGRESS NOTES
DOS: 3/8/2022  NAME: Ronnell Rizvi   : 1950  PCP: Anne Chand MD    Chief Complaint   Patient presents with   • Stroke   Patient is unaccompanied to today's visit.    Neurological Problem and Interval History:  72 y.o. 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  Patient switched to Dilantin to  as patient has been off Keppra for approximately 5 months due to side effects of rash and feeling.  Patient has remained on time 300 to 600 mg at that time based on this.  Currently patient on 300 mg extended release.  Patient reported that when he increased daily to 400 mg he was ataxic-gait instability and frequent.  He reports no breakthrough seizures on current dose, no unexplained tongue biting, no unexplained urinary, loss of time or prolonged/postictal state.  He denies any recent illnesses/falls under hospitalizations although he did have Covid in February.    Since last evaluation, patient denies any new signs and/or symptoms of stroke.  He reports no issues with blood pressure since hydrochlorothiazide added to his daily regimen.  BP today 124/82-heart rate 66.  He admits he does not check frequently at home.  He denies any issues with mood.  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  He has known sleep apnea and reports continuous poor quality of sleep and feeling of being unrested daily although he is unable to use CPAP due to comfort.  Reports that he has  tried to use machine 3 times and is unable to tolerate.  He knows risk and benefits of not treating sleep apnea; he interrelated again today risk and benefits-he verbalizes understanding.  Patient remains on Plavix 75 mg daily, simvastatin 5 mg daily.  LDL 12/21 188 discussed LDL goal for stroke prevention (40-70).  Recommend consideration of increased statin dose versus addition of Repatha-patient reports cognition changes on statins in the past therefore Repatha should be considered.  Patient denies any new complaints and or concerns on exam.  [place] state law regarding seizures and reiterated again today.  I will plan to see patient back in 6 to 12 months time.       Review of Systems:        Review of Systems   Constitutional: Negative for activity change, appetite change, fatigue and unexpected weight change.   HENT: Positive for hearing loss (hearing aides) and tinnitus. Negative for dental problem, drooling, ear pain, facial swelling, nosebleeds, trouble swallowing and voice change.    Eyes: Negative for photophobia, pain and visual disturbance.   Respiratory: Negative for choking, chest tightness, shortness of breath, wheezing and stridor.    Cardiovascular: Negative for chest pain, palpitations and leg swelling.   Gastrointestinal: Negative for abdominal pain, constipation, diarrhea, nausea and vomiting.   Endocrine: Negative for cold intolerance and heat intolerance.   Genitourinary: Negative for decreased urine volume, difficulty urinating, dysuria, frequency and urgency.   Musculoskeletal: Positive for gait problem (balance off and on). Negative for joint swelling, neck pain and neck stiffness.   Skin: Negative for color change, pallor, rash and wound.   Allergic/Immunologic: Negative for food allergies.   Neurological: Positive for dizziness and tremors (both hands). Negative for facial asymmetry, speech difficulty, weakness, light-headedness and headaches.   Hematological: Does not bruise/bleed  "easily.   Psychiatric/Behavioral: Positive for agitation and confusion. Negative for decreased concentration and sleep disturbance. The patient is nervous/anxious.          Current Outpatient Medications:   •  clopidogrel (PLAVIX) 75 MG tablet, Take 1 tablet by mouth Daily., Disp: 30 tablet, Rfl: 11  •  Cyanocobalamin 1000 MCG/ML kit, Inject  as directed 1 (One) Time Per Week. 1/4 of a cc IM every sunday, Disp: , Rfl:   •  hydroCHLOROthiazide (HYDRODIURIL) 12.5 MG tablet, Take 1 tablet by mouth Daily., Disp: 90 tablet, Rfl: 1  •  phenytoin extended (DILANTIN) 300 MG ER capsule, Take 1 capsule by mouth Daily., Disp: 30 capsule, Rfl: 0  •  simvastatin (Zocor) 5 MG tablet, Take 1 tablet by mouth Every Night., Disp: 90 tablet, Rfl: 1  •  levocetirizine (Xyzal) 5 MG tablet, Take 1 tablet by mouth Every Evening for 30 days., Disp: 30 tablet, Rfl: 0    \"The following portions of the patient's history were reviewed and updated as appropriate: allergies, current medications, past family history, past medical history, past social history, past surgical history and problem list.\"  Review and Interpretation of Imaging:  No new imaging reviewed  Laboratory Results:             Lab Results   Component Value Date    HGBA1C 5.50 01/15/2020         Lab Results   Component Value Date    CHOL 167 01/15/2020    CHOL 168 12/09/2018    CHOL 142 02/20/2018         Lab Results   Component Value Date    HDL 64 12/27/2021    HDL 58 09/08/2021    HDL 52 09/03/2020         Lab Results   Component Value Date     (H) 12/27/2021     (H) 09/08/2021     (H) 09/03/2020         Lab Results   Component Value Date    TRIG 88 12/27/2021    TRIG 115 09/08/2021    TRIG 72 09/03/2020     No results found for: RPR  Lab Results   Component Value Date    TSH 3.670 09/08/2021     Lab Results   Component Value Date    TLBDDLZJ01 903 09/03/2020     Physical Examination:   General Appearance:                                   Well developed, " well nourished, well groomed, alert, and cooperative.  HEENT:                                                          Normocephalic.    Neck and Spine:                                   Normal range of motion.  Normal alignment. No mass or tenderness. No bruits.  Cardiac:                                                         Regular rate and rhythm. No murmurs.  Peripheral Vasculature:                               Radial and pedal pulses are equal and symmetric.  Extremities:                                                   No edema or deformities. Normal joint ROM.  Skin:                                                               No rashes or birth marks.     Neurological examination:  Higher Integrative  Function:                               Oriented to time, place and person. Normal registration, recall, attention span and concentration. Normal language including comprehension, spontaneous speech, repetition, reading, writing, naming and vocabulary. No neglect with normal visual-spatial function and construction. Normal fund of knowledge and higher integrative function.  CN II:                                    Pupils are equal, round, and reactive to light. Normal visual acuity and visual fields.    CN III IV VI:                             Extraocular movements are full without nystagmus.   CN V:                                     Normal facial sensation and strength of muscles of mastication.  CN VII:                                    Facial movements are symmetric. No weakness.  CN VIII:                                   Auditory acuity impaired; pt. Very [place] bilaterally; has hearing aids does     not wear  CN IX & X:                              Symmetric palatal movement.  CN XI:                                     Sternocleidomastoid and trapezius are normal.  No weakness.  CN XII:                                    The tongue is midline.  No atrophy or  fasciculations.  Motor:                                     Normal muscle strength, bulk and tone in upper and lower extremities.    Reflexes:                                Plantar responses are flexor.  Sensation:                              Normal to light touch in arms and legs.   Station and Gait:                   Mildly unsteady gait  Coordination:                        Finger to nose test shows no dysmetria        Diagnoses:    1.  History of left occipital/left temporal infarct (2017) etiology unknown/cryptogenic  2.  Status post PFO closure after #1  3.  History of TIAs with expressive aphasia  4.  Nocturnal seizures; currently on Dilantin 300 mg extended release-previously on Keppra which patient discontinued due to side effects of rash and somnolence  4.  Low serum B12  5.  Prior medication noncompliance; continues to be at risk  6.  Bilateral hearing loss    Plan:     · Plavix 75 mg daily; previously recommended dual oral antiplatelet which patient discontinued due to side effects  · Dilantin level in 1 month; for now continue Dilantin 300 mg extended release  · Continue simvastatin; consider adding Repatha  · Continue B12 replacement  · Seizure precautions-below  · Blood pressure control to <130/80  · Goal LDL <70-recommend high dose statins-   · Serum glucose < 140  · Call 911 for stroke any stroke symptoms  · Follow-up with me in 6 to 12 months      Seizure Precautions: Patient is not to drive for at least 3 months until cleared by a physician, operate heavy machinery, take tub baths, swim unattended, climb heights, or perform any other activities that can bring harm to himself/herself or others during an episode of altered awareness.        pDiagnoses and all orders for this visit:    1. History of stroke (Primary)    2. Medication monitoring encounter  -     Phenytoin Level, Total; Future    3. Nocturnal seizures (HCC)    4. PFO (patent foramen ovale)    5. S/P patent foramen ovale closure    6.  Bilateral hearing loss, unspecified hearing loss type    7. Falls frequently

## 2022-03-08 NOTE — PATIENT INSTRUCTIONS
Seizure Precautions: Patient is not to drive for at least 3 months until cleared by a physician, operate heavy machinery, take tub baths, swim unattended, climb heights, or perform any other activities that can bring harm to himself/herself or others during an episode of altered awareness.

## 2022-03-24 DIAGNOSIS — Z91.81 AT LOW RISK FOR FALL: Primary | ICD-10-CM

## 2022-03-24 DIAGNOSIS — R56.9 SEIZURE: ICD-10-CM

## 2022-03-24 RX ORDER — PHENYTOIN SODIUM 300 MG/1
300 CAPSULE, EXTENDED RELEASE ORAL DAILY
Qty: 30 CAPSULE | Refills: 5 | Status: SHIPPED | OUTPATIENT
Start: 2022-03-24 | End: 2022-09-26 | Stop reason: SDUPTHER

## 2022-03-24 NOTE — PATIENT INSTRUCTIONS

## 2022-03-29 ENCOUNTER — OFFICE VISIT (OUTPATIENT)
Dept: FAMILY MEDICINE CLINIC | Facility: CLINIC | Age: 72
End: 2022-03-29

## 2022-03-29 VITALS
HEART RATE: 68 BPM | SYSTOLIC BLOOD PRESSURE: 122 MMHG | DIASTOLIC BLOOD PRESSURE: 82 MMHG | TEMPERATURE: 97.2 F | OXYGEN SATURATION: 100 % | HEIGHT: 73 IN | BODY MASS INDEX: 25.31 KG/M2 | WEIGHT: 191 LBS

## 2022-03-29 DIAGNOSIS — Z00.00 MEDICARE ANNUAL WELLNESS VISIT, SUBSEQUENT: Primary | ICD-10-CM

## 2022-03-29 DIAGNOSIS — I10 ESSENTIAL HYPERTENSION: ICD-10-CM

## 2022-03-29 PROCEDURE — 1170F FXNL STATUS ASSESSED: CPT | Performed by: FAMILY MEDICINE

## 2022-03-29 PROCEDURE — 1126F AMNT PAIN NOTED NONE PRSNT: CPT | Performed by: FAMILY MEDICINE

## 2022-03-29 PROCEDURE — G0439 PPPS, SUBSEQ VISIT: HCPCS | Performed by: FAMILY MEDICINE

## 2022-03-29 PROCEDURE — 1160F RVW MEDS BY RX/DR IN RCRD: CPT | Performed by: FAMILY MEDICINE

## 2022-03-29 RX ORDER — HYDROCHLOROTHIAZIDE 12.5 MG/1
12.5 TABLET ORAL DAILY
Qty: 90 TABLET | Refills: 1 | Status: SHIPPED | OUTPATIENT
Start: 2022-03-29 | End: 2022-09-30 | Stop reason: SDUPTHER

## 2022-03-29 NOTE — PROGRESS NOTES
The ABCs of the Annual Wellness Visit  Subsequent Medicare Wellness Visit    Chief Complaint   Patient presents with   • Medicare Wellness-subsequent      Subjective    History of Present Illness:  Ronnell Rizvi is a 72 y.o. male who presents for a Subsequent Medicare Wellness Visit.    The following portions of the patient's history were reviewed and   updated as appropriate: current medications, past family history, past medical history, past social history, past surgical history and problem list.    Compared to one year ago, the patient feels his physical   health is the same.    Compared to one year ago, the patient feels his mental   health is the same.    Recent Hospitalizations:  He was not admitted to the hospital during the last year.     Remains on: Plavix 75 mg daily; previously recommended dual oral antiplatelet which patient discontinued due to side effects. Dilantin 300 mg extended release, recently saw Neuro     -Did not tolerate simvastatin either caused SE, sees Cards in a few weeks can discuss   -Does not tolerate higher doses of Dilantin  -Eipsode of orthostatic symtpoms happened once with standing no syncope very mild lightheadedness would d/c HCTZ if reoccurs, recheck BMP in at least 6 months     Would do neuro-pysch testing if memory concerns worsen. Currently stable and he is very active with [employer]       Current Medical Providers:  Patient Care Team:  Anne Chand MD as PCP - General (Family Medicine)  Gregory King MD as Consulting Physician (Hematology and Oncology)  Chase Haque MD as Referring Physician (Internal Medicine)  Lius Wyatt MD as Consulting Physician (Cardiology)    Outpatient Medications Prior to Visit   Medication Sig Dispense Refill   • clopidogrel (PLAVIX) 75 MG tablet Take 1 tablet by mouth Daily. 30 tablet 11   • Cyanocobalamin 1000 MCG/ML kit Inject  as directed 1 (One) Time Per Week. 1/4 of a cc IM every sunday     •  hydroCHLOROthiazide (HYDRODIURIL) 12.5 MG tablet Take 1 tablet by mouth Daily. 90 tablet 1   • phenytoin extended (DILANTIN) 300 MG ER capsule Take 1 capsule by mouth Daily. 30 capsule 5   • simvastatin (Zocor) 5 MG tablet Take 1 tablet by mouth Every Night. 90 tablet 1   • levocetirizine (Xyzal) 5 MG tablet Take 1 tablet by mouth Every Evening for 30 days. 30 tablet 0     No facility-administered medications prior to visit.       No opioid medication identified on active medication list. I have reviewed chart for other potential  high risk medication/s and harmful drug interactions in the elderly.          Aspirin is not on active medication list.   Patient Active Problem List   Diagnosis   • Anemia, unspecified   • Health care maintenance   • Vitamin B12 deficiency   • Chronic fatigue   • Benign prostatic hypertrophy (BPH) with weak urinary stream   • Chronic bronchitis (HCC)   • ELIZABETH (obstructive sleep apnea)   • Sinoatrial block   • Aortic insufficiency   • Cerebral aneurysm, nonruptured   • CVA (cerebral vascular accident) ([facility])   • TIA (transient ischemic attack)   • Intracranial vascular stenosis   • Sinus pause   • S/P percutaneous patent foramen ovale closure   • TIA on medication   • HTN (hypertension)   • COPD (chronic obstructive pulmonary disease) ([facility])   • Nocturnal seizures ([facility])   • Encounter for screening for malignant neoplasm of colon   • Seizure ([facility])   • HL (hearing loss)   • COVID-19 virus infection     Advance Care Planning  Advance Directive is on file.     Review of Systems   Constitutional: Negative for fever.   HENT: Negative for congestion.    Respiratory: Negative for shortness of breath.    Cardiovascular: Negative for chest pain.   Gastrointestinal: Negative for abdominal pain.   Musculoskeletal: Negative for gait problem.   Skin: Negative for rash.   Neurological: Negative for weakness.        Objective    Vitals:    03/29/22 0945   BP: 122/82   Pulse: 68   Temp: 97.2 °F (36.2 °C)  "  SpO2: 100%   Weight: 86.6 kg (191 lb)   Height: 185.4 cm (72.99\")     BMI Readings from Last 1 Encounters:   22 25.20 kg/m²   BMI is above normal parameters. Recommendations include: educational material    Does the patient have evidence of cognitive impairment? Yes    Physical Exam  Vitals and nursing note reviewed.   Constitutional:       Appearance: He is well-developed.   Cardiovascular:      Rate and Rhythm: Normal rate and regular rhythm.      Heart sounds: Normal heart sounds. No murmur heard.  Pulmonary:      Effort: Pulmonary effort is normal. No respiratory distress.      Breath sounds: Normal breath sounds.   Abdominal:      Palpations: Abdomen is soft.   Musculoskeletal:         General: Normal range of motion.   Skin:     General: Skin is warm and dry.   Neurological:      Mental Status: He is alert and oriented to person, place, and time.                 HEALTH RISK ASSESSMENT    Smoking Status:  Social History     Tobacco Use   Smoking Status Former Smoker   • Quit date: 1979   • Years since quittin.1   Smokeless Tobacco Never Used   Tobacco Comment    caffeine use: Drinks 4 glasses of Dt coke on avg.      Alcohol Consumption:  Social History     Substance and Sexual Activity   Alcohol Use No     Fall Risk Screen:    STEADI Fall Risk Assessment was completed, and patient is at HIGH risk for falls. Assessment completed on:3/8/2022    Depression Screening:  PHQ-2/PHQ-9 Depression Screening 3/29/2022   Retired PHQ-9 Total Score -   Retired Total Score -   Little Interest or Pleasure in Doing Things 0-->not at all   Feeling Down, Depressed or Hopeless 0-->not at all   PHQ-9: Brief Depression Severity Measure Score 0       Health Habits and Functional and Cognitive Screening:  Functional & Cognitive Status 3/29/2022   Do you have difficulty preparing food and eating? No   Do you have difficulty bathing yourself, getting dressed or grooming yourself? No   Do you have difficulty using the " toilet? No   Do you have difficulty moving around from place to place? No   Do you have trouble with steps or getting out of a bed or a chair? No   Current Diet Well Balanced Diet   Dental Exam Up to date   Eye Exam Up to date   Exercise (times per week) 3 times per week   Current Exercises Include Home Exercise Program (TV, Computer, Etc.)   Do you need help using the phone?  No   Are you deaf or do you have serious difficulty hearing?  No   Do you need help with transportation? No   Do you need help shopping? No   Do you need help preparing meals?  No   Do you need help with housework?  No   Do you need help with laundry? No   Do you need help taking your medications? No   Do you need help managing money? No   Do you ever drive or ride in a car without wearing a seat belt? No   Have you felt unusual stress, anger or loneliness in the last month? -   Who do you live with? -   If you need help, do you have trouble finding someone available to you? -   Have you been bothered in the last four weeks by sexual problems? -   Do you have difficulty concentrating, remembering or making decisions? -       Age-appropriate Screening Schedule:  Refer to the list below for future screening recommendations based on patient's age, sex and/or medical conditions. Orders for these recommended tests are listed in the plan section. The patient has been provided with a written plan.    Health Maintenance   Topic Date Due   • ZOSTER VACCINE (3 of 3) 12/27/2016   • LIPID PANEL  12/27/2022   • TDAP/TD VACCINES (4 - Td or Tdap) 06/21/2030   • INFLUENZA VACCINE  Completed              Assessment/Plan   CMS Preventative Services Quick Reference  Risk Factors Identified During Encounter  Cardiovascular Disease  Chronic Pain   Dementia/Memory   The above risks/problems have been discussed with the patient.  Follow up actions/plans if indicated are seen below in the Assessment/Plan Section.  Pertinent information has been shared with the  patient in the After Visit Summary.    Diagnoses and all orders for this visit:    1. Medicare annual wellness visit, subsequent (Primary)        Follow Up: repeat labs in less than six months, renal function and electrolyes    Notify us of any orthostatic symptoms  F/u Cards in April   Otherwise he is stable at this time    An After Visit Summary and PPPS were made available to the patient.

## 2022-04-05 ENCOUNTER — OFFICE VISIT (OUTPATIENT)
Dept: CARDIOLOGY | Facility: CLINIC | Age: 72
End: 2022-04-05

## 2022-04-05 VITALS
HEIGHT: 73 IN | SYSTOLIC BLOOD PRESSURE: 126 MMHG | DIASTOLIC BLOOD PRESSURE: 70 MMHG | HEART RATE: 67 BPM | WEIGHT: 184 LBS | BODY MASS INDEX: 24.39 KG/M2

## 2022-04-05 DIAGNOSIS — I10 PRIMARY HYPERTENSION: ICD-10-CM

## 2022-04-05 DIAGNOSIS — Z87.74 S/P PERCUTANEOUS PATENT FORAMEN OVALE CLOSURE: Primary | ICD-10-CM

## 2022-04-05 DIAGNOSIS — I45.5 SINUS PAUSE: ICD-10-CM

## 2022-04-05 DIAGNOSIS — I45.5 SINOATRIAL BLOCK: ICD-10-CM

## 2022-04-05 PROBLEM — U07.1 COVID-19 VIRUS INFECTION: Status: RESOLVED | Noted: 2022-02-08 | Resolved: 2022-04-05

## 2022-04-05 PROCEDURE — 99213 OFFICE O/P EST LOW 20 MIN: CPT | Performed by: INTERNAL MEDICINE

## 2022-04-05 PROCEDURE — 93000 ELECTROCARDIOGRAM COMPLETE: CPT | Performed by: INTERNAL MEDICINE

## 2022-04-05 NOTE — PROGRESS NOTES
Date of Office Visit: 2022    Encounter Provider: Christopher Sawant MD  Place of Service: [facility] CARDIOLOGY  Patient Name: Ronnell Rizvi  :1950    Chief Complaint   Patient presents with   • Cerebrovascular Accident   :     HPI: Ronnell Rizvi is a 72 y.o. male who presents today to follow up. I have reviewed prior notes and there are no changes except for any new updates described below. I have also reviewed any information entered into the medical record by the patient or by ancillary staff.     I first met him in 2016 when he was hospitalized for chest pain.  A Cardiolite stress was normal (EF 54%).  He did have some Wenckebach at the beginning of stress which normalized with exercise.    In 2017, he had an episode of visual changes that resolved on their own.  The next morning it happened again and he came to the ED and was diagnosed with a stroke of the left occipital/temporal lobe.  He was found to have some diffuse small vessel disease.  An echo wasn't performed but a Zio patch was placed.  We were not consulted in the hospital.  He was placed on clopidogrel and atorvastatin.    In 2017, the Zio showed some atrial ectopy (there was a 13 beat run of PACs) but no atrial fibrillation.  An echo showed normal LV systolic function and a small PFO.    His studies were then reviewed by a different neurologist, who felt that this was actually embolic. A THIERRY showed a moderate sized PFO and moderate aortic insufficiency but was otherwise normal. A Confirm device was placed (implanted monitor from [employer]).  Upon arrival he was noted to have an irregular rhythm (not atrial fibrillation).  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  The monitor failed to show any atrial fibrillation (see below).    In 2018, he presented with recurrent neurological symptoms.  He was found to have a subacute infarct as well as diffuse intracranial  atherosclerosis.  He underwent percutaneous PFO closure with a 25mm Amplatzer device during that admission.    His loop recorder showed episodes of type II sinoatrial exit block; the loop was removed in 2020.  He also had a normal perfusion stress test in 2020 (he reported atypical chest pain at the time).     He feels well and denies any cardiac complaints at all.  He denies palpitations, lightheadedness, syncope, edema, chest pain, dyspnea, or recurrent stroke symptoms. He denies bleeding. He remains on clopidogrel as aspirin causes GI upset.     Past Medical History:   Diagnosis Date   • Allergic rhinitis    • Anal fissure    • Aortic insufficiency     moderate, THIERRY 2017   • Asthma    • Asthma    • Benign prostatic hypertrophy (BPH) with weak urinary stream 11/01/2016   • Chronic bronchitis (HCC)    • Colon polyp    • COPD (chronic obstructive pulmonary disease) (HCC)    • Emphysema lung (HCC)    • Falls frequently    • Fatty liver    • GERD (gastroesophageal reflux disease)    • Hepatitis     thought to be Hepatitis A    • History of pneumonia     With left lower lobe atelectasis and scar tissue, being followed   • HL (hearing loss)    • Hypertension    • Memory loss    • Obstructive sleep apnea syndrome 08/23/2017    refuses c-pap   • PFO (patent foramen ovale)     s/p percutaneous closure with a 25mm Amplatzer device in December 2018   • Pneumonia     LLL with scar tissue   • Seizures (HCC)    • Sinoatrial block 10/09/2018   • Spinal stenosis    • Stroke (HCC)     10/2017: left occipital/temporal. total of 3 strokes   • Vision loss        Past Surgical History:   Procedure Laterality Date   • CARDIAC CATHETERIZATION N/A 12/12/2018    Procedure: Patent foramen ovale closure- [employer];  Surgeon: Deangelo Harris MD;  Location: [facility] INVASIVE LOCATION;  Service: Cardiology   • CARDIAC CATHETERIZATION N/A 12/12/2018    Procedure: Intracardiac echocardiogram;  Surgeon: Deangelo Harris MD;   Location:  [facility] CATH INVASIVE LOCATION;  Service: Cardiology   • CARDIAC ELECTROPHYSIOLOGY PROCEDURE N/A 3/26/2018    Procedure: Loop insertion   CONFIRM RX;  Surgeon: Luis Wyatt MD;  Location:  [facility]ATH INVASIVE LOCATION;  Service: Cardiovascular   • CARDIAC ELECTROPHYSIOLOGY PROCEDURE N/A 7/15/2020    Procedure: Loop recorder removal;  Surgeon: Starr Driscoll MD;  Location:  [facility] INVASIVE LOCATION;  Service: Cardiovascular;  Laterality: N/A;   • CARDIAC SURGERY     • COLONOSCOPY     • COLONOSCOPY N/A 10/9/2020    Procedure: COLONOSCOPY with polypectomy;  Surgeon: Ras Mendoza MD;  Location:  [facility] OR;  Service: Gastroenterology;  Laterality: N/A;  diverticulosis  ascending polyp  transverse polyp  sigmoid polyps X 2  rectal polyp   • HAND SURGERY Bilateral    • INGUINAL HERNIA REPAIR Left    • OTHER SURGICAL HISTORY      inguinal hernia repair for person over age 5   • TONSILLECTOMY     • TONSILLECTOMY         Social History     Socioeconomic History   • Marital status:      Spouse name: Joey   Tobacco Use   • Smoking status: Former Smoker     Quit date: 1979     Years since quittin.1   • Smokeless tobacco: Never Used   • Tobacco comment: caffeine use: Drinks 4 glasses of Dt coke on avg.    Vaping Use   • Vaping Use: Never used   Substance and Sexual Activity   • Alcohol use: No   • Drug use: No   • Sexual activity: Defer       Family History   Problem Relation Age of Onset   • Obesity Mother    • Clotting disorder Mother         Upper extremities   • Alcohol abuse Father    • Cancer Father 63        Esophagus and lung   • Other Father         cardiac disorder   • Heart disease Father    • Kidney disease Sister    • Kidney failure Sister    • Drug abuse Paternal Uncle    • Stroke Sister    • Throat cancer Sister        Review of Systems   HENT: Positive for hearing loss.    Cardiovascular: Negative for chest pain.   Respiratory: Positive for snoring. Negative  "for shortness of breath.    Neurological: Negative for dizziness and light-headedness.        As per HPI   Psychiatric/Behavioral: Positive for memory loss.   All other systems reviewed and are negative.      Allergies   Allergen Reactions   • Aspirin Nausea Only     Pt states he \"can tolerate enteric coated aspirin only.\"    • Citrus      Blisters on mouth     • Combivent [Ipratropium-Albuterol] Other (See Comments)     Throat swelling   • Lactose Intolerance (Gi)    • Statins Mental Status Change     Severe memory impairment         Current Outpatient Medications:   •  clopidogrel (PLAVIX) 75 MG tablet, Take 1 tablet by mouth Daily., Disp: 30 tablet, Rfl: 11  •  Cyanocobalamin 1000 MCG/ML kit, Inject  as directed 1 (One) Time Per Week. 1/4 of a cc IM every sunday, Disp: , Rfl:   •  hydroCHLOROthiazide (HYDRODIURIL) 12.5 MG tablet, Take 1 tablet by mouth Daily., Disp: 90 tablet, Rfl: 1  •  phenytoin extended (DILANTIN) 300 MG ER capsule, Take 1 capsule by mouth Daily., Disp: 30 capsule, Rfl: 5     Objective:     Vitals:    04/05/22 1050   BP: 126/70   BP Location: Right arm   Pulse: 67   Weight: 83.5 kg (184 lb)   Height: 185.4 cm (72.99\")     Body mass index is 24.28 kg/m².    Physical Exam  Vitals reviewed.   Constitutional:       Appearance: He is well-developed.   HENT:      Head: Normocephalic.      Nose: Nose normal.      Mouth/Throat:      Comments: Masked  Eyes:      Conjunctiva/sclera: Conjunctivae normal.   Neck:      Vascular: No JVD.   Cardiovascular:      Rate and Rhythm: Normal rate and regular rhythm.      Pulses: Normal pulses and intact distal pulses.      Heart sounds: Normal heart sounds. No murmur heard.  Pulmonary:      Effort: Pulmonary effort is normal.      Breath sounds: Normal breath sounds.   Abdominal:      Palpations: Abdomen is soft.      Tenderness: There is no abdominal tenderness.   Musculoskeletal:         General: No swelling. Normal range of motion.      Cervical back: Normal " range of motion.   Skin:     General: Skin is warm and dry.      Findings: No rash.   Neurological:      General: No focal deficit present.      Mental Status: He is alert and oriented to person, place, and time.      Cranial Nerves: No cranial nerve deficit.   Psychiatric:         Mood and Affect: Mood normal.         Behavior: Behavior normal.         Thought Content: Thought content normal.           ECG 12 Lead    Date/Time: 4/5/2022 11:00 AM  Performed by: Christopher Sawant MD  Authorized by: Christopher Sawant MD   Comparison: compared with previous ECG   Similar to previous ECG  Rhythm: sinus rhythm  Conduction: 1st degree AV block  ST Segments: ST segments normal  T Waves: T waves normal  QRS axis: left  Other findings: poor R wave progression    Clinical impression: non-specific ECG            Assessment:      Diagnoses and all orders for this visit:    1. S/P percutaneous patent foramen ovale closure (Primary)  -     ECG 12 Lead    2. Primary hypertension    3. Sinoatrial block    4. Sinus pause       Plan:       He had a left occipital/temporal stroke which was initially presumed to be due to small vessel disease. He was placed on clopidogrel and atorvastatin, but he does not tolerate the statin due to memory loss. Then, outpatient neurological evaluation led to a further workup.  He was found to have a PFO on THIERRY, and then he had another event on DAPT.  He then had the PFO closed. He remains on clopidogrel as aspirin causes GI upset.     He has sinus node disease but does not have symptoms of higher level block. We'll observe this for now.     Sincerely,       Christopher Sawant MD

## 2022-04-07 ENCOUNTER — TELEPHONE (OUTPATIENT)
Dept: NEUROLOGY | Facility: CLINIC | Age: 72
End: 2022-04-07

## 2022-04-07 NOTE — TELEPHONE ENCOUNTER
Spoke with patient to remind him to have his Dilantin level checked.  Patient voiced understanding.

## 2022-06-01 ENCOUNTER — OFFICE VISIT (OUTPATIENT)
Dept: FAMILY MEDICINE CLINIC | Facility: CLINIC | Age: 72
End: 2022-06-01

## 2022-06-01 VITALS
HEIGHT: 73 IN | WEIGHT: 189 LBS | TEMPERATURE: 98.5 F | SYSTOLIC BLOOD PRESSURE: 130 MMHG | HEART RATE: 82 BPM | BODY MASS INDEX: 25.05 KG/M2 | OXYGEN SATURATION: 98 % | DIASTOLIC BLOOD PRESSURE: 82 MMHG

## 2022-06-01 DIAGNOSIS — S76.211A STRAIN OF GROIN, RIGHT, INITIAL ENCOUNTER: ICD-10-CM

## 2022-06-01 DIAGNOSIS — H10.13 ALLERGIC CONJUNCTIVITIS OF BOTH EYES: Primary | ICD-10-CM

## 2022-06-01 PROCEDURE — 99213 OFFICE O/P EST LOW 20 MIN: CPT | Performed by: FAMILY MEDICINE

## 2022-06-01 RX ORDER — OLOPATADINE HYDROCHLORIDE 1 MG/ML
1 SOLUTION/ DROPS OPHTHALMIC 2 TIMES DAILY
Qty: 5 ML | Refills: 1 | Status: SHIPPED | OUTPATIENT
Start: 2022-06-01 | End: 2023-01-30

## 2022-06-01 NOTE — PROGRESS NOTES
"  Subjective   Ronnell Rizvi is a 72 y.o. male who is here for   Chief Complaint   Patient presents with   • Groin Pain     Right side sharp pain   • Eye Problem     Both eyes red, itching    .     History of Present Illness     Patient comes in today with several issues.  He has a strain and a pain in his right groin.  Does lots of lifting and coughing.  He sees no bulge.  Urination and bowels are normal.  Has a history of a left inguinal hernia repair    Secondly both of his eyes are red itchy and irritated.  He works at the [employer].    Also wanted to let me know that he gets shaky in between meals.  Gets sweaty little nauseous.  He sits down eats peanut butter crackers and feels better.  This is a routine issue for him.  Been going on for years.  Doubtful this is due to a cardiopulmonary or neurological issue.        The following portions of the patient's history were reviewed and updated as appropriate: allergies, current medications, past family history, past medical history, past social history, past surgical history and problem list.    Review of Systems    Objective   Vitals:    06/01/22 0820   BP: 130/82   BP Location: Left arm   Patient Position: Sitting   Cuff Size: Adult   Pulse: 82   Temp: 98.5 °F (36.9 °C)   SpO2: 98%   Weight: 85.7 kg (189 lb)   Height: 185.4 cm (72.99\")      Physical Exam  Eyes:      General: Lids are normal.         Right eye: No discharge or hordeolum.         Left eye: No discharge or hordeolum.      Conjunctiva/sclera:      Right eye: Right conjunctiva is injected. No exudate.     Left eye: Left conjunctiva is injected. No exudate.     Pupils: Pupils are equal, round, and reactive to light.   Abdominal:      General: Bowel sounds are normal.      Hernia: No hernia is present. There is no hernia in the left inguinal area, right femoral area, left femoral area or right inguinal area.             Assessment & Plan   Diagnoses and all orders for this visit:    1. " Allergic conjunctivitis of both eyes (Primary)  -     olopatadine (Patanol) 0.1 % ophthalmic solution; Administer 1 drop to both eyes 2 (Two) Times a Day. Indications: Allergic Conjunctivitis  Dispense: 5 mL; Refill: 1    2. Strain of groin, right, initial encounter    I detect no obvious hernia in the right groin.    Continue his peanut butter cracker routine for the shaky spells between meals    There are no Patient Instructions on file for this visit.    There are no discontinued medications.     No follow-ups on file.    Dr. Jace Denny  [facility] [place], [place].

## 2022-06-01 NOTE — TELEPHONE ENCOUNTER
----- Message from CHINEDU Whitaker sent at 7/18/2018  1:22 PM EDT -----  Hypercoagulable panel unremarkable. No source for CVA identified  Follow-up as previously recommend. Call 911 for any stroke symptoms.   Membrane is not visually significant.

## 2022-06-26 DIAGNOSIS — E78.2 MIXED HYPERLIPIDEMIA: ICD-10-CM

## 2022-06-27 RX ORDER — SIMVASTATIN 5 MG
TABLET ORAL
Qty: 90 TABLET | Refills: 1 | OUTPATIENT
Start: 2022-06-27

## 2022-09-14 ENCOUNTER — TELEPHONE (OUTPATIENT)
Dept: FAMILY MEDICINE CLINIC | Facility: CLINIC | Age: 72
End: 2022-09-14

## 2022-09-14 NOTE — TELEPHONE ENCOUNTER
Caller: Ronnell Rizvi    Relationship: Self    Best call back number: [phone]     Requested Prescriptions: amoxicillin-clavulanate (AUGMENTIN) 875-125 MG     Pharmacy where request should be sent: [facility] [address] [place] [address], [place] - [address] [facility] [address] - [phone]  - [phone]     Additional details provided by patient: THE PATIENT STATES THAT HE WILL NEED AN EXTENDED AMOUNT OF THE ABOVE MEDICATION. THE PATIENT IS EXPERIENCING AN ONGOING, DEEP COUGH THAT HE WOULD LIKE TAKEN CARE OF. THE PATIENT STATES THAT THE AMOXICILLIN HELPED TO BREAK UP THE PHLEGM IN HIS CHEST.     Does the patient have less than a 3 day supply:  [x] Yes  [] No    Pito Long Rep   09/14/22 09:04 EDT

## 2022-09-14 NOTE — TELEPHONE ENCOUNTER
Spoke with patient and advised that he would need to be seen in the office. Patient advised that if he could not get an appointment today then he would just go to the urgent care. I advised that Dr. Chand did not have any appointments for today however I could try and get him and appointment with another provider. Patient advised that he would just go to the urgent care to be seen.

## 2022-09-26 DIAGNOSIS — R56.9 SEIZURE: ICD-10-CM

## 2022-09-26 RX ORDER — PHENYTOIN SODIUM 300 MG/1
300 CAPSULE, EXTENDED RELEASE ORAL DAILY
Qty: 30 CAPSULE | Refills: 5 | Status: SHIPPED | OUTPATIENT
Start: 2022-09-26 | End: 2022-09-30

## 2022-09-30 ENCOUNTER — OFFICE VISIT (OUTPATIENT)
Dept: FAMILY MEDICINE CLINIC | Facility: CLINIC | Age: 72
End: 2022-09-30

## 2022-09-30 VITALS
WEIGHT: 182 LBS | HEIGHT: 73 IN | HEART RATE: 73 BPM | SYSTOLIC BLOOD PRESSURE: 130 MMHG | TEMPERATURE: 98 F | BODY MASS INDEX: 24.12 KG/M2 | OXYGEN SATURATION: 98 % | DIASTOLIC BLOOD PRESSURE: 82 MMHG

## 2022-09-30 DIAGNOSIS — I63.9 CEREBROVASCULAR ACCIDENT (CVA), UNSPECIFIED MECHANISM: ICD-10-CM

## 2022-09-30 DIAGNOSIS — I10 ESSENTIAL HYPERTENSION: ICD-10-CM

## 2022-09-30 DIAGNOSIS — I10 PRIMARY HYPERTENSION: Primary | ICD-10-CM

## 2022-09-30 DIAGNOSIS — G47.33 OSA (OBSTRUCTIVE SLEEP APNEA): ICD-10-CM

## 2022-09-30 PROCEDURE — 99213 OFFICE O/P EST LOW 20 MIN: CPT | Performed by: FAMILY MEDICINE

## 2022-09-30 RX ORDER — HYDROCHLOROTHIAZIDE 12.5 MG/1
12.5 TABLET ORAL DAILY
Qty: 90 TABLET | Refills: 2 | Status: SHIPPED | OUTPATIENT
Start: 2022-09-30

## 2022-09-30 RX ORDER — PHENYTOIN SODIUM 100 MG/1
200 CAPSULE, EXTENDED RELEASE ORAL
COMMUNITY
Start: 2022-09-27 | End: 2023-03-28 | Stop reason: SDUPTHER

## 2022-10-14 ENCOUNTER — LAB (OUTPATIENT)
Dept: LAB | Facility: HOSPITAL | Age: 72
End: 2022-10-14

## 2022-10-14 ENCOUNTER — TELEPHONE (OUTPATIENT)
Dept: NEUROLOGY | Facility: CLINIC | Age: 72
End: 2022-10-14

## 2022-10-14 DIAGNOSIS — N26.1 KIDNEY ATROPHY: Primary | ICD-10-CM

## 2022-10-14 DIAGNOSIS — Z51.81 MEDICATION MONITORING ENCOUNTER: ICD-10-CM

## 2022-10-14 DIAGNOSIS — E03.4 THYROID ATROPHY: ICD-10-CM

## 2022-10-14 LAB
ALBUMIN SERPL-MCNC: 4.2 G/DL (ref 3.5–5.2)
ALBUMIN/GLOB SERPL: 1.8 G/DL
ALP SERPL-CCNC: 77 U/L (ref 39–117)
ALT SERPL W P-5'-P-CCNC: 42 U/L (ref 1–41)
ANION GAP SERPL CALCULATED.3IONS-SCNC: 8 MMOL/L (ref 5–15)
AST SERPL-CCNC: 33 U/L (ref 1–40)
BASOPHILS # BLD AUTO: 0.04 10*3/MM3 (ref 0–0.2)
BASOPHILS NFR BLD AUTO: 0.8 % (ref 0–1.5)
BILIRUB SERPL-MCNC: 0.3 MG/DL (ref 0–1.2)
BUN SERPL-MCNC: 9 MG/DL (ref 8–23)
BUN/CREAT SERPL: 8.3 (ref 7–25)
CALCIUM SPEC-SCNC: 9.3 MG/DL (ref 8.6–10.5)
CHLORIDE SERPL-SCNC: 102 MMOL/L (ref 98–107)
CO2 SERPL-SCNC: 29 MMOL/L (ref 22–29)
CREAT SERPL-MCNC: 1.09 MG/DL (ref 0.76–1.27)
DEPRECATED RDW RBC AUTO: 41.7 FL (ref 37–54)
EGFRCR SERPLBLD CKD-EPI 2021: 71.7 ML/MIN/1.73
EOSINOPHIL # BLD AUTO: 0.11 10*3/MM3 (ref 0–0.4)
EOSINOPHIL NFR BLD AUTO: 2.3 % (ref 0.3–6.2)
ERYTHROCYTE [DISTWIDTH] IN BLOOD BY AUTOMATED COUNT: 12.5 % (ref 12.3–15.4)
GLOBULIN UR ELPH-MCNC: 2.4 GM/DL
GLUCOSE SERPL-MCNC: 106 MG/DL (ref 65–99)
HCT VFR BLD AUTO: 46.3 % (ref 37.5–51)
HGB BLD-MCNC: 15.9 G/DL (ref 13–17.7)
IMM GRANULOCYTES # BLD AUTO: 0.01 10*3/MM3 (ref 0–0.05)
IMM GRANULOCYTES NFR BLD AUTO: 0.2 % (ref 0–0.5)
LYMPHOCYTES # BLD AUTO: 1.38 10*3/MM3 (ref 0.7–3.1)
LYMPHOCYTES NFR BLD AUTO: 28.8 % (ref 19.6–45.3)
MCH RBC QN AUTO: 30.8 PG (ref 26.6–33)
MCHC RBC AUTO-ENTMCNC: 34.3 G/DL (ref 31.5–35.7)
MCV RBC AUTO: 89.7 FL (ref 79–97)
MONOCYTES # BLD AUTO: 0.57 10*3/MM3 (ref 0.1–0.9)
MONOCYTES NFR BLD AUTO: 11.9 % (ref 5–12)
NEUTROPHILS NFR BLD AUTO: 2.69 10*3/MM3 (ref 1.7–7)
NEUTROPHILS NFR BLD AUTO: 56 % (ref 42.7–76)
NRBC BLD AUTO-RTO: 0 /100 WBC (ref 0–0.2)
PHENYTOIN SERPL-MCNC: 2.1 MCG/ML (ref 10–20)
PLATELET # BLD AUTO: 237 10*3/MM3 (ref 140–450)
PMV BLD AUTO: 9.7 FL (ref 6–12)
POTASSIUM SERPL-SCNC: 4.2 MMOL/L (ref 3.5–5.2)
PROT SERPL-MCNC: 6.6 G/DL (ref 6–8.5)
RBC # BLD AUTO: 5.16 10*6/MM3 (ref 4.14–5.8)
SODIUM SERPL-SCNC: 139 MMOL/L (ref 136–145)
T-UPTAKE NFR SERPL: 1.04 TBI (ref 0.8–1.3)
T4 SERPL-MCNC: 6.02 MCG/DL (ref 4.5–11.7)
TSH SERPL DL<=0.05 MIU/L-ACNC: 2.58 UIU/ML (ref 0.27–4.2)
WBC NRBC COR # BLD: 4.8 10*3/MM3 (ref 3.4–10.8)

## 2022-10-14 PROCEDURE — 84436 ASSAY OF TOTAL THYROXINE: CPT

## 2022-10-14 PROCEDURE — 80185 ASSAY OF PHENYTOIN TOTAL: CPT

## 2022-10-14 PROCEDURE — 84443 ASSAY THYROID STIM HORMONE: CPT

## 2022-10-14 PROCEDURE — 85025 COMPLETE CBC W/AUTO DIFF WBC: CPT

## 2022-10-14 PROCEDURE — 80053 COMPREHEN METABOLIC PANEL: CPT

## 2022-10-14 PROCEDURE — 36415 COLL VENOUS BLD VENIPUNCTURE: CPT

## 2022-10-14 PROCEDURE — 84479 ASSAY OF THYROID (T3 OR T4): CPT

## 2022-10-14 NOTE — TELEPHONE ENCOUNTER
"Spoke with patient regarding subtherapeutic dilantin level.      Patient denies any seizures or seizure-like activity.    Patient states that the pharmacy did not have 300mg tablets when he picked up his most recent refill.  They gave him 100mg tablets so he has been taking 200mg daily as 300mg makes him feel \"forgetful\" and like he \"cannot function\".  He states he does not have any issues taking Dilantin 200mg daily and that he feels \"normal\" on this dose and will discuss this at his next appointment.  "

## 2022-10-14 NOTE — TELEPHONE ENCOUNTER
----- Message from CHINEDU Whitaker sent at 10/14/2022  3:37 PM EDT -----  Dilantin level subtherapeutic-call to evaluate for any seizure like activity and review current dosing regimen with patient.

## 2022-10-21 NOTE — TELEPHONE ENCOUNTER
Called patient to advise that CHINEDU Muñoz recommends Dilantin 300mg nightly but he does not tolerate this.  Advised that he call for any seizures or seizure-like activity.  Patient voiced understanding.

## 2023-01-30 ENCOUNTER — OFFICE VISIT (OUTPATIENT)
Dept: INTERNAL MEDICINE | Facility: CLINIC | Age: 73
End: 2023-01-30
Payer: MEDICARE

## 2023-01-30 VITALS
BODY MASS INDEX: 25.31 KG/M2 | TEMPERATURE: 98.4 F | HEIGHT: 73 IN | WEIGHT: 191 LBS | OXYGEN SATURATION: 98 % | SYSTOLIC BLOOD PRESSURE: 121 MMHG | HEART RATE: 74 BPM | DIASTOLIC BLOOD PRESSURE: 74 MMHG

## 2023-01-30 DIAGNOSIS — R73.9 HYPERGLYCEMIA, UNSPECIFIED: ICD-10-CM

## 2023-01-30 DIAGNOSIS — E78.2 MIXED HYPERLIPIDEMIA: ICD-10-CM

## 2023-01-30 DIAGNOSIS — Z86.73 HISTORY OF STROKE: ICD-10-CM

## 2023-01-30 DIAGNOSIS — R53.82 CHRONIC FATIGUE, UNSPECIFIED: ICD-10-CM

## 2023-01-30 DIAGNOSIS — R55 PRE-SYNCOPE: Primary | ICD-10-CM

## 2023-01-30 PROCEDURE — 99204 OFFICE O/P NEW MOD 45 MIN: CPT | Performed by: INTERNAL MEDICINE

## 2023-01-30 RX ORDER — CLOPIDOGREL BISULFATE 75 MG/1
75 TABLET ORAL DAILY
Qty: 30 TABLET | Refills: 2 | Status: SHIPPED | OUTPATIENT
Start: 2023-01-30 | End: 2023-04-30

## 2023-01-30 NOTE — PROGRESS NOTES
"Chief Complaint  Establish Care, discuss lightheadedness/jitters    Subjective        Ronnell Rizvi presents to [facility]  History of Present Illness     Mr. Rizvi is a ellen 74 yo M who presents to establish care and discuss post-stroke care, lightheadedness.     In his 20s diagnosed with hypoglycemia.  Now having fatigue, feeling like he is hypoglycemic.    Post stroke he feels that his short term memory and his alexia are the main symptoms he deals with post stroke.  Last recorded stroke was 10/10/2020.  Had stroke initially in 2017.  Has had 3 seizures as well and is taking 200 mg Dilantin.     Exposed to Agent Orange during time in .      Objective   Vital Signs:  /74 (BP Location: Left arm)   Pulse 74   Temp 98.4 °F (36.9 °C)   Ht 185.4 cm (72.99\")   Wt 86.6 kg (191 lb)   SpO2 98%   BMI 25.20 kg/m²   Estimated body mass index is 25.2 kg/m² as calculated from the following:    Height as of this encounter: 185.4 cm (72.99\").    Weight as of this encounter: 86.6 kg (191 lb).       BMI is >= 25 and <30. (Overweight) The following options were offered after discussion;: exercise counseling/recommendations and nutrition counseling/recommendations      Physical Exam  Vitals reviewed.   Constitutional:       General: He is not in acute distress.     Appearance: Normal appearance.   HENT:      Head: Normocephalic and atraumatic.      Nose: Nose normal.      Mouth/Throat:      Mouth: Mucous membranes are moist.   Eyes:      Conjunctiva/sclera: Conjunctivae normal.   Cardiovascular:      Rate and Rhythm: Normal rate and regular rhythm.      Pulses: Normal pulses.      Heart sounds: Normal heart sounds.   Pulmonary:      Effort: Pulmonary effort is normal.      Breath sounds: Normal breath sounds.   Abdominal:      Palpations: Abdomen is soft.      Tenderness: There is no abdominal tenderness.   Musculoskeletal:      Right lower leg: No edema.      Left lower leg: No " edema.   Skin:     General: Skin is warm and dry.   Neurological:      General: No focal deficit present.      Mental Status: He is alert.   Psychiatric:         Mood and Affect: Mood normal.        Result Review :  The following data was reviewed by: Deena Tarango MD on 01/30/2023:  Common labs    Common Labs 10/14/22 10/14/22 1/30/23 1/30/23 1/30/23    0852 0852 1132 1132 1132   Glucose  106 (A) 89     BUN  9 13     Creatinine  1.09 1.23     Sodium  139 141     Potassium  4.2 4.0     Chloride  102 101     Calcium  9.3 9.4     Total Protein   6.1     Albumin  4.20 4.0     Total Bilirubin  0.3 <0.2     Alkaline Phosphatase  77 73     AST (SGOT)  33 15     ALT (SGPT)  42 (A) 16     WBC 4.80    4.77   Hemoglobin 15.9    15.2   Hematocrit 46.3    43.7   Platelets 237    212   Hemoglobin A1C    5.40    (A) Abnormal value       Comments are available for some flowsheets but are not being displayed.           Data reviewed: previous labs, PCP notes             Assessment and Plan   Diagnoses and all orders for this visit:    1. Pre-syncope (Primary)  Assessment & Plan:  Basic lab/electrolyte/thyroid work up ordered today.  No syncopal events.  Has had multiple loop recorders before in terms of thinking about arrhythmia.      Orders:  -     Comprehensive Metabolic Panel  -     Hemoglobin A1c  -     CBC & Differential  -     T4, Free  -     TSH  -     Adult Transthoracic Echo Complete W/ Cont if Necessary Per Protocol    2. Hyperglycemia, unspecified  -     Hemoglobin A1c    3. Chronic fatigue, unspecified  -     T4, Free  -     TSH  -     Adult Transthoracic Echo Complete W/ Cont if Necessary Per Protocol    4. Mixed hyperlipidemia  Assessment & Plan:  - Discussed benefits of statins, but seems as though he had memory issues when initiated previously.   It really concerns me to have someone with history of multiple strokes not on statin.  I wonder about one of the other more difficult to obtain agents.  I will have him  f/u closely and see if this is something that could be arranged.      5. History of stroke  Assessment & Plan:  Has some concerns about post-CVA care and further CVA prevention.  Unhappy with current neurologist.  On Dilantin chronically as well due to seizure that was thought to be post a stroke event.  Would like to see Dr. Daniel--referred.     Has had 3 separate stroke events in 2017, 2018, and 2020 and the seizure events.     I am very concerned that he says he is off ASA and plavix (has prescription for plavix and it is on his med list, but he was not taking).  Discussed risks/beneftis.  He could not tolerate ASA.  Discussed trial of return to plavix as he cannot remember any specific side effect that was an issue with this.     Orders:  -     Ambulatory Referral to Neurology  -     clopidogrel (PLAVIX) 75 MG tablet; Take 1 tablet by mouth Daily for 90 days.  Dispense: 30 tablet; Refill: 2           Follow Up   Return in about 3 months (around 4/30/2023) for f/u post stroke, HTN, hypoglycemia concerns.  Patient was given instructions and counseling regarding his condition or for health maintenance advice. Please see specific information pulled into the AVS if appropriate.

## 2023-01-31 LAB
ALBUMIN SERPL-MCNC: 4 G/DL (ref 3.5–5.2)
ALBUMIN/GLOB SERPL: 1.9 G/DL
ALP SERPL-CCNC: 73 U/L (ref 39–117)
ALT SERPL-CCNC: 16 U/L (ref 1–41)
AST SERPL-CCNC: 15 U/L (ref 1–40)
BASOPHILS # BLD AUTO: 0.04 10*3/MM3 (ref 0–0.2)
BASOPHILS NFR BLD AUTO: 0.8 % (ref 0–1.5)
BILIRUB SERPL-MCNC: <0.2 MG/DL (ref 0–1.2)
BUN SERPL-MCNC: 13 MG/DL (ref 8–23)
BUN/CREAT SERPL: 10.6 (ref 7–25)
CALCIUM SERPL-MCNC: 9.4 MG/DL (ref 8.6–10.5)
CHLORIDE SERPL-SCNC: 101 MMOL/L (ref 98–107)
CO2 SERPL-SCNC: 32 MMOL/L (ref 22–29)
CREAT SERPL-MCNC: 1.23 MG/DL (ref 0.76–1.27)
EGFRCR SERPLBLD CKD-EPI 2021: 61.6 ML/MIN/1.73
EOSINOPHIL # BLD AUTO: 0.09 10*3/MM3 (ref 0–0.4)
EOSINOPHIL NFR BLD AUTO: 1.9 % (ref 0.3–6.2)
ERYTHROCYTE [DISTWIDTH] IN BLOOD BY AUTOMATED COUNT: 12.3 % (ref 12.3–15.4)
GLOBULIN SER CALC-MCNC: 2.1 GM/DL
GLUCOSE SERPL-MCNC: 89 MG/DL (ref 65–99)
HBA1C MFR BLD: 5.4 % (ref 4.8–5.6)
HCT VFR BLD AUTO: 43.7 % (ref 37.5–51)
HGB BLD-MCNC: 15.2 G/DL (ref 13–17.7)
IMM GRANULOCYTES # BLD AUTO: 0.02 10*3/MM3 (ref 0–0.05)
IMM GRANULOCYTES NFR BLD AUTO: 0.4 % (ref 0–0.5)
LYMPHOCYTES # BLD AUTO: 1.46 10*3/MM3 (ref 0.7–3.1)
LYMPHOCYTES NFR BLD AUTO: 30.6 % (ref 19.6–45.3)
MCH RBC QN AUTO: 31.1 PG (ref 26.6–33)
MCHC RBC AUTO-ENTMCNC: 34.8 G/DL (ref 31.5–35.7)
MCV RBC AUTO: 89.4 FL (ref 79–97)
MONOCYTES # BLD AUTO: 0.62 10*3/MM3 (ref 0.1–0.9)
MONOCYTES NFR BLD AUTO: 13 % (ref 5–12)
NEUTROPHILS # BLD AUTO: 2.54 10*3/MM3 (ref 1.7–7)
NEUTROPHILS NFR BLD AUTO: 53.3 % (ref 42.7–76)
NRBC BLD AUTO-RTO: 0 /100 WBC (ref 0–0.2)
PLATELET # BLD AUTO: 212 10*3/MM3 (ref 140–450)
POTASSIUM SERPL-SCNC: 4 MMOL/L (ref 3.5–5.2)
PROT SERPL-MCNC: 6.1 G/DL (ref 6–8.5)
RBC # BLD AUTO: 4.89 10*6/MM3 (ref 4.14–5.8)
SODIUM SERPL-SCNC: 141 MMOL/L (ref 136–145)
T4 FREE SERPL-MCNC: 1.18 NG/DL (ref 0.93–1.7)
TSH SERPL DL<=0.005 MIU/L-ACNC: 2.13 UIU/ML (ref 0.27–4.2)
WBC # BLD AUTO: 4.77 10*3/MM3 (ref 3.4–10.8)

## 2023-01-31 NOTE — ASSESSMENT & PLAN NOTE
Has some concerns about post-CVA care and further CVA prevention.  Unhappy with current neurologist.  On Dilantin chronically as well due to seizure that was thought to be post a stroke event.  Would like to see Dr. Daniel--referred.     Has had 3 separate stroke events in 2017, 2018, and 2020 and the seizure events.     I am very concerned that he says he is off ASA and plavix (has prescription for plavix and it is on his med list, but he was not taking).  Discussed risks/beneftis.  He could not tolerate ASA.  Discussed trial of return to plavix as he cannot remember any specific side effect that was an issue with this.

## 2023-01-31 NOTE — ASSESSMENT & PLAN NOTE
Basic lab/electrolyte/thyroid work up ordered today.  No syncopal events.  Has had multiple loop recorders before in terms of thinking about arrhythmia.

## 2023-01-31 NOTE — ASSESSMENT & PLAN NOTE
- Discussed benefits of statins, but seems as though he had memory issues when initiated previously.   It really concerns me to have someone with history of multiple strokes not on statin.  I wonder about one of the other more difficult to obtain agents.  I will have him f/u closely and see if this is something that could be arranged.

## 2023-02-02 ENCOUNTER — PATIENT ROUNDING (BHMG ONLY) (OUTPATIENT)
Dept: INTERNAL MEDICINE | Facility: CLINIC | Age: 73
End: 2023-02-02
Payer: MEDICARE

## 2023-02-02 NOTE — PROGRESS NOTES
My name is Stacey Lopez and I am the Referral clerk at [facility].     I would like  to officially welcome you to our practice and ask about your recent visit.     Tell me about your visit with us. What things went well?        We're always looking for ways to make our patients' experiences even better. Do you have recommendations on ways we may improve?      Overall were you satisfied with your first visit to our practice?        I appreciate you taking the time to answer these questions. Is there anything else I can do for you?       Thank you, and have a great day.     Stacey

## 2023-02-10 PROBLEM — Z86.73 HISTORY OF STROKE: Status: ACTIVE | Noted: 2023-02-10

## 2023-02-14 ENCOUNTER — OFFICE VISIT (OUTPATIENT)
Dept: NEUROLOGY | Facility: CLINIC | Age: 73
End: 2023-02-14
Payer: MEDICARE

## 2023-02-14 VITALS
BODY MASS INDEX: 25.45 KG/M2 | DIASTOLIC BLOOD PRESSURE: 70 MMHG | WEIGHT: 192 LBS | SYSTOLIC BLOOD PRESSURE: 122 MMHG | HEART RATE: 61 BPM | HEIGHT: 73 IN | OXYGEN SATURATION: 97 %

## 2023-02-14 DIAGNOSIS — R56.9 SEIZURE: ICD-10-CM

## 2023-02-14 DIAGNOSIS — Z86.73 HISTORY OF STROKE: Primary | ICD-10-CM

## 2023-02-14 PROCEDURE — 99214 OFFICE O/P EST MOD 30 MIN: CPT | Performed by: PSYCHIATRY & NEUROLOGY

## 2023-02-14 NOTE — PROGRESS NOTES
Notes by MA:  Patient presents today for seizure and stroke follow up. Patient presents today with their wife, Joey and has given consent to give health information to them. Pt has previously been seen by CHINEDU Muñoz at our [facility] office.        Subjective:     Patient ID: Ronnell Rizvi is a 73 y.o. male.    History of Present Illness  The following portions of the patient's history were reviewed and updated as appropriate: allergies, current medications, past family history, past medical history, past social history, past surgical history and problem list.    Review of Systems   Musculoskeletal: Negative for gait problem.   Neurological: Positive for light-headedness. Negative for dizziness, tremors, seizures, syncope, facial asymmetry, speech difficulty, weakness, numbness and headaches.   Hematological: Does not bruise/bleed easily.   Psychiatric/Behavioral: Negative for agitation, behavioral problems, confusion, decreased concentration, dysphoric mood, hallucinations, self-injury, sleep disturbance and suicidal ideas. The patient is not nervous/anxious and is not hyperactive.         Objective:    Neurologic Exam  Awake and alert.  Reasonably pleasant with fluent speech.  Poor speech comprehension secondary to hearing loss bilaterally.    Aside from hearing loss, cranial nerves II through XII normal and symmetric.    Motor exam reveals reasonable tone bulk and power.  No drift.  Good .  Reasonable intrinsic hand muscles.  No lateralized spasticity.    Sensory exam reveals good sensation to light touch temperature and vibration in both upper and lower extremities symmetrically.    Coordination testing reveals smooth and accurate finger-nose-finger and reasonable rapid alternating movements.  Negative Romberg.    Tendon reflexes are 1+ and symmetric throughout.    No diplopia, nystagmus, tremor, ataxia, or other sign of medication toxicity.  Physical Exam  Neck is supple.  No cervical bruits.   Cardiac rhythm is regular.  Abdomen is soft.  No extremity edema.  Assessment/Plan:     Diagnoses and all orders for this visit:    1. History of stroke (Primary)    2. Seizure (HCC)  -     Phenytoin Level, Total & Free; Future  -     Comprehensive Metabolic Panel; Future  -     CBC Auto Differential; Future     Strongly encouraged him to start his Plavix as prescribed by Dr. Tarango and reiterated the importance of stroke prophylaxis given his history.  He refuses statins citing cognitive issues with them.  Discussed his previous work-ups with him at length.    Reportedly seizure-free for 2-1/2 years.  Appears to be doing reasonably well on low-dose Dilantin.  I do not see a clear clinical reason to change that at the current time.  He gradually reduced his dose over time to the highest dose he can tolerate which is currently 200 mg every night.  Arranging for repeat trough levels as well as chemistry, liver profile, and a CBC in April.    Follow-up 6 months, earlier if necessary.

## 2023-02-20 ENCOUNTER — PATIENT ROUNDING (BHMG ONLY) (OUTPATIENT)
Dept: NEUROLOGY | Facility: CLINIC | Age: 73
End: 2023-02-20
Payer: MEDICARE

## 2023-02-23 ENCOUNTER — HOSPITAL ENCOUNTER (OUTPATIENT)
Dept: CARDIOLOGY | Facility: HOSPITAL | Age: 73
Discharge: HOME | End: 2023-02-23
Admitting: INTERNAL MEDICINE
Payer: MEDICARE

## 2023-02-23 VITALS
DIASTOLIC BLOOD PRESSURE: 70 MMHG | HEIGHT: 72 IN | SYSTOLIC BLOOD PRESSURE: 122 MMHG | WEIGHT: 192 LBS | BODY MASS INDEX: 26.01 KG/M2

## 2023-02-23 LAB
AORTIC DIMENSIONLESS INDEX: 0.8 (DI)
BH CV ECHO MEAS - ACS: 2.25 CM
BH CV ECHO MEAS - AI P1/2T: 574.2 MSEC
BH CV ECHO MEAS - AO MAX PG: 5.7 MMHG
BH CV ECHO MEAS - AO MEAN PG: 3 MMHG
BH CV ECHO MEAS - AO ROOT DIAM: 3.3 CM
BH CV ECHO MEAS - AO V2 MAX: 119 CM/SEC
BH CV ECHO MEAS - AO V2 VTI: 23.8 CM
BH CV ECHO MEAS - AVA(I,D): 2.8 CM2
BH CV ECHO MEAS - EDV(CUBED): 117.6 ML
BH CV ECHO MEAS - EDV(MOD-SP2): 81 ML
BH CV ECHO MEAS - EDV(MOD-SP4): 83 ML
BH CV ECHO MEAS - EF(MOD-BP): 64.8 %
BH CV ECHO MEAS - EF(MOD-SP2): 61.7 %
BH CV ECHO MEAS - EF(MOD-SP4): 68.7 %
BH CV ECHO MEAS - ESV(CUBED): 36.9 ML
BH CV ECHO MEAS - ESV(MOD-SP2): 31 ML
BH CV ECHO MEAS - ESV(MOD-SP4): 26 ML
BH CV ECHO MEAS - FS: 32.1 %
BH CV ECHO MEAS - IVS/LVPW: 1 CM
BH CV ECHO MEAS - IVSD: 0.9 CM
BH CV ECHO MEAS - LAT PEAK E' VEL: 8.5 CM/SEC
BH CV ECHO MEAS - LV DIASTOLIC VOL/BSA (35-75): 39.6 CM2
BH CV ECHO MEAS - LV MASS(C)D: 153 GRAMS
BH CV ECHO MEAS - LV MAX PG: 4.4 MMHG
BH CV ECHO MEAS - LV MEAN PG: 2 MMHG
BH CV ECHO MEAS - LV SYSTOLIC VOL/BSA (12-30): 12.4 CM2
BH CV ECHO MEAS - LV V1 MAX: 105 CM/SEC
BH CV ECHO MEAS - LV V1 VTI: 19.7 CM
BH CV ECHO MEAS - LVIDD: 4.9 CM
BH CV ECHO MEAS - LVIDS: 3.3 CM
BH CV ECHO MEAS - LVOT AREA: 3.4 CM2
BH CV ECHO MEAS - LVOT DIAM: 2.09 CM
BH CV ECHO MEAS - LVPWD: 0.9 CM
BH CV ECHO MEAS - MED PEAK E' VEL: 6.4 CM/SEC
BH CV ECHO MEAS - MV A MAX VEL: 135 CM/SEC
BH CV ECHO MEAS - MV DEC SLOPE: 309.6 CM/SEC2
BH CV ECHO MEAS - MV DEC TIME: 0.22 MSEC
BH CV ECHO MEAS - MV E MAX VEL: 62.7 CM/SEC
BH CV ECHO MEAS - MV E/A: 0.46
BH CV ECHO MEAS - MV MAX PG: 7.8 MMHG
BH CV ECHO MEAS - MV MEAN PG: 2.8 MMHG
BH CV ECHO MEAS - MV P1/2T: 83.5 MSEC
BH CV ECHO MEAS - MV V2 VTI: 27.7 CM
BH CV ECHO MEAS - MVA(P1/2T): 2.6 CM2
BH CV ECHO MEAS - MVA(VTI): 2.43 CM2
BH CV ECHO MEAS - PA V2 MAX: 121.9 CM/SEC
BH CV ECHO MEAS - QP/QS: 0.71
BH CV ECHO MEAS - RAP SYSTOLE: 3 MMHG
BH CV ECHO MEAS - RV MAX PG: 2.09 MMHG
BH CV ECHO MEAS - RV V1 MAX: 72.3 CM/SEC
BH CV ECHO MEAS - RV V1 VTI: 14.7 CM
BH CV ECHO MEAS - RVOT DIAM: 2.04 CM
BH CV ECHO MEAS - RVSP: 17.1 MMHG
BH CV ECHO MEAS - SI(MOD-SP2): 23.9 ML/M2
BH CV ECHO MEAS - SI(MOD-SP4): 27.2 ML/M2
BH CV ECHO MEAS - SV(LVOT): 67.3 ML
BH CV ECHO MEAS - SV(MOD-SP2): 50 ML
BH CV ECHO MEAS - SV(MOD-SP4): 57 ML
BH CV ECHO MEAS - SV(RVOT): 47.9 ML
BH CV ECHO MEAS - TR MAX PG: 14.1 MMHG
BH CV ECHO MEAS - TR MAX VEL: 187.7 CM/SEC
BH CV ECHO MEASUREMENTS AVERAGE E/E' RATIO: 8.42
BH CV XLRA - TDI S': 12.7 CM/SEC
LEFT ATRIUM VOLUME INDEX: 25 ML/M2
MAXIMAL PREDICTED HEART RATE: 146 BPM
SINUS: 3.3 CM
STJ: 2.37 CM
STRESS TARGET HR: 124 BPM

## 2023-02-23 PROCEDURE — 93306 TTE W/DOPPLER COMPLETE: CPT | Performed by: INTERNAL MEDICINE

## 2023-02-23 PROCEDURE — 93306 TTE W/DOPPLER COMPLETE: CPT

## 2023-02-28 ENCOUNTER — HOSPITAL ENCOUNTER (EMERGENCY)
Facility: HOSPITAL | Age: 73
Discharge: HOME | End: 2023-02-28
Attending: EMERGENCY MEDICINE | Admitting: EMERGENCY MEDICINE
Payer: MEDICARE

## 2023-02-28 ENCOUNTER — TELEPHONE (OUTPATIENT)
Dept: INTERNAL MEDICINE | Facility: CLINIC | Age: 73
End: 2023-02-28

## 2023-02-28 VITALS
OXYGEN SATURATION: 96 % | BODY MASS INDEX: 24.25 KG/M2 | WEIGHT: 183 LBS | TEMPERATURE: 98.2 F | DIASTOLIC BLOOD PRESSURE: 76 MMHG | HEIGHT: 73 IN | HEART RATE: 69 BPM | RESPIRATION RATE: 18 BRPM | SYSTOLIC BLOOD PRESSURE: 157 MMHG

## 2023-02-28 DIAGNOSIS — R09.81 NASAL CONGESTION: Primary | ICD-10-CM

## 2023-02-28 DIAGNOSIS — R19.7 DIARRHEA, UNSPECIFIED TYPE: ICD-10-CM

## 2023-02-28 LAB
ABO GROUP BLD: NORMAL
ABO GROUP BLD: NORMAL
ALBUMIN SERPL-MCNC: 3.8 G/DL (ref 3.5–5.2)
ALBUMIN/GLOB SERPL: 1.5 G/DL
ALP SERPL-CCNC: 76 U/L (ref 39–117)
ALT SERPL W P-5'-P-CCNC: 23 U/L (ref 1–41)
ANION GAP SERPL CALCULATED.3IONS-SCNC: 11.7 MMOL/L (ref 5–15)
AST SERPL-CCNC: 26 U/L (ref 1–40)
BASOPHILS # BLD AUTO: 0.04 10*3/MM3 (ref 0–0.2)
BASOPHILS NFR BLD AUTO: 0.7 % (ref 0–1.5)
BILIRUB SERPL-MCNC: 0.2 MG/DL (ref 0–1.2)
BLD GP AB SCN SERPL QL: NEGATIVE
BUN SERPL-MCNC: 14 MG/DL (ref 8–23)
BUN/CREAT SERPL: 13.3 (ref 7–25)
CALCIUM SPEC-SCNC: 8.9 MG/DL (ref 8.6–10.5)
CHLORIDE SERPL-SCNC: 102 MMOL/L (ref 98–107)
CO2 SERPL-SCNC: 25.3 MMOL/L (ref 22–29)
CREAT SERPL-MCNC: 1.05 MG/DL (ref 0.76–1.27)
D-LACTATE SERPL-SCNC: 0.8 MMOL/L (ref 0.5–2)
DEPRECATED RDW RBC AUTO: 41.1 FL (ref 37–54)
EGFRCR SERPLBLD CKD-EPI 2021: 74.5 ML/MIN/1.73
EOSINOPHIL # BLD AUTO: 0.08 10*3/MM3 (ref 0–0.4)
EOSINOPHIL NFR BLD AUTO: 1.4 % (ref 0.3–6.2)
ERYTHROCYTE [DISTWIDTH] IN BLOOD BY AUTOMATED COUNT: 12.5 % (ref 12.3–15.4)
FLUAV RNA RESP QL NAA+PROBE: NOT DETECTED
FLUBV RNA RESP QL NAA+PROBE: NOT DETECTED
GLOBULIN UR ELPH-MCNC: 2.6 GM/DL
GLUCOSE SERPL-MCNC: 107 MG/DL (ref 65–99)
HCT VFR BLD AUTO: 43.7 % (ref 37.5–51)
HGB BLD-MCNC: 15.1 G/DL (ref 13–17.7)
HOLD SPECIMEN: NORMAL
HOLD SPECIMEN: NORMAL
IMM GRANULOCYTES # BLD AUTO: 0.02 10*3/MM3 (ref 0–0.05)
IMM GRANULOCYTES NFR BLD AUTO: 0.4 % (ref 0–0.5)
LYMPHOCYTES # BLD AUTO: 1.21 10*3/MM3 (ref 0.7–3.1)
LYMPHOCYTES NFR BLD AUTO: 21.6 % (ref 19.6–45.3)
MCH RBC QN AUTO: 31.1 PG (ref 26.6–33)
MCHC RBC AUTO-ENTMCNC: 34.6 G/DL (ref 31.5–35.7)
MCV RBC AUTO: 90.1 FL (ref 79–97)
MONOCYTES # BLD AUTO: 0.58 10*3/MM3 (ref 0.1–0.9)
MONOCYTES NFR BLD AUTO: 10.4 % (ref 5–12)
NEUTROPHILS NFR BLD AUTO: 3.67 10*3/MM3 (ref 1.7–7)
NEUTROPHILS NFR BLD AUTO: 65.5 % (ref 42.7–76)
NRBC BLD AUTO-RTO: 0 /100 WBC (ref 0–0.2)
PLATELET # BLD AUTO: 203 10*3/MM3 (ref 140–450)
PMV BLD AUTO: 9.6 FL (ref 6–12)
POTASSIUM SERPL-SCNC: 3.5 MMOL/L (ref 3.5–5.2)
PROT SERPL-MCNC: 6.4 G/DL (ref 6–8.5)
RBC # BLD AUTO: 4.85 10*6/MM3 (ref 4.14–5.8)
RH BLD: NEGATIVE
RH BLD: NEGATIVE
SARS-COV-2 RNA RESP QL NAA+PROBE: NOT DETECTED
SODIUM SERPL-SCNC: 139 MMOL/L (ref 136–145)
T&S EXPIRATION DATE: NORMAL
WBC NRBC COR # BLD: 5.6 10*3/MM3 (ref 3.4–10.8)
WHOLE BLOOD HOLD COAG: NORMAL
WHOLE BLOOD HOLD SPECIMEN: NORMAL

## 2023-02-28 PROCEDURE — 86900 BLOOD TYPING SEROLOGIC ABO: CPT

## 2023-02-28 PROCEDURE — 80053 COMPREHEN METABOLIC PANEL: CPT | Performed by: EMERGENCY MEDICINE

## 2023-02-28 PROCEDURE — 83605 ASSAY OF LACTIC ACID: CPT | Performed by: EMERGENCY MEDICINE

## 2023-02-28 PROCEDURE — 36415 COLL VENOUS BLD VENIPUNCTURE: CPT

## 2023-02-28 PROCEDURE — 86901 BLOOD TYPING SEROLOGIC RH(D): CPT | Performed by: EMERGENCY MEDICINE

## 2023-02-28 PROCEDURE — 86900 BLOOD TYPING SEROLOGIC ABO: CPT | Performed by: EMERGENCY MEDICINE

## 2023-02-28 PROCEDURE — C9803 HOPD COVID-19 SPEC COLLECT: HCPCS

## 2023-02-28 PROCEDURE — 87636 SARSCOV2 & INF A&B AMP PRB: CPT | Performed by: EMERGENCY MEDICINE

## 2023-02-28 PROCEDURE — 86850 RBC ANTIBODY SCREEN: CPT | Performed by: EMERGENCY MEDICINE

## 2023-02-28 PROCEDURE — 86901 BLOOD TYPING SEROLOGIC RH(D): CPT

## 2023-02-28 PROCEDURE — 99283 EMERGENCY DEPT VISIT LOW MDM: CPT

## 2023-02-28 PROCEDURE — 85025 COMPLETE CBC W/AUTO DIFF WBC: CPT | Performed by: EMERGENCY MEDICINE

## 2023-02-28 RX ORDER — SODIUM CHLORIDE 0.9 % (FLUSH) 0.9 %
10 SYRINGE (ML) INJECTION AS NEEDED
Status: DISCONTINUED | OUTPATIENT
Start: 2023-02-28 | End: 2023-02-28 | Stop reason: HOSPADM

## 2023-02-28 NOTE — TELEPHONE ENCOUNTER
Caller: Joey Rizvi    Relationship: Emergency Contact    Best call back number: [phone]     What medications are you currently taking:   Current Outpatient Medications on File Prior to Visit   Medication Sig Dispense Refill   • clopidogrel (PLAVIX) 75 MG tablet Take 1 tablet by mouth Daily for 90 days. 30 tablet 2   • Cyanocobalamin 1000 MCG/ML kit Inject  as directed 1 (One) Time Per Week. 1/4 of a cc IM every sunday     • hydroCHLOROthiazide (HYDRODIURIL) 12.5 MG tablet Take 1 tablet by mouth Daily. 90 tablet 2   • phenytoin ER (DILANTIN) 100 MG capsule 200 mg.       No current facility-administered medications on file prior to visit.      When did you start taking these medications: 01/30/23    Which medication are you concerned about: clopidogrel (PLAVIX) 75 MG tablet    Who prescribed you this medication: DR. BRAND    What are your concerns:   DIARRHEA  BLACK STOOL  LOW ENERGY  CONFUSION

## 2023-02-28 NOTE — TELEPHONE ENCOUNTER
Patient is having Diarrhea, Black Stool, Low Energy and Confusion. Would this be caused by the Plavix?

## 2023-03-08 ENCOUNTER — OFFICE VISIT (OUTPATIENT)
Dept: INTERNAL MEDICINE | Facility: CLINIC | Age: 73
End: 2023-03-08
Payer: MEDICARE

## 2023-03-08 VITALS
BODY MASS INDEX: 25.45 KG/M2 | DIASTOLIC BLOOD PRESSURE: 74 MMHG | HEART RATE: 64 BPM | WEIGHT: 192 LBS | OXYGEN SATURATION: 99 % | RESPIRATION RATE: 18 BRPM | TEMPERATURE: 98.4 F | SYSTOLIC BLOOD PRESSURE: 126 MMHG | HEIGHT: 73 IN

## 2023-03-08 DIAGNOSIS — K92.1 MELENA: Primary | ICD-10-CM

## 2023-03-08 PROCEDURE — 99213 OFFICE O/P EST LOW 20 MIN: CPT | Performed by: INTERNAL MEDICINE

## 2023-03-08 NOTE — ASSESSMENT & PLAN NOTE
Sounds like melena, but was negative for bleeding in stool and H&H was stable in ED.  Discussed best options from here and decided on urgent GI referral for consideration of work up of possible bleeding.  Internal hemorrhoids on 2015 colonoscopy noted.

## 2023-03-08 NOTE — PROGRESS NOTES
"      Ronnell Rizvi is a 73 y.o. male who presents with a chief complaint of   Chief Complaint   Patient presents with   • Hospital Follow Up Visit     Was having some lightheadedness, diarrhea and dark stool. Says that he does not feel that the lightheadedness is as bad but is still having the diarrhea and dark stool. Was having stomach pain at the time he went to the ED but says that it has since resolved        HPI     Has had a seizure since initiation of Plavix, but did miss the dose that night.    Seen in ER on 2/28--blood work all negative at that time and no blood in the stool.  Still having dark stool and diarrhea.  Diarrhea started with Plavix.  Stool is black not BRBPR.     The following portions of the patient's history were reviewed and updated as appropriate: allergies, current medications, past family history, past medical history, past social history, past surgical history and problem list.      Current Outpatient Medications:   •  clopidogrel (PLAVIX) 75 MG tablet, Take 1 tablet by mouth Daily for 90 days., Disp: 30 tablet, Rfl: 2  •  Cyanocobalamin 1000 MCG/ML kit, Inject  as directed 1 (One) Time Per Week. 1/4 of a cc IM every sunday, Disp: , Rfl:   •  hydroCHLOROthiazide (HYDRODIURIL) 12.5 MG tablet, Take 1 tablet by mouth Daily., Disp: 90 tablet, Rfl: 2  •  phenytoin ER (DILANTIN) 100 MG capsule, 2 capsules., Disp: , Rfl:             Physical Exam  /74 (BP Location: Left arm, Patient Position: Sitting, Cuff Size: Adult)   Pulse 64   Temp 98.4 °F (36.9 °C) (Infrared)   Resp 18   Ht 185.4 cm (73\")   Wt 87.1 kg (192 lb)   SpO2 99%   BMI 25.33 kg/m²     Physical Exam  Vitals reviewed.   Constitutional:       General: He is not in acute distress.     Appearance: Normal appearance.   HENT:      Head: Normocephalic and atraumatic.      Nose: Nose normal.      Mouth/Throat:      Mouth: Mucous membranes are moist.   Eyes:      Conjunctiva/sclera: Conjunctivae normal.   Cardiovascular:      " Rate and Rhythm: Normal rate and regular rhythm.      Pulses: Normal pulses.      Heart sounds: Normal heart sounds.   Pulmonary:      Effort: Pulmonary effort is normal.      Breath sounds: Normal breath sounds.   Abdominal:      Palpations: Abdomen is soft.      Tenderness: There is no abdominal tenderness.   Musculoskeletal:      Right lower leg: No edema.      Left lower leg: No edema.   Skin:     General: Skin is warm and dry.   Neurological:      General: No focal deficit present.      Mental Status: He is alert.   Psychiatric:         Mood and Affect: Mood normal.           Results for orders placed or performed during the hospital encounter of 02/28/23   COVID-19 and FLU A/B PCR - Swab, Nasopharynx    Specimen: Nasopharynx; Swab   Result Value Ref Range    COVID19 Not Detected Not Detected - Ref. Range    Influenza A PCR Not Detected Not Detected    Influenza B PCR Not Detected Not Detected   Comprehensive Metabolic Panel    Specimen: Blood   Result Value Ref Range    Glucose 107 (H) 65 - 99 mg/dL    BUN 14 8 - 23 mg/dL    Creatinine 1.05 0.76 - 1.27 mg/dL    Sodium 139 136 - 145 mmol/L    Potassium 3.5 3.5 - 5.2 mmol/L    Chloride 102 98 - 107 mmol/L    CO2 25.3 22.0 - 29.0 mmol/L    Calcium 8.9 8.6 - 10.5 mg/dL    Total Protein 6.4 6.0 - 8.5 g/dL    Albumin 3.8 3.5 - 5.2 g/dL    ALT (SGPT) 23 1 - 41 U/L    AST (SGOT) 26 1 - 40 U/L    Alkaline Phosphatase 76 39 - 117 U/L    Total Bilirubin 0.2 0.0 - 1.2 mg/dL    Globulin 2.6 gm/dL    A/G Ratio 1.5 g/dL    BUN/Creatinine Ratio 13.3 7.0 - 25.0    Anion Gap 11.7 5.0 - 15.0 mmol/L    eGFR 74.5 >60.0 mL/min/1.73   Lactic Acid, Plasma    Specimen: Blood   Result Value Ref Range    Lactate 0.8 0.5 - 2.0 mmol/L   CBC Auto Differential    Specimen: Blood   Result Value Ref Range    WBC 5.60 3.40 - 10.80 10*3/mm3    RBC 4.85 4.14 - 5.80 10*6/mm3    Hemoglobin 15.1 13.0 - 17.7 g/dL    Hematocrit 43.7 37.5 - 51.0 %    MCV 90.1 79.0 - 97.0 fL    MCH 31.1 26.6 - 33.0 pg     MCHC 34.6 31.5 - 35.7 g/dL    RDW 12.5 12.3 - 15.4 %    RDW-SD 41.1 37.0 - 54.0 fl    MPV 9.6 6.0 - 12.0 fL    Platelets 203 140 - 450 10*3/mm3    Neutrophil % 65.5 42.7 - 76.0 %    Lymphocyte % 21.6 19.6 - 45.3 %    Monocyte % 10.4 5.0 - 12.0 %    Eosinophil % 1.4 0.3 - 6.2 %    Basophil % 0.7 0.0 - 1.5 %    Immature Grans % 0.4 0.0 - 0.5 %    Neutrophils, Absolute 3.67 1.70 - 7.00 10*3/mm3    Lymphocytes, Absolute 1.21 0.70 - 3.10 10*3/mm3    Monocytes, Absolute 0.58 0.10 - 0.90 10*3/mm3    Eosinophils, Absolute 0.08 0.00 - 0.40 10*3/mm3    Basophils, Absolute 0.04 0.00 - 0.20 10*3/mm3    Immature Grans, Absolute 0.02 0.00 - 0.05 10*3/mm3    nRBC 0.0 0.0 - 0.2 /100 WBC   Type & Screen    Specimen: Blood   Result Value Ref Range    ABO Type A     RH type Negative     Antibody Screen Negative     T&S Expiration Date 3/3/2023 11:59:59 PM    ABO RH Specimen Verification    Specimen: Blood   Result Value Ref Range    ABO Type A     RH type Negative    Green Top (Gel)   Result Value Ref Range    Extra Tube Hold for add-ons.    Lavender Top   Result Value Ref Range    Extra Tube hold for add-on    Gold Top - SST   Result Value Ref Range    Extra Tube Hold for add-ons.    Light Blue Top   Result Value Ref Range    Extra Tube Hold for add-ons.            Diagnoses and all orders for this visit:    1. Melena (Primary)  Assessment & Plan:  Sounds like melena, but was negative for bleeding in stool and H&H was stable in ED.  Discussed best options from here and decided on urgent GI referral for consideration of work up of possible bleeding.  Internal hemorrhoids on 2015 colonoscopy noted.     Orders:  -     Ambulatory Referral to Gastroenterology

## 2023-03-28 RX ORDER — PHENYTOIN SODIUM 100 MG/1
200 CAPSULE, EXTENDED RELEASE ORAL DAILY
Qty: 60 CAPSULE | Refills: 3 | Status: SHIPPED | OUTPATIENT
Start: 2023-03-28

## 2023-04-11 ENCOUNTER — OFFICE VISIT (OUTPATIENT)
Dept: CARDIOLOGY | Facility: CLINIC | Age: 73
End: 2023-04-11
Payer: MEDICARE

## 2023-04-11 VITALS
SYSTOLIC BLOOD PRESSURE: 140 MMHG | BODY MASS INDEX: 24.78 KG/M2 | DIASTOLIC BLOOD PRESSURE: 80 MMHG | RESPIRATION RATE: 16 BRPM | HEIGHT: 73 IN | HEART RATE: 68 BPM | WEIGHT: 187 LBS | OXYGEN SATURATION: 96 %

## 2023-04-11 DIAGNOSIS — I63.9 CEREBROVASCULAR ACCIDENT (CVA), UNSPECIFIED MECHANISM: ICD-10-CM

## 2023-04-11 DIAGNOSIS — I10 PRIMARY HYPERTENSION: ICD-10-CM

## 2023-04-11 DIAGNOSIS — I45.5 SINOATRIAL BLOCK: ICD-10-CM

## 2023-04-11 DIAGNOSIS — I10 ESSENTIAL HYPERTENSION: ICD-10-CM

## 2023-04-11 DIAGNOSIS — E78.2 MIXED HYPERLIPIDEMIA: ICD-10-CM

## 2023-04-11 DIAGNOSIS — Z87.74 S/P PERCUTANEOUS PATENT FORAMEN OVALE CLOSURE: Primary | ICD-10-CM

## 2023-04-11 DIAGNOSIS — Z86.73 HISTORY OF STROKE: ICD-10-CM

## 2023-04-11 PROCEDURE — 3077F SYST BP >= 140 MM HG: CPT | Performed by: NURSE PRACTITIONER

## 2023-04-11 PROCEDURE — 3079F DIAST BP 80-89 MM HG: CPT | Performed by: NURSE PRACTITIONER

## 2023-04-11 PROCEDURE — 1160F RVW MEDS BY RX/DR IN RCRD: CPT | Performed by: NURSE PRACTITIONER

## 2023-04-11 PROCEDURE — 99214 OFFICE O/P EST MOD 30 MIN: CPT | Performed by: NURSE PRACTITIONER

## 2023-04-11 PROCEDURE — 93000 ELECTROCARDIOGRAM COMPLETE: CPT | Performed by: NURSE PRACTITIONER

## 2023-04-11 PROCEDURE — 1159F MED LIST DOCD IN RCRD: CPT | Performed by: NURSE PRACTITIONER

## 2023-04-11 RX ORDER — FEXOFENADINE HCL 180 MG/1
180 TABLET ORAL DAILY
COMMUNITY

## 2023-04-11 RX ORDER — HYDROCHLOROTHIAZIDE 12.5 MG/1
12.5 TABLET ORAL DAILY
Qty: 30 TABLET | Refills: 11 | Status: SHIPPED | OUTPATIENT
Start: 2023-04-11

## 2023-04-11 RX ORDER — CLOPIDOGREL BISULFATE 75 MG/1
75 TABLET ORAL DAILY
Qty: 30 TABLET | Refills: 11 | Status: SHIPPED | OUTPATIENT
Start: 2023-04-11 | End: 2023-07-10

## 2023-04-11 NOTE — LETTER
2023       No Recipients    Patient: Ronnell Rizvi   YOB: 1950   Date of Visit: 2023       Dear Dr. Bermudez Recipients:    Thank you for referring Ronnell Rizvi to me for evaluation. Below are the relevant portions of my assessment and plan of care.    If you have questions, please do not hesitate to call me. I look forward to following Ronnell along with you.         Sincerely,        CHINEDU Yuan        CC:   No Recipients    Vivian Tran APRN  [phone]  Signed    Date of Office Visit: 2023  Encounter Provider: CHINEDU Ruano  Place of Service: [facility] CARDIOLOGY  Patient Name: Ronnell Rizvi  :1950  Primary Cardiologist: Dr. Sawant    CC:  Annual cardiac evaluation    Dear Dr. Tarango     HPI: Ronnell Rizvi is a pleasant 73 y.o. male who presents 2023 for cardiac follow up.  I have reviewed his past medical records including notes, labs and testing in preparation for today's visit.    Dr. Sawant met him in 2016 when he was hospitalized for chest pain.  A Cardiolite stress was normal (EF 54%).  He did have some Wenckebach at the beginning of stress which normalized with exercise.     In 2017, he had an episode of visual changes that resolved on their own.  The next morning it happened again and he came to the ED and was diagnosed with a stroke of the left occipital/temporal lobe.  He was found to have some diffuse small vessel disease.  An echo wasn't performed but a Zio patch was placed.  We were not consulted in the hospital.  He was placed on clopidogrel and atorvastatin.     In 2017, the Zio showed some atrial ectopy (there was a 13 beat run of PACs) but no atrial fibrillation.  An echo showed normal LV systolic function and a small PFO.     His studies were then reviewed by a different neurologist, who felt that this was actually embolic. A THIERRY showed a moderate sized PFO and moderate aortic  insufficiency but was otherwise normal. A Confirm device was placed (implanted monitor from [employer]).  Upon arrival he was noted to have an irregular rhythm (not atrial fibrillation).  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  The monitor failed to show any atrial fibrillation (see below).     In December 2018, he presented with recurrent neurological symptoms.  He was found to have a subacute infarct as well as diffuse intracranial atherosclerosis.  He underwent percutaneous PFO closure with a 25mm Amplatzer device during that admission.     His loop recorder showed episodes of type II sinoatrial exit block; the loop was removed in 2020.  He also had a normal perfusion stress test in 2020 (he reported atypical chest pain at the time).      He saw Dr. Sawant in April 2022 for his annual evaluation and she noted that he felt well and denied any cardiac complaints at all.  He denies palpitations, lightheadedness, syncope, edema, chest pain, dyspnea, or recurrent stroke symptoms. He denies bleeding. He remains on clopidogrel as aspirin causes GI upset.     2/23 Interpretation Summary   •  Left ventricular systolic function is normal. Calculated left ventricular EF = 64.8%  •  Left ventricular wall thickness is consistent with hypertrophy. Sigmoid-shaped ventricular septum is present.  •  Left ventricular diastolic function was normal.  •  Systolic anterior motion of the chordal apparatus is present.  There is no obstruction.  •  Estimated right ventricular systolic pressure from tricuspid regurgitation is normal (<35 mmHg).    He presents today for his annual cardiac evaluation.  I note that you did have an echo performed as above.  He was seen in the ED on 2/28/2023 for nasal congestion.  He did have labs performed that showed a CMP with a glucose of 107 and normal electrolytes.  Liver function test within normal limits.  CBC unremarkable.  He denies any palpitations, shortness of breath, lower extremity  edema.  He denies any dizziness, lightheadedness, syncope or presyncopal episodes.  He denies any chest pain, chest pressure or fatigue.  Past Medical History:   Diagnosis Date   • Allergic 1985   • Allergic rhinitis    • Anal fissure    • Aortic insufficiency     moderate, THIERRY 2017   • Asthma    • Asthma    • Benign prostatic hypertrophy (BPH) with weak urinary stream 11/01/2016   • Cataract    • Chronic bronchitis    • Colon polyp    • COPD (chronic obstructive pulmonary disease)    • Emphysema lung    • Falls frequently    • Fatty liver    • GERD (gastroesophageal reflux disease)    • Headache    • Hepatitis     thought to be Hepatitis A    • History of pneumonia     With left lower lobe atelectasis and scar tissue, being followed   • HL (hearing loss)    • Hypertension    • Low back pain    • Memory loss    • Mixed hyperlipidemia 1/30/2023   • Obstructive sleep apnea syndrome 08/23/2017    refuses c-pap   • PFO (patent foramen ovale)     s/p percutaneous closure with a 25mm Amplatzer device in December 2018   • Pneumonia     LLL with scar tissue   • Seizures    • Sinoatrial block 10/09/2018   • Spinal stenosis    • Stroke     10/2017: left occipital/temporal. total of 3 strokes   • Tremor     Comes & goes   • Vision loss        Past Surgical History:   Procedure Laterality Date   • CARDIAC CATHETERIZATION N/A 12/12/2018    Procedure: Patent foramen ovale closure- [employer];  Surgeon: Deangelo Harris MD;  Location:  [facility] CATH INVASIVE LOCATION;  Service: Cardiology   • CARDIAC CATHETERIZATION N/A 12/12/2018    Procedure: Intracardiac echocardiogram;  Surgeon: Deangelo Harris MD;  Location: [facility] CATH INVASIVE LOCATION;  Service: Cardiology   • CARDIAC ELECTROPHYSIOLOGY PROCEDURE N/A 3/26/2018    Procedure: Loop insertion   CONFIRM RX;  Surgeon: Luis Wyatt MD;  Location:  [facility] CATH INVASIVE LOCATION;  Service: Cardiovascular   • CARDIAC ELECTROPHYSIOLOGY PROCEDURE N/A 7/15/2020    Procedure:  Loop recorder removal;  Surgeon: Starr Driscoll MD;  Location:  [facility] CATH INVASIVE LOCATION;  Service: Cardiovascular;  Laterality: N/A;   • CARDIAC SURGERY     • COLONOSCOPY     • COLONOSCOPY N/A 10/9/2020    Procedure: COLONOSCOPY with polypectomy;  Surgeon: Ras Mendoza MD;  Location:  [facility] OR;  Service: Gastroenterology;  Laterality: N/A;  diverticulosis  ascending polyp  transverse polyp  sigmoid polyps X 2  rectal polyp   • HAND SURGERY Bilateral    • INGUINAL HERNIA REPAIR Left    • OTHER SURGICAL HISTORY      inguinal hernia repair for person over age 5   • TONSILLECTOMY     • TONSILLECTOMY         Social History     Socioeconomic History   • Marital status:      Spouse name: Joey   Tobacco Use   • Smoking status: Former     Packs/day: 1.50     Years: 15.00     Pack years: 22.50     Types: Cigarettes     Quit date: 1979     Years since quittin.1   • Smokeless tobacco: Never   • Tobacco comments:     caffeine use: Drinks 4 glasses of Dt coke on avg.    Vaping Use   • Vaping Use: Never used   Substance and Sexual Activity   • Alcohol use: No   • Drug use: No   • Sexual activity: Defer       Family History   Problem Relation Age of Onset   • Obesity Mother    • Clotting disorder Mother         Upper extremities   • Alcohol abuse Father    • Cancer Father         Esophagus and lung   • Other Father         cardiac disorder   • Heart disease Father    • Depression Father    • Kidney disease Sister    • Kidney failure Sister    • Drug abuse Paternal Uncle    • Stroke Sister    • Throat cancer Sister    • Drug abuse Paternal Uncle        The following portion of the patient's history were reviewed and updated as appropriate: past medical history, past surgical history, past social history, past family history, allergies, current medications, and problem list.    Review of Systems   Constitutional: Negative for diaphoresis, fever and malaise/fatigue.   HENT: Negative for  "congestion, hearing loss, hoarse voice, nosebleeds and sore throat.    Eyes: Negative for photophobia, vision loss in left eye, vision loss in right eye and visual disturbance.   Cardiovascular: Negative for chest pain, dyspnea on exertion, irregular heartbeat, leg swelling, near-syncope, orthopnea, palpitations, paroxysmal nocturnal dyspnea and syncope.   Respiratory: Negative for cough, hemoptysis, shortness of breath, sleep disturbances due to breathing, snoring, sputum production and wheezing.    Endocrine: Negative for cold intolerance, heat intolerance, polydipsia, polyphagia and polyuria.   Hematologic/Lymphatic: Negative for bleeding problem. Does not bruise/bleed easily.   Skin: Negative for color change, dry skin, poor wound healing, rash and suspicious lesions.   Musculoskeletal: Negative for arthritis, back pain, falls, gout, joint pain, joint swelling, muscle cramps, muscle weakness and myalgias.   Gastrointestinal: Negative for bloating, abdominal pain, constipation, diarrhea, dysphagia, melena, nausea and vomiting.   Neurological: Negative for excessive daytime sleepiness, dizziness, headaches, light-headedness, loss of balance, numbness, paresthesias, seizures, vertigo and weakness.   Psychiatric/Behavioral: Negative for depression, memory loss and substance abuse. The patient is not nervous/anxious.        Allergies   Allergen Reactions   • Aspirin Nausea Only     Pt states he \"can tolerate enteric coated aspirin only.\"    • Citrus      Blisters on mouth     • Combivent [Ipratropium-Albuterol] Other (See Comments)     Throat swelling   • Lactose Intolerance (Gi)    • Statins Mental Status Change     Severe memory impairment         Current Outpatient Medications:   •  clopidogrel (PLAVIX) 75 MG tablet, Take 1 tablet by mouth Daily for 90 days., Disp: 30 tablet, Rfl: 11  •  Cyanocobalamin 1000 MCG/ML kit, Inject  as directed 1 (One) Time Per Week. 1/4 of a cc IM every sunday, Disp: , Rfl:   •  " "fexofenadine (ALLEGRA) 180 MG tablet, Take 1 tablet by mouth Daily., Disp: , Rfl:   •  hydroCHLOROthiazide (HYDRODIURIL) 12.5 MG tablet, Take 1 tablet by mouth Daily., Disp: 30 tablet, Rfl: 11  •  phenytoin ER (DILANTIN) 100 MG capsule, Take 2 capsules by mouth Daily., Disp: 60 capsule, Rfl: 3       Objective:     Vitals:    04/11/23 1105   BP: 140/80   Pulse: 68   Resp: 16   SpO2: 96%   Weight: 84.8 kg (187 lb)   Height: 185.4 cm (73\")     Body mass index is 24.67 kg/m².      Vitals reviewed.   Constitutional:       General: Not in acute distress.     Appearance: Well-developed.   Eyes:      General:         Right eye: No discharge.         Left eye: No discharge.      Conjunctiva/sclera: Conjunctivae normal.      Pupils: Pupils are equal, round, and reactive to light.   HENT:      Head: Normocephalic and atraumatic.      Right Ear: External ear normal.      Left Ear: External ear normal.      Nose: Nose normal.   Neck:      Thyroid: No thyromegaly.      Vascular: No JVD.      Trachea: No tracheal deviation.      Lymphadenopathy: No cervical adenopathy.   Pulmonary:      Effort: Pulmonary effort is normal. No respiratory distress.      Breath sounds: Normal breath sounds. No wheezing. No rales.   Chest:      Chest wall: Not tender to palpatation.   Cardiovascular:      Normal rate. Regular rhythm.      No gallop.   Pulses:     Intact distal pulses.   Edema:     Peripheral edema absent.   Abdominal:      General: Bowel sounds are normal. There is no distension.      Palpations: Abdomen is soft.      Tenderness: There is no abdominal tenderness.   Musculoskeletal: Normal range of motion.         General: No tenderness or deformity.      Cervical back: Normal range of motion and neck supple. Skin:     General: Skin is warm and dry.      Findings: No erythema or rash.   Neurological:      Mental Status: Alert and oriented to person, place, and time.      Coordination: Coordination normal.   Psychiatric:         " Behavior: Behavior normal.         Thought Content: Thought content normal.         Judgment: Judgment normal.               ECG 12 Lead    Date/Time: 4/11/2023 11:13 AM  Performed by: Vivian Tran APRN  Authorized by: Vivian Tran APRN   Comparison: compared with previous ECG from 4/5/2022  Similar to previous ECG  Rhythm: sinus rhythm  Rate: normal  Conduction: 1st degree AV block  Q waves: II, III and aVF    ST Segments: ST segments normal  T inversion: aVL  QRS axis: left    Clinical impression: abnormal EKG              Assessment:       Diagnosis Plan   1. S/P percutaneous patent foramen ovale closure        2. Sinoatrial block        3. Primary hypertension        4. Mixed hyperlipidemia        5. Cerebrovascular accident (CVA), unspecified mechanism        6. History of stroke  clopidogrel (PLAVIX) 75 MG tablet      7. Essential hypertension  hydroCHLOROthiazide (HYDRODIURIL) 12.5 MG tablet             Plan:       1.  CVA/PFO- He had a left occipital/temporal stroke which was initially presumed to be due to small vessel disease. He was placed on clopidogrel and atorvastatin (which he stopped due to memory loss).  Then, outpatient neurological evaluation led to a further workup.  He was found to have a PFO on THIERRY, and then he had another event on DAPT.  He then had the PFO closed.  Another TIA with expressive aphasia and short-term memory loss 1/14/2020.  He was evaluated by neurology and instructed to take 81 mg of aspirin with his Plavix.  He stopped the Plavix due to a possible drug reaction.    He stopped the aspirin secondary to esophageal/stomach upset.  He is maintained on Plavix and is doing well    .2.  Hypertension-well controlled     3.  ELIZABETH- states he does not use CPAP.     4.  Sinoatrial block- he has intermittent second-degree SA block (type II) which is asymptomatic.  He has first-degree AV block that is unchanged.    5.  Hyperlipidemia-he stopped the statin secondary to memory loss and  from CVA and TIAs.     6.  Seizures - he had stopped taking his Keppra in May 2020. He remains on Dilantin.      7.  S/P placement of loop recorder.  - This was explanted 7/15/2020     8.  COPD- he denies any shortness of breath.     RTO in 1 year with JK    As always, it has been a pleasure to participate in your patient's care. Thank you.       Sincerely,       CHINEDU Ruano      Current Outpatient Medications:   •  clopidogrel (PLAVIX) 75 MG tablet, Take 1 tablet by mouth Daily for 90 days., Disp: 30 tablet, Rfl: 11  •  Cyanocobalamin 1000 MCG/ML kit, Inject  as directed 1 (One) Time Per Week. 1/4 of a cc IM every sunday, Disp: , Rfl:   •  fexofenadine (ALLEGRA) 180 MG tablet, Take 1 tablet by mouth Daily., Disp: , Rfl:   •  hydroCHLOROthiazide (HYDRODIURIL) 12.5 MG tablet, Take 1 tablet by mouth Daily., Disp: 30 tablet, Rfl: 11  •  phenytoin ER (DILANTIN) 100 MG capsule, Take 2 capsules by mouth Daily., Disp: 60 capsule, Rfl: 3      Dictated utilizing Dragon dictation

## 2023-04-11 NOTE — PROGRESS NOTES
Date of Office Visit: 2023  Encounter Provider: CHINEDU Ruano  Place of Service: [facility] CARDIOLOGY  Patient Name: Ronnell Rizvi  :1950  Primary Cardiologist: Dr. Sawant    CC:  Annual cardiac evaluation    Dear Dr. Tarango     HPI: Ronnell Rizvi is a pleasant 73 y.o. male who presents 2023 for cardiac follow up.  I have reviewed his past medical records including notes, labs and testing in preparation for today's visit.    Dr. Sawant met him in 2016 when he was hospitalized for chest pain.  A Cardiolite stress was normal (EF 54%).  He did have some Wenckebach at the beginning of stress which normalized with exercise.     In 2017, he had an episode of visual changes that resolved on their own.  The next morning it happened again and he came to the ED and was diagnosed with a stroke of the left occipital/temporal lobe.  He was found to have some diffuse small vessel disease.  An echo wasn't performed but a Zio patch was placed.  We were not consulted in the hospital.  He was placed on clopidogrel and atorvastatin.     In 2017, the Zio showed some atrial ectopy (there was a 13 beat run of PACs) but no atrial fibrillation.  An echo showed normal LV systolic function and a small PFO.     His studies were then reviewed by a different neurologist, who felt that this was actually embolic. A THIERRY showed a moderate sized PFO and moderate aortic insufficiency but was otherwise normal. A Confirm device was placed (implanted monitor from [employer]).  Upon arrival he was noted to have an irregular rhythm (not atrial fibrillation).  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  The monitor failed to show any atrial fibrillation (see below).     In 2018, he presented with recurrent neurological symptoms.  He was found to have a subacute infarct as well as diffuse intracranial atherosclerosis.  He underwent percutaneous PFO closure with a  25mm Amplatzer device during that admission.     His loop recorder showed episodes of type II sinoatrial exit block; the loop was removed in 2020.  He also had a normal perfusion stress test in 2020 (he reported atypical chest pain at the time).      He saw Dr. Sawant in April 2022 for his annual evaluation and she noted that he felt well and denied any cardiac complaints at all.  He denies palpitations, lightheadedness, syncope, edema, chest pain, dyspnea, or recurrent stroke symptoms. He denies bleeding. He remains on clopidogrel as aspirin causes GI upset.     2/23 Interpretation Summary   •  Left ventricular systolic function is normal. Calculated left ventricular EF = 64.8%  •  Left ventricular wall thickness is consistent with hypertrophy. Sigmoid-shaped ventricular septum is present.  •  Left ventricular diastolic function was normal.  •  Systolic anterior motion of the chordal apparatus is present.  There is no obstruction.  •  Estimated right ventricular systolic pressure from tricuspid regurgitation is normal (<35 mmHg).    He presents today for his annual cardiac evaluation.  I note that you did have an echo performed as above.  He was seen in the ED on 2/28/2023 for nasal congestion.  He did have labs performed that showed a CMP with a glucose of 107 and normal electrolytes.  Liver function test within normal limits.  CBC unremarkable.  He denies any palpitations, shortness of breath, lower extremity edema.  He denies any dizziness, lightheadedness, syncope or presyncopal episodes.  He denies any chest pain, chest pressure or fatigue.  Past Medical History:   Diagnosis Date   • Allergic 1985   • Allergic rhinitis    • Anal fissure    • Aortic insufficiency     moderate, THIERRY 2017   • Asthma    • Asthma    • Benign prostatic hypertrophy (BPH) with weak urinary stream 11/01/2016   • Cataract    • Chronic bronchitis    • Colon polyp    • COPD (chronic obstructive pulmonary disease)    • Emphysema lung    •  Falls frequently    • Fatty liver    • GERD (gastroesophageal reflux disease)    • Headache    • Hepatitis     thought to be Hepatitis A    • History of pneumonia     With left lower lobe atelectasis and scar tissue, being followed   • HL (hearing loss)    • Hypertension    • Low back pain    • Memory loss    • Mixed hyperlipidemia 1/30/2023   • Obstructive sleep apnea syndrome 08/23/2017    refuses c-pap   • PFO (patent foramen ovale)     s/p percutaneous closure with a 25mm Amplatzer device in December 2018   • Pneumonia     LLL with scar tissue   • Seizures    • Sinoatrial block 10/09/2018   • Spinal stenosis    • Stroke     10/2017: left occipital/temporal. total of 3 strokes   • Tremor     Comes & goes   • Vision loss        Past Surgical History:   Procedure Laterality Date   • CARDIAC CATHETERIZATION N/A 12/12/2018    Procedure: Patent foramen ovale closure- [employer];  Surgeon: Deangelo Harris MD;  Location:  [facility] CATH INVASIVE LOCATION;  Service: Cardiology   • CARDIAC CATHETERIZATION N/A 12/12/2018    Procedure: Intracardiac echocardiogram;  Surgeon: Deangelo Harris MD;  [facility];  Service: Cardiology   • CARDIAC ELECTROPHYSIOLOGY PROCEDURE N/A 3/26/2018    Procedure: Loop insertion   CONFIRM RX;  Surgeon: Luis Wyatt MD;  Location:  [facility] CATH INVASIVE LOCATION;  Service: Cardiovascular   • CARDIAC ELECTROPHYSIOLOGY PROCEDURE N/A 7/15/2020    Procedure: Loop recorder removal;  Surgeon: Starr Driscoll MD;  Location:  [facility] CATH INVASIVE LOCATION;  Service: Cardiovascular;  Laterality: N/A;   • CARDIAC SURGERY     • COLONOSCOPY     • COLONOSCOPY N/A 10/9/2020    Procedure: COLONOSCOPY with polypectomy;  Surgeon: Ras Mendoza MD;  Location: [facility] OR;  Service: Gastroenterology;  Laterality: N/A;  diverticulosis  ascending polyp  transverse polyp  sigmoid polyps X 2  rectal polyp   • HAND SURGERY Bilateral    • INGUINAL HERNIA REPAIR Left    •  OTHER SURGICAL HISTORY      inguinal hernia repair for person over age 5   • TONSILLECTOMY     • TONSILLECTOMY         Social History     Socioeconomic History   • Marital status:      Spouse name: Joey   Tobacco Use   • Smoking status: Former     Packs/day: 1.50     Years: 15.00     Pack years: 22.50     Types: Cigarettes     Quit date: 1979     Years since quittin.1   • Smokeless tobacco: Never   • Tobacco comments:     caffeine use: Drinks 4 glasses of Dt coke on avg.    Vaping Use   • Vaping Use: Never used   Substance and Sexual Activity   • Alcohol use: No   • Drug use: No   • Sexual activity: Defer       Family History   Problem Relation Age of Onset   • Obesity Mother    • Clotting disorder Mother         Upper extremities   • Alcohol abuse Father    • Cancer Father         Esophagus and lung   • Other Father         cardiac disorder   • Heart disease Father    • Depression Father    • Kidney disease Sister    • Kidney failure Sister    • Drug abuse Paternal Uncle    • Stroke Sister    • Throat cancer Sister    • Drug abuse Paternal Uncle        The following portion of the patient's history were reviewed and updated as appropriate: past medical history, past surgical history, past social history, past family history, allergies, current medications, and problem list.    Review of Systems   Constitutional: Negative for diaphoresis, fever and malaise/fatigue.   HENT: Negative for congestion, hearing loss, hoarse voice, nosebleeds and sore throat.    Eyes: Negative for photophobia, vision loss in left eye, vision loss in right eye and visual disturbance.   Cardiovascular: Negative for chest pain, dyspnea on exertion, irregular heartbeat, leg swelling, near-syncope, orthopnea, palpitations, paroxysmal nocturnal dyspnea and syncope.   Respiratory: Negative for cough, hemoptysis, shortness of breath, sleep disturbances due to breathing, snoring, sputum production and wheezing.    Endocrine:  "Negative for cold intolerance, heat intolerance, polydipsia, polyphagia and polyuria.   Hematologic/Lymphatic: Negative for bleeding problem. Does not bruise/bleed easily.   Skin: Negative for color change, dry skin, poor wound healing, rash and suspicious lesions.   Musculoskeletal: Negative for arthritis, back pain, falls, gout, joint pain, joint swelling, muscle cramps, muscle weakness and myalgias.   Gastrointestinal: Negative for bloating, abdominal pain, constipation, diarrhea, dysphagia, melena, nausea and vomiting.   Neurological: Negative for excessive daytime sleepiness, dizziness, headaches, light-headedness, loss of balance, numbness, paresthesias, seizures, vertigo and weakness.   Psychiatric/Behavioral: Negative for depression, memory loss and substance abuse. The patient is not nervous/anxious.        Allergies   Allergen Reactions   • Aspirin Nausea Only     Pt states he \"can tolerate enteric coated aspirin only.\"    • Citrus      Blisters on mouth     • Combivent [Ipratropium-Albuterol] Other (See Comments)     Throat swelling   • Lactose Intolerance (Gi)    • Statins Mental Status Change     Severe memory impairment         Current Outpatient Medications:   •  clopidogrel (PLAVIX) 75 MG tablet, Take 1 tablet by mouth Daily for 90 days., Disp: 30 tablet, Rfl: 11  •  Cyanocobalamin 1000 MCG/ML kit, Inject  as directed 1 (One) Time Per Week. 1/4 of a cc IM every sunday, Disp: , Rfl:   •  fexofenadine (ALLEGRA) 180 MG tablet, Take 1 tablet by mouth Daily., Disp: , Rfl:   •  hydroCHLOROthiazide (HYDRODIURIL) 12.5 MG tablet, Take 1 tablet by mouth Daily., Disp: 30 tablet, Rfl: 11  •  phenytoin ER (DILANTIN) 100 MG capsule, Take 2 capsules by mouth Daily., Disp: 60 capsule, Rfl: 3        Objective:     Vitals:    04/11/23 1105   BP: 140/80   Pulse: 68   Resp: 16   SpO2: 96%   Weight: 84.8 kg (187 lb)   Height: 185.4 cm (73\")     Body mass index is 24.67 kg/m².      Vitals reviewed.   Constitutional:       " General: Not in acute distress.     Appearance: Well-developed.   Eyes:      General:         Right eye: No discharge.         Left eye: No discharge.      Conjunctiva/sclera: Conjunctivae normal.      Pupils: Pupils are equal, round, and reactive to light.   HENT:      Head: Normocephalic and atraumatic.      Right Ear: External ear normal.      Left Ear: External ear normal.      Nose: Nose normal.   Neck:      Thyroid: No thyromegaly.      Vascular: No JVD.      Trachea: No tracheal deviation.      Lymphadenopathy: No cervical adenopathy.   Pulmonary:      Effort: Pulmonary effort is normal. No respiratory distress.      Breath sounds: Normal breath sounds. No wheezing. No rales.   Chest:      Chest wall: Not tender to palpatation.   Cardiovascular:      Normal rate. Regular rhythm.      No gallop.   Pulses:     Intact distal pulses.   Edema:     Peripheral edema absent.   Abdominal:      General: Bowel sounds are normal. There is no distension.      Palpations: Abdomen is soft.      Tenderness: There is no abdominal tenderness.   Musculoskeletal: Normal range of motion.         General: No tenderness or deformity.      Cervical back: Normal range of motion and neck supple. Skin:     General: Skin is warm and dry.      Findings: No erythema or rash.   Neurological:      Mental Status: Alert and oriented to person, place, and time.      Coordination: Coordination normal.   Psychiatric:         Behavior: Behavior normal.         Thought Content: Thought content normal.         Judgment: Judgment normal.               ECG 12 Lead    Date/Time: 4/11/2023 11:13 AM  Performed by: Vivian Tran APRN  Authorized by: Vivian rTan APRN   Comparison: compared with previous ECG from 4/5/2022  Similar to previous ECG  Rhythm: sinus rhythm  Rate: normal  Conduction: 1st degree AV block  Q waves: II, III and aVF    ST Segments: ST segments normal  T inversion: aVL  QRS axis: left    Clinical impression: abnormal  EKG              Assessment:       Diagnosis Plan   1. S/P percutaneous patent foramen ovale closure        2. Sinoatrial block        3. Primary hypertension        4. Mixed hyperlipidemia        5. Cerebrovascular accident (CVA), unspecified mechanism        6. History of stroke  clopidogrel (PLAVIX) 75 MG tablet      7. Essential hypertension  hydroCHLOROthiazide (HYDRODIURIL) 12.5 MG tablet             Plan:       1.  CVA/PFO- He had a left occipital/temporal stroke which was initially presumed to be due to small vessel disease. He was placed on clopidogrel and atorvastatin (which he stopped due to memory loss).  Then, outpatient neurological evaluation led to a further workup.  He was found to have a PFO on THIERRY, and then he had another event on DAPT.  He then had the PFO closed.  Another TIA with expressive aphasia and short-term memory loss 1/14/2020.  He was evaluated by neurology and instructed to take 81 mg of aspirin with his Plavix.  He stopped the Plavix due to a possible drug reaction.    He stopped the aspirin secondary to esophageal/stomach upset.  He is maintained on Plavix and is doing well    .2.  Hypertension-well controlled     3.  ELIZABETH- states he does not use CPAP.     4.  Sinoatrial block- he has intermittent second-degree SA block (type II) which is asymptomatic.  He has first-degree AV block that is unchanged.    5.  Hyperlipidemia-he stopped the statin secondary to memory loss and from CVA and TIAs.     6.  Seizures - he had stopped taking his Keppra in May 2020. He remains on Dilantin.      7.  S/P placement of loop recorder.  - This was explanted 7/15/2020     8.  COPD- he denies any shortness of breath.     RTO in 1 year with JK    As always, it has been a pleasure to participate in your patient's care. Thank you.       Sincerely,       CHINEDU Ruano      Current Outpatient Medications:   •  clopidogrel (PLAVIX) 75 MG tablet, Take 1 tablet by mouth Daily for 90 days., Disp: 30 tablet,  Rfl: 11  •  Cyanocobalamin 1000 MCG/ML kit, Inject  as directed 1 (One) Time Per Week. 1/4 of a cc IM every sunday, Disp: , Rfl:   •  fexofenadine (ALLEGRA) 180 MG tablet, Take 1 tablet by mouth Daily., Disp: , Rfl:   •  hydroCHLOROthiazide (HYDRODIURIL) 12.5 MG tablet, Take 1 tablet by mouth Daily., Disp: 30 tablet, Rfl: 11  •  phenytoin ER (DILANTIN) 100 MG capsule, Take 2 capsules by mouth Daily., Disp: 60 capsule, Rfl: 3      Dictated utilizing Dragon dictation

## 2023-04-19 ENCOUNTER — LAB (OUTPATIENT)
Dept: LAB | Facility: HOSPITAL | Age: 73
End: 2023-04-19
Payer: MEDICARE

## 2023-04-19 DIAGNOSIS — R56.9 SEIZURE: ICD-10-CM

## 2023-04-19 LAB
ALBUMIN SERPL-MCNC: 4.1 G/DL (ref 3.5–5.2)
ALBUMIN/GLOB SERPL: 1.6 G/DL
ALP SERPL-CCNC: 73 U/L (ref 39–117)
ALT SERPL W P-5'-P-CCNC: 15 U/L (ref 1–41)
ANION GAP SERPL CALCULATED.3IONS-SCNC: 7.2 MMOL/L (ref 5–15)
AST SERPL-CCNC: 14 U/L (ref 1–40)
BASOPHILS # BLD AUTO: 0.04 10*3/MM3 (ref 0–0.2)
BASOPHILS NFR BLD AUTO: 0.7 % (ref 0–1.5)
BILIRUB SERPL-MCNC: <0.2 MG/DL (ref 0–1.2)
BUN SERPL-MCNC: 17 MG/DL (ref 8–23)
BUN/CREAT SERPL: 13.8 (ref 7–25)
CALCIUM SPEC-SCNC: 9.1 MG/DL (ref 8.6–10.5)
CHLORIDE SERPL-SCNC: 102 MMOL/L (ref 98–107)
CO2 SERPL-SCNC: 28.8 MMOL/L (ref 22–29)
CREAT SERPL-MCNC: 1.23 MG/DL (ref 0.76–1.27)
DEPRECATED RDW RBC AUTO: 43 FL (ref 37–54)
EGFRCR SERPLBLD CKD-EPI 2021: 61.6 ML/MIN/1.73
EOSINOPHIL # BLD AUTO: 0.16 10*3/MM3 (ref 0–0.4)
EOSINOPHIL NFR BLD AUTO: 3 % (ref 0.3–6.2)
ERYTHROCYTE [DISTWIDTH] IN BLOOD BY AUTOMATED COUNT: 12.8 % (ref 12.3–15.4)
GLOBULIN UR ELPH-MCNC: 2.5 GM/DL
GLUCOSE SERPL-MCNC: 98 MG/DL (ref 65–99)
HCT VFR BLD AUTO: 44.1 % (ref 37.5–51)
HGB BLD-MCNC: 15.2 G/DL (ref 13–17.7)
IMM GRANULOCYTES # BLD AUTO: 0.02 10*3/MM3 (ref 0–0.05)
IMM GRANULOCYTES NFR BLD AUTO: 0.4 % (ref 0–0.5)
LYMPHOCYTES # BLD AUTO: 1.92 10*3/MM3 (ref 0.7–3.1)
LYMPHOCYTES NFR BLD AUTO: 36 % (ref 19.6–45.3)
MCH RBC QN AUTO: 31.7 PG (ref 26.6–33)
MCHC RBC AUTO-ENTMCNC: 34.5 G/DL (ref 31.5–35.7)
MCV RBC AUTO: 91.9 FL (ref 79–97)
MONOCYTES # BLD AUTO: 0.55 10*3/MM3 (ref 0.1–0.9)
MONOCYTES NFR BLD AUTO: 10.3 % (ref 5–12)
NEUTROPHILS NFR BLD AUTO: 2.65 10*3/MM3 (ref 1.7–7)
NEUTROPHILS NFR BLD AUTO: 49.6 % (ref 42.7–76)
NRBC BLD AUTO-RTO: 0 /100 WBC (ref 0–0.2)
PLATELET # BLD AUTO: 232 10*3/MM3 (ref 140–450)
PMV BLD AUTO: 9.9 FL (ref 6–12)
POTASSIUM SERPL-SCNC: 3.9 MMOL/L (ref 3.5–5.2)
PROT SERPL-MCNC: 6.6 G/DL (ref 6–8.5)
RBC # BLD AUTO: 4.8 10*6/MM3 (ref 4.14–5.8)
SODIUM SERPL-SCNC: 138 MMOL/L (ref 136–145)
WBC NRBC COR # BLD: 5.34 10*3/MM3 (ref 3.4–10.8)

## 2023-04-19 PROCEDURE — 80053 COMPREHEN METABOLIC PANEL: CPT

## 2023-04-19 PROCEDURE — 85025 COMPLETE CBC W/AUTO DIFF WBC: CPT

## 2023-04-19 PROCEDURE — 80185 ASSAY OF PHENYTOIN TOTAL: CPT

## 2023-04-19 PROCEDURE — 80186 ASSAY OF PHENYTOIN FREE: CPT

## 2023-04-19 PROCEDURE — 36415 COLL VENOUS BLD VENIPUNCTURE: CPT

## 2023-04-21 LAB
PHENYTOIN FREE SERPL-MCNC: ABNORMAL UG/ML (ref 1–2)
PHENYTOIN SERPL-MCNC: 2.2 UG/ML (ref 10–20)

## 2023-05-02 ENCOUNTER — OFFICE VISIT (OUTPATIENT)
Dept: GASTROENTEROLOGY | Facility: CLINIC | Age: 73
End: 2023-05-02
Payer: MEDICARE

## 2023-05-02 ENCOUNTER — LAB (OUTPATIENT)
Dept: LAB | Facility: HOSPITAL | Age: 73
End: 2023-05-02
Payer: MEDICARE

## 2023-05-02 VITALS
DIASTOLIC BLOOD PRESSURE: 72 MMHG | SYSTOLIC BLOOD PRESSURE: 132 MMHG | HEIGHT: 73 IN | BODY MASS INDEX: 25.13 KG/M2 | WEIGHT: 189.6 LBS

## 2023-05-02 DIAGNOSIS — R19.5 DARK STOOLS: Primary | ICD-10-CM

## 2023-05-02 DIAGNOSIS — Z83.438 FAMILY HISTORY OF OTHER DISORDER OF LIPOPROTEIN METABOLISM AND OTHER LIPIDEMIA: ICD-10-CM

## 2023-05-02 LAB
DEPRECATED RDW RBC AUTO: 43.1 FL (ref 37–54)
ERYTHROCYTE [DISTWIDTH] IN BLOOD BY AUTOMATED COUNT: 12.9 % (ref 12.3–15.4)
HCT VFR BLD AUTO: 48 % (ref 37.5–51)
HGB BLD-MCNC: 16.4 G/DL (ref 13–17.7)
IRON 24H UR-MRATE: 102 MCG/DL (ref 59–158)
IRON SATN MFR SERPL: 35 % (ref 20–50)
MCH RBC QN AUTO: 31 PG (ref 26.6–33)
MCHC RBC AUTO-ENTMCNC: 34.2 G/DL (ref 31.5–35.7)
MCV RBC AUTO: 90.7 FL (ref 79–97)
PLATELET # BLD AUTO: 241 10*3/MM3 (ref 140–450)
PMV BLD AUTO: 10.4 FL (ref 6–12)
RBC # BLD AUTO: 5.29 10*6/MM3 (ref 4.14–5.8)
TIBC SERPL-MCNC: 291 MCG/DL (ref 298–536)
TRANSFERRIN SERPL-MCNC: 195 MG/DL (ref 200–360)
WBC NRBC COR # BLD: 5.65 10*3/MM3 (ref 3.4–10.8)

## 2023-05-02 PROCEDURE — 84466 ASSAY OF TRANSFERRIN: CPT

## 2023-05-02 PROCEDURE — 83540 ASSAY OF IRON: CPT

## 2023-05-02 PROCEDURE — 85027 COMPLETE CBC AUTOMATED: CPT

## 2023-05-02 PROCEDURE — 36415 COLL VENOUS BLD VENIPUNCTURE: CPT

## 2023-05-02 NOTE — PROGRESS NOTES
PATIENT INFORMATION  Ronnell Rizvi       - 1950    CHIEF COMPLAINT  Chief Complaint   Patient presents with   • Black or Bloody Stool       HISTORY OF PRESENT ILLNESS    Here today for evaluation of dark stools    Reports dark stools since ER visit in March, CBC has remained stable since. Stools have been dark for awhile, but shade blind so cannot tell me how dark. Does have some urgency and has to go when out and about, then do not seem dark, only seems dark when in commode at home. Range 1-5 BM. Can be solid, rarely hard to get out. Can be liquid stool, usually even on days with more frequent BM starts solid, maybe 1-2 times a week has those frequent days. Wife feels diarrhea days worse since starting plavix. Pt reports difficulty with short term memory since strokes. Dark BM do not seem to be sticky.     Is having lightheadedness more lately, has to eat something right away and take several hours to feel better. Was a problem years ago.    Occasional reflux, especially if eating and laying back, occasional HB maybe every 7-10 days. No dysphagia, bloating, nausea, vomiting. Some belching. No NSAIDS. Treats HB with alkaseltzer. Never on daily PPI.    Last CBC, CMP unremarkable. Daily plavix. FOBT negative at ER. Patient does not seem to be bleeding, history complicated due to pt vision issues.    Colon 10/9/2020 pan colonic diverticulosis with inflammatory polyps and 2/5 adenomas.      REVIEWED PERTINENT RESULTS/ LABS  Lab Results   Component Value Date    CASEREPORT  10/09/2020     Surgical Pathology Report                         Case: [id number]                                  Authorizing Provider:  Ras Mendoza        Collected:           10/09/2020 09:41 AM                                 MD Ninfa                                                                   Ordering Location:     [facility]   Received:            10/09/2020 12:50 PM                                 OR                                                                            Pathologist:           Warren Curry MD                                                         Specimens:   1) - Large Intestine, Right / Ascending Colon, ascending polyp                                      2) - Large Intestine, Transverse Colon, transverse polyp                                            3) - Large Intestine, Sigmoid Colon, sigmoid polyps X 2                                             4) - Large Intestine, Rectum, rectal polyp                                                 FINALDX  10/09/2020     1. Right Ascending Colon Biopsy: Benign colonic mucosa with   A. Active moderate chronic inflammation with mild crypt distortion noted.   B. Focal active cryptitis with rare crypt abscess formation noted, no granulomatous inflammation identified.    2. Transverse Colon Biopsy: Polypoid colonic mucosa with   A. Focal changes suggesting mixed developing hyperplastic polyp and tubular adenoma.   B. Focal active cryptitis noted, no well-developed crypt abscess formation nor granulomatous inflammation                    identified.   C. No high grade glandular dysplasia nor malignancy identified.    3. Sigmoid Colon Biopsy: Benign colonic mucosa with    A. Focal active mild chronic inflammation.   B. Focal active cryptitis with crypt abscess formation noted, no well-developed granulomatous inflammation nor                    crypt distortion identified.    4. Rectum Biopsy:    A. Focal changes consistent with mixed hyperplastic polyp and developing tubular adenoma.   B. No high grade glandular dysplasia nor malignancy identified.    love/clair        Lab Results   Component Value Date    HGB 15.2 04/19/2023    MCV 91.9 04/19/2023     04/19/2023    ALT 15 04/19/2023    AST 14 04/19/2023    HGBA1C 5.40 01/30/2023    INR 1.07 10/09/2020    TRIG 88 12/27/2021      No results found.    REVIEW OF SYSTEMS  Review of Systems    Constitutional: Positive for fatigue.   HENT: Positive for congestion.    Eyes: Negative.    Respiratory: Negative.    Cardiovascular: Negative.    Gastrointestinal: Positive for blood in stool and diarrhea.   Endocrine: Negative.    Genitourinary: Negative.    Musculoskeletal: Negative.    Skin: Negative.    Allergic/Immunologic: Positive for food allergies (lactose intolenerant ).   Neurological: Positive for seizures (hx of ), light-headedness and headaches.   Hematological: Negative.    Psychiatric/Behavioral: Positive for confusion (strokes ).         ACTIVE PROBLEMS  Patient Active Problem List    Diagnosis    • Melena [K92.1]    • History of stroke [Z86.73]    • Pre-syncope [R55]    • Mixed hyperlipidemia [E78.2]    • HL (hearing loss) [H91.90]    • Seizure (HCC) [R56.9]    • Encounter for screening for malignant neoplasm of colon [Z12.11]    • COPD (chronic obstructive pulmonary disease) [J44.9]    • Nocturnal seizures [R56.9]    • TIA on medication [G45.9]    • HTN (hypertension) [I10]    • S/P percutaneous patent foramen ovale closure [Z87.74]    • Sinus pause [I45.5]    • Intracranial vascular stenosis [I67.9]    • TIA (transient ischemic attack) [G45.9]    • CVA (cerebral vascular accident) [I63.9]    • Cerebral aneurysm, nonruptured [I67.1]    • 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]    • ELIZABETH (obstructive sleep apnea) [G47.33]    • Chronic bronchitis [J42]    • Vitamin B12 deficiency [E53.8]    • Chronic fatigue [R53.82]    • Benign prostatic hypertrophy (BPH) with weak urinary stream [N40.1, R39.12]    • Anemia, unspecified [D64.9]          PAST MEDICAL HISTORY  Past Medical History:   Diagnosis Date   • Allergic 1985   • Allergic rhinitis    • Anal fissure    • Aortic insufficiency     moderate, THIERRY 2017   • Asthma    • Asthma    • Benign prostatic hypertrophy (BPH) with weak urinary stream 11/01/2016   • Cataract    • Chronic bronchitis    • Colon polyp    • COPD (chronic obstructive pulmonary disease)    •  Emphysema lung    • Falls frequently    • Fatty liver    • GERD (gastroesophageal reflux disease)    • Headache    • Hepatitis     thought to be Hepatitis A    • History of pneumonia     With left lower lobe atelectasis and scar tissue, being followed   • HL (hearing loss)    • Hypertension    • Low back pain    • Memory loss    • Mixed hyperlipidemia 1/30/2023   • Obstructive sleep apnea syndrome 08/23/2017    refuses c-pap   • PFO (patent foramen ovale)     s/p percutaneous closure with a 25mm Amplatzer device in December 2018   • Pneumonia     LLL with scar tissue   • Seizures    • Sinoatrial block 10/09/2018   • Spinal stenosis    • Stroke     10/2017: left occipital/temporal. total of 3 strokes   • Tremor     Comes & goes   • Vision loss          SURGICAL HISTORY  Past Surgical History:   Procedure Laterality Date   • CARDIAC CATHETERIZATION N/A 12/12/2018    Procedure: Patent foramen ovale closure- Abbott;  Surgeon: Deangelo Harris MD;  Location:  [facility];  Service: Cardiology   • CARDIAC CATHETERIZATION N/A 12/12/2018    Procedure: Intracardiac echocardiogram;  Surgeon: Deangelo Harris MD;  Location:  [facility];  Service: Cardiology   • CARDIAC ELECTROPHYSIOLOGY PROCEDURE N/A 3/26/2018    Procedure: Loop insertion   CONFIRM RX;  Surgeon: Luis Wyatt MD;  Location:  [facility];  Service: Cardiovascular   • CARDIAC ELECTROPHYSIOLOGY PROCEDURE N/A 7/15/2020    Procedure: Loop recorder removal;  Surgeon: Starr Driscoll MD;  Location:  [facility];  Service: Cardiovascular;  Laterality: N/A;   • CARDIAC SURGERY     • COLONOSCOPY     • COLONOSCOPY N/A 10/9/2020    Procedure: COLONOSCOPY with polypectomy;  Surgeon: Ras Mendoza MD;  Location: [facility];  Service: Gastroenterology;  Laterality: N/A;  diverticulosis  ascending polyp  transverse polyp  sigmoid polyps X 2  rectal polyp   • HAND SURGERY  Bilateral    • INGUINAL HERNIA REPAIR Left    • OTHER SURGICAL HISTORY      inguinal hernia repair for person over age 5   • TONSILLECTOMY     • TONSILLECTOMY           FAMILY HISTORY  Family History   Problem Relation Age of Onset   • Obesity Mother    • Clotting disorder Mother         Upper extremities   • Alcohol abuse Father    • Cancer Father         Esophagus and lung   • Other Father         cardiac disorder   • Heart disease Father    • Depression Father    • Kidney disease Sister    • Kidney failure Sister    • Drug abuse Paternal Uncle    • Stroke Sister    • Throat cancer Sister    • Drug abuse Paternal Uncle          SOCIAL HISTORY  Social History     Occupational History   • Occupation:      Employer: RETIRED   Tobacco Use   • Smoking status: Former     Packs/day: 1.50     Years: 15.00     Pack years: 22.50     Types: Cigarettes     Quit date: 1979     Years since quittin.2   • Smokeless tobacco: Never   • Tobacco comments:     caffeine use: Drinks 4 glasses of Dt coke on avg.    Vaping Use   • Vaping Use: Never used   Substance and Sexual Activity   • Alcohol use: No   • Drug use: No   • Sexual activity: Defer         CURRENT MEDICATIONS    Current Outpatient Medications:   •  clopidogrel (PLAVIX) 75 MG tablet, Take 1 tablet by mouth Daily for 90 days., Disp: 30 tablet, Rfl: 11  •  Cyanocobalamin 1000 MCG/ML kit, Inject  as directed 1 (One) Time Per Week. 1/4 of a cc IM every , Disp: , Rfl:   •  fexofenadine (ALLEGRA) 180 MG tablet, Take 1 tablet by mouth Daily., Disp: , Rfl:   •  hydroCHLOROthiazide (HYDRODIURIL) 12.5 MG tablet, Take 1 tablet by mouth Daily., Disp: 30 tablet, Rfl: 11  •  phenytoin ER (DILANTIN) 100 MG capsule, Take 2 capsules by mouth Daily., Disp: 60 capsule, Rfl: 3    ALLERGIES  Aspirin, Citrus, Combivent [ipratropium-albuterol], Lactose intolerance (gi), and Statins    VITALS  Vitals:    23 1101   BP: 132/72   BP Location: Left arm   Patient  "Position: Sitting   Cuff Size: Adult   Weight: 86 kg (189 lb 9.6 oz)   Height: 185.4 cm (72.99\")       PHYSICAL EXAM  Debilities/Disabilities Identified: None  Emotional Behavior: Appropriate  Wt Readings from Last 3 Encounters:   05/02/23 86 kg (189 lb 9.6 oz)   04/11/23 84.8 kg (187 lb)   03/08/23 87.1 kg (192 lb)     Ht Readings from Last 1 Encounters:   05/02/23 185.4 cm (72.99\")     Body mass index is 25.02 kg/m².  Physical Exam  Constitutional:       General: He is not in acute distress.     Appearance: Normal appearance. He is not ill-appearing.   HENT:      Head: Normocephalic and atraumatic.      Mouth/Throat:      Mouth: Mucous membranes are moist.      Pharynx: No posterior oropharyngeal erythema.   Eyes:      General: No scleral icterus.  Cardiovascular:      Rate and Rhythm: Normal rate and regular rhythm.      Heart sounds: Normal heart sounds.   Pulmonary:      Effort: Pulmonary effort is normal.      Breath sounds: Normal breath sounds.   Abdominal:      General: Abdomen is flat. Bowel sounds are normal. There is no distension.      Palpations: Abdomen is soft. There is no mass.      Tenderness: There is no abdominal tenderness. There is no guarding or rebound. Negative signs include Reis's sign.      Hernia: No hernia is present.   Musculoskeletal:      Cervical back: Neck supple.   Skin:     General: Skin is warm.      Capillary Refill: Capillary refill takes less than 2 seconds.   Neurological:      General: No focal deficit present.      Mental Status: He is alert and oriented to person, place, and time.   Psychiatric:         Mood and Affect: Mood normal.         Behavior: Behavior normal.         Thought Content: Thought content normal.         Judgment: Judgment normal.         CLINICAL DATA REVIEWED   reviewed previous lab results and integrated with today's visit, reviewed notes from other physicians and/or last GI encounter, reviewed previous endoscopy results and available photos, " reviewed surgical pathology results from previous biopsies    ASSESSMENT  Diagnoses and all orders for this visit:    Dark stools  -     CBC (No Diff)  -     Iron Profile    Family history of other disorder of lipoprotein metabolism and other lipidemia  -     Iron Profile          PLAN    Check iron profile  10g fiber daily  Would recommend monitoring CBC, if starts to decrease would recommend EGD    Return in about 4 months (around 9/2/2023).    I have discussed the above plan with the patient.  They verbalize understanding and are in agreement with the plan.  They have been advised to contact the office for any questions, concerns, or changes related to their health.

## 2023-07-24 RX ORDER — PHENYTOIN SODIUM 100 MG/1
CAPSULE, EXTENDED RELEASE ORAL
Qty: 60 CAPSULE | Refills: 3 | Status: SHIPPED | OUTPATIENT
Start: 2023-07-24

## 2023-08-25 ENCOUNTER — OFFICE VISIT (OUTPATIENT)
Dept: NEUROLOGY | Facility: CLINIC | Age: 73
End: 2023-08-25
Payer: MEDICARE

## 2023-08-25 VITALS
HEART RATE: 63 BPM | SYSTOLIC BLOOD PRESSURE: 120 MMHG | BODY MASS INDEX: 25.1 KG/M2 | DIASTOLIC BLOOD PRESSURE: 68 MMHG | WEIGHT: 189.4 LBS | HEIGHT: 73 IN | OXYGEN SATURATION: 97 %

## 2023-08-25 DIAGNOSIS — Z86.73 HISTORY OF STROKE: Primary | ICD-10-CM

## 2023-08-25 DIAGNOSIS — R56.9 SEIZURE: ICD-10-CM

## 2023-08-25 NOTE — PROGRESS NOTES
Notes by MA:  Pt presents today with his wife for follow up for history of stroke and seizures.        Subjective:     Patient ID: Ronnell Rizvi is a 73 y.o. male.    History of Present Illness  The following portions of the patient's history were reviewed and updated as appropriate: allergies, current medications, past family history, past medical history, past social history, past surgical history, and problem list.    Review of Systems   Constitutional:  Positive for fatigue. Negative for activity change and appetite change.   HENT:  Negative for facial swelling, trouble swallowing and voice change.    Eyes:  Negative for photophobia, pain and visual disturbance.   Respiratory:  Negative for chest tightness, shortness of breath and wheezing.    Cardiovascular:  Negative for chest pain, palpitations and leg swelling.   Gastrointestinal:  Negative for abdominal pain, nausea and vomiting.   Endocrine: Negative for cold intolerance and heat intolerance.   Musculoskeletal:  Positive for arthralgias. Negative for back pain, gait problem, joint swelling, myalgias, neck pain and neck stiffness.   Neurological:  Positive for tremors, speech difficulty and light-headedness. Negative for dizziness, seizures, syncope, facial asymmetry, weakness, numbness and headaches.   Hematological:  Does not bruise/bleed easily.   Psychiatric/Behavioral:  Positive for confusion and decreased concentration. Negative for agitation, behavioral problems, dysphoric mood, hallucinations, self-injury, sleep disturbance and suicidal ideas. The patient is not nervous/anxious and is not hyperactive.       Objective:    Neurologic Exam  Awake and alert.  Reasonably pleasant with fluent speech.  Poor speech comprehension secondary to hearing loss bilaterally.     Aside from hearing loss, cranial nerves II through XII normal and symmetric.     Motor exam reveals reasonable tone bulk and power.  No drift.  Good .  Reasonable intrinsic hand  muscles.  No lateralized spasticity.     Sensory exam reveals good sensation to light touch temperature and vibration in both upper and lower extremities symmetrically.     Coordination testing reveals smooth and accurate finger-nose-finger and reasonable rapid alternating movements.  Negative Romberg.     Tendon reflexes are 1+ and symmetric throughout.     No diplopia, nystagmus, tremor, ataxia, or other sign of medication toxicity.  Physical Exam    Assessment/Plan:     Diagnoses and all orders for this visit:    1. History of stroke (Primary)    2. Seizure     No clear further strokes.  However, he did have at least one 10-minute episode of speech problems or speech arrest.  Its not clear to me whether this was just a bad day for him or whether he had a TIA or possibly breakthrough seizure.  His wife is going to record this for me if it recurs and we can make appropriate adjustments.  Continuing his Plavix unchanged for now and low-dose Dilantin.  He is demonstrating a mild postural tremor on exam today.  Possible essential tremor.  We will see how he is doing with this at our next visit.  All of the above was discussed in detail and I answered their many questions to the best of my ability today.    45 minutes total patient care time including record review, examination, discussion, counseling, and documentation.

## 2023-09-05 ENCOUNTER — OFFICE VISIT (OUTPATIENT)
Dept: GASTROENTEROLOGY | Facility: CLINIC | Age: 73
End: 2023-09-05
Payer: MEDICARE

## 2023-09-05 VITALS
HEIGHT: 73 IN | WEIGHT: 190.8 LBS | BODY MASS INDEX: 25.29 KG/M2 | DIASTOLIC BLOOD PRESSURE: 64 MMHG | SYSTOLIC BLOOD PRESSURE: 116 MMHG

## 2023-09-05 DIAGNOSIS — K58.2 IRRITABLE BOWEL SYNDROME WITH BOTH CONSTIPATION AND DIARRHEA: Primary | ICD-10-CM

## 2023-09-05 DIAGNOSIS — K21.9 GASTROESOPHAGEAL REFLUX DISEASE, UNSPECIFIED WHETHER ESOPHAGITIS PRESENT: ICD-10-CM

## 2023-09-05 NOTE — PROGRESS NOTES
PATIENT INFORMATION  Ronnell Rizvi       - 1950    CHIEF COMPLAINT  Chief Complaint   Patient presents with    Abdominal Pain    Bloated       HISTORY OF PRESENT ILLNESS    Here today for IBS and GERD follow-up    Per LOV: Reports dark stools since ER visit in March, CBC has remained stable since. Stools have been dark for awhile, but shade blind so cannot tell me how dark. Does have some urgency and has to go when out and about, then do not seem dark, only seems dark when in commode at home. Range 1-5 BM. Can be solid, rarely hard to get out. Can be liquid stool, usually even on days with more frequent BM starts solid, maybe 1-2 times a week has those frequent days. Wife feels diarrhea days worse since starting plavix. Pt reports difficulty with short term memory since strokes. Dark BM do not seem to be sticky. Last CBC, CMP unremarkable. Daily plavix. FOBT negative at ER. Recommended 10g fiber daily. TODAY: Did not start fiber, recently bought fiber. So still alternating. Will start fiber and probiotic with a goal of 10g a day. Stool is not dark if outside. Has gas, but not foul.    Per LOV: Occasional reflux, especially if eating and laying back, occasional HB maybe every 7-10 days. No dysphagia, bloating, nausea, vomiting. Some belching. No NSAIDS. Treats HB with alkaseltzer. Never on daily PPI. Reflux more frequent lately when working bent over. Heartburn mild and not common, rarely needing william seltzer.    Colon 10/9/2020 pan colonic diverticulosis with inflammatory polyps and 2/5 adenomas.    Abdominal Pain  Associated symptoms include constipation and diarrhea. Pertinent negatives include no nausea or vomiting.     REVIEWED PERTINENT RESULTS/ LABS  Lab Results   Component Value Date    CASEREPORT  10/09/2020     Surgical Pathology Report                         Case: [id number]                                  Authorizing Provider:  Ras Mendoza        Collected:           10/09/2020 09:41  NIRANJAN Ocasio MD                                                                   Ordering Location:     [facility]   Received:            10/09/2020 12:50 PM                                 OR                                                                           Pathologist:           Warren Curry MD                                                         Specimens:   1) - Large Intestine, Right / Ascending Colon, ascending polyp                                      2) - Large Intestine, Transverse Colon, transverse polyp                                            3) - Large Intestine, Sigmoid Colon, sigmoid polyps X 2                                             4) - Large Intestine, Rectum, rectal polyp                                                 FINALDX  10/09/2020     1. Right Ascending Colon Biopsy: Benign colonic mucosa with   A. Active moderate chronic inflammation with mild crypt distortion noted.   B. Focal active cryptitis with rare crypt abscess formation noted, no granulomatous inflammation identified.    2. Transverse Colon Biopsy: Polypoid colonic mucosa with   A. Focal changes suggesting mixed developing hyperplastic polyp and tubular adenoma.   B. Focal active cryptitis noted, no well-developed crypt abscess formation nor granulomatous inflammation                    identified.   C. No high grade glandular dysplasia nor malignancy identified.    3. Sigmoid Colon Biopsy: Benign colonic mucosa with    A. Focal active mild chronic inflammation.   B. Focal active cryptitis with crypt abscess formation noted, no well-developed granulomatous inflammation nor                    crypt distortion identified.    4. Rectum Biopsy:    A. Focal changes consistent with mixed hyperplastic polyp and developing tubular adenoma.   B. No high grade glandular dysplasia nor malignancy identified.    jat/zulye        Lab Results   Component Value Date    HGB 16.4  05/02/2023    MCV 90.7 05/02/2023     05/02/2023    ALT 15 04/19/2023    AST 14 04/19/2023    HGBA1C 5.40 01/30/2023    INR 1.07 10/09/2020    TRIG 88 12/27/2021    IRON 102 05/02/2023    TIBC 291 (L) 05/02/2023      No results found.    REVIEW OF SYSTEMS  Review of Systems   Constitutional:  Positive for fatigue.   HENT: Negative.     Eyes: Negative.    Respiratory: Negative.     Cardiovascular: Negative.    Gastrointestinal:  Positive for abdominal distention, abdominal pain, constipation and diarrhea. Negative for nausea and vomiting.   Genitourinary: Negative.    Musculoskeletal:  Positive for back pain.   Allergic/Immunologic: Positive for food allergies.   Neurological:  Positive for light-headedness.   Hematological: Negative.    Psychiatric/Behavioral: Negative.         ACTIVE PROBLEMS  Patient Active Problem List    Diagnosis     Irritable bowel syndrome with both constipation and diarrhea [K58.2]     Gastroesophageal reflux disease [K21.9]     Melena [K92.1]     History of stroke [Z86.73]     Pre-syncope [R55]     Mixed hyperlipidemia [E78.2]     HL (hearing loss) [H91.90]     Seizure [R56.9]     Encounter for screening for malignant neoplasm of colon [Z12.11]     COPD (chronic obstructive pulmonary disease) [J44.9]     Nocturnal seizures [R56.9]     TIA on medication [G45.9]     HTN (hypertension) [I10]     S/P percutaneous patent foramen ovale closure [Z87.74]     Sinus pause [I45.5]     Intracranial vascular stenosis [I67.9]     TIA (transient ischemic attack) [G45.9]     CVA (cerebral vascular accident) [I63.9]     Cerebral aneurysm, nonruptured [I67.1]     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]     ELIZABETH (obstructive sleep apnea) [G47.33]     Chronic bronchitis [J42]     Vitamin B12 deficiency [E53.8]     Chronic fatigue [R53.82]     Benign prostatic hypertrophy (BPH) with weak urinary stream [N40.1, R39.12]     Anemia, unspecified [D64.9]          PAST MEDICAL HISTORY  Past Medical History:   Diagnosis Date     Allergic 1985    Allergic rhinitis     Anal fissure     Aortic insufficiency     moderate, THIERRY 2017    Asthma     Asthma     Benign prostatic hypertrophy (BPH) with weak urinary stream 11/01/2016    Cataract     Chronic bronchitis     Colon polyp     COPD (chronic obstructive pulmonary disease)     Emphysema lung     Falls frequently     Fatty liver     GERD (gastroesophageal reflux disease)     Headache     Hepatitis     thought to be Hepatitis A     History of pneumonia     With left lower lobe atelectasis and scar tissue, being followed    HL (hearing loss)     Hypertension     Low back pain     Memory loss     Mixed hyperlipidemia 1/30/2023    Obstructive sleep apnea syndrome 08/23/2017    refuses c-pap    PFO (patent foramen ovale)     s/p percutaneous closure with a 25mm Amplatzer device in December 2018    Pneumonia     LLL with scar tissue    Seizures     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 total of 3 strokes    Tremor     Comes & goes    Vision loss          SURGICAL HISTORY  Past Surgical History:   Procedure Laterality Date    CARDIAC CATHETERIZATION N/A 12/12/2018    Procedure: Patent foramen ovale closure- Abbott;  Surgeon: Deangelo Harris MD;  Location:  [facility];  Service: Cardiology    CARDIAC CATHETERIZATION N/A 12/12/2018    Procedure: Intracardiac echocardiogram;  Surgeon: Deangelo Harris MD;  Location:  [facility];  Service: Cardiology    CARDIAC ELECTROPHYSIOLOGY PROCEDURE N/A 3/26/2018    Procedure: Loop insertion   CONFIRM RX;  Surgeon: Luis Wyatt MD;  Location:  [facility];  Service: Cardiovascular    CARDIAC ELECTROPHYSIOLOGY PROCEDURE N/A 7/15/2020    Procedure: Loop recorder removal;  Surgeon: Starr Driscoll MD;  Location:  [facility];  Service: Cardiovascular;  Laterality: N/A;    CARDIAC SURGERY      COLONOSCOPY      COLONOSCOPY  N/A 10/9/2020    Procedure: COLONOSCOPY with polypectomy;  Surgeon: Ras Mendoza MD;  Location: [facility] OR;  Service: Gastroenterology;  Laterality: N/A;  diverticulosis  ascending polyp  transverse polyp  sigmoid polyps X 2  rectal polyp    HAND SURGERY Bilateral     INGUINAL HERNIA REPAIR Left     OTHER SURGICAL HISTORY      inguinal hernia repair for person over age 5    TONSILLECTOMY      TONSILLECTOMY           FAMILY HISTORY  Family History   Problem Relation Age of Onset    Obesity Mother     Clotting disorder Mother         Upper extremities    Alcohol abuse Father     Cancer Father         Esophagus and lung    Other Father         cardiac disorder    Heart disease Father     Depression Father     Kidney disease Sister     Kidney failure Sister     Drug abuse Paternal Uncle     Stroke Sister     Throat cancer Sister     Drug abuse Paternal Uncle          SOCIAL HISTORY  Social History     Occupational History    Occupation:      Employer: RETIRED   Tobacco Use    Smoking status: Former     Packs/day: 1.50     Years: 15.00     Pack years: 22.50     Types: Cigarettes     Quit date: 1979     Years since quittin.5     Passive exposure: Past    Smokeless tobacco: Never    Tobacco comments:     caffeine use: Drinks 4 glasses of Dt coke on avg.    Vaping Use    Vaping Use: Never used   Substance and Sexual Activity    Alcohol use: No    Drug use: No    Sexual activity: Defer         CURRENT MEDICATIONS    Current Outpatient Medications:     clopidogrel (PLAVIX) 75 MG tablet, Take 1 tablet by mouth Daily for 90 days., Disp: 30 tablet, Rfl: 11    Cyanocobalamin 1000 MCG/ML kit, Inject  as directed 1 (One) Time Per Week. 1/4 of a cc IM every , Disp: , Rfl:     fexofenadine (ALLEGRA) 180 MG tablet, Take 1 tablet by mouth Daily., Disp: , Rfl:     hydroCHLOROthiazide (HYDRODIURIL) 12.5 MG tablet, Take 1 tablet by mouth Daily., Disp: 30 tablet, Rfl: 11    phenytoin ER (DILANTIN)  "100 MG capsule, TAKE TWO CAPSULES BY MOUTH DAILY, Disp: 60 capsule, Rfl: 3    ALLERGIES  Aspirin, Citrus, Combivent [ipratropium-albuterol], Lactose intolerance (gi), and Statins    VITALS  Vitals:    09/05/23 1314   BP: 116/64   BP Location: Left arm   Patient Position: Sitting   Cuff Size: Adult   Weight: 86.5 kg (190 lb 12.8 oz)   Height: 185.4 cm (72.99\")       PHYSICAL EXAM  Debilities/Disabilities Identified: None  Emotional Behavior: Appropriate  Wt Readings from Last 3 Encounters:   09/05/23 86.5 kg (190 lb 12.8 oz)   08/25/23 85.9 kg (189 lb 6.4 oz)   05/02/23 86 kg (189 lb 9.6 oz)     Ht Readings from Last 1 Encounters:   09/05/23 185.4 cm (72.99\")     Body mass index is 25.18 kg/m².  Physical Exam  Constitutional:       General: He is not in acute distress.     Appearance: Normal appearance. He is not ill-appearing.   HENT:      Head: Normocephalic and atraumatic.      Mouth/Throat:      Mouth: Mucous membranes are moist.      Pharynx: No posterior oropharyngeal erythema.   Eyes:      General: No scleral icterus.  Cardiovascular:      Rate and Rhythm: Normal rate and regular rhythm.      Heart sounds: Normal heart sounds.   Pulmonary:      Effort: Pulmonary effort is normal.      Breath sounds: Normal breath sounds.   Abdominal:      General: Abdomen is flat. Bowel sounds are normal. There is no distension.      Palpations: Abdomen is soft. There is no mass.      Tenderness: There is no abdominal tenderness. There is no guarding or rebound. Negative signs include Reis's sign.      Hernia: No hernia is present.   Musculoskeletal:      Cervical back: Neck supple.   Skin:     General: Skin is warm.      Capillary Refill: Capillary refill takes less than 2 seconds.   Neurological:      General: No focal deficit present.      Mental Status: He is alert and oriented to person, place, and time.   Psychiatric:         Mood and Affect: Mood normal.         Behavior: Behavior normal.         Thought Content: " Thought content normal.         Judgment: Judgment normal.       CLINICAL DATA REVIEWED   reviewed previous lab results and integrated with today's visit, reviewed notes from other physicians and/or last GI encounter, reviewed previous endoscopy results and available photos, reviewed surgical pathology results from previous biopsies    ASSESSMENT  Diagnoses and all orders for this visit:    Irritable bowel syndrome with both constipation and diarrhea    Gastroesophageal reflux disease, unspecified whether esophagitis present          PLAN    Daily fiber, probiotic, plenty of liquids    Return in about 4 months (around 1/5/2024).    I have discussed the above plan with the patient.  They verbalize understanding and are in agreement with the plan.  They have been advised to contact the office for any questions, concerns, or changes related to their health.

## 2023-10-16 ENCOUNTER — OFFICE VISIT (OUTPATIENT)
Dept: ORTHOPEDIC SURGERY | Facility: CLINIC | Age: 73
End: 2023-10-16
Payer: MEDICARE

## 2023-10-16 VITALS
HEART RATE: 65 BPM | WEIGHT: 183 LBS | HEIGHT: 73 IN | BODY MASS INDEX: 24.25 KG/M2 | SYSTOLIC BLOOD PRESSURE: 135 MMHG | DIASTOLIC BLOOD PRESSURE: 74 MMHG

## 2023-10-16 DIAGNOSIS — M25.551 RIGHT HIP PAIN: Primary | ICD-10-CM

## 2023-10-16 PROCEDURE — 99203 OFFICE O/P NEW LOW 30 MIN: CPT | Performed by: INTERNAL MEDICINE

## 2023-10-16 PROCEDURE — 3075F SYST BP GE 130 - 139MM HG: CPT | Performed by: INTERNAL MEDICINE

## 2023-10-16 PROCEDURE — 3078F DIAST BP <80 MM HG: CPT | Performed by: INTERNAL MEDICINE

## 2023-10-16 PROCEDURE — 73502 X-RAY EXAM HIP UNI 2-3 VIEWS: CPT | Performed by: INTERNAL MEDICINE

## 2023-10-16 RX ORDER — CLOPIDOGREL BISULFATE 75 MG/1
TABLET ORAL
COMMUNITY
Start: 2023-10-09

## 2023-10-16 NOTE — PROGRESS NOTES
Subjective:     Patient ID: Ronnell Rizvi is a 73 y.o. male.    Chief Complaint:    History of Present Illness  Ronnell Rizvi presents to clinic today for evaluation of right anterior and lateral hip pain.  The patient states that the pain began a few weeks ago without any specific injury.  He states that it is worse with activity and better with rest.  He states he notices that when sleeping at night and when sitting in his truck the most.  It is gradually improved but is still nowhere near how it was prior to beginning.  He states that something has to be going on because this is abnormal for him.     Social History     Occupational History    Occupation:      Employer: RETIRED   Tobacco Use    Smoking status: Former     Packs/day: 1.50     Years: 15.00     Additional pack years: 0.00     Total pack years: 22.50     Types: Cigarettes     Quit date: 1979     Years since quittin.6     Passive exposure: Past    Smokeless tobacco: Never    Tobacco comments:     caffeine use: Drinks 4 glasses of Dt coke on avg.    Vaping Use    Vaping Use: Never used   Substance and Sexual Activity    Alcohol use: No    Drug use: No    Sexual activity: Defer      Past Medical History:   Diagnosis Date    Allergic     Allergic rhinitis     Anal fissure     Aortic insufficiency     moderate, THIERRY 2017    Asthma     Asthma     Benign prostatic hypertrophy (BPH) with weak urinary stream 2016    Cataract     Chronic bronchitis     Colon polyp     COPD (chronic obstructive pulmonary disease)     Emphysema lung     Falls frequently     Fatty liver     GERD (gastroesophageal reflux disease)     Headache     Hepatitis     thought to be Hepatitis A     History of pneumonia     With left lower lobe atelectasis and scar tissue, being followed    HL (hearing loss)     Hypertension     Low back pain     Memory loss     Mixed hyperlipidemia 2023    Obstructive sleep apnea syndrome 2017    refuses c-pap    PFO  (patent foramen ovale)     s/p percutaneous closure with a 25mm Amplatzer device in December 2018    Pneumonia     LLL with scar tissue    Seizures     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 total of 3 strokes    Tremor     Comes & goes    Vision loss      Past Surgical History:   Procedure Laterality Date    CARDIAC CATHETERIZATION N/A 12/12/2018    Procedure: Patent foramen ovale closure- Abbott;  Surgeon: Deangelo Harris MD;  Location:  [facility];  Service: Cardiology    CARDIAC CATHETERIZATION N/A 12/12/2018    Procedure: Intracardiac echocardiogram;  Surgeon: Deangelo Harris MD;  Location:  [facility];  Service: Cardiology    CARDIAC ELECTROPHYSIOLOGY PROCEDURE N/A 3/26/2018    Procedure: Loop insertion   CONFIRM RX;  Surgeon: Luis Wyatt MD;  Location:  [facility];  Service: Cardiovascular    CARDIAC ELECTROPHYSIOLOGY PROCEDURE N/A 7/15/2020    Procedure: Loop recorder removal;  Surgeon: Starr Driscoll MD;  Location:  [facility];  Service: Cardiovascular;  Laterality: N/A;    CARDIAC SURGERY      COLONOSCOPY      COLONOSCOPY N/A 10/9/2020    Procedure: COLONOSCOPY with polypectomy;  Surgeon: Ras Mendoza MD;  Location:  [facility];  Service: Gastroenterology;  Laterality: N/A;  diverticulosis  ascending polyp  transverse polyp  sigmoid polyps X 2  rectal polyp    HAND SURGERY Bilateral     INGUINAL HERNIA REPAIR Left     OTHER SURGICAL HISTORY      inguinal hernia repair for person over age 5    TONSILLECTOMY      TONSILLECTOMY         Family History   Problem Relation Age of Onset    Obesity Mother     Clotting disorder Mother         Upper extremities    Alcohol abuse Father     Cancer Father 63        Esophagus and lung    Other Father         cardiac disorder    Heart disease Father     Depression Father     Kidney disease Sister     Kidney  "failure Sister     Drug abuse Paternal Uncle     Stroke Sister     Throat cancer Sister     Drug abuse Paternal Uncle                  Objective:  Vitals:    10/16/23 1414   BP: 135/74   BP Location: Left arm   Pulse: 65   Weight: 83 kg (183 lb)   Height: 185.4 cm (72.99\")         10/16/23  1414   Weight: 83 kg (183 lb)     Body mass index is 24.15 kg/m².        Right Hip Exam     Tenderness   The patient is experiencing tenderness in the anterior and lateral.    Range of Motion   Flexion:  normal   External rotation:  normal   Internal rotation:  normal     Tests   RACHELL: negative  Fadir:  Positive FADIR test    Other   Erythema: absent  Scars: absent  Sensation: normal  Pulse: present               Imaging: 3 views of the right hip and pelvis were ordered and reviewed by myself in the office today  Indication: Right hip pain  Findings: X-rays demonstrate no acute osseous abnormality and no signs of fracture dislocation or subluxation.  X-rays demonstrate no joint space narrowing femoral head flattening subchondral sclerosis cystic changes or periarticular osteophytes.  Comparative studies: None        Assessment:        1. Right hip pain           Plan:          Discussed treatment options at length with patient at today's visit.  Discussed with the patient at this point time we will suspicion for severe injury is low.  The patient states that something has to be going on since this is not abnormal for him.  I offered physical therapy and nonsteroidal anti-inflammatories.  The patient is quite nervous that there is something severe going on inside of his hip since he is never had issues before.  The patient is hopeful to get an MRI.  An MRI was ordered today to evaluate for intra-articular pathologies.  This could include early avascular necrosis or nondisplaced femoral neck or head fracture.  MRI was ordered and I will plan to see the patient back following the MRI to go over the results  Follow up: After " MRI      Ronnell Rizvi was in agreement with plan and had all questions answered.     Medications:  No orders of the defined types were placed in this encounter.      Followup:  No follow-ups on file.    Diagnoses and all orders for this visit:    1. Right hip pain (Primary)  -     XR Hip With or Without Pelvis 2 - 3 View Right  -     MRI hip right wo contrast; Future          Dictated utilizing Dragon dictation

## 2023-11-03 ENCOUNTER — HOSPITAL ENCOUNTER (OUTPATIENT)
Dept: MRI IMAGING | Facility: HOSPITAL | Age: 73
Discharge: HOME | End: 2023-11-03
Admitting: INTERNAL MEDICINE
Payer: MEDICARE

## 2023-11-03 DIAGNOSIS — M25.551 RIGHT HIP PAIN: ICD-10-CM

## 2023-11-03 PROCEDURE — 73721 MRI JNT OF LWR EXTRE W/O DYE: CPT

## 2023-11-14 ENCOUNTER — OFFICE VISIT (OUTPATIENT)
Dept: ORTHOPEDIC SURGERY | Facility: CLINIC | Age: 73
End: 2023-11-14
Payer: MEDICARE

## 2023-11-14 VITALS — WEIGHT: 183 LBS | HEIGHT: 73 IN | BODY MASS INDEX: 24.25 KG/M2

## 2023-11-14 DIAGNOSIS — M24.152 DEGENERATIVE TEAR OF ACETABULAR LABRUM OF LEFT HIP: Primary | ICD-10-CM

## 2023-11-14 PROCEDURE — 99213 OFFICE O/P EST LOW 20 MIN: CPT | Performed by: INTERNAL MEDICINE

## 2023-11-14 NOTE — PROGRESS NOTES
Subjective:     Patient ID: Ronnell Rizvi is a 73 y.o. male.    Chief Complaint:    History of Present Illness  Ronnell Rizvi returns to clinic today for evaluation of right anterior groin/hip pain.  The patient was seen by me in the middle of October at that time hip  Set severe anterior right hip pain.  There was concern for labral pathology or AVN versus nondisplaced fracture.  He was sent for an MRI.  He states in the meantime his hip pain has improved to about 80% recovery.  He is not hearing better but is happy that he is making progress.  He states at night he notices that the most he started sleeping with a dogmatic between his legs and this is helped significantly     Social History     Occupational History    Occupation:      Employer: RETIRED   Tobacco Use    Smoking status: Former     Packs/day: 1.50     Years: 15.00     Additional pack years: 0.00     Total pack years: 22.50     Types: Cigarettes     Quit date: 1979     Years since quittin.7     Passive exposure: Past    Smokeless tobacco: Never    Tobacco comments:     caffeine use: Drinks 4 glasses of Dt coke on avg.    Vaping Use    Vaping Use: Never used   Substance and Sexual Activity    Alcohol use: No    Drug use: No    Sexual activity: Defer      Past Medical History:   Diagnosis Date    Allergic     Allergic rhinitis     Anal fissure     Aortic insufficiency     moderate, THIERRY 2017    Asthma     Asthma     Benign prostatic hypertrophy (BPH) with weak urinary stream 2016    Cataract     Chronic bronchitis     Colon polyp     COPD (chronic obstructive pulmonary disease)     Emphysema lung     Falls frequently     Fatty liver     GERD (gastroesophageal reflux disease)     Headache     Hepatitis     thought to be Hepatitis A     History of pneumonia     With left lower lobe atelectasis and scar tissue, being followed    HL (hearing loss)     Hypertension     Low back pain     Memory loss     Mixed hyperlipidemia  1/30/2023    Obstructive sleep apnea syndrome 08/23/2017    refuses c-pap    PFO (patent foramen ovale)     s/p percutaneous closure with a 25mm Amplatzer device in December 2018    Pneumonia     LLL with scar tissue    Seizures     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 total of 3 strokes    Tremor     Comes & goes    Vision loss      Past Surgical History:   Procedure Laterality Date    CARDIAC CATHETERIZATION N/A 12/12/2018    Procedure: Patent foramen ovale closure- Abbott;  Surgeon: Deangelo Harris MD;  Location:  [facility]VAS[facility];  Service: Cardiology    CARDIAC CATHETERIZATION N/A 12/12/2018    Procedure: Intracardiac echocardiogram;  Surgeon: Deangelo Harris MD;  Location:  [facility];  Service: Cardiology    CARDIAC ELECTROPHYSIOLOGY PROCEDURE N/A 3/26/2018    Procedure: Loop insertion   CONFIRM RX;  Surgeon: Luis Wyatt MD;  Location:  [facility];  Service: Cardiovascular    CARDIAC ELECTROPHYSIOLOGY PROCEDURE N/A 7/15/2020    Procedure: Loop recorder removal;  Surgeon: Starr Driscoll MD;  Location:  [facility];  Service: Cardiovascular;  Laterality: N/A;    CARDIAC SURGERY      COLONOSCOPY      COLONOSCOPY N/A 10/9/2020    Procedure: COLONOSCOPY with polypectomy;  Surgeon: Ras Mendoza MD;  [facility]: [facility] OR;  Service: Gastroenterology;  Laterality: N/A;  diverticulosis  ascending polyp  transverse polyp  sigmoid polyps X 2  rectal polyp    HAND SURGERY Bilateral     INGUINAL HERNIA REPAIR Left     OTHER SURGICAL HISTORY      inguinal hernia repair for person over age 5    TONSILLECTOMY      TONSILLECTOMY         Family History   Problem Relation Age of Onset    Obesity Mother     Clotting disorder Mother         Upper extremities    Alcohol abuse Father     Cancer Father 63        Esophagus and lung    Other Father         cardiac disorder     "Heart disease Father     Depression Father     Kidney disease Sister     Kidney failure Sister     Drug abuse Paternal Uncle     Stroke Sister     Throat cancer Sister     Drug abuse Paternal Uncle                  Objective:  Vitals:    11/14/23 1438   Weight: 83 kg (183 lb)   Height: 185.4 cm (72.99\")         11/14/23  1438   Weight: 83 kg (183 lb)     Body mass index is 24.15 kg/m².        Right Hip Exam     Tenderness   The patient is experiencing tenderness in the anterior.    Range of Motion   Flexion:  110   External rotation:  40   Internal rotation:  10     Muscle Strength   Flexion: 5/5     Tests   RACHELL: negative  Fadir:  Positive FADIR test    Other   Erythema: absent  Scars: absent  Sensation: normal  Pulse: present               Imaging: MRI right hip was reviewed by myself in the office today  Indication: Right hip pain  Findings: MRI demonstrates mild chondromalacia with a small anterior superior acetabular labral tear.  No signs of fracture or AVN.  Comparative studies: Plain radiographs  MRI Hip Right Without Contrast    Result Date: 11/3/2023  Focal right anterior superior acetabular labral tearing. Mild right hip chondrosis without evidence of full-thickness articular cartilage loss. Mild right distal gluteal tendinopathy without tendon tear.  No evidence of fracture or avascular necrosis.  This report was finalized on 11/3/2023 3:57 PM by Remy Flower MD.    Assessment:        1. 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  I discussed with the patient that at this point time since he is about 80% recovered I recommend continued observation.  I discussed with him that if he fails to make a full recovery the next course of action would be either physical therapy or referral to Dr. Sheldon or Morris for an intra-articular hip injection.  He states he like to hold off for the time being because he " is still continuing to improve.  I discussed with them that if he fails to make a full recovery or the pain worsens to please call the office and I will refer him to the sports medicine group for hip injection.  [facility] standing in agreement with that plan.  Follow up: As needed      Ronnell Rizvi was in agreement with plan and had all questions answered.     Medications:  No orders of the defined types were placed in this encounter.      Followup:  No follow-ups on file.    Diagnoses and all orders for this visit:    1. 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

## 2023-11-19 DIAGNOSIS — R56.9 SEIZURE: Primary | ICD-10-CM

## 2023-11-20 RX ORDER — PHENYTOIN SODIUM 100 MG/1
200 CAPSULE, EXTENDED RELEASE ORAL DAILY
Qty: 60 CAPSULE | Refills: 3 | Status: SHIPPED | OUTPATIENT
Start: 2023-11-20

## 2023-12-20 DIAGNOSIS — R56.9 SEIZURE: ICD-10-CM

## 2023-12-20 RX ORDER — PHENYTOIN SODIUM 100 MG/1
200 CAPSULE, EXTENDED RELEASE ORAL DAILY
Qty: 60 CAPSULE | Refills: 3 | OUTPATIENT
Start: 2023-12-20

## 2023-12-29 ENCOUNTER — APPOINTMENT (OUTPATIENT)
Dept: CT IMAGING | Facility: HOSPITAL | Age: 73
End: 2023-12-29
Payer: MEDICARE

## 2023-12-29 ENCOUNTER — TELEPHONE (OUTPATIENT)
Dept: NEUROLOGY | Facility: CLINIC | Age: 73
End: 2023-12-29
Payer: MEDICARE

## 2023-12-29 ENCOUNTER — HOSPITAL ENCOUNTER (EMERGENCY)
Facility: HOSPITAL | Age: 73
Discharge: HOME | End: 2023-12-29
Attending: EMERGENCY MEDICINE
Payer: MEDICARE

## 2023-12-29 VITALS
HEIGHT: 73 IN | RESPIRATION RATE: 16 BRPM | HEART RATE: 55 BPM | TEMPERATURE: 97.8 F | SYSTOLIC BLOOD PRESSURE: 135 MMHG | WEIGHT: 183 LBS | DIASTOLIC BLOOD PRESSURE: 71 MMHG | OXYGEN SATURATION: 95 % | BODY MASS INDEX: 24.25 KG/M2

## 2023-12-29 DIAGNOSIS — R51.9 ACUTE NONINTRACTABLE HEADACHE, UNSPECIFIED HEADACHE TYPE: Primary | ICD-10-CM

## 2023-12-29 PROCEDURE — 70450 CT HEAD/BRAIN W/O DYE: CPT

## 2023-12-29 PROCEDURE — 72125 CT NECK SPINE W/O DYE: CPT

## 2023-12-29 PROCEDURE — 99284 EMERGENCY DEPT VISIT MOD MDM: CPT

## 2023-12-29 RX ORDER — ACETAMINOPHEN 500 MG
1000 TABLET ORAL ONCE
Status: COMPLETED | OUTPATIENT
Start: 2023-12-29 | End: 2023-12-29

## 2023-12-29 RX ORDER — METAXALONE 800 MG/1
800 TABLET ORAL 3 TIMES DAILY
Qty: 12 TABLET | Refills: 0 | Status: SHIPPED | OUTPATIENT
Start: 2023-12-29

## 2023-12-29 RX ADMIN — ACETAMINOPHEN 1000 MG: 500 TABLET ORAL at 18:52

## 2023-12-29 NOTE — TELEPHONE ENCOUNTER
Received call from Pt. He states that he has a HA and he never has a headache. I advised Pt to be seen in the ER due to his Hx of stroke. Pt concerned they will think he is a hypochondriac and I explained to him that it is better to exercise on the side of caution given his Hx. V/u

## 2023-12-29 NOTE — ED PROVIDER NOTES
Subjective   History of Present Illness  73-year-old male presents emergency room complaining of headache.  Patient has a history of strokes in the past and is on anticoagulation.  Patient states that he rarely gets headaches and so when he started having a headache this morning that has been present though fluctuating in intensity since around 7-9 AM this morning he called his neurologist because he was concerned.  His neurologist told him to come the emergency room to be evaluated.  Patient states that his headache is on the left parietal area which is where his past strokes have been.  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  Patient states he is taking all his medications as directed today and yesterday.  Patient reports no fever visual changes neck pain jaw pain sore throat diaphoresis shortness of breath chest pain anorexia nausea vomiting diarrhea weakness of any extremity or difficulty ambulating.  Patient states he took a Tylenol this morning which mildly improved his headache.  Patient does not report sudden severe onset but instead describes noticing it and it was present and would go away with intermittent intensity at times.  Patient has no nausea, altered mental status dizziness dyspnea vision deficit memory loss or speech disturbances.      Review of Systems   Constitutional:  Negative for activity change, appetite change, chills, diaphoresis, fatigue and fever.   HENT:  Negative for congestion, sinus pressure, sneezing and sore throat.    Eyes:  Negative for photophobia and visual disturbance.   Respiratory:  Negative for cough and shortness of breath.    Cardiovascular:  Negative for chest pain, palpitations and leg swelling.   Gastrointestinal:  Negative for abdominal distention, abdominal pain, diarrhea, nausea and vomiting.   Genitourinary:  Negative for dysuria and flank pain.  "  Musculoskeletal:  Negative for arthralgias, back pain and myalgias.   Skin:  Negative for rash.   Neurological:  Positive for headaches. Negative for dizziness and weakness.   Psychiatric/Behavioral:  Negative for behavioral problems and confusion.        Past Medical History:   Diagnosis Date    Allergic 1985    Allergic rhinitis     Anal fissure     Aortic insufficiency     moderate, THIERRY 2017    Asthma     Asthma     Benign prostatic hypertrophy (BPH) with weak urinary stream 11/01/2016    Cataract     Chronic bronchitis     Colon polyp     COPD (chronic obstructive pulmonary disease)     Emphysema lung     Falls frequently     Fatty liver     GERD (gastroesophageal reflux disease)     Headache     Hepatitis     thought to be Hepatitis A     History of pneumonia     With left lower lobe atelectasis and scar tissue, being followed    HL (hearing loss)     Hypertension     Low back pain     Memory loss     Mixed hyperlipidemia 1/30/2023    Obstructive sleep apnea syndrome 08/23/2017    refuses c-pap    PFO (patent foramen ovale)     s/p percutaneous closure with a 25mm Amplatzer device in December 2018    Pneumonia     LLL with scar tissue    Seizures     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 total of 3 strokes    Tremor     Comes & goes    Vision loss        Allergies   Allergen Reactions    Aspirin Nausea Only     Pt states he \"can tolerate enteric coated aspirin only.\"     Citrus      Blisters on mouth      Combivent [Ipratropium-Albuterol] Other (See Comments)     Throat swelling    Lactose Intolerance (Gi)     Statins Mental Status Change     Severe memory impairment       Past Surgical History:   Procedure Laterality Date    CARDIAC CATHETERIZATION N/A 12/12/2018    Procedure: Patent foramen ovale closure- Abbott;  Surgeon: Deangelo Harris MD;  Location: [facility] CATH INVASIVE LOCATION;  Service: Cardiology    CARDIAC CATHETERIZATION N/A " 2018    Procedure: Intracardiac echocardiogram;  Surgeon: Deangelo Harris MD;  Location:  [facility];  Service: Cardiology    CARDIAC ELECTROPHYSIOLOGY PROCEDURE N/A 3/26/2018    Procedure: Loop insertion   CONFIRM RX;  Surgeon: Luis Wyatt MD;  Location:  [facility];  Service: Cardiovascular    CARDIAC ELECTROPHYSIOLOGY PROCEDURE N/A 7/15/2020    Procedure: Loop recorder removal;  Surgeon: Starr Driscoll MD;  Location:  [facility];  Service: Cardiovascular;  Laterality: N/A;    CARDIAC SURGERY      COLONOSCOPY      COLONOSCOPY N/A 10/9/2020    Procedure: COLONOSCOPY with polypectomy;  Surgeon: Ras Mendoza MD;  Location:  [facility] OR;  Service: Gastroenterology;  Laterality: N/A;  diverticulosis  ascending polyp  transverse polyp  sigmoid polyps X 2  rectal polyp    HAND SURGERY Bilateral     INGUINAL HERNIA REPAIR Left     OTHER SURGICAL HISTORY      inguinal hernia repair for person over age 5    TONSILLECTOMY      TONSILLECTOMY         Family History   Problem Relation Age of Onset    Obesity Mother     Clotting disorder Mother         Upper extremities    Alcohol abuse Father     Cancer Father 63        Esophagus and lung    Other Father         cardiac disorder    Heart disease Father     Depression Father     Kidney disease Sister     Kidney failure Sister     Drug abuse Paternal Uncle     Stroke Sister     Throat cancer Sister     Drug abuse Paternal Uncle        Social History     Socioeconomic History    Marital status:      Spouse name: Joey   Tobacco Use    Smoking status: Former     Packs/day: 1.50     Years: 15.00     Additional pack years: 0.00     Total pack years: 22.50     Types: Cigarettes     Quit date: 1979     Years since quittin.8     Passive exposure: Past    Smokeless tobacco: Never    Tobacco comments:     caffeine use: Drinks 4 glasses of Dt coke on avg.    Vaping Use    Vaping Use:  Never used   Substance and Sexual Activity    Alcohol use: No    Drug use: No    Sexual activity: Defer           Objective   Physical Exam  Vitals and nursing note reviewed.   Constitutional:       General: He is not in acute distress.     Appearance: Normal appearance. He is not toxic-appearing or diaphoretic.      Comments: Pleasant 73-year-old male in no acute distress   HENT:      Head: Normocephalic and atraumatic.      Mouth/Throat:      Mouth: Mucous membranes are moist.   Eyes:      Conjunctiva/sclera: Conjunctivae normal.   Cardiovascular:      Rate and Rhythm: Normal rate.      Pulses: Normal pulses.   Pulmonary:      Effort: Pulmonary effort is normal. No respiratory distress.   Abdominal:      General: Abdomen is flat. There is no distension.   Musculoskeletal:         General: No swelling or signs of injury. Normal range of motion.      Cervical back: Normal range of motion and neck supple.   Skin:     General: Skin is warm and dry.      Findings: No rash.   Neurological:      General: No focal deficit present.      Mental Status: He is alert and oriented to person, place, and time.   Psychiatric:         Mood and Affect: Mood normal.         Behavior: Behavior normal.         Procedures           ED Course  ED Course as of 12/29/23 1957   Fri Dec 29, 2023   1829 CT head:  IMPRESSION:     No acute intracranial abnormality. Chronic findings as above.     Signer Name: Remy Flower MD   Signed: 12/29/2023 6:21 PM [facility]   []   1830 CT C-spine:  IMPRESSION:     No acute abnormality of the cervical spine.        Signer Name: Remy Flower MD   Signed: 12/29/2023 6:22 PM [facility]   []   1830 Spoke with patient concerning CT findings specifically no acute abnormality noted.  Patient is to follow-up with his neurologist as needed for headache and/or can return to emergency room for new persistent or worsening symptoms.  Patient states he  understands agrees with plan. []   1839 Patient reports headache is improved and does not want Tylenol in the emergency room. []      ED Course User Index  [] Bobby Pyle MD                          Total (NIH Stroke Scale): 0                  Medical Decision Making  My differential diagnosis for headache includes but is not limited to:  Migraine, cluster, ischemic stroke, subarachnoid hemorrhage, intracranial hemorrhage, vascular malformation, cerebral aneurysm, vascular dissection, vasculitis, temporal arteritis, malignant hypertension, pheochromocytoma, cerebral venous thrombosis, preeclampsia; bacterial meningitis, viral meningitis, fungal meningitis, encephalitis, brain abscess, pleural empyema, sinusitis, dental infection, influenza, viral syndrome; carbon monoxide exposure, analgesic abuse, hypoglycemia; trigeminal neuralgia, postherpetic neuralgia, occipital neuralgia; subdural hematoma, concussion, musculoskeletal tension, cervical osteoarthritis; glaucoma, TMJ disease, pseudotumor cerebri, post LP headache, intracranial neoplasm, sleep apnea     Problems Addressed:  Acute nonintractable headache, unspecified headache type: complicated acute illness or injury    Amount and/or Complexity of Data Reviewed  Radiology: ordered. Decision-making details documented in ED Course.    Risk  OTC drugs.  Prescription drug management.        Final diagnoses:   Acute nonintractable headache, unspecified headache type       ED Disposition  ED Disposition       ED Disposition   Discharge    Condition   Stable    Comment   --               Deena Tarango MD  [address] [facility] [address] [address] [phone]    Schedule an appointment as soon as possible for a visit   As needed    [facility] EMERGENCY DEPARTMENT  [address] [facility] [address] [phone]  Go to   As needed         Medication List        New Prescriptions      metaxalone 800 MG  tablet  Commonly known as: SKELAXIN  Take 1 tablet by mouth 3 (Three) Times a Day.               Where to Get Your Medications        These medications were sent to [facility] [id number] - [place] - [address] - [phone]  - [phone] FX  [address] [place] [address]      Phone: [phone]   metaxalone 800 MG tablet            Bobby Pyle MD  12/29/23 1832       Bobby Pyle MD  12/29/23 1845       Bobby Pyle MD  12/29/23 1957

## 2024-01-01 NOTE — SIGNIFICANT NOTE
01/15/20 1018   Rehab Time/Intention   Evaluation Not Performed other (see comments)  (Pt passed RN swallow screen. MRI negative for an acute infarct. Pt seen previously by ST for dysphagia and cognitive assessment, both were WFLs at that time. SLP to sign off. Please re consult if current status changes.)   Rehab Treatment   Discipline speech language pathologist      Statement Selected

## 2024-01-21 ENCOUNTER — APPOINTMENT (OUTPATIENT)
Dept: GENERAL RADIOLOGY | Facility: HOSPITAL | Age: 74
End: 2024-01-21
Payer: MEDICARE

## 2024-01-21 ENCOUNTER — HOSPITAL ENCOUNTER (INPATIENT)
Facility: HOSPITAL | Age: 74
LOS: 3 days | Discharge: HOME | End: 2024-01-24
Attending: EMERGENCY MEDICINE | Admitting: INTERNAL MEDICINE
Payer: MEDICARE

## 2024-01-21 DIAGNOSIS — R09.02 HYPOXIA: ICD-10-CM

## 2024-01-21 DIAGNOSIS — R56.9 SEIZURE: Primary | ICD-10-CM

## 2024-01-21 DIAGNOSIS — E87.6 HYPOKALEMIA: ICD-10-CM

## 2024-01-21 DIAGNOSIS — J18.9 PNEUMONIA OF RIGHT LUNG DUE TO INFECTIOUS ORGANISM, UNSPECIFIED PART OF LUNG: ICD-10-CM

## 2024-01-21 LAB
ALBUMIN SERPL-MCNC: 3.7 G/DL (ref 3.5–5.2)
ALBUMIN/GLOB SERPL: 1.9 G/DL
ALP SERPL-CCNC: 69 U/L (ref 39–117)
ALT SERPL W P-5'-P-CCNC: 22 U/L (ref 1–41)
ANION GAP SERPL CALCULATED.3IONS-SCNC: 12.1 MMOL/L (ref 5–15)
AST SERPL-CCNC: 21 U/L (ref 1–40)
B PARAPERT DNA SPEC QL NAA+PROBE: NOT DETECTED
B PERT DNA SPEC QL NAA+PROBE: NOT DETECTED
BASOPHILS # BLD AUTO: 0.04 10*3/MM3 (ref 0–0.2)
BASOPHILS NFR BLD AUTO: 0.4 % (ref 0–1.5)
BILIRUB SERPL-MCNC: 0.2 MG/DL (ref 0–1.2)
BUN SERPL-MCNC: 11 MG/DL (ref 8–23)
BUN/CREAT SERPL: 11.3 (ref 7–25)
C PNEUM DNA NPH QL NAA+NON-PROBE: NOT DETECTED
CALCIUM SPEC-SCNC: 8.5 MG/DL (ref 8.6–10.5)
CHLORIDE SERPL-SCNC: 101 MMOL/L (ref 98–107)
CO2 SERPL-SCNC: 24.9 MMOL/L (ref 22–29)
CREAT SERPL-MCNC: 0.97 MG/DL (ref 0.76–1.27)
DEPRECATED RDW RBC AUTO: 43.1 FL (ref 37–54)
EGFRCR SERPLBLD CKD-EPI 2021: 81.4 ML/MIN/1.73
EOSINOPHIL # BLD AUTO: 0.1 10*3/MM3 (ref 0–0.4)
EOSINOPHIL NFR BLD AUTO: 1 % (ref 0.3–6.2)
ERYTHROCYTE [DISTWIDTH] IN BLOOD BY AUTOMATED COUNT: 12.7 % (ref 12.3–15.4)
FLUAV RNA RESP QL NAA+PROBE: NOT DETECTED
FLUAV SUBTYP SPEC NAA+PROBE: NOT DETECTED
FLUBV RNA ISLT QL NAA+PROBE: NOT DETECTED
FLUBV RNA RESP QL NAA+PROBE: NOT DETECTED
GEN 5 2HR TROPONIN T REFLEX: 23 NG/L
GLOBULIN UR ELPH-MCNC: 2 GM/DL
GLUCOSE SERPL-MCNC: 131 MG/DL (ref 65–99)
HADV DNA SPEC NAA+PROBE: NOT DETECTED
HCOV 229E RNA SPEC QL NAA+PROBE: NOT DETECTED
HCOV HKU1 RNA SPEC QL NAA+PROBE: NOT DETECTED
HCOV NL63 RNA SPEC QL NAA+PROBE: NOT DETECTED
HCOV OC43 RNA SPEC QL NAA+PROBE: NOT DETECTED
HCT VFR BLD AUTO: 46.7 % (ref 37.5–51)
HGB BLD-MCNC: 15.9 G/DL (ref 13–17.7)
HMPV RNA NPH QL NAA+NON-PROBE: NOT DETECTED
HPIV1 RNA ISLT QL NAA+PROBE: NOT DETECTED
HPIV2 RNA SPEC QL NAA+PROBE: NOT DETECTED
HPIV3 RNA NPH QL NAA+PROBE: NOT DETECTED
HPIV4 P GENE NPH QL NAA+PROBE: NOT DETECTED
IMM GRANULOCYTES # BLD AUTO: 0.17 10*3/MM3 (ref 0–0.05)
IMM GRANULOCYTES NFR BLD AUTO: 1.8 % (ref 0–0.5)
L PNEUMO1 AG UR QL IA: NEGATIVE
LYMPHOCYTES # BLD AUTO: 1.36 10*3/MM3 (ref 0.7–3.1)
LYMPHOCYTES NFR BLD AUTO: 14 % (ref 19.6–45.3)
M PNEUMO IGG SER IA-ACNC: NOT DETECTED
MAGNESIUM SERPL-MCNC: 2.2 MG/DL (ref 1.6–2.4)
MCH RBC QN AUTO: 31.7 PG (ref 26.6–33)
MCHC RBC AUTO-ENTMCNC: 34 G/DL (ref 31.5–35.7)
MCV RBC AUTO: 93.2 FL (ref 79–97)
MONOCYTES # BLD AUTO: 0.72 10*3/MM3 (ref 0.1–0.9)
MONOCYTES NFR BLD AUTO: 7.4 % (ref 5–12)
MRSA DNA SPEC QL NAA+PROBE: NORMAL
NEUTROPHILS NFR BLD AUTO: 7.29 10*3/MM3 (ref 1.7–7)
NEUTROPHILS NFR BLD AUTO: 75.4 % (ref 42.7–76)
NRBC BLD AUTO-RTO: 0 /100 WBC (ref 0–0.2)
PHENYTOIN SERPL-MCNC: 1.3 MCG/ML (ref 10–20)
PHOSPHATE SERPL-MCNC: 1.9 MG/DL (ref 2.5–4.5)
PHOSPHATE SERPL-MCNC: 2.4 MG/DL (ref 2.5–4.5)
PLATELET # BLD AUTO: 225 10*3/MM3 (ref 140–450)
PMV BLD AUTO: 9.6 FL (ref 6–12)
POTASSIUM SERPL-SCNC: 3.3 MMOL/L (ref 3.5–5.2)
POTASSIUM SERPL-SCNC: 3.9 MMOL/L (ref 3.5–5.2)
PROCALCITONIN SERPL-MCNC: 0.06 NG/ML (ref 0–0.25)
PROT SERPL-MCNC: 5.7 G/DL (ref 6–8.5)
RBC # BLD AUTO: 5.01 10*6/MM3 (ref 4.14–5.8)
RHINOVIRUS RNA SPEC NAA+PROBE: NOT DETECTED
RSV RNA NPH QL NAA+NON-PROBE: NOT DETECTED
S PNEUM AG SPEC QL LA: NEGATIVE
SARS-COV-2 RNA NPH QL NAA+NON-PROBE: NOT DETECTED
SARS-COV-2 RNA RESP QL NAA+PROBE: NOT DETECTED
SODIUM SERPL-SCNC: 138 MMOL/L (ref 136–145)
TROPONIN T DELTA: 2 NG/L
TROPONIN T SERPL HS-MCNC: 21 NG/L
WBC NRBC COR # BLD AUTO: 9.68 10*3/MM3 (ref 3.4–10.8)

## 2024-01-21 PROCEDURE — 84100 ASSAY OF PHOSPHORUS: CPT | Performed by: EMERGENCY MEDICINE

## 2024-01-21 PROCEDURE — 25010000002 ENOXAPARIN PER 10 MG: Performed by: INTERNAL MEDICINE

## 2024-01-21 PROCEDURE — 25810000003 SODIUM CHLORIDE 0.9 % SOLUTION 250 ML FLEX CONT: Performed by: EMERGENCY MEDICINE

## 2024-01-21 PROCEDURE — 83735 ASSAY OF MAGNESIUM: CPT | Performed by: EMERGENCY MEDICINE

## 2024-01-21 PROCEDURE — 93010 ELECTROCARDIOGRAM REPORT: CPT | Performed by: INTERNAL MEDICINE

## 2024-01-21 PROCEDURE — 25010000002 CEFTRIAXONE PER 250 MG: Performed by: EMERGENCY MEDICINE

## 2024-01-21 PROCEDURE — 99285 EMERGENCY DEPT VISIT HI MDM: CPT

## 2024-01-21 PROCEDURE — 87449 NOS EACH ORGANISM AG IA: CPT | Performed by: INTERNAL MEDICINE

## 2024-01-21 PROCEDURE — 25010000002 METHYLPREDNISOLONE PER 125 MG: Performed by: EMERGENCY MEDICINE

## 2024-01-21 PROCEDURE — 71046 X-RAY EXAM CHEST 2 VIEWS: CPT

## 2024-01-21 PROCEDURE — 87641 MR-STAPH DNA AMP PROBE: CPT | Performed by: INTERNAL MEDICINE

## 2024-01-21 PROCEDURE — 25010000002 AMPICILLIN-SULBACTAM PER 1.5 G: Performed by: INTERNAL MEDICINE

## 2024-01-21 PROCEDURE — 87040 BLOOD CULTURE FOR BACTERIA: CPT | Performed by: INTERNAL MEDICINE

## 2024-01-21 PROCEDURE — 25010000002 AZITHROMYCIN PER 500 MG: Performed by: EMERGENCY MEDICINE

## 2024-01-21 PROCEDURE — 80053 COMPREHEN METABOLIC PANEL: CPT | Performed by: EMERGENCY MEDICINE

## 2024-01-21 PROCEDURE — 93005 ELECTROCARDIOGRAM TRACING: CPT | Performed by: EMERGENCY MEDICINE

## 2024-01-21 PROCEDURE — 84484 ASSAY OF TROPONIN QUANT: CPT | Performed by: EMERGENCY MEDICINE

## 2024-01-21 PROCEDURE — 94799 UNLISTED PULMONARY SVC/PX: CPT

## 2024-01-21 PROCEDURE — 84100 ASSAY OF PHOSPHORUS: CPT | Performed by: INTERNAL MEDICINE

## 2024-01-21 PROCEDURE — 87636 SARSCOV2 & INF A&B AMP PRB: CPT | Performed by: EMERGENCY MEDICINE

## 2024-01-21 PROCEDURE — 80185 ASSAY OF PHENYTOIN TOTAL: CPT | Performed by: EMERGENCY MEDICINE

## 2024-01-21 PROCEDURE — 84132 ASSAY OF SERUM POTASSIUM: CPT | Performed by: INTERNAL MEDICINE

## 2024-01-21 PROCEDURE — 99223 1ST HOSP IP/OBS HIGH 75: CPT | Performed by: INTERNAL MEDICINE

## 2024-01-21 PROCEDURE — 84484 ASSAY OF TROPONIN QUANT: CPT | Performed by: INTERNAL MEDICINE

## 2024-01-21 PROCEDURE — 0202U NFCT DS 22 TRGT SARS-COV-2: CPT | Performed by: INTERNAL MEDICINE

## 2024-01-21 PROCEDURE — 87205 SMEAR GRAM STAIN: CPT | Performed by: INTERNAL MEDICINE

## 2024-01-21 PROCEDURE — 85025 COMPLETE CBC W/AUTO DIFF WBC: CPT | Performed by: EMERGENCY MEDICINE

## 2024-01-21 PROCEDURE — 87070 CULTURE OTHR SPECIMN AEROBIC: CPT | Performed by: INTERNAL MEDICINE

## 2024-01-21 PROCEDURE — 84145 PROCALCITONIN (PCT): CPT | Performed by: INTERNAL MEDICINE

## 2024-01-21 PROCEDURE — 94640 AIRWAY INHALATION TREATMENT: CPT

## 2024-01-21 RX ORDER — SODIUM CHLORIDE 0.9 % (FLUSH) 0.9 %
10 SYRINGE (ML) INJECTION AS NEEDED
Status: DISCONTINUED | OUTPATIENT
Start: 2024-01-21 | End: 2024-01-24 | Stop reason: HOSPADM

## 2024-01-21 RX ORDER — GUAIFENESIN 600 MG/1
600 TABLET, EXTENDED RELEASE ORAL DAILY
COMMUNITY

## 2024-01-21 RX ORDER — ENOXAPARIN SODIUM 100 MG/ML
40 INJECTION SUBCUTANEOUS DAILY
Status: DISCONTINUED | OUTPATIENT
Start: 2024-01-21 | End: 2024-01-22

## 2024-01-21 RX ORDER — PHENYTOIN SODIUM 100 MG/1
200 CAPSULE, EXTENDED RELEASE ORAL DAILY
Status: DISCONTINUED | OUTPATIENT
Start: 2024-01-22 | End: 2024-01-24 | Stop reason: HOSPADM

## 2024-01-21 RX ORDER — PHENYTOIN SODIUM 100 MG/1
300 CAPSULE, EXTENDED RELEASE ORAL EVERY 12 HOURS SCHEDULED
Status: COMPLETED | OUTPATIENT
Start: 2024-01-21 | End: 2024-01-21

## 2024-01-21 RX ORDER — METHYLPREDNISOLONE SODIUM SUCCINATE 125 MG/2ML
125 INJECTION, POWDER, LYOPHILIZED, FOR SOLUTION INTRAMUSCULAR; INTRAVENOUS ONCE
Status: COMPLETED | OUTPATIENT
Start: 2024-01-21 | End: 2024-01-21

## 2024-01-21 RX ORDER — BUDESONIDE AND FORMOTEROL FUMARATE DIHYDRATE 160; 4.5 UG/1; UG/1
2 AEROSOL RESPIRATORY (INHALATION)
Status: DISCONTINUED | OUTPATIENT
Start: 2024-01-21 | End: 2024-01-24 | Stop reason: HOSPADM

## 2024-01-21 RX ORDER — SODIUM CHLORIDE 9 MG/ML
40 INJECTION, SOLUTION INTRAVENOUS AS NEEDED
Status: DISCONTINUED | OUTPATIENT
Start: 2024-01-21 | End: 2024-01-24 | Stop reason: HOSPADM

## 2024-01-21 RX ORDER — PHENYTOIN SODIUM 100 MG/1
300 CAPSULE, EXTENDED RELEASE ORAL EVERY 6 HOURS SCHEDULED
Status: DISCONTINUED | OUTPATIENT
Start: 2024-01-21 | End: 2024-01-21

## 2024-01-21 RX ORDER — POTASSIUM CHLORIDE 20 MEQ/1
40 TABLET, EXTENDED RELEASE ORAL EVERY 4 HOURS
Status: COMPLETED | OUTPATIENT
Start: 2024-01-21 | End: 2024-01-21

## 2024-01-21 RX ORDER — CETIRIZINE HYDROCHLORIDE 10 MG/1
10 TABLET ORAL DAILY
Status: DISCONTINUED | OUTPATIENT
Start: 2024-01-21 | End: 2024-01-24 | Stop reason: HOSPADM

## 2024-01-21 RX ORDER — BENZONATATE 100 MG/1
100 CAPSULE ORAL 3 TIMES DAILY PRN
Status: DISCONTINUED | OUTPATIENT
Start: 2024-01-21 | End: 2024-01-24 | Stop reason: HOSPADM

## 2024-01-21 RX ORDER — PHENYTOIN SODIUM 100 MG/1
200 CAPSULE, EXTENDED RELEASE ORAL EVERY 12 HOURS SCHEDULED
Status: DISCONTINUED | OUTPATIENT
Start: 2024-01-22 | End: 2024-01-21

## 2024-01-21 RX ORDER — CLOPIDOGREL BISULFATE 75 MG/1
75 TABLET ORAL DAILY
Status: DISCONTINUED | OUTPATIENT
Start: 2024-01-21 | End: 2024-01-24 | Stop reason: HOSPADM

## 2024-01-21 RX ORDER — LOSARTAN POTASSIUM 50 MG/1
25 TABLET ORAL
Status: DISCONTINUED | OUTPATIENT
Start: 2024-01-21 | End: 2024-01-24 | Stop reason: HOSPADM

## 2024-01-21 RX ORDER — PHENYTOIN SODIUM 100 MG/1
300 CAPSULE, EXTENDED RELEASE ORAL EVERY 4 HOURS
Status: DISCONTINUED | OUTPATIENT
Start: 2024-01-21 | End: 2024-01-21

## 2024-01-21 RX ORDER — SODIUM CHLORIDE 0.9 % (FLUSH) 0.9 %
10 SYRINGE (ML) INJECTION EVERY 12 HOURS SCHEDULED
Status: DISCONTINUED | OUTPATIENT
Start: 2024-01-21 | End: 2024-01-24 | Stop reason: HOSPADM

## 2024-01-21 RX ORDER — ACETAMINOPHEN 325 MG/1
650 TABLET ORAL EVERY 6 HOURS PRN
Status: DISCONTINUED | OUTPATIENT
Start: 2024-01-21 | End: 2024-01-24 | Stop reason: HOSPADM

## 2024-01-21 RX ADMIN — BENZONATATE 100 MG: 100 CAPSULE ORAL at 02:49

## 2024-01-21 RX ADMIN — AMPICILLIN AND SULBACTAM 3 G: 2; 1 INJECTION, POWDER, FOR SOLUTION INTRAVENOUS at 02:56

## 2024-01-21 RX ADMIN — PHENYTOIN SODIUM 300 MG: 100 CAPSULE ORAL at 08:32

## 2024-01-21 RX ADMIN — AZITHROMYCIN MONOHYDRATE 500 MG: 500 INJECTION, POWDER, LYOPHILIZED, FOR SOLUTION INTRAVENOUS at 01:28

## 2024-01-21 RX ADMIN — PHENYTOIN SODIUM 300 MG: 100 CAPSULE ORAL at 20:28

## 2024-01-21 RX ADMIN — AMPICILLIN AND SULBACTAM 3 G: 2; 1 INJECTION, POWDER, FOR SOLUTION INTRAVENOUS at 20:28

## 2024-01-21 RX ADMIN — DOXYCYCLINE 100 MG: 100 INJECTION, POWDER, LYOPHILIZED, FOR SOLUTION INTRAVENOUS at 21:15

## 2024-01-21 RX ADMIN — BUDESONIDE AND FORMOTEROL FUMARATE DIHYDRATE 2 PUFF: 160; 4.5 AEROSOL RESPIRATORY (INHALATION) at 20:19

## 2024-01-21 RX ADMIN — POTASSIUM CHLORIDE 40 MEQ: 1500 TABLET, EXTENDED RELEASE ORAL at 02:48

## 2024-01-21 RX ADMIN — AMPICILLIN AND SULBACTAM 3 G: 2; 1 INJECTION, POWDER, FOR SOLUTION INTRAVENOUS at 08:33

## 2024-01-21 RX ADMIN — CLOPIDOGREL BISULFATE 75 MG: 75 TABLET ORAL at 08:32

## 2024-01-21 RX ADMIN — AMPICILLIN AND SULBACTAM 3 G: 2; 1 INJECTION, POWDER, FOR SOLUTION INTRAVENOUS at 15:27

## 2024-01-21 RX ADMIN — LOSARTAN POTASSIUM 25 MG: 50 TABLET, FILM COATED ORAL at 02:48

## 2024-01-21 RX ADMIN — METHYLPREDNISOLONE SODIUM SUCCINATE 125 MG: 125 INJECTION, POWDER, FOR SOLUTION INTRAMUSCULAR; INTRAVENOUS at 01:25

## 2024-01-21 RX ADMIN — Medication 10 ML: at 02:58

## 2024-01-21 RX ADMIN — POTASSIUM & SODIUM PHOSPHATES POWDER PACK 280-160-250 MG 2 PACKET: 280-160-250 PACK at 02:48

## 2024-01-21 RX ADMIN — CEFTRIAXONE 1000 MG: 1 INJECTION, POWDER, FOR SOLUTION INTRAMUSCULAR; INTRAVENOUS at 01:29

## 2024-01-21 RX ADMIN — ENOXAPARIN SODIUM 40 MG: 40 INJECTION SUBCUTANEOUS at 08:33

## 2024-01-21 RX ADMIN — BUDESONIDE AND FORMOTEROL FUMARATE DIHYDRATE 2 PUFF: 160; 4.5 AEROSOL RESPIRATORY (INHALATION) at 09:45

## 2024-01-21 RX ADMIN — Medication 10 ML: at 08:36

## 2024-01-21 RX ADMIN — Medication 10 ML: at 20:29

## 2024-01-21 RX ADMIN — POTASSIUM CHLORIDE 40 MEQ: 1500 TABLET, EXTENDED RELEASE ORAL at 06:38

## 2024-01-21 RX ADMIN — CETIRIZINE HYDROCHLORIDE 10 MG: 10 TABLET, FILM COATED ORAL at 08:32

## 2024-01-21 NOTE — CASE MANAGEMENT/SOCIAL WORK
Continued Stay Note  [place]     Patient Name: Ronnell Rizvi  MRN: [id number]  Today's Date: 1/21/2024    Admit Date: 1/21/2024    Plan: DC Home, no needs   Discharge Plan       Row Name 01/21/24 1349       Plan    Plan DC Home, no needs    Plan Comments CCP spoke to pt and his wife introduced self, role and discused dc plans/needs.  Pt lives with his wife in a 1 story home.  There are 3 steps to go through the garage with hand rails.  They have 2 walkers, and a shower chair but pt does not use them.  They were his wifes when she had her knee surgery.  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  [facility] in [place] and he has no issues with medication copays.  His plan is to return home.  His wife will transport.  CCP will follow. Thanks, Yulissa GARCIA                   Discharge Codes    No documentation.                       Yulissa Hawkins

## 2024-01-21 NOTE — ED PROVIDER NOTES
Subjective   History of Present Illness  Patient presents complaining of an episode of some chest burning.  Patient took some heartburn medicine but got no relief.  Patient said it came on gradually after they had dinner but before bedtime.  Patient said nothing seems to make it better or worse.  Patient denies any recent exertional chest pain and no recent travel, sick contacts, bad food exposure, or trauma.  Patient denies any fever or shortness of breath.  Patient was at home with spouse watching TV after dinner and around 20-30 had a grand mall seizure that lasted about 1 minute but patient was out of it for about 3 to 5 minutes.  Patient came to and did not understand what was going on but then over the next few minutes he became more aware and alert.  Patient spouse was with him the entire time.  Patient had no vomiting or make duration.  Patient's last seizure was about 2 years ago.  Patient reports since he has been to the hospital he has developed a cough.  Patient denies any tingling or numbness in his arms or legs.  Patient spouse says he is entirely back to his baseline.      Review of Systems   All other systems reviewed and are negative.      Past Medical History:   Diagnosis Date    Allergic 1985    Allergic rhinitis     Anal fissure     Aortic insufficiency     moderate, THIERRY 2017    Asthma     Asthma     Benign prostatic hypertrophy (BPH) with weak urinary stream 11/01/2016    Cataract     Chronic bronchitis     Colon polyp     COPD (chronic obstructive pulmonary disease)     Emphysema lung     Falls frequently     Fatty liver     GERD (gastroesophageal reflux disease)     Headache     Hepatitis     thought to be Hepatitis A     History of pneumonia     With left lower lobe atelectasis and scar tissue, being followed    HL (hearing loss)     Hypertension     Low back pain     Memory loss     Mixed hyperlipidemia 1/30/2023    Obstructive sleep apnea syndrome 08/23/2017    refuses c-pap    PFO (patent  "foramen ovale)     s/p percutaneous closure with a 25mm Amplatzer device in December 2018    Pneumonia     LLL with scar tissue    Seizures     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 total of 3 strokes    Tremor     Comes & goes    Vision loss        Allergies   Allergen Reactions    Aspirin Nausea Only     Pt states he \"can tolerate enteric coated aspirin only.\"     Citrus      Blisters on mouth      Combivent [Ipratropium-Albuterol] Other (See Comments)     Throat swelling    Lactose Intolerance (Gi)     Statins Mental Status Change     Severe memory impairment       Past Surgical History:   Procedure Laterality Date    CARDIAC CATHETERIZATION N/A 12/12/2018    Procedure: Patent foramen ovale closure- Abbott;  Surgeon: Deangelo Harris MD;  Location:  [facility];  Service: Cardiology    CARDIAC CATHETERIZATION N/A 12/12/2018    Procedure: Intracardiac echocardiogram;  Surgeon: Deangelo Harris MD;  Location:  [facility];  Service: Cardiology    CARDIAC ELECTROPHYSIOLOGY PROCEDURE N/A 03/26/2018    Procedure: Loop insertion   CONFIRM RX;  Surgeon: Luis Wyatt MD;  Location:  [facility];  Service: Cardiovascular    CARDIAC ELECTROPHYSIOLOGY PROCEDURE N/A 07/15/2020    Procedure: Loop recorder removal;  Surgeon: Starr Driscoll MD;  Location:  [facility];  Service: Cardiovascular;  Laterality: N/A;    CARDIAC SURGERY      CATARACT EXTRACTION Bilateral     COLONOSCOPY      COLONOSCOPY N/A 10/09/2020    Procedure: COLONOSCOPY with polypectomy;  Surgeon: Ras Mendoza MD;  Location:  [facility];  Service: Gastroenterology;  Laterality: N/A;  diverticulosis  ascending polyp  transverse polyp  sigmoid polyps X 2  rectal polyp    HAND SURGERY Bilateral     INGUINAL HERNIA REPAIR Left     OTHER SURGICAL HISTORY      inguinal hernia repair for person over age 5    " TONSILLECTOMY      TONSILLECTOMY         Family History   Problem Relation Age of Onset    Obesity Mother     Clotting disorder Mother         Upper extremities    Alcohol abuse Father     Cancer Father 63        Esophagus and lung    Other Father         cardiac disorder    Heart disease Father     Depression Father     Kidney disease Sister     Kidney failure Sister     Drug abuse Paternal Uncle     Stroke Sister     Throat cancer Sister     Drug abuse Paternal Uncle        Social History     Socioeconomic History    Marital status:      Spouse name: Joey   Tobacco Use    Smoking status: Former     Packs/day: 1.50     Years: 15.00     Additional pack years: 0.00     Total pack years: 22.50     Types: Cigarettes     Quit date: 1979     Years since quittin.9     Passive exposure: Past    Smokeless tobacco: Never    Tobacco comments:     caffeine use: Drinks 4 glasses of Dt coke on avg.    Vaping Use    Vaping Use: Never used   Substance and Sexual Activity    Alcohol use: No    Drug use: No    Sexual activity: Defer           Objective   Physical Exam  Vitals and nursing note reviewed.   Constitutional:       Appearance: Normal appearance.      Comments: Patient lying in bed comfortably, pleasant, talkative.   HENT:      Head: Normocephalic and atraumatic.      Right Ear: External ear normal.      Left Ear: External ear normal.      Nose: Nose normal.      Mouth/Throat:      Mouth: Mucous membranes are moist.      Pharynx: Oropharynx is clear.   Eyes:      Extraocular Movements: Extraocular movements intact.      Conjunctiva/sclera: Conjunctivae normal.      Pupils: Pupils are equal, round, and reactive to light.   Cardiovascular:      Rate and Rhythm: Normal rate and regular rhythm.      Heart sounds: Normal heart sounds.   Pulmonary:      Effort: Pulmonary effort is normal.      Breath sounds: Normal air entry. Examination of the right-middle field reveals rhonchi. Examination of the  right-lower field reveals rhonchi.   Abdominal:      General: Abdomen is flat.      Palpations: Abdomen is soft.   Musculoskeletal:      Cervical back: Normal range of motion and neck supple.      Right lower leg: No edema.      Left lower leg: No edema.   Skin:     General: Skin is warm and dry.      Capillary Refill: Capillary refill takes 2 to 3 seconds.   Neurological:      Mental Status: He is alert and oriented to person, place, and time.      Sensory: No sensory deficit.      Motor: No weakness.   Psychiatric:         Mood and Affect: Mood normal.         Behavior: Behavior normal.         ECG 12 Lead      Date/Time: 1/21/2024 12:15 AM    Performed by: Riki Gatica MD  Authorized by: Riki Gatica MD  Interpreted by physician  Comparison: compared with previous ECG from 4/11/2023  Similar to previous ECG  Comments: Rate of 98, sinus rhythm, normal axis, no acute ST elevation or depression.  Nonspecific T wave abnormalities in the lateral leads.               ED Course                                             Medical Decision Making  Ddx pneumothorax, pneumonia, viral syndrome, electrolyte abnormality, dehydration, ACS, GERD, gastritis    Labs Reviewed  COMPREHENSIVE METABOLIC PANEL - Abnormal; Notable for the following components:     Glucose                       131 (*)                Potassium                     3.3 (*)                Calcium                       8.5 (*)                Total Protein                 5.7 (*)             All other components within normal limits         Narrative: GFR Normal >60                  Chronic Kidney Disease <60                  Kidney Failure <15                                    The GFR formula is only valid for adults with stable renal function between ages 18 and 70.  CBC WITH AUTO DIFFERENTIAL - Abnormal; Notable for the following components:     Lymphocyte %                  14.0 (*)               Immature Grans %              1.8 (*)                 Neutrophils, Absolute         7.29 (*)               Immature Grans, Absolute      0.17 (*)            All other components within normal limits  PHOSPHORUS - Abnormal; Notable for the following components:     Phosphorus                    1.9 (*)             All other components within normal limits  COVID-19 AND FLU A/B, NP SWAB IN TRANSPORT MEDIA 1 HR TAT - Normal         Narrative: Fact sheet for providers: https://www.fda.gov/media/589847/download                                    Fact sheet for patients: https://www.fda.gov/media/014166/download                                    Test performed by PCR.  TROPONIN - Normal         Narrative: High Sensitive Troponin T Reference Range:                  <14.0 ng/L- Negative Female for AMI                  <22.0 ng/L- Negative Male for AMI                  >=14 - Abnormal Female indicating possible myocardial injury.                  >=22 - Abnormal Male indicating possible myocardial injury.                   Clinicians would have to utilize clinical acumen, EKG, Troponin, and serial changes to determine if it is an Acute Myocardial Infarction or myocardial injury due to an underlying chronic condition.                                       MAGNESIUM - Normal  URINALYSIS W/ MICROSCOPIC IF INDICATED (NO CULTURE)  PHENYTOIN LEVEL, TOTAL  HIGH SENSITIVITIY TROPONIN T 2HR  CBC AND DIFFERENTIAL    XR Chest 2 View    Result Date: 1/21/2024  Diffuse pneumonia in the right lung. Follow-up until clear recommended. Signer Name: Kieran Bell MD Signed: 1/21/2024 1:06 AM [facility]        Amount and/or Complexity of Data Reviewed  Labs: ordered.  Radiology: ordered.  ECG/medicine tests: ordered.    Risk  Prescription drug management.        Final diagnoses:   Seizure   Hypokalemia   Pneumonia of right lung due to infectious organism, unspecified part of lung   Hypoxia       ED Disposition  ED Disposition       ED Disposition   Decision  to Admit    Condition   --    Comment   Level of Care: Med/Surg [1]   Diagnosis: Pneumonia [042903]   Admitting Physician: SHAHZAD SARABIA [[id number]]   Attending Physician: SHAHZAD SARABIA [[id number]]   Certification: I Certify That Inpatient Hospital Services Are Medically Necessary For Greater Than 2 Midnights                 No follow-up provider specified.       Medication List      No changes were made to your prescriptions during this visit.            Riki Gatica MD  01/21/24 0123

## 2024-01-21 NOTE — H&P
[facility]     PCP: Deena Tarango MD    CODE status: Full    CHIEF COMPLAINT: Cough and seizure activity    HISTORY OF PRESENT ILLNESS:    Patient is a 74-year-old male with past medical history significant for epilepsy treated with Dilantin followed by [facility] neurology, asthma/COPD/chronic bronchitis-nonoxygen dependent, memory loss, hyperlipidemia, previous left occipital strokes, vision loss, fatty liver, BPH, hypertension.  Who presents to [facility] ED complaining of his chest burning.  Usual heartburn medications provided no relief, reports worsening after dinner leading up to his bedtime.  Nothing seems to relieve this for the patient.  He denies chest pain.  He denies shortness of breath, however was hypoxic on arrival at 82% on room air, placed on 2 L initially nasal cannula in ED sats not improving enough therefore increased to 4 L and finally came up to 91%.  Patient was at home with spouse watching TV after dinner and had witnessed grand mal seizure lasted about 1 minute but remained confused for the next 5 minutes or so.  Patient had no recollection of what was going on until he came back to and was more alert, spouse reportedly was with him the whole time as per ED documentation.  Prior to this patient has not had seizure activity for 2 years.  He is also reporting a cough but denies fever or chills.     Workup in the ED revealed hypoxia as described above, improved with 4 L nasal cannula administration of oxygen, chest x-ray shows diffuse right-sided pneumonia, hypophosphatemia, hypokalemia.       Past Medical History:   Diagnosis Date    Allergic 1985    Allergic rhinitis     Anal fissure     Aortic insufficiency     moderate, THIERRY 2017    Asthma     Asthma     Benign prostatic hypertrophy (BPH) with weak urinary stream 11/01/2016    Cataract     Chronic bronchitis     Colon polyp     COPD (chronic obstructive pulmonary disease)     Emphysema lung      Falls frequently     Fatty liver     GERD (gastroesophageal reflux disease)     Headache     Hepatitis     thought to be Hepatitis A     History of pneumonia     With left lower lobe atelectasis and scar tissue, being followed    HL (hearing loss)     Hypertension     Low back pain     Memory loss     Mixed hyperlipidemia 1/30/2023    Obstructive sleep apnea syndrome 08/23/2017    refuses c-pap    PFO (patent foramen ovale)     s/p percutaneous closure with a 25mm Amplatzer device in December 2018    Pneumonia     LLL with scar tissue    Seizures     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 total of 3 strokes    Tremor     Comes & goes    Vision loss      Past Surgical History:   Procedure Laterality Date    CARDIAC CATHETERIZATION N/A 12/12/2018    Procedure: Patent foramen ovale closure- Abbott;  Surgeon: Deangelo Harris MD;  Location:  [facility];  Service: Cardiology    CARDIAC CATHETERIZATION N/A 12/12/2018    Procedure: Intracardiac echocardiogram;  Surgeon: Deangelo Harris MD;  Location:  [facility];  Service: Cardiology    CARDIAC ELECTROPHYSIOLOGY PROCEDURE N/A 03/26/2018    Procedure: Loop insertion   CONFIRM RX;  Surgeon: Luis Wyatt MD;  Location:  [facility];  Service: Cardiovascular    CARDIAC ELECTROPHYSIOLOGY PROCEDURE N/A 07/15/2020    Procedure: Loop recorder removal;  Surgeon: Starr Driscoll MD;  Location:  [facility];  Service: Cardiovascular;  Laterality: N/A;    CARDIAC SURGERY      CATARACT EXTRACTION Bilateral     COLONOSCOPY      COLONOSCOPY N/A 10/09/2020    Procedure: COLONOSCOPY with polypectomy;  Surgeon: Ras Mendoza MD;  Location: [facility] OR;  Service: Gastroenterology;  Laterality: N/A;  diverticulosis  ascending polyp  transverse polyp  sigmoid polyps X 2  rectal polyp    HAND SURGERY Bilateral     INGUINAL HERNIA REPAIR  Left     OTHER SURGICAL HISTORY      inguinal hernia repair for person over age 5    TONSILLECTOMY      TONSILLECTOMY       Family History   Problem Relation Age of Onset    Obesity Mother     Clotting disorder Mother         Upper extremities    Alcohol abuse Father     Cancer Father 63        Esophagus and lung    Other Father         cardiac disorder    Heart disease Father     Depression Father     Kidney disease Sister     Kidney failure Sister     Drug abuse Paternal Uncle     Stroke Sister     Throat cancer Sister     Drug abuse Paternal Uncle      Social History     Tobacco Use    Smoking status: Former     Packs/day: 1.50     Years: 15.00     Additional pack years: 0.00     Total pack years: 22.50     Types: Cigarettes     Quit date: 1979     Years since quittin.9     Passive exposure: Past    Smokeless tobacco: Never    Tobacco comments:     caffeine use: Drinks 4 glasses of Dt coke on avg.    Vaping Use    Vaping Use: Never used   Substance Use Topics    Alcohol use: No    Drug use: No     (Not in a hospital admission)    Allergies:  Aspirin, Citrus, Combivent [ipratropium-albuterol], Lactose intolerance (gi), and Statins    Immunization History   Administered Date(s) Administered    COVID-19 (PFIZER) BIVALENT 12+YRS 2022    COVID-19 (PFIZER) Purple Cap Monovalent 2021, 2021, 2021    COVID-19 F23 (PFIZER) 12YRS+ (COMIRNATY) 2023    Covid-19 (Pfizer) Gray Cap Monovalent 2022    Flu Vaccine Quad PF >36MO 10/22/2016    Fluzone High Dose =>65 Years (Vaxcare ONLY) 2018, 10/02/2019, 2020    Fluzone High-Dose 65+yrs 2021    Influenza Quad Vaccine (Inpatient) 10/11/2017    Pneumococcal Conjugate 13-Valent (PCV13) 2016, 10/10/2019    Pneumococcal Polysaccharide (PPSV23) 2014    TD Preservative Free (Tenivac) 2020    Tdap 11/10/2010    Zostavax 10/09/2013       REVIEW OF SYSTEMS:  Please see the above history of present illness for  "pertinent positives and negatives.  The remainder of the patient's systems have been reviewed and are negative.     Vital Signs  Temp:  [98.3 °F (36.8 °C)] 98.3 °F (36.8 °C)  Heart Rate:  [87-96] 87  Resp:  [18] 18  BP: (143-161)/(75-84) 143/75  Flowsheet Rows      Flowsheet Row First Filed Value   Admission Height 185.4 cm (73\") Documented at 01/21/2024 0014   Admission Weight 83.9 kg (185 lb) Documented at 01/21/2024 0014               Physical Exam:  General: Patient is awake and alert.  Elderly male, no increased work of breathing well on 4 L nasal cannula, suspect however he would have increased work of breathing quickly if this were to be removed given saturation is only 91% on this, no acute distress noted.   HENT: Head is atraumatic, normocephalic. Hearing is grossly intact. Nose is without obvious congestion and appears patent. Neck is supple and trachea is midline.   Eyes: Vision is grossly intact. Pupils appear equal and round.   Cardiovascular: Heart has regular rate and rhythm with no murmurs, rubs or gallops noted.   Respiratory: Coarse breath sounds diffusely, very minimal expiratory wheezing anteriorly, no rhonchi or rales present  Abdominal/GI: Soft, nontender, bowel sounds present. No rebound or guarding present.   Extremities: No peripheral edema noted.   Musculoskeletal: Spontaneous movement of bilateral upper and lower extremities against gravity noted. No signs of injury or deformity noted.   Skin: Warm and dry.   Psych: Mood and affect are appropriate. Cooperative with exam.   Neuro: No facial asymmetry noted. No focal deficits noted, hearing and vision are grossly intact.    Emotional Behavior: all of the following were found to be normal   Judgment and Insight   Mental Status   Memory   Mood and Affect: neither of the following found acutely        Depression                 Anxiety     Debilities: no signs of the following found    Physical Weakness     Handicaps     Disabilities  "    Agitation         Results Review:    I reviewed the patient's new clinical results.  Lab Results (most recent)       Procedure Component Value Units Date/Time    Comprehensive Metabolic Panel [[id number]]  (Abnormal) Collected: 01/21/24 0022    Specimen: Blood Updated: 01/21/24 0105     Glucose 131 mg/dL      BUN --     Creatinine 0.97 mg/dL      Sodium 138 mmol/L      Potassium 3.3 mmol/L      Chloride 101 mmol/L      CO2 24.9 mmol/L      Calcium 8.5 mg/dL      Total Protein 5.7 g/dL      Albumin 3.7 g/dL      ALT (SGPT) --     AST (SGOT) --     Alkaline Phosphatase 69 U/L      Total Bilirubin 0.2 mg/dL      Globulin 2.0 gm/dL      A/G Ratio 1.9 g/dL      BUN/Creatinine Ratio --     Anion Gap 12.1 mmol/L      eGFR 81.4 mL/min/1.73     Narrative:      GFR Normal >60  Chronic Kidney Disease <60  Kidney Failure <15    The GFR formula is only valid for adults with stable renal function between ages 18 and 70.    Magnesium [[id number]]  (Normal) Collected: 01/21/24 0022    Specimen: Blood Updated: 01/21/24 0104     Magnesium 2.2 mg/dL     Phosphorus [[id number]]  (Abnormal) Collected: 01/21/24 0022    Specimen: Blood Updated: 01/21/24 0103     Phosphorus 1.9 mg/dL     High Sensitivity Troponin T [[id number]]  (Normal) Collected: 01/21/24 0022    Specimen: Blood Updated: 01/21/24 0052     HS Troponin T 21 ng/L     Narrative:      High Sensitive Troponin T Reference Range:  <14.0 ng/L- Negative Female for AMI  <22.0 ng/L- Negative Male for AMI  >=14 - Abnormal Female indicating possible myocardial injury.  >=22 - Abnormal Male indicating possible myocardial injury.   Clinicians would have to utilize clinical acumen, EKG, Troponin, and serial changes to determine if it is an Acute Myocardial Infarction or myocardial injury due to an underlying chronic condition.         COVID-19 and FLU A/B PCR, 1 HR TAT - Swab, Nasopharynx [[id number]]  (Normal) Collected: 01/21/24 0022    Specimen: Swab from Nasopharynx Updated: 01/21/24  0047     COVID19 Not Detected     Influenza A PCR Not Detected     Influenza B PCR Not Detected    Narrative:      Fact sheet for providers: https://www.fda.gov/media/911082/download    Fact sheet for patients: https://www.fda.gov/media/198966/download    Test performed by PCR.    CBC & Differential [[id number]]  (Abnormal) Collected: 01/21/24 0022    Specimen: Blood Updated: 01/21/24 0033    Narrative:      The following orders were created for panel order CBC & Differential.  Procedure                               Abnormality         Status                     ---------                               -----------         ------                     CBC Auto Differential[[id number]]        Abnormal            Final result                 Please view results for these tests on the individual orders.    CBC Auto Differential [[id number]]  (Abnormal) Collected: 01/21/24 0022    Specimen: Blood Updated: 01/21/24 0033     WBC 9.68 10*3/mm3      RBC 5.01 10*6/mm3      Hemoglobin 15.9 g/dL      Hematocrit 46.7 %      MCV 93.2 fL      MCH 31.7 pg      MCHC 34.0 g/dL      RDW 12.7 %      RDW-SD 43.1 fl      MPV 9.6 fL      Platelets 225 10*3/mm3      Neutrophil % 75.4 %      Lymphocyte % 14.0 %      Monocyte % 7.4 %      Eosinophil % 1.0 %      Basophil % 0.4 %      Immature Grans % 1.8 %      Neutrophils, Absolute 7.29 10*3/mm3      Lymphocytes, Absolute 1.36 10*3/mm3      Monocytes, Absolute 0.72 10*3/mm3      Eosinophils, Absolute 0.10 10*3/mm3      Basophils, Absolute 0.04 10*3/mm3      Immature Grans, Absolute 0.17 10*3/mm3      nRBC 0.0 /100 WBC     Phenytoin Level, Total [[id number]] Collected: 01/21/24 0022    Specimen: Blood Updated: 01/21/24 0025            Imaging Results (Most Recent)       Procedure Component Value Units Date/Time    XR Chest 2 View [[id number]] Collected: 01/21/24 0055     Updated: 01/21/24 0108    Narrative:      CR Chest 2 Vws    INDICATION:    Dry cough and chest burning. Low O2.    COMPARISON:     9/14/2022    FINDINGS:   PA and lateral views of the chest.  Cardiac and mediastinal contours remain normal. Patient is status post ASD closure. There is chronic scarring at the left lung base. There are new interstitial infiltrates throughout the right lung compatible with  pneumonia. No pneumothorax.        Impression:      Diffuse pneumonia in the right lung. Follow-up until clear recommended.    Signer Name: Kieran Bell MD   Signed: 1/21/2024 1:06 AM EST  [facility]          Reviewed    ECG/EMG Results (most recent)       Procedure Component Value Units Date/Time    ECG 12 Lead Other; burning in chest [[id number]] Collected: 01/21/24 0015     Updated: 01/21/24 0016     QT Interval 361 ms      QTC Interval 463 ms     Narrative:      HEART RATE= 98  bpm  RR Interval= 608  ms  NY Interval= 235  ms  P Horizontal Axis= 10  deg  P Front Axis= 36  deg  QRSD Interval= 91  ms  QT Interval= 361  ms  QTcB= 463  ms  QRS Axis= 1  deg  T Wave Axis= 110  deg  - ABNORMAL ECG -  Sinus rhythm  Prolonged NY interval  Anterior infarct, old  Nonspecific T abnormalities, lateral leads  Electronically Signed By:   Date and Time of Study: 2024-01-21 00:15:43        Reviewed    Assessment & Plan     Acute hypoxic respiratory failure secondary to diffuse right-sided community-acquired pneumonia  -Concern for gram-negative pneumonia also aspiration given symptoms started post seizure tonight, will check sputum culture, RVP, Legionella, strep pneumo antigens, MRSA PCR pending as well  -Check procalcitonin level, interestingly leukocytosis is not yet present, obvious right-sided pneumonia  -Patient received Rocephin and azithromycin in the ED, will switch to Unasyn and doxycycline.   -Patient has no previous oxygen requirement, utilizing accessory muscles of respiration and in obvious respiratory distress prior to being placed on supplemental oxygen via nasal cannula at 4 L at which time sats corrine out of the  low 80s into the low 90s.  Target saturation of 88 to 92%.  Wean oxygen as tolerated.   -Patient has COPD diagnosis with chronic bronchitis in his history but no home medications for this, given patient's reported allergy to DuoNeb we will utilize Symbicort twice daily.  Monitor for effect.     Hypertension, electrolyte abnormalities  -Given patient's electrolyte abnormalities including hypokalemia and severe hypo-phosphatemia likely will be a poor candidate going forward for hydrochlorothiazide use, will discontinue.   -Will initiate losartan daily, monitor for improvement.   -For electrolyte abnormalities placed on replacement protocol    Seizure activity with history of epilepsy  -Followed by Dr. Longoria as an outpatient, on Dilantin, level 1.3 (normal range begins at 10). Provide 300mg of ER formulation by mouth BID for one day to attempt to increase level and protect from further seizure activity. Will then continue 200mg dose starting tomorrow. Repeat level in 48 hours  -Suspect pneumonia with hypoxia contributing, treat as per above, placed on seizure protocol    Chronic stable conditions to be managed with home medications include the following conditions listed below. Also please note: Every effort was made to accurately obtain patient's home medications. This was done utilizing extensive chart review, ED documentation, recent pharmacy records, and where possible thorough discussion with the patient if medications were known by the patient. I have reviewed and edited the patient's home medications as per my efforts above and current med list can be found within home medications portion of this electronic medical record and is accurate as possible as of 01/21/24     Previous stroke-continue home Plavix, patient reports statin intolerance.     Seasonal allergic rhinitis-continue home antihistamine.       DVT PPX: Lovenox        I discussed the patient's findings and my recommendations with patient      Jace Huang, DO  01/21/24  01:20 EST    Time: 43 minutes    At [facility], we believe that sharing information builds trust and better relationships. You are receiving this note because you recently visited [facility]. It is possible you will see health information before a provider has talked with you about it. This kind of information can be easy to misunderstand. To help you fully understand what it means for your health, we urge you to discuss this note with your provider.

## 2024-01-21 NOTE — PLAN OF CARE
Goal Outcome Evaluation:  Plan of Care Reviewed With: patient, spouse        Progress: no change  Outcome Evaluation: O2 2L. SR per telemetry. Antibiotics initiated in the ED. Blood cultures and troponins changed to 0600 after reviewed with MD. Sputum and urine sent to lab. Per electrolyte replacement protocol potassium and phos given. Labs to be rechecked later. Voiding per urinal. BM 1/20 per pt report. Pts wife may bring in his hearing aids later today when she visits him.

## 2024-01-21 NOTE — PLAN OF CARE
Goal Outcome Evaluation:              Outcome Evaluation: VSS, o2 at 2l vnc, SR on monitor, no c/o pain/discomfort, iv antibiotics as ordered, see e-mar, electrolytes replaced per protocol, follow up labs obtained.

## 2024-01-21 NOTE — ED NOTES
Nursing report ED to floor  Ronnell Rizvi  74 y.o.  male    HPI :   Chief Complaint   Patient presents with    Cough     Burning in chest x  5 hours. Pt took heartburn medicine without relief. Pt also has a dry cough. Denies SOB, nausea or diaphoresis. Pt had a grand mal seizure lasting 3 minutes this evening. Last seizure was approx 2 years ago.    Seizures       Admitting doctor:   Jace Huang DO    Admitting diagnosis:   The primary encounter diagnosis was Seizure. Diagnoses of Hypokalemia, Pneumonia of right lung due to infectious organism, unspecified part of lung, and Hypoxia were also pertinent to this visit.    Code status:   Current Code Status       Date Active Code Status Order ID Comments User Context       Prior            Allergies:   Aspirin, Citrus, Combivent [ipratropium-albuterol], Lactose intolerance (gi), and Statins    Isolation:   No active isolations    Intake and Output  No intake or output data in the 24 hours ending 01/21/24 0117    Weight:       01/21/24  0014   Weight: 83.9 kg (185 lb)       Most recent vitals:   Vitals:    01/21/24 0030 01/21/24 0031 01/21/24 0031 01/21/24 0036   BP:  143/75     BP Location:       Patient Position:       Pulse: 93 89  87   Resp:       Temp:       TempSrc:       SpO2: (!) 89% 91% 91% 91%   Weight:       Height:           Active LDAs/IV Access:   Lines, Drains & Airways       Active LDAs       Name Placement date Placement time Site Days    Peripheral IV 01/21/24 0019 Left Antecubital 01/21/24  0019  Antecubital  less than 1                    Labs (abnormal labs have a star):   Labs Reviewed   COMPREHENSIVE METABOLIC PANEL - Abnormal; Notable for the following components:       Result Value    Glucose 131 (*)     Potassium 3.3 (*)     Calcium 8.5 (*)     Total Protein 5.7 (*)     All other components within normal limits    Narrative:     GFR Normal >60  Chronic Kidney Disease <60  Kidney Failure <15    The GFR formula is only valid for adults  with stable renal function between ages 18 and 70.   CBC WITH AUTO DIFFERENTIAL - Abnormal; Notable for the following components:    Lymphocyte % 14.0 (*)     Immature Grans % 1.8 (*)     Neutrophils, Absolute 7.29 (*)     Immature Grans, Absolute 0.17 (*)     All other components within normal limits   PHOSPHORUS - Abnormal; Notable for the following components:    Phosphorus 1.9 (*)     All other components within normal limits   COVID-19 AND FLU A/B, NP SWAB IN TRANSPORT MEDIA 1 HR TAT - Normal    Narrative:     Fact sheet for providers: https://www.fda.gov/media/356063/download    Fact sheet for patients: https://www.fda.gov/media/364788/download    Test performed by PCR.   TROPONIN - Normal    Narrative:     High Sensitive Troponin T Reference Range:  <14.0 ng/L- Negative Female for AMI  <22.0 ng/L- Negative Male for AMI  >=14 - Abnormal Female indicating possible myocardial injury.  >=22 - Abnormal Male indicating possible myocardial injury.   Clinicians would have to utilize clinical acumen, EKG, Troponin, and serial changes to determine if it is an Acute Myocardial Infarction or myocardial injury due to an underlying chronic condition.        MAGNESIUM - Normal   URINALYSIS W/ MICROSCOPIC IF INDICATED (NO CULTURE)   PHENYTOIN LEVEL, TOTAL   HIGH SENSITIVITIY TROPONIN T 2HR   CBC AND DIFFERENTIAL    Narrative:     The following orders were created for panel order CBC & Differential.  Procedure                               Abnormality         Status                     ---------                               -----------         ------                     CBC Auto Differential[[id number]]        Abnormal            Final result                 Please view results for these tests on the individual orders.       Results       Procedure Component Value Ref Range Date/Time    Comprehensive Metabolic Panel [[id number]]  (Abnormal) Collected: 01/21/24 0022    Order Status: Completed Specimen: Blood Updated: 01/21/24  0105     Glucose 131 65 - 99 mg/dL      BUN --     Creatinine 0.97 0.76 - 1.27 mg/dL      Sodium 138 136 - 145 mmol/L      Potassium 3.3 3.5 - 5.2 mmol/L      Chloride 101 98 - 107 mmol/L      CO2 24.9 22.0 - 29.0 mmol/L      Calcium 8.5 8.6 - 10.5 mg/dL      Total Protein 5.7 6.0 - 8.5 g/dL      Albumin 3.7 3.5 - 5.2 g/dL      ALT (SGPT) --     AST (SGOT) --     Alkaline Phosphatase 69 39 - 117 U/L      Total Bilirubin 0.2 0.0 - 1.2 mg/dL      Globulin 2.0 gm/dL      A/G Ratio 1.9 g/dL      BUN/Creatinine Ratio --     Anion Gap 12.1 5.0 - 15.0 mmol/L      eGFR 81.4 >60.0 mL/min/1.73     Narrative:      GFR Normal >60  Chronic Kidney Disease <60  Kidney Failure <15    The GFR formula is only valid for adults with stable renal function between ages 18 and 70.    Magnesium [[id number]]  (Normal) Collected: 01/21/24 0022    Order Status: Completed Specimen: Blood Updated: 01/21/24 0104     Magnesium 2.2 1.6 - 2.4 mg/dL     Phosphorus [[id number]]  (Abnormal) Collected: 01/21/24 0022    Order Status: Completed Specimen: Blood Updated: 01/21/24 0103     Phosphorus 1.9 2.5 - 4.5 mg/dL     High Sensitivity Troponin T [[id number]]  (Normal) Collected: 01/21/24 0022    Order Status: Completed Specimen: Blood Updated: 01/21/24 0052     HS Troponin T 21 <22 ng/L     Narrative:      High Sensitive Troponin T Reference Range:  <14.0 ng/L- Negative Female for AMI  <22.0 ng/L- Negative Male for AMI  >=14 - Abnormal Female indicating possible myocardial injury.  >=22 - Abnormal Male indicating possible myocardial injury.   Clinicians would have to utilize clinical acumen, EKG, Troponin, and serial changes to determine if it is an Acute Myocardial Infarction or myocardial injury due to an underlying chronic condition.         High Sensitivity Troponin T 2Hr [[id number]]     Order Status: Sent Specimen: Blood     COVID-19 and FLU A/B PCR, 1 HR TAT - Swab, Nasopharynx [[id number]]  (Normal) Collected: 01/21/24 0022    Order Status:  Completed Specimen: Swab from Nasopharynx Updated: 01/21/24 0047     COVID19 Not Detected Not Detected - Ref. Range      Influenza A PCR Not Detected Not Detected      Influenza B PCR Not Detected Not Detected     Narrative:      Fact sheet for providers: https://www.fda.gov/media/341745/download    Fact sheet for patients: https://www.fda.gov/media/418708/download    Test performed by PCR.    CBC Auto Differential [[id number]]  (Abnormal) Collected: 01/21/24 0022    Order Status: Completed Specimen: Blood Updated: 01/21/24 0033     WBC 9.68 3.40 - 10.80 10*3/mm3      RBC 5.01 4.14 - 5.80 10*6/mm3      Hemoglobin 15.9 13.0 - 17.7 g/dL      Hematocrit 46.7 37.5 - 51.0 %      MCV 93.2 79.0 - 97.0 fL      MCH 31.7 26.6 - 33.0 pg      MCHC 34.0 31.5 - 35.7 g/dL      RDW 12.7 12.3 - 15.4 %      RDW-SD 43.1 37.0 - 54.0 fl      MPV 9.6 6.0 - 12.0 fL      Platelets 225 140 - 450 10*3/mm3      Neutrophil % 75.4 42.7 - 76.0 %      Lymphocyte % 14.0 19.6 - 45.3 %      Monocyte % 7.4 5.0 - 12.0 %      Eosinophil % 1.0 0.3 - 6.2 %      Basophil % 0.4 0.0 - 1.5 %      Immature Grans % 1.8 0.0 - 0.5 %      Neutrophils, Absolute 7.29 1.70 - 7.00 10*3/mm3      Lymphocytes, Absolute 1.36 0.70 - 3.10 10*3/mm3      Monocytes, Absolute 0.72 0.10 - 0.90 10*3/mm3      Eosinophils, Absolute 0.10 0.00 - 0.40 10*3/mm3      Basophils, Absolute 0.04 0.00 - 0.20 10*3/mm3      Immature Grans, Absolute 0.17 0.00 - 0.05 10*3/mm3      nRBC 0.0 0.0 - 0.2 /100 WBC     D-dimer, Quantitative [[id number]] Collected: 01/21/24 0022    Order Status: Canceled Specimen: Blood Updated: 01/21/24 0025    Phenytoin Level, Total [[id number]] Collected: 01/21/24 0022    Order Status: Sent Specimen: Blood Updated: 01/21/24 0025    Urinalysis With Microscopic If Indicated (No Culture) - Urine, Clean Catch [[id number]]     Order Status: Sent Specimen: Urine, Clean Catch              EKG:   ECG 12 Lead Other; burning in chest   Preliminary Result   HEART RATE= 98  bpm    RR Interval= 608  ms   UT Interval= 235  ms   P Horizontal Axis= 10  deg   P Front Axis= 36  deg   QRSD Interval= 91  ms   QT Interval= 361  ms   QTcB= 463  ms   QRS Axis= 1  deg   T Wave Axis= 110  deg   - ABNORMAL ECG -   Sinus rhythm   Prolonged UT interval   Anterior infarct, old   Nonspecific T abnormalities, lateral leads   Electronically Signed By:    Date and Time of Study: 2024 00:15:43      ECG 12 Lead    (Results Pending)       Meds given in ED:   Medications   sodium chloride 0.9 % flush 10 mL (has no administration in time range)   methylPREDNISolone sodium succinate (SOLU-Medrol) injection 125 mg (has no administration in time range)   cefTRIAXone (ROCEPHIN) 1,000 mg in sodium chloride 0.9 % 100 mL IVPB (has no administration in time range)   azithromycin (ZITHROMAX) 500 mg in sodium chloride 0.9 % 250 mL IVPB-VTB (has no administration in time range)       Imaging results:  XR Chest 2 View    Result Date: 2024  Diffuse pneumonia in the right lung. Follow-up until clear recommended. Signer Name: Kieran Bell MD Signed: 2024 1:06 AM EST [facility]     Ambulatory status:   - up with assist    Social issues:   Social History     Socioeconomic History    Marital status:      Spouse name: Joey   Tobacco Use    Smoking status: Former     Packs/day: 1.50     Years: 15.00     Additional pack years: 0.00     Total pack years: 22.50     Types: Cigarettes     Quit date: 1979     Years since quittin.9     Passive exposure: Past    Smokeless tobacco: Never    Tobacco comments:     caffeine use: Drinks 4 glasses of Dt coke on avg.    Vaping Use    Vaping Use: Never used   Substance and Sexual Activity    Alcohol use: No    Drug use: No    Sexual activity: Defer       NIH Stroke Scale: OLIVIER Judd RN  24 01:17 EST

## 2024-01-21 NOTE — DISCHARGE PLACEMENT REQUEST
"Gabino Andrade (74 y.o. Male)       Date of Birth   1950    Social Security Number       Address   [address] [place] [address] [address] [place] [address]    Home Phone   [phone]    MRN   [id number]       Restorationist   Non-Restoration    Marital Status                               Admission Date   1/21/24    Admission Type   Emergency    Admitting Provider   Jace Huang DO    Attending Provider   Jace Huang DO    Department, Room/Bed   [facility] SUR[facility], 1412/1       Discharge Date       Discharge Disposition       Discharge Destination                                 Attending Provider: Jace Huang DO    Allergies: Aspirin, Citrus, Combivent [Ipratropium-albuterol], Lactose Intolerance (Gi), Statins    Isolation: Enh Drop/Con   Infection: COVID Screen (preop/placement) (02/19/21)   Code Status: CPR    Ht: 182.9 cm (72\")   Wt: 85.6 kg (188 lb 11.2 oz)    Admission Cmt: None   Principal Problem: Pneumonia [J18.9]                   Active Insurance as of 1/21/2024       Primary Coverage       Payor Plan Insurance Group Employer/Plan Group    MEDICARE MEDICARE A & B        Payor Plan Address Payor Plan Phone Number Payor Plan Fax Number Effective Dates    [address] [phone]  1/1/2014 - None Entered    [facility] [id number]         Subscriber Name Subscriber Birth Date Member ID       GABINO ANDRADE 1950 [id number]               Secondary Coverage       Payor Plan Insurance Group Employer/Plan Group    [facility] [id number]       Payor Plan Address Payor Plan Phone Number Payor Plan Fax Number Effective Dates    [address]   1/1/2018 - None Entered    [facility] [id number]         Subscriber Name Subscriber Birth Date Member ID       LUPEMELISSA SAN [birth year]/1943 [id number]                     Emergency Contacts        (Rel.) Home Phone Work Phone Mobile Phone    Melissa Andrade (Spouse) [phone] -- --                "

## 2024-01-22 LAB
ANION GAP SERPL CALCULATED.3IONS-SCNC: 3.5 MMOL/L (ref 5–15)
BUN SERPL-MCNC: 13 MG/DL (ref 8–23)
BUN/CREAT SERPL: 12.4 (ref 7–25)
CALCIUM SPEC-SCNC: 8.3 MG/DL (ref 8.6–10.5)
CHLORIDE SERPL-SCNC: 106 MMOL/L (ref 98–107)
CO2 SERPL-SCNC: 29.5 MMOL/L (ref 22–29)
CREAT SERPL-MCNC: 1.05 MG/DL (ref 0.76–1.27)
DEPRECATED RDW RBC AUTO: 44.3 FL (ref 37–54)
EGFRCR SERPLBLD CKD-EPI 2021: 74 ML/MIN/1.73
ERYTHROCYTE [DISTWIDTH] IN BLOOD BY AUTOMATED COUNT: 12.9 % (ref 12.3–15.4)
GLUCOSE SERPL-MCNC: 110 MG/DL (ref 65–99)
HCT VFR BLD AUTO: 40.7 % (ref 37.5–51)
HGB BLD-MCNC: 13.9 G/DL (ref 13–17.7)
MCH RBC QN AUTO: 32 PG (ref 26.6–33)
MCHC RBC AUTO-ENTMCNC: 34.2 G/DL (ref 31.5–35.7)
MCV RBC AUTO: 93.6 FL (ref 79–97)
PLATELET # BLD AUTO: 178 10*3/MM3 (ref 140–450)
PMV BLD AUTO: 9.5 FL (ref 6–12)
POTASSIUM SERPL-SCNC: 3.6 MMOL/L (ref 3.5–5.2)
POTASSIUM SERPL-SCNC: 4.2 MMOL/L (ref 3.5–5.2)
PROCALCITONIN SERPL-MCNC: 0.06 NG/ML (ref 0–0.25)
QT INTERVAL: 361 MS
QTC INTERVAL: 463 MS
RBC # BLD AUTO: 4.35 10*6/MM3 (ref 4.14–5.8)
SODIUM SERPL-SCNC: 139 MMOL/L (ref 136–145)
WBC NRBC COR # BLD AUTO: 10.78 10*3/MM3 (ref 3.4–10.8)

## 2024-01-22 PROCEDURE — 99232 SBSQ HOSP IP/OBS MODERATE 35: CPT | Performed by: HOSPITALIST

## 2024-01-22 PROCEDURE — 80048 BASIC METABOLIC PNL TOTAL CA: CPT | Performed by: INTERNAL MEDICINE

## 2024-01-22 PROCEDURE — 85027 COMPLETE CBC AUTOMATED: CPT | Performed by: INTERNAL MEDICINE

## 2024-01-22 PROCEDURE — 94799 UNLISTED PULMONARY SVC/PX: CPT

## 2024-01-22 PROCEDURE — 84132 ASSAY OF SERUM POTASSIUM: CPT | Performed by: INTERNAL MEDICINE

## 2024-01-22 PROCEDURE — 84145 PROCALCITONIN (PCT): CPT | Performed by: HOSPITALIST

## 2024-01-22 PROCEDURE — 94664 DEMO&/EVAL PT USE INHALER: CPT

## 2024-01-22 PROCEDURE — 25010000002 ENOXAPARIN PER 10 MG: Performed by: INTERNAL MEDICINE

## 2024-01-22 PROCEDURE — 25010000002 AMPICILLIN-SULBACTAM PER 1.5 G: Performed by: INTERNAL MEDICINE

## 2024-01-22 PROCEDURE — 94761 N-INVAS EAR/PLS OXIMETRY MLT: CPT

## 2024-01-22 RX ORDER — AMOXICILLIN AND CLAVULANATE POTASSIUM 875; 125 MG/1; MG/1
1 TABLET, FILM COATED ORAL EVERY 12 HOURS SCHEDULED
Status: DISCONTINUED | OUTPATIENT
Start: 2024-01-22 | End: 2024-01-24 | Stop reason: HOSPADM

## 2024-01-22 RX ORDER — POTASSIUM CHLORIDE 20 MEQ/1
40 TABLET, EXTENDED RELEASE ORAL EVERY 4 HOURS
Status: COMPLETED | OUTPATIENT
Start: 2024-01-22 | End: 2024-01-22

## 2024-01-22 RX ADMIN — Medication 10 ML: at 09:03

## 2024-01-22 RX ADMIN — AMPICILLIN AND SULBACTAM 3 G: 2; 1 INJECTION, POWDER, FOR SOLUTION INTRAVENOUS at 02:33

## 2024-01-22 RX ADMIN — Medication 10 ML: at 20:36

## 2024-01-22 RX ADMIN — BUDESONIDE AND FORMOTEROL FUMARATE DIHYDRATE 2 PUFF: 160; 4.5 AEROSOL RESPIRATORY (INHALATION) at 19:45

## 2024-01-22 RX ADMIN — AMOXICILLIN AND CLAVULANATE POTASSIUM 1 TABLET: 875; 125 TABLET, FILM COATED ORAL at 20:34

## 2024-01-22 RX ADMIN — POTASSIUM CHLORIDE 40 MEQ: 1500 TABLET, EXTENDED RELEASE ORAL at 09:03

## 2024-01-22 RX ADMIN — PHENYTOIN SODIUM 200 MG: 100 CAPSULE ORAL at 09:03

## 2024-01-22 RX ADMIN — POTASSIUM CHLORIDE 40 MEQ: 1500 TABLET, EXTENDED RELEASE ORAL at 14:00

## 2024-01-22 RX ADMIN — AMPICILLIN AND SULBACTAM 3 G: 2; 1 INJECTION, POWDER, FOR SOLUTION INTRAVENOUS at 14:00

## 2024-01-22 RX ADMIN — CETIRIZINE HYDROCHLORIDE 10 MG: 10 TABLET, FILM COATED ORAL at 09:03

## 2024-01-22 RX ADMIN — CLOPIDOGREL BISULFATE 75 MG: 75 TABLET ORAL at 09:03

## 2024-01-22 RX ADMIN — LOSARTAN POTASSIUM 25 MG: 50 TABLET, FILM COATED ORAL at 09:02

## 2024-01-22 RX ADMIN — AMPICILLIN AND SULBACTAM 3 G: 2; 1 INJECTION, POWDER, FOR SOLUTION INTRAVENOUS at 09:57

## 2024-01-22 RX ADMIN — ENOXAPARIN SODIUM 40 MG: 40 INJECTION SUBCUTANEOUS at 09:03

## 2024-01-22 RX ADMIN — DOXYCYCLINE 100 MG: 100 INJECTION, POWDER, LYOPHILIZED, FOR SOLUTION INTRAVENOUS at 09:02

## 2024-01-22 RX ADMIN — BUDESONIDE AND FORMOTEROL FUMARATE DIHYDRATE 2 PUFF: 160; 4.5 AEROSOL RESPIRATORY (INHALATION) at 07:34

## 2024-01-22 NOTE — SIGNIFICANT NOTE
01/22/24 1127   OTHER   Discipline physical therapist   Rehab Time/Intention   Session Not Performed patient/family declined evaluation  (Patient politely declined 2x this morning.  Patient c/o fatigue from not sleeping well last night, requested PT follow up this afternoon.)

## 2024-01-22 NOTE — SIGNIFICANT NOTE
01/22/24 1456   OTHER   Discipline physical therapist   Rehab Time/Intention   Session Not Performed   (attempted to see x2 this afternoon.  pt politely requests therapist return in the morning to attempt evaluation.  Will follow up at that time.)

## 2024-01-22 NOTE — PLAN OF CARE
Goal Outcome Evaluation:  Plan of Care Reviewed With: patient        Progress: improving  Outcome Evaluation: Tolerating 2L oxygen via nasal cannula. Rested well during the night. No complaints of pain or discomfort. Hopes to go home soon. VSS. Remains sinus rhythm on telemetry.

## 2024-01-22 NOTE — PROGRESS NOTES
"Hospitalist Team      Patient Care Team:  Deena Tarango MD as PCP - General (Internal Medicine)  Gregory King MD as Consulting Physician (Hematology and Oncology)  Chase Haque MD as Referring Physician (Internal Medicine)  Luis Wyatt MD as Consulting Physician (Cardiology)      Chief Complaint:  Follow-up Pneumonia    Subjective    No acute events overnight.  Mr. Rizvi notes increasing non-productive cough w/ pulmonary toilet exercises.  He denies chest pain.  Appetite and PO intake better.  No further seizure activity noted.  He is now off of oxygen.    Objective    Vital Signs  Temp:  [97.8 °F (36.6 °C)-98.4 °F (36.9 °C)] 98.4 °F (36.9 °C)  Heart Rate:  [83-98] 98  Resp:  [16-22] 16  BP: (122-138)/(58-80) 122/58  Oxygen Therapy  SpO2: 93 %  Pulse Oximetry Type: Continuous  Device (Oxygen Therapy): room air  Flow (L/min): 2}    Flowsheet Rows      Flowsheet Row First Filed Value   Admission Height 185.4 cm (73\") Documented at 01/21/2024 0014   Admission Weight 83.9 kg (185 lb) Documented at 01/21/2024 0014            Physical Exam:    General: Appears stated age in no acute distress.  Lungs: Dry crackles noted at the right base, otherwise clear.  Respirations are non-labored.  CV: Regular rate and rhythm.  No murmur appreciated.  Radial pulses are 2+ and symmetric.  Abdomen: Soft and non-tender w/ active bowel sounds.  MSK: No C/C/E.  Neuro: CN II-XII grossly intact.  Psych: Normal affect.  Ox3.    Results Review:     I reviewed the patient's new clinical results.    Lab Results (last 24 hours)       Procedure Component Value Units Date/Time    Procalcitonin [[id number]]  (Normal) Collected: 01/22/24 0728    Specimen: Blood Updated: 01/22/24 0830     Procalcitonin 0.06 ng/mL     Narrative:      As a Marker for Sepsis (Non-Neonates):    1. <0.5 ng/mL represents a low risk of severe sepsis and/or septic shock.  2. >2 ng/mL represents a high risk of severe sepsis and/or septic shock.    As a " "Marker for Lower Respiratory Tract Infections that require antibiotic therapy:    PCT on Admission    Antibiotic Therapy       6-12 Hrs later    >0.5                Strongly Recommended  >0.25 - <0.5        Recommended   0.1 - 0.25          Discouraged              Remeasure/reassess PCT  <0.1                Strongly Discouraged     Remeasure/reassess PCT    As 28 day mortality risk marker: \"Change in Procalcitonin Result\" (>80% or <=80%) if Day 0 (or Day 1) and Day 4 values are available. Refer to http://www.[facility]-pct-calculator.com    Change in PCT <=80%  A decrease of PCT levels below or equal to 80% defines a positive change in PCT test result representing a higher risk for 28-day all-cause mortality of patients diagnosed with severe sepsis for septic shock.    Change in PCT >80%  A decrease of PCT levels of more than 80% defines a negative change in PCT result representing a lower risk for 28-day all-cause mortality of patients diagnosed with severe sepsis or septic shock.       Basic Metabolic Panel [[id number]]  (Abnormal) Collected: 01/22/24 0728    Specimen: Blood Updated: 01/22/24 0756     Glucose 110 mg/dL      BUN 13 mg/dL      Creatinine 1.05 mg/dL      Sodium 139 mmol/L      Potassium 3.6 mmol/L      Chloride 106 mmol/L      CO2 29.5 mmol/L      Calcium 8.3 mg/dL      BUN/Creatinine Ratio 12.4     Anion Gap 3.5 mmol/L      eGFR 74.0 mL/min/1.73     Narrative:      GFR Normal >60  Chronic Kidney Disease <60  Kidney Failure <15    The GFR formula is only valid for adults with stable renal function between ages 18 and 70.    CBC (No Diff) [[id number]]  (Normal) Collected: 01/22/24 0728    Specimen: Blood Updated: 01/22/24 0742     WBC 10.78 10*3/mm3      RBC 4.35 10*6/mm3      Hemoglobin 13.9 g/dL      Hematocrit 40.7 %      MCV 93.6 fL      MCH 32.0 pg      MCHC 34.2 g/dL      RDW 12.9 %      RDW-SD 44.3 fl      MPV 9.5 fL      Platelets 178 10*3/mm3     Blood Culture - Blood, Arm, Right [[id number]]  " (Normal) Collected: 01/21/24 0719    Specimen: Blood from Arm, Right Updated: 01/22/24 0731     Blood Culture No growth at 24 hours    Blood Culture - Blood, Wrist, Right [[id number]]  (Normal) Collected: 01/21/24 0719    Specimen: Blood from Wrist, Right Updated: 01/22/24 0731     Blood Culture No growth at 24 hours    MRSA Screen, PCR (Inpatient) - Swab, Nares [[id number]]  (Normal) Collected: 01/21/24 1241    Specimen: Swab from Nares Updated: 01/21/24 1841     MRSA PCR No MRSA Detected    Narrative:      The negative predictive value of this diagnostic test is high and should only be used to consider de-escalating anti-MRSA therapy. A positive result may indicate colonization with MRSA and must be correlated clinically.    Respiratory Panel PCR w/COVID-19(SARS-CoV-2) HOLA/MARVIN/JASSON/PAD/COR/BETI In-House, NP Swab in UTM/VTM, 2 HR TAT - Swab, Nasopharynx [[id number]]  (Normal) Collected: 01/21/24 0334    Specimen: Swab from Nasopharynx Updated: 01/21/24 1327     ADENOVIRUS, PCR Not Detected     Coronavirus 229E Not Detected     Coronavirus HKU1 Not Detected     Coronavirus NL63 Not Detected     Coronavirus OC43 Not Detected     COVID19 Not Detected     Human Metapneumovirus Not Detected     Human Rhinovirus/Enterovirus Not Detected     Influenza A PCR Not Detected     Influenza B PCR Not Detected     Parainfluenza Virus 1 Not Detected     Parainfluenza Virus 2 Not Detected     Parainfluenza Virus 3 Not Detected     Parainfluenza Virus 4 Not Detected     RSV, PCR Not Detected     Bordetella pertussis pcr Not Detected     Bordetella parapertussis PCR Not Detected     Chlamydophila pneumoniae PCR Not Detected     Mycoplasma pneumo by PCR Not Detected    Narrative:      In the setting of a positive respiratory panel with a viral infection PLUS a negative procalcitonin without other underlying concern for bacterial infection, consider observing off antibiotics or discontinuation of antibiotics and continue supportive care. If  the respiratory panel is positive for atypical bacterial infection (Bordetella pertussis, Chlamydophila pneumoniae, or Mycoplasma pneumoniae), consider antibiotic de-escalation to target atypical bacterial infection.    Phosphorus [[id number]]  (Abnormal) Collected: 01/21/24 1140    Specimen: Blood Updated: 01/21/24 1241     Phosphorus 2.4 mg/dL     Potassium [[id number]]  (Normal) Collected: 01/21/24 1140    Specimen: Blood Updated: 01/21/24 1231     Potassium 3.9 mmol/L             Imaging Results (Last 24 Hours)       ** No results found for the last 24 hours. **              Medication Review:   I have reviewed the patient's current medication list    Current Facility-Administered Medications:     acetaminophen (TYLENOL) tablet 650 mg, 650 mg, Oral, Q6H PRN, Jace Huang DO    ampicillin-sulbactam (UNASYN) 3 g in sodium chloride 0.9 % 100 mL IVPB-MBP, 3 g, Intravenous, Q6H, Jace Huang DO, 3 g at 01/22/24 0233    benzonatate (TESSALON) capsule 100 mg, 100 mg, Oral, TID PRN, Jace Huang DO, 100 mg at 01/21/24 0249    budesonide-formoterol (SYMBICORT) 160-4.5 MCG/ACT inhaler 2 puff, 2 puff, Inhalation, BID - RT, Jace Huang DO, 2 puff at 01/22/24 0734    Calcium Replacement - Follow Nurse / BPA Driven Protocol, , Does not apply, PRN, Jace Huang DO    cetirizine (zyrTEC) tablet 10 mg, 10 mg, Oral, Daily, Jace Huang DO, 10 mg at 01/22/24 0903    clopidogrel (PLAVIX) tablet 75 mg, 75 mg, Oral, Daily, Jace Huang DO, 75 mg at 01/22/24 0903    doxycycline (VIBRAMYCIN) 100 mg in sodium chloride 0.9 % 100 mL IVPB, 100 mg, Intravenous, Q12H, Jace Huang DO, 100 mg at 01/22/24 0902    Enoxaparin Sodium (LOVENOX) syringe 40 mg, 40 mg, Subcutaneous, Daily, Jace Huang DO, 40 mg at 01/22/24 0903    losartan (COZAAR) tablet 25 mg, 25 mg, Oral, Q24H, Jace Huang DO, 25 mg at 01/22/24 0902    Magnesium Low Dose Replacement - Follow Nurse /  BPA Driven Protocol, , Does not apply, PRReina MCGARRY Michael R, DO    phenytoin ER (DILANTIN) capsule 200 mg, 200 mg, Oral, Daily, Jace Huang DO, 200 mg at 01/22/24 0903    Phosphorus Replacement - Follow Nurse / BPA Driven Protocol, , Does not apply, PRNReina Michael R, DO    potassium chloride (K-DUR,KLOR-CON) CR tablet 40 mEq, 40 mEq, Oral, Q4H, Jace Huang DO, 40 mEq at 01/22/24 0903    Potassium Replacement - Follow Nurse / BPA Driven Protocol, , Does not apply, PRReina MCGARRY Michael R, DO    [COMPLETED] Insert Peripheral IV, , , Once **AND** sodium chloride 0.9 % flush 10 mL, 10 mL, Intravenous, PRN, Jace Huang,     sodium chloride 0.9 % flush 10 mL, 10 mL, Intravenous, Q12H, Jace Huang DO, 10 mL at 01/22/24 0903    sodium chloride 0.9 % flush 10 mL, 10 mL, Intravenous, PRN, Jace Huang,     sodium chloride 0.9 % infusion 40 mL, 40 mL, Intravenous, PRN, Jace Huang DO      Assessment & Plan     Diffuse Community Acquired Pneumonia of the Right Lung: Will change to PO Augmentin.  No organism isolated.  As noted, he has been liberated from oxygen.  Essential Hypertension: At goal on exam.  Noted change to home regimen.  COPD: Continue maintenance inhalers.  No sign of acute exacerbation.  Seizure Disorder: No further activity reported.  Dosing change noted.  Hx/O CVA: Continue antiplatelet therapy.    Plan for disposition: Anticipate home soon.    Nael Burr MD  01/22/24  09:18 EST

## 2024-01-22 NOTE — CASE MANAGEMENT/SOCIAL WORK
Continued Stay Note  [place] Morton     Patient Name: Ronnell Rizvi  MRN: [id number]  Today's Date: 1/22/2024    Admit Date: 1/21/2024    Plan: Home with wife   Discharge Plan       Row Name 01/22/24 1555       Plan    Plan Home with wife    Patient/Family in Agreement with Plan yes    Plan Comments CM followed up with patient today regarding discharge plans. He is currently sitting up on the side of the bed and is very [place]. Patient states he lives with his wife and plans on returning back home at discharge and she will be able to provide ride home at discharge. He states he does not think he will have any discharge needs and had no other questions or concerns regarding discharge needs. Name and number placed on white board in room. CM will follow.                   Discharge Codes    No documentation.                 Expected Discharge Date and Time       Expected Discharge Date Expected Discharge Time    Jan 23, 2024               Xochitl Evans RN

## 2024-01-22 NOTE — PLAN OF CARE
Goal Outcome Evaluation:  Plan of Care Reviewed With: patient        Progress: improving  Outcome Evaluation: Patient on room air this shift. IV Unasyn changed to PO Augmentin. Voiding spontaneously. Tolerating diet w/o complaints. Telemetry noted NSR. All care explained. All questions answered. Pt verb understanding.

## 2024-01-23 LAB
ALBUMIN SERPL-MCNC: 3.2 G/DL (ref 3.5–5.2)
ALBUMIN/GLOB SERPL: 1.7 G/DL
ALP SERPL-CCNC: 58 U/L (ref 39–117)
ALT SERPL W P-5'-P-CCNC: 43 U/L (ref 1–41)
ANION GAP SERPL CALCULATED.3IONS-SCNC: 3.5 MMOL/L (ref 5–15)
AST SERPL-CCNC: 30 U/L (ref 1–40)
BASOPHILS # BLD AUTO: 0.03 10*3/MM3 (ref 0–0.2)
BASOPHILS NFR BLD AUTO: 0.3 % (ref 0–1.5)
BILIRUB SERPL-MCNC: 0.5 MG/DL (ref 0–1.2)
BUN SERPL-MCNC: 15 MG/DL (ref 8–23)
BUN/CREAT SERPL: 16.3 (ref 7–25)
CALCIUM SPEC-SCNC: 8.2 MG/DL (ref 8.6–10.5)
CHLORIDE SERPL-SCNC: 107 MMOL/L (ref 98–107)
CO2 SERPL-SCNC: 26.5 MMOL/L (ref 22–29)
CREAT SERPL-MCNC: 0.92 MG/DL (ref 0.76–1.27)
DEPRECATED RDW RBC AUTO: 43.8 FL (ref 37–54)
EGFRCR SERPLBLD CKD-EPI 2021: 86.7 ML/MIN/1.73
EOSINOPHIL # BLD AUTO: 0.01 10*3/MM3 (ref 0–0.4)
EOSINOPHIL NFR BLD AUTO: 0.1 % (ref 0.3–6.2)
ERYTHROCYTE [DISTWIDTH] IN BLOOD BY AUTOMATED COUNT: 12.6 % (ref 12.3–15.4)
GLOBULIN UR ELPH-MCNC: 1.9 GM/DL
GLUCOSE SERPL-MCNC: 105 MG/DL (ref 65–99)
HCT VFR BLD AUTO: 39.2 % (ref 37.5–51)
HGB BLD-MCNC: 13.3 G/DL (ref 13–17.7)
IMM GRANULOCYTES # BLD AUTO: 0.06 10*3/MM3 (ref 0–0.05)
IMM GRANULOCYTES NFR BLD AUTO: 0.6 % (ref 0–0.5)
LYMPHOCYTES # BLD AUTO: 1.23 10*3/MM3 (ref 0.7–3.1)
LYMPHOCYTES NFR BLD AUTO: 12.8 % (ref 19.6–45.3)
MCH RBC QN AUTO: 31.8 PG (ref 26.6–33)
MCHC RBC AUTO-ENTMCNC: 33.9 G/DL (ref 31.5–35.7)
MCV RBC AUTO: 93.8 FL (ref 79–97)
MONOCYTES # BLD AUTO: 1.08 10*3/MM3 (ref 0.1–0.9)
MONOCYTES NFR BLD AUTO: 11.2 % (ref 5–12)
NEUTROPHILS NFR BLD AUTO: 7.2 10*3/MM3 (ref 1.7–7)
NEUTROPHILS NFR BLD AUTO: 75 % (ref 42.7–76)
NRBC BLD AUTO-RTO: 0 /100 WBC (ref 0–0.2)
PLATELET # BLD AUTO: 165 10*3/MM3 (ref 140–450)
PMV BLD AUTO: 9.9 FL (ref 6–12)
POTASSIUM SERPL-SCNC: 3.9 MMOL/L (ref 3.5–5.2)
PROCALCITONIN SERPL-MCNC: 0.08 NG/ML (ref 0–0.25)
PROT SERPL-MCNC: 5.1 G/DL (ref 6–8.5)
RBC # BLD AUTO: 4.18 10*6/MM3 (ref 4.14–5.8)
SODIUM SERPL-SCNC: 137 MMOL/L (ref 136–145)
WBC NRBC COR # BLD AUTO: 9.61 10*3/MM3 (ref 3.4–10.8)

## 2024-01-23 PROCEDURE — 80185 ASSAY OF PHENYTOIN TOTAL: CPT | Performed by: INTERNAL MEDICINE

## 2024-01-23 PROCEDURE — 94799 UNLISTED PULMONARY SVC/PX: CPT

## 2024-01-23 PROCEDURE — 99232 SBSQ HOSP IP/OBS MODERATE 35: CPT | Performed by: HOSPITALIST

## 2024-01-23 PROCEDURE — 94761 N-INVAS EAR/PLS OXIMETRY MLT: CPT

## 2024-01-23 PROCEDURE — 80186 ASSAY OF PHENYTOIN FREE: CPT | Performed by: INTERNAL MEDICINE

## 2024-01-23 PROCEDURE — 97161 PT EVAL LOW COMPLEX 20 MIN: CPT

## 2024-01-23 PROCEDURE — 94664 DEMO&/EVAL PT USE INHALER: CPT

## 2024-01-23 PROCEDURE — 85025 COMPLETE CBC W/AUTO DIFF WBC: CPT | Performed by: HOSPITALIST

## 2024-01-23 PROCEDURE — 84145 PROCALCITONIN (PCT): CPT | Performed by: HOSPITALIST

## 2024-01-23 PROCEDURE — 80053 COMPREHEN METABOLIC PANEL: CPT | Performed by: HOSPITALIST

## 2024-01-23 RX ADMIN — Medication 10 ML: at 09:41

## 2024-01-23 RX ADMIN — CETIRIZINE HYDROCHLORIDE 10 MG: 10 TABLET, FILM COATED ORAL at 09:40

## 2024-01-23 RX ADMIN — BUDESONIDE AND FORMOTEROL FUMARATE DIHYDRATE 2 PUFF: 160; 4.5 AEROSOL RESPIRATORY (INHALATION) at 07:29

## 2024-01-23 RX ADMIN — AMOXICILLIN AND CLAVULANATE POTASSIUM 1 TABLET: 875; 125 TABLET, FILM COATED ORAL at 09:40

## 2024-01-23 RX ADMIN — AMOXICILLIN AND CLAVULANATE POTASSIUM 1 TABLET: 875; 125 TABLET, FILM COATED ORAL at 20:46

## 2024-01-23 RX ADMIN — LOSARTAN POTASSIUM 25 MG: 50 TABLET, FILM COATED ORAL at 09:40

## 2024-01-23 RX ADMIN — Medication 10 ML: at 20:46

## 2024-01-23 RX ADMIN — BUDESONIDE AND FORMOTEROL FUMARATE DIHYDRATE 2 PUFF: 160; 4.5 AEROSOL RESPIRATORY (INHALATION) at 19:21

## 2024-01-23 RX ADMIN — CLOPIDOGREL BISULFATE 75 MG: 75 TABLET ORAL at 09:40

## 2024-01-23 RX ADMIN — PHENYTOIN SODIUM 200 MG: 100 CAPSULE ORAL at 09:40

## 2024-01-23 NOTE — PLAN OF CARE
Goal Outcome Evaluation:  Plan of Care Reviewed With: patient           Outcome Evaluation: Physical therapy evaluation complete.  Patient performs sit to/from stand independently without device and gait without device x200 feet, SBA.  Patient manages direction changes without balance loss or difficulty.  Patient reports current mobility is at baseline and reports no concerns regarding return home at this time.  no further PT needs in acute care setting.  Recommend continued ambulation with nursing staff.      Anticipated Discharge Disposition (PT): home

## 2024-01-23 NOTE — PLAN OF CARE
Goal Outcome Evaluation:  Plan of Care Reviewed With: patient        Progress: improving  Outcome Evaluation: VSS, remains O2 NC to maintain O2 above 88, tolerating diet, independent

## 2024-01-23 NOTE — PROGRESS NOTES
"Hospitalist Team      Patient Care Team:  Deena Tarango MD as PCP - General (Internal Medicine)  Gregory King MD as Consulting Physician (Hematology and Oncology)  Chase Haque MD as Referring Physician (Internal Medicine)  Luis Wyatt MD as Consulting Physician (Cardiology)        Chief Complaint:  Follow-up Pneumonia    Subjective    Feels well this morning although Mr. Rizvi reports deep inspiration causes him to cough.  He denies chest pain, and he is tolerating PO.  He has been ambulating w/o issue.  Did require a touch of O2 overnight, but has not needed any further this morning.     Objective    Vital Signs  Temp:  [97.9 °F (36.6 °C)-98.6 °F (37 °C)] 97.9 °F (36.6 °C)  Heart Rate:  [72-98] 88  Resp:  [18-19] 18  BP: (106-173)/(60-77) 106/60  Oxygen Therapy  SpO2: 92 %  Pulse Oximetry Type: Continuous  Device (Oxygen Therapy): room air  Flow (L/min): 2}    Flowsheet Rows      Flowsheet Row First Filed Value   Admission Height 185.4 cm (73\") Documented at 01/21/2024 0014   Admission Weight 83.9 kg (185 lb) Documented at 01/21/2024 0014            Physical Exam:    General: Appears stated age in no acute distress.  Lungs: Breath sounds are clear throughout all fields.  Respirations are non-labored.  CV: Regular rate and rhythm.  No murmur appreciated.  Radial pulses are 2+ and symmetric.  Abdomen: Soft and non-tender w/ active bowel sounds.  MSK: No C/C/E.  Neuro: CN II-XII grossly intact.  Psych: Pleasant affect.  Ox3.    Results Review:     I reviewed the patient's new clinical results.    Lab Results (last 24 hours)       Procedure Component Value Units Date/Time    Respiratory Culture - Sputum, Cough [[id number]] Collected: 01/21/24 0352    Specimen: Sputum from Cough Updated: 01/23/24 0808     Respiratory Culture Heavy growth (4+) The culture consists of normal respiratory buddy. This is a preliminary report; final report to follow.     Gram Stain Rare (1+) WBCs seen      Few (2+) " "Mixed bacterial buddy    Blood Culture - Blood, Arm, Right [[id number]]  (Normal) Collected: 01/21/24 0719    Specimen: Blood from Arm, Right Updated: 01/23/24 0730     Blood Culture No growth at 2 days    Blood Culture - Blood, Wrist, Right [[id number]]  (Normal) Collected: 01/21/24 0719    Specimen: Blood from Wrist, Right Updated: 01/23/24 0730     Blood Culture No growth at 2 days    Comprehensive Metabolic Panel [[id number]]  (Abnormal) Collected: 01/23/24 0332    Specimen: Blood Updated: 01/23/24 0437     Glucose 105 mg/dL      BUN 15 mg/dL      Creatinine 0.92 mg/dL      Sodium 137 mmol/L      Potassium 3.9 mmol/L      Chloride 107 mmol/L      CO2 26.5 mmol/L      Calcium 8.2 mg/dL      Total Protein 5.1 g/dL      Albumin 3.2 g/dL      ALT (SGPT) 43 U/L      AST (SGOT) 30 U/L      Alkaline Phosphatase 58 U/L      Total Bilirubin 0.5 mg/dL      Globulin 1.9 gm/dL      A/G Ratio 1.7 g/dL      BUN/Creatinine Ratio 16.3     Anion Gap 3.5 mmol/L      eGFR 86.7 mL/min/1.73     Narrative:      GFR Normal >60  Chronic Kidney Disease <60  Kidney Failure <15    The GFR formula is only valid for adults with stable renal function between ages 18 and 70.    Procalcitonin [[id number]]  (Normal) Collected: 01/23/24 0332    Specimen: Blood Updated: 01/23/24 0427     Procalcitonin 0.08 ng/mL     Narrative:      As a Marker for Sepsis (Non-Neonates):    1. <0.5 ng/mL represents a low risk of severe sepsis and/or septic shock.  2. >2 ng/mL represents a high risk of severe sepsis and/or septic shock.    As a Marker for Lower Respiratory Tract Infections that require antibiotic therapy:    PCT on Admission    Antibiotic Therapy       6-12 Hrs later    >0.5                Strongly Recommended  >0.25 - <0.5        Recommended   0.1 - 0.25          Discouraged              Remeasure/reassess PCT  <0.1                Strongly Discouraged     Remeasure/reassess PCT    As 28 day mortality risk marker: \"Change in Procalcitonin Result\" (>80% " or <=80%) if Day 0 (or Day 1) and Day 4 values are available. Refer to http://[email or web address]-pct-calculator.[email or web address]    Change in PCT <=80%  A decrease of PCT levels below or equal to 80% defines a positive change in PCT test result representing a higher risk for 28-day all-cause mortality of patients diagnosed with severe sepsis for septic shock.    Change in PCT >80%  A decrease of PCT levels of more than 80% defines a negative change in PCT result representing a lower risk for 28-day all-cause mortality of patients diagnosed with severe sepsis or septic shock.       CBC & Differential [[id number]]  (Abnormal) Collected: 01/23/24 0332    Specimen: Blood Updated: 01/23/24 0359    Narrative:      The following orders were created for panel order CBC & Differential.  Procedure                               Abnormality         Status                     ---------                               -----------         ------                     CBC Auto Differential[[id number]]        Abnormal            Final result                 Please view results for these tests on the individual orders.    CBC Auto Differential [[id number]]  (Abnormal) Collected: 01/23/24 0332    Specimen: Blood Updated: 01/23/24 0359     WBC 9.61 10*3/mm3      RBC 4.18 10*6/mm3      Hemoglobin 13.3 g/dL      Hematocrit 39.2 %      MCV 93.8 fL      MCH 31.8 pg      MCHC 33.9 g/dL      RDW 12.6 %      RDW-SD 43.8 fl      MPV 9.9 fL      Platelets 165 10*3/mm3      Neutrophil % 75.0 %      Lymphocyte % 12.8 %      Monocyte % 11.2 %      Eosinophil % 0.1 %      Basophil % 0.3 %      Immature Grans % 0.6 %      Neutrophils, Absolute 7.20 10*3/mm3      Lymphocytes, Absolute 1.23 10*3/mm3      Monocytes, Absolute 1.08 10*3/mm3      Eosinophils, Absolute 0.01 10*3/mm3      Basophils, Absolute 0.03 10*3/mm3      Immature Grans, Absolute 0.06 10*3/mm3      nRBC 0.0 /100 WBC     Phenytoin Level, Total & Free [[id number]] Collected: 01/23/24 0332    Specimen: Blood Updated:  01/23/24 0353    Potassium [[id number]]  (Normal) Collected: 01/22/24 1733    Specimen: Blood Updated: 01/22/24 1753     Potassium 4.2 mmol/L             Imaging Results (Last 24 Hours)       ** No results found for the last 24 hours. **              Medication Review:   I have reviewed the patient's current medication list    Current Facility-Administered Medications:     acetaminophen (TYLENOL) tablet 650 mg, 650 mg, Oral, Q6H PRN, Jace Huang DO    amoxicillin-clavulanate (AUGMENTIN) 875-125 MG per tablet 1 tablet, 1 tablet, Oral, Q12H, Nael Burr MD, 1 tablet at 01/23/24 0940    benzonatate (TESSALON) capsule 100 mg, 100 mg, Oral, TID PRN, Jace Huang DO, 100 mg at 01/21/24 0249    budesonide-formoterol (SYMBICORT) 160-4.5 MCG/ACT inhaler 2 puff, 2 puff, Inhalation, BID - RT, Jace Huang DO, 2 puff at 01/23/24 0729    Calcium Replacement - Follow Nurse / BPA Driven Protocol, , Does not apply, PRNReina Michael R, DO    cetirizine (zyrTEC) tablet 10 mg, 10 mg, Oral, Daily, Jace Huang DO, 10 mg at 01/23/24 0940    clopidogrel (PLAVIX) tablet 75 mg, 75 mg, Oral, Daily, Jace Huang DO, 75 mg at 01/23/24 0940    losartan (COZAAR) tablet 25 mg, 25 mg, Oral, Q24H, Jace Huang DO, 25 mg at 01/23/24 0940    Magnesium Low Dose Replacement - Follow Nurse / BPA Driven Protocol, , Does not apply, PRReina MCGARRY Michael R, DO    phenytoin ER (DILANTIN) capsule 200 mg, 200 mg, Oral, Daily, Jace Huang DO, 200 mg at 01/23/24 0940    Phosphorus Replacement - Follow Nurse / BPA Driven Protocol, , Does not apply, PRNReina Michael R, DO    Potassium Replacement - Follow Nurse / BPA Driven Protocol, , Does not apply, PRN, Reina, Jace R, DO    [COMPLETED] Insert Peripheral IV, , , Once **AND** sodium chloride 0.9 % flush 10 mL, 10 mL, Intravenous, PRN, Jace Huang DO    sodium chloride 0.9 % flush 10 mL, 10 mL, Intravenous, Q12H, Reina  Jace HODGES DO, 10 mL at 01/23/24 0941    sodium chloride 0.9 % flush 10 mL, 10 mL, Intravenous, PRN, Jace Huang,     sodium chloride 0.9 % infusion 40 mL, 40 mL, Intravenous, PRN, Jace Huang DO      Assessment & Plan     Diffuse Community Acquired Pneumonia of the Right Lung: Will finish his course of Augmentin.  No O2 need thus far...correct reading?  Encouraged ambulation.  Essential Hypertension: Trend at goal.  No change to regimen.  COPD: No acute issues on maitenence therapy.  Seizure Disorder: repeat level drawn and pending.  Continue modified dosing.  No further activity.  Hx/O CVA: Continue antiplatelet therapy.    [facility]  01/23/24  10:01 EST

## 2024-01-23 NOTE — THERAPY DISCHARGE NOTE
Patient Name: Ronnell Rizvi  : 1950    MRN: [id number]                              Today's Date: 2024       Admit Date: 2024    Visit Dx:     ICD-10-CM ICD-9-CM   1. Seizure  R56.9 780.39   2. Hypokalemia  E87.6 276.8   3. Pneumonia of right lung due to infectious organism, unspecified part of lung  J18.9 483.8   4. Hypoxia  R09.02 799.02     Patient Active Problem List   Diagnosis    Anemia, unspecified    Vitamin B12 deficiency    Chronic fatigue    Benign prostatic hypertrophy (BPH) with weak urinary stream    Chronic bronchitis    ELIZABETH (obstructive sleep apnea)    Sinoatrial block    Cerebral aneurysm, nonruptured    CVA (cerebral vascular accident)    TIA (transient ischemic attack)    Intracranial vascular stenosis    Sinus pause    S/P percutaneous patent foramen ovale closure    TIA on medication    HTN (hypertension)    COPD (chronic obstructive pulmonary disease)    Nocturnal seizures    Encounter for screening for malignant neoplasm of colon    Seizure    HL (hearing loss)    Pre-syncope    Mixed hyperlipidemia    History of stroke    Melena    Irritable bowel syndrome with both constipation and diarrhea    Gastroesophageal reflux disease    Pneumonia     Past Medical History:   Diagnosis Date    Allergic     Allergic rhinitis     Anal fissure     Aortic insufficiency     moderate, THIERRY 2017    Asthma     Asthma     Benign prostatic hypertrophy (BPH) with weak urinary stream 2016    Cataract     Chronic bronchitis     Colon polyp     COPD (chronic obstructive pulmonary disease)     Emphysema lung     Falls frequently     Fatty liver     GERD (gastroesophageal reflux disease)     Headache     Hepatitis     thought to be Hepatitis A     History of pneumonia     With left lower lobe atelectasis and scar tissue, being followed    HL (hearing loss)     Hypertension     Low back pain     Memory loss     Mixed hyperlipidemia 2023    Obstructive sleep apnea syndrome 2017     refuses c-pap    PFO (patent foramen ovale)     s/p percutaneous closure with a 25mm Amplatzer device in December 2018    Pneumonia     LLL with scar tissue    Seizures     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 total of 3 strokes    Tremor     Comes & goes    Vision loss      Past Surgical History:   Procedure Laterality Date    CARDIAC CATHETERIZATION N/A 12/12/2018    Procedure: Patent foramen ovale closure- [employer];  Surgeon: Deangelo Harris MD;  Location:  [facility];  Service: Cardiology    CARDIAC CATHETERIZATION N/A 12/12/2018    Procedure: Intracardiac echocardiogram;  Surgeon: Deangelo Harris MD;  Location:  [facility];  Service: Cardiology    CARDIAC ELECTROPHYSIOLOGY PROCEDURE N/A 03/26/2018    Procedure: Loop insertion   CONFIRM RX;  Surgeon: Luis Wyatt MD;  Location:  [facility];  Service: Cardiovascular    CARDIAC ELECTROPHYSIOLOGY PROCEDURE N/A 07/15/2020    Procedure: Loop recorder removal;  Surgeon: Starr Driscoll MD;  Location:  [facility];  Service: Cardiovascular;  Laterality: N/A;    CARDIAC SURGERY      CATARACT EXTRACTION Bilateral     COLONOSCOPY      COLONOSCOPY N/A 10/09/2020    Procedure: COLONOSCOPY with polypectomy;  Surgeon: Ras Mendoza MD;  Location: [facility];  Service: Gastroenterology;  Laterality: N/A;  diverticulosis  ascending polyp  transverse polyp  sigmoid polyps X 2  rectal polyp    EYE SURGERY      HAND SURGERY Bilateral     INGUINAL HERNIA REPAIR Left     OTHER SURGICAL HISTORY      inguinal hernia repair for person over age 5    TONSILLECTOMY      TONSILLECTOMY        General Information       Row Name 01/23/24 1018          Physical Therapy Time and Intention    Document Type discharge evaluation/summary  -     Mode of Treatment physical therapy  -       Row Name 01/23/24 1018          General  Information    Patient Profile Reviewed yes  pt admitted for coughing, SOA.  pt diagnosed with pneumonia  -     Prior Level of Function independent:;all household mobility;community mobility  -     Existing Precautions/Restrictions no known precautions/restrictions  -     Barriers to Rehab none identified  -       Row Name 01/23/24 1018          Living Environment    People in Home spouse  -       Row Name 01/23/24 1018          Home Main Entrance    Number of Stairs, Main Entrance three  -     Stair Railings, Main Entrance railings on both sides of stairs  -       Row Name 01/23/24 1018          Stairs Within Home, Primary    Stairs, Within Home, Primary single story house  -       Row Name 01/23/24 1018          Cognition    Orientation Status (Cognition) oriented x 3  -               User Key  (r) = Recorded By, (t) = Taken By, (c) = Cosigned By      Initials Name Provider Type    Patria Spain, [place] Physical Therapist                   Mobility       Row Name 01/23/24 1018          Bed Mobility    Bed Mobility other (see comments)  -     Comment, (Bed Mobility) deferred up in recliner  -       Row Name 01/23/24 1018          Sit-Stand Transfer    Sit-Stand Hooker (Transfers) independent  -     Assistive Device (Sit-Stand Transfers) other (see comments)  no device used  -       Row Name 01/23/24 1018          Gait/Stairs (Locomotion)    Hooker Level (Gait) standby assist  -     Assistive Device (Gait) --  no device used  -     Distance in Feet (Gait) 200  -     Deviations/Abnormal Patterns (Gait) base of support, narrow  -     Bilateral Gait Deviations forward flexed posture  -     Comment, (Gait/Stairs) pt manages direction changes without difficulty or balance loss  -               User Key  (r) = Recorded By, (t) = Taken By, (c) = Cosigned By      Initials Name Provider Type    Patria Spain PT Physical Therapist                   Obj/Interventions   "     [facility] Name 01/23/24 1018          Range of Motion Comprehensive    Comment, General Range of Motion LE ROM WFL bilterally  -[facility] Name 01/23/24 1018          Strength Comprehensive (MMT)    Comment, General Manual Muscle Testing (MMT) Assessment LE strength functional within task  -       Row Name 01/23/24 1018          Sensory Assessment (Somatosensory)    Sensory Assessment (Somatosensory) other (see comments)  pt reports no numbness/tingling in LEs  -               User Key  (r) = Recorded By, (t) = Taken By, (c) = Cosigned By      Initials Name Provider Type    Patria Spain, PT Physical Therapist                   Goals/Plan    No documentation.                  Clinical Impression       [facility] Name 01/23/24 1018          Pain    Pretreatment Pain Rating 0/10 - no pain  -     Posttreatment Pain Rating 0/10 - no pain  -[facility] Name 01/23/24 1018          Plan of Care Review    Plan of Care Reviewed With patient  -     Outcome Evaluation Physical therapy evaluation complete.  Patient performs sit to/from stand independently without device and gait without device x200 feet, SBA.  Patient manages direction changes without balance loss or difficulty.  Patient reports current mobility is at baseline and reports no concerns regarding return home at this time.  no further PT needs in acute care setting.  Recommend continued ambulation with nursing staff.  -       Row Name 01/23/24 1018          Therapy Assessment/Plan (PT)    Patient/Family Therapy Goals Statement (PT) \"go home\"  -     Criteria for Skilled Interventions Met (PT) no problems identified which require skilled intervention  -     Therapy Frequency (PT) evaluation only  -       Row Name 01/23/24 1018          Positioning and Restraints    Pre-Treatment Position sitting in chair/recliner  -     Post Treatment Position chair  -JW     In Chair sitting;notified nsg;call light within reach;encouraged to call for assist  -  "              User Key  (r) = Recorded By, (t) = Taken By, (c) = Cosigned By      Initials Name Provider Type    Patria Spain, PT Physical Therapist                   Outcome Measures       Row Name 01/23/24 1018 01/23/24 0934       How much help from another person do you currently need...    Turning from your back to your side while in flat bed without using bedrails? 4  -JW 4  -TC    Moving from lying on back to sitting on the side of a flat bed without bedrails? 4  - 4  -TC    Moving to and from a bed to a chair (including a wheelchair)? 3  - 3  -TC    Standing up from a chair using your arms (e.g., wheelchair, bedside chair)? 3  - 3  -TC    Climbing 3-5 steps with a railing? 3  - 3  -TC    To walk in hospital room? 3  - 3  -TC    AM-PAC 6 Clicks Score (PT) 20  - 20  -TC    Highest Level of Mobility Goal 6 --> Walk 10 steps or more  - 6 --> Walk 10 steps or more  -      Row Name 01/23/24 1018          Functional Assessment    Outcome Measure Options AM-PAC 6 Clicks Basic Mobility (PT)  -               User Key  (r) = Recorded By, (t) = Taken By, (c) = Cosigned By      Initials Name Provider Type    Patria Spain, PT Physical Therapist    Rosanne Ridley, RN Registered Nurse                  Physical Therapy Education       Title: PT OT SLP Therapies (Resolved)       Topic: Physical Therapy (Resolved)       Point: Mobility training (Resolved)       Learning Progress Summary             Patient Acceptance, E,TB, VU by  at 1/23/2024 1255                                         User Key       Initials Effective Dates Name Provider Type [facility] 06/16/21 -  Patria Quintana, [place] Physical Therapist PT                  PT Recommendation and Plan     Plan of Care Reviewed With: patient  Outcome Evaluation: Physical therapy evaluation complete.  Patient performs sit to/from stand independently without device and gait without device x200 feet, SBA.  Patient manages direction changes  without balance loss or difficulty.  Patient reports current mobility is at baseline and reports no concerns regarding return home at this time.  no further PT needs in acute care setting.  Recommend continued ambulation with nursing staff.     Time Calculation:   PT Evaluation Complexity  History, PT Evaluation Complexity: no personal factors and/or comorbidities  Examination of Body Systems (PT Eval Complexity): 1-2 elements  Clinical Presentation (PT Evaluation Complexity): stable  Clinical Decision Making (PT Evaluation Complexity): low complexity  Overall Complexity (PT Evaluation Complexity): low complexity     PT Charges       Row Name 01/23/24 1256             Time Calculation    Start Time 1018  -      Stop Time 1032  -      Time Calculation (min) 14 min  -      PT Received On 01/23/24  -                User Key  (r) = Recorded By, (t) = Taken By, (c) = Cosigned By      Initials Name Provider Type    Patria Spain, [place] Physical Therapist                  Therapy Charges for Today       Code Description Service Date Service Provider Modifiers Qty    [id number] HC PT EVAL LOW COMPLEXITY 1 1/23/2024 Patria Quintana, PT GP 1            PT G-Codes  Outcome Measure Options: AM-PAC 6 Clicks Basic Mobility (PT)  AM-PAC 6 Clicks Score (PT): 20    PT Discharge Summary  Anticipated Discharge Disposition (PT): home    Patria Quintana PT  1/23/2024

## 2024-01-23 NOTE — PLAN OF CARE
Goal Outcome Evaluation:  Plan of Care Reviewed With: patient        Progress: improving  Outcome Evaluation: Pt on @ 2L/NC this AM. Oxygen off during this shift. Ambulates independently. Denies SOA with ambulation. No distress noted. Tolerating diet. Needs to be reminded to use urinal for output recording. PO antibiotics. IV saline locked. All care explained. All questions explained. Pt verb understanding

## 2024-01-24 ENCOUNTER — READMISSION MANAGEMENT (OUTPATIENT)
Dept: CALL CENTER | Facility: HOSPITAL | Age: 74
End: 2024-01-24
Payer: MEDICARE

## 2024-01-24 VITALS
BODY MASS INDEX: 25.56 KG/M2 | DIASTOLIC BLOOD PRESSURE: 52 MMHG | WEIGHT: 188.7 LBS | OXYGEN SATURATION: 90 % | HEART RATE: 71 BPM | SYSTOLIC BLOOD PRESSURE: 106 MMHG | TEMPERATURE: 97.6 F | RESPIRATION RATE: 18 BRPM | HEIGHT: 72 IN

## 2024-01-24 LAB
BACTERIA SPEC RESP CULT: NORMAL
GRAM STN SPEC: NORMAL
GRAM STN SPEC: NORMAL

## 2024-01-24 PROCEDURE — 94761 N-INVAS EAR/PLS OXIMETRY MLT: CPT

## 2024-01-24 PROCEDURE — 94799 UNLISTED PULMONARY SVC/PX: CPT

## 2024-01-24 RX ORDER — PHENYTOIN SODIUM 200 MG/1
200 CAPSULE, EXTENDED RELEASE ORAL DAILY
Qty: 30 CAPSULE | Refills: 0 | Status: SHIPPED | OUTPATIENT
Start: 2024-01-25 | End: 2024-01-29 | Stop reason: SDUPTHER

## 2024-01-24 RX ORDER — BENZONATATE 100 MG/1
100 CAPSULE ORAL 3 TIMES DAILY PRN
Qty: 20 CAPSULE | Refills: 0 | Status: SHIPPED | OUTPATIENT
Start: 2024-01-24

## 2024-01-24 RX ORDER — LOSARTAN POTASSIUM 25 MG/1
25 TABLET ORAL
Qty: 30 TABLET | Refills: 0 | Status: SHIPPED | OUTPATIENT
Start: 2024-01-25 | End: 2024-01-29 | Stop reason: SDUPTHER

## 2024-01-24 RX ORDER — PHENYTOIN SODIUM 200 MG/1
200 CAPSULE, EXTENDED RELEASE ORAL DAILY
Qty: 30 CAPSULE | Refills: 0 | Status: SHIPPED | OUTPATIENT
Start: 2024-01-25 | End: 2024-01-24 | Stop reason: SDUPTHER

## 2024-01-24 RX ORDER — AMOXICILLIN AND CLAVULANATE POTASSIUM 875; 125 MG/1; MG/1
1 TABLET, FILM COATED ORAL EVERY 12 HOURS SCHEDULED
Qty: 2 TABLET | Refills: 0 | Status: SHIPPED | OUTPATIENT
Start: 2024-01-24 | End: 2024-01-25

## 2024-01-24 RX ADMIN — CLOPIDOGREL BISULFATE 75 MG: 75 TABLET ORAL at 09:17

## 2024-01-24 RX ADMIN — BUDESONIDE AND FORMOTEROL FUMARATE DIHYDRATE 2 PUFF: 160; 4.5 AEROSOL RESPIRATORY (INHALATION) at 07:51

## 2024-01-24 RX ADMIN — AMOXICILLIN AND CLAVULANATE POTASSIUM 1 TABLET: 875; 125 TABLET, FILM COATED ORAL at 09:17

## 2024-01-24 RX ADMIN — LOSARTAN POTASSIUM 25 MG: 50 TABLET, FILM COATED ORAL at 09:18

## 2024-01-24 RX ADMIN — Medication 10 ML: at 09:15

## 2024-01-24 RX ADMIN — PHENYTOIN SODIUM 200 MG: 100 CAPSULE ORAL at 09:17

## 2024-01-24 RX ADMIN — CETIRIZINE HYDROCHLORIDE 10 MG: 10 TABLET, FILM COATED ORAL at 09:18

## 2024-01-24 NOTE — CASE MANAGEMENT/SOCIAL WORK
Case Management Discharge Note      Final Note: Discharged home.         Selected Continued Care - Discharged on 1/24/2024 Admission date: 1/21/2024 - Discharge disposition: Home or Self Care      Destination    No services have been selected for the patient.                Durable Medical Equipment    No services have been selected for the patient.                Dialysis/Infusion    No services have been selected for the patient.                Home Medical Care    No services have been selected for the patient.                Therapy    No services have been selected for the patient.                Community Resources    No services have been selected for the patient.                Community & DME    No services have been selected for the patient.                         Final Discharge Disposition Code: 01 - home or self-care

## 2024-01-24 NOTE — CASE MANAGEMENT/SOCIAL WORK
Continued Stay Note  [place]     Patient Name: Ronnell Rizvi  MRN: [id number]  Today's Date: 1/24/2024    Admit Date: 1/21/2024    Plan: Home with wefe   Discharge Plan       Row Name 01/24/24 1235       Plan    Plan Home with wife    Patient/Family in Agreement with Plan yes    Plan Comments Patient noted for discharge today. CM followed up with patient to review discharge plan. Patient is currently sitting up in the chair and voices no complaints. He states he is going home today and his wife can assist with needs and she will be picking him up today. He declined any discharge needs at this time. [facility] reviewed with patient and he verbalized understanding. CM will follow.                   Discharge Codes    No documentation.                 Expected Discharge Date and Time       Expected Discharge Date Expected Discharge Time    Jan 24, 2024               Xochitl Evans RN

## 2024-01-24 NOTE — OUTREACH NOTE
Prep Survey      Flowsheet Row Responses   [facility] patient discharged from? [place]   Is LACE score < 7 ? No   Eligibility [facility] [place]   Date of Admission 01/21/24   Date of Discharge 01/24/24   Discharge Disposition Home or Self Care   Discharge diagnosis Pneumonia   Does the patient have one of the following disease processes/diagnoses(primary or secondary)? Pneumonia   Does the patient have Home health ordered? No   Is there a DME ordered? No   Prep survey completed? Yes            Neelima AGUILEAR - Registered Nurse

## 2024-01-24 NOTE — PLAN OF CARE
Goal Outcome Evaluation:  Plan of Care Reviewed With: patient        Progress: improving  Outcome Evaluation: Patient remains on room air while awake. Requiring 2L oxygen while sleeping. No complaints of pain or discomfort. Tolerating PO antibiotics. VSS.

## 2024-01-24 NOTE — DISCHARGE SUMMARY
"[place]  1950  [id number]    Hospitalists Discharge Summary    Date of Admission: 1/21/2024  Date of Discharge:  1/24/2024    Primary Discharge Diagnoses:   Grand mal seizure with history of epilepsy  Acute hypoxic respiratory failure secondary to diffuse right-sided community acquired bacterial pneumonia    Secondary Discharge Diagnoses:   Electrolyte imbalance  COPD  Hypertension  History of stroke  Seasonal allergic rhinitis  H/O ASD closure    History of Present Illness:  \"Patient is a 74-year-old male with past medical history significant for epilepsy treated with Dilantin followed by [facility], asthma/COPD/chronic bronchitis-nonoxygen dependent, memory loss, hyperlipidemia, previous left occipital strokes, vision loss, fatty liver, BPH, hypertension.  Who presents to [facility] ED complaining of his chest burning.  Usual heartburn medications provided no relief, reports worsening after dinner leading up to his bedtime.  Nothing seems to relieve this for the patient.  He denies chest pain.  He denies shortness of breath, however was hypoxic on arrival at 82% on room air, placed on 2 L initially nasal cannula in ED sats not improving enough therefore increased to 4 L and finally came up to 91%.  Patient was at home with spouse watching TV after dinner and had witnessed grand mal seizure lasted about 1 minute but remained confused for the next 5 minutes or so.  Patient had no recollection of what was going on until he came back to and was more alert, spouse reportedly was with him the whole time as per ED documentation.  Prior to this patient has not had seizure activity for 2 years.  He is also reporting a cough but denies fever or chills.      Workup in the ED revealed hypoxia as described above, improved with 4 L nasal cannula administration of oxygen, chest x-ray shows diffuse right-sided pneumonia, hypophosphatemia, hypokalemia.\"    Hospital Course:  Patient was given " Rocephin and azithromycin in ER which was switched to Unasyn and doxycycline along with oxygen and nebulizer treatments.  He was gradually liberated from oxygen and changed to oral antibiotic regimen.    Regarding epilepsy, his Dilantin dose was increased due to low therapeutic level    Electrolytes were repleted per protocol  He was discharged in stable condition to complete his course of Augmentin and keep follow-up appointment with Dr. Hubbard in Feb F/U Deena Tarango MD 1 week    PCP  Patient Care Team:  Deena Tarango MD as PCP - General (Internal Medicine)  Gregory King MD as Consulting Physician (Hematology and Oncology)  Chase Haque MD as Referring Physician (Internal Medicine)  Luis Wyatt MD as Consulting Physician (Cardiology)    Consults:   Consults       No orders found from 12/23/2023 to 1/22/2024.          Operations and Procedures Performed:     XR Chest 2 View    Result Date: 1/21/2024  Narrative: CR Chest 2 Vws INDICATION:  Dry cough and chest burning. Low O2. COMPARISON:  9/14/2022 FINDINGS: PA and lateral views of the chest.  Cardiac and mediastinal contours remain normal. Patient is status post ASD closure. There is chronic scarring at the left lung base. There are new interstitial infiltrates throughout the right lung compatible with pneumonia. No pneumothorax.      Impression: Diffuse pneumonia in the right lung. Follow-up until clear recommended. Signer Name: Kieran Bell MD Signed: 1/21/2024 1:06 AM [facility] [place]    CT Cervical Spine Without Contrast    Result Date: 12/29/2023  Narrative: CT cervical spine without contrast INDICATION: Headache COMPARISON: None. TECHNIQUE: Axial CT imaging of the cervical spine was performed without intravenous contrast. Coronal, sagittal, and three-dimensional reformats were rendered. Radiation dose reduction techniques were utilized, including automated exposure control and exposure  modulation based on body size. FINDINGS: There is no fracture Normal alignment. Scattered endplate osteophyte formations with uncovertebral and facet arthropathy. Multilevel degenerative disc disease with disc height loss greatest at C6-7. There is at least mild bilateral neural foraminal narrowing at this level. No evidence of severe canal narrowing. Unremarkable visualized soft tissues.     Impression: No acute abnormality of the cervical spine. Signer Name: Remy Flower MD Signed: 12/29/2023 6:22 PM EST [facility]    CT Head Without Contrast    Result Date: 12/29/2023  Narrative: CT head without contrast INDICATION: Headache COMPARISON: 10/11/2020 TECHNIQUE: Thinly collimated spiral CT images of the brain without contrast with axial, coronal and sagittal reformations. Radiation dose reduction techniques were utilized, including automated exposure control and exposure modulation based on body size. FINDINGS: There is no evidence of intracranial hemorrhage, loss of distinction between gray and white matter, mass, significant mass effect, or abnormal extra-axial collection. There are scattered foci of periventricular and subcortical hypodensity. While nonspecific, these are likely related to small vessel ischemic disease (leukoaraiosis). The basal cisterns are patent. Left parietal lobe encephalomalacia. There is unchanged prominence of the ventricular system which is likely secondary to central volume loss. The density of the dural venous sinuses is normal. The skull base and calvarium are normal. Paranasal sinuses and mastoid air cells are predominantly clear. Bilateral ocular surgery.     Impression: No acute intracranial abnormality. Chronic findings as above. Signer Name: Remy Flower MD Signed: 12/29/2023 6:21 PM EST [facility]     Allergies:  is allergic to aspirin, citrus, combivent [ipratropium-albuterol], lactose intolerance (gi), and  statins.    Azar  No medications noted per report, reviewed by me    Discharge Medications:     Discharge Medications        New Medications        Instructions Start Date   amoxicillin-clavulanate 875-125 MG per tablet  Commonly known as: AUGMENTIN   1 tablet, Oral, Every 12 Hours Scheduled      benzonatate 100 MG capsule  Commonly known as: TESSALON   100 mg, Oral, 3 Times Daily PRN      losartan 25 MG tablet  Commonly known as: COZAAR   25 mg, Oral, Every 24 Hours Scheduled   Start Date: January 25, 2024            Changes to Medications        Instructions Start Date   phenytoin extended 200 MG ER capsule  Commonly known as: DILANTIN  What changed: medication strength   200 mg, Oral, Daily   Start Date: January 25, 2024            Continue These Medications        Instructions Start Date   clopidogrel 75 MG tablet  Commonly known as: PLAVIX       Cyanocobalamin 1000 MCG/ML kit   Injection, Weekly, 1/4 of a cc IM every sunday      fexofenadine 180 MG tablet  Commonly known as: ALLEGRA   180 mg, Oral, Daily PRN      guaiFENesin 600 MG 12 hr tablet  Commonly known as: MUCINEX   600 mg, Oral, Daily             Stop These Medications      hydroCHLOROthiazide 12.5 MG tablet  Commonly known as: HYDRODIURIL              Last Lab Results:   Lab Results (most recent)       Procedure Component Value Units Date/Time    Respiratory Culture - Sputum, Cough [[id number]] Collected: 01/21/24 0352    Specimen: Sputum from Cough Updated: 01/24/24 1012     Respiratory Culture Heavy growth (4+) Normal respiratory buddy. No S. aureus or Pseudomonas aeruginosa detected. Final report.     Gram Stain Rare (1+) WBCs seen      Few (2+) Mixed bacterial buddy    Blood Culture - Blood, Arm, Right [[id number]]  (Normal) Collected: 01/21/24 0719    Specimen: Blood from Arm, Right Updated: 01/24/24 0730     Blood Culture No growth at 3 days    Blood Culture - Blood, Wrist, Right [[id number]]  (Normal) Collected: 01/21/24 0719    Specimen: Blood  "from Wrist, Right Updated: 01/24/24 0730     Blood Culture No growth at 3 days    Comprehensive Metabolic Panel [[id number]]  (Abnormal) Collected: 01/23/24 0332    Specimen: Blood Updated: 01/23/24 0437     Glucose 105 mg/dL      BUN 15 mg/dL      Creatinine 0.92 mg/dL      Sodium 137 mmol/L      Potassium 3.9 mmol/L      Chloride 107 mmol/L      CO2 26.5 mmol/L      Calcium 8.2 mg/dL      Total Protein 5.1 g/dL      Albumin 3.2 g/dL      ALT (SGPT) 43 U/L      AST (SGOT) 30 U/L      Alkaline Phosphatase 58 U/L      Total Bilirubin 0.5 mg/dL      Globulin 1.9 gm/dL      A/G Ratio 1.7 g/dL      BUN/Creatinine Ratio 16.3     Anion Gap 3.5 mmol/L      eGFR 86.7 mL/min/1.73     Narrative:      GFR Normal >60  Chronic Kidney Disease <60  Kidney Failure <15    The GFR formula is only valid for adults with stable renal function between ages 18 and 70.    Procalcitonin [[id number]]  (Normal) Collected: 01/23/24 0332    Specimen: Blood Updated: 01/23/24 0427     Procalcitonin 0.08 ng/mL     Narrative:      As a Marker for Sepsis (Non-Neonates):    1. <0.5 ng/mL represents a low risk of severe sepsis and/or septic shock.  2. >2 ng/mL represents a high risk of severe sepsis and/or septic shock.    As a Marker for Lower Respiratory Tract Infections that require antibiotic therapy:    PCT on Admission    Antibiotic Therapy       6-12 Hrs later    >0.5                Strongly Recommended  >0.25 - <0.5        Recommended   0.1 - 0.25          Discouraged              Remeasure/reassess PCT  <0.1                Strongly Discouraged     Remeasure/reassess PCT    As 28 day mortality risk marker: \"Change in Procalcitonin Result\" (>80% or <=80%) if Day 0 (or Day 1) and Day 4 values are available. Refer to http://www.[facility]-pct-calculator.com    Change in PCT <=80%  A decrease of PCT levels below or equal to 80% defines a positive change in PCT test result representing a higher risk for 28-day all-cause mortality of patients diagnosed " with severe sepsis for septic shock.    Change in PCT >80%  A decrease of PCT levels of more than 80% defines a negative change in PCT result representing a lower risk for 28-day all-cause mortality of patients diagnosed with severe sepsis or septic shock.       CBC & Differential [[id number]]  (Abnormal) Collected: 01/23/24 0332    Specimen: Blood Updated: 01/23/24 0359    Narrative:      The following orders were created for panel order CBC & Differential.  Procedure                               Abnormality         Status                     ---------                               -----------         ------                     CBC Auto Differential[[id number]]        Abnormal            Final result                 Please view results for these tests on the individual orders.    CBC Auto Differential [[id number]]  (Abnormal) Collected: 01/23/24 0332    Specimen: Blood Updated: 01/23/24 0359     WBC 9.61 10*3/mm3      RBC 4.18 10*6/mm3      Hemoglobin 13.3 g/dL      Hematocrit 39.2 %      MCV 93.8 fL      MCH 31.8 pg      MCHC 33.9 g/dL      RDW 12.6 %      RDW-SD 43.8 fl      MPV 9.9 fL      Platelets 165 10*3/mm3      Neutrophil % 75.0 %      Lymphocyte % 12.8 %      Monocyte % 11.2 %      Eosinophil % 0.1 %      Basophil % 0.3 %      Immature Grans % 0.6 %      Neutrophils, Absolute 7.20 10*3/mm3      Lymphocytes, Absolute 1.23 10*3/mm3      Monocytes, Absolute 1.08 10*3/mm3      Eosinophils, Absolute 0.01 10*3/mm3      Basophils, Absolute 0.03 10*3/mm3      Immature Grans, Absolute 0.06 10*3/mm3      nRBC 0.0 /100 WBC     Phenytoin Level, Total & Free [[id number]] Collected: 01/23/24 0332    Specimen: Blood Updated: 01/23/24 0353    Potassium [[id number]]  (Normal) Collected: 01/22/24 1733    Specimen: Blood Updated: 01/22/24 1753     Potassium 4.2 mmol/L     Procalcitonin [[id number]]  (Normal) Collected: 01/22/24 0728    Specimen: Blood Updated: 01/22/24 0830     Procalcitonin 0.06 ng/mL     Narrative:      As a  "Marker for Sepsis (Non-Neonates):    1. <0.5 ng/mL represents a low risk of severe sepsis and/or septic shock.  2. >2 ng/mL represents a high risk of severe sepsis and/or septic shock.    As a Marker for Lower Respiratory Tract Infections that require antibiotic therapy:    PCT on Admission    Antibiotic Therapy       6-12 Hrs later    >0.5                Strongly Recommended  >0.25 - <0.5        Recommended   0.1 - 0.25          Discouraged              Remeasure/reassess PCT  <0.1                Strongly Discouraged     Remeasure/reassess PCT    As 28 day mortality risk marker: \"Change in Procalcitonin Result\" (>80% or <=80%) if Day 0 (or Day 1) and Day 4 values are available. Refer to http://www.[facility] Holdingspct-calculator.com    Change in PCT <=80%  A decrease of PCT levels below or equal to 80% defines a positive change in PCT test result representing a higher risk for 28-day all-cause mortality of patients diagnosed with severe sepsis for septic shock.    Change in PCT >80%  A decrease of PCT levels of more than 80% defines a negative change in PCT result representing a lower risk for 28-day all-cause mortality of patients diagnosed with severe sepsis or septic shock.       Basic Metabolic Panel [[id number]]  (Abnormal) Collected: 01/22/24 0728    Specimen: Blood Updated: 01/22/24 0756     Glucose 110 mg/dL      BUN 13 mg/dL      Creatinine 1.05 mg/dL      Sodium 139 mmol/L      Potassium 3.6 mmol/L      Chloride 106 mmol/L      CO2 29.5 mmol/L      Calcium 8.3 mg/dL      BUN/Creatinine Ratio 12.4     Anion Gap 3.5 mmol/L      eGFR 74.0 mL/min/1.73     Narrative:      GFR Normal >60  Chronic Kidney Disease <60  Kidney Failure <15    The GFR formula is only valid for adults with stable renal function between ages 18 and 70.    CBC (No Diff) [[id number]]  (Normal) Collected: 01/22/24 0728    Specimen: Blood Updated: 01/22/24 0742     WBC 10.78 10*3/mm3      RBC 4.35 10*6/mm3      Hemoglobin 13.9 g/dL      Hematocrit " 40.7 %      MCV 93.6 fL      MCH 32.0 pg      MCHC 34.2 g/dL      RDW 12.9 %      RDW-SD 44.3 fl      MPV 9.5 fL      Platelets 178 10*3/mm3     MRSA Screen, PCR (Inpatient) - Swab, Nares [[id number]]  (Normal) Collected: 01/21/24 1241    Specimen: Swab from Nares Updated: 01/21/24 1841     MRSA PCR No MRSA Detected    Narrative:      The negative predictive value of this diagnostic test is high and should only be used to consider de-escalating anti-MRSA therapy. A positive result may indicate colonization with MRSA and must be correlated clinically.    Respiratory Panel PCR w/COVID-19(SARS-CoV-2) HOLA/MARVIN/JASSON/PAD/COR/BETI In-House, NP Swab in UTM/VTM, 2 HR TAT - Swab, Nasopharynx [[id number]]  (Normal) Collected: 01/21/24 0334    Specimen: Swab from Nasopharynx Updated: 01/21/24 1327     ADENOVIRUS, PCR Not Detected     Coronavirus 229E Not Detected     Coronavirus HKU1 Not Detected     Coronavirus NL63 Not Detected     Coronavirus OC43 Not Detected     COVID19 Not Detected     Human Metapneumovirus Not Detected     Human Rhinovirus/Enterovirus Not Detected     Influenza A PCR Not Detected     Influenza B PCR Not Detected     Parainfluenza Virus 1 Not Detected     Parainfluenza Virus 2 Not Detected     Parainfluenza Virus 3 Not Detected     Parainfluenza Virus 4 Not Detected     RSV, PCR Not Detected     Bordetella pertussis pcr Not Detected     Bordetella parapertussis PCR Not Detected     Chlamydophila pneumoniae PCR Not Detected     Mycoplasma pneumo by PCR Not Detected    Narrative:      In the setting of a positive respiratory panel with a viral infection PLUS a negative procalcitonin without other underlying concern for bacterial infection, consider observing off antibiotics or discontinuation of antibiotics and continue supportive care. If the respiratory panel is positive for atypical bacterial infection (Bordetella pertussis, Chlamydophila pneumoniae, or Mycoplasma pneumoniae), consider antibiotic  de-escalation to target atypical bacterial infection.    Phosphorus [[id number]]  (Abnormal) Collected: 01/21/24 1140    Specimen: Blood Updated: 01/21/24 1241     Phosphorus 2.4 mg/dL     Potassium [[id number]]  (Normal) Collected: 01/21/24 1140    Specimen: Blood Updated: 01/21/24 1231     Potassium 3.9 mmol/L     High Sensitivity Troponin T 2Hr [[id number]]  (Abnormal) Collected: 01/21/24 0717    Specimen: Blood Updated: 01/21/24 0735     HS Troponin T 23 ng/L      Troponin T Delta 2 ng/L     Narrative:      High Sensitive Troponin T Reference Range:  <14.0 ng/L- Negative Female for AMI  <22.0 ng/L- Negative Male for AMI  >=14 - Abnormal Female indicating possible myocardial injury.  >=22 - Abnormal Male indicating possible myocardial injury.   Clinicians would have to utilize clinical acumen, EKG, Troponin, and serial changes to determine if it is an Acute Myocardial Infarction or myocardial injury due to an underlying chronic condition.         Legionella Antigen, Urine - Urine, Urine, Clean Catch [[id number]]  (Normal) Collected: 01/21/24 0352    Specimen: Urine, Clean Catch Updated: 01/21/24 0418     LEGIONELLA ANTIGEN, URINE Negative    S. Pneumo Ag Urine or CSF - Urine, Urine, Clean Catch [[id number]]  (Normal) Collected: 01/21/24 0352    Specimen: Urine, Clean Catch Updated: 01/21/24 0418     Strep Pneumo Ag Negative    Comprehensive Metabolic Panel [[id number]]  (Abnormal) Collected: 01/21/24 0022    Specimen: Blood Updated: 01/21/24 0133     Glucose 131 mg/dL      BUN 11 mg/dL      Comment: Appended report. These results have been appended to a previously preliminary verified report.        Creatinine 0.97 mg/dL      Sodium 138 mmol/L      Potassium 3.3 mmol/L      Chloride 101 mmol/L      CO2 24.9 mmol/L      Calcium 8.5 mg/dL      Total Protein 5.7 g/dL      Albumin 3.7 g/dL      ALT (SGPT) 22 U/L      Comment: Appended report. These results have been appended to a previously preliminary verified report.         AST (SGOT) 21 U/L      Comment: Appended report. These results have been appended to a previously preliminary verified report.        Alkaline Phosphatase 69 U/L      Total Bilirubin 0.2 mg/dL      Globulin 2.0 gm/dL      A/G Ratio 1.9 g/dL      BUN/Creatinine Ratio 11.3     Comment: Appended report. These results have been appended to a previously preliminary verified report.        Anion Gap 12.1 mmol/L      eGFR 81.4 mL/min/1.73     Narrative:      GFR Normal >60  Chronic Kidney Disease <60  Kidney Failure <15    The GFR formula is only valid for adults with stable renal function between ages 18 and 70.    Phenytoin Level, Total [[id number]]  (Abnormal) Collected: 01/21/24 0022    Specimen: Blood Updated: 01/21/24 0133     Phenytoin Level 1.3 mcg/mL     Magnesium [[id number]]  (Normal) Collected: 01/21/24 0022    Specimen: Blood Updated: 01/21/24 0104     Magnesium 2.2 mg/dL     Phosphorus [[id number]]  (Abnormal) Collected: 01/21/24 0022    Specimen: Blood Updated: 01/21/24 0103     Phosphorus 1.9 mg/dL     High Sensitivity Troponin T [[id number]]  (Normal) Collected: 01/21/24 0022    Specimen: Blood Updated: 01/21/24 0052     HS Troponin T 21 ng/L     Narrative:      High Sensitive Troponin T Reference Range:  <14.0 ng/L- Negative Female for AMI  <22.0 ng/L- Negative Male for AMI  >=14 - Abnormal Female indicating possible myocardial injury.  >=22 - Abnormal Male indicating possible myocardial injury.   Clinicians would have to utilize clinical acumen, EKG, Troponin, and serial changes to determine if it is an Acute Myocardial Infarction or myocardial injury due to an underlying chronic condition.         COVID-19 and FLU A/B PCR, 1 HR TAT - Swab, Nasopharynx [[id number]]  (Normal) Collected: 01/21/24 0022    Specimen: Swab from Nasopharynx Updated: 01/21/24 0047     COVID19 Not Detected     Influenza A PCR Not Detected     Influenza B PCR Not Detected    Narrative:      Fact sheet for providers:  https://www.fda.gov/media/567001/download    Fact sheet for patients: https://www.fda.gov/media/451705/download    Test performed by PCR.    CBC & Differential [[id number]]  (Abnormal) Collected: 01/21/24 0022    Specimen: Blood Updated: 01/21/24 0033    Narrative:      The following orders were created for panel order CBC & Differential.  Procedure                               Abnormality         Status                     ---------                               -----------         ------                     CBC Auto Differential[[id number]]        Abnormal            Final result                 Please view results for these tests on the individual orders.    CBC Auto Differential [[id number]]  (Abnormal) Collected: 01/21/24 0022    Specimen: Blood Updated: 01/21/24 0033     WBC 9.68 10*3/mm3      RBC 5.01 10*6/mm3      Hemoglobin 15.9 g/dL      Hematocrit 46.7 %      MCV 93.2 fL      MCH 31.7 pg      MCHC 34.0 g/dL      RDW 12.7 %      RDW-SD 43.1 fl      MPV 9.6 fL      Platelets 225 10*3/mm3      Neutrophil % 75.4 %      Lymphocyte % 14.0 %      Monocyte % 7.4 %      Eosinophil % 1.0 %      Basophil % 0.4 %      Immature Grans % 1.8 %      Neutrophils, Absolute 7.29 10*3/mm3      Lymphocytes, Absolute 1.36 10*3/mm3      Monocytes, Absolute 0.72 10*3/mm3      Eosinophils, Absolute 0.10 10*3/mm3      Basophils, Absolute 0.04 10*3/mm3      Immature Grans, Absolute 0.17 10*3/mm3      nRBC 0.0 /100 WBC           Imaging Results (Most Recent)       Procedure Component Value Units Date/Time    XR Chest 2 View [[id number]] Collected: 01/21/24 0055     Updated: 01/21/24 0108    Narrative:      CR Chest 2 Vws    INDICATION:    Dry cough and chest burning. Low O2.    COMPARISON:    9/14/2022    FINDINGS:   PA and lateral views of the chest.  Cardiac and mediastinal contours remain normal. Patient is status post ASD closure. There is chronic scarring at the left lung base. There are new interstitial infiltrates throughout the  right lung compatible with  pneumonia. No pneumothorax.        Impression:      Diffuse pneumonia in the right lung. Follow-up until clear recommended.    Signer Name: Kieran Bell MD   Signed: 1/21/2024 1:06 AM [facility]  [facility] [place]            PROCEDURES: None    Condition on Discharge: Stable    Physical Exam at Discharge  Vital Signs  Temp:  [97.6 °F (36.4 °C)-98.8 °F (37.1 °C)] 97.6 °F (36.4 °C)  Heart Rate:  [68-94] 71  Resp:  [16-18] 18  BP: (106-130)/(52-70) 106/52  Body mass index is 25.59 kg/m².    Physical Exam:  Physical Exam   Constitutional: Patient appears well-developed and well-nourished and in no acute distress   HEENT:   Head: Normocephalic and atraumatic.   Eyes:  Pupils are equal, round, and reactive to light. EOM are intact. Sclera are anicteric and non-injected.  Mouth and Throat: Patient has moist mucous membranes. Oropharynx is clear of any erythema or exudate.     Neck: Neck supple. No JVD present. No thyromegaly present. No lymphadenopathy present.  Cardiovascular: Regular rate, regular rhythm, S1 normal and S2 normal.  Exam reveals no gallop and no friction rub.  No murmur heard.  Pulmonary/Chest: Lungs are clear to auscultation bilaterally. No respiratory distress. No wheezes. No rhonchi. No rales.   Abdominal: Soft. Bowel sounds are normal. No distension and no mass. There is no hepatosplenomegaly. There is no tenderness.   Musculoskeletal: Normal Muscle tone  Extremities: No edema. Pulses are palpable in all 4 extremities.  Neurological: Patient is alert and oriented to person, place, and time. Cranial nerves II-XII are grossly intact with no focal deficits.  Skin: Skin is warm. No rash noted. Nails show no clubbing.  No cyanosis or erythema.    Discharge Disposition  Home    Visiting Nurse:    No     Home PT/OT:  No     Home Safety Evaluation:  No     DME  None new    Discharge Diet:      Dietary Orders (From admission, onward)       Start     Ordered     01/21/24 0154  Diet: Regular/House Diet; Texture: Regular Texture (IDDSI 7); Fluid Consistency: Thin (IDDSI 0)  Diet Effective Now        References:    Diet Order [place]   Question Answer Comment   Diets: Regular/House Diet    Texture: Regular Texture (IDDSI 7)    Fluid Consistency: Thin (IDDSI 0)        01/21/24 0153                    Activity at Discharge:  As tolerated, no driving until released by neurology    Pre-discharge education  Medications, follow-up    Follow-up Appointments  Future Appointments   Date Time Provider Department Center   2/23/2024 10:40 AM Yemi Hubbard MD [facility]   4/16/2024 11:00 AM Christopher Sawant MD [facility]     Additional Instructions for the Follow-ups that You Need to Schedule       Discharge Follow-up with PCP   As directed       Currently Documented PCP:    Deena Tarango MD    PCP Phone Number:    [phone]     Follow Up Details: 1-2 weeks                Test Results Pending at Discharge: To be followed up by PCP  Pending Labs       Order Current Status    Phenytoin Level, Total & Free In process    Blood Culture - Blood, Arm, Right Preliminary result    Blood Culture - Blood, Wrist, Right Preliminary result             Nael Burr MD  01/24/24  11:15 EST    Time: Discharge over 30 min (if over 30 minutes give explanation as to why it took greater than 30 minutes)  Secondary to:   Coordination of care/follow up  Medication reconciliation  D/W patient and family

## 2024-01-24 NOTE — PLAN OF CARE
Goal Outcome Evaluation:              Outcome Evaluation: Discharge Instructions reviewed with pateint, all questions answered.

## 2024-01-25 ENCOUNTER — TRANSITIONAL CARE MANAGEMENT TELEPHONE ENCOUNTER (OUTPATIENT)
Dept: CALL CENTER | Facility: HOSPITAL | Age: 74
End: 2024-01-25
Payer: MEDICARE

## 2024-01-25 NOTE — OUTREACH NOTE
Call Center TCM Note      Flowsheet Row Responses   [facility] patient discharged from? [place]   Does the patient have one of the following disease processes/diagnoses(primary or secondary)? Pneumonia   TCM attempt successful? Yes   Call start time 0952   Call end time 0958   Discharge diagnosis Pneumonia   Is patient permission given to speak with other caregiver? Yes   List who call center can speak with Wife, Joey Rizvi   Person spoke with today (if not patient) and relationship Patient   Medication alerts for this patient Augmentin, Tessalon, Losartan   Meds reviewed with patient/caregiver? Yes   Is the patient having any side effects they believe may be caused by any medication additions or changes? No   Does the patient have all medications ordered at discharge? Yes   Is the patient taking all medications as directed (includes completed medication regime)? Yes   Comments Hospital f/u appt on 1/29/24 at 11:15 AM with Dr. Deena Tarango.   Does the patient have an appointment with their PCP within 7-14 days of discharge? Yes   Has home health visited the patient within 72 hours of discharge? N/A   Pulse Ox monitoring None   Psychosocial issues? No   Comments Pt states he is in the car wating for his wife, she had a doctor appt this morning. He states he is doing better, still a little weak. Has cough, but has improved. No shortness of breath. Pt states he lost 12 lbs with this illness.   Did the patient receive a copy of their discharge instructions? Yes   Nursing interventions Reviewed instructions with patient   What is the patient's perception of their health status since discharge? Improving   If the patient is a current smoker, are they able to teach back resources for cessation? Not a smoker   Is the patient/caregiver able to teach back the hierarchy of who to call/visit for symptoms/problems? PCP, Specialist, Home health nurse, Urgent Care, ED, 911 Yes   Is the patient/caregiver able to teach  back signs and symptoms of worsening condition: Fever/chills, Shortness of breath, Chest pain   Is the patient/caregiver able to teach back importance of completing antibiotic course of treatment? Yes   TCM call completed? Yes   Wrap up additional comments Doing well. Has f/u appt in place.   Call end time 0958   Would this patient benefit from a Referral to [facility] Social Work? No   Is the patient interested in additional calls from an ambulatory ? No            Madelyn Spencer RN    1/25/2024, 09:58 EST

## 2024-01-26 LAB
BACTERIA SPEC AEROBE CULT: NORMAL
BACTERIA SPEC AEROBE CULT: NORMAL
PHENYTOIN FREE SERPL-MCNC: 0.7 UG/ML (ref 1–2)
PHENYTOIN SERPL-MCNC: 4 UG/ML (ref 10–20)

## 2024-01-29 ENCOUNTER — OFFICE VISIT (OUTPATIENT)
Dept: INTERNAL MEDICINE | Facility: CLINIC | Age: 74
End: 2024-01-29
Payer: MEDICARE

## 2024-01-29 VITALS
HEIGHT: 72 IN | BODY MASS INDEX: 26.09 KG/M2 | TEMPERATURE: 98.2 F | HEART RATE: 69 BPM | DIASTOLIC BLOOD PRESSURE: 62 MMHG | WEIGHT: 192.6 LBS | OXYGEN SATURATION: 94 % | SYSTOLIC BLOOD PRESSURE: 118 MMHG

## 2024-01-29 DIAGNOSIS — R56.9 SEIZURE: ICD-10-CM

## 2024-01-29 DIAGNOSIS — I10 PRIMARY HYPERTENSION: Primary | ICD-10-CM

## 2024-01-29 DIAGNOSIS — L98.9 SKIN LESION: ICD-10-CM

## 2024-01-29 RX ORDER — PHENYTOIN SODIUM 200 MG/1
200 CAPSULE, EXTENDED RELEASE ORAL 2 TIMES DAILY
Qty: 180 CAPSULE | Refills: 0 | Status: SHIPPED | OUTPATIENT
Start: 2024-01-29

## 2024-01-29 RX ORDER — LOSARTAN POTASSIUM 25 MG/1
25 TABLET ORAL
Qty: 90 TABLET | Refills: 1 | Status: SHIPPED | OUTPATIENT
Start: 2024-01-29

## 2024-01-29 NOTE — PROGRESS NOTES
"      Ronnell Rizvi is a 74 y.o. male who presents with a chief complaint of   Chief Complaint   Patient presents with    Hospital Follow Up Visit       HPI     Still doing a lot of coughing.  Almost wondering about aspiration for him especially given procal of 0.06>0.08.    Dilantin sent to [facility] pharmacy.       The following portions of the patient's history were reviewed and updated as appropriate: allergies, current medications, past family history, past medical history, past social history, past surgical history and problem list.      Current Outpatient Medications:     benzonatate (TESSALON) 100 MG capsule, Take 1 capsule by mouth 3 (Three) Times a Day As Needed for Cough., Disp: 20 capsule, Rfl: 0    clopidogrel (PLAVIX) 75 MG tablet, , Disp: , Rfl:     Cyanocobalamin 1000 MCG/ML kit, Inject  as directed 1 (One) Time Per Week. 1/4 of a cc IM every sunday, Disp: , Rfl:     fexofenadine (ALLEGRA) 180 MG tablet, Take 1 tablet by mouth Daily As Needed., Disp: , Rfl:     guaiFENesin (MUCINEX) 600 MG 12 hr tablet, Take 1 tablet by mouth Daily., Disp: , Rfl:     losartan (COZAAR) 25 MG tablet, Take 1 tablet by mouth Daily., Disp: 90 tablet, Rfl: 1    phenytoin extended (DILANTIN) 200 MG ER capsule, Take 1 capsule by mouth 2 (Two) Times a Day., Disp: 180 capsule, Rfl: 0            Physical Exam  /62 (BP Location: Left arm, Patient Position: Sitting, Cuff Size: Large Adult)   Pulse 69   Temp 98.2 °F (36.8 °C) (Infrared)   Ht 182.9 cm (72\")   Wt 87.4 kg (192 lb 9.6 oz)   SpO2 94%   BMI 26.12 kg/m²     Physical Exam  Vitals reviewed.   Constitutional:       General: He is not in acute distress.     Appearance: Normal appearance.   HENT:      Head: Normocephalic and atraumatic.      Nose: Nose normal.      Mouth/Throat:      Mouth: Mucous membranes are moist.   Eyes:      Conjunctiva/sclera: Conjunctivae normal.   Cardiovascular:      Rate and Rhythm: Normal rate and regular rhythm.   Pulmonary:      " Effort: Pulmonary effort is normal.      Breath sounds: Normal breath sounds.   Skin:     General: Skin is warm and dry.   Neurological:      General: No focal deficit present.      Mental Status: He is alert.   Psychiatric:         Mood and Affect: Mood normal.           Results for orders placed or performed during the hospital encounter of 01/21/24   COVID-19 and FLU A/B PCR, 1 HR TAT - Swab, Nasopharynx    Specimen: Nasopharynx; Swab   Result Value Ref Range    COVID19 Not Detected Not Detected - Ref. Range    Influenza A PCR Not Detected Not Detected    Influenza B PCR Not Detected Not Detected   Respiratory Culture - Sputum, Cough    Specimen: Cough; Sputum   Result Value Ref Range    Respiratory Culture       Heavy growth (4+) Normal respiratory buddy. No S. aureus or Pseudomonas aeruginosa detected. Final report.    Gram Stain Rare (1+) WBCs seen     Gram Stain Few (2+) Mixed bacterial buddy    Legionella Antigen, Urine - Urine, Urine, Clean Catch    Specimen: Urine, Clean Catch   Result Value Ref Range    LEGIONELLA ANTIGEN, URINE Negative Negative   S. Pneumo Ag Urine or CSF - Urine, Urine, Clean Catch    Specimen: Urine, Clean Catch   Result Value Ref Range    Strep Pneumo Ag Negative Negative   Respiratory Panel PCR w/COVID-19(SARS-CoV-2) HOLA/MARVIN/JASSON/PAD/COR/BETI In-House, NP Swab in UTM/VTM, 2 HR TAT - Swab, Nasopharynx    Specimen: Nasopharynx; Swab   Result Value Ref Range    ADENOVIRUS, PCR Not Detected Not Detected    Coronavirus 229E Not Detected Not Detected    Coronavirus HKU1 Not Detected Not Detected    Coronavirus NL63 Not Detected Not Detected    Coronavirus OC43 Not Detected Not Detected    COVID19 Not Detected Not Detected - Ref. Range    Human Metapneumovirus Not Detected Not Detected    Human Rhinovirus/Enterovirus Not Detected Not Detected    Influenza A PCR Not Detected Not Detected    Influenza B PCR Not Detected Not Detected    Parainfluenza Virus 1 Not Detected Not Detected     Parainfluenza Virus 2 Not Detected Not Detected    Parainfluenza Virus 3 Not Detected Not Detected    Parainfluenza Virus 4 Not Detected Not Detected    RSV, PCR Not Detected Not Detected    Bordetella pertussis pcr Not Detected Not Detected    Bordetella parapertussis PCR Not Detected Not Detected    Chlamydophila pneumoniae PCR Not Detected Not Detected    Mycoplasma pneumo by PCR Not Detected Not Detected   Blood Culture - Blood, Arm, Right    Specimen: Arm, Right; Blood   Result Value Ref Range    Blood Culture No growth at 5 days    Blood Culture - Blood, Wrist, Right    Specimen: Wrist, Right; Blood   Result Value Ref Range    Blood Culture No growth at 5 days    MRSA Screen, PCR (Inpatient) - Swab, Nares    Specimen: Nares; Swab   Result Value Ref Range    MRSA PCR No MRSA Detected No MRSA Detected   Comprehensive Metabolic Panel    Specimen: Blood   Result Value Ref Range    Glucose 131 (H) 65 - 99 mg/dL    BUN 11 8 - 23 mg/dL    Creatinine 0.97 0.76 - 1.27 mg/dL    Sodium 138 136 - 145 mmol/L    Potassium 3.3 (L) 3.5 - 5.2 mmol/L    Chloride 101 98 - 107 mmol/L    CO2 24.9 22.0 - 29.0 mmol/L    Calcium 8.5 (L) 8.6 - 10.5 mg/dL    Total Protein 5.7 (L) 6.0 - 8.5 g/dL    Albumin 3.7 3.5 - 5.2 g/dL    ALT (SGPT) 22 1 - 41 U/L    AST (SGOT) 21 1 - 40 U/L    Alkaline Phosphatase 69 39 - 117 U/L    Total Bilirubin 0.2 0.0 - 1.2 mg/dL    Globulin 2.0 gm/dL    A/G Ratio 1.9 g/dL    BUN/Creatinine Ratio 11.3 7.0 - 25.0    Anion Gap 12.1 5.0 - 15.0 mmol/L    eGFR 81.4 >60.0 mL/min/1.73   High Sensitivity Troponin T    Specimen: Blood   Result Value Ref Range    HS Troponin T 21 <22 ng/L   Phenytoin Level, Total    Specimen: Blood   Result Value Ref Range    Phenytoin Level 1.3 (L) 10.0 - 20.0 mcg/mL   CBC Auto Differential    Specimen: Blood   Result Value Ref Range    WBC 9.68 3.40 - 10.80 10*3/mm3    RBC 5.01 4.14 - 5.80 10*6/mm3    Hemoglobin 15.9 13.0 - 17.7 g/dL    Hematocrit 46.7 37.5 - 51.0 %    MCV 93.2 79.0  - 97.0 fL    MCH 31.7 26.6 - 33.0 pg    MCHC 34.0 31.5 - 35.7 g/dL    RDW 12.7 12.3 - 15.4 %    RDW-SD 43.1 37.0 - 54.0 fl    MPV 9.6 6.0 - 12.0 fL    Platelets 225 140 - 450 10*3/mm3    Neutrophil % 75.4 42.7 - 76.0 %    Lymphocyte % 14.0 (L) 19.6 - 45.3 %    Monocyte % 7.4 5.0 - 12.0 %    Eosinophil % 1.0 0.3 - 6.2 %    Basophil % 0.4 0.0 - 1.5 %    Immature Grans % 1.8 (H) 0.0 - 0.5 %    Neutrophils, Absolute 7.29 (H) 1.70 - 7.00 10*3/mm3    Lymphocytes, Absolute 1.36 0.70 - 3.10 10*3/mm3    Monocytes, Absolute 0.72 0.10 - 0.90 10*3/mm3    Eosinophils, Absolute 0.10 0.00 - 0.40 10*3/mm3    Basophils, Absolute 0.04 0.00 - 0.20 10*3/mm3    Immature Grans, Absolute 0.17 (H) 0.00 - 0.05 10*3/mm3    nRBC 0.0 0.0 - 0.2 /100 WBC   Magnesium    Specimen: Blood   Result Value Ref Range    Magnesium 2.2 1.6 - 2.4 mg/dL   Phosphorus    Specimen: Blood   Result Value Ref Range    Phosphorus 1.9 (C) 2.5 - 4.5 mg/dL   Procalcitonin    Specimen: Blood   Result Value Ref Range    Procalcitonin 0.06 0.00 - 0.25 ng/mL   Phosphorus    Specimen: Blood   Result Value Ref Range    Phosphorus 2.4 (L) 2.5 - 4.5 mg/dL   High Sensitivity Troponin T 2Hr    Specimen: Blood   Result Value Ref Range    HS Troponin T 23 (H) <22 ng/L    Troponin T Delta 2 >=-4 - <+4 ng/L   Potassium    Specimen: Blood   Result Value Ref Range    Potassium 3.9 3.5 - 5.2 mmol/L   Basic Metabolic Panel    Specimen: Blood   Result Value Ref Range    Glucose 110 (H) 65 - 99 mg/dL    BUN 13 8 - 23 mg/dL    Creatinine 1.05 0.76 - 1.27 mg/dL    Sodium 139 136 - 145 mmol/L    Potassium 3.6 3.5 - 5.2 mmol/L    Chloride 106 98 - 107 mmol/L    CO2 29.5 (H) 22.0 - 29.0 mmol/L    Calcium 8.3 (L) 8.6 - 10.5 mg/dL    BUN/Creatinine Ratio 12.4 7.0 - 25.0    Anion Gap 3.5 (L) 5.0 - 15.0 mmol/L    eGFR 74.0 >60.0 mL/min/1.73   CBC (No Diff)    Specimen: Blood   Result Value Ref Range    WBC 10.78 3.40 - 10.80 10*3/mm3    RBC 4.35 4.14 - 5.80 10*6/mm3    Hemoglobin 13.9 13.0 -  17.7 g/dL    Hematocrit 40.7 37.5 - 51.0 %    MCV 93.6 79.0 - 97.0 fL    MCH 32.0 26.6 - 33.0 pg    MCHC 34.2 31.5 - 35.7 g/dL    RDW 12.9 12.3 - 15.4 %    RDW-SD 44.3 37.0 - 54.0 fl    MPV 9.5 6.0 - 12.0 fL    Platelets 178 140 - 450 10*3/mm3   Procalcitonin    Specimen: Blood   Result Value Ref Range    Procalcitonin 0.06 0.00 - 0.25 ng/mL   Potassium    Specimen: Blood   Result Value Ref Range    Potassium 4.2 3.5 - 5.2 mmol/L   Phenytoin Level, Total & Free    Specimen: Blood   Result Value Ref Range    Phenytoin Level 4.0 (L) 10.0 - 20.0 ug/mL    Phenytoin, Free 0.7 (L) 1.0 - 2.0 ug/mL   Comprehensive Metabolic Panel    Specimen: Blood   Result Value Ref Range    Glucose 105 (H) 65 - 99 mg/dL    BUN 15 8 - 23 mg/dL    Creatinine 0.92 0.76 - 1.27 mg/dL    Sodium 137 136 - 145 mmol/L    Potassium 3.9 3.5 - 5.2 mmol/L    Chloride 107 98 - 107 mmol/L    CO2 26.5 22.0 - 29.0 mmol/L    Calcium 8.2 (L) 8.6 - 10.5 mg/dL    Total Protein 5.1 (L) 6.0 - 8.5 g/dL    Albumin 3.2 (L) 3.5 - 5.2 g/dL    ALT (SGPT) 43 (H) 1 - 41 U/L    AST (SGOT) 30 1 - 40 U/L    Alkaline Phosphatase 58 39 - 117 U/L    Total Bilirubin 0.5 0.0 - 1.2 mg/dL    Globulin 1.9 gm/dL    A/G Ratio 1.7 g/dL    BUN/Creatinine Ratio 16.3 7.0 - 25.0    Anion Gap 3.5 (L) 5.0 - 15.0 mmol/L    eGFR 86.7 >60.0 mL/min/1.73   Procalcitonin    Specimen: Blood   Result Value Ref Range    Procalcitonin 0.08 0.00 - 0.25 ng/mL   CBC Auto Differential    Specimen: Blood   Result Value Ref Range    WBC 9.61 3.40 - 10.80 10*3/mm3    RBC 4.18 4.14 - 5.80 10*6/mm3    Hemoglobin 13.3 13.0 - 17.7 g/dL    Hematocrit 39.2 37.5 - 51.0 %    MCV 93.8 79.0 - 97.0 fL    MCH 31.8 26.6 - 33.0 pg    MCHC 33.9 31.5 - 35.7 g/dL    RDW 12.6 12.3 - 15.4 %    RDW-SD 43.8 37.0 - 54.0 fl    MPV 9.9 6.0 - 12.0 fL    Platelets 165 140 - 450 10*3/mm3    Neutrophil % 75.0 42.7 - 76.0 %    Lymphocyte % 12.8 (L) 19.6 - 45.3 %    Monocyte % 11.2 5.0 - 12.0 %    Eosinophil % 0.1 (L) 0.3 - 6.2 %     Basophil % 0.3 0.0 - 1.5 %    Immature Grans % 0.6 (H) 0.0 - 0.5 %    Neutrophils, Absolute 7.20 (H) 1.70 - 7.00 10*3/mm3    Lymphocytes, Absolute 1.23 0.70 - 3.10 10*3/mm3    Monocytes, Absolute 1.08 (H) 0.10 - 0.90 10*3/mm3    Eosinophils, Absolute 0.01 0.00 - 0.40 10*3/mm3    Basophils, Absolute 0.03 0.00 - 0.20 10*3/mm3    Immature Grans, Absolute 0.06 (H) 0.00 - 0.05 10*3/mm3    nRBC 0.0 0.0 - 0.2 /100 WBC   ECG 12 Lead Other; burning in chest   Result Value Ref Range    QT Interval 361 ms    QTC Interval 463 ms           Diagnoses and all orders for this visit:    1. Primary hypertension (Primary)  -     losartan (COZAAR) 25 MG tablet; Take 1 tablet by mouth Daily.  Dispense: 90 tablet; Refill: 1    2. Skin lesion  -     Ambulatory Referral to Dermatology    3. Seizure  -     phenytoin extended (DILANTIN) 200 MG ER capsule; Take 1 capsule by mouth 2 (Two) Times a Day.  Dispense: 180 capsule; Refill: 0      Reviewed d/c summary, labs, imaging.  Clarified dilantin dose with neurology and sent refill to insure plenty of medication until he sees Dr. Daniel in f/u.  Patient taken off HCTZ in hospital and started on Losartan.  Keep this change--Losartan refilled.

## 2024-02-02 ENCOUNTER — READMISSION MANAGEMENT (OUTPATIENT)
Dept: CALL CENTER | Facility: HOSPITAL | Age: 74
End: 2024-02-02
Payer: MEDICARE

## 2024-02-02 NOTE — OUTREACH NOTE
COPD/PN Week 2 Survey      Flowsheet Row Responses   [facility] facility patient discharged from? [place]   Does the patient have one of the following disease processes/diagnoses(primary or secondary)? Pneumonia   Week 2 attempt successful? No   Unsuccessful attempts Attempt 1            Lou GARCIA - Registered Nurse

## 2024-02-06 ENCOUNTER — READMISSION MANAGEMENT (OUTPATIENT)
Dept: CALL CENTER | Facility: HOSPITAL | Age: 74
End: 2024-02-06
Payer: MEDICARE

## 2024-02-06 NOTE — OUTREACH NOTE
COPD/PN Week 2 Survey      Flowsheet Row Responses   [facility] facility patient discharged from? [place]   Does the patient have one of the following disease processes/diagnoses(primary or secondary)? Pneumonia   Week 2 attempt successful? No   Unsuccessful attempts Attempt 2            Lo Koch Registered Nurse

## 2024-02-15 ENCOUNTER — READMISSION MANAGEMENT (OUTPATIENT)
Dept: CALL CENTER | Facility: HOSPITAL | Age: 74
End: 2024-02-15
Payer: MEDICARE

## 2024-02-20 ENCOUNTER — READMISSION MANAGEMENT (OUTPATIENT)
Dept: CALL CENTER | Facility: HOSPITAL | Age: 74
End: 2024-02-20
Payer: MEDICARE

## 2024-02-20 NOTE — OUTREACH NOTE
COPD/PN Week 3 Survey      Flowsheet Row Responses   [facility] facility patient discharged from? [place]   Does the patient have one of the following disease processes/diagnoses(primary or secondary)? Pneumonia   Week 3 attempt successful? No   Unsuccessful attempts Attempt 2  [All numbers listed were attempted-no answer]            Brenna H - Registered Nurse

## 2024-02-23 ENCOUNTER — OFFICE VISIT (OUTPATIENT)
Dept: NEUROLOGY | Facility: CLINIC | Age: 74
End: 2024-02-23
Payer: MEDICARE

## 2024-02-23 VITALS
OXYGEN SATURATION: 95 % | HEART RATE: 71 BPM | DIASTOLIC BLOOD PRESSURE: 58 MMHG | WEIGHT: 194.4 LBS | BODY MASS INDEX: 26.33 KG/M2 | SYSTOLIC BLOOD PRESSURE: 126 MMHG | HEIGHT: 72 IN

## 2024-02-23 DIAGNOSIS — G40.109 LOCALIZATION-RELATED SYMPTOMATIC EPILEPSY AND EPILEPTIC SYNDROMES WITH SIMPLE PARTIAL SEIZURES, NOT INTRACTABLE, WITHOUT STATUS EPILEPTICUS: Primary | ICD-10-CM

## 2024-02-23 DIAGNOSIS — Z86.73 HISTORY OF STROKE: ICD-10-CM

## 2024-02-23 PROCEDURE — 3078F DIAST BP <80 MM HG: CPT | Performed by: PSYCHIATRY & NEUROLOGY

## 2024-02-23 PROCEDURE — 99214 OFFICE O/P EST MOD 30 MIN: CPT | Performed by: PSYCHIATRY & NEUROLOGY

## 2024-02-23 PROCEDURE — 1160F RVW MEDS BY RX/DR IN RCRD: CPT | Performed by: PSYCHIATRY & NEUROLOGY

## 2024-02-23 PROCEDURE — 1159F MED LIST DOCD IN RCRD: CPT | Performed by: PSYCHIATRY & NEUROLOGY

## 2024-02-23 PROCEDURE — 3074F SYST BP LT 130 MM HG: CPT | Performed by: PSYCHIATRY & NEUROLOGY

## 2024-02-23 NOTE — PROGRESS NOTES
Notes by MA:  Pt is here with his wife today for a follow up for seizures and Hx of stroke. He had a seizure in January.      Subjective:     Patient ID: Ronnell Rizvi is a 74 y.o. male.    Seizures   Pertinent negatives include no confusion, no headaches and no speech difficulty.     The following portions of the patient's history were reviewed and updated as appropriate: allergies, current medications, past family history, past medical history, past social history, past surgical history, and problem list.    Review of Systems   Musculoskeletal:  Negative for gait problem.   Neurological:  Positive for tremors and seizures. Negative for dizziness, syncope, facial asymmetry, speech difficulty, weakness, light-headedness, numbness and headaches.   Psychiatric/Behavioral:  Negative for agitation, behavioral problems, confusion, decreased concentration, dysphoric mood, hallucinations, self-injury, sleep disturbance and suicidal ideas. The patient is not nervous/anxious and is not hyperactive.         Objective:    Neurologic Exam  Awake and alert.  Reasonably pleasant with fluent speech.  Poor speech comprehension secondary to hearing loss bilaterally.     Aside from hearing loss, cranial nerves II through XII normal and symmetric.     Motor exam reveals reasonable tone bulk and power.  No drift.  Good .  Reasonable intrinsic hand muscles.  No lateralized spasticity.     Sensory exam reveals good sensation to light touch temperature and vibration in both upper and lower extremities symmetrically.     Coordination testing reveals smooth and accurate finger-nose-finger and reasonable rapid alternating movements.  Negative Romberg.  He does have an action tremor today that is also present during sustained posture.     Tendon reflexes are 1+ and symmetric throughout.     No diplopia, nystagmus, ataxia, or other sign of medication toxicity.  Physical Exam    Assessment/Plan:     Diagnoses and all orders for this  visit:    1. Localization-related symptomatic epilepsy and epileptic syndromes with simple partial seizures, not intractable, without status epilepticus (Primary)  -     Phenytoin Level, Total; Future    2. History of stroke     Breakthrough symptomatic seizure since our last visit, probably precipitated by pneumonia.  His Dilantin level was found to be low and his dose was increased to 200 mg twice daily.  He seems to be tolerating this well although he is a little bit tremulous today.  He attributes this to caffeine.  I am arranging for a new trough level to make sure we are reasonably therapeutic without being toxic and we will adjust as appropriate.    Continuing Plavix for stroke prophylaxis.  No other changes today.  Will see him back in several months to check on his progress.

## 2024-02-26 ENCOUNTER — LAB (OUTPATIENT)
Dept: LAB | Facility: HOSPITAL | Age: 74
End: 2024-02-26
Payer: MEDICARE

## 2024-02-26 DIAGNOSIS — G40.109 LOCALIZATION-RELATED SYMPTOMATIC EPILEPSY AND EPILEPTIC SYNDROMES WITH SIMPLE PARTIAL SEIZURES, NOT INTRACTABLE, WITHOUT STATUS EPILEPTICUS: ICD-10-CM

## 2024-02-26 LAB — PHENYTOIN SERPL-MCNC: 7.1 MCG/ML (ref 10–20)

## 2024-02-26 PROCEDURE — 80185 ASSAY OF PHENYTOIN TOTAL: CPT

## 2024-02-26 PROCEDURE — 36415 COLL VENOUS BLD VENIPUNCTURE: CPT

## 2024-02-29 DIAGNOSIS — I10 PRIMARY HYPERTENSION: ICD-10-CM

## 2024-02-29 RX ORDER — LOSARTAN POTASSIUM 25 MG/1
25 TABLET ORAL
Qty: 90 TABLET | Refills: 1 | OUTPATIENT
Start: 2024-02-29

## 2024-02-29 NOTE — TELEPHONE ENCOUNTER
Caller: Joey Rizvi    Relationship: Emergency Contact    Best call back number: [phone]    Requested Prescriptions:   Requested Prescriptions     Pending Prescriptions Disp Refills    losartan (COZAAR) 25 MG tablet 90 tablet 1     Sig: Take 1 tablet by mouth Daily.        Pharmacy where request should be sent: [facility] [id number] [place], [place] - [address] - [phone] PH - [phone] FX     Last office visit with prescribing clinician: 2/23/2024   Last telemedicine visit with prescribing clinician: Visit date not found   Next office visit with prescribing clinician: 5/24/2024     Additional details provided by patient: PT NEEDS A REFILL.  STATED HE HAS 1 PILL LEFT.       Does the patient have less than a 3 day supply:  [x] Yes  [] No    Would you like a call back once the refill request has been completed: [] Yes [] No    If the office needs to give you a call back, can they leave a voicemail: [] Yes [] No    Pito Bucio Rep   02/29/24 11:39 EST

## 2024-03-11 ENCOUNTER — TELEPHONE (OUTPATIENT)
Dept: NEUROLOGY | Facility: CLINIC | Age: 74
End: 2024-03-11
Payer: MEDICARE

## 2024-03-11 NOTE — TELEPHONE ENCOUNTER
Dilantin level has come up a little bit since prior test.  Still in the low range.  Certainly not toxic.  Recommend continuing current Dilantin dose. V/u

## 2024-03-11 NOTE — TELEPHONE ENCOUNTER
----- Message from Yemi Hubbard MD sent at 3/11/2024  1:41 PM EDT -----  Dilantin level has come up a little bit since prior test.  Still in the low range.  Certainly not toxic.  Recommend continuing current Dilantin dose.

## 2024-04-03 RX ORDER — CLOPIDOGREL BISULFATE 75 MG/1
75 TABLET ORAL DAILY
Qty: 30 TABLET | Refills: 0 | Status: SHIPPED | OUTPATIENT
Start: 2024-04-03

## 2024-04-16 ENCOUNTER — OFFICE VISIT (OUTPATIENT)
Dept: CARDIOLOGY | Facility: CLINIC | Age: 74
End: 2024-04-16
Payer: MEDICARE

## 2024-04-16 VITALS
SYSTOLIC BLOOD PRESSURE: 132 MMHG | BODY MASS INDEX: 25.09 KG/M2 | HEART RATE: 68 BPM | HEIGHT: 72 IN | WEIGHT: 185.2 LBS | DIASTOLIC BLOOD PRESSURE: 60 MMHG

## 2024-04-16 DIAGNOSIS — E78.2 MIXED HYPERLIPIDEMIA: ICD-10-CM

## 2024-04-16 DIAGNOSIS — R56.9 SEIZURE: ICD-10-CM

## 2024-04-16 DIAGNOSIS — I45.5 SINOATRIAL BLOCK: ICD-10-CM

## 2024-04-16 DIAGNOSIS — Z87.74 S/P PERCUTANEOUS PATENT FORAMEN OVALE CLOSURE: Primary | ICD-10-CM

## 2024-04-16 DIAGNOSIS — J44.9 CHRONIC OBSTRUCTIVE PULMONARY DISEASE, UNSPECIFIED COPD TYPE: ICD-10-CM

## 2024-04-16 DIAGNOSIS — I10 PRIMARY HYPERTENSION: ICD-10-CM

## 2024-04-16 DIAGNOSIS — G47.33 OSA (OBSTRUCTIVE SLEEP APNEA): ICD-10-CM

## 2024-04-16 PROCEDURE — 3075F SYST BP GE 130 - 139MM HG: CPT | Performed by: PHYSICIAN ASSISTANT

## 2024-04-16 PROCEDURE — 3078F DIAST BP <80 MM HG: CPT | Performed by: PHYSICIAN ASSISTANT

## 2024-04-16 PROCEDURE — 1160F RVW MEDS BY RX/DR IN RCRD: CPT | Performed by: PHYSICIAN ASSISTANT

## 2024-04-16 PROCEDURE — 93000 ELECTROCARDIOGRAM COMPLETE: CPT | Performed by: PHYSICIAN ASSISTANT

## 2024-04-16 PROCEDURE — 99214 OFFICE O/P EST MOD 30 MIN: CPT | Performed by: PHYSICIAN ASSISTANT

## 2024-04-16 PROCEDURE — 1159F MED LIST DOCD IN RCRD: CPT | Performed by: PHYSICIAN ASSISTANT

## 2024-04-16 RX ORDER — HYDROCHLOROTHIAZIDE 12.5 MG/1
12.5 TABLET ORAL DAILY
COMMUNITY
Start: 2024-03-19

## 2024-04-16 NOTE — PROGRESS NOTES
"    CARDIOLOGY        Patient Name: Ronnell Rizvi  :1950  Age: 74 y.o.  Primary Cardiologist: Christopher Sawant MD  Encounter Provider:  Cesario Wilson PA-C    Date of Service: 24        CHIEF COMPLAINT / REASON FOR OFFICE VISIT     1 year follow-up      HISTORY OF PRESENT ILLNESS       HPI  Ronnell Rizvi is a 74 y.o. male who presents today for 1 year follow-up.     Pt has a history significant for SA block, hypertension, hyperlipidemia, PFO closure, seizure, and CVA presents for 1 year follow-up.  Last seen in the office on 2023.  Pt was doing well at that time.    Patient spent doing well since her last office visit.  Patient did have an episode of shortness of breath along with a breakthrough seizure. Patient was admitted on 2024 through 2024 for hypoxic respiratory failure due to bacterial pneumonia and breakthrough seizure.  Since, patient has been doing well without any issues.  Patient denies any chest pain, shortness of breath, palpitations, edema to his legs, or any bleeding issues.     The following portions of the patient's history were reviewed and updated as appropriate: allergies, current medications, past family history, past medical history, past social history, past surgical history and problem list.      VITAL SIGNS     Visit Vitals  /60 (BP Location: Right arm)   Pulse 68   Ht 182.9 cm (72\")   Wt 84 kg (185 lb 3.2 oz)   BMI 25.12 kg/m²       @[email or web address]  Wt Readings from Last 3 Encounters:   24 84 kg (185 lb 3.2 oz)   24 88.2 kg (194 lb 6.4 oz)   24 87.1 kg (192 lb)     Body mass index is 25.12 kg/m².        PHYSICAL EXAMINATION     Physical Exam      REVIEWED DATA       ECG 12 Lead    Date/Time: 2024 11:11 AM  Performed by: Cesario Wilson PA-C    Authorized by: Cesario Wilson PA-C  Comparison: compared with previous ECG   Similar to previous ECG  Rhythm: sinus rhythm  Rate: normal  Conduction: 1st degree AV " block  Q waves: III and aVF    QRS axis: normal    Clinical impression: abnormal EKG  Comments: EKG unchanged from previous on 4/11/2023          Cardiac Procedures:      Transthoracic echo on 2/23/2023  Interpretation Summary         Left ventricular systolic function is normal. Calculated left ventricular EF = 64.8%    Left ventricular wall thickness is consistent with hypertrophy. Sigmoid-shaped ventricular septum is present.    Left ventricular diastolic function was normal.    Systolic anterior motion of the chordal apparatus is present.  There is no obstruction.    Estimated right ventricular systolic pressure from tricuspid regurgitation is normal (<35 mmHg).     Stress Test on 11/23/2020  Interpretation Summary    Left ventricular ejection fraction is normal. (Calculated EF = 54%).  Myocardial perfusion imaging indicates a normal myocardial perfusion study with no evidence of ischemia.  Impressions are consistent with a low risk study.       Lab Results   Component Value Date     01/23/2024     01/22/2024    K 3.9 01/23/2024    K 4.2 01/22/2024     01/23/2024     01/22/2024    CO2 26.5 01/23/2024    CO2 29.5 (H) 01/22/2024    BUN 15 01/23/2024    BUN 13 01/22/2024    CREATININE 0.92 01/23/2024    CREATININE 1.05 01/22/2024    EGFRIFNONA 64 12/27/2021    EGFRIFNONA 65 09/08/2021    EGFRIFAFRI 74 12/27/2021    EGFRIFAFRI 79 09/08/2021    GLUCOSE 105 (H) 01/23/2024    GLUCOSE 110 (H) 01/22/2024    CALCIUM 8.2 (L) 01/23/2024    CALCIUM 8.3 (L) 01/22/2024    PROTENTOTREF 6.1 01/30/2023    PROTENTOTREF 6.3 12/27/2021    ALBUMIN 3.2 (L) 01/23/2024    ALBUMIN 3.7 01/21/2024    BILITOT 0.5 01/23/2024    BILITOT 0.2 01/21/2024    AST 30 01/23/2024    AST 21 01/21/2024    ALT 43 (H) 01/23/2024    ALT 22 01/21/2024     Lab Results   Component Value Date    WBC 9.61 01/23/2024    WBC 10.78 01/22/2024    HGB 13.3 01/23/2024    HGB 13.9 01/22/2024    HCT 39.2 01/23/2024    HCT 40.7 01/22/2024    MCV  93.8 01/23/2024    MCV 93.6 01/22/2024     01/23/2024     01/22/2024     Lab Results   Component Value Date    PROBNP 724.6 10/10/2020     Lab Results   Component Value Date    TROPONINT 23 (H) 01/21/2024     Lab Results   Component Value Date    TSH 2.130 01/30/2023    TSH 2.580 10/14/2022             ASSESSMENT & PLAN     Diagnoses and all orders for this visit:    1. S/P percutaneous patent foramen ovale closure/CVA  He is maintained on Plavix and is doing well. Pt has intolerance to aspirin.    2. Primary hypertension   Controlled on losartan.  Patient to restrict his sodium intake.    3. ELIZABETH (obstructive sleep apnea)   He does not use CPAP.     4. Sinoatrial block   First-degree AV block that change.    5. Mixed hyperlipidemia   He stopped the statin secondary to memory loss and from CVA and TIAs     6. Seizure   Followed by Dr. Hubbard.  Currently on Dilantin    7. Chronic obstructive pulmonary disease   No shortness of breath.    Patient has been doing well other than his episode of pneumonia back in January.  Patient recovered well.  Patient has no complaints.  Patient to restrict his sodium intake and exercise more.  Patient to follow-up with Dr. Sawant in 1 year.  Patient told to call the office with any questions or concerns.    Return in about 1 year (around 4/16/2025) for Dr. Sawant.    Future Appointments         Provider Department Center    4/23/2024 9:50 AM [facility] PRIMARY [facility]ARE [facility]    4/30/2024 9:00 AM Deena Tarango MD [facility] PRIMARY CARE [facility]    5/24/2024 11:00 AM Yemi Hubbard MD [facility] NEUROLOGY L[facility]    4/22/2025 11:00 AM Christopher Sawant MD [facility] CARDIOLOGY [facility]                MEDICATIONS         Discharge Medications            Accurate as of April 16, 2024 11:31 AM. If you have any questions, ask your nurse or doctor.                Continue These Medications         Instructions Start Date   clopidogrel 75 MG tablet  Commonly known as: PLAVIX   75 mg, Oral, Daily      Cyanocobalamin 1000 MCG/ML kit   Injection, Weekly, 1/4 of a cc IM every sunday      fexofenadine 180 MG tablet  Commonly known as: ALLEGRA   180 mg, Oral, Daily PRN      guaiFENesin 600 MG 12 hr tablet  Commonly known as: MUCINEX   600 mg, Oral, Daily      hydroCHLOROthiazide 12.5 MG tablet   12.5 mg, Oral, Daily      phenytoin  MG capsule  Commonly known as: DILANTIN   Take 2 capsules by mouth 2 (Two) Times a Day. 2 in the morning, 2 at night                   **Dragon Disclaimer:   Much of this encounter note is an electronic transcription/translation of spoken language to printed text. The electronic translation of spoken language may permit erroneous, or at times, nonsensical words or phrases to be inadvertently transcribed. Although I have reviewed the note for such errors, some may still exist.

## 2024-04-17 ENCOUNTER — TELEPHONE (OUTPATIENT)
Dept: INTERNAL MEDICINE | Facility: CLINIC | Age: 74
End: 2024-04-17
Payer: MEDICARE

## 2024-04-23 DIAGNOSIS — E78.2 MIXED HYPERLIPIDEMIA: ICD-10-CM

## 2024-04-23 DIAGNOSIS — I63.9 CEREBROVASCULAR ACCIDENT (CVA), UNSPECIFIED MECHANISM: ICD-10-CM

## 2024-04-23 DIAGNOSIS — K21.9 GASTROESOPHAGEAL REFLUX DISEASE, UNSPECIFIED WHETHER ESOPHAGITIS PRESENT: ICD-10-CM

## 2024-04-23 DIAGNOSIS — D51.3 OTHER DIETARY VITAMIN B12 DEFICIENCY ANEMIA: ICD-10-CM

## 2024-04-23 DIAGNOSIS — I10 PRIMARY HYPERTENSION: ICD-10-CM

## 2024-04-23 DIAGNOSIS — R73.9 HYPERGLYCEMIA: ICD-10-CM

## 2024-04-23 DIAGNOSIS — E53.8 VITAMIN B12 DEFICIENCY: Primary | ICD-10-CM

## 2024-04-23 DIAGNOSIS — E53.8 VITAMIN B12 DEFICIENCY: ICD-10-CM

## 2024-04-24 LAB
ALBUMIN SERPL-MCNC: 4 G/DL (ref 3.5–5.2)
ALBUMIN/GLOB SERPL: 1.9 G/DL
ALP SERPL-CCNC: 86 U/L (ref 39–117)
ALT SERPL-CCNC: 17 U/L (ref 1–41)
AST SERPL-CCNC: 17 U/L (ref 1–40)
BILIRUB SERPL-MCNC: 0.3 MG/DL (ref 0–1.2)
BUN SERPL-MCNC: 13 MG/DL (ref 8–23)
BUN/CREAT SERPL: 11.1 (ref 7–25)
CALCIUM SERPL-MCNC: 9 MG/DL (ref 8.6–10.5)
CHLORIDE SERPL-SCNC: 104 MMOL/L (ref 98–107)
CHOLEST SERPL-MCNC: 269 MG/DL (ref 0–200)
CO2 SERPL-SCNC: 28.8 MMOL/L (ref 22–29)
CREAT SERPL-MCNC: 1.17 MG/DL (ref 0.76–1.27)
EGFRCR SERPLBLD CKD-EPI 2021: 65 ML/MIN/1.73
GLOBULIN SER CALC-MCNC: 2.1 GM/DL
GLUCOSE SERPL-MCNC: 87 MG/DL (ref 65–99)
HBA1C MFR BLD: 5.2 % (ref 4.8–5.6)
HDLC SERPL-MCNC: 59 MG/DL (ref 40–60)
LDLC SERPL CALC-MCNC: 196 MG/DL (ref 0–100)
LDLC/HDLC SERPL: 3.27 {RATIO}
POTASSIUM SERPL-SCNC: 4.4 MMOL/L (ref 3.5–5.2)
PROT SERPL-MCNC: 6.1 G/DL (ref 6–8.5)
SODIUM SERPL-SCNC: 140 MMOL/L (ref 136–145)
T4 FREE SERPL-MCNC: 1.08 NG/DL (ref 0.93–1.7)
TRIGL SERPL-MCNC: 86 MG/DL (ref 0–150)
TSH SERPL DL<=0.005 MIU/L-ACNC: 3.29 UIU/ML (ref 0.27–4.2)
VLDLC SERPL CALC-MCNC: 14 MG/DL (ref 5–40)

## 2024-04-30 ENCOUNTER — OFFICE VISIT (OUTPATIENT)
Dept: INTERNAL MEDICINE | Facility: CLINIC | Age: 74
End: 2024-04-30
Payer: COMMERCIAL

## 2024-04-30 VITALS
HEART RATE: 62 BPM | HEIGHT: 72 IN | TEMPERATURE: 98.2 F | BODY MASS INDEX: 25.11 KG/M2 | OXYGEN SATURATION: 96 % | WEIGHT: 185.4 LBS | SYSTOLIC BLOOD PRESSURE: 124 MMHG | DIASTOLIC BLOOD PRESSURE: 78 MMHG

## 2024-04-30 DIAGNOSIS — N39.41 URGE INCONTINENCE OF URINE: ICD-10-CM

## 2024-04-30 DIAGNOSIS — Z00.00 MEDICARE ANNUAL WELLNESS VISIT, SUBSEQUENT: Primary | ICD-10-CM

## 2024-04-30 DIAGNOSIS — E78.2 MIXED HYPERLIPIDEMIA: ICD-10-CM

## 2024-04-30 DIAGNOSIS — Z12.5 SCREENING FOR PROSTATE CANCER: ICD-10-CM

## 2024-04-30 PROCEDURE — G0439 PPPS, SUBSEQ VISIT: HCPCS | Performed by: INTERNAL MEDICINE

## 2024-04-30 PROCEDURE — 99213 OFFICE O/P EST LOW 20 MIN: CPT | Performed by: INTERNAL MEDICINE

## 2024-04-30 RX ORDER — ROSUVASTATIN CALCIUM 20 MG/1
20 TABLET, COATED ORAL DAILY
Qty: 90 TABLET | Refills: 1 | Status: SHIPPED | OUTPATIENT
Start: 2024-04-30

## 2024-04-30 NOTE — PROGRESS NOTES
The ABCs of the Annual Wellness Visit  Subsequent Medicare Wellness Visit    Chief Complaint   Patient presents with    Medicare Wellness-subsequent      Subjective    History of Present Illness:  Ronnell Rizvi is a 74 y.o. male who presents for a Subsequent Medicare Wellness Visit.    The following portions of the patient's history were reviewed and   updated as appropriate: allergies, current medications, past family history, past medical history, past social history, past surgical history, and problem list.    Compared to one year ago, the patient feels his physical   health is the same.    Compared to one year ago, the patient feels his mental   health is the same.    Recent Hospitalizations:  This patient has had a [facility] admission record on file within the last 365 days.    Current Medical Providers:  Patient Care Team:  Deena Tarango MD as PCP - General (Internal Medicine)  Gregory King MD as Consulting Physician (Hematology and Oncology)  Chase Haque MD as Referring Physician (Internal Medicine)  Luis Wyatt MD as Consulting Physician (Cardiology)    Outpatient Medications Prior to Visit   Medication Sig Dispense Refill    clopidogrel (PLAVIX) 75 MG tablet Take 1 tablet by mouth Daily. 30 tablet 0    Cyanocobalamin 1000 MCG/ML kit Inject  as directed 1 (One) Time Per Week. 1/4 of a cc IM every sunday      guaiFENesin (MUCINEX) 600 MG 12 hr tablet Take 1 tablet by mouth Daily.      hydroCHLOROthiazide 12.5 MG tablet Take 1 tablet by mouth Daily.      phenytoin ER (DILANTIN) 100 MG capsule Take 2 capsules by mouth 2 (Two) Times a Day. 2 in the morning, 2 at night      fexofenadine (ALLEGRA) 180 MG tablet Take 1 tablet by mouth Daily As Needed. (Patient not taking: Reported on 4/30/2024)       No facility-administered medications prior to visit.       No opioid medication identified on active medication list. I have reviewed chart for other potential  high risk medication/s  "and harmful drug interactions in the elderly.        Aspirin is not on active medication list.  Aspirin use is not indicated based on review of current medical condition/s. Risk of harm outweighs potential benefits.  .    Patient Active Problem List   Diagnosis    Anemia, unspecified    Vitamin B12 deficiency    Chronic fatigue    Benign prostatic hypertrophy (BPH) with weak urinary stream    Chronic bronchitis    ELIZABETH (obstructive sleep apnea)    Sinoatrial block    Cerebral aneurysm, nonruptured    CVA (cerebral vascular accident)    TIA (transient ischemic attack)    Intracranial vascular stenosis    Sinus pause    S/P percutaneous patent foramen ovale closure    TIA on medication    HTN (hypertension)    COPD (chronic obstructive pulmonary disease)    Nocturnal seizures    Encounter for screening for malignant neoplasm of colon    Seizure    HL (hearing loss)    Pre-syncope    Mixed hyperlipidemia    History of stroke    Melena    Irritable bowel syndrome with both constipation and diarrhea    Gastroesophageal reflux disease    Pneumonia     Advance Care Planning  Advance Directive is on file.  ACP discussion was held with the patient during this visit. Patient has an advance directive in EMR which is still valid.           Objective    Vitals:    04/30/24 0900   BP: 124/78   BP Location: Left arm   Patient Position: Sitting   Cuff Size: Adult   Pulse: 62   Temp: 98.2 °F (36.8 °C)   TempSrc: Infrared   SpO2: 96%   Weight: 84.1 kg (185 lb 6.4 oz)   Height: 182.9 cm (72\")   PainSc: 0-No pain     Estimated body mass index is 25.14 kg/m² as calculated from the following:    Height as of this encounter: 182.9 cm (72\").    Weight as of this encounter: 84.1 kg (185 lb 6.4 oz).    BMI is >= 25 and <30. (Overweight) The following options were offered after discussion;: BMI is not accurate for all patients.       Does the patient have evidence of cognitive impairment?  No changes since his stroke.  Feels like mental side " has stayed stable since stroke.  Still has issues with reading quickly.    Physical Exam  Vitals reviewed.   Constitutional:       General: He is not in acute distress.     Appearance: Normal appearance.   HENT:      Head: Normocephalic and atraumatic.      Nose: Nose normal.      Mouth/Throat:      Mouth: Mucous membranes are moist.   Eyes:      Conjunctiva/sclera: Conjunctivae normal.   Cardiovascular:      Rate and Rhythm: Normal rate and regular rhythm.   Pulmonary:      Effort: Pulmonary effort is normal.      Breath sounds: Normal breath sounds.   Abdominal:      Palpations: Abdomen is soft.      Tenderness: There is no abdominal tenderness.   Skin:     General: Skin is warm and dry.   Neurological:      General: No focal deficit present.      Mental Status: He is alert.   Psychiatric:         Mood and Affect: Mood normal.       Lab Results   Component Value Date    CHLPL 269 (H) 2024    TRIG 86 2024    HDL 59 2024     (H) 2024    VLDL 14 2024    HGBA1C 5.20 2024            HEALTH RISK ASSESSMENT    Smoking Status:  Social History     Tobacco Use   Smoking Status Former    Current packs/day: 0.00    Average packs/day: 1.5 packs/day for 15.0 years (22.5 ttl pk-yrs)    Types: Cigarettes    Start date: 1964    Quit date: 1979    Years since quittin.2    Passive exposure: Past   Smokeless Tobacco Never   Tobacco Comments    caffeine use: Drinks 4 glasses of Dt coke on avg.      Alcohol Consumption:  Social History     Substance and Sexual Activity   Alcohol Use No     Fall Risk Screen:    STEADI Fall Risk Assessment was completed, and patient is at MODERATE risk for falls. Assessment completed on:2024    Depression Screenin/30/2024     8:58 AM   PHQ-2/PHQ-9 Depression Screening   Little Interest or Pleasure in Doing Things 0-->not at all   Feeling Down, Depressed or Hopeless 0-->not at all   PHQ-9: Brief Depression Severity Measure Score 0        Health Habits and Functional and Cognitive Screenin/30/2024     8:56 AM   Functional & Cognitive Status   Do you have difficulty preparing food and eating? No   Do you have difficulty bathing yourself, getting dressed or grooming yourself? No   Do you have difficulty using the toilet? No   Do you have difficulty moving around from place to place? No   Do you have trouble with steps or getting out of a bed or a chair? No   Current Diet Unhealthy Diet   Dental Exam Up to date   Eye Exam Up to date   Exercise (times per week) 0 times per week   Current Exercises Include No Regular Exercise;Walking;Yard Work   Do you need help using the phone?  No   Are you deaf or do you have serious difficulty hearing?  Yes   Do you need help to go to places out of walking distance? No   Do you need help shopping? No   Do you need help preparing meals?  No   Do you need help with housework?  No   Do you need help with laundry? No   Do you need help taking your medications? No   Do you need help managing money? No   Do you ever drive or ride in a car without wearing a seat belt? No   Have you felt unusual stress, anger or loneliness in the last month? No   Who do you live with? Spouse   If you need help, do you have trouble finding someone available to you? No   Do you have difficulty concentrating, remembering or making decisions? Yes       Age-appropriate Screening Schedule:  Refer to the list below for future screening recommendations based on patient's age, sex and/or medical conditions. Orders for these recommended tests are listed in the plan section. The patient has been provided with a written plan.    Health Maintenance   Topic Date Due    ZOSTER VACCINE (3 of 3) 2016    COVID-19 Vaccine ( season) 2024    INFLUENZA VACCINE  2024    LIPID PANEL  2025    ANNUAL WELLNESS VISIT  2025    BMI FOLLOWUP  2025    COLORECTAL CANCER SCREENING  10/09/2025    TDAP/TD VACCINES (4  - Td or Tdap) 06/21/2030    HEPATITIS C SCREENING  Completed    RSV Vaccine - Adults  Completed    Pneumococcal Vaccine 65+  Completed    AAA SCREEN (ONE-TIME)  Addressed              Assessment & Plan   CMS Preventative Services Quick Reference  Risk Factors Identified During Encounter  Hearing Problem:  [facility]  The above risks/problems have been discussed with the patient.  Follow up actions/plans if indicated are seen below in the Assessment/Plan Section.  Pertinent information has been shared with the patient in the After Visit Summary.    Diagnoses and all orders for this visit:    1. Medicare annual wellness visit, subsequent (Primary)    2. Urge incontinence of urine  -     PSA SCREENING; Future  -     Urinalysis With Culture If Indicated -; Future    3. Screening for prostate cancer  -     PSA SCREENING; Future    4. Mixed hyperlipidemia  -     rosuvastatin (Crestor) 20 MG tablet; Take 1 tablet by mouth Daily.  Dispense: 90 tablet; Refill: 1  -     Lipid Panel With LDL / HDL Ratio; Future        Few months ago started having urge incontinence.  Will check PSA and u/a.  He will return within the next 2 weeks to get this tested.  Will use urge incontinence meds after this if needed to help symptoms.     Re-start cholesterol meds at low/intermediate dose and he will make dietary changes.  I would like to start high dose statin, but I am happy to start any form of statin as a compromise.  Will repeat lipids in 6 months prior to/at time of 6 month f/u.    Follow Up:   Return in about 6 months (around 10/30/2024) for f/u HLD.     An After Visit Summary and PPPS were made available to the patient.

## 2024-05-01 DIAGNOSIS — N39.41 URGE INCONTINENCE OF URINE: ICD-10-CM

## 2024-05-01 DIAGNOSIS — E78.2 MIXED HYPERLIPIDEMIA: ICD-10-CM

## 2024-05-01 DIAGNOSIS — Z12.5 SCREENING FOR PROSTATE CANCER: ICD-10-CM

## 2024-05-15 ENCOUNTER — LAB (OUTPATIENT)
Dept: LAB | Facility: HOSPITAL | Age: 74
End: 2024-05-15
Payer: MEDICARE

## 2024-05-15 LAB
BILIRUB UR QL STRIP: NEGATIVE
CHOLEST SERPL-MCNC: 213 MG/DL (ref 0–200)
CLARITY UR: CLEAR
COLOR UR: YELLOW
GLUCOSE UR STRIP-MCNC: NEGATIVE MG/DL
HDLC SERPL-MCNC: 60 MG/DL (ref 40–60)
HGB UR QL STRIP.AUTO: NEGATIVE
HOLD SPECIMEN: NORMAL
KETONES UR QL STRIP: NEGATIVE
LDLC SERPL CALC-MCNC: 138 MG/DL (ref 0–100)
LDLC/HDLC SERPL: 2.27 {RATIO}
LEUKOCYTE ESTERASE UR QL STRIP.AUTO: NEGATIVE
NITRITE UR QL STRIP: NEGATIVE
PH UR STRIP.AUTO: 7.5 [PH] (ref 5–8)
PROT UR QL STRIP: NEGATIVE
PSA SERPL-MCNC: 0.63 NG/ML (ref 0–4)
SP GR UR STRIP: 1.01 (ref 1–1.03)
TRIGL SERPL-MCNC: 85 MG/DL (ref 0–150)
UROBILINOGEN UR QL STRIP: NORMAL
VLDLC SERPL-MCNC: 15 MG/DL (ref 5–40)

## 2024-05-15 PROCEDURE — G0103 PSA SCREENING: HCPCS | Performed by: INTERNAL MEDICINE

## 2024-05-15 PROCEDURE — 81003 URINALYSIS AUTO W/O SCOPE: CPT | Performed by: INTERNAL MEDICINE

## 2024-05-15 PROCEDURE — 80061 LIPID PANEL: CPT | Performed by: INTERNAL MEDICINE

## 2024-05-24 ENCOUNTER — OFFICE VISIT (OUTPATIENT)
Dept: NEUROLOGY | Facility: CLINIC | Age: 74
End: 2024-05-24
Payer: MEDICARE

## 2024-05-24 VITALS
WEIGHT: 187.2 LBS | HEART RATE: 61 BPM | OXYGEN SATURATION: 95 % | BODY MASS INDEX: 25.39 KG/M2 | DIASTOLIC BLOOD PRESSURE: 62 MMHG | SYSTOLIC BLOOD PRESSURE: 120 MMHG

## 2024-05-24 DIAGNOSIS — Z86.73 HISTORY OF STROKE: ICD-10-CM

## 2024-05-24 DIAGNOSIS — G40.109 LOCALIZATION-RELATED SYMPTOMATIC EPILEPSY AND EPILEPTIC SYNDROMES WITH SIMPLE PARTIAL SEIZURES, NOT INTRACTABLE, WITHOUT STATUS EPILEPTICUS: Primary | ICD-10-CM

## 2024-05-24 PROCEDURE — 3074F SYST BP LT 130 MM HG: CPT | Performed by: PSYCHIATRY & NEUROLOGY

## 2024-05-24 PROCEDURE — 99214 OFFICE O/P EST MOD 30 MIN: CPT | Performed by: PSYCHIATRY & NEUROLOGY

## 2024-05-24 PROCEDURE — 3078F DIAST BP <80 MM HG: CPT | Performed by: PSYCHIATRY & NEUROLOGY

## 2024-05-24 PROCEDURE — 1159F MED LIST DOCD IN RCRD: CPT | Performed by: PSYCHIATRY & NEUROLOGY

## 2024-05-24 PROCEDURE — 1160F RVW MEDS BY RX/DR IN RCRD: CPT | Performed by: PSYCHIATRY & NEUROLOGY

## 2024-05-24 NOTE — PROGRESS NOTES
Notes by MA:  Pt is here today with his wife for a follow up for epilepsy.      Subjective:     Patient ID: Ronnell Rizvi is a 74 y.o. male.    History of Present Illness  The following portions of the patient's history were reviewed and updated as appropriate: allergies, current medications, past family history, past medical history, past social history, past surgical history, and problem list.    Review of Systems   Musculoskeletal:  Negative for gait problem.   Neurological:  Positive for tremors. Negative for dizziness, seizures, syncope, facial asymmetry, speech difficulty, weakness, light-headedness, numbness and headaches.   Psychiatric/Behavioral:  Negative for agitation, behavioral problems, confusion, decreased concentration, dysphoric mood, hallucinations, self-injury, sleep disturbance and suicidal ideas. The patient is not nervous/anxious and is not hyperactive.         Objective:    Neurologic Exam  Awake and alert.  Reasonably pleasant with fluent speech.  Poor speech comprehension secondary to hearing loss bilaterally.     Aside from hearing loss, cranial nerves II through XII normal and symmetric.     Motor exam reveals reasonable tone bulk and power.  No drift.  Good .  Reasonable intrinsic hand muscles.  No lateralized spasticity.     Sensory exam reveals good sensation to light touch temperature and vibration in both upper and lower extremities symmetrically.     Coordination testing reveals smooth and accurate finger-nose-finger and reasonable rapid alternating movements.  Negative Romberg.  He does have an action tremor today that is also present during sustained posture but very mild and improved compared to last visit.     Tendon reflexes are 1+ and symmetric throughout.     No diplopia, nystagmus, ataxia, or other sign of medication toxicity.  Physical Exam    Assessment/Plan:     Diagnoses and all orders for this visit:    1. Localization-related symptomatic epilepsy and epileptic  syndromes with simple partial seizures, not intractable, without status epilepticus (Primary)    2. History of stroke       Seizure-free.  Aside from some morning drowsiness after taking his morning dose, he is tolerating the medications reasonably well.  Have suggested that we move all 4 doses of Dilantin to nighttime (400 mg nightly) to see if it helps with this side effect.  We discussed how to do this and answered their questions today.  No other changes.  They are encouraged to call with problems or questions.  Otherwise we will see him back in 4 months time.

## 2024-06-24 RX ORDER — HYDROCHLOROTHIAZIDE 12.5 MG/1
12.5 TABLET ORAL DAILY
Qty: 90 TABLET | Refills: 1 | Status: SHIPPED | OUTPATIENT
Start: 2024-06-24

## 2024-07-11 ENCOUNTER — APPOINTMENT (OUTPATIENT)
Dept: CT IMAGING | Facility: HOSPITAL | Age: 74
End: 2024-07-11
Payer: MEDICARE

## 2024-07-11 ENCOUNTER — APPOINTMENT (OUTPATIENT)
Dept: GENERAL RADIOLOGY | Facility: HOSPITAL | Age: 74
End: 2024-07-11
Payer: MEDICARE

## 2024-07-11 ENCOUNTER — HOSPITAL ENCOUNTER (OUTPATIENT)
Facility: HOSPITAL | Age: 74
Setting detail: OBSERVATION
LOS: 1 days | Discharge: HOME | End: 2024-07-12
Attending: STUDENT IN AN ORGANIZED HEALTH CARE EDUCATION/TRAINING PROGRAM | Admitting: FAMILY MEDICINE
Payer: MEDICARE

## 2024-07-11 DIAGNOSIS — I63.9 ACUTE CVA (CEREBROVASCULAR ACCIDENT): ICD-10-CM

## 2024-07-11 DIAGNOSIS — R47.81 SLURRED SPEECH: Primary | ICD-10-CM

## 2024-07-11 DIAGNOSIS — I63.9 DYSARTHRIA DUE TO ACUTE CEREBROVASCULAR ACCIDENT (CVA): ICD-10-CM

## 2024-07-11 DIAGNOSIS — R47.1 DYSARTHRIA DUE TO ACUTE CEREBROVASCULAR ACCIDENT (CVA): ICD-10-CM

## 2024-07-11 LAB
ABO GROUP BLD: NORMAL
ALBUMIN SERPL-MCNC: 3.7 G/DL (ref 3.5–5.2)
ALBUMIN/GLOB SERPL: 1.5 G/DL
ALP SERPL-CCNC: 92 U/L (ref 39–117)
ALT SERPL W P-5'-P-CCNC: 13 U/L (ref 1–41)
ANION GAP SERPL CALCULATED.3IONS-SCNC: 9.1 MMOL/L (ref 5–15)
APTT PPP: 22.2 SECONDS (ref 24.3–38.1)
AST SERPL-CCNC: 17 U/L (ref 1–40)
BASOPHILS # BLD AUTO: 0.04 10*3/MM3 (ref 0–0.2)
BASOPHILS NFR BLD AUTO: 0.7 % (ref 0–1.5)
BILIRUB SERPL-MCNC: 0.2 MG/DL (ref 0–1.2)
BLD GP AB SCN SERPL QL: NEGATIVE
BUN SERPL-MCNC: 11 MG/DL (ref 8–23)
BUN/CREAT SERPL: 11.5 (ref 7–25)
CALCIUM SPEC-SCNC: 9.1 MG/DL (ref 8.6–10.5)
CHLORIDE SERPL-SCNC: 101 MMOL/L (ref 98–107)
CO2 SERPL-SCNC: 25.9 MMOL/L (ref 22–29)
CREAT SERPL-MCNC: 0.96 MG/DL (ref 0.76–1.27)
DEPRECATED RDW RBC AUTO: 43.5 FL (ref 37–54)
EGFRCR SERPLBLD CKD-EPI 2021: 82.4 ML/MIN/1.73
EOSINOPHIL # BLD AUTO: 0.14 10*3/MM3 (ref 0–0.4)
EOSINOPHIL NFR BLD AUTO: 2.5 % (ref 0.3–6.2)
ERYTHROCYTE [DISTWIDTH] IN BLOOD BY AUTOMATED COUNT: 12.6 % (ref 12.3–15.4)
GLOBULIN UR ELPH-MCNC: 2.4 GM/DL
GLUCOSE BLDC GLUCOMTR-MCNC: 100 MG/DL (ref 70–130)
GLUCOSE SERPL-MCNC: 95 MG/DL (ref 65–99)
HCT VFR BLD AUTO: 40.3 % (ref 37.5–51)
HGB BLD-MCNC: 14 G/DL (ref 13–17.7)
HOLD SPECIMEN: NORMAL
HOLD SPECIMEN: NORMAL
IMM GRANULOCYTES # BLD AUTO: 0.01 10*3/MM3 (ref 0–0.05)
IMM GRANULOCYTES NFR BLD AUTO: 0.2 % (ref 0–0.5)
INR PPP: 1 (ref 0.9–1.1)
LYMPHOCYTES # BLD AUTO: 2.07 10*3/MM3 (ref 0.7–3.1)
LYMPHOCYTES NFR BLD AUTO: 36.6 % (ref 19.6–45.3)
MCH RBC QN AUTO: 32.6 PG (ref 26.6–33)
MCHC RBC AUTO-ENTMCNC: 34.7 G/DL (ref 31.5–35.7)
MCV RBC AUTO: 93.9 FL (ref 79–97)
MONOCYTES # BLD AUTO: 0.68 10*3/MM3 (ref 0.1–0.9)
MONOCYTES NFR BLD AUTO: 12 % (ref 5–12)
NEUTROPHILS NFR BLD AUTO: 2.71 10*3/MM3 (ref 1.7–7)
NEUTROPHILS NFR BLD AUTO: 48 % (ref 42.7–76)
NRBC BLD AUTO-RTO: 0 /100 WBC (ref 0–0.2)
PLATELET # BLD AUTO: 194 10*3/MM3 (ref 140–450)
PMV BLD AUTO: 9.7 FL (ref 6–12)
POTASSIUM SERPL-SCNC: 3.6 MMOL/L (ref 3.5–5.2)
PROT SERPL-MCNC: 6.1 G/DL (ref 6–8.5)
PROTHROMBIN TIME: 13.2 SECONDS (ref 12.1–15)
QT INTERVAL: 414 MS
QTC INTERVAL: 412 MS
RBC # BLD AUTO: 4.29 10*6/MM3 (ref 4.14–5.8)
RH BLD: NEGATIVE
SODIUM SERPL-SCNC: 136 MMOL/L (ref 136–145)
T&S EXPIRATION DATE: NORMAL
TROPONIN T SERPL HS-MCNC: 14 NG/L
WBC NRBC COR # BLD AUTO: 5.65 10*3/MM3 (ref 3.4–10.8)
WHOLE BLOOD HOLD COAG: NORMAL
WHOLE BLOOD HOLD SPECIMEN: NORMAL

## 2024-07-11 PROCEDURE — 82948 REAGENT STRIP/BLOOD GLUCOSE: CPT

## 2024-07-11 PROCEDURE — 93010 ELECTROCARDIOGRAM REPORT: CPT | Performed by: INTERNAL MEDICINE

## 2024-07-11 PROCEDURE — 84484 ASSAY OF TROPONIN QUANT: CPT | Performed by: STUDENT IN AN ORGANIZED HEALTH CARE EDUCATION/TRAINING PROGRAM

## 2024-07-11 PROCEDURE — 85730 THROMBOPLASTIN TIME PARTIAL: CPT | Performed by: STUDENT IN AN ORGANIZED HEALTH CARE EDUCATION/TRAINING PROGRAM

## 2024-07-11 PROCEDURE — 85610 PROTHROMBIN TIME: CPT | Performed by: STUDENT IN AN ORGANIZED HEALTH CARE EDUCATION/TRAINING PROGRAM

## 2024-07-11 PROCEDURE — 85025 COMPLETE CBC W/AUTO DIFF WBC: CPT | Performed by: STUDENT IN AN ORGANIZED HEALTH CARE EDUCATION/TRAINING PROGRAM

## 2024-07-11 PROCEDURE — A9270 NON-COVERED ITEM OR SERVICE: HCPCS | Performed by: FAMILY MEDICINE

## 2024-07-11 PROCEDURE — 70450 CT HEAD/BRAIN W/O DYE: CPT

## 2024-07-11 PROCEDURE — 86850 RBC ANTIBODY SCREEN: CPT | Performed by: STUDENT IN AN ORGANIZED HEALTH CARE EDUCATION/TRAINING PROGRAM

## 2024-07-11 PROCEDURE — 99285 EMERGENCY DEPT VISIT HI MDM: CPT

## 2024-07-11 PROCEDURE — 93005 ELECTROCARDIOGRAM TRACING: CPT | Performed by: STUDENT IN AN ORGANIZED HEALTH CARE EDUCATION/TRAINING PROGRAM

## 2024-07-11 PROCEDURE — 71045 X-RAY EXAM CHEST 1 VIEW: CPT

## 2024-07-11 PROCEDURE — 25510000001 IOPAMIDOL PER 1 ML: Performed by: STUDENT IN AN ORGANIZED HEALTH CARE EDUCATION/TRAINING PROGRAM

## 2024-07-11 PROCEDURE — 70496 CT ANGIOGRAPHY HEAD: CPT

## 2024-07-11 PROCEDURE — 63710000001 PHENYTOIN ER 100 MG CAPSULE: Performed by: FAMILY MEDICINE

## 2024-07-11 PROCEDURE — 99223 1ST HOSP IP/OBS HIGH 75: CPT | Performed by: FAMILY MEDICINE

## 2024-07-11 PROCEDURE — 86900 BLOOD TYPING SEROLOGIC ABO: CPT | Performed by: STUDENT IN AN ORGANIZED HEALTH CARE EDUCATION/TRAINING PROGRAM

## 2024-07-11 PROCEDURE — 80053 COMPREHEN METABOLIC PANEL: CPT | Performed by: STUDENT IN AN ORGANIZED HEALTH CARE EDUCATION/TRAINING PROGRAM

## 2024-07-11 PROCEDURE — 86901 BLOOD TYPING SEROLOGIC RH(D): CPT | Performed by: STUDENT IN AN ORGANIZED HEALTH CARE EDUCATION/TRAINING PROGRAM

## 2024-07-11 PROCEDURE — 70498 CT ANGIOGRAPHY NECK: CPT

## 2024-07-11 PROCEDURE — 99214 OFFICE O/P EST MOD 30 MIN: CPT | Performed by: PSYCHIATRY & NEUROLOGY

## 2024-07-11 PROCEDURE — 0042T HC CT CEREBRAL PERFUSION W/WO CONTRAST: CPT

## 2024-07-11 RX ORDER — ROSUVASTATIN CALCIUM 20 MG/1
20 TABLET, COATED ORAL DAILY
Status: DISCONTINUED | OUTPATIENT
Start: 2024-07-12 | End: 2024-07-12 | Stop reason: HOSPADM

## 2024-07-11 RX ORDER — POLYETHYLENE GLYCOL 3350 17 G/17G
17 POWDER, FOR SOLUTION ORAL DAILY PRN
Status: DISCONTINUED | OUTPATIENT
Start: 2024-07-11 | End: 2024-07-12 | Stop reason: HOSPADM

## 2024-07-11 RX ORDER — ASPIRIN 300 MG/1
300 SUPPOSITORY RECTAL DAILY
Status: DISCONTINUED | OUTPATIENT
Start: 2024-07-11 | End: 2024-07-11

## 2024-07-11 RX ORDER — ATORVASTATIN CALCIUM 40 MG/1
80 TABLET, FILM COATED ORAL NIGHTLY
Status: DISCONTINUED | OUTPATIENT
Start: 2024-07-11 | End: 2024-07-11

## 2024-07-11 RX ORDER — ONDANSETRON 2 MG/ML
4 INJECTION INTRAMUSCULAR; INTRAVENOUS EVERY 6 HOURS PRN
Status: DISCONTINUED | OUTPATIENT
Start: 2024-07-11 | End: 2024-07-12 | Stop reason: HOSPADM

## 2024-07-11 RX ORDER — ALUMINA, MAGNESIA, AND SIMETHICONE 2400; 2400; 240 MG/30ML; MG/30ML; MG/30ML
7.5 SUSPENSION ORAL EVERY 4 HOURS PRN
Status: DISCONTINUED | OUTPATIENT
Start: 2024-07-11 | End: 2024-07-12 | Stop reason: HOSPADM

## 2024-07-11 RX ORDER — CLOPIDOGREL BISULFATE 75 MG/1
75 TABLET ORAL DAILY
Status: DISCONTINUED | OUTPATIENT
Start: 2024-07-12 | End: 2024-07-12 | Stop reason: HOSPADM

## 2024-07-11 RX ORDER — PHENYTOIN SODIUM 100 MG/1
400 CAPSULE, EXTENDED RELEASE ORAL NIGHTLY
Status: DISCONTINUED | OUTPATIENT
Start: 2024-07-11 | End: 2024-07-12 | Stop reason: HOSPADM

## 2024-07-11 RX ORDER — BISACODYL 10 MG
10 SUPPOSITORY, RECTAL RECTAL DAILY PRN
Status: DISCONTINUED | OUTPATIENT
Start: 2024-07-11 | End: 2024-07-12 | Stop reason: HOSPADM

## 2024-07-11 RX ORDER — AMOXICILLIN 250 MG
2 CAPSULE ORAL 2 TIMES DAILY PRN
Status: DISCONTINUED | OUTPATIENT
Start: 2024-07-11 | End: 2024-07-12 | Stop reason: HOSPADM

## 2024-07-11 RX ORDER — BISACODYL 10 MG
10 SUPPOSITORY, RECTAL RECTAL DAILY PRN
Status: DISCONTINUED | OUTPATIENT
Start: 2024-07-11 | End: 2024-07-11

## 2024-07-11 RX ORDER — CLOPIDOGREL BISULFATE 75 MG/1
75 TABLET ORAL DAILY
Status: DISCONTINUED | OUTPATIENT
Start: 2024-07-12 | End: 2024-07-11

## 2024-07-11 RX ORDER — SODIUM CHLORIDE 0.9 % (FLUSH) 0.9 %
10 SYRINGE (ML) INJECTION AS NEEDED
Status: DISCONTINUED | OUTPATIENT
Start: 2024-07-11 | End: 2024-07-12 | Stop reason: HOSPADM

## 2024-07-11 RX ORDER — SODIUM CHLORIDE 9 MG/ML
40 INJECTION, SOLUTION INTRAVENOUS AS NEEDED
Status: DISCONTINUED | OUTPATIENT
Start: 2024-07-11 | End: 2024-07-12 | Stop reason: HOSPADM

## 2024-07-11 RX ORDER — GUAIFENESIN 600 MG/1
600 TABLET, EXTENDED RELEASE ORAL DAILY
Status: DISCONTINUED | OUTPATIENT
Start: 2024-07-12 | End: 2024-07-12 | Stop reason: HOSPADM

## 2024-07-11 RX ORDER — SODIUM CHLORIDE 0.9 % (FLUSH) 0.9 %
10 SYRINGE (ML) INJECTION EVERY 12 HOURS SCHEDULED
Status: DISCONTINUED | OUTPATIENT
Start: 2024-07-11 | End: 2024-07-12 | Stop reason: HOSPADM

## 2024-07-11 RX ORDER — HYDROCHLOROTHIAZIDE 12.5 MG/1
12.5 TABLET ORAL DAILY
Status: DISCONTINUED | OUTPATIENT
Start: 2024-07-12 | End: 2024-07-12 | Stop reason: HOSPADM

## 2024-07-11 RX ORDER — ASPIRIN 81 MG/1
81 TABLET, CHEWABLE ORAL DAILY
Status: DISCONTINUED | OUTPATIENT
Start: 2024-07-11 | End: 2024-07-11

## 2024-07-11 RX ORDER — CLOPIDOGREL BISULFATE 75 MG/1
300 TABLET ORAL ONCE
Status: DISCONTINUED | OUTPATIENT
Start: 2024-07-11 | End: 2024-07-11

## 2024-07-11 RX ORDER — CETIRIZINE HYDROCHLORIDE 10 MG/1
10 TABLET ORAL DAILY
Status: DISCONTINUED | OUTPATIENT
Start: 2024-07-12 | End: 2024-07-12 | Stop reason: HOSPADM

## 2024-07-11 RX ADMIN — IOPAMIDOL 100 ML: 755 INJECTION, SOLUTION INTRAVENOUS at 18:24

## 2024-07-11 RX ADMIN — IOPAMIDOL 50 ML: 755 INJECTION, SOLUTION INTRAVENOUS at 18:25

## 2024-07-11 RX ADMIN — Medication 10 ML: at 22:14

## 2024-07-11 RX ADMIN — PHENYTOIN SODIUM 400 MG: 100 CAPSULE ORAL at 22:14

## 2024-07-11 RX ADMIN — Medication 10 ML: at 20:42

## 2024-07-11 NOTE — CONSULTS
DOS: 2024  NAME: Ronnell Rizvi   : 1950  PCP: Deena Tarango MD  CC: Intermittent episodes of word slurring that started around 8 AM today when he woke up.  Referring MD: Pinky Bourgeois, physician assistant.    Neurological Problem and Interval History:  74 y.o. right-handed white male with a Hx of prior strokes and a PFO that has been closed with Amplatzer in 2018 and has been taking Plavix on a regular basis.  He can only take some enteric-coated baby aspirin but not on a regular basis as it tends to cause severe gastric irritation.  He noticed since this morning that he was having intermittent episodes of word finding difficulty for which reason he presented to the emergency room.  He also has a history of seizure disorder from the prior strokes he had had and he was seen recently by Dr. Yemi Bennett MD outpatient neurologist on May 24, 2024 when he advised that he needs to take the phenytoin 400 mg at bedtime only.  When I saw the patient through the telemedicine device I noticed he is awake and alert, he is extremely hard of hearing because he does not have the proper hearing aids.  With the help of his wife I was able to make him follow commands well and he was able to repeat common words and phrases without any issues.  There is no speech impediment noted and no facial asymmetry noted and no visual changes noted.  There is no drift noticeable in the upper or lower extremities and there is no sensory loss in the upper or lower extremities noted.  The finger-to-nose coordination and the heel-to-shin testing are normal.  He was able to get up by the side of the bed independently and his Romberg is negative.  He walks at home independently without any walker or cane.  He passed his bedside swallow.  The NIH stroke scale is 0.    Past Medical/Surgical Hx:  Past Medical History:   Diagnosis Date    Allergic     Allergic rhinitis     Anal fissure     Aortic insufficiency     moderate, THIERRY  2017    Asthma     Asthma     Benign prostatic hypertrophy (BPH) with weak urinary stream 11/01/2016    Cataract     Chronic bronchitis     Colon polyp     COPD (chronic obstructive pulmonary disease)     Emphysema lung     Falls frequently     Fatty liver     GERD (gastroesophageal reflux disease)     Headache     Hepatitis     thought to be Hepatitis A     History of pneumonia     With left lower lobe atelectasis and scar tissue, being followed    HL (hearing loss)     Hypertension     Low back pain     Memory loss     Mixed hyperlipidemia 1/30/2023    Obstructive sleep apnea syndrome 08/23/2017    refuses c-pap    PFO (patent foramen ovale)     s/p percutaneous closure with a 25mm Amplatzer device in December 2018    Pneumonia     LLL with scar tissue    Seizures     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 total of 3 strokes    Tremor     Comes & goes    Vision loss      Past Surgical History:   Procedure Laterality Date    CARDIAC CATHETERIZATION N/A 12/12/2018    Procedure: Patent foramen ovale closure- Abbott;  Surgeon: Deangelo Harris MD;  Location:  [facility];  Service: Cardiology    CARDIAC CATHETERIZATION N/A 12/12/2018    Procedure: Intracardiac echocardiogram;  Surgeon: Deangelo Harris MD;  Location:  [facility];  Service: Cardiology    CARDIAC ELECTROPHYSIOLOGY PROCEDURE N/A 03/26/2018    Procedure: Loop insertion   CONFIRM RX;  Surgeon: Luis Wyatt MD;  Location:  [facility];  Service: Cardiovascular    CARDIAC ELECTROPHYSIOLOGY PROCEDURE N/A 07/15/2020    Procedure: Loop recorder removal;  Surgeon: Starr Driscoll MD;  Location:  [facility];  Service: Cardiovascular;  Laterality: N/A;    CARDIAC SURGERY      CATARACT EXTRACTION Bilateral     COLONOSCOPY      COLONOSCOPY N/A 10/09/2020    Procedure: COLONOSCOPY with polypectomy;  Surgeon: Kelly  Ras Ocasio MD;  Location: [facility];  Service: Gastroenterology;  Laterality: N/A;  diverticulosis  ascending polyp  transverse polyp  sigmoid polyps X 2  rectal polyp    EYE SURGERY      HAND SURGERY Bilateral     INGUINAL HERNIA REPAIR Left     OTHER SURGICAL HISTORY      inguinal hernia repair for person over age 5    TONSILLECTOMY      TONSILLECTOMY         Review of Systems:    Constitutional: Pleasant gentleman currently sitting in the gurney in no distress.  Cardiovascular: No chest pain or palpitations noted.  Respiratory: No shortness of breath noted.  Genitourinary : No bladder incontinence noted.  However he uses the urinal quite a few times because of his benign prostatic hypertrophy.  Gastrointestinal: No nausea and vomiting noted.  Musculoskeletal: No aches and pains in the muscles or joints noted.  Dermatological: No skin breakdown noted.  Neurological: No focal neurological deficits and the NIH stroke scale is 0.  Psychiatric: No major anxiety or depression noted.  Ophthalmological: Has corrective lenses and denies any double vision.          Medications On Admission  (Not in a hospital admission)      Prior to Admission medications    Medication Sig Start Date End Date Taking? Authorizing Provider   clopidogrel (PLAVIX) 75 MG tablet Take 1 tablet by mouth Daily. 4/3/24   Christopher Sawant MD   Cyanocobalamin 1000 MCG/ML kit Inject  as directed 1 (One) Time Per Week. 1/4 of a cc IM every sunday    ProviderSariah MD   fexofenadine (ALLEGRA) 180 MG tablet Take 1 tablet by mouth Daily As Needed.  Patient not taking: Reported on 4/30/2024    ProviderSariah MD   guaiFENesin (MUCINEX) 600 MG 12 hr tablet Take 1 tablet by mouth Daily.    Sariah Telles MD   hydroCHLOROthiazide 12.5 MG tablet Take 1 tablet by mouth Daily. 6/24/24   Cesario Wilson PA-C   phenytoin ER (DILANTIN) 100 MG capsule Take 2 capsules by mouth 2 (Two) Times a Day. 2 in the morning, 2 at night 1/29/24    "Provider, MD Sariah   rosuvastatin (Crestor) 20 MG tablet Take 1 tablet by mouth Daily. 24   Deena Tarango MD        Allergies:  Allergies   Allergen Reactions    Aspirin Nausea Only     Pt states he \"can tolerate enteric coated aspirin only.\"     Citrus      Blisters on mouth      Combivent [Ipratropium-Albuterol] Other (See Comments)     Throat swelling    Lactose Intolerance (Gi)     Statins Mental Status Change     Severe memory impairment       Social Hx:  Social History     Socioeconomic History    Marital status:      Spouse name: Joey   Tobacco Use    Smoking status: Former     Current packs/day: 0.00     Average packs/day: 1.5 packs/day for 15.0 years (22.5 ttl pk-yrs)     Types: Cigarettes     Start date: 1964     Quit date: 1979     Years since quittin.4     Passive exposure: Past    Smokeless tobacco: Never    Tobacco comments:     caffeine use: Drinks 4 glasses of Dt coke on avg.    Vaping Use    Vaping status: Never Used   Substance and Sexual Activity    Alcohol use: No    Drug use: No    Sexual activity: Defer       Family Hx:  Family History   Problem Relation Age of Onset    Obesity Mother     Clotting disorder Mother         Upper extremities    Alcohol abuse Father     Cancer Father 63        Esophagus and lung    Other Father         cardiac disorder    Heart disease Father     Depression Father     Kidney disease Sister     Kidney failure Sister     Drug abuse Paternal Uncle     Stroke Sister     Throat cancer Sister     Drug abuse Paternal Uncle        Review of Imaging (Interpretation of images not reports): The MRI of the brain performed on 2018 was interpreted as follows:    FINDINGS: The diffusion-weighted images show a small focus of bright ...   Impression   1. A 9 mm acute or subacute infarct in the left corona radiata.  2. Old infarct in the left occipital lobe.  3. Moderate changes of bilateral small vessel white matter " ischemic  disease.  4. Hypoplastic A1 segment of the right anterior cerebral artery and  moderately severe narrowing of the P2 segment of the right posterior  cerebral artery.  3. No aneurysm is seen. Tiny infundibulum is seen in the left posterior  internal carotid artery near the posterior communicating artery origin.  6. Findings were called to the [facility] nurses station. ...           MRI Angiogram Neck With & Without Contrast Final result 12/9/2018          Narrative   MRI OF THE BRAIN WITH AND WITHOUT CONTRAST, MR ANGIOGRAPHY OF THE NECK  WITH AND WITHOUT CONTRAST, AND MR ANGIOGRAPHY OF THE HEAD WITHOUT  CONTRAST 12/09/2018.     HISTORY: Dizziness. Right-sided weakness.     TECHNIQUE: Multiple pre and postcontrast sagittal, axial and coronal  images were obtained through the brain.     FINDINGS: The diffusion-weighted images show a small focus of bright ...   Impression   1. A 9 mm acute or subacute infarct in the left corona radiata.  2. Old infarct in the left occipital lobe.  3. Moderate changes of bilateral small vessel white matter ischemic  disease.  4. Hypoplastic A1 segment of the right anterior cerebral artery and  moderately severe narrowing of the P2 segment of the right posterior  cerebral artery.  3. No aneurysm is seen. Tiny infundibulum is seen in the left posterior  internal carotid artery near the posterior communicating artery origin.  6. Findings were called to the [facility] nurses station. ...          CT Head Without Contrast Stroke Protocol    Result Date: 7/11/2024  CT HEAD WO CONTRAST STROKE PROTOCOL Date of Exam: 7/11/2024 6:06 PM EDT Indication: Neuro deficit, acute, stroke suspected Neuro Deficit, acute, Stroke suspected. Comparison: 12/29/2023 Technique: Axial CT images were obtained of the head without contrast administration.  Reconstructed coronal and sagittal images were also obtained. Automated exposure control and iterative construction methods were used. Scan Time: 1812 hours  Results discussed with the emergency room at 6:19 p.m. on 7/11/2024. FINDINGS: There is no evidence for acute intracranial hemorrhage. No definitive acute focal ischemia is identified. Nonspecific white matter changes are noted likely related to chronic small vessel ischemic changes and age-related changes. Associated diffuse volume loss is observed. Remote infarction is noted involving the inferior aspect of the left parietal-occipital region. There is also evidence for remote infarction involving the region of the right basal ganglia and right thalamus. These changes are stable. There is no evidence for abnormal cerebral edema. There is no mass effect or midline shift. The ventricular system is nondilated. The basilar cisterns are patent. The skull is intact. The paranasal sinuses and mastoid air cells are clear.     Impression: 1.No evidence for acute intracranial abnormality. 2.Nonspecific white matter changes are noted with associated diffuse volume loss. These findings are likely related to chronic small vessel ischemic changes and/or age-related changes. 3.Multifocal remote infarcts are again noted which appear stable. Electronically Signed: Domingo Cabrera MD  7/11/2024 6:22 PM EDT  Workstation ID: [id number]      The CT angiogram of the head and neck with contrast shows the following:     Findings: The common carotid arteries are normal. Moderate atheromatous disease of the proximal right internal carotid artery without significant stenosis. The extracranial right internal carotid artery is normal otherwise. The intracranial segments of   the left internal carotid arteries are normal. The right carotid terminus is normal. Focal severe stenosis of the right M2 branch seen on axial image #278 series 6 which is nearly 60% stenosis completely occluded. Otherwise the visualized segments of the   right anterior and middle cerebral arteries are normal.     The left common carotid artery is normal. Moderate  atheromatous disease of the left carotid bulb and proximal left internal carotid artery with a minimal residual vessel lumen diameter of 4 mm and a native vessel lumen diameter of 5 mm consistent with   approximately 20% stenosis per NASCET criteria.  Mild atheromatous disease involving the intracranial segments of the internal carotid arteries. The left carotid terminus is normal. The visualized branches of the left anterior and middle cerebral arteries appear normal.     Both vertebral arteries arise from the subclavian arteries. The right vertebral artery is dominant. Left vertebral artery supplies the basilar artery. The basilar artery and basilar artery tip are normal. The basilar artery terminates in bilateral   posterior cerebral arteries which appear normal.     The lung apices are clear. Mild emphysematous changes of the lung apices. The thyroid gland is normal. No cervical adenopathy. The visualized parotid and submandibular glands are normal.  Paranasal sinuses are clear. Bilateral lens replacements. No abnormal intracranial enhancement.     IMPRESSION:  Focal severe stenosis of the right M2 branch with minimal thready flow noted in the region. No vessel occlusion.     The CT cerebral perfusion shows the following:    Indication: Neuro deficit, acute, stroke suspected  Neuro Deficit, acute, Stroke suspected.     Comparison: None available.     Technique: Axial CT images of the brain were obtained prior to and after the administration of iodinated contrast. CT Perfusion protocol was utilized. Automated post processing was performed by RAPID software and submitted to PACS for interpretation.   Automated exposure control and iterative reconstruction was utilized.     Findings: There is approximately 74 cc of patchy bilateral areas of mildly elevated Tmax. No decreased cerebral blood flow or blood volume.     IMPRESSION:  No evidence of infarct.        Additional Tests Performed: The MRI of the brain without  contrast performed on January 14, 2020 was interpreted as follows:    FINDINGS:     The diffusion-weighted images, in correlation with ADC mapping, show no  restricted diffusion to suggest acute infarct. Chronic T2 shine through  artifact is seen at the alonzo.     The midline structures appear unremarkable.     The brain parenchyma shows encephalomalacia/old infarct changes at the  left occipital lobe. Moderate predominantly periventricular white matter  chronic small vessel ischemic change is apparent.     Flow voids in the major arteries at the base of the brain appear  unremarkable.     The paranasal sinuses, mastoid air cells, and orbits appear  unremarkable.     IMPRESSION:     No acute infarct. Chronic changes of the brain.    Results for orders placed in visit on 01/30/23    Adult Transthoracic Echo Complete W/ Cont if Necessary Per Protocol    Interpretation Summary    Left ventricular systolic function is normal. Calculated left ventricular EF = 64.8%    Left ventricular wall thickness is consistent with hypertrophy. Sigmoid-shaped ventricular septum is present.    Left ventricular diastolic function was normal.    Systolic anterior motion of the chordal apparatus is present.  There is no obstruction.    Estimated right ventricular systolic pressure from tricuspid regurgitation is normal (<35 mmHg).            Laboratory Results:   Lab Results   Component Value Date    GLUCOSE 95 07/11/2024    CALCIUM 9.1 07/11/2024     07/11/2024    K 3.6 07/11/2024    CO2 25.9 07/11/2024     07/11/2024    BUN 11 07/11/2024    CREATININE 0.96 07/11/2024    EGFRIFAFRI 74 12/27/2021    EGFRIFNONA 64 12/27/2021    BCR 11.5 07/11/2024    ANIONGAP 9.1 07/11/2024     Lab Results   Component Value Date    WBC 5.65 07/11/2024    HGB 14.0 07/11/2024    HCT 40.3 07/11/2024    MCV 93.9 07/11/2024     07/11/2024     Lab Results   Component Value Date    CHOL 213 (H) 05/15/2024    CHOL 167 01/15/2020    CHOL 168  "12/09/2018     Lab Results   Component Value Date    HDL 60 05/15/2024    HDL 59 04/23/2024    HDL 64 12/27/2021     Lab Results   Component Value Date     (H) 05/15/2024     (H) 04/23/2024     (H) 12/27/2021     Lab Results   Component Value Date    TRIG 85 05/15/2024    TRIG 86 04/23/2024    TRIG 88 12/27/2021     Lab Results   Component Value Date    HGBA1C 5.20 04/23/2024     Lab Results   Component Value Date    INR 1.00 07/11/2024    INR 1.07 10/09/2020    INR 0.97 01/14/2020    PROTIME 13.2 07/11/2024    PROTIME 13.6 10/09/2020    PROTIME 12.6 01/14/2020     Lab Results   Component Value Date    GCXOPBKH27 903 09/03/2020     Lab Results   Component Value Date    FOLATE 12.50 09/03/2020     TSH          4/23/2024    14:37   TSH   TSH 3.290           Physical Examination:  /75   Pulse 61   Temp 98 °F (36.7 °C) (Oral)   Resp 16   Ht 182.9 cm (72\")   Wt 86.7 kg (191 lb 3.2 oz)   SpO2 92%   BMI 25.93 kg/m²   General Appearance:   Well developed, well nourished, well groomed, alert, and cooperative.  HEENT: Normocephalic.    Neck and Spine: Normal range of motion.  Normal alignment. No mass or tenderness. No bruits.    Extremities:    No edema or deformities. Normal joint ROM.   Skin:    No rashes or birth marks.    Neurological examination:  Higher Integrative  Function: Oriented to time, place and person. Normal registration, recall, attention span and concentration. Normal language including comprehension, spontaneous speech, repetition, reading, writing, naming and vocabulary. No neglect with normal visual-spatial function and construction. Normal fund of knowledge and higher integrative function.  CN II:  Pupils are equal, round, and reactive to light. Normal visual acuity and visual fields.    CN III IV VI: Extraocular movements are full without nystagmus.   CN V:  Normal facial sensation and strength of muscles of mastication.  CN VII:  Facial movements are symmetric. No " weakness.  CN VIII: Auditory acuity is normal.  CN IX & X: Symmetric palatal movement.  CN XI:  Sternocleidomastoid and trapezius are normal.  No weakness.  CN XII:  The tongue is midline.  No atrophy or fasciculations.  Motor:  Normal muscle strength, bulk and tone in upper and lower extremities.  No fasciculations, rigidity, spasticity, or abnormal movements.  Sensation: Normal to light touch, pinprick, vibration, temperature, and proprioception in arms and legs. Normal graphesthesia and no extinction on DSS.  Station and Gait: Normal gait and station.    Coordination:  Finger to nose test shows no dysmetria.  Rapid alternating movements are normal.  Heel to shin normal.    NIHSS:    Baseline  0-->Alert: keenly responsive  0-->Answers both questions correctly  0-->Performs both tasks correctly  0=normal  0=No visual loss  0=Normal symmetric movement  0-->No drift: limb holds 90 (or 45) degrees for full 10 secs  0-->No drift: limb holds 90 (or 45) degrees for full 10 secs  0-->No drift: limb holds 90 (or 45) degrees for full 10 secs  0-->No drift: limb holds 90 (or 45) degrees for full 10 secs  0=Absent  0=Normal; no sensory loss  0=No aphasia, normal  0=Normal  0=No abnormality    Total score: 0    Diagnoses / Discussion:  74 y.o. who presents with Sx of intermittent speech impediment but currently back to baseline.  Patient also has a history of seizure disorder and currently on phenytoin 400 mg at bedtime.    Plan:  Aspirin 81 mg with a loading dose of Plavix 300 mg.  The patient states that he can take 1 or 2 doses of baby aspirin enteric-coated but after that it started irritating his stomach.  Plavix 75 mg  Hydrate  Neuro checks  Check lipid panel, Hgb A1C, TSH, sed rate, vitamin B12, folic acid levels, trough phenytoin level.  Lipitor 80 mg  Non-pharmacological DVT prophylaxis  TTE with bubble study to look for PFO  Carotid ultrasound  MRI of the brain with a stroke protocol  Telemetry Unit  admission/observation to look for cardiac arrhythmias  PT/OT/ST  Stroke Education  Smoking cessation protocol  Blood pressure control : Keep the systolic blood pressure between 1 20-1 40 and the diastolic between 70-80.  Goal LDL <70-recommend high dose statins-   Serum glucose < 140  Body mass index: 26 kg/m².  Obstructive sleep apnea screening in this age group with stop bang questionnaire.  An EEG for tomorrow along with photic stimulation.     Discussed signs and symptoms of stroke and when to call 911. Instructed to follow a low fat diet including the Mediterranean diet. Instructed to take all medications daily as prescribed. Encouraged 30 minutes of exercise 3-4 times a week as tolerated. Stay well hydrated. Discussed potential side effects of new medications and signs and symptoms to report.  Reviewed stroke risk factors and stroke prevention plan. Patient and/or family verbalizes understanding and agrees with plan.     I have discussed the above with the patient and family.  He will be followed up by our inpatient teleneurology service.  Time spent with patient: 70 minutes in face-to-face evaluation and management of the patient using the dedicated telemedicine device without any interruption with the help of Ms. Pinky Bourgeois, physician assistant with the patient located at the [facility] and myself at a remote location.    Electronically signed by Yojana Ware MD, 07/11/24, 7:41 PM EDT.    Dictated using Dragon dictation.

## 2024-07-11 NOTE — ED PROVIDER NOTES
Subjective   History of Present Illness  Patient is a 74-year-old gentleman with a history of strokes.  His wife noticed him having some slurred speech around 830 this morning.  She said she has a new hearing aid and thought there was something wrong with the device.  He went on about his day and earlier started having slurred speech on the telephone.  He noticed as well as the person he was talking to.  He called his wife at 7:55 PM concerned with the slurred speech.  He is not having slurred speech currently.    He has noticed lately that he has had swelling around both of his legs but otherwise has not had any other complaints.  He has a chronic murmur from a PFO that has been repaired a few years ago.  He takes Plavix.      Review of Systems   Constitutional: Negative.    HENT: Negative.     Eyes: Negative.    Respiratory: Negative.     Cardiovascular: Negative.    Gastrointestinal: Negative.    Genitourinary: Negative.    Musculoskeletal: Negative.    Neurological:  Positive for speech difficulty. Negative for dizziness, facial asymmetry and headaches.        Slurred speech   Psychiatric/Behavioral: Negative.     All other systems reviewed and are negative.      Past Medical History:   Diagnosis Date    Allergic 1985    Allergic rhinitis     Anal fissure     Aortic insufficiency     moderate, THIERRY 2017    Asthma     Asthma     Benign prostatic hypertrophy (BPH) with weak urinary stream 11/01/2016    Cataract     Chronic bronchitis     Colon polyp     COPD (chronic obstructive pulmonary disease)     Emphysema lung     Falls frequently     Fatty liver     GERD (gastroesophageal reflux disease)     Headache     Hepatitis     thought to be Hepatitis A     History of pneumonia     With left lower lobe atelectasis and scar tissue, being followed    HL (hearing loss)     Hypertension     Low back pain     Memory loss     Mixed hyperlipidemia 1/30/2023    Obstructive sleep apnea syndrome 08/23/2017    refuses c-pap     "PFO (patent foramen ovale)     s/p percutaneous closure with a 25mm Amplatzer device in December 2018    Pneumonia     LLL with scar tissue    Seizures     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 total of 3 strokes    Tremor     Comes & goes    Vision loss        Allergies   Allergen Reactions    Aspirin Nausea Only     Pt states he \"can tolerate enteric coated aspirin only.\"     Citrus      Blisters on mouth      Combivent [Ipratropium-Albuterol] Other (See Comments)     Throat swelling    Lactose Intolerance (Gi)     Statins Mental Status Change     Severe memory impairment       Past Surgical History:   Procedure Laterality Date    CARDIAC CATHETERIZATION N/A 12/12/2018    Procedure: Patent foramen ovale closure- Abbott;  Surgeon: Deangelo Harris MD;  Location:  [facility];  Service: Cardiology    CARDIAC CATHETERIZATION N/A 12/12/2018    Procedure: Intracardiac echocardiogram;  Surgeon: Deangelo Harris MD;  Location:  [facility];  Service: Cardiology    CARDIAC ELECTROPHYSIOLOGY PROCEDURE N/A 03/26/2018    Procedure: Loop insertion   CONFIRM RX;  Surgeon: Luis Wyatt MD;  Location:  [facility];  Service: Cardiovascular    CARDIAC ELECTROPHYSIOLOGY PROCEDURE N/A 07/15/2020    Procedure: Loop recorder removal;  Surgeon: Starr Driscoll MD;  Location:  [facility];  Service: Cardiovascular;  Laterality: N/A;    CARDIAC SURGERY      CATARACT EXTRACTION Bilateral     COLONOSCOPY      COLONOSCOPY N/A 10/09/2020    Procedure: COLONOSCOPY with polypectomy;  Surgeon: Ras Mendoza MD;  Location:  [facility];  Service: Gastroenterology;  Laterality: N/A;  diverticulosis  ascending polyp  transverse polyp  sigmoid polyps X 2  rectal polyp    EYE SURGERY      HAND SURGERY Bilateral     INGUINAL HERNIA REPAIR Left     OTHER SURGICAL HISTORY      inguinal hernia repair " for person over age 5    TONSILLECTOMY      TONSILLECTOMY         Family History   Problem Relation Age of Onset    Obesity Mother     Clotting disorder Mother         Upper extremities    Alcohol abuse Father     Cancer Father 63        Esophagus and lung    Other Father         cardiac disorder    Heart disease Father     Depression Father     Kidney disease Sister     Kidney failure Sister     Drug abuse Paternal Uncle     Stroke Sister     Throat cancer Sister     Drug abuse Paternal Uncle        Social History     Socioeconomic History    Marital status:      Spouse name: Joey   Tobacco Use    Smoking status: Former     Current packs/day: 0.00     Average packs/day: 1.5 packs/day for 15.0 years (22.5 ttl pk-yrs)     Types: Cigarettes     Start date: 1964     Quit date: 1979     Years since quittin.4     Passive exposure: Past    Smokeless tobacco: Never    Tobacco comments:     caffeine use: Drinks 4 glasses of Dt coke on avg.    Vaping Use    Vaping status: Never Used   Substance and Sexual Activity    Alcohol use: No    Drug use: No    Sexual activity: Defer           Objective   Physical Exam  Vitals reviewed.   Constitutional:       Appearance: Normal appearance.   Eyes:      Extraocular Movements: Extraocular movements intact.      Conjunctiva/sclera: Conjunctivae normal.      Pupils: Pupils are equal, round, and reactive to light.   Cardiovascular:      Rate and Rhythm: Normal rate and regular rhythm.      Pulses: Normal pulses.      Heart sounds: Normal heart sounds.   Pulmonary:      Effort: Pulmonary effort is normal.      Breath sounds: Normal breath sounds.   Abdominal:      General: There is no distension.      Tenderness: There is no abdominal tenderness. There is no right CVA tenderness, left CVA tenderness or guarding.   Musculoskeletal:         General: Swelling (Swelling nonpitting bilateral lower extremities) present. Normal range of motion.      Cervical back: Normal  range of motion.   Skin:     Capillary Refill: Capillary refill takes less than 2 seconds.   Neurological:      General: No focal deficit present.      Mental Status: He is alert and oriented to person, place, and time.      Comments: NIH stroke scale 0   Psychiatric:         Mood and Affect: Mood normal.         Behavior: Behavior normal.         ECG 12 Lead ED Triage Standing Order; Acute Stroke (Onset <24 hrs)      Date/Time: 7/11/2024 6:00 PM    Performed by: Pinky Bourgeois PA-C  Authorized by: Velasquez Olmos DO  Interpreted by ED physician  Rhythm: sinus rhythm  Rate: normal  BPM: 60  QRS axis: normal  Comments: Prolonged IA               ED Course                          Total (NIH Stroke Scale): 0                  Medical Decision Making  At presentation, patient does not appear to be having any slurred speech.  I confirmed this with wife that this is his normal voice.  I am concerned he is having TIAs which she has had in the past.  Dr. Ware has seen patient through telemedicine and agrees he needs to be admitted for further evaluation.  Patient and wife are in agreement.  Dr. Ware has been in further workup for patient and patient will be admitted.    Problems Addressed:  Slurred speech: complicated acute illness or injury    Amount and/or Complexity of Data Reviewed  Labs: ordered.  Radiology: ordered.  ECG/medicine tests: ordered and independent interpretation performed.    Risk  Prescription drug management.  Decision regarding hospitalization.        Final diagnoses:   Slurred speech       ED Disposition  ED Disposition       ED Disposition   Decision to Admit    Condition   --    Comment   Level of Care: Telemetry [5]   Diagnosis: Slurred speech [[id number]]   Admitting Physician: VIK HOUSE [[id number]]   Attending Physician: VIK HOUSE [[id number]]   Isolate for COVID?: No [0]   Certification: I Certify That Inpatient Hospital Services Are Medically Necessary For Greater Than 2 Midnights                  No follow-up provider specified.       Medication List      No changes were made to your prescriptions during this visit.            Pinky Bourgeois PA-C  07/11/24 1951

## 2024-07-12 ENCOUNTER — APPOINTMENT (OUTPATIENT)
Dept: MRI IMAGING | Facility: HOSPITAL | Age: 74
End: 2024-07-12
Payer: MEDICARE

## 2024-07-12 ENCOUNTER — READMISSION MANAGEMENT (OUTPATIENT)
Dept: CALL CENTER | Facility: HOSPITAL | Age: 74
End: 2024-07-12
Payer: MEDICARE

## 2024-07-12 ENCOUNTER — APPOINTMENT (OUTPATIENT)
Dept: CARDIOLOGY | Facility: HOSPITAL | Age: 74
End: 2024-07-12
Payer: MEDICARE

## 2024-07-12 ENCOUNTER — APPOINTMENT (OUTPATIENT)
Dept: ULTRASOUND IMAGING | Facility: HOSPITAL | Age: 74
End: 2024-07-12
Payer: MEDICARE

## 2024-07-12 VITALS
OXYGEN SATURATION: 93 % | RESPIRATION RATE: 18 BRPM | DIASTOLIC BLOOD PRESSURE: 67 MMHG | BODY MASS INDEX: 24.52 KG/M2 | TEMPERATURE: 97.5 F | SYSTOLIC BLOOD PRESSURE: 125 MMHG | HEART RATE: 62 BPM | HEIGHT: 72 IN | WEIGHT: 181 LBS

## 2024-07-12 PROBLEM — I63.9 ACUTE CVA (CEREBROVASCULAR ACCIDENT): Status: ACTIVE | Noted: 2024-07-12

## 2024-07-12 LAB
ALBUMIN SERPL-MCNC: 3.7 G/DL (ref 3.5–5.2)
ALBUMIN/GLOB SERPL: 1.8 G/DL
ALP SERPL-CCNC: 84 U/L (ref 39–117)
ALT SERPL W P-5'-P-CCNC: 13 U/L (ref 1–41)
ANION GAP SERPL CALCULATED.3IONS-SCNC: 8.7 MMOL/L (ref 5–15)
AORTIC DIMENSIONLESS INDEX: 0.9 (DI)
APTT PPP: 22.9 SECONDS (ref 24.3–38.1)
AST SERPL-CCNC: 17 U/L (ref 1–40)
BH CV ECHO MEAS - AI P1/2T: 575.2 MSEC
BH CV ECHO MEAS - AO MAX PG: 6.4 MMHG
BH CV ECHO MEAS - AO MEAN PG: 4 MMHG
BH CV ECHO MEAS - AO ROOT DIAM: 3 CM
BH CV ECHO MEAS - AO V2 MAX: 126 CM/SEC
BH CV ECHO MEAS - AO V2 VTI: 30.6 CM
BH CV ECHO MEAS - AVA(I,D): 2.6 CM2
BH CV ECHO MEAS - EDV(CUBED): 79.5 ML
BH CV ECHO MEAS - EDV(MOD-SP2): 99 ML
BH CV ECHO MEAS - EDV(MOD-SP4): 88 ML
BH CV ECHO MEAS - EF(MOD-BP): 64.4 %
BH CV ECHO MEAS - EF(MOD-SP2): 62.6 %
BH CV ECHO MEAS - EF(MOD-SP4): 67 %
BH CV ECHO MEAS - ESV(CUBED): 21.5 ML
BH CV ECHO MEAS - ESV(MOD-SP2): 37 ML
BH CV ECHO MEAS - ESV(MOD-SP4): 29 ML
BH CV ECHO MEAS - FS: 35.3 %
BH CV ECHO MEAS - IVS/LVPW: 0.91 CM
BH CV ECHO MEAS - IVSD: 1 CM
BH CV ECHO MEAS - LAT PEAK E' VEL: 9.1 CM/SEC
BH CV ECHO MEAS - LV DIASTOLIC VOL/BSA (35-75): 43.1 CM2
BH CV ECHO MEAS - LV MASS(C)D: 152.6 GRAMS
BH CV ECHO MEAS - LV MAX PG: 5.5 MMHG
BH CV ECHO MEAS - LV MEAN PG: 3 MMHG
BH CV ECHO MEAS - LV SYSTOLIC VOL/BSA (12-30): 14.2 CM2
BH CV ECHO MEAS - LV V1 MAX: 117 CM/SEC
BH CV ECHO MEAS - LV V1 VTI: 26.6 CM
BH CV ECHO MEAS - LVIDD: 4.3 CM
BH CV ECHO MEAS - LVIDS: 2.8 CM
BH CV ECHO MEAS - LVOT AREA: 2.9 CM2
BH CV ECHO MEAS - LVOT DIAM: 1.94 CM
BH CV ECHO MEAS - LVPWD: 1.1 CM
BH CV ECHO MEAS - MED PEAK E' VEL: 7.1 CM/SEC
BH CV ECHO MEAS - MR MAX PG: 31.2 MMHG
BH CV ECHO MEAS - MR MAX VEL: 279.2 CM/SEC
BH CV ECHO MEAS - MV A DUR: 0.13 SEC
BH CV ECHO MEAS - MV A MAX VEL: 113.2 CM/SEC
BH CV ECHO MEAS - MV DEC SLOPE: 241.1 CM/SEC2
BH CV ECHO MEAS - MV DEC TIME: 393 SEC
BH CV ECHO MEAS - MV E MAX VEL: 68.1 CM/SEC
BH CV ECHO MEAS - MV E/A: 0.6
BH CV ECHO MEAS - MV MAX PG: 6.1 MMHG
BH CV ECHO MEAS - MV MEAN PG: 2.31 MMHG
BH CV ECHO MEAS - MV P1/2T: 99.8 MSEC
BH CV ECHO MEAS - MV V2 VTI: 36.4 CM
BH CV ECHO MEAS - MVA(P1/2T): 2.2 CM2
BH CV ECHO MEAS - MVA(VTI): 2.15 CM2
BH CV ECHO MEAS - PA ACC TIME: 0.11 SEC
BH CV ECHO MEAS - PA V2 MAX: 93.4 CM/SEC
BH CV ECHO MEAS - PULM A REVS DUR: 0.12 SEC
BH CV ECHO MEAS - PULM A REVS VEL: 35.6 CM/SEC
BH CV ECHO MEAS - PULM DIAS VEL: 36.5 CM/SEC
BH CV ECHO MEAS - PULM S/D: 1.46
BH CV ECHO MEAS - PULM SYS VEL: 53.4 CM/SEC
BH CV ECHO MEAS - RAP SYSTOLE: 3 MMHG
BH CV ECHO MEAS - RV MAX PG: 2.37 MMHG
BH CV ECHO MEAS - RV V1 MAX: 77 CM/SEC
BH CV ECHO MEAS - RV V1 VTI: 18.4 CM
BH CV ECHO MEAS - RVSP: 22.5 MMHG
BH CV ECHO MEAS - SV(LVOT): 78.4 ML
BH CV ECHO MEAS - SV(MOD-SP2): 62 ML
BH CV ECHO MEAS - SV(MOD-SP4): 59 ML
BH CV ECHO MEAS - SVI(LVOT): 38.4 ML/M2
BH CV ECHO MEAS - SVI(MOD-SP2): 30.4 ML/M2
BH CV ECHO MEAS - SVI(MOD-SP4): 28.9 ML/M2
BH CV ECHO MEAS - TAPSE (>1.6): 2.15 CM
BH CV ECHO MEAS - TR MAX PG: 19.5 MMHG
BH CV ECHO MEAS - TR MAX VEL: 221 CM/SEC
BH CV ECHO MEASUREMENTS AVERAGE E/E' RATIO: 8.41
BH CV XLRA - RV BASE: 3.6 CM
BH CV XLRA - TDI S': 11.1 CM/SEC
BILIRUB SERPL-MCNC: 0.2 MG/DL (ref 0–1.2)
BUN SERPL-MCNC: 11 MG/DL (ref 8–23)
BUN/CREAT SERPL: 13.4 (ref 7–25)
CALCIUM SPEC-SCNC: 8.9 MG/DL (ref 8.6–10.5)
CHLORIDE SERPL-SCNC: 105 MMOL/L (ref 98–107)
CHOLEST SERPL-MCNC: 203 MG/DL (ref 0–200)
CO2 SERPL-SCNC: 26.3 MMOL/L (ref 22–29)
CREAT SERPL-MCNC: 0.82 MG/DL (ref 0.76–1.27)
DEPRECATED RDW RBC AUTO: 43.8 FL (ref 37–54)
EGFRCR SERPLBLD CKD-EPI 2021: 91.6 ML/MIN/1.73
ERYTHROCYTE [DISTWIDTH] IN BLOOD BY AUTOMATED COUNT: 12.7 % (ref 12.3–15.4)
ERYTHROCYTE [SEDIMENTATION RATE] IN BLOOD: <1 MM/HR (ref 0–20)
FOLATE SERPL-MCNC: 9.52 NG/ML (ref 4.78–24.2)
GLOBULIN UR ELPH-MCNC: 2.1 GM/DL
GLUCOSE BLDC GLUCOMTR-MCNC: 145 MG/DL (ref 70–130)
GLUCOSE BLDC GLUCOMTR-MCNC: 86 MG/DL (ref 70–130)
GLUCOSE BLDC GLUCOMTR-MCNC: 93 MG/DL (ref 70–130)
GLUCOSE SERPL-MCNC: 88 MG/DL (ref 65–99)
HBA1C MFR BLD: 5.37 % (ref 4.8–5.6)
HCT VFR BLD AUTO: 41.3 % (ref 37.5–51)
HDLC SERPL-MCNC: 56 MG/DL (ref 40–60)
HGB BLD-MCNC: 14.1 G/DL (ref 13–17.7)
INR PPP: 1.04 (ref 0.9–1.1)
LDLC SERPL CALC-MCNC: 136 MG/DL (ref 0–100)
LDLC/HDLC SERPL: 2.42 {RATIO}
LEFT ATRIUM VOLUME INDEX: 28.4 ML/M2
MCH RBC QN AUTO: 32 PG (ref 26.6–33)
MCHC RBC AUTO-ENTMCNC: 34.1 G/DL (ref 31.5–35.7)
MCV RBC AUTO: 93.7 FL (ref 79–97)
PHENYTOIN SERPL-MCNC: 6.2 MCG/ML (ref 10–20)
PLATELET # BLD AUTO: 188 10*3/MM3 (ref 140–450)
PMV BLD AUTO: 9.8 FL (ref 6–12)
POTASSIUM SERPL-SCNC: 3.4 MMOL/L (ref 3.5–5.2)
PROT SERPL-MCNC: 5.8 G/DL (ref 6–8.5)
PROTHROMBIN TIME: 13.6 SECONDS (ref 12.1–15)
RBC # BLD AUTO: 4.41 10*6/MM3 (ref 4.14–5.8)
SODIUM SERPL-SCNC: 140 MMOL/L (ref 136–145)
TRIGL SERPL-MCNC: 58 MG/DL (ref 0–150)
TSH SERPL DL<=0.05 MIU/L-ACNC: 3.06 UIU/ML (ref 0.27–4.2)
VIT B12 BLD-MCNC: 465 PG/ML (ref 211–946)
VLDLC SERPL-MCNC: 11 MG/DL (ref 5–40)
WBC NRBC COR # BLD AUTO: 5.08 10*3/MM3 (ref 3.4–10.8)

## 2024-07-12 PROCEDURE — 99214 OFFICE O/P EST MOD 30 MIN: CPT | Performed by: STUDENT IN AN ORGANIZED HEALTH CARE EDUCATION/TRAINING PROGRAM

## 2024-07-12 PROCEDURE — 99238 HOSP IP/OBS DSCHRG MGMT 30/<: CPT | Performed by: HOSPITALIST

## 2024-07-12 PROCEDURE — 82746 ASSAY OF FOLIC ACID SERUM: CPT | Performed by: FAMILY MEDICINE

## 2024-07-12 PROCEDURE — 83036 HEMOGLOBIN GLYCOSYLATED A1C: CPT | Performed by: FAMILY MEDICINE

## 2024-07-12 PROCEDURE — 63710000001 CLOPIDOGREL 75 MG TABLET: Performed by: FAMILY MEDICINE

## 2024-07-12 PROCEDURE — A9270 NON-COVERED ITEM OR SERVICE: HCPCS | Performed by: FAMILY MEDICINE

## 2024-07-12 PROCEDURE — 97161 PT EVAL LOW COMPLEX 20 MIN: CPT

## 2024-07-12 PROCEDURE — 80053 COMPREHEN METABOLIC PANEL: CPT | Performed by: FAMILY MEDICINE

## 2024-07-12 PROCEDURE — 85610 PROTHROMBIN TIME: CPT | Performed by: FAMILY MEDICINE

## 2024-07-12 PROCEDURE — G0378 HOSPITAL OBSERVATION PER HR: HCPCS

## 2024-07-12 PROCEDURE — 85652 RBC SED RATE AUTOMATED: CPT | Performed by: FAMILY MEDICINE

## 2024-07-12 PROCEDURE — 93306 TTE W/DOPPLER COMPLETE: CPT

## 2024-07-12 PROCEDURE — 84443 ASSAY THYROID STIM HORMONE: CPT | Performed by: FAMILY MEDICINE

## 2024-07-12 PROCEDURE — 80185 ASSAY OF PHENYTOIN TOTAL: CPT | Performed by: FAMILY MEDICINE

## 2024-07-12 PROCEDURE — 82948 REAGENT STRIP/BLOOD GLUCOSE: CPT

## 2024-07-12 PROCEDURE — 63710000001 HYDROCHLOROTHIAZIDE 12.5 MG TABLET: Performed by: FAMILY MEDICINE

## 2024-07-12 PROCEDURE — 70551 MRI BRAIN STEM W/O DYE: CPT

## 2024-07-12 PROCEDURE — 93880 EXTRACRANIAL BILAT STUDY: CPT

## 2024-07-12 PROCEDURE — 97165 OT EVAL LOW COMPLEX 30 MIN: CPT

## 2024-07-12 PROCEDURE — 85730 THROMBOPLASTIN TIME PARTIAL: CPT | Performed by: FAMILY MEDICINE

## 2024-07-12 PROCEDURE — 63710000001 CETIRIZINE 10 MG TABLET: Performed by: FAMILY MEDICINE

## 2024-07-12 PROCEDURE — 63710000001 GUAIFENESIN 600 MG TABLET SUSTAINED-RELEASE 12 HOUR: Performed by: FAMILY MEDICINE

## 2024-07-12 PROCEDURE — 80061 LIPID PANEL: CPT | Performed by: FAMILY MEDICINE

## 2024-07-12 PROCEDURE — 96105 ASSESSMENT OF APHASIA: CPT

## 2024-07-12 PROCEDURE — 85027 COMPLETE CBC AUTOMATED: CPT | Performed by: FAMILY MEDICINE

## 2024-07-12 PROCEDURE — 93306 TTE W/DOPPLER COMPLETE: CPT | Performed by: INTERNAL MEDICINE

## 2024-07-12 PROCEDURE — 82607 VITAMIN B-12: CPT | Performed by: FAMILY MEDICINE

## 2024-07-12 PROCEDURE — 63710000001 ROSUVASTATIN 20 MG TABLET: Performed by: FAMILY MEDICINE

## 2024-07-12 RX ORDER — PANTOPRAZOLE SODIUM 40 MG/1
40 TABLET, DELAYED RELEASE ORAL DAILY
Qty: 30 TABLET | Refills: 0 | Status: ON HOLD | OUTPATIENT
Start: 2024-07-12

## 2024-07-12 RX ORDER — ROSUVASTATIN CALCIUM 40 MG/1
40 TABLET, COATED ORAL NIGHTLY
Qty: 30 TABLET | Refills: 0 | Status: ON HOLD | OUTPATIENT
Start: 2024-07-12

## 2024-07-12 RX ORDER — ASPIRIN 81 MG/1
81 TABLET ORAL DAILY
Qty: 30 TABLET | Refills: 0 | Status: ON HOLD | OUTPATIENT
Start: 2024-07-12

## 2024-07-12 RX ADMIN — HYDROCHLOROTHIAZIDE 12.5 MG: 12.5 TABLET ORAL at 10:21

## 2024-07-12 RX ADMIN — GUAIFENESIN 600 MG: 600 TABLET, EXTENDED RELEASE ORAL at 10:21

## 2024-07-12 RX ADMIN — Medication 10 ML: at 10:22

## 2024-07-12 RX ADMIN — ROSUVASTATIN CALCIUM 20 MG: 20 TABLET, FILM COATED ORAL at 10:21

## 2024-07-12 RX ADMIN — CETIRIZINE HYDROCHLORIDE 10 MG: 10 TABLET, FILM COATED ORAL at 10:21

## 2024-07-12 RX ADMIN — CLOPIDOGREL BISULFATE 75 MG: 75 TABLET ORAL at 10:21

## 2024-07-12 NOTE — PLAN OF CARE
Goal Outcome Evaluation:  Plan of Care Reviewed With: patient        Progress: improving  Outcome Evaluation: Pt VS improving, am labs pending. Continues tele, SR-SB, HR 50's-60's, continues room air overnight. NIH 1, slurred speech.  Pt denies pain overnight; reports tolerating diet, denies n/v/d.  Pt up to restroom with standby assist. Plan for MRI and bubble study in am.

## 2024-07-12 NOTE — CASE MANAGEMENT/SOCIAL WORK
Case Management Discharge Note      Final Note: Home         Selected Continued Care - Admitted Since 7/11/2024       Destination    No services have been selected for the patient.                Durable Medical Equipment    No services have been selected for the patient.                Dialysis/Infusion    No services have been selected for the patient.                Home Medical Care    No services have been selected for the patient.                Therapy    No services have been selected for the patient.                Community Resources    No services have been selected for the patient.                Community & DME    No services have been selected for the patient.                         Final Discharge Disposition Code: 01 - home or self-care

## 2024-07-12 NOTE — THERAPY DISCHARGE NOTE
Patient Name: Ronnell Rizvi  : 1950    MRN: [id number]                              Today's Date: 2024       Admit Date: 2024    Visit Dx:     ICD-10-CM ICD-9-CM   1. Slurred speech  R47.81 784.59     Patient Active Problem List   Diagnosis    Anemia, unspecified    Vitamin B12 deficiency    Chronic fatigue    Benign prostatic hypertrophy (BPH) with weak urinary stream    Chronic bronchitis    ELIZABETH (obstructive sleep apnea)    Sinoatrial block    Cerebral aneurysm, nonruptured    CVA (cerebral vascular accident)    TIA (transient ischemic attack)    Intracranial vascular stenosis    Sinus pause    S/P percutaneous patent foramen ovale closure    TIA on medication    HTN (hypertension)    COPD (chronic obstructive pulmonary disease)    Nocturnal seizures    Encounter for screening for malignant neoplasm of colon    Seizure    HL (hearing loss)    Pre-syncope    Mixed hyperlipidemia    History of stroke    Melena    Irritable bowel syndrome with both constipation and diarrhea    Gastroesophageal reflux disease    Pneumonia    Slurred speech     Past Medical History:   Diagnosis Date    Acquired dyslexia     pt  reports after 3rd stroke    Allergic 1985    Allergic rhinitis     Anal fissure     Aortic insufficiency     moderate, THIERRY     Asthma     Asthma     Benign prostatic hypertrophy (BPH) with weak urinary stream 2016    Cataract     Chronic bronchitis     Colon polyp     COPD (chronic obstructive pulmonary disease)     Emphysema lung     Falls frequently     Fatty liver     GERD (gastroesophageal reflux disease)     Headache     Hepatitis     thought to be Hepatitis A     History of pneumonia     With left lower lobe atelectasis and scar tissue, being followed    HL (hearing loss)     Hypertension     Low back pain     Macular degeneration     Memory loss     Mixed hyperlipidemia 2023    Obstructive sleep apnea syndrome 2017    refuses c-pap    PFO (patent foramen ovale)      s/p percutaneous closure with a 25mm Amplatzer device in December 2018    Pneumonia     LLL with scar tissue    Seizures     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 total of 3 strokes    Tremor     Comes & goes    Vision loss      Past Surgical History:   Procedure Laterality Date    CARDIAC CATHETERIZATION N/A 12/12/2018    Procedure: Patent foramen ovale closure- Abbott;  Surgeon: Deangelo Harris MD;  Location:  [facility];  Service: Cardiology    CARDIAC CATHETERIZATION N/A 12/12/2018    Procedure: Intracardiac echocardiogram;  Surgeon: Deangelo Harris MD;  Location:  [facility];  Service: Cardiology    CARDIAC ELECTROPHYSIOLOGY PROCEDURE N/A 03/26/2018    Procedure: Loop insertion   CONFIRM RX;  Surgeon: Luis Wyatt MD;  Location:  [facility];  Service: Cardiovascular    CARDIAC ELECTROPHYSIOLOGY PROCEDURE N/A 07/15/2020    Procedure: Loop recorder removal;  Surgeon: Starr Driscoll MD;  Location:  [facility];  Service: Cardiovascular;  Laterality: N/A;    CARDIAC SURGERY      CATARACT EXTRACTION Bilateral     COLONOSCOPY      COLONOSCOPY N/A 10/09/2020    Procedure: COLONOSCOPY with polypectomy;  Surgeon: Ras Mendoza MD;  Location:  [facility];  Service: Gastroenterology;  Laterality: N/A;  diverticulosis  ascending polyp  transverse polyp  sigmoid polyps X 2  rectal polyp    EYE SURGERY      HAND SURGERY Bilateral     INGUINAL HERNIA REPAIR Left     OTHER SURGICAL HISTORY      inguinal hernia repair for person over age 5    TONSILLECTOMY      TONSILLECTOMY        General Information       Row Name 07/12/24 0913          Physical Therapy Time and Intention    Document Type discharge evaluation/summary  -BP     Mode of Treatment physical therapy  -BP       Row Name 07/12/24 0913          General Information    Patient Profile Reviewed yes  Patient  presents to ED due to slurred speech. Admitted under Stroke protocol.  -BP     Prior Level of Function --  Patient reports independence with mobility and ADLs at baseline without use of an AD.  -BP     Existing Precautions/Restrictions no known precautions/restrictions  -BP     Barriers to Rehab none identified  -BP       Row Name 07/12/24 0913          Living Environment    People in Home spouse  spouse is retired and able to assist as needed  -BP       Row Name 07/12/24 0913          Home Main Entrance    Number of Stairs, Main Entrance none  -BP       Row Name 07/12/24 0913          Cognition    Orientation Status (Cognition) oriented x 3  -BP       Row Name 07/12/24 0913          Safety Issues, Functional Mobility    Comment, Safety Issues/Impairments (Mobility) WFL  -BP               User Key  (r) = Recorded By, (t) = Taken By, (c) = Cosigned By      Initials Name Provider Type    Prosper Benitez, PT Physical Therapist                   Mobility       Row Name 07/12/24 0916          Bed Mobility    Bed Mobility supine-sit  -BP     Supine-Sit [place] (Bed Mobility) modified independence  -BP     Assistive Device (Bed Mobility) head of bed elevated  -BP       Row Name 07/12/24 0916          Sit-Stand Transfer    Sit-Stand [place] (Transfers) independent  -BP     Assistive Device (Sit-Stand Transfers) other (see comments)  -BP       Row Name 07/12/24 0916          Gait/Stairs (Locomotion)    [place] Level (Gait) supervision  -BP     Assistive Device (Gait) --  no device  -BP     Distance in Feet (Gait) 100  -BP     Deviations/Abnormal Patterns (Gait) gait speed decreased  -BP     Comment, (Gait/Stairs) Patient navigates directional changes and obstacles safely without loss of balance. No need for an AD. Patient reports gait is at baseline.  -BP               User Key  (r) = Recorded By, (t) = Taken By, (c) = Cosigned By      Initials Name Provider Type    Prosper Benitez, PT Physical  Therapist                   Obj/Interventions       [facility] Name 07/12/24 0919          Range of Motion Comprehensive    Comment, General Range of Motion B LE AROM WFL  -[facility] Name 07/12/24 0919          Strength Comprehensive (MMT)    Comment, General Manual Muscle Testing (MMT) Assessment B LE gross MMT 5/5.  -[facility] Name 07/12/24 0919          Balance    Comment, Balance sitting balance-independent. Standing balance-independent.  -[facility] Name 07/12/24 0919          Sensory Assessment (Somatosensory)    Sensory Assessment (Somatosensory) sensation intact  -               User Key  (r) = Recorded By, (t) = Taken By, (c) = Cosigned By      Initials Name Provider Type    Prosper Benitez, PT Physical Therapist                   Goals/Plan    No documentation.                  Clinical Impression       [facility] Name 07/12/24 0920          Pain    Pretreatment Pain Rating 0/10 - no pain  -BP     Posttreatment Pain Rating 0/10 - no pain  -     Additional Documentation Pain Scale: Numbers Pre/Post-Treatment (Group)  -BP       Row Name 07/12/24 0920          Plan of Care Review    Plan of Care Reviewed With patient  -BP     Outcome Evaluation PT Evaluation Complete: Patient performs supine to sit transfer with modified independence, sit to/from stand transfers with independence, and gait x 100 feet with supervision without use of an AD. Patient manages directional changes and obstacles safely, no loss of balance. No need for an AD. Patient reports gait is at baseline. Patient with slurred speech noted, potential slight facial droop observed, notified RN. Patient reports he feels at baseline with exception of his speech. Patient plans to return home with spouse at discharge, no further inpatient PT needs at this time.  -[facility] Name 07/12/24 0920          Therapy Assessment/Plan (PT)    Criteria for Skilled Interventions Met (PT) no problems identified which require skilled intervention  -      Therapy Frequency (PT) evaluation only  -BP       Row Name 07/12/24 0920          Positioning and Restraints    Pre-Treatment Position in bed  -BP     Post Treatment Position chair  -BP     In Chair notified nsg;sitting;call light within reach;encouraged to call for assist  -BP               User Key  (r) = Recorded By, (t) = Taken By, (c) = Cosigned By      Initials Name Provider Type    BP Prosper Jarquin, PT Physical Therapist                   Outcome Measures       Row Name 07/12/24 0924          How much help from another person do you currently need...    Turning from your back to your side while in flat bed without using bedrails? 4  -BP     Moving from lying on back to sitting on the side of a flat bed without bedrails? 4  -BP     Moving to and from a bed to a chair (including a wheelchair)? 4  -BP     Standing up from a chair using your arms (e.g., wheelchair, bedside chair)? 4  -BP     Climbing 3-5 steps with a railing? 3  -BP     To walk in hospital room? 3  -BP     AM-PAC 6 Clicks Score (PT) 22  -BP     Highest Level of Mobility Goal 7 --> Walk 25 feet or more  -BP       Row Name 07/12/24 0924 07/12/24 0919       Modified Knox Scale    Pre-Stroke Modified [place] Scale 0 - No Symptoms at all.  -BP 0 - No Symptoms at all.  -EN    Modified [place] Scale 1 - No significant disability despite symptoms.  Able to carry out all usual duties and activities.  -BP 1 - No significant disability despite symptoms.  Able to carry out all usual duties and activities.  -EN      Row Name 07/12/24 0924 07/12/24 0919       Functional Assessment    Outcome Measure Options Modified [place];AM-PAC 6 Clicks Basic Mobility (PT)  -BP Modified [place]  -EN      Row Name 07/12/24 0915          Functional Assessment    Outcome Measure Options AM-PAC 6 Clicks Daily Activity (OT)  -EN               User Key  (r) = Recorded By, (t) = Taken By, (c) = Cosigned By      Initials Name Provider Type    Louise Hunt OTR  Occupational Therapist    Prosper Benitez, PT Physical Therapist                  Physical Therapy Education       Title: PT OT SLP Therapies (Resolved)       Topic: Physical Therapy (Resolved)       Point: Mobility training (Resolved)       Learning Progress Summary             Patient Acceptance, E,TB, VU by  at 7/12/2024 0925                                         User Key       Initials Effective Dates Name Provider Type Discipline     06/16/21 -  Prosper Jarquin PT Physical Therapist PT                  PT Recommendation and Plan     Plan of Care Reviewed With: patient  Outcome Evaluation: PT Evaluation Complete: Patient performs supine to sit transfer with modified independence, sit to/from stand transfers with independence, and gait x 100 feet with supervision without use of an AD. Patient manages directional changes and obstacles safely, no loss of balance. No need for an AD. Patient reports gait is at baseline. Patient with slurred speech noted, potential slight facial droop observed, notified RN. Patient reports he feels at baseline with exception of his speech. Patient plans to return home with spouse at discharge, no further inpatient PT needs at this time.     Time Calculation:   PT Evaluation Complexity  History, PT Evaluation Complexity: 1-2 personal factors and/or comorbidities  Examination of Body Systems (PT Eval Complexity): 1-2 elements  Clinical Presentation (PT Evaluation Complexity): stable  Clinical Decision Making (PT Evaluation Complexity): low complexity  Overall Complexity (PT Evaluation Complexity): low complexity     PT Charges       Row Name 07/12/24 0926             Time Calculation    Start Time 0821  -BP      Stop Time 0831  -BP      Time Calculation (min) 10 min  -BP      PT Received On 07/12/24  -BP                User Key  (r) = Recorded By, (t) = Taken By, (c) = Cosigned By      Initials Name Provider Type    Prosper Benitez, [place] Physical Therapist                   Therapy Charges for Today       Code Description Service Date Service Provider Modifiers Qty    [id number] HC PT EVAL LOW COMPLEXITY 1 7/12/2024 Prosper Jarquin, PT GP 1            PT G-Codes  Outcome Measure Options: Modified [place], AM-PAC 6 Clicks Basic Mobility (PT)  AM-PAC 6 Clicks Score (PT): 22  AM-PAC 6 Clicks Score (OT): 24  Modified G[place] Scale: 1 - No significant disability despite symptoms.  Able to carry out all usual duties and activities.    PT Discharge Summary  Anticipated Discharge Disposition (PT): home  Reason for Discharge: At baseline function  Discharge Destination: Home    Prosper Jarquin, PT  7/12/2024

## 2024-07-12 NOTE — OUTREACH NOTE
Prep Survey      Flowsheet Row Responses   [facility] facility patient discharged from? [place]   Is LACE score < 7 ? No   Eligibility [facility]   Date of Admission 07/11/24   Date of Discharge 07/12/24   Discharge Disposition Home or Self Care   Discharge diagnosis Slurred speech, Acute CVA   Does the patient have one of the following disease processes/diagnoses(primary or secondary)? Stroke   Does the patient have Home health ordered? No   Is there a DME ordered? No   Prep survey completed? Yes            Yennifer WILSON - Registered Nurse

## 2024-07-12 NOTE — PLAN OF CARE
Goal Outcome Evaluation:  Plan of Care Reviewed With: patient, spouse           Outcome Evaluation: SLP: Pt demonstrates a mild to moderate dysarthria of speech. He demonstrates imprecise articulation at the conversational level. He is able to maintain precise articulation at the word and phrase level but declines in connected speech. Occasionally difficult to understand in conversation but understood approximately 90% of the time by an unfamiliar listener. Pt able to demonstrate use of compensatory strategies of slow rate, overexaggeration of tongue/lip movement and increased volume. Pt also able to demonstrate use of clarifcations when breakdowns occur. Recommendations: Outpatient speech therapy for dysarthria per PCP or outpatient neurologist. Pt and wife verbalize understanding.      Anticipated Discharge Disposition (SLP): home, anticipate therapy at next level of care (pt aware to request from PCP or outpt neurologist if problems persist over the next few days w/o improvement)    SLP Diagnosis: mild-moderate, dysarthria (no aphasia or apraxia appreciated) (07/12/24 0962)

## 2024-07-12 NOTE — PLAN OF CARE
Goal Outcome Evaluation:  Plan of Care Reviewed With: patient           Outcome Evaluation: OT evaluation completed. Patient alert and oriented X 3 however speech was difficult to understand. Patient reported he feels back to baseline with exception of speech.   Patient performed supine to sit with modified independence and demonstrated ind with LBD. Patient performed sit to stand independently and functional mobility X 100 without a device with supervision.  Patient plans to return home with spouse and has no skilled OT needs at this time.      Anticipated Discharge Disposition (OT): home

## 2024-07-12 NOTE — CASE MANAGEMENT/SOCIAL WORK
Continued Stay Note  [place] Morton     Patient Name: Ronnell Rizvi  MRN: [id number]  Today's Date: 7/12/2024    Admit Date: 7/11/2024    Plan: DC home, no needs   Discharge Plan       Row Name 07/12/24 1548       Plan    Plan DC home, no needs    Plan Comments Met with pt, introduced self, role and discussed dc plans/needs.  pt lives with his wife in a 1 story home with 1 step to get in.  He is [employer] and uses [employer].  He  has been driving but will stop and let his wife take him to his appointments.  His living will and POA are on file here.  He has never used HH or been to IP rehab.  His pharmacy is [facility] in [place], today he will get his meds thru [facility].  He denies issues with medication copays, buying food, or paying bills.  Pt denies any abuse.  His plan is to dc home with wife. She will drive him home.  Will follow. Thanks, Yulissa GARCIA    Final Discharge Disposition Code 01 - home or self-care    Final Note Home                   Discharge Codes    No documentation.                 Expected Discharge Date and Time       Expected Discharge Date Expected Discharge Time    Jul 12, 2024               Yulissa Hawkins

## 2024-07-12 NOTE — CONSULTS
Hospital request for Advanced Directive planning consultation. Patient indicated it is already on file. When I checked the records, it was indeed filed.

## 2024-07-12 NOTE — PLAN OF CARE
Goal Outcome Evaluation:  Plan of Care Reviewed With: patient           Outcome Evaluation: PT Evaluation Complete: Patient performs supine to sit transfer with modified independence, sit to/from stand transfers with independence, and gait x 100 feet with supervision without use of an AD. Patient manages directional changes and obstacles safely, no loss of balance. No need for an AD. Patient reports gait is at baseline. Patient with slurred speech noted, potential slight facial droop observed, notified RN. Patient reports he feels at baseline with exception of his speech. Patient plans to return home with spouse at discharge, no further inpatient PT needs at this time.      Anticipated Discharge Disposition (PT): home

## 2024-07-12 NOTE — H&P
"[facility] GROUP HOSPITALIST     PCP: Deena Tarango MD    CODE status: Full     CHIEF COMPLAINT: slurred speech    HISTORY OF PRESENT ILLNESS:    75 y/o male with hx of CVA with PFO closure 2018, seizures, GERD, HTN, hyperlipidemia, and COPD presents to [facility] ED for evaluation of slurred speech.  He says his wife told him she thought his speech was slurred this AM.  He made a phone call a bit later and noticed his \"tongue was thick\" and that he had trouble with his speech.  He knew what to say, but he had trouble saying the words. Currently, he feels like his speech is better, but not quite back to normal.  He admits to having a mild headache, but denies any blurred vision, weakness of upper or lower extremities, nausea, numbness or tingling. He denies missing any of his seizure meds or having any recent medication dose changes.  He takes Plavix daily.        Past Medical History:   Diagnosis Date    Allergic 1985    Allergic rhinitis     Anal fissure     Aortic insufficiency     moderate, THIERRY 2017    Asthma     Asthma     Benign prostatic hypertrophy (BPH) with weak urinary stream 11/01/2016    Cataract     Chronic bronchitis     Colon polyp     COPD (chronic obstructive pulmonary disease)     Emphysema lung     Falls frequently     Fatty liver     GERD (gastroesophageal reflux disease)     Headache     Hepatitis     thought to be Hepatitis A     History of pneumonia     With left lower lobe atelectasis and scar tissue, being followed    HL (hearing loss)     Hypertension     Low back pain     Memory loss     Mixed hyperlipidemia 1/30/2023    Obstructive sleep apnea syndrome 08/23/2017    refuses c-pap    PFO (patent foramen ovale)     s/p percutaneous closure with a 25mm Amplatzer device in December 2018    Pneumonia     LLL with scar tissue    Seizures     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 total of 3 strokes    Tremor     " Comes & goes    Vision loss      Past Surgical History:   Procedure Laterality Date    CARDIAC CATHETERIZATION N/A 12/12/2018    Procedure: Patent foramen ovale closure- Abbott;  Surgeon: Deangelo Harris MD;  Location:  [facility];  Service: Cardiology    CARDIAC CATHETERIZATION N/A 12/12/2018    Procedure: Intracardiac echocardiogram;  Surgeon: Deangelo Harris MD;  Location:  [facility];  Service: Cardiology    CARDIAC ELECTROPHYSIOLOGY PROCEDURE N/A 03/26/2018    Procedure: Loop insertion   CONFIRM RX;  Surgeon: Luis Wyatt MD;  Location:  [facility];  Service: Cardiovascular    CARDIAC ELECTROPHYSIOLOGY PROCEDURE N/A 07/15/2020    Procedure: Loop recorder removal;  Surgeon: Starr Driscoll MD;  Location:  [facility];  Service: Cardiovascular;  Laterality: N/A;    CARDIAC SURGERY      CATARACT EXTRACTION Bilateral     COLONOSCOPY      COLONOSCOPY N/A 10/09/2020    Procedure: COLONOSCOPY with polypectomy;  Surgeon: Ras Mendoza MD;  Location:  [facility];  Service: Gastroenterology;  Laterality: N/A;  diverticulosis  ascending polyp  transverse polyp  sigmoid polyps X 2  rectal polyp    EYE SURGERY      HAND SURGERY Bilateral     INGUINAL HERNIA REPAIR Left     OTHER SURGICAL HISTORY      inguinal hernia repair for person over age 5    TONSILLECTOMY      TONSILLECTOMY       Family History   Problem Relation Age of Onset    Obesity Mother     Clotting disorder Mother         Upper extremities    Alcohol abuse Father     Cancer Father 63        Esophagus and lung    Other Father         cardiac disorder    Heart disease Father     Depression Father     Kidney disease Sister     Kidney failure Sister     Drug abuse Paternal Uncle     Stroke Sister     Throat cancer Sister     Drug abuse Paternal Uncle      Social History     Tobacco Use    Smoking status: Former     Current packs/day: 0.00     Average packs/day:  "1.5 packs/day for 15.0 years (22.5 ttl pk-yrs)     Types: Cigarettes     Start date: 1964     Quit date: 1979     Years since quittin.4     Passive exposure: Past    Smokeless tobacco: Never    Tobacco comments:     caffeine use: Drinks 4 glasses of Dt coke on avg.    Vaping Use    Vaping status: Never Used   Substance Use Topics    Alcohol use: No    Drug use: No     (Not in a hospital admission)    Allergies:  Aspirin, Citrus, Combivent [ipratropium-albuterol], Lactose intolerance (gi), and Statins    Immunization History   Administered Date(s) Administered    ABRYSVO (RSV, 60+ or pregnant women 32-36 wks) 2023    COVID-19 (PFIZER) BIVALENT 12+YRS 2022    COVID-19 (PFIZER) Purple Cap Monovalent 2021, 2021, 2021    COVID-19 F23 (PFIZER) 12YRS+ (COMIRNATY) 2023, 2024    Covid-19 (Pfizer) Gray Cap Monovalent 2021, 2022    Flu Vaccine Quad PF >36MO 10/22/2016    Fluzone High Dose =>65 Years (Vaxcare ONLY) 2018, 10/02/2019, 2020    Fluzone High-Dose 65+yrs 2021, 2022, 10/06/2023    Influenza Quad Vaccine (Inpatient) 10/11/2017    Influenza Seasonal Injectable 2021    Pneumococcal Conjugate 13-Valent (PCV13) 2016, 10/10/2019    Pneumococcal Polysaccharide (PPSV23) 2014    TD Preservative Free (Tenivac) 2020    Tdap 11/10/2010    Zostavax 10/09/2013       REVIEW OF SYSTEMS:  Please see the above history of present illness for pertinent positives and negatives.  Patient admits to bilateral LE edema.  The remainder of the patient's systems have been reviewed and are negative.    Vital Signs  Temp:  [98 °F (36.7 °C)] 98 °F (36.7 °C)  Heart Rate:  [58-69] 58  Resp:  [16-18] 16  BP: (132-151)/(72-80) 132/73  Flowsheet Rows      Flowsheet Row First Filed Value   Admission Height 182.9 cm (72\") Documented at 2024   Admission Weight 86.7 kg (191 lb 3.2 oz) Documented at 2024 175           "     Physical Exam:  General: Sitting up in bed; NAD  HENT: Head is atraumatic, normocephalic. Hearing is grossly intact. Nose is without obvious congestion and appears patent. Neck is supple and trachea is midline.   Eyes: Vision is grossly intact. Pupils appear equal and round.   Cardiovascular: Heart has regular rate and rhythm with no murmurs, rubs or gallops noted.   Respiratory: Lungs are clear to ausculation without wheezes, rhonchi or rales.   Abdominal/GI: Soft, nontender, bowel sounds present. No rebound or guarding present.   Extremities: No peripheral edema noted.   Musculoskeletal: Spontaneous movement of bilateral upper and lower extremities against gravity noted. No signs of injury or deformity noted.   Skin: Warm and dry.   Psych: Mood and affect are appropriate. Cooperative with exam.   Neuro: No facial asymmetry noted. Mild dysarthria;  CN II-XII intact; no focal deficits            Results Review:    I reviewed the patient's new clinical results.  Lab Results (most recent)       Procedure Component Value Units Date/Time    Comprehensive Metabolic Panel [[id number]] Collected: 07/11/24 1805    Specimen: Blood Updated: 07/11/24 1828     Glucose 95 mg/dL      BUN 11 mg/dL      Creatinine 0.96 mg/dL      Sodium 136 mmol/L      Potassium 3.6 mmol/L      Chloride 101 mmol/L      CO2 25.9 mmol/L      Calcium 9.1 mg/dL      Total Protein 6.1 g/dL      Albumin 3.7 g/dL      ALT (SGPT) 13 U/L      AST (SGOT) 17 U/L      Alkaline Phosphatase 92 U/L      Total Bilirubin 0.2 mg/dL      Globulin 2.4 gm/dL      A/G Ratio 1.5 g/dL      BUN/Creatinine Ratio 11.5     Anion Gap 9.1 mmol/L      eGFR 82.4 mL/min/1.73     Narrative:      GFR Normal >60  Chronic Kidney Disease <60  Kidney Failure <15    The GFR formula is only valid for adults with stable renal function between ages 18 and 70.    Single High Sensitivity Troponin T [[id number]]  (Normal) Collected: 07/11/24 1805    Specimen: Blood Updated: 07/11/24 1828      HS Troponin T 14 ng/L     Narrative:      High Sensitive Troponin T Reference Range:  <14.0 ng/L- Negative Female for AMI  <22.0 ng/L- Negative Male for AMI  >=14 - Abnormal Female indicating possible myocardial injury.  >=22 - Abnormal Male indicating possible myocardial injury.   Clinicians would have to utilize clinical acumen, EKG, Troponin, and serial changes to determine if it is an Acute Myocardial Infarction or myocardial injury due to an underlying chronic condition.         Protime-INR [[id number]]  (Normal) Collected: 07/11/24 1805    Specimen: Blood Updated: 07/11/24 1821     Protime 13.2 Seconds      INR 1.00    Narrative:      Therapeutic Ranges for INR: 2.0-3.0 (PT 20-30)                              2.5-3.5 (PT 25-34)    aPTT [[id number]]  (Abnormal) Collected: 07/11/24 1805    Specimen: Blood Updated: 07/11/24 1821     PTT 22.2 seconds     Narrative:      PTT = The equivalent PTT values for the therapeutic range of heparin levels at 0.1 to 0.7 U/ml are 53 to 110 seconds.      [place] Draw [[id number]] Collected: 07/11/24 1805    Specimen: Blood Updated: 07/11/24 1815    Narrative:      The following orders were created for panel order [place] Draw.  Procedure                               Abnormality         Status                     ---------                               -----------         ------                     Green Top (Gel)[[id number]]                                  Final result               Lavender Top[[id number]]                                     Final result               Gold Top - SST[[id number]]                                   Final result               Light Blue Top[[id number]]                                   Final result                 Please view results for these tests on the individual orders.    Green Top (Gel) [[id number]] Collected: 07/11/24 1805    Specimen: Blood Updated: 07/11/24 1815     Extra Tube Hold for add-ons.     Comment: Auto resulted.       Lavender Top  [[id number]] Collected: 07/11/24 1805    Specimen: Blood Updated: 07/11/24 1815     Extra Tube hold for add-on     Comment: Auto resulted       Gold Top - SST [[id number]] Collected: 07/11/24 1805    Specimen: Blood Updated: 07/11/24 1815     Extra Tube Hold for add-ons.     Comment: Auto resulted.       Light Blue Top [[id number]] Collected: 07/11/24 1805    Specimen: Blood Updated: 07/11/24 1815     Extra Tube Hold for add-ons.     Comment: Auto resulted       CBC & Differential [[id number]]  (Normal) Collected: 07/11/24 1805    Specimen: Blood Updated: 07/11/24 1810    Narrative:      The following orders were created for panel order CBC & Differential.  Procedure                               Abnormality         Status                     ---------                               -----------         ------                     CBC Auto Differential[[id number]]        Normal              Final result                 Please view results for these tests on the individual orders.    CBC Auto Differential [[id number]]  (Normal) Collected: 07/11/24 1805    Specimen: Blood Updated: 07/11/24 1810     WBC 5.65 10*3/mm3      RBC 4.29 10*6/mm3      Hemoglobin 14.0 g/dL      Hematocrit 40.3 %      MCV 93.9 fL      MCH 32.6 pg      MCHC 34.7 g/dL      RDW 12.6 %      RDW-SD 43.5 fl      MPV 9.7 fL      Platelets 194 10*3/mm3      Neutrophil % 48.0 %      Lymphocyte % 36.6 %      Monocyte % 12.0 %      Eosinophil % 2.5 %      Basophil % 0.7 %      Immature Grans % 0.2 %      Neutrophils, Absolute 2.71 10*3/mm3      Lymphocytes, Absolute 2.07 10*3/mm3      Monocytes, Absolute 0.68 10*3/mm3      Eosinophils, Absolute 0.14 10*3/mm3      Basophils, Absolute 0.04 10*3/mm3      Immature Grans, Absolute 0.01 10*3/mm3      nRBC 0.0 /100 WBC     POC Glucose Once [[id number]]  (Normal) Collected: 07/11/24 1804    Specimen: Blood Updated: 07/11/24 1810     Glucose 100 mg/dL             Imaging Results (Most Recent)       Procedure Component Value  Units Date/Time    CT Angiogram Head w AI Analysis of LVO [[id number]] Collected: 07/11/24 1922     Updated: 07/11/24 1934    Narrative:      CT ANGIOGRAM HEAD W AI ANALYSIS OF LVO, CT ANGIOGRAM NECK    Date of Exam: 7/11/2024 6:24 PM EDT    Indication: Neuro Deficit, acute, Stroke suspected  Neuro deficit, acute stroke suspected.    Comparison: None available.    Technique: CTA of the head and neck was performed after the uneventful intravenous administration of iodinated contrast. Reconstructed coronal and sagittal images were also obtained. In addition, a 3-D volume rendered image was created for   interpretation. Automated exposure control and iterative reconstruction methods were used.    Findings: The common carotid arteries are normal. Moderate atheromatous disease of the proximal right internal carotid artery without significant stenosis. The extracranial right internal carotid artery is normal otherwise. The intracranial segments of   the left internal carotid arteries are normal. The right carotid terminus is normal. Focal severe stenosis of the right M2 branch seen on axial image #278 series 6 which is nearly 60% stenosis completely occluded. Otherwise the visualized segments of the   right anterior and middle cerebral arteries are normal.    The left common carotid artery is normal. Moderate atheromatous disease of the left carotid bulb and proximal left internal carotid artery with a minimal residual vessel lumen diameter of 4 mm and a native vessel lumen diameter of 5 mm consistent with   approximately 20% stenosis per NASCET criteria.  Mild atheromatous disease involving the intracranial segments of the internal carotid arteries. The left carotid terminus is normal. The visualized branches of the left anterior and middle cerebral arteries appear normal.    Both vertebral arteries arise from the subclavian arteries. The right vertebral artery is dominant. Left vertebral artery supplies the basilar  artery. The basilar artery and basilar artery tip are normal. The basilar artery terminates in bilateral   posterior cerebral arteries which appear normal.    The lung apices are clear. Mild emphysematous changes of the lung apices. The thyroid gland is normal. No cervical adenopathy. The visualized parotid and submandibular glands are normal.  Paranasal sinuses are clear. Bilateral lens replacements. No abnormal intracranial enhancement.      Impression:      Focal severe stenosis of the right M2 branch with minimal thready flow noted in the region. No vessel occlusion.      Electronically Signed: Sang Silva MD    7/11/2024 7:31 PM EDT    Workstation ID: [id number]    CT Angiogram Neck [[id number]] Collected: 07/11/24 1922     Updated: 07/11/24 1934    Narrative:      CT ANGIOGRAM HEAD W AI ANALYSIS OF LVO, CT ANGIOGRAM NECK    Date of Exam: 7/11/2024 6:24 PM EDT    Indication: Neuro Deficit, acute, Stroke suspected  Neuro deficit, acute stroke suspected.    Comparison: None available.    Technique: CTA of the head and neck was performed after the uneventful intravenous administration of iodinated contrast. Reconstructed coronal and sagittal images were also obtained. In addition, a 3-D volume rendered image was created for   interpretation. Automated exposure control and iterative reconstruction methods were used.    Findings: The common carotid arteries are normal. Moderate atheromatous disease of the proximal right internal carotid artery without significant stenosis. The extracranial right internal carotid artery is normal otherwise. The intracranial segments of   the left internal carotid arteries are normal. The right carotid terminus is normal. Focal severe stenosis of the right M2 branch seen on axial image #278 series 6 which is nearly 60% stenosis completely occluded. Otherwise the visualized segments of the   right anterior and middle cerebral arteries are normal.    The left common carotid artery is  normal. Moderate atheromatous disease of the left carotid bulb and proximal left internal carotid artery with a minimal residual vessel lumen diameter of 4 mm and a native vessel lumen diameter of 5 mm consistent with   approximately 20% stenosis per NASCET criteria.  Mild atheromatous disease involving the intracranial segments of the internal carotid arteries. The left carotid terminus is normal. The visualized branches of the left anterior and middle cerebral arteries appear normal.    Both vertebral arteries arise from the subclavian arteries. The right vertebral artery is dominant. Left vertebral artery supplies the basilar artery. The basilar artery and basilar artery tip are normal. The basilar artery terminates in bilateral   posterior cerebral arteries which appear normal.    The lung apices are clear. Mild emphysematous changes of the lung apices. The thyroid gland is normal. No cervical adenopathy. The visualized parotid and submandibular glands are normal.  Paranasal sinuses are clear. Bilateral lens replacements. No abnormal intracranial enhancement.      Impression:      Focal severe stenosis of the right M2 branch with minimal thready flow noted in the region. No vessel occlusion.      Electronically Signed: Sang Silva MD    7/11/2024 7:31 PM EDT    Workstation ID: [id number]    XR Chest 1 View [[id number]] Collected: 07/11/24 1838     Updated: 07/11/24 1841    Narrative:      XR CHEST 1 VW    Date of Exam: 7/11/2024 6:21 PM EDT    Indication: Acute Stroke Protocol (Onset < 12 hrs)    Comparison: 1/21/2024, 9/14/2022, 10/10/2020    FINDINGS:  No new consolidations or pleural effusions are observed. The cardiac silhouette and mediastinum are stable. No acute osseous abnormalities are identified.      Impression:      There is no significant change when compared to the prior study. There is no evidence for acute cardiopulmonary process.        Electronically Signed: Domingo Cabrera MD    7/11/2024 6:38  PM EDT    Workstation ID: [id number]    CT CEREBRAL PERFUSION WITH & WITHOUT CONTRAST [[id number]] Collected: 07/11/24 1835     Updated: 07/11/24 1840    Narrative:      CT CEREBRAL PERFUSION W WO CONTRAST    Date of Exam: 7/11/2024 6:25 PM EDT    Indication: Neuro deficit, acute, stroke suspected  Neuro Deficit, acute, Stroke suspected.     Comparison: None available.    Technique: Axial CT images of the brain were obtained prior to and after the administration of iodinated contrast. CT Perfusion protocol was utilized. Automated post processing was performed by RAPID software and submitted to PACS for interpretation.   Automated exposure control and iterative reconstruction was utilized.    Findings: There is approximately 74 cc of patchy bilateral areas of mildly elevated Tmax. No decreased cerebral blood flow or blood volume.      Impression:      No evidence of infarct.      Electronically Signed: Sang Silva MD    7/11/2024 6:36 PM EDT    Workstation ID: [id number]    CT Head Without Contrast Stroke Protocol [[id number]] Collected: 07/11/24 1817     Updated: 07/11/24 1825    Narrative:      CT HEAD WO CONTRAST STROKE PROTOCOL    Date of Exam: 7/11/2024 6:06 PM EDT    Indication: Neuro deficit, acute, stroke suspected  Neuro Deficit, acute, Stroke suspected.    Comparison: 12/29/2023    Technique: Axial CT images were obtained of the head without contrast administration.  Reconstructed coronal and sagittal images were also obtained. Automated exposure control and iterative construction methods were used.    Scan Time: 1812 hours  Results discussed with the emergency room at 6:19 p.m. on 7/11/2024.      FINDINGS:  There is no evidence for acute intracranial hemorrhage. No definitive acute focal ischemia is identified. Nonspecific white matter changes are noted likely related to chronic small vessel ischemic changes and age-related changes. Associated diffuse   volume loss is observed. Remote infarction is noted  involving the inferior aspect of the left parietal-occipital region. There is also evidence for remote infarction involving the region of the right basal ganglia and right thalamus. These changes are   stable. There is no evidence for abnormal cerebral edema. There is no mass effect or midline shift. The ventricular system is nondilated. The basilar cisterns are patent. The skull is intact. The paranasal sinuses and mastoid air cells are clear.      Impression:      1.No evidence for acute intracranial abnormality.  2.Nonspecific white matter changes are noted with associated diffuse volume loss. These findings are likely related to chronic small vessel ischemic changes and/or age-related changes.  3.Multifocal remote infarcts are again noted which appear stable.          Electronically Signed: Domingo Cabrera MD    7/11/2024 6:22 PM EDT    Workstation ID: [id number]              ECG/EMG Results (most recent)       Procedure Component Value Units Date/Time    ECG 12 Lead Stroke Evaluation [[id number]] Collected: 07/11/24 1800     Updated: 07/11/24 1801     QT Interval 414 ms      QTC Interval 412 ms     Narrative:      HEART RATE=60  bpm  RR Jdtltzhc=5134  ms  UT Qwedoxji=326  ms  P Horizontal Axis=-1  deg  P Front Axis=41  deg  QRSD Uapptwtt=475  ms  QT Pdqiqgnd=373  ms  GIjW=080  ms  QRS Axis=-22  deg  T Wave Axis=60  deg  - ABNORMAL ECG -  Sinus rhythm  Prolonged UT interval  Inferior infarct, old  Anterior infarct, old  Date and Time of Study:2024-07-11 18:00:16    ECG 12 Lead ED Triage Standing Order; Acute Stroke (Onset <24 hrs) [[id number]] Resulted: 07/11/24 1951     Updated: 07/11/24 1951              Assessment & Plan     Transient slurred speech  Concern for TIA vs stroke   Patient with hx of strokes/seizures  I personally reviewed patient's CT head and CTA head and neck showing focal stenosis of right M2 segment, but no other acute findings   Seen by Neurology who recommended further stroke work-up including  MRI brain  Order MRI brain  Patient with hx of PFO that was closed in 2018; currently takes Plavix  Continue Plavix and statin  Neurochecks and NIHs per protocol  Ok for diet if passes swallow evaluation     Hx of Seizures  Denies any recent missed doses or adjustments   Continue home Dilantin; level ordered in ED and pending     HTN  Allow permissive HTN for now  Resume home HCTZ in AM    Hyperlipidemia  Await lipid panel  Continue statin    DVT ppx:  SCDs        Luis Alberto Begum,   07/11/24  20:21 EDT      At [facility], we believe that sharing information builds trust and better relationships. You are receiving this note because you recently visited [facility]. It is possible you will see health information before a provider has talked with you about it. This kind of information can be easy to misunderstand. To help you fully understand what it means for your health, we urge you to discuss this note with your provider.

## 2024-07-12 NOTE — DISCHARGE SUMMARY
Ronnell SAN Stem  1950  [id number]    Hospitalists Discharge Summary    Date of Admission: 7/11/2024  Date of Discharge:  7/12/2024    Primary Discharge Diagnoses: ***    Secondary Discharge Diagnoses: ***      History of Present Illness:***    Hospital Course:      PCP  Patient Care Team:  Deena Tarango MD as PCP - General (Internal Medicine)  Gregory King MD as Consulting Physician (Hematology and Oncology)  Chase Haque MD as Referring Physician (Internal Medicine)  Luis Wyatt MD as Consulting Physician (Cardiology)    Consults:   Consults       Date and Time Order Name Status Description    7/11/2024  7:05 PM Inpatient Neurology Consult Stroke Completed     7/11/2024  6:02 PM Inpatient Neurology Consult Stroke Completed     7/11/2024  6:02 PM Inpatient Neurology Consult Stroke Completed             Operations and Procedures Performed:       Adult Transthoracic Echo Complete W/ Cont if Necessary Per Protocol (With Agitated Saline)    Result Date: 7/12/2024  Narrative:   Left ventricular systolic function is normal. Calculated left ventricular EF = 64.4%   Left ventricular wall thickness is consistent with hypertrophy. Sigmoid-shaped ventricular septum is present.   Left ventricular diastolic function was normal.   There is mild calcification of the aortic valve.   Systolic anterior motion of the chordal apparatus is present. There is no outflow tract obstruction.   Estimated right ventricular systolic pressure from tricuspid regurgitation is normal (<35 mmHg).     US Carotid Bilateral    Result Date: 7/12/2024  Narrative: US CAROTID BILATERAL Date of Exam: 7/12/2024 9:53 AM EDT Indication: soft plaques. Comparison: CT angiogram of the neck 7/11/2024 Technique: Grayscale, color-flow, and spectral imaging was obtained of the bilateral carotid and vertebral arteries. Findings: The peak right common carotid artery velocity is 100 cm/s. The peak right internal carotid artery velocity is 96  cm/s. The IC/CC ratio on the right carotid bifurcation is 0.96. Grayscale and color flow imaging shows plaque with shadowing primarily in the  proximal right internal carotid artery. There is no evidence of flow acceleration. The right vertebral artery is patent with antegrade flow. The peak left common carotid artery velocity is 100 cm/s. The peak left internal carotid artery velocity is 112 cm/s. The IC/CC ratio is 1.12. Grayscale and color flow imaging shows plaque in the left carotid bulb extending into the proximal ICA without a definite flow limiting stenosis. The left vertebral artery is patent with antegrade flow.     Impression: Impression: 1. Atherosclerotic plaque in both carotid bifurcations extending into both internal carotid arteries. The degree of narrowing is less than 50% by consensus panel criteria. 2. Patent vertebral arteries bilaterally with antegrade flow. Electronically Signed: Krunal Lagos MD  7/12/2024 10:35 AM EDT  Workstation ID: [id number]    MRI Brain Without Contrast    Result Date: 7/12/2024  Narrative: MRI BRAIN WO CONTRAST Date of Exam: 7/12/2024 9:54 AM EDT Indication: Stroke, follow up stroke protocol.  Comparison: CTA head stroke study 7/11/2022 Technique:  Routine multiplanar/multisequence sequence images of the brain were obtained without contrast administration. Findings: There are multiple small punctate foci of restricted diffusion in the right corona radiata consistent with small acute infarcts. There is no evidence of acute or chronic intracranial hemorrhage. No mass effect or midline shift. No abnormal extra-axial collections. The basal ganglia, brainstem and cerebellum appear within normal limits. Midline structures are intact. There is age-related diffuse cortical atrophy. There are prominent patchy white matter hyperintensities in keeping with chronic microvascular ischemic  disease. There is encephalomalacia and gliosis in the left occipital lobe related to prior  infarct. Remote lacunar infarct of the right thalamus. Calvarial and superficial soft tissue signal is within normal limits. Orbits appear unremarkable. There is trace mucosal thickening in the bilateral ethmoid air cells. The mastoid air cells are clear.     Impression: Impression: 1. Multiple small acute infarcts of the right corona radiata. 2. Age-related atrophy, chronic microvascular ischemic disease and remote left occipital lobe infarct. Electronically Signed: Madelyn Dale MD  7/12/2024 10:09 AM EDT  Workstation ID: [id number]    CT Angiogram Head w AI Analysis of LVO    Result Date: 7/11/2024  Narrative: CT ANGIOGRAM HEAD W AI ANALYSIS OF LVO, CT ANGIOGRAM NECK Date of Exam: 7/11/2024 6:24 PM EDT Indication: Neuro Deficit, acute, Stroke suspected Neuro deficit, acute stroke suspected. Comparison: None available. Technique: CTA of the head and neck was performed after the uneventful intravenous administration of iodinated contrast. Reconstructed coronal and sagittal images were also obtained. In addition, a 3-D volume rendered image was created for interpretation. Automated exposure control and iterative reconstruction methods were used. Findings: The common carotid arteries are normal. Moderate atheromatous disease of the proximal right internal carotid artery without significant stenosis. The extracranial right internal carotid artery is normal otherwise. The intracranial segments of the left internal carotid arteries are normal. The right carotid terminus is normal. Focal severe stenosis of the right M2 branch seen on axial image #278 series 6 which is nearly 60% stenosis completely occluded. Otherwise the visualized segments of the  right anterior and middle cerebral arteries are normal. The left common carotid artery is normal. Moderate atheromatous disease of the left carotid bulb and proximal left internal carotid artery with a minimal residual vessel lumen diameter of 4 mm and a native vessel lumen  diameter of 5 mm consistent with approximately 20% stenosis per NASCET criteria. Mild atheromatous disease involving the intracranial segments of the internal carotid arteries. The left carotid terminus is normal. The visualized branches of the left anterior and middle cerebral arteries appear normal. Both vertebral arteries arise from the subclavian arteries. The right vertebral artery is dominant. Left vertebral artery supplies the basilar artery. The basilar artery and basilar artery tip are normal. The basilar artery terminates in bilateral posterior cerebral arteries which appear normal. The lung apices are clear. Mild emphysematous changes of the lung apices. The thyroid gland is normal. No cervical adenopathy. The visualized parotid and submandibular glands are normal. Paranasal sinuses are clear. Bilateral lens replacements. No abnormal intracranial enhancement.     Impression: Focal severe stenosis of the right M2 branch with minimal thready flow noted in the region. No vessel occlusion. Electronically Signed: Sang Silva MD  7/11/2024 7:31 PM EDT  Workstation ID: [id number]    CT Angiogram Neck    Result Date: 7/11/2024  Narrative: CT ANGIOGRAM HEAD W AI ANALYSIS OF LVO, CT ANGIOGRAM NECK Date of Exam: 7/11/2024 6:24 PM EDT Indication: Neuro Deficit, acute, Stroke suspected Neuro deficit, acute stroke suspected. Comparison: None available. Technique: CTA of the head and neck was performed after the uneventful intravenous administration of iodinated contrast. Reconstructed coronal and sagittal images were also obtained. In addition, a 3-D volume rendered image was created for interpretation. Automated exposure control and iterative reconstruction methods were used. Findings: The common carotid arteries are normal. Moderate atheromatous disease of the proximal right internal carotid artery without significant stenosis. The extracranial right internal carotid artery is normal otherwise. The intracranial  segments of the left internal carotid arteries are normal. The right carotid terminus is normal. Focal severe stenosis of the right M2 branch seen on axial image #278 series 6 which is nearly 60% stenosis completely occluded. Otherwise the visualized segments of the  right anterior and middle cerebral arteries are normal. The left common carotid artery is normal. Moderate atheromatous disease of the left carotid bulb and proximal left internal carotid artery with a minimal residual vessel lumen diameter of 4 mm and a native vessel lumen diameter of 5 mm consistent with approximately 20% stenosis per NASCET criteria. Mild atheromatous disease involving the intracranial segments of the internal carotid arteries. The left carotid terminus is normal. The visualized branches of the left anterior and middle cerebral arteries appear normal. Both vertebral arteries arise from the subclavian arteries. The right vertebral artery is dominant. Left vertebral artery supplies the basilar artery. The basilar artery and basilar artery tip are normal. The basilar artery terminates in bilateral posterior cerebral arteries which appear normal. The lung apices are clear. Mild emphysematous changes of the lung apices. The thyroid gland is normal. No cervical adenopathy. The visualized parotid and submandibular glands are normal. Paranasal sinuses are clear. Bilateral lens replacements. No abnormal intracranial enhancement.     Impression: Focal severe stenosis of the right M2 branch with minimal thready flow noted in the region. No vessel occlusion. Electronically Signed: Sang Silva MD  7/11/2024 7:31 PM EDT  Workstation ID: [id number]    XR Chest 1 View    Result Date: 7/11/2024  Narrative: XR CHEST 1 VW Date of Exam: 7/11/2024 6:21 PM EDT Indication: Acute Stroke Protocol (Onset < 12 hrs) Comparison: 1/21/2024, 9/14/2022, 10/10/2020 FINDINGS: No new consolidations or pleural effusions are observed. The cardiac silhouette and  mediastinum are stable. No acute osseous abnormalities are identified.     Impression: There is no significant change when compared to the prior study. There is no evidence for acute cardiopulmonary process. Electronically Signed: Domingo Cabrera MD  7/11/2024 6:38 PM EDT  Workstation ID: [id number]    CT CEREBRAL PERFUSION WITH & WITHOUT CONTRAST    Result Date: 7/11/2024  Narrative: CT CEREBRAL PERFUSION W WO CONTRAST Date of Exam: 7/11/2024 6:25 PM EDT Indication: Neuro deficit, acute, stroke suspected Neuro Deficit, acute, Stroke suspected.  Comparison: None available. Technique: Axial CT images of the brain were obtained prior to and after the administration of iodinated contrast. CT Perfusion protocol was utilized. Automated post processing was performed by RAPID software and submitted to PACS for interpretation. Automated exposure control and iterative reconstruction was utilized. Findings: There is approximately 74 cc of patchy bilateral areas of mildly elevated Tmax. No decreased cerebral blood flow or blood volume.     Impression: No evidence of infarct. Electronically Signed: Sang Silva MD  7/11/2024 6:36 PM EDT  Workstation ID: [id number]    CT Head Without Contrast Stroke Protocol    Result Date: 7/11/2024  Narrative: CT HEAD WO CONTRAST STROKE PROTOCOL Date of Exam: 7/11/2024 6:06 PM EDT Indication: Neuro deficit, acute, stroke suspected Neuro Deficit, acute, Stroke suspected. Comparison: 12/29/2023 Technique: Axial CT images were obtained of the head without contrast administration.  Reconstructed coronal and sagittal images were also obtained. Automated exposure control and iterative construction methods were used. Scan Time: 1812 hours Results discussed with the emergency room at 6:19 p.m. on 7/11/2024. FINDINGS: There is no evidence for acute intracranial hemorrhage. No definitive acute focal ischemia is identified. Nonspecific white matter changes are noted likely related to chronic small vessel  ischemic changes and age-related changes. Associated diffuse volume loss is observed. Remote infarction is noted involving the inferior aspect of the left parietal-occipital region. There is also evidence for remote infarction involving the region of the right basal ganglia and right thalamus. These changes are stable. There is no evidence for abnormal cerebral edema. There is no mass effect or midline shift. The ventricular system is nondilated. The basilar cisterns are patent. The skull is intact. The paranasal sinuses and mastoid air cells are clear.     Impression: 1.No evidence for acute intracranial abnormality. 2.Nonspecific white matter changes are noted with associated diffuse volume loss. These findings are likely related to chronic small vessel ischemic changes and/or age-related changes. 3.Multifocal remote infarcts are again noted which appear stable. Electronically Signed: Domingo Cabrera MD  7/11/2024 6:22 PM EDT  Workstation ID: [id number]     Allergies:  is allergic to aspirin, citrus, combivent [ipratropium-albuterol], lactose intolerance (gi), and statins.    Azar  {Desc; reviewed/not reviewed:[id number]}    Discharge Medications:     Discharge Medications        New Medications        Instructions Start Date   aspirin 81 MG EC tablet   81 mg, Oral, Daily, Take with Pantoprazole.      pantoprazole 40 MG EC tablet  Commonly known as: Protonix   40 mg, Oral, Daily             Changes to Medications        Instructions Start Date   rosuvastatin 40 MG tablet  Commonly known as: Crestor  What changed:   medication strength  how much to take  when to take this   40 mg, Oral, Nightly             Continue These Medications        Instructions Start Date   clopidogrel 75 MG tablet  Commonly known as: PLAVIX   75 mg, Oral, Daily      Cyanocobalamin 1000 MCG/ML kit   Injection, Weekly, 1/4 of a cc IM every sunday      fexofenadine 180 MG tablet  Commonly known as: ALLEGRA   180 mg, Oral, Daily PRN       guaiFENesin 600 MG 12 hr tablet  Commonly known as: MUCINEX   600 mg, Oral, Daily      phenytoin  MG capsule  Commonly known as: DILANTIN   Take 2 capsules by mouth 2 (Two) Times a Day. 2 in the morning, 2 at night  PT Reports he takes all 4 capsules at night             Stop These Medications      hydroCHLOROthiazide 12.5 MG tablet              Last Lab Results:   Lab Results (most recent)       Procedure Component Value Units Date/Time    POC Glucose Once [[id number]]  (Normal) Collected: 07/12/24 1214    Specimen: Blood Updated: 07/12/24 1230     Glucose 86 mg/dL     Folate [[id number]]  (Normal) Collected: 07/12/24 0437    Specimen: Blood Updated: 07/12/24 1054     Folate 9.52 ng/mL     Narrative:      Results may be falsely increased if patient taking Biotin.      Vitamin B12 [[id number]]  (Normal) Collected: 07/12/24 0437    Specimen: Blood Updated: 07/12/24 1054     Vitamin B-12 465 pg/mL     Narrative:      Results may be falsely increased if patient taking Biotin.      POC Glucose Once [[id number]]  (Normal) Collected: 07/12/24 0809    Specimen: Blood Updated: 07/12/24 0817     Glucose 93 mg/dL     Sedimentation Rate [[id number]]  (Normal) Collected: 07/12/24 0437    Specimen: Blood Updated: 07/12/24 0535     Sed Rate <1 mm/hr     TSH [[id number]]  (Normal) Collected: 07/12/24 0437    Specimen: Blood Updated: 07/12/24 0532     TSH 3.060 uIU/mL     Comprehensive Metabolic Panel [[id number]]  (Abnormal) Collected: 07/12/24 0437    Specimen: Blood Updated: 07/12/24 0532     Glucose 88 mg/dL      BUN 11 mg/dL      Creatinine 0.82 mg/dL      Sodium 140 mmol/L      Potassium 3.4 mmol/L      Chloride 105 mmol/L      CO2 26.3 mmol/L      Calcium 8.9 mg/dL      Total Protein 5.8 g/dL      Albumin 3.7 g/dL      ALT (SGPT) 13 U/L      AST (SGOT) 17 U/L      Alkaline Phosphatase 84 U/L      Total Bilirubin 0.2 mg/dL      Globulin 2.1 gm/dL      A/G Ratio 1.8 g/dL      BUN/Creatinine Ratio 13.4     Anion Gap 8.7 mmol/L       eGFR 91.6 mL/min/1.73     Narrative:      GFR Normal >60  Chronic Kidney Disease <60  Kidney Failure <15    The GFR formula is only valid for adults with stable renal function between ages 18 and 70.    Lipid Panel [[id number]]  (Abnormal) Collected: 07/12/24 0437    Specimen: Blood Updated: 07/12/24 0532     Total Cholesterol 203 mg/dL      Triglycerides 58 mg/dL      HDL Cholesterol 56 mg/dL      LDL Cholesterol  136 mg/dL      VLDL Cholesterol 11 mg/dL      LDL/HDL Ratio 2.42    Narrative:      Cholesterol Reference Ranges  (U.S. Department of Health and Human Services ATP III Classifications)    Desirable          <200 mg/dL  Borderline High    200-239 mg/dL  High Risk          >240 mg/dL      Triglyceride Reference Ranges  (U.S. Department of Health and Human Services ATP III Classifications)    Normal           <150 mg/dL  Borderline High  150-199 mg/dL  High             200-499 mg/dL  Very High        >500 mg/dL    HDL Reference Ranges  (U.S. Department of Health and Human Services ATP III Classifications)    Low     <40 mg/dl (major risk factor for CHD)  High    >60 mg/dl ('negative' risk factor for CHD)        LDL Reference Ranges  (U.S. Department of Health and Human Services ATP III Classifications)    Optimal          <100 mg/dL  Near Optimal     100-129 mg/dL  Borderline High  130-159 mg/dL  High             160-189 mg/dL  Very High        >189 mg/dL    Phenytoin Level, Total [[id number]]  (Abnormal) Collected: 07/12/24 0437    Specimen: Blood Updated: 07/12/24 0532     Phenytoin Level 6.2 mcg/mL     aPTT [[id number]]  (Abnormal) Collected: 07/12/24 0437    Specimen: Blood Updated: 07/12/24 0516     PTT 22.9 seconds     Narrative:      PTT = The equivalent PTT values for the therapeutic range of heparin levels at 0.1 to 0.7 U/ml are 53 to 110 seconds.      Protime-INR [[id number]]  (Normal) Collected: 07/12/24 0437    Specimen: Blood Updated: 07/12/24 0516     Protime 13.6 Seconds      INR 1.04     Narrative:      Therapeutic Ranges for INR: 2.0-3.0 (PT 20-30)                              2.5-3.5 (PT 25-34)    Hemoglobin A1c [[id number]]  (Normal) Collected: 07/12/24 0437    Specimen: Blood Updated: 07/12/24 0509     Hemoglobin A1C 5.37 %     Narrative:      Hemoglobin A1C Ranges:    Increased Risk for Diabetes  5.7% to 6.4%  Diabetes                     >= 6.5%  Diabetic Goal                < 7.0%    CBC (No Diff) [[id number]]  (Normal) Collected: 07/12/24 0437    Specimen: Blood Updated: 07/12/24 0458     WBC 5.08 10*3/mm3      RBC 4.41 10*6/mm3      Hemoglobin 14.1 g/dL      Hematocrit 41.3 %      MCV 93.7 fL      MCH 32.0 pg      MCHC 34.1 g/dL      RDW 12.7 %      RDW-SD 43.8 fl      MPV 9.8 fL      Platelets 188 10*3/mm3     Comprehensive Metabolic Panel [[id number]] Collected: 07/11/24 1805    Specimen: Blood Updated: 07/11/24 1828     Glucose 95 mg/dL      BUN 11 mg/dL      Creatinine 0.96 mg/dL      Sodium 136 mmol/L      Potassium 3.6 mmol/L      Chloride 101 mmol/L      CO2 25.9 mmol/L      Calcium 9.1 mg/dL      Total Protein 6.1 g/dL      Albumin 3.7 g/dL      ALT (SGPT) 13 U/L      AST (SGOT) 17 U/L      Alkaline Phosphatase 92 U/L      Total Bilirubin 0.2 mg/dL      Globulin 2.4 gm/dL      A/G Ratio 1.5 g/dL      BUN/Creatinine Ratio 11.5     Anion Gap 9.1 mmol/L      eGFR 82.4 mL/min/1.73     Narrative:      GFR Normal >60  Chronic Kidney Disease <60  Kidney Failure <15    The GFR formula is only valid for adults with stable renal function between ages 18 and 70.    Single High Sensitivity Troponin T [[id number]]  (Normal) Collected: 07/11/24 1805    Specimen: Blood Updated: 07/11/24 1828     HS Troponin T 14 ng/L     Narrative:      High Sensitive Troponin T Reference Range:  <14.0 ng/L- Negative Female for AMI  <22.0 ng/L- Negative Male for AMI  >=14 - Abnormal Female indicating possible myocardial injury.  >=22 - Abnormal Male indicating possible myocardial injury.   Clinicians would have to  utilize clinical acumen, EKG, Troponin, and serial changes to determine if it is an Acute Myocardial Infarction or myocardial injury due to an underlying chronic condition.         Protime-INR [[id number]]  (Normal) Collected: 07/11/24 1805    Specimen: Blood Updated: 07/11/24 1821     Protime 13.2 Seconds      INR 1.00    Narrative:      Therapeutic Ranges for INR: 2.0-3.0 (PT 20-30)                              2.5-3.5 (PT 25-34)    aPTT [[id number]]  (Abnormal) Collected: 07/11/24 1805    Specimen: Blood Updated: 07/11/24 1821     PTT 22.2 seconds     Narrative:      PTT = The equivalent PTT values for the therapeutic range of heparin levels at 0.1 to 0.7 U/ml are 53 to 110 seconds.      [place] Draw [[id number]] Collected: 07/11/24 1805    Specimen: Blood Updated: 07/11/24 1815    Narrative:      The following orders were created for panel order [place] Draw.  Procedure                               Abnormality         Status                     ---------                               -----------         ------                     Green Top (Gel)[[id number]]                                  Final result               Lavender Top[[id number]]                                     Final result               Gold Top - SST[[id number]]                                   Final result               Light Blue Top[[id number]]                                   Final result                 Please view results for these tests on the individual orders.    Green Top (Gel) [[id number]] Collected: 07/11/24 1805    Specimen: Blood Updated: 07/11/24 1815     Extra Tube Hold for add-ons.     Comment: Auto resulted.       Lavender Top [[id number]] Collected: 07/11/24 1805    Specimen: Blood Updated: 07/11/24 1815     Extra Tube hold for add-on     Comment: Auto resulted       Gold Top - SST [[id number]] Collected: 07/11/24 1805    Specimen: Blood Updated: 07/11/24 1815     Extra Tube Hold for add-ons.     Comment: Auto resulted.       Light  Blue Top [[id number]] Collected: 07/11/24 1805    Specimen: Blood Updated: 07/11/24 1815     Extra Tube Hold for add-ons.     Comment: Auto resulted       CBC & Differential [[id number]]  (Normal) Collected: 07/11/24 1805    Specimen: Blood Updated: 07/11/24 1810    Narrative:      The following orders were created for panel order CBC & Differential.  Procedure                               Abnormality         Status                     ---------                               -----------         ------                     CBC Auto Differential[[id number]]        Normal              Final result                 Please view results for these tests on the individual orders.    CBC Auto Differential [[id number]]  (Normal) Collected: 07/11/24 1805    Specimen: Blood Updated: 07/11/24 1810     WBC 5.65 10*3/mm3      RBC 4.29 10*6/mm3      Hemoglobin 14.0 g/dL      Hematocrit 40.3 %      MCV 93.9 fL      MCH 32.6 pg      MCHC 34.7 g/dL      RDW 12.6 %      RDW-SD 43.5 fl      MPV 9.7 fL      Platelets 194 10*3/mm3      Neutrophil % 48.0 %      Lymphocyte % 36.6 %      Monocyte % 12.0 %      Eosinophil % 2.5 %      Basophil % 0.7 %      Immature Grans % 0.2 %      Neutrophils, Absolute 2.71 10*3/mm3      Lymphocytes, Absolute 2.07 10*3/mm3      Monocytes, Absolute 0.68 10*3/mm3      Eosinophils, Absolute 0.14 10*3/mm3      Basophils, Absolute 0.04 10*3/mm3      Immature Grans, Absolute 0.01 10*3/mm3      nRBC 0.0 /100 WBC           Imaging Results (Most Recent)       Procedure Component Value Units Date/Time    US Carotid Bilateral [[id number]] Collected: 07/12/24 1032     Updated: 07/12/24 1040    Narrative:      US CAROTID BILATERAL    Date of Exam: 7/12/2024 9:53 AM EDT    Indication: soft plaques.    Comparison: CT angiogram of the neck 7/11/2024    Technique: Grayscale, color-flow, and spectral imaging was obtained of the bilateral carotid and vertebral arteries.      Findings:  The peak right common carotid artery velocity  is 100 cm/s. The peak right internal carotid artery velocity is 96 cm/s. The IC/CC ratio on the right carotid bifurcation is 0.96. Grayscale and color flow imaging shows plaque with shadowing primarily in the   proximal right internal carotid artery. There is no evidence of flow acceleration. The right vertebral artery is patent with antegrade flow.    The peak left common carotid artery velocity is 100 cm/s. The peak left internal carotid artery velocity is 112 cm/s. The IC/CC ratio is 1.12. Grayscale and color flow imaging shows plaque in the left carotid bulb extending into the proximal ICA without   a definite flow limiting stenosis. The left vertebral artery is patent with antegrade flow.      Impression:      Impression:    1. Atherosclerotic plaque in both carotid bifurcations extending into both internal carotid arteries. The degree of narrowing is less than 50% by consensus panel criteria.  2. Patent vertebral arteries bilaterally with antegrade flow.        Electronically Signed: Krunal Lagos MD    7/12/2024 10:35 AM EDT    Workstation ID: [id number]    MRI Brain Without Contrast [[id number]] Collected: 07/12/24 0958     Updated: 07/12/24 1012    Narrative:      MRI BRAIN WO CONTRAST    Date of Exam: 7/12/2024 9:54 AM EDT    Indication: Stroke, follow up  stroke protocol.     Comparison: CTA head stroke study 7/11/2022    Technique:  Routine multiplanar/multisequence sequence images of the brain were obtained without contrast administration.      Findings:  There are multiple small punctate foci of restricted diffusion in the right corona radiata consistent with small acute infarcts.    There is no evidence of acute or chronic intracranial hemorrhage. No mass effect or midline shift. No abnormal extra-axial collections.     The basal ganglia, brainstem and cerebellum appear within normal limits. Midline structures are intact. There is age-related diffuse cortical atrophy. There are prominent patchy white  matter hyperintensities in keeping with chronic microvascular ischemic   disease. There is encephalomalacia and gliosis in the left occipital lobe related to prior infarct. Remote lacunar infarct of the right thalamus.    Calvarial and superficial soft tissue signal is within normal limits. Orbits appear unremarkable. There is trace mucosal thickening in the bilateral ethmoid air cells. The mastoid air cells are clear.        Impression:      Impression:  1. Multiple small acute infarcts of the right corona radiata.  2. Age-related atrophy, chronic microvascular ischemic disease and remote left occipital lobe infarct.        Electronically Signed: Madelyn Dale MD    7/12/2024 10:09 AM EDT    Workstation ID: [id number]    CT Angiogram Head w AI Analysis of LVO [[id number]] Collected: 07/11/24 1922     Updated: 07/11/24 1934    Narrative:      CT ANGIOGRAM HEAD W AI ANALYSIS OF LVO, CT ANGIOGRAM NECK    Date of Exam: 7/11/2024 6:24 PM EDT    Indication: Neuro Deficit, acute, Stroke suspected  Neuro deficit, acute stroke suspected.    Comparison: None available.    Technique: CTA of the head and neck was performed after the uneventful intravenous administration of iodinated contrast. Reconstructed coronal and sagittal images were also obtained. In addition, a 3-D volume rendered image was created for   interpretation. Automated exposure control and iterative reconstruction methods were used.    Findings: The common carotid arteries are normal. Moderate atheromatous disease of the proximal right internal carotid artery without significant stenosis. The extracranial right internal carotid artery is normal otherwise. The intracranial segments of   the left internal carotid arteries are normal. The right carotid terminus is normal. Focal severe stenosis of the right M2 branch seen on axial image #278 series 6 which is nearly 60% stenosis completely occluded. Otherwise the visualized segments of the   right anterior and  middle cerebral arteries are normal.    The left common carotid artery is normal. Moderate atheromatous disease of the left carotid bulb and proximal left internal carotid artery with a minimal residual vessel lumen diameter of 4 mm and a native vessel lumen diameter of 5 mm consistent with   approximately 20% stenosis per NASCET criteria.  Mild atheromatous disease involving the intracranial segments of the internal carotid arteries. The left carotid terminus is normal. The visualized branches of the left anterior and middle cerebral arteries appear normal.    Both vertebral arteries arise from the subclavian arteries. The right vertebral artery is dominant. Left vertebral artery supplies the basilar artery. The basilar artery and basilar artery tip are normal. The basilar artery terminates in bilateral   posterior cerebral arteries which appear normal.    The lung apices are clear. Mild emphysematous changes of the lung apices. The thyroid gland is normal. No cervical adenopathy. The visualized parotid and submandibular glands are normal.  Paranasal sinuses are clear. Bilateral lens replacements. No abnormal intracranial enhancement.      Impression:      Focal severe stenosis of the right M2 branch with minimal thready flow noted in the region. No vessel occlusion.      Electronically Signed: Sang Silva MD    7/11/2024 7:31 PM EDT    Workstation ID: [id number]    CT Angiogram Neck [[id number]] Collected: 07/11/24 1922     Updated: 07/11/24 1934    Narrative:      CT ANGIOGRAM HEAD W AI ANALYSIS OF LVO, CT ANGIOGRAM NECK    Date of Exam: 7/11/2024 6:24 PM EDT    Indication: Neuro Deficit, acute, Stroke suspected  Neuro deficit, acute stroke suspected.    Comparison: None available.    Technique: CTA of the head and neck was performed after the uneventful intravenous administration of iodinated contrast. Reconstructed coronal and sagittal images were also obtained. In addition, a 3-D volume rendered image was  created for   interpretation. Automated exposure control and iterative reconstruction methods were used.    Findings: The common carotid arteries are normal. Moderate atheromatous disease of the proximal right internal carotid artery without significant stenosis. The extracranial right internal carotid artery is normal otherwise. The intracranial segments of   the left internal carotid arteries are normal. The right carotid terminus is normal. Focal severe stenosis of the right M2 branch seen on axial image #278 series 6 which is nearly 60% stenosis completely occluded. Otherwise the visualized segments of the   right anterior and middle cerebral arteries are normal.    The left common carotid artery is normal. Moderate atheromatous disease of the left carotid bulb and proximal left internal carotid artery with a minimal residual vessel lumen diameter of 4 mm and a native vessel lumen diameter of 5 mm consistent with   approximately 20% stenosis per NASCET criteria.  Mild atheromatous disease involving the intracranial segments of the internal carotid arteries. The left carotid terminus is normal. The visualized branches of the left anterior and middle cerebral arteries appear normal.    Both vertebral arteries arise from the subclavian arteries. The right vertebral artery is dominant. Left vertebral artery supplies the basilar artery. The basilar artery and basilar artery tip are normal. The basilar artery terminates in bilateral   posterior cerebral arteries which appear normal.    The lung apices are clear. Mild emphysematous changes of the lung apices. The thyroid gland is normal. No cervical adenopathy. The visualized parotid and submandibular glands are normal.  Paranasal sinuses are clear. Bilateral lens replacements. No abnormal intracranial enhancement.      Impression:      Focal severe stenosis of the right M2 branch with minimal thready flow noted in the region. No vessel occlusion.      Electronically  Signed: Sang Silva MD    7/11/2024 7:31 PM EDT    Workstation ID: [id number]    XR Chest 1 View [[id number]] Collected: 07/11/24 1838     Updated: 07/11/24 1841    Narrative:      XR CHEST 1 VW    Date of Exam: 7/11/2024 6:21 PM EDT    Indication: Acute Stroke Protocol (Onset < 12 hrs)    Comparison: 1/21/2024, 9/14/2022, 10/10/2020    FINDINGS:  No new consolidations or pleural effusions are observed. The cardiac silhouette and mediastinum are stable. No acute osseous abnormalities are identified.      Impression:      There is no significant change when compared to the prior study. There is no evidence for acute cardiopulmonary process.        Electronically Signed: Domingo Cabrera MD    7/11/2024 6:38 PM EDT    Workstation ID: [id number]    CT CEREBRAL PERFUSION WITH & WITHOUT CONTRAST [[id number]] Collected: 07/11/24 1835     Updated: 07/11/24 1840    Narrative:      CT CEREBRAL PERFUSION W WO CONTRAST    Date of Exam: 7/11/2024 6:25 PM EDT    Indication: Neuro deficit, acute, stroke suspected  Neuro Deficit, acute, Stroke suspected.     Comparison: None available.    Technique: Axial CT images of the brain were obtained prior to and after the administration of iodinated contrast. CT Perfusion protocol was utilized. Automated post processing was performed by RAPID software and submitted to PACS for interpretation.   Automated exposure control and iterative reconstruction was utilized.    Findings: There is approximately 74 cc of patchy bilateral areas of mildly elevated Tmax. No decreased cerebral blood flow or blood volume.      Impression:      No evidence of infarct.      Electronically Signed: Sang Silva MD    7/11/2024 6:36 PM EDT    Workstation ID: [id number]    CT Head Without Contrast Stroke Protocol [[id number]] Collected: 07/11/24 1817     Updated: 07/11/24 1825    Narrative:      CT HEAD WO CONTRAST STROKE PROTOCOL    Date of Exam: 7/11/2024 6:06 PM EDT    Indication: Neuro deficit, acute, stroke  suspected  Neuro Deficit, acute, Stroke suspected.    Comparison: 12/29/2023    Technique: Axial CT images were obtained of the head without contrast administration.  Reconstructed coronal and sagittal images were also obtained. Automated exposure control and iterative construction methods were used.    Scan Time: 1812 hours  Results discussed with the emergency room at 6:19 p.m. on 7/11/2024.      FINDINGS:  There is no evidence for acute intracranial hemorrhage. No definitive acute focal ischemia is identified. Nonspecific white matter changes are noted likely related to chronic small vessel ischemic changes and age-related changes. Associated diffuse   volume loss is observed. Remote infarction is noted involving the inferior aspect of the left parietal-occipital region. There is also evidence for remote infarction involving the region of the right basal ganglia and right thalamus. These changes are   stable. There is no evidence for abnormal cerebral edema. There is no mass effect or midline shift. The ventricular system is nondilated. The basilar cisterns are patent. The skull is intact. The paranasal sinuses and mastoid air cells are clear.      Impression:      1.No evidence for acute intracranial abnormality.  2.Nonspecific white matter changes are noted with associated diffuse volume loss. These findings are likely related to chronic small vessel ischemic changes and/or age-related changes.  3.Multifocal remote infarcts are again noted which appear stable.          Electronically Signed: Domingo Cabrera MD    7/11/2024 6:22 PM EDT    Workstation ID: [id number]            PROCEDURES      Condition on Discharge:  ***    Physical Exam at Discharge  Vital Signs  Temp:  [97.5 °F (36.4 °C)-98 °F (36.7 °C)] 97.5 °F (36.4 °C)  Heart Rate:  [57-69] 62  Resp:  [16-20] 18  BP: (120-170)/(67-80) 125/67    Physical Exam:  Physical Exam   Constitutional: Patient appears well-developed and well-nourished and in no acute  distress   HEENT:   Head: Normocephalic and atraumatic.   Eyes:  Pupils are equal, round, and reactive to light. EOM are intact. Sclera are anicteric and non-injected.  Mouth and Throat: Patient has moist mucous membranes. Oropharynx is clear of any erythema or exudate.     Neck: Neck supple. No JVD present. No thyromegaly present. No lymphadenopathy present.  Cardiovascular: Regular rate, regular rhythm, S1 normal and S2 normal.  Exam reveals no gallop and no friction rub.  No murmur heard.  Pulmonary/Chest: Lungs are clear to auscultation bilaterally. No respiratory distress. No wheezes. No rhonchi. No rales.   Abdominal: Soft. Bowel sounds are normal. No distension and no mass. There is no hepatosplenomegaly. There is no tenderness.   Musculoskeletal: Normal Muscle tone  Extremities: No edema. Pulses are palpable in all 4 extremities.  Neurological: Patient is alert and oriented to person, place, and time. Cranial nerves II-XII are grossly intact with no focal deficits.  Skin: Skin is warm. No rash noted. Nails show no clubbing.  No cyanosis or erythema.    Discharge Disposition  ***    Visiting Nurse:    {YES/NO:200010}    Home PT/OT:  {YES/NO:200010}    Home Safety Evaluation:  {YES/NO:200010}    DME  ***    Discharge Diet:      Dietary Orders (From admission, onward)       Start     Ordered    07/11/24 2123  Diet: Cardiac; Healthy Heart (2-3 Na+); Fluid Consistency: Thin (IDDSI 0)  Diet Effective Now        References:    Diet Order [facility]   Question Answer Comment   Diets: Cardiac    Cardiac Diet: Healthy Heart (2-3 Na+)    Fluid Consistency: Thin (IDDSI 0)        07/11/24 2123                    Activity at Discharge:  ***    Pre-discharge education  {Discharge Education:92551}      Follow-up Appointments  Future Appointments   Date Time Provider Department Center   9/27/2024 11:00 AM Yemi Hubbard MD [facility]   10/25/2024  8:40 AM [facility]   10/30/2024  9:45 AM  Deena Tarango MD [facility]   4/22/2025 11:00 AM Christopher Sawant MD [facility]     Additional Instructions for the Follow-ups that You Need to Schedule       Discharge Follow-up with PCP   As directed       Currently Documented PCP:    Deena Tarango MD    PCP Phone Number:    [phone]     Follow Up Details: 1 week        Discharge Follow-up with Specified Provider: Dr. Talbert; 2 Weeks   As directed      To: Dr. Talbert   Follow Up: 2 Weeks        Referral to Speech Therapy   As directed      Follow-up needed: Yes                Test Results Pending at Discharge       Nael Burr MD  07/12/24  15:33 EDT    Time: {Time spent:401257926} (if over 30 minutes give explanation as to why it took greater than 30 minutes)

## 2024-07-12 NOTE — THERAPY DISCHARGE NOTE
Acute Care - Occupational Therapy Discharge   Angelica Alatorre    Patient Name: Ronnell Rizvi  : 1950    MRN: [id number]                              Today's Date: 2024       Admit Date: 2024    Visit Dx:     ICD-10-CM ICD-9-CM   1. Slurred speech  R47.81 784.59     Patient Active Problem List   Diagnosis    Anemia, unspecified    Vitamin B12 deficiency    Chronic fatigue    Benign prostatic hypertrophy (BPH) with weak urinary stream    Chronic bronchitis    ELIZABETH (obstructive sleep apnea)    Sinoatrial block    Cerebral aneurysm, nonruptured    CVA (cerebral vascular accident)    TIA (transient ischemic attack)    Intracranial vascular stenosis    Sinus pause    S/P percutaneous patent foramen ovale closure    TIA on medication    HTN (hypertension)    COPD (chronic obstructive pulmonary disease)    Nocturnal seizures    Encounter for screening for malignant neoplasm of colon    Seizure    HL (hearing loss)    Pre-syncope    Mixed hyperlipidemia    History of stroke    Melena    Irritable bowel syndrome with both constipation and diarrhea    Gastroesophageal reflux disease    Pneumonia    Slurred speech     Past Medical History:   Diagnosis Date    Acquired dyslexia     pt  reports after 3rd stroke    Allergic     Allergic rhinitis     Anal fissure     Aortic insufficiency     moderate, THIERRY 2017    Asthma     Asthma     Benign prostatic hypertrophy (BPH) with weak urinary stream 2016    Cataract     Chronic bronchitis     Colon polyp     COPD (chronic obstructive pulmonary disease)     Emphysema lung     Falls frequently     Fatty liver     GERD (gastroesophageal reflux disease)     Headache     Hepatitis     thought to be Hepatitis A     History of pneumonia     With left lower lobe atelectasis and scar tissue, being followed    HL (hearing loss)     Hypertension     Low back pain     Macular degeneration     Memory loss     Mixed hyperlipidemia 2023    Obstructive sleep apnea syndrome  08/23/2017    refuses c-pap    PFO (patent foramen ovale)     s/p percutaneous closure with a 25mm Amplatzer device in December 2018    Pneumonia     LLL with scar tissue    Seizures     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 total of 3 strokes    Tremor     Comes & goes    Vision loss      Past Surgical History:   Procedure Laterality Date    CARDIAC CATHETERIZATION N/A 12/12/2018    Procedure: Patent foramen ovale closure- [employer];  Surgeon: Deangelo Harris MD;  Location:  [facility];  Service: Cardiology    CARDIAC CATHETERIZATION N/A 12/12/2018    Procedure: Intracardiac echocardiogram;  Surgeon: Deangelo Harris MD;  Location:  [facility];  Service: Cardiology    CARDIAC ELECTROPHYSIOLOGY PROCEDURE N/A 03/26/2018    Procedure: Loop insertion   CONFIRM RX;  Surgeon: Luis Wyatt MD;  Location:  [facility];  Service: Cardiovascular    CARDIAC ELECTROPHYSIOLOGY PROCEDURE N/A 07/15/2020    Procedure: Loop recorder removal;  Surgeon: Starr Driscoll MD;  Location:  [facility];  Service: Cardiovascular;  Laterality: N/A;    CARDIAC SURGERY      CATARACT EXTRACTION Bilateral     COLONOSCOPY      COLONOSCOPY N/A 10/09/2020    Procedure: COLONOSCOPY with polypectomy;  Surgeon: Ras Mendoza MD;  Location:  [facility];  Service: Gastroenterology;  Laterality: N/A;  diverticulosis  ascending polyp  transverse polyp  sigmoid polyps X 2  rectal polyp    EYE SURGERY      HAND SURGERY Bilateral     INGUINAL HERNIA REPAIR Left     OTHER SURGICAL HISTORY      inguinal hernia repair for person over age 5    TONSILLECTOMY      TONSILLECTOMY        General Information       Row Name 07/12/24 0858          OT Time and Intention    Document Type discharge evaluation/summary  -EN     Mode of Treatment occupational therapy  -EN       Row Name 07/12/24 0858          General  Information    Patient Profile Reviewed yes  -EN     Prior Level of Function independent:;all household mobility;ADL's  -EN     Existing Precautions/Restrictions no known precautions/restrictions  -EN     Barriers to Rehab none identified  -EN       Row Name 07/12/24 0858          Living Environment    People in Home spouse  spouse retired and able to assist as needed  -EN       Row Name 07/12/24 0858          Home Main Entrance    Number of Stairs, Main Entrance none  -EN       Row Name 07/12/24 0858          Cognition    Orientation Status (Cognition) oriented x 3  -EN               User Key  (r) = Recorded By, (t) = Taken By, (c) = Cosigned By      Initials Name Provider Type    EN Louise Dumont OTR Occupational Therapist                   Mobility/ADL's       Row Name 07/12/24 0900          Bed Mobility    Bed Mobility supine-sit  -EN     Supine-Sit [place] (Bed Mobility) modified independence  -EN     Assistive Device (Bed Mobility) head of bed elevated  -EN       Row Name 07/12/24 0900          Transfers    Transfers sit-stand transfer  -EN       Row Name 07/12/24 0900          Sit-Stand Transfer    Sit-Stand [place] (Transfers) independent  -EN     Assistive Device (Sit-Stand Transfers) other (see comments)  No AD  -EN       Row Name 07/12/24 0900          Functional Mobility    Functional Mobility- Ind. Level supervision required  -EN     Functional Mobility-Distance (Feet) 100  -EN       Row Name 07/12/24 0900          Activities of Daily Living    BADL Assessment/Intervention lower body dressing  -EN       Row Name 07/12/24 0900          Lower Body Dressing Assessment/Training    [place] Level (Lower Body Dressing) lower body dressing skills;independent  -EN               User Key  (r) = Recorded By, (t) = Taken By, (c) = Cosigned By      Initials Name Provider Type    Louise Hunt OTR Occupational Therapist                   Obj/Interventions       Row Name 07/12/24 0901           Sensory Assessment (Somatosensory)    Sensory Assessment (Somatosensory) UE sensation intact  -EN       Row Name 07/12/24 0901          Range of Motion Comprehensive    General Range of Motion bilateral upper extremity ROM WFL  -EN       Row Name 07/12/24 0901          Strength Comprehensive (MMT)    Comment, General Manual Muscle Testing (MMT) Assessment B UE strength WFL  -EN       Row Name 07/12/24 0901          Balance    Comment, Balance ind with dynamic sitting balance, ind with standing balance  -EN               User Key  (r) = Recorded By, (t) = Taken By, (c) = Cosigned By      Initials Name Provider Type    Luoise Hunt OTR Occupational Therapist                   Goals/Plan    No documentation.                  Clinical Impression       Row Name 07/12/24 0904          Pain Assessment    Pretreatment Pain Rating 0/10 - no pain  -EN     Posttreatment Pain Rating 0/10 - no pain  -EN       Row Name 07/12/24 0904          Plan of Care Review    Plan of Care Reviewed With patient  -EN     Outcome Evaluation OT evaluation completed. Patient alert and oriented X 3 however speech was difficult to understand. Patient reported he feels back to baseline with exception of speech.   Patient performed supine to sit with modified independence and demonstrated ind with LBD. Patient performed sit to stand independently and functional mobility X 100 without a device with supervision.  Patient plans to return home with spouse and has no skilled OT needs at this time.  -EN       Row Name 07/12/24 0904          Therapy Assessment/Plan (OT)    Criteria for Skilled Therapeutic Interventions Met (OT) no problems identified which require skilled intervention  -EN     Therapy Frequency (OT) evaluation only  -EN       Row Name 07/12/24 0904          Therapy Plan Review/Discharge Plan (OT)    Anticipated Discharge Disposition (OT) home  -EN       Row Name 07/12/24 0904          Positioning and Restraints     Pre-Treatment Position in bed  -EN     Post Treatment Position chair  -EN     In Chair notified nsg;sitting;call light within reach;encouraged to call for assist  -EN               User Key  (r) = Recorded By, (t) = Taken By, (c) = Cosigned By      Initials Name Provider Type    Louise Hunt OTR Occupational Therapist                   Outcome Measures       Row Name 07/12/24 0915          How much help from another is currently needed...    Putting on and taking off regular lower body clothing? 4  -EN     Bathing (including washing, rinsing, and drying) 4  -EN     Toileting (which includes using toilet bed pan or urinal) 4  -EN     Putting on and taking off regular upper body clothing 4  -EN     Taking care of personal grooming (such as brushing teeth) 4  -EN     Eating meals 4  -EN     AM-PAC 6 Clicks Score (OT) 24  -EN       Row Name 07/12/24 0919          Modified Kingsbury Scale    Pre-Stroke Modified Kingsbury Scale 0 - No Symptoms at all.  -EN     Modified Kingsbury Scale 1 - No significant disability despite symptoms.  Able to carry out all usual duties and activities.  -EN       Row Name 07/12/24 0919 07/12/24 0915       Functional Assessment    Outcome Measure Options Modified [place]  -EN AM-PAC 6 Clicks Daily Activity (OT)  -EN              User Key  (r) = Recorded By, (t) = Taken By, (c) = Cosigned By      Initials Name Provider Type    Louise Hunt OTR Occupational Therapist                  Occupational Therapy Education       Title: PT OT SLP Therapies (Resolved)       Topic: Occupational Therapy (Resolved)       Point: ADL training (Resolved)       Description:   Instruct learner(s) on proper safety adaptation and remediation techniques during self care or transfers.   Instruct in proper use of assistive devices.                  Learning Progress Summary             Patient Acceptance, E, VU by SONIA at 7/12/2024 0916                                         User Key       Initials  Effective Dates Name Provider Type Discipline    EN 06/16/21 -  Louise Dumont OTR Occupational Therapist OT                  OT Recommendation and Plan  Therapy Frequency (OT): evaluation only  Plan of Care Review  Plan of Care Reviewed With: patient  Outcome Evaluation: OT evaluation completed. Patient alert and oriented X 3 however speech was difficult to understand. Patient reported he feels back to baseline with exception of speech.   Patient performed supine to sit with modified independence and demonstrated ind with LBD. Patient performed sit to stand independently and functional mobility X 100 without a device with supervision.  Patient plans to return home with spouse and has no skilled OT needs at this time.       Time Calculation:   Evaluation Complexity (OT)  Review Occupational Profile/Medical/Therapy History Complexity: brief/low complexity  Assessment, Occupational Performance/Identification of Deficit Complexity: 1-3 performance deficits  Clinical Decision Making Complexity (OT): problem focused assessment/low complexity     Time Calculation- OT       Row Name 07/12/24 0918             Time Calculation- OT    OT Start Time 0821  -EN      OT Stop Time 0831  -EN      OT Time Calculation (min) 10 min  -EN                User Key  (r) = Recorded By, (t) = Taken By, (c) = Cosigned By      Initials Name Provider Type    EN Louise Dumont OTR Occupational Therapist                  Therapy Charges for Today       Code Description Service Date Service Provider Modifiers Qty    [id number]  OT EVAL LOW COMPLEXITY 1 7/12/2024 Louise Dumont OTR GO 1               OT Discharge Summary  Anticipated Discharge Disposition (OT): home    DULCE Shay  7/12/2024

## 2024-07-12 NOTE — THERAPY EVALUATION
Acute Care - Speech Language Pathology Initial Evaluation  [place] [facility]     Patient Name: Ronnell Rizvi  : 1950  MRN: [id number]  Today's Date: 2024               Admit Date: 2024     Visit Dx:    ICD-10-CM ICD-9-CM   1. Slurred speech  R47.81 784.59   2. Dysarthria due to acute cerebrovascular accident (CVA)  I63.9 434.91    R47.1 784.51     Patient Active Problem List   Diagnosis    Anemia, unspecified    Vitamin B12 deficiency    Chronic fatigue    Benign prostatic hypertrophy (BPH) with weak urinary stream    Chronic bronchitis    ELIZABETH (obstructive sleep apnea)    Sinoatrial block    Cerebral aneurysm, nonruptured    CVA (cerebral vascular accident)    TIA (transient ischemic attack)    Intracranial vascular stenosis    Sinus pause    S/P percutaneous patent foramen ovale closure    TIA on medication    HTN (hypertension)    COPD (chronic obstructive pulmonary disease)    Nocturnal seizures    Encounter for screening for malignant neoplasm of colon    Seizure    HL (hearing loss)    Pre-syncope    Mixed hyperlipidemia    History of stroke    Melena    Irritable bowel syndrome with both constipation and diarrhea    Gastroesophageal reflux disease    Pneumonia    Slurred speech     Past Medical History:   Diagnosis Date    Acquired dyslexia     pt  reports after 3rd stroke    Allergic     Allergic rhinitis     Anal fissure     Aortic insufficiency     moderate, THIERRY 2017    Asthma     Asthma     Benign prostatic hypertrophy (BPH) with weak urinary stream 2016    Cataract     Chronic bronchitis     Colon polyp     COPD (chronic obstructive pulmonary disease)     Emphysema lung     Falls frequently     Fatty liver     GERD (gastroesophageal reflux disease)     Headache     Hepatitis     thought to be Hepatitis A     History of pneumonia     With left lower lobe atelectasis and scar tissue, being followed    HL (hearing loss)     Hypertension     Low back pain     Macular degeneration      Memory loss     Mixed hyperlipidemia 01/30/2023    Obstructive sleep apnea syndrome 08/23/2017    refuses c-pap    PFO (patent foramen ovale)     s/p percutaneous closure with a 25mm Amplatzer device in December 2018    Pneumonia     LLL with scar tissue    Seizures     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 total of 3 strokes    Tremor     Comes & goes    Vision loss      Past Surgical History:   Procedure Laterality Date    CARDIAC CATHETERIZATION N/A 12/12/2018    Procedure: Patent foramen ovale closure- Abbott;  Surgeon: Deangelo Harris MD;  Location:  [facility];  Service: Cardiology    CARDIAC CATHETERIZATION N/A 12/12/2018    Procedure: Intracardiac echocardiogram;  Surgeon: Deangelo Harris MD;  Location:  [facility];  Service: Cardiology    CARDIAC ELECTROPHYSIOLOGY PROCEDURE N/A 03/26/2018    Procedure: Loop insertion   CONFIRM RX;  Surgeon: Lius Wyatt MD;  Location:  [facility];  Service: Cardiovascular    CARDIAC ELECTROPHYSIOLOGY PROCEDURE N/A 07/15/2020    Procedure: Loop recorder removal;  Surgeon: Starr Driscoll MD;  Location:  [facility];  Service: Cardiovascular;  Laterality: N/A;    CARDIAC SURGERY      CATARACT EXTRACTION Bilateral     COLONOSCOPY      COLONOSCOPY N/A 10/09/2020    Procedure: COLONOSCOPY with polypectomy;  Surgeon: Ras Mendoza MD;  Location: [facility] OR;  Service: Gastroenterology;  Laterality: N/A;  diverticulosis  ascending polyp  transverse polyp  sigmoid polyps X 2  rectal polyp    EYE SURGERY      HAND SURGERY Bilateral     INGUINAL HERNIA REPAIR Left     OTHER SURGICAL HISTORY      inguinal hernia repair for person over age 5    TONSILLECTOMY      TONSILLECTOMY         SLP Recommendation and Plan  SLP Diagnosis: mild-moderate, dysarthria (no aphasia or apraxia appreciated) (07/12/24 1129)              SLC  Criteria for Skilled Therapy Interventions Met: yes (07/12/24 1129)  Anticipated Discharge Disposition (SLP): home, anticipate therapy at next level of care (pt aware to request from PCP or outpt neurologist if problems persist over the next few days w/o improvement) (07/12/24 1129)  Demonstrates Need for Referral to Another Service: neurology, speech therapy (07/12/24 1129)     Therapy Frequency (SLP SLC): other (see comments) (recommend further therapy as outpt) (07/12/24 1129)                 Patient/Family Concerns, Anticipated Discharge Disposition (SLP): No current concerns voiced per pt or wife. (07/12/24 1129)              Plan of Care Reviewed With: patient, spouse (07/12/24 1422)  Outcome Evaluation: SLP: Pt demonstrates a mild to moderate dysarthria of speech. He demonstrates imprecise articulation at the conversational level. He is able to maintain precise articulation at the word and phrase level but declines in connected speech. Occasionally difficult to understand in conversation but understood approximately 90% of the time by an unfamiliar listener. Pt able to demonstrate use of compensatory strategies of slow rate, overexaggeration of tongue/lip movement and increased volume. Pt also able to demonstrate use of clarifcations when breakdowns occur.Recommendations: Outpatient speech therapy for dysarthria per PCP or outpatient neurologist. Pt and wife verbalize understanding. (07/12/24 1422)      SLP EVALUATION (Last 72 Hours)       SLP SLC Evaluation       Row Name 07/12/24 1129                   Communication Assessment/Intervention    Document Type evaluation  -AD        Subjective Information complains of  difficulty with his speech  -AD        Patient Observations alert;cooperative;agree to therapy  -AD        Patient/Family/Caregiver Comments/Observations Pt seen upright in a chair in his room with wife present. He was alert and cooperative.  -AD        Patient Effort good  -AD        Symptoms  Noted During/After Treatment none  -AD           General Information    Patient Profile Reviewed yes  -AD        Pertinent History Of Current Problem Pt is a 73 y/o male admitted due to slurred speech and suspected stroke. Hx of stroke in 2017 affecting his vision. Hx of seizure disorder as well. Priorr PFO with repair in 2018. Head imaging significant for focal severe stenosis of right M2 branch w/minimal thready flow, multifocal remote infarcts w/area of encephalomalacia and gliosis in left occipital lobe from prior infarct. New, multiple small acute infacts in the right corona radiata. Age related diffiuse cortical atrophy most likely due to chronic microvascular ischemic disease. Pt currently with noted left facial droop and dysarthric speech. Pt states it is worse today than yesterday. He lives at home with his wife and is mostly retired.  -AD        Precautions/Limitations, Vision vision impairment, bilaterally;other (see comments)  visual deficits r/t to stroke in 2017 w/left occipital lobe infarct  -AD        Precautions/Limitations, Hearing hearing aid, left;hearing aid, right;hearing impairment, bilaterally  needs increased volume at times  -AD        Prior Level of Function-Communication WFL  -AD        Plans/Goals Discussed with patient;agreed upon  -AD        Barriers to Rehab --  prior strokes  -AD        Patient's Goals for Discharge return to home;functional communication  -AD        Family Goals for Discharge functional communication  -AD        Standardized Assessment Used Western Aphasia Battery  Bedside  -AD           Pain    Additional Documentation --  no current pain reported  -AD           Comprehension Assessment/Intervention    Comprehension Assessment/Intervention Auditory Comprehension;Reading Comprehension  -AD           Auditory Comprehension Assessment/Intervention    Auditory Comprehension (Communication) WFL  -AD        Able to Identify Objects/Pictures (Communication) familiar  objects;WFL  -AD        Answers Questions (Communication) yes/no;wh questions;personal;simple;WFL  -AD        Able to Follow Commands (Communication) 1-step;2-step;3-step;WFL  -AD        Successful Auditory Strategies (Communication) repetition  due to hearing loss  -AD           Reading Comprehension Assessment/Intervention    Reading Comprehension (Communication) mild impairment  -AD        Single Word Level WFL  -AD        Phrase Level WFL  -AD        Functional Reading Tasks WFL;mild impairment  -AD        Reading Comprehension, Comment Pt is able to read but is labored and takes extra time to perform. He is able to read simple instructions and follow written commands. He struggles with words occasionally requesting assistance with reading one time. Deficits are chronic and related to stroke in the left occipital lobe in 2017.  -AD           Expression Assessment/Intervention    Expression Assessment/Intervention verbal expression  -AD           Verbal Expression Assessment/Intervention    Verbal Expression WFL  -AD        Automatic Speech (Communication) response to greeting;WFL  -AD        Repetition words;phrases;sentences;WFL  -AD        Confrontational Naming high frequency;WFL  -AD        Spontaneous/Functional Words simple;complex;WFL;mild impairment;delayed responses;other (see comments)  mild errors/hesitation in coming up with a word or name, but usually able to do so with time.  -AD        Conversational Discourse/Fluency WFL;mild impairment;delayed responses  -AD        Verbal Expression, Comment Mild slow responses and difficulty coming up with names of people or a word occasionally.  -AD           Oral Motor Structure and Function    Oral Motor Structure and Function mild impairment  -AD        Oral Lesions or Structural Abnormalities and/or variants none  -AD        Dentition Assessment natural, present and adequate  -AD        Mucosal Quality moist, healthy  -AD           Oral Musculature and  Cranial Nerve Assessment    Oral Motor General Assessment oral labial or buccal impairment;lingual impairment  -AD        Oral Labial or Buccal Impairment, Detail, Cranial Nerve VII (Facial): left labial droop  -AD        Lingual Impairment, Detail. Cranial Nerves IX, XII (Glossopharyngeal and Hypoglossal) other (see comments)  reduced back of tongue movement  -AD        Oral Motor, Comment Mild droop of left lower face but able to achieve functional ROM with movement; back of tongue weakness noted with CANDY (p^t^k^). Functional for anterior tongue movement, strength and ROM WFL of tongue.  -AD           Motor Speech Assessment/Intervention    Motor Speech Function mild impairment;moderate impairment;unfamiliar listener  -AD        Characteristics Consistent with Dysarthria slurred speech  imprecise articulation  -AD        Initiation of Phonation (Communication) voluntary;involuntary - (e.g. sneezing, laughing, yawning);WFL  -AD        Automatic Speech (Communication) response to greeting;WFL  -AD        Verbal Repetition (Communication) monosyllabic words;WFL;polysyllabic words;mild impairment  -AD        Conversational Speech (Communication) mild impairment;moderate impairment;simple;moderate complexity  -AD        Speech intelligibility 90%;in quiet environment;in connected speech;with unfamiliar listener  -AD        Motor Speech, Comment Pt demonstrates a mild to moderate dysarthria of speech. He demonstrates imprecise articulation at the conversational level. He is able to maintain precise articulation at the word and phrase level but declines in connected speech. Occasionally difficult to understand in conversation but understood approximately 90% of the time by an unfamiliar listener. Pt able to demonstrate use of compensatory strategies of slow rate, overexaggeration of tongue/lip movement and increased volume. Pt also able to demonstrate use of clarifcations when breakdowns occur.  -AD           Cognitive  Assessment Intervention- SLP    Orientation Status (Cognition) person;place;time;situation;WFL  -AD        Pragmatics (Communication) affect;awareness to non-verbal signals;awareness/response to humor;awareness/response to intonation/stress;initiation;eye contact;topic maintenance;turn taking;WFL  -AD           Standardized Tests    Formal Speech Language Tests WAB: Western Aphasia Battery  -AD           Cortical    WAB Comments WAB-R Bedside Record Form completed. Spontaneous Speech: Content 10pts; Spontaneous Speech: Fluency 9 points (some hesitations and word-finding difficulty; Auditory Verbal Comprehension: Yes/No Questions 10 points; Sequential Commands 10 points; Repetition 10 points; Object Naming 10 points, Reading 9 points (prior deficit from 2017 stroke).  -AD           SLP Evaluation Clinical Impressions    SLP Diagnosis mild-moderate;dysarthria  no aphasia or apraxia appreciated  -AD        Rehab Potential/Prognosis good  -AD        SLC Criteria for Skilled Therapy Interventions Met yes  -AD        Functional Impact functional impact in social situations;difficulty in expressing complex messages;restrictions in personal and social life  -AD           Recommendations    Therapy Frequency (SLP SLC) other (see comments)  recommend further therapy as outpt  -AD        Anticipated Discharge Disposition (SLP) home;anticipate therapy at next level of care  pt aware to request from PCP or outpt neurologist if problems persist over the next few days w/o improvement  -AD        Patient/Family Concerns, Anticipated Discharge Disposition (SLP) No current concerns voiced per pt or wife.  -AD        Demonstrates Need for Referral to Another Service neurology;speech therapy  -AD                  User Key  (r) = Recorded By, (t) = Taken By, (c) = Cosigned By      Initials Name Effective Dates    Sunshine Starr MS CCC-SLP 06/16/21 -                        EDUCATION  The patient has been educated in the following  areas:     Communication Impairment.  Pt and wife verbalizes understanding of dysarthria and follow up outpatient therapy. Pt demonstrates use of compensatory strategies of over-exaggeration of speech, increased intensity and slow rate/phrasing.         Time Calculation:      Time Calculation- SLP       Row Name 07/12/24 1423             Time Calculation- SLP    SLP Start Time 1129  -AD      SLP Stop Time 1233  -AD      SLP Time Calculation (min) 64 min  -AD      Total Timed Code Minutes- SLP 0 minute(s)  -AD      SLP Non-Billable Time (min) 0 min  -AD      SLP Received On 07/12/24  -AD         Timed Charges    [id number] of Aphasia 64  -AD         Total Minutes    Timed Charges Total Minutes 64  -AD       Total Minutes 64  -AD                User Key  (r) = Recorded By, (t) = Taken By, (c) = Cosigned By      Initials Name Provider Type    AD Sunshine Ballard, MS CCC-SLP Speech and Language Pathologist                    Therapy Charges for Today       Code Description Service Date Service Provider Modifiers Qty    [id number]  ST ASSESSMENT OF APHASIA PER HOUR 7/12/2024 Sunshine Ballard MS CCC-SLP GN 1                       Sunshine Ballard MS CCC-SLP  7/12/2024

## 2024-07-12 NOTE — PROGRESS NOTES
Stroke Progress Note       Chief Complaint:  slurred speech     Subjective    Subjective     Subjective:  Has mild speech hesitancy     Review of Systems   Constitutional: No fatigue  HENT: Negative for nosebleeds and rhinorrhea.    Eyes: Negative for redness.   Respiratory: Negative for cough.    Gastrointestinal: Negative for anal bleeding.   Endocrine: Negative for polydipsia.   Genitourinary: Negative for enuresis and urgency.   Musculoskeletal: Negative for joint swelling.   Neurological: Negative for tremors.   Psychiatric/Behavioral: Negative for hallucinations.      Objective      Temp:  [97.5 °F (36.4 °C)-98 °F (36.7 °C)] 97.5 °F (36.4 °C)  Heart Rate:  [57-69] 67  Resp:  [16-20] 20  BP: (120-170)/(71-80) 120/71          NEURO( Exam is performed with help of hospital staff at bed side and observed by me via audio-video interface)    MENTAL STATUS: AAOx3, memory intact, fund of knowledge appropriate    LANG/SPEECH: Naming and repetition intact, fluent, follows 3-step commands    CRANIAL NERVES:    Pupils equal and reactive, EOMI intact, no gaze deviation, no nystagmus  No facial droop, cough and gag intact, shoulder shrug intact, tongue midline     MOTOR:  Moves all extremities equally    SENSORY: Normal to light touch all throughout     COORDINATION: Normal finger to nose and heel to shin, no tremor, no dysmetria    STATION: Not assessed due to patient condition    GAIT: Not assessed due to patient condition       Results Review:    I reviewed the patient's new clinical results.    Lab Results (last 24 hours)       Procedure Component Value Units Date/Time    POC Glucose Once [[id number]]  (Normal) Collected: 07/12/24 0809    Specimen: Blood Updated: 07/12/24 0817     Glucose 93 mg/dL     Sedimentation Rate [[id number]]  (Normal) Collected: 07/12/24 0437    Specimen: Blood Updated: 07/12/24 0535     Sed Rate <1 mm/hr     TSH [[id number]]  (Normal) Collected: 07/12/24 0437    Specimen: Blood Updated: 07/12/24  0532     TSH 3.060 uIU/mL     Comprehensive Metabolic Panel [[id number]]  (Abnormal) Collected: 07/12/24 0437    Specimen: Blood Updated: 07/12/24 0532     Glucose 88 mg/dL      BUN 11 mg/dL      Creatinine 0.82 mg/dL      Sodium 140 mmol/L      Potassium 3.4 mmol/L      Chloride 105 mmol/L      CO2 26.3 mmol/L      Calcium 8.9 mg/dL      Total Protein 5.8 g/dL      Albumin 3.7 g/dL      ALT (SGPT) 13 U/L      AST (SGOT) 17 U/L      Alkaline Phosphatase 84 U/L      Total Bilirubin 0.2 mg/dL      Globulin 2.1 gm/dL      A/G Ratio 1.8 g/dL      BUN/Creatinine Ratio 13.4     Anion Gap 8.7 mmol/L      eGFR 91.6 mL/min/1.73     Narrative:      GFR Normal >60  Chronic Kidney Disease <60  Kidney Failure <15    The GFR formula is only valid for adults with stable renal function between ages 18 and 70.    Lipid Panel [[id number]]  (Abnormal) Collected: 07/12/24 0437    Specimen: Blood Updated: 07/12/24 0532     Total Cholesterol 203 mg/dL      Triglycerides 58 mg/dL      HDL Cholesterol 56 mg/dL      LDL Cholesterol  136 mg/dL      VLDL Cholesterol 11 mg/dL      LDL/HDL Ratio 2.42    Narrative:      Cholesterol Reference Ranges  ([place] Department of Health and Human Services ATP III Classifications)    Desirable          <200 mg/dL  Borderline High    200-239 mg/dL  High Risk          >240 mg/dL      Triglyceride Reference Ranges  (U.S. Department of Health and Human Services ATP III Classifications)    Normal           <150 mg/dL  Borderline High  150-199 mg/dL  High             200-499 mg/dL  Very High        >500 mg/dL    HDL Reference Ranges  (U.S. Department of Health and Human Services ATP III Classifications)    Low     <40 mg/dl (major risk factor for CHD)  High    >60 mg/dl ('negative' risk factor for CHD)        LDL Reference Ranges  (U.S. Department of Health and Human Services ATP III Classifications)    Optimal          <100 mg/dL  Near Optimal     100-129 mg/dL  Borderline High  130-159 mg/dL  High              160-189 mg/dL  Very High        >189 mg/dL    Phenytoin Level, Total [[id number]]  (Abnormal) Collected: 07/12/24 0437    Specimen: Blood Updated: 07/12/24 0532     Phenytoin Level 6.2 mcg/mL     aPTT [[id number]]  (Abnormal) Collected: 07/12/24 0437    Specimen: Blood Updated: 07/12/24 0516     PTT 22.9 seconds     Narrative:      PTT = The equivalent PTT values for the therapeutic range of heparin levels at 0.1 to 0.7 U/ml are 53 to 110 seconds.      Protime-INR [[id number]]  (Normal) Collected: 07/12/24 0437    Specimen: Blood Updated: 07/12/24 0516     Protime 13.6 Seconds      INR 1.04    Narrative:      Therapeutic Ranges for INR: 2.0-3.0 (PT 20-30)                              2.5-3.5 (PT 25-34)    Hemoglobin A1c [[id number]]  (Normal) Collected: 07/12/24 0437    Specimen: Blood Updated: 07/12/24 0509     Hemoglobin A1C 5.37 %     Narrative:      Hemoglobin A1C Ranges:    Increased Risk for Diabetes  5.7% to 6.4%  Diabetes                     >= 6.5%  Diabetic Goal                < 7.0%    CBC (No Diff) [[id number]]  (Normal) Collected: 07/12/24 0437    Specimen: Blood Updated: 07/12/24 0458     WBC 5.08 10*3/mm3      RBC 4.41 10*6/mm3      Hemoglobin 14.1 g/dL      Hematocrit 41.3 %      MCV 93.7 fL      MCH 32.0 pg      MCHC 34.1 g/dL      RDW 12.7 %      RDW-SD 43.8 fl      MPV 9.8 fL      Platelets 188 10*3/mm3     Vitamin B12 [[id number]] Collected: 07/12/24 0437    Specimen: Blood Updated: 07/12/24 0451    Folate [[id number]] Collected: 07/12/24 0437    Specimen: Blood Updated: 07/12/24 0451    POC Glucose Once [[id number]]  (Abnormal) Collected: 07/11/24 2354    Specimen: Blood Updated: 07/12/24 0000     Glucose 145 mg/dL     Comprehensive Metabolic Panel [[id number]] Collected: 07/11/24 1805    Specimen: Blood Updated: 07/11/24 1828     Glucose 95 mg/dL      BUN 11 mg/dL      Creatinine 0.96 mg/dL      Sodium 136 mmol/L      Potassium 3.6 mmol/L      Chloride 101 mmol/L      CO2 25.9 mmol/L      Calcium 9.1  mg/dL      Total Protein 6.1 g/dL      Albumin 3.7 g/dL      ALT (SGPT) 13 U/L      AST (SGOT) 17 U/L      Alkaline Phosphatase 92 U/L      Total Bilirubin 0.2 mg/dL      Globulin 2.4 gm/dL      A/G Ratio 1.5 g/dL      BUN/Creatinine Ratio 11.5     Anion Gap 9.1 mmol/L      eGFR 82.4 mL/min/1.73     Narrative:      GFR Normal >60  Chronic Kidney Disease <60  Kidney Failure <15    The GFR formula is only valid for adults with stable renal function between ages 18 and 70.    Single High Sensitivity Troponin T [[id number]]  (Normal) Collected: 07/11/24 1805    Specimen: Blood Updated: 07/11/24 1828     HS Troponin T 14 ng/L     Narrative:      High Sensitive Troponin T Reference Range:  <14.0 ng/L- Negative Female for AMI  <22.0 ng/L- Negative Male for AMI  >=14 - Abnormal Female indicating possible myocardial injury.  >=22 - Abnormal Male indicating possible myocardial injury.   Clinicians would have to utilize clinical acumen, EKG, Troponin, and serial changes to determine if it is an Acute Myocardial Infarction or myocardial injury due to an underlying chronic condition.         Protime-INR [[id number]]  (Normal) Collected: 07/11/24 1805    Specimen: Blood Updated: 07/11/24 1821     Protime 13.2 Seconds      INR 1.00    Narrative:      Therapeutic Ranges for INR: 2.0-3.0 (PT 20-30)                              2.5-3.5 (PT 25-34)    aPTT [[id number]]  (Abnormal) Collected: 07/11/24 1805    Specimen: Blood Updated: 07/11/24 1821     PTT 22.2 seconds     Narrative:      PTT = The equivalent PTT values for the therapeutic range of heparin levels at 0.1 to 0.7 U/ml are 53 to 110 seconds.      [place] Draw [[id number]] Collected: 07/11/24 1805    Specimen: Blood Updated: 07/11/24 1815    Narrative:      The following orders were created for panel order [place] Draw.  Procedure                               Abnormality         Status                     ---------                               -----------         ------                      Green Top (Gel)[[id number]]                                  Final result               Lavender Top[[id number]]                                     Final result               Gold Top - SST[[id number]]                                   Final result               Light Blue Top[[id number]]                                   Final result                 Please view results for these tests on the individual orders.    Green Top (Gel) [[id number]] Collected: 07/11/24 1805    Specimen: Blood Updated: 07/11/24 1815     Extra Tube Hold for add-ons.     Comment: Auto resulted.       Lavender Top [[id number]] Collected: 07/11/24 1805    Specimen: Blood Updated: 07/11/24 1815     Extra Tube hold for add-on     Comment: Auto resulted       Gold Top - SST [[id number]] Collected: 07/11/24 1805    Specimen: Blood Updated: 07/11/24 1815     Extra Tube Hold for add-ons.     Comment: Auto resulted.       Light Blue Top [[id number]] Collected: 07/11/24 1805    Specimen: Blood Updated: 07/11/24 1815     Extra Tube Hold for add-ons.     Comment: Auto resulted       CBC & Differential [[id number]]  (Normal) Collected: 07/11/24 1805    Specimen: Blood Updated: 07/11/24 1810    Narrative:      The following orders were created for panel order CBC & Differential.  Procedure                               Abnormality         Status                     ---------                               -----------         ------                     CBC Auto Differential[[id number]]        Normal              Final result                 Please view results for these tests on the individual orders.    CBC Auto Differential [[id number]]  (Normal) Collected: 07/11/24 1805    Specimen: Blood Updated: 07/11/24 1810     WBC 5.65 10*3/mm3      RBC 4.29 10*6/mm3      Hemoglobin 14.0 g/dL      Hematocrit 40.3 %      MCV 93.9 fL      MCH 32.6 pg      MCHC 34.7 g/dL      RDW 12.6 %      RDW-SD 43.5 fl      MPV 9.7 fL      Platelets 194 10*3/mm3      Neutrophil %  48.0 %      Lymphocyte % 36.6 %      Monocyte % 12.0 %      Eosinophil % 2.5 %      Basophil % 0.7 %      Immature Grans % 0.2 %      Neutrophils, Absolute 2.71 10*3/mm3      Lymphocytes, Absolute 2.07 10*3/mm3      Monocytes, Absolute 0.68 10*3/mm3      Eosinophils, Absolute 0.14 10*3/mm3      Basophils, Absolute 0.04 10*3/mm3      Immature Grans, Absolute 0.01 10*3/mm3      nRBC 0.0 /100 WBC     POC Glucose Once [[id number]]  (Normal) Collected: 07/11/24 1804    Specimen: Blood Updated: 07/11/24 1810     Glucose 100 mg/dL           CT Angiogram Head w AI Analysis of LVO    Result Date: 7/11/2024  Focal severe stenosis of the right M2 branch with minimal thready flow noted in the region. No vessel occlusion. Electronically Signed: Sang Silva MD  7/11/2024 7:31 PM EDT  Workstation ID: [id number]    CT Angiogram Neck    Result Date: 7/11/2024  Focal severe stenosis of the right M2 branch with minimal thready flow noted in the region. No vessel occlusion. Electronically Signed: Sang Silva MD  7/11/2024 7:31 PM EDT  Workstation ID: [id number]    XR Chest 1 View    Result Date: 7/11/2024  There is no significant change when compared to the prior study. There is no evidence for acute cardiopulmonary process. Electronically Signed: Domingo Cabrera MD  7/11/2024 6:38 PM EDT  Workstation ID: [id number]    CT CEREBRAL PERFUSION WITH & WITHOUT CONTRAST    Result Date: 7/11/2024  No evidence of infarct. Electronically Signed: Sang Silva MD  7/11/2024 6:36 PM EDT  Workstation ID: [id number]    CT Head Without Contrast Stroke Protocol    Result Date: 7/11/2024  1.No evidence for acute intracranial abnormality. 2.Nonspecific white matter changes are noted with associated diffuse volume loss. These findings are likely related to chronic small vessel ischemic changes and/or age-related changes. 3.Multifocal remote infarcts are again noted which appear stable. Electronically Signed: Domingo Cabrera MD  7/11/2024 6:22 PM EDT   Workstation ID: [id number]   Results for orders placed in visit on 01/30/23    Adult Transthoracic Echo Complete W/ Cont if Necessary Per Protocol    Interpretation Summary    Left ventricular systolic function is normal. Calculated left ventricular EF = 64.8%    Left ventricular wall thickness is consistent with hypertrophy. Sigmoid-shaped ventricular septum is present.    Left ventricular diastolic function was normal.    Systolic anterior motion of the chordal apparatus is present.  There is no obstruction.    Estimated right ventricular systolic pressure from tricuspid regurgitation is normal (<35 mmHg).            Assessment/Plan     Assessment/Plan:  74 y.o. who presents with Sx of intermittent speech impediment but currently back to baseline.  Patient also has a history of seizure disorder and currently on phenytoin 400 mg at bedtime.    Scattered acute ischemic stroke right subcortical- right M2 stenosis   -patient was loaded with Plavix  -continue Plavix- patient has been taking Plavix at home  -blood pressure goal okay between 120-160, normalise over the course of next 2 days.  -avoid dehydration  -continue lipitor 80 daily     To focus on modifiable stroke risk factors.          neurology follow up in 2 weeks.      Ashok Souza MD  07/12/24  10:11 EDT

## 2024-07-15 ENCOUNTER — APPOINTMENT (OUTPATIENT)
Dept: CT IMAGING | Facility: HOSPITAL | Age: 74
DRG: 065 | End: 2024-07-15
Payer: MEDICARE

## 2024-07-15 ENCOUNTER — HOSPITAL ENCOUNTER (INPATIENT)
Facility: HOSPITAL | Age: 74
LOS: 1 days | Discharge: NURSING, REHAB OR LONG-TERM CARE | DRG: 065 | End: 2024-07-17
Attending: STUDENT IN AN ORGANIZED HEALTH CARE EDUCATION/TRAINING PROGRAM | Admitting: INTERNAL MEDICINE
Payer: MEDICARE

## 2024-07-15 ENCOUNTER — TRANSITIONAL CARE MANAGEMENT TELEPHONE ENCOUNTER (OUTPATIENT)
Dept: CALL CENTER | Facility: HOSPITAL | Age: 74
End: 2024-07-15
Payer: MEDICARE

## 2024-07-15 ENCOUNTER — APPOINTMENT (OUTPATIENT)
Dept: MRI IMAGING | Facility: HOSPITAL | Age: 74
DRG: 065 | End: 2024-07-15
Payer: MEDICARE

## 2024-07-15 ENCOUNTER — APPOINTMENT (OUTPATIENT)
Dept: GENERAL RADIOLOGY | Facility: HOSPITAL | Age: 74
DRG: 065 | End: 2024-07-15
Payer: MEDICARE

## 2024-07-15 ENCOUNTER — TELEPHONE (OUTPATIENT)
Dept: NEUROLOGY | Facility: CLINIC | Age: 74
End: 2024-07-15
Payer: MEDICARE

## 2024-07-15 DIAGNOSIS — R47.1 DYSARTHRIA: ICD-10-CM

## 2024-07-15 DIAGNOSIS — R13.12 OROPHARYNGEAL DYSPHAGIA: ICD-10-CM

## 2024-07-15 DIAGNOSIS — R56.9 NOCTURNAL SEIZURES: ICD-10-CM

## 2024-07-15 DIAGNOSIS — R53.1 WEAKNESS OF LEFT SIDE OF BODY: ICD-10-CM

## 2024-07-15 DIAGNOSIS — I63.9 ISCHEMIC STROKE: Primary | ICD-10-CM

## 2024-07-15 LAB
ALBUMIN SERPL-MCNC: 3.9 G/DL (ref 3.5–5.2)
ALBUMIN/GLOB SERPL: 1.6 G/DL
ALP SERPL-CCNC: 97 U/L (ref 39–117)
ALT SERPL W P-5'-P-CCNC: 13 U/L (ref 1–41)
ANION GAP SERPL CALCULATED.3IONS-SCNC: 8.1 MMOL/L (ref 5–15)
APTT PPP: 22.2 SECONDS (ref 24.3–38.1)
AST SERPL-CCNC: 19 U/L (ref 1–40)
BASOPHILS # BLD AUTO: 0.03 10*3/MM3 (ref 0–0.2)
BASOPHILS NFR BLD AUTO: 0.5 % (ref 0–1.5)
BILIRUB SERPL-MCNC: 0.2 MG/DL (ref 0–1.2)
BUN SERPL-MCNC: 10 MG/DL (ref 8–23)
BUN/CREAT SERPL: 10.9 (ref 7–25)
CALCIUM SPEC-SCNC: 8.9 MG/DL (ref 8.6–10.5)
CHLORIDE SERPL-SCNC: 103 MMOL/L (ref 98–107)
CO2 SERPL-SCNC: 26.9 MMOL/L (ref 22–29)
CREAT SERPL-MCNC: 0.92 MG/DL (ref 0.76–1.27)
DEPRECATED RDW RBC AUTO: 44.1 FL (ref 37–54)
EGFRCR SERPLBLD CKD-EPI 2021: 86.7 ML/MIN/1.73
EOSINOPHIL # BLD AUTO: 0.13 10*3/MM3 (ref 0–0.4)
EOSINOPHIL NFR BLD AUTO: 2.2 % (ref 0.3–6.2)
ERYTHROCYTE [DISTWIDTH] IN BLOOD BY AUTOMATED COUNT: 12.7 % (ref 12.3–15.4)
GLOBULIN UR ELPH-MCNC: 2.4 GM/DL
GLUCOSE BLDC GLUCOMTR-MCNC: 100 MG/DL (ref 70–130)
GLUCOSE SERPL-MCNC: 100 MG/DL (ref 65–99)
HCT VFR BLD AUTO: 44.3 % (ref 37.5–51)
HGB BLD-MCNC: 15.2 G/DL (ref 13–17.7)
HOLD SPECIMEN: NORMAL
HOLD SPECIMEN: NORMAL
IMM GRANULOCYTES # BLD AUTO: 0.02 10*3/MM3 (ref 0–0.05)
IMM GRANULOCYTES NFR BLD AUTO: 0.3 % (ref 0–0.5)
INR PPP: 0.98 (ref 0.9–1.1)
LYMPHOCYTES # BLD AUTO: 1.65 10*3/MM3 (ref 0.7–3.1)
LYMPHOCYTES NFR BLD AUTO: 27.5 % (ref 19.6–45.3)
MCH RBC QN AUTO: 32.5 PG (ref 26.6–33)
MCHC RBC AUTO-ENTMCNC: 34.3 G/DL (ref 31.5–35.7)
MCV RBC AUTO: 94.9 FL (ref 79–97)
MONOCYTES # BLD AUTO: 0.73 10*3/MM3 (ref 0.1–0.9)
MONOCYTES NFR BLD AUTO: 12.2 % (ref 5–12)
NEUTROPHILS NFR BLD AUTO: 3.43 10*3/MM3 (ref 1.7–7)
NEUTROPHILS NFR BLD AUTO: 57.3 % (ref 42.7–76)
NRBC BLD AUTO-RTO: 0 /100 WBC (ref 0–0.2)
PLATELET # BLD AUTO: 206 10*3/MM3 (ref 140–450)
PMV BLD AUTO: 9.8 FL (ref 6–12)
POTASSIUM SERPL-SCNC: 4 MMOL/L (ref 3.5–5.2)
PROT SERPL-MCNC: 6.3 G/DL (ref 6–8.5)
PROTHROMBIN TIME: 13 SECONDS (ref 12.1–15)
RBC # BLD AUTO: 4.67 10*6/MM3 (ref 4.14–5.8)
SODIUM SERPL-SCNC: 138 MMOL/L (ref 136–145)
TROPONIN T SERPL HS-MCNC: 17 NG/L
WBC NRBC COR # BLD AUTO: 5.99 10*3/MM3 (ref 3.4–10.8)
WHOLE BLOOD HOLD COAG: NORMAL
WHOLE BLOOD HOLD SPECIMEN: NORMAL

## 2024-07-15 PROCEDURE — 36415 COLL VENOUS BLD VENIPUNCTURE: CPT

## 2024-07-15 PROCEDURE — 99285 EMERGENCY DEPT VISIT HI MDM: CPT

## 2024-07-15 PROCEDURE — 84484 ASSAY OF TROPONIN QUANT: CPT | Performed by: STUDENT IN AN ORGANIZED HEALTH CARE EDUCATION/TRAINING PROGRAM

## 2024-07-15 PROCEDURE — 85025 COMPLETE CBC W/AUTO DIFF WBC: CPT | Performed by: STUDENT IN AN ORGANIZED HEALTH CARE EDUCATION/TRAINING PROGRAM

## 2024-07-15 PROCEDURE — 25010000002 LEVETIRACETAM IN NACL 0.82% 500 MG/100ML SOLUTION: Performed by: INTERNAL MEDICINE

## 2024-07-15 PROCEDURE — 82948 REAGENT STRIP/BLOOD GLUCOSE: CPT

## 2024-07-15 PROCEDURE — G0378 HOSPITAL OBSERVATION PER HR: HCPCS

## 2024-07-15 PROCEDURE — 71045 X-RAY EXAM CHEST 1 VIEW: CPT

## 2024-07-15 PROCEDURE — 99223 1ST HOSP IP/OBS HIGH 75: CPT | Performed by: PSYCHIATRY & NEUROLOGY

## 2024-07-15 PROCEDURE — 85730 THROMBOPLASTIN TIME PARTIAL: CPT | Performed by: STUDENT IN AN ORGANIZED HEALTH CARE EDUCATION/TRAINING PROGRAM

## 2024-07-15 PROCEDURE — 70551 MRI BRAIN STEM W/O DYE: CPT

## 2024-07-15 PROCEDURE — 92526 ORAL FUNCTION THERAPY: CPT

## 2024-07-15 PROCEDURE — 70450 CT HEAD/BRAIN W/O DYE: CPT

## 2024-07-15 PROCEDURE — 94799 UNLISTED PULMONARY SVC/PX: CPT

## 2024-07-15 PROCEDURE — 99223 1ST HOSP IP/OBS HIGH 75: CPT | Performed by: INTERNAL MEDICINE

## 2024-07-15 PROCEDURE — 93005 ELECTROCARDIOGRAM TRACING: CPT | Performed by: STUDENT IN AN ORGANIZED HEALTH CARE EDUCATION/TRAINING PROGRAM

## 2024-07-15 PROCEDURE — 0202U NFCT DS 22 TRGT SARS-COV-2: CPT | Performed by: INTERNAL MEDICINE

## 2024-07-15 PROCEDURE — 93010 ELECTROCARDIOGRAM REPORT: CPT | Performed by: INTERNAL MEDICINE

## 2024-07-15 PROCEDURE — 80053 COMPREHEN METABOLIC PANEL: CPT | Performed by: STUDENT IN AN ORGANIZED HEALTH CARE EDUCATION/TRAINING PROGRAM

## 2024-07-15 PROCEDURE — 85610 PROTHROMBIN TIME: CPT | Performed by: STUDENT IN AN ORGANIZED HEALTH CARE EDUCATION/TRAINING PROGRAM

## 2024-07-15 RX ORDER — ASPIRIN 300 MG/1
300 SUPPOSITORY RECTAL DAILY
Status: DISCONTINUED | OUTPATIENT
Start: 2024-07-16 | End: 2024-07-16

## 2024-07-15 RX ORDER — ASPIRIN 81 MG/1
81 TABLET ORAL DAILY
Status: DISCONTINUED | OUTPATIENT
Start: 2024-07-15 | End: 2024-07-17 | Stop reason: HOSPADM

## 2024-07-15 RX ORDER — AMOXICILLIN 250 MG
2 CAPSULE ORAL 2 TIMES DAILY PRN
Status: DISCONTINUED | OUTPATIENT
Start: 2024-07-15 | End: 2024-07-17 | Stop reason: HOSPADM

## 2024-07-15 RX ORDER — SODIUM CHLORIDE 0.9 % (FLUSH) 0.9 %
10 SYRINGE (ML) INJECTION EVERY 12 HOURS SCHEDULED
Status: DISCONTINUED | OUTPATIENT
Start: 2024-07-15 | End: 2024-07-17 | Stop reason: HOSPADM

## 2024-07-15 RX ORDER — BISACODYL 10 MG
10 SUPPOSITORY, RECTAL RECTAL DAILY PRN
Status: DISCONTINUED | OUTPATIENT
Start: 2024-07-15 | End: 2024-07-17 | Stop reason: HOSPADM

## 2024-07-15 RX ORDER — SODIUM CHLORIDE 0.9 % (FLUSH) 0.9 %
10 SYRINGE (ML) INJECTION AS NEEDED
Status: DISCONTINUED | OUTPATIENT
Start: 2024-07-15 | End: 2024-07-17 | Stop reason: HOSPADM

## 2024-07-15 RX ORDER — BISACODYL 5 MG/1
5 TABLET, DELAYED RELEASE ORAL DAILY PRN
Status: DISCONTINUED | OUTPATIENT
Start: 2024-07-15 | End: 2024-07-17 | Stop reason: HOSPADM

## 2024-07-15 RX ORDER — PANTOPRAZOLE SODIUM 40 MG/1
40 TABLET, DELAYED RELEASE ORAL DAILY
Status: DISCONTINUED | OUTPATIENT
Start: 2024-07-15 | End: 2024-07-17 | Stop reason: HOSPADM

## 2024-07-15 RX ORDER — CETIRIZINE HYDROCHLORIDE 10 MG/1
10 TABLET ORAL DAILY
Status: DISCONTINUED | OUTPATIENT
Start: 2024-07-15 | End: 2024-07-17 | Stop reason: HOSPADM

## 2024-07-15 RX ORDER — LEVETIRACETAM 5 MG/ML
500 INJECTION INTRAVASCULAR EVERY 12 HOURS SCHEDULED
Status: DISCONTINUED | OUTPATIENT
Start: 2024-07-15 | End: 2024-07-16

## 2024-07-15 RX ORDER — POLYETHYLENE GLYCOL 3350 17 G/17G
17 POWDER, FOR SOLUTION ORAL DAILY PRN
Status: DISCONTINUED | OUTPATIENT
Start: 2024-07-15 | End: 2024-07-17 | Stop reason: HOSPADM

## 2024-07-15 RX ORDER — ROSUVASTATIN CALCIUM 20 MG/1
40 TABLET, COATED ORAL NIGHTLY
Status: DISCONTINUED | OUTPATIENT
Start: 2024-07-15 | End: 2024-07-17 | Stop reason: HOSPADM

## 2024-07-15 RX ORDER — SODIUM CHLORIDE 9 MG/ML
40 INJECTION, SOLUTION INTRAVENOUS AS NEEDED
Status: DISCONTINUED | OUTPATIENT
Start: 2024-07-15 | End: 2024-07-17 | Stop reason: HOSPADM

## 2024-07-15 RX ADMIN — Medication 10 ML: at 21:54

## 2024-07-15 RX ADMIN — LEVETIRACETAM 500 MG: 5 INJECTION INTRAVASCULAR at 21:30

## 2024-07-15 NOTE — TELEPHONE ENCOUNTER
Wife called stating since discharge from the hospital, pt has been experiencing new left sides weakness and has been falling. Pt was in ED last week with a stroke. I advised wife pt should return to ED, as these are new stroke symptoms. Wife v/u and agreeable to transporting pt herself.

## 2024-07-15 NOTE — H&P
[facility]ICAL GROUP HOSPITALIST     PCP: Deena Tarango MD    CODE status: Full    CHIEF COMPLAINT: Weakness    HISTORY OF PRESENT ILLNESS:    Patient is a 74-year-old male known to our facility for recent admission for acute CVA, he was discharged on aspirin and Plavix and continued on home Crestor and followed in consultation by stroke neurology.  Patient has been taking aspirin since Saturday.  He also is on Dilantin which is known to affect the plasma concentration of Plavix.  He presents to [facility] ED complaining of weakness.  He also has a left facial droop.  He was discharged on 7/12/2024 and had fully recovered from his symptoms which mainly consisted of difficulty speaking, he now has new weakness and difficulty walking and standing with falls.     Workup in the ED revealed MRI showing increased area of infarct, stroke neurologist consulted for assistance, they recommended observation and plan as detailed below in assessment and plan section.       Past Medical History:   Diagnosis Date    Acquired dyslexia     pt  reports after 3rd stroke    Allergic 1985    Allergic rhinitis     Anal fissure     Aortic insufficiency     moderate, THIERRY 2017    Asthma     Asthma     Benign prostatic hypertrophy (BPH) with weak urinary stream 11/01/2016    Cataract     Chronic bronchitis     Colon polyp     COPD (chronic obstructive pulmonary disease)     Emphysema lung     Falls frequently     Fatty liver     GERD (gastroesophageal reflux disease)     Headache     Hepatitis     thought to be Hepatitis A     History of pneumonia     With left lower lobe atelectasis and scar tissue, being followed    HL (hearing loss)     Hypertension     Low back pain     Macular degeneration     Memory loss     Mixed hyperlipidemia 01/30/2023    Obstructive sleep apnea syndrome 08/23/2017    refuses c-pap    PFO (patent foramen ovale)     s/p percutaneous closure with a 25mm Amplatzer device in December 2018     Pneumonia     LLL with scar tissue    Seizures     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 total of 3 strokes    Tremor     Comes & goes    Vision loss      Past Surgical History:   Procedure Laterality Date    CARDIAC CATHETERIZATION N/A 12/12/2018    Procedure: Patent foramen ovale closure- Abbott;  Surgeon: Deangelo Harris MD;  Location:  [facility];  Service: Cardiology    CARDIAC CATHETERIZATION N/A 12/12/2018    Procedure: Intracardiac echocardiogram;  Surgeon: Deangelo Harris MD;  Location:  [facility];  Service: Cardiology    CARDIAC ELECTROPHYSIOLOGY PROCEDURE N/A 03/26/2018    Procedure: Loop insertion   CONFIRM RX;  Surgeon: Luis Wyatt MD;  Location:  [facility];  Service: Cardiovascular    CARDIAC ELECTROPHYSIOLOGY PROCEDURE N/A 07/15/2020    Procedure: Loop recorder removal;  Surgeon: Starr Driscoll MD;  Location:  [facility];  Service: Cardiovascular;  Laterality: N/A;    CARDIAC SURGERY      CATARACT EXTRACTION Bilateral     COLONOSCOPY      COLONOSCOPY N/A 10/09/2020    Procedure: COLONOSCOPY with polypectomy;  Surgeon: Ras Mendoza MD;  Location:  [facility];  Service: Gastroenterology;  Laterality: N/A;  diverticulosis  ascending polyp  transverse polyp  sigmoid polyps X 2  rectal polyp    EYE SURGERY      HAND SURGERY Bilateral     INGUINAL HERNIA REPAIR Left     OTHER SURGICAL HISTORY      inguinal hernia repair for person over age 5    TONSILLECTOMY      TONSILLECTOMY       Family History   Problem Relation Age of Onset    Obesity Mother     Clotting disorder Mother         Upper extremities    Alcohol abuse Father     Cancer Father 63        Esophagus and lung    Other Father         cardiac disorder    Heart disease Father     Depression Father     Kidney disease Sister     Kidney failure Sister     Drug abuse Paternal Uncle      "Stroke Sister     Throat cancer Sister     Drug abuse Paternal Uncle      Social History     Tobacco Use    Smoking status: Former     Current packs/day: 0.00     Average packs/day: 1.5 packs/day for 15.0 years (22.5 ttl pk-yrs)     Types: Cigarettes     Start date: 1964     Quit date: 1979     Years since quittin.4     Passive exposure: Past    Smokeless tobacco: Never    Tobacco comments:     caffeine use: Drinks 4 glasses of Dt coke on avg.    Vaping Use    Vaping status: Never Used   Substance Use Topics    Alcohol use: No    Drug use: No     (Not in a hospital admission)    Allergies:  Aspirin, Citrus, Combivent [ipratropium-albuterol], Lactose intolerance (gi), and Statins    Immunization History   Administered Date(s) Administered    ABRYSVO (RSV, 60+ or pregnant women 32-36 wks) 2023    COVID-19 (PFIZER) BIVALENT 12+YRS 2022    COVID-19 (PFIZER) Purple Cap Monovalent 2021, 2021, 2021    COVID-19 F23 (PFIZER) 12YRS+ (COMIRNATY) 2023, 2024    Covid-19 (Pfizer) Gray Cap Monovalent 2021, 2022    Flu Vaccine Quad PF >36MO 10/22/2016    Fluzone High Dose =>65 Years (Vaxcare ONLY) 2018, 10/02/2019, 2020    Fluzone High-Dose 65+yrs 2021, 2022, 10/06/2023    Influenza Quad Vaccine (Inpatient) 10/11/2017    Influenza Seasonal Injectable 2021    Pneumococcal Conjugate 13-Valent (PCV13) 2016, 10/10/2019    Pneumococcal Polysaccharide (PPSV23) 2014    TD Preservative Free (Tenivac) 2020    Tdap 11/10/2010    Zostavax 10/09/2013       REVIEW OF SYSTEMS:  Please see the above history of present illness for pertinent positives and negatives.  The remainder of the patient's systems have been reviewed and are negative.     Vital Signs  Heart Rate:  [58-70] 65  Resp:  [16-18] 18  BP: (130-151)/(68-82) 147/68  Flowsheet Rows      Flowsheet Row First Filed Value   Admission Height 182.9 cm (72.01\") Documented at " 07/15/2024 1519   Admission Weight 80.3 kg (177 lb) Documented at 07/15/2024 1519               Physical Exam:  General: Patient is awake and alert. Well developed and well nourished. No acute distress noted.   HENT: Head is atraumatic, normocephalic. Hearing is grossly intact. Nose is without obvious congestion and appears patent. Neck is supple and trachea is midline.  Obvious left facial droop present.   Eyes: Vision is grossly intact. Pupils appear equal and round.   Cardiovascular: Heart has regular rate and rhythm with no murmurs, rubs or gallops noted.   Respiratory: Lungs are clear to ausculation without wheezes, rhonchi or rales.   Abdominal/GI: Soft, nontender, bowel sounds present. No rebound or guarding present.   Extremities: No peripheral edema noted.   Musculoskeletal: Spontaneous movement of bilateral upper and lower extremities against gravity noted, strength 3+ out of 5 in lower extremities bilaterally. No signs of injury or deformity noted.   Skin: Warm and dry.   Psych: Mood and affect are appropriate. Cooperative with exam.   Neuro: No facial asymmetry noted. No focal deficits noted other than noted as above, hearing and vision are grossly intact.    Emotional Behavior: all of the following were found to be normal   Judgment and Insight   Mental Status   Memory   Mood and Affect: neither of the following found acutely        Depression                 Anxiety     Debilities: no signs of the following found    Physical Weakness     Handicaps     Disabilities     Agitation         Results Review:    I reviewed the patient's new clinical results.  Lab Results (most recent)       Procedure Component Value Units Date/Time    Comprehensive Metabolic Panel [[id number]]  (Abnormal) Collected: 07/15/24 1535    Specimen: Blood Updated: 07/15/24 1555     Glucose 100 mg/dL      BUN 10 mg/dL      Creatinine 0.92 mg/dL      Sodium 138 mmol/L      Potassium 4.0 mmol/L      Chloride 103 mmol/L      CO2 26.9  mmol/L      Calcium 8.9 mg/dL      Total Protein 6.3 g/dL      Albumin 3.9 g/dL      ALT (SGPT) 13 U/L      AST (SGOT) 19 U/L      Alkaline Phosphatase 97 U/L      Total Bilirubin 0.2 mg/dL      Globulin 2.4 gm/dL      A/G Ratio 1.6 g/dL      BUN/Creatinine Ratio 10.9     Anion Gap 8.1 mmol/L      eGFR 86.7 mL/min/1.73     Narrative:      GFR Normal >60  Chronic Kidney Disease <60  Kidney Failure <15    The GFR formula is only valid for adults with stable renal function between ages 18 and 70.    Single High Sensitivity Troponin T [[id number]]  (Normal) Collected: 07/15/24 1535    Specimen: Blood Updated: 07/15/24 1555     HS Troponin T 17 ng/L     Narrative:      High Sensitive Troponin T Reference Range:  <14.0 ng/L- Negative Female for AMI  <22.0 ng/L- Negative Male for AMI  >=14 - Abnormal Female indicating possible myocardial injury.  >=22 - Abnormal Male indicating possible myocardial injury.   Clinicians would have to utilize clinical acumen, EKG, Troponin, and serial changes to determine if it is an Acute Myocardial Infarction or myocardial injury due to an underlying chronic condition.         Protime-INR [[id number]]  (Normal) Collected: 07/15/24 1535    Specimen: Blood Updated: 07/15/24 1549     Protime 13.0 Seconds      INR 0.98    Narrative:      Therapeutic Ranges for INR: 2.0-3.0 (PT 20-30)                              2.5-3.5 (PT 25-34)    aPTT [[id number]]  (Abnormal) Collected: 07/15/24 1535    Specimen: Blood Updated: 07/15/24 1549     PTT 22.2 seconds     Narrative:      PTT = The equivalent PTT values for the therapeutic range of heparin levels at 0.1 to 0.7 U/ml are 53 to 110 seconds.      [place] Draw [[id number]] Collected: 07/15/24 1535    Specimen: Blood Updated: 07/15/24 1545    Narrative:      The following orders were created for panel order [place] Draw.  Procedure                               Abnormality         Status                     ---------                                -----------         ------                     Green Top (Gel)[[id number]]                                  Final result               Lavender Top[[id number]]                                     Final result               Gold Top - SST[[id number]]                                   Final result               Light Blue Top[[id number]]                                   Final result                 Please view results for these tests on the individual orders.    Green Top (Gel) [[id number]] Collected: 07/15/24 1535    Specimen: Blood Updated: 07/15/24 1545     Extra Tube Hold for add-ons.     Comment: Auto resulted.       Lavender Top [[id number]] Collected: 07/15/24 1535    Specimen: Blood Updated: 07/15/24 1545     Extra Tube hold for add-on     Comment: Auto resulted       Gold Top - SST [[id number]] Collected: 07/15/24 1535    Specimen: Blood Updated: 07/15/24 1545     Extra Tube Hold for add-ons.     Comment: Auto resulted.       Light Blue Top [[id number]] Collected: 07/15/24 1535    Specimen: Blood Updated: 07/15/24 1545     Extra Tube Hold for add-ons.     Comment: Auto resulted       CBC & Differential [[id number]]  (Abnormal) Collected: 07/15/24 1535    Specimen: Blood Updated: 07/15/24 1539    Narrative:      The following orders were created for panel order CBC & Differential.  Procedure                               Abnormality         Status                     ---------                               -----------         ------                     CBC Auto Differential[[id number]]        Abnormal            Final result                 Please view results for these tests on the individual orders.    CBC Auto Differential [[id number]]  (Abnormal) Collected: 07/15/24 1535    Specimen: Blood Updated: 07/15/24 1539     WBC 5.99 10*3/mm3      RBC 4.67 10*6/mm3      Hemoglobin 15.2 g/dL      Hematocrit 44.3 %      MCV 94.9 fL      MCH 32.5 pg      MCHC 34.3 g/dL      RDW 12.7 %      RDW-SD 44.1 fl      MPV 9.8 fL       Platelets 206 10*3/mm3      Neutrophil % 57.3 %      Lymphocyte % 27.5 %      Monocyte % 12.2 %      Eosinophil % 2.2 %      Basophil % 0.5 %      Immature Grans % 0.3 %      Neutrophils, Absolute 3.43 10*3/mm3      Lymphocytes, Absolute 1.65 10*3/mm3      Monocytes, Absolute 0.73 10*3/mm3      Eosinophils, Absolute 0.13 10*3/mm3      Basophils, Absolute 0.03 10*3/mm3      Immature Grans, Absolute 0.02 10*3/mm3      nRBC 0.0 /100 WBC     POC Glucose Once [[id number]]  (Normal) Collected: 07/15/24 1524    Specimen: Blood Updated: 07/15/24 1530     Glucose 100 mg/dL             Imaging Results (Most Recent)       Procedure Component Value Units Date/Time    MRI Brain Without Contrast [[id number]] Collected: 07/15/24 1617     Updated: 07/15/24 1628    Narrative:      MRI BRAIN WO CONTRAST    Date of Exam: 7/15/2024 3:50 PM EDT    Indication: acute stroke.     Comparison: CT same day and MRI 7/12/2024    Technique:  Routine multiplanar/multisequence sequence images of the brain were obtained without contrast administration.      Findings:  Areas of acute diffusion restriction abnormality demonstrates mild progression within the previously noted distribution within the deep white matter, corona radiata noted on the comparison study. No new areas are noted of acute diffusion restriction   abnormality.    The ventricles, cisterns and sulci appear stable. No evidence of acute hemorrhage noted. Remote infarct with encephalomalacia in the left occipital region again noted. There is associated gliosis of the surrounding brain parenchyma. There is extensive   patchy and confluent FLAIR and T2 signal hyperintensity noted within the supratentorial brain compatible with sequela small vessel ischemic disease.    Changes also noted within the alonzo and midbrain.    Major intracranial flow voids appear grossly patent.    Mild mucosal thickening noted. Globes and orbits appear stable. Mastoid air cells appear stable.    The midline  structures of the brain appear stable.      Impression:      Impression:    1. Acute diffusion restriction abnormality within the deep white matter/corona radiata on the right appears similar in distribution with slight increase in size compatible with progression/evolution of small acute/subacute infarctions.    2. No evidence of acute hemorrhage.    3. Chronic white matter changes similar to the prior study as well as sequela of prior remote left occipital infarct.    Findings were discussed with the emergency department at the time of interpretation        Electronically Signed: Star Khalil MD    7/15/2024 4:25 PM EDT    Workstation ID: [id number]    XR Chest 1 View [[id number]] Collected: 07/15/24 1545     Updated: 07/15/24 1548    Narrative:      XR CHEST 1 VW    Date of Exam: 7/15/2024 3:33 PM EDT    Indication: Acute Stroke Protocol (Onset < 12 hrs)    Comparison: Chest radiograph performed on July 11, 2024    Findings:  No focal consolidation or effusion.  There is no evidence of pneumothorax.  The pulmonary vasculature appears within normal limits.  The cardiac and mediastinal silhouette appear unremarkable.  No acute osseous abnormality identified.      Impression:      Impression:  No radiographic evidence of acute chest process.      Electronically Signed: Negrito Kemp MD    7/15/2024 3:45 PM EDT    Workstation ID: [id number]    CT Head Without Contrast Stroke Protocol [[id number]] Collected: 07/15/24 1539     Updated: 07/15/24 1545    Narrative:      CT HEAD WO CONTRAST STROKE PROTOCOL    Date of Exam: 7/15/2024 3:36 PM EDT    Indication: Neuro deficit, acute, stroke suspected  Neuro Deficit, acute, Stroke suspected.    Comparison: 7/12/2024 MRI and 7/11/2024 CT    Technique: Axial CT images were obtained of the head without contrast administration.  Reconstructed coronal and sagittal images were also obtained. Automated exposure control and iterative construction methods were used.    Scan Time:  15:31        Findings:  There is no evidence of acute intracranial hemorrhage, mass, midline shift, new loss of gray-white matter differentiation, or extra-axial fluid collection. There is a left parieto-occipital encephalomalacia. Subcortical and periventricular white matter   hypodensities consistent with sequela of chronic small vessel ischemic disease. There is global parenchymal volume loss with ex vacuo dilation of the ventricular system. The basal cisterns are patent.    There is no evidence of fracture or suspicious osseous lesion. Paranasal sinuses and mastoid air cells are well aerated. There is bilateral ocular surgery. The included soft tissues are within normal limits.      Impression:      Impression:  No acute intracranial abnormality. Chronic findings as above.        Electronically Signed: Remy Flower MD    7/15/2024 3:42 PM EDT    Workstation ID: [id number]          Reviewed    ECG/EMG Results (most recent)       Procedure Component Value Units Date/Time    ECG 12 Lead ED Triage Standing Order; Acute Stroke (Onset <24 hrs) [[id number]] Collected: 07/15/24 1538     Updated: 07/15/24 1539     QT Interval 414 ms      QTC Interval 407 ms     Narrative:      HEART RATE=65  bpm  RR Rmsiengn=9862  ms  DC Qmpvajji=967  ms  P Horizontal Axis=-36  deg  P Front Axis=20  deg  QRSD Interval=95  ms  QT Adugwcau=653  ms  DDaC=980  ms  QRS Axis=-20  deg  T Wave Axis=65  deg  - ABNORMAL ECG -  Sinus rhythm  Ventricular premature complex  Prolonged DC interval  Inferior infarct, old  Anterior infarct, old  Date and Time of Study:2024-07-15 15:38:35          Reviewed    Assessment & Plan     Acute CVA with worsening extremity weakness on known recent CVA  -Repeat MRI from the ED prior to admission shows acute diffusion restriction abnormality within the deep white matter/corona radiata on the right with increased size/progression of small acute/subacute infarction seen on previous MRI  -Stroke neurology consulted from  ED, patient previously discharged on aspirin and Plavix, unfortunately due to heartburn issues he was not receiving aspirin.  Patient also on phenytoin which is a known CY inhibitor which likely did not allow Plavix to provide maximum benefit.   -Therefore stroke neurologist recommending patient receive Brilinta as well as aspirin  -I have also discussed case with Dr. Huerta in-house neurologist, for advice regarding phenytoin, he recommends discontinuing this medication for now placing patient on 500 mg IV every 12 hours of Keppra with plan to transition to 500 mg p.o. twice daily as able.  Consult placed to Dr. Huerta, he plans to see patient in a.m..   -Cardiology consult placed to evaluate patient for transesophageal echo, will make patient n.p.o. after midnight for this.   -Monitor closely with neurochecks.   -Given significant facial droop, patient will be kept n.p.o. until bedside swallow eval performed, speech consulted  -PT and OT consulted for assistance as well      Chronic stable conditions to be managed with home medications include the following conditions listed below. Also please note: Every effort was made to accurately obtain patient's home medications. This was done utilizing extensive chart review, ED documentation, recent pharmacy records, and where possible thorough discussion with the patient if medications were known by the patient. I have reviewed and edited the patient's home medications as per my efforts above and current med list can be found within home medications portion of this electronic medical record and is accurate as possible as of 07/15/24     Seasonal allergic rhinitis-hold home Allegra, therapeutic interchange with cetirizine    GERD-continue home Protonix    Hyperlipidemia-continue home Crestor        DVT PPX: SCDs        I discussed the patient's findings and my recommendations with patient     Jace Huang, DO  07/15/24  17:00 EDT    Time: 41 mins     At [facility], we believe that sharing information builds trust and better relationships. You are receiving this note because you recently visited [facility]. It is possible you will see health information before a provider has talked with you about it. This kind of information can be easy to misunderstand. To help you fully understand what it means for your health, we urge you to discuss this note with your provider.

## 2024-07-15 NOTE — OUTREACH NOTE
"Call Center TCM Note      Flowsheet Row Responses   [facility] patient discharged from? [place]   Does the patient have one of the following disease processes/diagnoses(primary or secondary)? Stroke   TCM attempt successful? Yes   Call start time 1347   Call end time 1351   General alerts for this patient Please call spouse for f/u calls   Discharge diagnosis Slurred speech, Acute CVA   Person spoke with today (if not patient) and relationship spouse   Has home health visited the patient within 72 hours of discharge? N/A   DME comments pt uses a walker/cane for balance   Psychosocial issues? No   Does the patient have any residual symptoms from stroke/TIA? Yes   Residual symptoms comments left sided weaknes that has worsen since d/c, \"thick tongue\"   Does the patient understand the diet ordered at discharge? Yes   What is the patient's perception of their health status since discharge? Worsening  [weakness on left side, has fallen several times w/o injury. He has been instruced by Neurology to go back to ED for evaluation]   Is the patient/caregiver able to teach back the hierarchy of who to call/visit for symptoms/problems? PCP, Specialist, Home health nurse, Urgent Care, ED, 911 Yes   TCM call completed? Yes   Call end time 1351   Would this patient benefit from a Referral to Amb Social Work? No   Is the patient interested in additional calls from an ambulatory ? No            Donna Coleman RN    7/15/2024, 13:52 EDT        "

## 2024-07-15 NOTE — Clinical Note
Level of Care: Med/Surg [1]   Diagnosis: Stroke [283022]   Admitting Physician: SHAHZAD SARABIA [[id number]]   Attending Physician: SHAHZAD SARABIA [[id number]]

## 2024-07-15 NOTE — CONSULTS
[facility]   Teleneurology Note    Patient Name: Ronnell Rizvi  : 1950  MRN: [id number]  Primary Care Physician: Deena Tarango MD  Referring Site: [place]    Subjective   Teleneurology Initial Data           Neurologist Evaluation Date: 07/15/24 Neurologist Evaluation Time: 1640   Date Last Known Well: 24 Time Last Known Well: 2200     History     Chief Complaint: Had a stroke last week and got discharged last week after 2 days in the hospital without any deficits but last night started noticing weakness in the left leg and the left arm and this morning left-sided facial droop  HPI: 74-year-old right-handed white male with a history of recent stroke approximately a week ago when I also evaluated him that time and he had minimal deficits but had a recent acute infarct in the right corona radiata.  The next day he was followed by my colleague Dr. Souza who recommended that he be discharged on baby aspirin and Plavix.  The patient is unable to tolerate statins for which reason I discussed about using Repatha intravenous infusion once a month.  Patient has not had any blood pressure issues so far.  Because of the recurrence of this current stroke and he noticed the weakness yesterday and this morning started noticing left facial droop along with some slurred speech his wife brought him into the hospital around 3:15 PM today.  As the patient already had CT angiogram of the head and neck with contrast last week I discussed about getting an urgent MRI of the brain with a stroke protocol which is showing maturation of the acute stroke in the right corona radiata but it seems like there is some new infarcts there.  The patient has an Amplatzer umbrella filter placed in his moderate-sized PFO in 2018 and had been doing fine until this recent stroke a week ago and then extension of the stroke that happened overnight.  He currently has a prominent left-sided facial droop with mildly dysarthric  speech but no weakness in the right upper extremity on or in the lower extremities but there is a mild drift noticeable in the left upper extremity.  No sensory loss noted.  The patient has also noted since yesterday that when he was trying to get up and walk he was dragging the left leg.  Because of the prominent left-sided facial droop he has automatically failed the bedside swallow and this needs to be addressed by the speech pathologist.  As the patient had a recent stroke he was not considered a candidate for any acute thrombolytic therapy or any acute thrombectomy.  He denies any headaches.  Since this morning he has been having difficulty with swallowing as the water is running down the left side of the face    Stroke Risk Factors/ Pertinent Data     Stroke risk factors: dyslipidemia, prior stroke/ TIA  Anticoagulants prior to arrival: none  Antiplatelets prior to arrival: aspirin, clopidogrel (Plavix)     Scoring Scales     Modified Navajo Scale  Pre-Stroke Modified Gely Scale: 0 - No Symptoms at all.  Intracerebral Hemmorhage (ICH) Score  Jose Coma Score: 13-15  Age>=80: no  Jose Coma Scale  Best Eye Response: Spontaneous  Best Verbal Response: Oriented  Best Motor Response: Follows commands  Jose Coma Scale Score: 15    NIH Stroke Scale     NIHSS Performed Date: 07/15/24 NIHSS Performed Time: 1640   Interval: baseline  1a. Level of Consciousness: 0-->Alert, keenly responsive  1b. LOC Questions: 0-->Answers both questions correctly  1c. LOC Commands: 0-->Performs both tasks correctly  2. Best Gaze: 0-->Normal  3. Visual: 0-->No visual loss  4. Facial Palsy: 2-->Partial paralysis (total or near-total paralysis of lower face)  5a. Motor Arm, Left: 1-->Drift, limb holds 90 (or 45) degrees, but drifts down before full 10 seconds, does not hit bed or other support  5b. Motor Arm, Right: 0-->No drift, limb holds 90 (or 45) degrees for full 10 secs  6a. Motor Leg, Left: 0-->No drift, leg holds 30 degree  position for full 5 secs  6b. Motor Leg, Right: 0-->No drift, leg holds 30 degree position for full 5 secs  7. Limb Ataxia: 0-->Absent  8. Sensory: 0-->Normal, no sensory loss  9. Best Language: 0-->No aphasia, normal  10. Dysarthria: 1-->Mild-to-moderate dysarthria, patient slurs at least some words and, at worst, can be understood with some difficulty  11. Extinction and Inattention (formerly Neglect): 0-->No abnormality  Total (NIH Stroke Scale): 4     Review of Systems     Review of Systems   Constitutional: Negative.    HENT: Negative.     Eyes: Negative.    Respiratory: Negative.     Cardiovascular: Negative.    Gastrointestinal: Negative.    Endocrine: Negative.    Genitourinary: Negative.    Musculoskeletal: Negative.    Allergic/Immunologic: Negative.    Neurological:  Positive for facial asymmetry, speech difficulty and weakness.   Hematological: Negative.    Psychiatric/Behavioral: Negative.       The patient had completely recovered by the time he was discharged home as noted in the progress notes from Dr. Ashok Souza on July 12, 2024.  Objective   Exam     Exam performed with the help of support staff from the referring site  Neurological Exam  Mental Status  Awake, alert and oriented to person, place and time. Oriented to person, place, time and situation. Recent and remote memory are intact. dysarthria present. Language is fluent with no aphasia. Attention and concentration are normal. Fund of knowledge is appropriate for level of education.    Cranial Nerves  CN I: Sense of smell is normal.  CN II: Visual acuity is normal. Visual fields full to confrontation.  CN III, IV, VI: Extraocular movements intact bilaterally. Normal lids and orbits bilaterally. Pupils equal round and reactive to light bilaterally.  CN V: Facial sensation is normal.  CN VII:  Right: Taste is normal.  Left: There is central facial weakness. Taste is normal.  CN IX, X:  Left: Palate is weak.  CN XI: Shoulder shrug strength  is normal.  CN XII: Tongue midline without atrophy or fasciculations.  If he tries to drink water it runs down the left side of the face..    Motor  Normal muscle bulk throughout. No fasciculations present. Normal muscle tone. No abnormal involuntary movements. Left pronator drift.    Sensory  Sensation is intact to light touch, pinprick, vibration and proprioception in all four extremities.    Coordination    Finger-to-nose, rapid alternating movements and heel-to-shin normal bilaterally without dysmetria.    Gait    Could not be tested at this time as the patient tends to drag the left leg and tends to lose balance and fall as per his wife..    Patient automatically failed the swallow test because the left side of the face was drooping and his speech is dysarthric.  Result Review    Results      Reviewed the MRI films in details on the PACS with the patient and his wife which was interpreted as follows:    Findings:  Areas of acute diffusion restriction abnormality demonstrates mild progression within the previously noted distribution within the deep white matter, corona radiata noted on the comparison study. No new areas are noted of acute diffusion restriction   abnormality.     The ventricles, cisterns and sulci appear stable. No evidence of acute hemorrhage noted. Remote infarct with encephalomalacia in the left occipital region again noted. There is associated gliosis of the surrounding brain parenchyma. There is extensive   patchy and confluent FLAIR and T2 signal hyperintensity noted within the supratentorial brain compatible with sequela small vessel ischemic disease.     Changes also noted within the alonzo and midbrain.     Major intracranial flow voids appear grossly patent.     Mild mucosal thickening noted. Globes and orbits appear stable. Mastoid air cells appear stable.     The midline structures of the brain appear stable.     IMPRESSION:  Impression:     1. Acute diffusion restriction abnormality  within the deep white matter/corona radiata on the right appears similar in distribution with slight increase in size compatible with progression/evolution of small acute/subacute infarctions.     2. No evidence of acute hemorrhage.     3. Chronic white matter changes similar to the prior study as well as sequela of prior remote left occipital infarct.     Findings were discussed with the emergency department at the time of interpretation    It also seems that there are some new infarcts in the left corona radiata zone.    Personal review of CNS imaging:(Official report by radiologist pending)       Thrombolytic   Thrombolytics: not applicable     Assessment & Plan   Assessment/ Plan     Assessment:      Acute cerebrovascular event with progression as he had already recovered completely before discharge on the July 12, 2024.    Plan:  Patient will be admitted to the hospitalist service under Dr. Jace Huang on cardiac telemetry monitoring.  2 switched Plavix to Brilinta 90 mg twice daily with food along with baby aspirin enteric-coated daily.  High potency statin however the patient says he is allergic to the statin but whether it is a true allergy or not needs to be verified with the pharmacist.  Patient already had a transthoracic echocardiogram performed last week for which reason I recommended getting a transesophageal echocardiogram with the help of the cardiologist as he had an Amplatzer umbrella filter placed in 2018 and to make sure that this has not slipped.  To check P2 Y12 enzyme level activity which is usually performed at the [facility] pathology department.  Patient needs to be seen by speech pathologist so that he can be cleared for a modified texture diet as well as to take his oral medications.  He needs to be evaluated by physical therapist and occupational therapist as well to make sure he is safe to go home independently with his wife.  He will be followed up by me  tomorrow with the [facility] teleneurology rounding service.    Final Impression and Plan: Reviewed the MRI which is showing acute new strokes in the right corona radiata region.    Disposition     Disposition: The patient will remain at the referring institution for further evaluation and management    Medical Decision Making  Medical Data Reviewed: Data reviewed including: clinical labs, radiology and/or medical tests, Obtaining/ reviewing old medical records, Obtaining case history from another source  Length of visit: 30 minutes in this critical care evaluation and management of the acute stroke symptoms in the emergency room setting    Yojana COLLADO MD, saw the patient on 07/15/24 at [facility] for an initial in-patient or emergency room telememedicine face to face consult using interactive technology for 30 minutes. The location of the patient was [place]. I was located at [facility]  .    I have proceeded with this evaluation at the request of the referring practitioner as it is felt to be an emergency setting and no appropriate specialist is available to perform this evaluation. The originating hospital has reported that this is the correct patient and has obtained consent from the patient/surrogate to perform this telemedicine evaluation(including obtaining history, performing examination and reviewing data provided by the patient an/or originating site of care provider)    I have introduced myself to the patient, provided my credentials, disclosed my location, and determined that, based on review of the patient's chart and discussion with the patient's primary team, telemedicine via a HIPAA compliant, real-time, face-to-face two-way, interactive audio and video platform is an appropriate and effective means of providing the service.    The patient/surrogate has a right to refuse this evaluation as they have been explained risks including potential loss of confidentiality, benefits, alternatives,  and the potential need for subsequent face-to-face care. In this evaluation, we will be providing recommendations only.  The ultimate decision to follow or not to follow these recommendations will be left to the bedside treating/requesting practitioner.    The patient/surrogate has been notified that other healthcare professionals including technical person may be involved in this A/V evaluation.  All laws concerning confidentiality and patient access to medical records and copies of medical records apply to telemedicine.  The patient/surrogate has received the originating site's Health Notice of Privacy Practices.    Yojana Ware MD

## 2024-07-15 NOTE — OUTREACH NOTE
Call Center TCM Note      Flowsheet Row Responses   [facility] facility patient discharged from? [place]   Does the patient have one of the following disease processes/diagnoses(primary or secondary)? Stroke   TCM attempt successful? No   Unsuccessful attempts Attempt 1   Call Status Left message            Donna Coleman RN    7/15/2024, 10:14 EDT

## 2024-07-15 NOTE — ED PROVIDER NOTES
Subjective   History of Present Illness  74-year-old male who presents with left upper extremity left lower extremity weakness as well as facial droop.  The patient was actually discharged on 7/12/2024.  He was admitted due to acute ischemic stroke on 7/11/2024.  He has a history of PFO closure in 2018 and CVA.  History of nocturnal seizures hypertension and hyperlipidemia.  The patient does also have a history of COPD, not on oxygen per my understanding.  Patient continues to have weakness which apparently is a new symptom versus his prior admission.  His difficulty ambulating and inability to stand up with recurrent falls is somewhat concerning per wife therefore they present here for evaluation.  The patient does take Plavix already and was given instructions to take aspirin daily at home however his wife states that she has been taking this because of GERD. Symptoms began Saturday/worsened Saturday       Review of Systems   Constitutional:  Negative for activity change, chills, diaphoresis and fever.   HENT: Negative.     Respiratory: Negative.     Cardiovascular: Negative.    Skin: Negative.    Neurological:  Positive for facial asymmetry, speech difficulty and weakness.   All other systems reviewed and are negative.      Past Medical History:   Diagnosis Date    Acquired dyslexia     pt  reports after 3rd stroke    Allergic 1985    Allergic rhinitis     Anal fissure     Aortic insufficiency     moderate, THIERRY 2017    Asthma     Asthma     Benign prostatic hypertrophy (BPH) with weak urinary stream 11/01/2016    Cataract     Chronic bronchitis     Colon polyp     COPD (chronic obstructive pulmonary disease)     Emphysema lung     Falls frequently     Fatty liver     GERD (gastroesophageal reflux disease)     Headache     Hepatitis     thought to be Hepatitis A     History of pneumonia     With left lower lobe atelectasis and scar tissue, being followed    HL (hearing loss)     Hypertension     Low back pain      "Macular degeneration     Memory loss     Mixed hyperlipidemia 01/30/2023    Obstructive sleep apnea syndrome 08/23/2017    refuses c-pap    PFO (patent foramen ovale)     s/p percutaneous closure with a 25mm Amplatzer device in December 2018    Pneumonia     LLL with scar tissue    Seizures     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 total of 3 strokes    Tremor     Comes & goes    Vision loss        Allergies   Allergen Reactions    Aspirin Nausea Only     Pt states he \"can tolerate enteric coated aspirin only.\"     Citrus      Blisters on mouth      Combivent [Ipratropium-Albuterol] Other (See Comments)     Throat swelling    Lactose Intolerance (Gi)     Statins Mental Status Change     Severe memory impairment       Past Surgical History:   Procedure Laterality Date    CARDIAC CATHETERIZATION N/A 12/12/2018    Procedure: Patent foramen ovale closure- Abbott;  Surgeon: Deangelo Harris MD;  Location:  [facility];  Service: Cardiology    CARDIAC CATHETERIZATION N/A 12/12/2018    Procedure: Intracardiac echocardiogram;  Surgeon: Deangelo Harris MD;  Location:  [facility];  Service: Cardiology    CARDIAC ELECTROPHYSIOLOGY PROCEDURE N/A 03/26/2018    Procedure: Loop insertion   CONFIRM RX;  Surgeon: Luis Wyatt MD;  Location:  [facility];  Service: Cardiovascular    CARDIAC ELECTROPHYSIOLOGY PROCEDURE N/A 07/15/2020    Procedure: Loop recorder removal;  Surgeon: Starr Driscoll MD;  Location:  [facility];  Service: Cardiovascular;  Laterality: N/A;    CARDIAC SURGERY      CATARACT EXTRACTION Bilateral     COLONOSCOPY      COLONOSCOPY N/A 10/09/2020    Procedure: COLONOSCOPY with polypectomy;  Surgeon: Ras Mendoza MD;  Location: [facility] OR;  Service: Gastroenterology;  Laterality: N/A;  diverticulosis  ascending polyp  transverse polyp  sigmoid polyps X " 2  rectal polyp    EYE SURGERY      HAND SURGERY Bilateral     INGUINAL HERNIA REPAIR Left     OTHER SURGICAL HISTORY      inguinal hernia repair for person over age 5    TONSILLECTOMY      TONSILLECTOMY         Family History   Problem Relation Age of Onset    Obesity Mother     Clotting disorder Mother         Upper extremities    Alcohol abuse Father     Cancer Father 63        Esophagus and lung    Other Father         cardiac disorder    Heart disease Father     Depression Father     Kidney disease Sister     Kidney failure Sister     Drug abuse Paternal Uncle     Stroke Sister     Throat cancer Sister     Drug abuse Paternal Uncle        Social History     Socioeconomic History    Marital status:      Spouse name: Joey   Tobacco Use    Smoking status: Former     Current packs/day: 0.00     Average packs/day: 1.5 packs/day for 15.0 years (22.5 ttl pk-yrs)     Types: Cigarettes     Start date: 1964     Quit date: 1979     Years since quittin.4     Passive exposure: Past    Smokeless tobacco: Never    Tobacco comments:     caffeine use: Drinks 4 glasses of Dt coke on avg.    Vaping Use    Vaping status: Never Used   Substance and Sexual Activity    Alcohol use: No    Drug use: No    Sexual activity: Defer           Objective   Physical Exam  Vitals and nursing note reviewed.   Constitutional:       Appearance: Normal appearance. He is normal weight. He is not ill-appearing, toxic-appearing or diaphoretic.   HENT:      Head: Normocephalic and atraumatic.      Right Ear: External ear normal.      Left Ear: External ear normal.      Nose: Nose normal. No congestion or rhinorrhea.      Mouth/Throat:      Pharynx: No oropharyngeal exudate or posterior oropharyngeal erythema.   Eyes:      Extraocular Movements: Extraocular movements intact.      Conjunctiva/sclera: Conjunctivae normal.      Pupils: Pupils are equal, round, and reactive to light.   Cardiovascular:      Rate and Rhythm: Normal  rate and regular rhythm.      Pulses: Normal pulses.   Pulmonary:      Effort: Pulmonary effort is normal.      Breath sounds: Normal breath sounds. No stridor. No wheezing, rhonchi or rales.   Chest:      Chest wall: No tenderness.   Abdominal:      General: There is no distension.      Palpations: Abdomen is soft. There is no mass.   Musculoskeletal:         General: No swelling, tenderness or signs of injury. Normal range of motion.      Cervical back: Normal range of motion. No rigidity or tenderness.   Skin:     General: Skin is warm.      Capillary Refill: Capillary refill takes less than 2 seconds.      Coloration: Skin is not jaundiced or pale.      Findings: No bruising.   Neurological:      Mental Status: He is alert.      Motor: Weakness present.      Comments: There is an obvious left-sided facial droop with dysarthria and expressive speech.  No evidence of receptive abnormality.   strength within normal limits, sensation grossly intact throughout.  There is a left-sided pronator drift as well as left lower extremity weakness without the ability to elevate the leg.  Cranial nerves otherwise intact.  Ambulation not assessed secondary to ataxia and weakness.         Procedures           ED Course  ED Course as of 07/15/24 1703   Mon Jul 15, 2024   1619 ECG 12 Lead ED Triage Standing Order; Acute Stroke (Onset <24 hrs)  Rate: 65, rhythm is regular, left axis, , QTc 407.  No ST elevation or depression.  PVCs noted [JN]      ED Course User Index  [JN] Velasquez Olmos DO                          Total (NIH Stroke Scale): 4                  Medical Decision Making    MDM:    Escalation of care including admission/observation considered    - Discussions of management with other providers:  neurology     - Discussed/reviewed with Radiology regarding test interpretation    - Independent interpretation: Labs, XR, CT, and EKG    - Additional patient history obtained from: [facility]    - Review of external  non-ED record (if available):  Prior Inpt record, Office record, Outpt record, Prior Outpt labs, PCP record, Outside ED record, Other    - Chronic conditions affecting care: See HPI and medical Hx.    - Social Determinants of health significantly affecting care:  None        Medical Decision Making Discussion:    This is a 74-year-old who presents with worsening extremity weakness of the left upper extremity and left lower extremity.  He states that since Saturday he has had some worsening facial droop on the left as well with difficulty speaking.  He was discharged last Friday secondary to new onset stroke.  On arrival, and after chart review, it appears that these symptoms are new therefore code stroke was called.  I did speak with on-call neurology and recommends MRI instead of CT perfusion study.  Therefore CT of the head will be evaluated to rule out hemorrhage and MRI.    16:25-the patient does have based off of discussion with radiology, progression of his present ischemic disease.  There is no new stroke noted on MRI and there is no hemorrhagic transformation.    16:37-I spoke with neurologist Dr. Ware, recommends admission for a repeat TTE secondary to valve abnormalities noted several years ago.  The patient continues to remain stable here.  No acute intervention given at this time.    I spoke with the hospitalist who has kindly agreed to admit.  He is also already spoken with neurology.  The patient remains hemodynamically stable, otherwise well and will be admitted to the care of the hospitalist at this time.    The patient was counseled regarding diagnostic results and treatment plan and patient has indicated understanding of these instructions.      Amount and/or Complexity of Data Reviewed  Labs: ordered.  Radiology: ordered.  ECG/medicine tests: ordered. Decision-making details documented in ED Course.    Risk  Prescription drug management.        Final diagnoses:   Ischemic stroke   Weakness of  left side of body   Dysarthria       ED Disposition  ED Disposition       ED Disposition   Decision to Admit    Condition   --    Comment   Level of Care: Med/Surg [1]   Diagnosis: CVA (cerebral vascular accident) [825375]   Admitting Physician: SHAHZAD SARABIA [[id number]]                 No follow-up provider specified.       Medication List      No changes were made to your prescriptions during this visit.            Velasquez Olmos,   07/15/24 1701

## 2024-07-15 NOTE — TELEPHONE ENCOUNTER
Caller: MELISSA    [facility] call back number: [phone]     Chief complaint: PT HAD A STROKE AND WIFE STATES THAT SINCE COMING HOME FROM HOSPITAL  PT IS HAVING INCREASED WEAKNESS ON LEFT SIDE AND PT IS FALLING A LOT.    Patient directed to call 911 or go to their nearest emergency room.     Patient verbalized understanding: [x] Yes  [] No

## 2024-07-15 NOTE — PLAN OF CARE
Goal Outcome Evaluation:  Plan of Care Reviewed With: patient, spouse        Progress: no change  Outcome Evaluation: Patient ER admit with stroke, had MRI, left sided deficit today. NPO, cardiology & neurology consulted.

## 2024-07-15 NOTE — ED NOTES
"Nursing report ED to floor  Ronnell Rizvi  74 y.o.  male    HPI :   Chief Complaint   Patient presents with    Extremity Weakness       Admitting doctor:   Jace Huang DO    Admitting diagnosis:   The primary encounter diagnosis was Ischemic stroke. Diagnoses of Weakness of left side of body and Dysarthria were also pertinent to this visit.    Code status:   Current Code Status       Date Active Code Status Order ID Comments User Context       7/15/2024 1700 CPR (Attempt to Resuscitate) [id number]  Jace Huang DO ED        Question Answer    Code Status (Patient has no pulse and is not breathing) CPR (Attempt to Resuscitate)    Medical Interventions (Patient has pulse or is breathing) Full Support                    Allergies:   Aspirin, Citrus, Combivent [ipratropium-albuterol], Lactose intolerance (gi), and Statins    Isolation:   No active isolations    Intake and Output  No intake or output data in the 24 hours ending 07/15/24 1712    Weight:       07/15/24  1519   Weight: 80.3 kg (177 lb)       Most recent vitals:   Vitals:    07/15/24 1519 07/15/24 1614 07/15/24 1630 07/15/24 1649   BP: 130/82 151/71 147/68    BP Location: Right arm      Patient Position: Lying      Pulse: 70 58 65    Resp: 16 18 18    Temp:    98.2 °F (36.8 °C)   TempSrc: Oral      SpO2: 95% 96% 96%    Weight: 80.3 kg (177 lb)      Height: 182.9 cm (72.01\")          Active LDAs/IV Access:   Lines, Drains & Airways       Active LDAs       Name Placement date Placement time Site Days    Peripheral IV 07/15/24 1531 Right Antecubital 07/15/24  1531  Antecubital  less than 1                    Labs (abnormal labs have a star):   Labs Reviewed   COMPREHENSIVE METABOLIC PANEL - Abnormal; Notable for the following components:       Result Value    Glucose 100 (*)     All other components within normal limits    Narrative:     GFR Normal >60  Chronic Kidney Disease <60  Kidney Failure <15    The GFR formula is only valid for adults " with stable renal function between ages 18 and 70.   APTT - Abnormal; Notable for the following components:    PTT 22.2 (*)     All other components within normal limits    Narrative:     PTT = The equivalent PTT values for the therapeutic range of heparin levels at 0.1 to 0.7 U/ml are 53 to 110 seconds.     CBC WITH AUTO DIFFERENTIAL - Abnormal; Notable for the following components:    Monocyte % 12.2 (*)     All other components within normal limits   PROTIME-INR - Normal    Narrative:     Therapeutic Ranges for INR: 2.0-3.0 (PT 20-30)                              2.5-3.5 (PT 25-34)   SINGLE HS TROPONIN T - Normal    Narrative:     High Sensitive Troponin T Reference Range:  <14.0 ng/L- Negative Female for AMI  <22.0 ng/L- Negative Male for AMI  >=14 - Abnormal Female indicating possible myocardial injury.  >=22 - Abnormal Male indicating possible myocardial injury.   Clinicians would have to utilize clinical acumen, EKG, Troponin, and serial changes to determine if it is an Acute Myocardial Infarction or myocardial injury due to an underlying chronic condition.        POCT GLUCOSE FINGERSTICK - Normal   RAINBOW DRAW    Narrative:     The following orders were created for panel order [place] Draw.  Procedure                               Abnormality         Status                     ---------                               -----------         ------                     Green Top (Gel)[[id number]]                                  Final result               Lavender Top[[id number]]                                     Final result               Gold Top - SST[[id number]]                                   Final result               Light Blue Top[[id number]]                                   Final result                 Please view results for these tests on the individual orders.   POCT GLUCOSE FINGERSTICK   CBC AND DIFFERENTIAL    Narrative:     The following orders were created for panel order CBC &  Differential.  Procedure                               Abnormality         Status                     ---------                               -----------         ------                     CBC Auto Differential[[id number]]        Abnormal            Final result                 Please view results for these tests on the individual orders.   GREEN TOP   LAVENDER TOP   GOLD TOP - SST   LIGHT BLUE TOP       Results       Procedure Component Value Ref Range Date/Time    Comprehensive Metabolic Panel [[id number]]  (Abnormal) Collected: 07/15/24 1535    Order Status: Completed Specimen: Blood Updated: 07/15/24 1555     Glucose 100 65 - 99 mg/dL      BUN 10 8 - 23 mg/dL      Creatinine 0.92 0.76 - 1.27 mg/dL      Sodium 138 136 - 145 mmol/L      Potassium 4.0 3.5 - 5.2 mmol/L      Chloride 103 98 - 107 mmol/L      CO2 26.9 22.0 - 29.0 mmol/L      Calcium 8.9 8.6 - 10.5 mg/dL      Total Protein 6.3 6.0 - 8.5 g/dL      Albumin 3.9 3.5 - 5.2 g/dL      ALT (SGPT) 13 1 - 41 U/L      AST (SGOT) 19 1 - 40 U/L      Alkaline Phosphatase 97 39 - 117 U/L      Total Bilirubin 0.2 0.0 - 1.2 mg/dL      Globulin 2.4 gm/dL      A/G Ratio 1.6 g/dL      BUN/Creatinine Ratio 10.9 7.0 - 25.0      Anion Gap 8.1 5.0 - 15.0 mmol/L      eGFR 86.7 >60.0 mL/min/1.73     Narrative:      GFR Normal >60  Chronic Kidney Disease <60  Kidney Failure <15    The GFR formula is only valid for adults with stable renal function between ages 18 and 70.    Single High Sensitivity Troponin T [[id number]]  (Normal) Collected: 07/15/24 1535    Order Status: Completed Specimen: Blood Updated: 07/15/24 1555     HS Troponin T 17 <22 ng/L     Narrative:      High Sensitive Troponin T Reference Range:  <14.0 ng/L- Negative Female for AMI  <22.0 ng/L- Negative Male for AMI  >=14 - Abnormal Female indicating possible myocardial injury.  >=22 - Abnormal Male indicating possible myocardial injury.   Clinicians would have to utilize clinical acumen, EKG, Troponin, and serial  changes to determine if it is an Acute Myocardial Infarction or myocardial injury due to an underlying chronic condition.         Protime-INR [[id number]]  (Normal) Collected: 07/15/24 1535    Order Status: Completed Specimen: Blood Updated: 07/15/24 1549     Protime 13.0 12.1 - 15.0 Seconds      INR 0.98 0.90 - 1.10     Narrative:      Therapeutic Ranges for INR: 2.0-3.0 (PT 20-30)                              2.5-3.5 (PT 25-34)    aPTT [[id number]]  (Abnormal) Collected: 07/15/24 1535    Order Status: Completed Specimen: Blood Updated: 07/15/24 1549     PTT 22.2 24.3 - 38.1 seconds     Narrative:      PTT = The equivalent PTT values for the therapeutic range of heparin levels at 0.1 to 0.7 U/ml are 53 to 110 seconds.      [place] Draw [[id number]] Collected: 07/15/24 1535    Order Status: Completed Specimen: Blood Updated: 07/15/24 1545    Narrative:      The following orders were created for panel order [place] Draw.  Procedure                               Abnormality         Status                     ---------                               -----------         ------                     Green Top (Gel)[[id number]]                                  Final result               Lavender Top[[id number]]                                     Final result               Gold Top - SST[[id number]]                                   Final result               Light Blue Top[[id number]]                                   Final result                 Please view results for these tests on the individual orders.    CBC Auto Differential [[id number]]  (Abnormal) Collected: 07/15/24 1535    Order Status: Completed Specimen: Blood Updated: 07/15/24 1539     WBC 5.99 3.40 - 10.80 10*3/mm3      RBC 4.67 4.14 - 5.80 10*6/mm3      Hemoglobin 15.2 13.0 - 17.7 g/dL      Hematocrit 44.3 37.5 - 51.0 %      MCV 94.9 79.0 - 97.0 fL      MCH 32.5 26.6 - 33.0 pg      MCHC 34.3 31.5 - 35.7 g/dL      RDW 12.7 12.3 - 15.4 %      RDW-SD 44.1 37.0 - 54.0  fl      MPV 9.8 6.0 - 12.0 fL      Platelets 206 140 - 450 10*3/mm3      Neutrophil % 57.3 42.7 - 76.0 %      Lymphocyte % 27.5 19.6 - 45.3 %      Monocyte % 12.2 5.0 - 12.0 %      Eosinophil % 2.2 0.3 - 6.2 %      Basophil % 0.5 0.0 - 1.5 %      Immature Grans % 0.3 0.0 - 0.5 %      Neutrophils, Absolute 3.43 1.70 - 7.00 10*3/mm3      Lymphocytes, Absolute 1.65 0.70 - 3.10 10*3/mm3      Monocytes, Absolute 0.73 0.10 - 0.90 10*3/mm3      Eosinophils, Absolute 0.13 0.00 - 0.40 10*3/mm3      Basophils, Absolute 0.03 0.00 - 0.20 10*3/mm3      Immature Grans, Absolute 0.02 0.00 - 0.05 10*3/mm3      nRBC 0.0 0.0 - 0.2 /100 WBC     POC Glucose Once [[id number]]  (Normal) Collected: 07/15/24 1524    Order Status: Completed Specimen: Blood Updated: 07/15/24 1530     Glucose 100 70 - 130 mg/dL     POC Glucose Once [[id number]]     Order Status: Completed Specimen: Blood              EKG:   ECG 12 Lead ED Triage Standing Order; Acute Stroke (Onset <24 hrs)   Preliminary Result   HEART RATE=65  bpm   RR Kyrwdhtt=3621  ms   IN Afbxztuc=089  ms   P Horizontal Axis=-36  deg   P Front Axis=20  deg   QRSD Interval=95  ms   QT Xtudmeck=208  ms   NJzU=772  ms   QRS Axis=-20  deg   T Wave Axis=65  deg   - ABNORMAL ECG -   Sinus rhythm   Ventricular premature complex   Prolonged IN interval   Inferior infarct, old   Anterior infarct, old   Date and Time of Study:2024-07-15 15:38:35          Meds given in ED:   Medications   sodium chloride 0.9 % flush 10 mL (has no administration in time range)       Imaging results:  MRI Brain Without Contrast    Result Date: 7/15/2024  Impression: 1. Acute diffusion restriction abnormality within the deep white matter/corona radiata on the right appears similar in distribution with slight increase in size compatible with progression/evolution of small acute/subacute infarctions. 2. No evidence of acute hemorrhage. 3. Chronic white matter changes similar to the prior study as well as sequela of prior  remote left occipital infarct. Findings were discussed with the emergency department at the time of interpretation Electronically Signed: Star Khalil MD  7/15/2024 4:25 PM EDT  Workstation ID: [id number]    XR Chest 1 View    Result Date: 7/15/2024  Impression: No radiographic evidence of acute chest process. Electronically Signed: Negrito Kemp MD  7/15/2024 3:45 PM EDT  Workstation ID: [id number]    CT Head Without Contrast Stroke Protocol    Result Date: 7/15/2024  Impression: No acute intracranial abnormality. Chronic findings as above. Electronically Signed: Remy Flower MD  7/15/2024 3:42 PM EDT  Workstation ID: [id number]     Ambulatory status:   - up with assist X 2    Social issues:   Social History     Socioeconomic History    Marital status:      Spouse name: Joey   Tobacco Use    Smoking status: Former     Current packs/day: 0.00     Average packs/day: 1.5 packs/day for 15.0 years (22.5 ttl pk-yrs)     Types: Cigarettes     Start date: 1964     Quit date: 1979     Years since quittin.4     Passive exposure: Past    Smokeless tobacco: Never    Tobacco comments:     caffeine use: Drinks 4 glasses of Dt coke on avg.    Vaping Use    Vaping status: Never Used   Substance and Sexual Activity    Alcohol use: No    Drug use: No    Sexual activity: Defer       NIH Stroke Scale:  Interval: baseline      Oliva Murphy RN  07/15/24 17:12 EDT

## 2024-07-15 NOTE — PROGRESS NOTES
Staff notes patient has failed bedside swallow due to facial droop but they are unable to give any medications oral.  Place Dobbhoff to allow for multiple oral medications noted ordered    2025 PM  Informed by staff the patient adamantly refuses any Dobbhoff tube placement and understands ramifications of not taking aspirin and Brilinta tonight.  Aspirin ordered rectally, other medications on hold as patient is strict n.p.o. tonight.

## 2024-07-16 ENCOUNTER — APPOINTMENT (OUTPATIENT)
Dept: GENERAL RADIOLOGY | Facility: HOSPITAL | Age: 74
DRG: 065 | End: 2024-07-16
Payer: MEDICARE

## 2024-07-16 ENCOUNTER — READMISSION MANAGEMENT (OUTPATIENT)
Dept: CALL CENTER | Facility: HOSPITAL | Age: 74
End: 2024-07-16
Payer: MEDICARE

## 2024-07-16 PROBLEM — J18.9 PNEUMONIA: Status: RESOLVED | Noted: 2024-01-21 | Resolved: 2024-07-16

## 2024-07-16 PROBLEM — I63.9 ACUTE CVA (CEREBROVASCULAR ACCIDENT): Status: RESOLVED | Noted: 2024-07-12 | Resolved: 2024-07-16

## 2024-07-16 PROBLEM — I63.9 CVA (CEREBRAL VASCULAR ACCIDENT): Status: ACTIVE | Noted: 2024-07-16

## 2024-07-16 PROBLEM — R47.81 SLURRED SPEECH: Status: RESOLVED | Noted: 2024-07-11 | Resolved: 2024-07-16

## 2024-07-16 PROBLEM — I63.9 CVA (CEREBRAL VASCULAR ACCIDENT): Status: RESOLVED | Noted: 2018-11-29 | Resolved: 2024-07-16

## 2024-07-16 LAB
B PARAPERT DNA SPEC QL NAA+PROBE: NOT DETECTED
B PERT DNA SPEC QL NAA+PROBE: NOT DETECTED
C PNEUM DNA NPH QL NAA+NON-PROBE: NOT DETECTED
FLUAV SUBTYP SPEC NAA+PROBE: NOT DETECTED
FLUBV RNA ISLT QL NAA+PROBE: NOT DETECTED
HADV DNA SPEC NAA+PROBE: NOT DETECTED
HCOV 229E RNA SPEC QL NAA+PROBE: NOT DETECTED
HCOV HKU1 RNA SPEC QL NAA+PROBE: NOT DETECTED
HCOV NL63 RNA SPEC QL NAA+PROBE: NOT DETECTED
HCOV OC43 RNA SPEC QL NAA+PROBE: NOT DETECTED
HMPV RNA NPH QL NAA+NON-PROBE: NOT DETECTED
HPIV1 RNA ISLT QL NAA+PROBE: NOT DETECTED
HPIV2 RNA SPEC QL NAA+PROBE: NOT DETECTED
HPIV3 RNA NPH QL NAA+PROBE: NOT DETECTED
HPIV4 P GENE NPH QL NAA+PROBE: NOT DETECTED
M PNEUMO IGG SER IA-ACNC: NOT DETECTED
QT INTERVAL: 414 MS
QTC INTERVAL: 407 MS
RHINOVIRUS RNA SPEC NAA+PROBE: NOT DETECTED
RSV RNA NPH QL NAA+NON-PROBE: NOT DETECTED
SARS-COV-2 RNA NPH QL NAA+NON-PROBE: NOT DETECTED

## 2024-07-16 PROCEDURE — 99232 SBSQ HOSP IP/OBS MODERATE 35: CPT | Performed by: PSYCHIATRY & NEUROLOGY

## 2024-07-16 PROCEDURE — 94799 UNLISTED PULMONARY SVC/PX: CPT

## 2024-07-16 PROCEDURE — 97161 PT EVAL LOW COMPLEX 20 MIN: CPT

## 2024-07-16 PROCEDURE — 97116 GAIT TRAINING THERAPY: CPT

## 2024-07-16 PROCEDURE — 99232 SBSQ HOSP IP/OBS MODERATE 35: CPT | Performed by: INTERNAL MEDICINE

## 2024-07-16 PROCEDURE — 92611 MOTION FLUOROSCOPY/SWALLOW: CPT

## 2024-07-16 PROCEDURE — 99222 1ST HOSP IP/OBS MODERATE 55: CPT | Performed by: INTERNAL MEDICINE

## 2024-07-16 PROCEDURE — 25010000002 LEVETIRACETAM IN NACL 0.82% 500 MG/100ML SOLUTION: Performed by: INTERNAL MEDICINE

## 2024-07-16 PROCEDURE — 97530 THERAPEUTIC ACTIVITIES: CPT

## 2024-07-16 PROCEDURE — 74230 X-RAY XM SWLNG FUNCJ C+: CPT

## 2024-07-16 PROCEDURE — 92610 EVALUATE SWALLOWING FUNCTION: CPT

## 2024-07-16 PROCEDURE — 97165 OT EVAL LOW COMPLEX 30 MIN: CPT

## 2024-07-16 RX ORDER — LACOSAMIDE 50 MG/1
50 TABLET ORAL EVERY 12 HOURS SCHEDULED
Status: DISCONTINUED | OUTPATIENT
Start: 2024-07-16 | End: 2024-07-17 | Stop reason: HOSPADM

## 2024-07-16 RX ADMIN — ASPIRIN 81 MG: 81 TABLET, COATED ORAL at 10:36

## 2024-07-16 RX ADMIN — Medication 10 ML: at 08:54

## 2024-07-16 RX ADMIN — LACOSAMIDE 50 MG: 50 TABLET, FILM COATED ORAL at 13:51

## 2024-07-16 RX ADMIN — TICAGRELOR 90 MG: 90 TABLET ORAL at 20:19

## 2024-07-16 RX ADMIN — TICAGRELOR 90 MG: 90 TABLET ORAL at 10:37

## 2024-07-16 RX ADMIN — PANTOPRAZOLE SODIUM 40 MG: 40 TABLET, DELAYED RELEASE ORAL at 10:35

## 2024-07-16 RX ADMIN — LEVETIRACETAM 500 MG: 5 INJECTION INTRAVASCULAR at 08:54

## 2024-07-16 RX ADMIN — ROSUVASTATIN CALCIUM 40 MG: 20 TABLET, FILM COATED ORAL at 20:19

## 2024-07-16 RX ADMIN — LACOSAMIDE 50 MG: 50 TABLET, FILM COATED ORAL at 20:19

## 2024-07-16 RX ADMIN — CETIRIZINE HYDROCHLORIDE 10 MG: 10 TABLET, FILM COATED ORAL at 10:36

## 2024-07-16 RX ADMIN — Medication 10 ML: at 20:19

## 2024-07-16 NOTE — OUTREACH NOTE
Stroke Week 2 Survey      Flowsheet Row Responses   [facility] facility patient discharged from? [facility]   Does the patient have one of the following disease processes/diagnoses(primary or secondary)? Stroke   Week 2 attempt successful? No   Unsuccessful attempts Attempt 1   Revoke Readmitted            Roselia H - Registered Nurse

## 2024-07-16 NOTE — THERAPY EVALUATION
Patient Name: Ronnell Rizvi  : 1950    MRN: [id number]                              Today's Date: 2024       Admit Date: 7/15/2024    Visit Dx:     ICD-10-CM ICD-9-CM   1. Ischemic stroke  I63.9 434.91   2. Weakness of left side of body  R53.1 728.87   3. Dysarthria  R47.1 784.51   4. Oropharyngeal dysphagia [R13.12]  R13.12 787.22     Patient Active Problem List   Diagnosis    Anemia, unspecified    Vitamin B12 deficiency    Chronic fatigue    Benign prostatic hypertrophy (BPH) with weak urinary stream    Chronic bronchitis    ELIZABETH (obstructive sleep apnea)    Sinoatrial block    Cerebral aneurysm, nonruptured    TIA (transient ischemic attack)    Intracranial vascular stenosis    Sinus pause    S/P percutaneous patent foramen ovale closure    TIA on medication    HTN (hypertension)    COPD (chronic obstructive pulmonary disease)    Nocturnal seizures    Encounter for screening for malignant neoplasm of colon    Seizure    HL (hearing loss)    Pre-syncope    Mixed hyperlipidemia    History of stroke    Melena    Irritable bowel syndrome with both constipation and diarrhea    Gastroesophageal reflux disease    Stroke    CVA (cerebral vascular accident)     Past Medical History:   Diagnosis Date    Acquired dyslexia     pt  reports after 3rd stroke    Allergic     Allergic rhinitis     Anal fissure     Aortic insufficiency     moderate, THIERRY 2017    Asthma     Asthma     Benign prostatic hypertrophy (BPH) with weak urinary stream 2016    Cataract     Chronic bronchitis     Colon polyp     COPD (chronic obstructive pulmonary disease)     Emphysema lung     Falls frequently     Fatty liver     GERD (gastroesophageal reflux disease)     Headache     Hepatitis     thought to be Hepatitis A     History of pneumonia     With left lower lobe atelectasis and scar tissue, being followed    HL (hearing loss)     Hypertension     Low back pain     Macular degeneration     Memory loss     Mixed  hyperlipidemia 01/30/2023    Obstructive sleep apnea syndrome 08/23/2017    refuses c-pap    PFO (patent foramen ovale)     s/p percutaneous closure with a 25mm Amplatzer device in December 2018    Pneumonia     LLL with scar tissue    Seizures     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 total of 3 strokes    Tremor     Comes & goes    Vision loss      Past Surgical History:   Procedure Laterality Date    CARDIAC CATHETERIZATION N/A 12/12/2018    Procedure: Patent foramen ovale closure- [employer];  Surgeon: Deangelo Harris MD;  Location:  [facility];  Service: Cardiology    CARDIAC CATHETERIZATION N/A 12/12/2018    Procedure: Intracardiac echocardiogram;  Surgeon: Deangelo Harris MD;  Location:  [facility];  Service: Cardiology    CARDIAC ELECTROPHYSIOLOGY PROCEDURE N/A 03/26/2018    Procedure: Loop insertion   CONFIRM RX;  Surgeon: Luis Wyatt MD;  Location:  [facility];  Service: Cardiovascular    CARDIAC ELECTROPHYSIOLOGY PROCEDURE N/A 07/15/2020    Procedure: Loop recorder removal;  Surgeon: Starr Driscoll MD;  Location:  [facility];  Service: Cardiovascular;  Laterality: N/A;    CARDIAC SURGERY      CATARACT EXTRACTION Bilateral     COLONOSCOPY      COLONOSCOPY N/A 10/09/2020    Procedure: COLONOSCOPY with polypectomy;  Surgeon: Ras Mendoza MD;  Location: [facility];  Service: Gastroenterology;  Laterality: N/A;  diverticulosis  ascending polyp  transverse polyp  sigmoid polyps X 2  rectal polyp    EYE SURGERY      HAND SURGERY Bilateral     INGUINAL HERNIA REPAIR Left     OTHER SURGICAL HISTORY      inguinal hernia repair for person over age 5    TONSILLECTOMY      TONSILLECTOMY        General Information       Row Name 07/16/24 1234          Physical Therapy Time and Intention    Document Type evaluation  -BP     Mode of Treatment physical therapy   -BP       Row Name 07/16/24 1234          General Information    Patient Profile Reviewed yes  Patient with recent admission due to acute CVA, was discharged home last week with slurred speech and facial droop. Patient now presents with left sided weakness with increased facial droop. MRI revealed an extension of the infarct.  -BP     Prior Level of Function --  Patient reports independence with all mobility and ADLs at baseline without use of an AD. During previous admission, patient was ambulatory without a device.  -BP     Existing Precautions/Restrictions fall  -BP     Barriers to Rehab none identified  -BP       Row Name 07/16/24 1234          Living Environment    People in Home spouse  -BP       Row Name 07/16/24 1234          Home Main Entrance    Number of Stairs, Main Entrance two  -BP     Stair Railings, Main Entrance --  with two grab bars  -BP       Row Name 07/16/24 1234          Stairs Within Home, Primary    Stairs, Within Home, Primary no stairs within the home  -BP       Row Name 07/16/24 1234          Cognition    Orientation Status (Cognition) oriented x 4  -BP               User Key  (r) = Recorded By, (t) = Taken By, (c) = Cosigned By      Initials Name Provider Type    BP Prosper Jarquin, PT Physical Therapist                   Mobility       Row Name 07/16/24 1303          Bed Mobility    Bed Mobility supine-sit  -BP     Supine-Sit [place] (Bed Mobility) standby assist  -BP     Assistive Device (Bed Mobility) head of bed elevated  -BP       Row Name 07/16/24 1303          Transfers    Comment, (Transfers) Verbal cues for hand placement  -BP       Row Name 07/16/24 1303          Sit-Stand Transfer    Sit-Stand [place] (Transfers) minimum assist (75% patient effort);verbal cues;2 person assist  -BP     Assistive Device (Sit-Stand Transfers) walker, front-wheeled  -BP       Row Name 07/16/24 1303          Gait/Stairs (Locomotion)    [place] Level (Gait) minimum assist (75%  patient effort);verbal cues  -BP     Assistive Device (Gait) walker, front-wheeled  -BP     Distance in Feet (Gait) 20  -BP     Deviations/Abnormal Patterns (Gait) liyah decreased;base of support, narrow;stride length decreased  -BP     Bilateral Gait Deviations forward flexed posture  -BP     Comment, (Gait/Stairs) Patient with increased left lateral lean in standing, requires min A to weight shift to the right to advance L LE during swing. No knee buckling noted.  -BP               User Key  (r) = Recorded By, (t) = Taken By, (c) = Cosigned By      Initials Name Provider Type    BP Prosper Jarquin, PT Physical Therapist                   Obj/Interventions       Row Name 07/16/24 1319          Range of Motion Comprehensive    Comment, General Range of Motion B LE AROM WFL.  -BP       Row Name 07/16/24 1319          Strength Comprehensive (MMT)    Comment, General Manual Muscle Testing (MMT) Assessment R LE gross MMT 5/5. L LE gross MMT 4+/5.  -BP       Row Name 07/16/24 1319          Balance    Comment, Balance static sitting balance-Supervision. Dynamic sitting balance-CGA/min A. Static standing balance-CGA. Dynamic standing balance-min A with use of FWW.  -BP       Row Name 07/16/24 1319          Sensory Assessment (Somatosensory)    Sensory Assessment (Somatosensory) --  patient denies numbness and tingling B LE's. L UE numbness noted-see OT notes.  -BP               User Key  (r) = Recorded By, (t) = Taken By, (c) = Cosigned By      Initials Name Provider Type    Prosper Benitez, PT Physical Therapist                   Goals/Plan       Row Name 07/16/24 1326          Bed Mobility Goal 1 (PT)    Activity/Assistive Device (Bed Mobility Goal 1, PT) bed mobility activities, all  -BP     [place] Level/Cues Needed (Bed Mobility Goal 1, PT) supervision required  -BP     Time Frame (Bed Mobility Goal 1, PT) 5 days  -BP     Progress/Outcomes (Bed Mobility Goal 1, PT) new goal  -BP       Row Name 07/16/24  1326          Transfer Goal 1 (PT)    Activity/Assistive Device (Transfer Goal 1, PT) transfers, all;walker, rolling  -BP     [place] Level/Cues Needed (Transfer Goal 1, PT) contact guard required  -BP     Time Frame (Transfer Goal 1, PT) 5 days  -BP     Progress/Outcome (Transfer Goal 1, PT) new goal  -BP       Row Name 07/16/24 1326          Gait Training Goal 1 (PT)    Activity/Assistive Device (Gait Training Goal 1, PT) gait (walking locomotion);walker, rolling  -BP     [place] Level (Gait Training Goal 1, PT) contact guard required  -BP     Distance (Gait Training Goal 1, PT) 50  -BP     Time Frame (Gait Training Goal 1, PT) 5 days  -BP     Progress/Outcome (Gait Training Goal 1, PT) new [facility]  -BP       Row Name 07/16/24 1326          Therapy Assessment/Plan (PT)    Planned Therapy Interventions (PT) balance training;bed mobility training;gait training;home exercise program;patient/family education;transfer training;strengthening  -BP               User Key  (r) = Recorded By, (t) = Taken By, (c) = Cosigned By      Initials Name Provider Type    BP Prosper Jarquin, PT Physical Therapist                   Clinical Impression       Row Name 07/16/24 1320          Pain    Pretreatment Pain Rating 0/10 - no pain  -BP     Posttreatment Pain Rating 0/10 - no pain  -BP     Additional Documentation Pain Scale: Numbers Pre/Post-Treatment (Group)  -BP       Row Name 07/16/24 1320          Plan of Care Review    Plan of Care Reviewed With patient  -BP     Outcome Evaluation PT Evaluation Complete: Patient performs supine to sit transfer with SBA, sit to/from stand transfers with min A, and gait x 20 feet with min A with use of FWW. Patient demonstrates L lateral lean in standing with difficulty advancing L LE forward. Patient requires min A to weight shift right to advance L LE. No knee buckling noted. Patient requires min A for dynamic sitting and dynamic standing balance. Patient is oriented x 4, follows all  commands. Slurred speech and L facial droop  noted. Patient would benefit from physical therapy to address deficits in LE strength, balance, and functional mobility. Recommend acute rehab at discharge. Patient and spouse agreeable. Notified .  -BP       Row Name 07/16/24 1320          Therapy Assessment/Plan (PT)    Rehab Potential (PT) good, to achieve stated therapy goals  -BP     Criteria for Skilled Interventions Met (PT) yes;meets criteria  -BP     Therapy Frequency (PT) --  QD/BID  -BP     Predicted Duration of Therapy Intervention (PT) 5 days  -BP       Row Name 07/16/24 1320          Positioning and Restraints    Pre-Treatment Position in bed  -BP     Post Treatment Position chair  -BP     In Chair notified nsg;sitting;call light within reach;encouraged to call for assist;with family/caregiver  -BP               User Key  (r) = Recorded By, (t) = Taken By, (c) = Cosigned By      Initials Name Provider Type    BP Prosper Jarquin, PT Physical Therapist                   Outcome Measures       Row Name 07/16/24 1327          How much help from another person do you currently need...    Turning from your back to your side while in flat bed without using bedrails? 3  -BP     Moving from lying on back to sitting on the side of a flat bed without bedrails? 3  -BP     Moving to and from a bed to a chair (including a wheelchair)? 3  -BP     Standing up from a chair using your arms (e.g., wheelchair, bedside chair)? 3  -BP     Climbing 3-5 steps with a railing? 2  -BP     To walk in hospital room? 3  -BP     AM-PAC 6 Clicks Score (PT) 17  -BP     Highest Level of Mobility Goal 5 --> Static standing  -BP       Row Name 07/16/24 1320          Modified [place] Scale    Pre-Stroke Modified [place] Scale 1 - No significant disability despite symptoms.  Able to carry out all usual duties and activities.  -BP     Modified [place] Scale 4 - Moderately severe disability.  Unable to walk without assistance, and  unable to attend to own bodily needs without assistance.  -BP       Row Name 07/16/24 1327          Functional Assessment    Outcome Measure Options AM-PAC 6 Clicks Basic Mobility (PT);Modified [place]  -               User Key  (r) = Recorded By, (t) = Taken By, (c) = Cosigned By      Initials Name Provider Type    BP Prosper Jarquin, [place] Physical Therapist                                 Physical Therapy Education       Title: PT OT SLP Therapies (In Progress)       Topic: Physical Therapy (In Progress)       Point: Mobility training (Done)       Learning Progress Summary             Patient Acceptance, E,TB, VU by BP at 7/16/2024 1329                         Point: Home exercise program (Not Started)       Learner Progress:  Not documented in this visit.                              User Key       Initials Effective Dates Name Provider Type Discipline     06/16/21 -  Prosper Jarquin PT Physical Therapist PT                  PT Recommendation and Plan  Planned Therapy Interventions (PT): balance training, bed mobility training, gait training, home exercise program, patient/family education, transfer training, strengthening  Plan of Care Reviewed With: patient  Outcome Evaluation: PT Evaluation Complete: Patient performs supine to sit transfer with SBA, sit to/from stand transfers with min A, and gait x 20 feet with min A with use of FWW. Patient demonstrates L lateral lean in standing with difficulty advancing L LE forward. Patient requires min A to weight shift right to advance L LE. No knee buckling noted. Patient requires min A for dynamic sitting and dynamic standing balance. Patient is oriented x 4, follows all commands. Slurred speech and L facial droop  noted. Patient would benefit from physical therapy to address deficits in LE strength, balance, and functional mobility. Recommend acute rehab at discharge. Patient and spouse agreeable. Notified .     Time Calculation:   PT Evaluation  Complexity  History, PT Evaluation Complexity: 3 or more personal factors and/or comorbidities  Examination of Body Systems (PT Eval Complexity): total of 3 or more elements  Clinical Presentation (PT Evaluation Complexity): stable  Clinical Decision Making (PT Evaluation Complexity): low complexity  Overall Complexity (PT Evaluation Complexity): low complexity     PT Charges       Row Name 07/16/24 1329             Time Calculation    Start Time 0900  -BP      PT Received On 07/16/24  -BP      PT - Next Appointment 07/16/24  -BP                User Key  (r) = Recorded By, (t) = Taken By, (c) = Cosigned By      Initials Name Provider Type    BP Prosper Jarquin, PT Physical Therapist                  Therapy Charges for Today       Code Description Service Date Service Provider Modifiers Qty    [id number]  PT EVAL LOW COMPLEXITY 2 7/16/2024 Prosper Jarquin, PT GP 1            PT G-Codes  Outcome Measure Options: AM-PAC 6 Clicks Basic Mobility (PT), Modified [place]  AM-PAC 6 Clicks Score (PT): 17  Modified [place] Scale: 4 - Moderately severe disability.  Unable to walk without assistance, and unable to attend to own bodily needs without assistance.  PT Discharge Summary  Anticipated Discharge Disposition (PT): inpatient rehabilitation facility    Prosper Jarquin PT  7/16/2024

## 2024-07-16 NOTE — PLAN OF CARE
Goal Outcome Evaluation:  Plan of Care Reviewed With: patient           Outcome Evaluation: OT evaluation completed/ pt required SBA for supine to sit transfer to EOB. pt required min assist for functional transfers from EOB with RW. pt required min assist for functional mobility in room with RW x 20 feet. anticipate pt will require min-mod assist for lb adls. pt with decreased strength and coordination in L UE and co numbness in L hand. pt with slurred speech and facial droop. pt would benefit from skilled OT services. will see pt 5x week during hospitalization with anticipated discharge to acute rehab      Anticipated Discharge Disposition (OT): inpatient rehabilitation facility

## 2024-07-16 NOTE — CASE MANAGEMENT/SOCIAL WORK
Continued Stay Note  [place] Morton     Patient Name: Ronnell Rizvi  MRN: [id number]  Today's Date: 7/16/2024    Admit Date: 7/15/2024    Plan: referral pending for acute rehab at [facility]   Discharge Plan       Row Name 07/16/24 1348       Plan    Plan referral pending for acute rehab at [facility]    Patient/Family in Agreement with Plan yes    Plan Comments Spoke with Pt and spouse at bedside. IMM letter reviewed and copy left at bedside with spouse. Discussed DC plans. Pt/spouse are agreeable to plans for acute rehab at [place]. Explained some basic differences b/t acute and subacute rehab. Spouse verbalized understanding. Spouse states they would like some place close to home. [facility] is the closest one to her and they are both in agreement to a referral there. In basket referral in [facility] for [facility]. Called referral and left a generic M for liaison to review the [facility] referral. CCP to follow.......JW                   Discharge Codes    No documentation.                       Ivy Davis, RN

## 2024-07-16 NOTE — PLAN OF CARE
Goal Outcome Evaluation:  Plan of Care Reviewed With: patient           Outcome Evaluation: PT Evaluation Complete: Patient performs supine to sit transfer with SBA, sit to/from stand transfers with min A, and gait x 20 feet with min A with use of FWW. Patient demonstrates L lateral lean in standing with difficulty advancing L LE forward. Patient requires min A to weight shift right to advance L LE. No knee buckling noted. Patient requires min A for dynamic sitting and dynamic standing balance. Patient is oriented x 4, follows all commands. Slurred speech and L facial droop  noted. Patient would benefit from physical therapy to address deficits in LE strength, balance, and functional mobility. Recommend acute rehab at discharge. Patient and spouse agreeable. Notified .      Anticipated Discharge Disposition (PT): inpatient rehabilitation facility

## 2024-07-16 NOTE — CASE MANAGEMENT/SOCIAL WORK
Continued Stay Note  [place] Morton     Patient Name: Ronnell Rizvi  MRN: [id number]  Today's Date: 7/16/2024    Admit Date: 7/15/2024    Plan: referral pending for acute rehab at [facility]   Discharge Plan       Row Name 07/16/24 1348       Plan    Plan referral pending for acute rehab at [facility]    Patient/Family in Agreement with Plan yes    Plan Comments Spoke with Pt and spouse at bedside. IMM letter reviewed and copy left at bedside with spouse. Discussed DC plans. Pt/spouse are agreeable to plans for acute rehab at [place]. Explained some basic differences b/t acute and subacute rehab. Spouse verbalized understanding. Spouse states they would like some place close to home. [facility] is the closest one to her and they are both in agreement to a referral there. In basket referral in [facility] for [facility]. Called referral and left a generic M for liaison to review the [facility] referral. CCP to follow.......JW                   Discharge Codes    No documentation.                       Ivy Davis, RN

## 2024-07-16 NOTE — PLAN OF CARE
Goal Outcome Evaluation:  Plan of Care Reviewed With: patient           Outcome Evaluation: PT: Patient performs sit to/from stand transfers with min A and gait x 56 feet with min/mod A with use of FWW. Patient with continued L lateral lean during static standing and with gait. Requires min/mod A to manage device as well as min/mod A for weight shifting right to advance L LE. Patients is very motivated to participate and is agreeable to recommendation of inpatient acute rehab at discharge. Will continue to see QD/BID while in the hospital.      Anticipated Discharge Disposition (PT): inpatient rehabilitation facility

## 2024-07-16 NOTE — PLAN OF CARE
Problem: Adult Inpatient Plan of Care  Goal: Plan of Care Review  Outcome: Ongoing, Progressing  Flowsheets (Taken 7/16/2024 0226)  Plan of Care Reviewed With: (Rosanne RIVERO)   patient   spouse   other (see comments)  Outcome Evaluation: SLP: Initiated swallow eval with okay from RN and Dr. Huang. Pt tolerating small sips of water, but one episode of coughing on thins from consecutive straw drinks. Further attempts w/small straw drinks w/no overt s/s of aspiration. Pt stating he may have a procedure 'where they go down his esophagus to check out his heart' reported. Discussed with charge nurse, Nabila and will not proceed further with swallow eval until seen by cardiology. Pt, spouse and Rosanne RIVERO aware and understanding.

## 2024-07-16 NOTE — PLAN OF CARE
Problem: Adult Inpatient Plan of Care  Goal: Plan of Care Review  7/16/2024 1036 by Sunshine Ballard MS CCC-SLP  Outcome: Ongoing, Progressing  Flowsheets (Taken 7/16/2024 1036)  Plan of Care Reviewed With: (Dr Lopez)   patient   spouse   other (see comments)  Outcome Evaluation: SLP Clinical Swallow Eval: Pt demonstrates mild oropharyngeal dysphagia with mild anterior loss of liquid and puree out of left side of mouth. Pt w/one episode of throat clearing on the initial trial of thins from straw. Pt w/no further s/s of aspiration on repeat trials of thins, puree and solids. Recommend further assessment via instrumental assessment. MD in agreement. Recommend meds whole with nectar thick or in puree. Recommend soft, whole diet with nectar thick liquids. Okay for sips of water 30 minutes after meals and following oral care and up to next meal. No water or other thin iiquids with meals. SLP to continue to follow. Recommend acute rehab at discharge.

## 2024-07-16 NOTE — PROGRESS NOTES
"DOS: 2024  NAME: Ronnell Rizvi   : 1950  PCP: Deena Tarango MD  Chief Complaint   Patient presents with    Extremity Weakness       Chief complaint: Still has mild dysarthric speech as well as left-sided facial droop.  Subjective: He has been getting up and walking independently.  He cannot recall when he had the last seizure and so I discussed that because Dr. Christopher Sawant our cardiologist had recommended that the phenytoin be changed and as he has passed his bedside swallow that I will start him on Vimpat 50 mg twice daily with food and then slowly go up on that.  In this age group it is better to use Vimpat instead of Keppra which has got more cognitive side effects.    Objective:  Vital signs: /69 (BP Location: Left arm, Patient Position: Sitting)   Pulse 67   Temp 97.5 °F (36.4 °C) (Oral)   Resp 18   Ht 182.9 cm (72.01\")   Wt 80.3 kg (177 lb)   SpO2 95%   BMI 24.00 kg/m²    Gen: NAD, vitals reviewed  MS: Normal.  CN: Cranials 2-12: No focal deficits noted.  Motor: Muscles of both upper and lower extremities show good bulk power and tone.  Sensory: No sensory loss noted.  Coordination: Normal finger-to-nose coordination noted.  Gait: He was able to stand up by the side of the bed independently and his Romberg is negative.    ROS:  General: Pleasant gentleman currently feeling a lot better awaiting the cardiology test for tomorrow.  Neurological: The NIH stroke scale is 2 at this point.    Laboratory results:  Lab Results   Component Value Date    GLUCOSE 100 (H) 07/15/2024    CALCIUM 8.9 07/15/2024     07/15/2024    K 4.0 07/15/2024    CO2 26.9 07/15/2024     07/15/2024    BUN 10 07/15/2024    CREATININE 0.92 07/15/2024    EGFRIFAFRI 74 2021    EGFRIFNONA 64 2021    BCR 10.9 07/15/2024    ANIONGAP 8.1 07/15/2024     Lab Results   Component Value Date    WBC 5.99 07/15/2024    HGB 15.2 07/15/2024    HCT 44.3 07/15/2024    MCV 94.9 07/15/2024     " 07/15/2024     Lab Results   Component Value Date     (H) 07/12/2024     (H) 05/15/2024     (H) 04/23/2024         Lab 07/12/24  0437   HEMOGLOBIN A1C 5.37        Review of labs: The LDL is elevated at 136 and the hemoglobin A1c is 5.37.     CMP:        Lab 07/15/24  1535 07/12/24  0437 07/11/24  1805   SODIUM 138 140 136   POTASSIUM 4.0 3.4* 3.6   CHLORIDE 103 105 101   CO2 26.9 26.3 25.9   ANION GAP 8.1 8.7 9.1   BUN 10 11 11   CREATININE 0.92 0.82 0.96   EGFR 86.7 91.6 82.4   GLUCOSE 100* 88 95   CALCIUM 8.9 8.9 9.1   TOTAL PROTEIN 6.3 5.8* 6.1   ALBUMIN 3.9 3.7 3.7   GLOBULIN 2.4 2.1 2.4   ALT (SGPT) 13 13 13   AST (SGOT) 19 17 17   BILIRUBIN 0.2 0.2 0.2   ALK PHOS 97 84 92        TSH          4/23/2024    14:37 7/12/2024    04:37   TSH   TSH 3.290  3.060         Lipid Panel          4/23/2024    14:37 5/15/2024    09:04 7/12/2024    04:37   Lipid Panel   Total Cholesterol  213  203    Total Cholesterol 269      Triglycerides 86  85  58    HDL Cholesterol 59  60  56    VLDL Cholesterol 14  15  11    LDL Cholesterol  196  138  136    LDL/HDL Ratio 3.27  2.27  2.42         Lab Results   Component Value Date    KGVLPHEY35 465 07/12/2024       Lab Results   Component Value Date    FOLATE 9.52 07/12/2024       Lab Results   Component Value Date    HGBA1C 5.37 07/12/2024           Review and interpretation of imaging: The recent CT angiogram of the head and neck with contrast showed the following:    Findings: The common carotid arteries are normal. Moderate atheromatous disease of the proximal right internal carotid artery without significant stenosis. The extracranial right internal carotid artery is normal otherwise. The intracranial segments of   the left internal carotid arteries are normal. The right carotid terminus is normal. Focal severe stenosis of the right M2 branch seen on axial image #278 series 6 which is nearly 60% stenosis completely occluded. Otherwise the visualized segments of  the   right anterior and middle cerebral arteries are normal.     The left common carotid artery is normal. Moderate atheromatous disease of the left carotid bulb and proximal left internal carotid artery with a minimal residual vessel lumen diameter of 4 mm and a native vessel lumen diameter of 5 mm consistent with   approximately 20% stenosis per NASCET criteria.  Mild atheromatous disease involving the intracranial segments of the internal carotid arteries. The left carotid terminus is normal. The visualized branches of the left anterior and middle cerebral arteries appear normal.     Both vertebral arteries arise from the subclavian arteries. The right vertebral artery is dominant. Left vertebral artery supplies the basilar artery. The basilar artery and basilar artery tip are normal. The basilar artery terminates in bilateral   posterior cerebral arteries which appear normal.     The lung apices are clear. Mild emphysematous changes of the lung apices. The thyroid gland is normal. No cervical adenopathy. The visualized parotid and submandibular glands are normal.  Paranasal sinuses are clear. Bilateral lens replacements. No abnormal intracranial enhancement.     IMPRESSION:  Focal severe stenosis of the right M2 branch with minimal thready flow noted in the region. No vessel occlusion.       CT Head Without Contrast Stroke Protocol    Result Date: 7/15/2024  CT HEAD WO CONTRAST STROKE PROTOCOL Date of Exam: 7/15/2024 3:36 PM EDT Indication: Neuro deficit, acute, stroke suspected Neuro Deficit, acute, Stroke suspected. Comparison: 7/12/2024 MRI and 7/11/2024 CT Technique: Axial CT images were obtained of the head without contrast administration.  Reconstructed coronal and sagittal images were also obtained. Automated exposure control and iterative construction methods were used. Scan Time: 15:31 Findings: There is no evidence of acute intracranial hemorrhage, mass, midline shift, new loss of gray-white matter  differentiation, or extra-axial fluid collection. There is a left parieto-occipital encephalomalacia. Subcortical and periventricular white matter hypodensities consistent with sequela of chronic small vessel ischemic disease. There is global parenchymal volume loss with ex vacuo dilation of the ventricular system. The basal cisterns are patent. There is no evidence of fracture or suspicious osseous lesion. Paranasal sinuses and mastoid air cells are well aerated. There is bilateral ocular surgery. The included soft tissues are within normal limits.     Impression: Impression: No acute intracranial abnormality. Chronic findings as above. Electronically Signed: Remy Flower MD  7/15/2024 3:42 PM EDT  Workstation ID: [id number]    CT Head Without Contrast Stroke Protocol    Result Date: 7/11/2024  CT HEAD WO CONTRAST STROKE PROTOCOL Date of Exam: 7/11/2024 6:06 PM EDT Indication: Neuro deficit, acute, stroke suspected Neuro Deficit, acute, Stroke suspected. Comparison: 12/29/2023 Technique: Axial CT images were obtained of the head without contrast administration.  Reconstructed coronal and sagittal images were also obtained. Automated exposure control and iterative construction methods were used. Scan Time: 1812 hours Results discussed with the emergency room at 6:19 p.m. on 7/11/2024. FINDINGS: There is no evidence for acute intracranial hemorrhage. No definitive acute focal ischemia is identified. Nonspecific white matter changes are noted likely related to chronic small vessel ischemic changes and age-related changes. Associated diffuse volume loss is observed. Remote infarction is noted involving the inferior aspect of the left parietal-occipital region. There is also evidence for remote infarction involving the region of the right basal ganglia and right thalamus. These changes are stable. There is no evidence for abnormal cerebral edema. There is no mass effect or midline shift. The ventricular system is  nondilated. The basilar cisterns are patent. The skull is intact. The paranasal sinuses and mastoid air cells are clear.     Impression: 1.No evidence for acute intracranial abnormality. 2.Nonspecific white matter changes are noted with associated diffuse volume loss. These findings are likely related to chronic small vessel ischemic changes and/or age-related changes. 3.Multifocal remote infarcts are again noted which appear stable. Electronically Signed: Domingo Cabrera MD  7/11/2024 6:22 PM EDT  Workstation ID: [id number]          MRI Brain Without Contrast    Result Date: 7/15/2024  MRI BRAIN WO CONTRAST Date of Exam: 7/15/2024 3:50 PM EDT Indication: acute stroke.  Comparison: CT same day and MRI 7/12/2024 Technique:  Routine multiplanar/multisequence sequence images of the brain were obtained without contrast administration. Findings: Areas of acute diffusion restriction abnormality demonstrates mild progression within the previously noted distribution within the deep white matter, corona radiata noted on the comparison study. No new areas are noted of acute diffusion restriction abnormality. The ventricles, cisterns and sulci appear stable. No evidence of acute hemorrhage noted. Remote infarct with encephalomalacia in the left occipital region again noted. There is associated gliosis of the surrounding brain parenchyma. There is extensive patchy and confluent FLAIR and T2 signal hyperintensity noted within the supratentorial brain compatible with sequela small vessel ischemic disease. Changes also noted within the alonzo and midbrain. Major intracranial flow voids appear grossly patent. Mild mucosal thickening noted. Globes and orbits appear stable. Mastoid air cells appear stable. The midline structures of the brain appear stable.     Impression: Impression: 1. Acute diffusion restriction abnormality within the deep white matter/corona radiata on the right appears similar in distribution with slight increase in  size compatible with progression/evolution of small acute/subacute infarctions. 2. No evidence of acute hemorrhage. 3. Chronic white matter changes similar to the prior study as well as sequela of prior remote left occipital infarct. Findings were discussed with the emergency department at the time of interpretation Electronically Signed: Star Khalil MD  7/15/2024 4:25 PM EDT  Workstation ID: [id number]    MRI Brain Without Contrast    Result Date: 7/12/2024  MRI BRAIN WO CONTRAST Date of Exam: 7/12/2024 9:54 AM EDT Indication: Stroke, follow up stroke protocol.  Comparison: CTA head stroke study 7/11/2022 Technique:  Routine multiplanar/multisequence sequence images of the brain were obtained without contrast administration. Findings: There are multiple small punctate foci of restricted diffusion in the right corona radiata consistent with small acute infarcts. There is no evidence of acute or chronic intracranial hemorrhage. No mass effect or midline shift. No abnormal extra-axial collections. The basal ganglia, brainstem and cerebellum appear within normal limits. Midline structures are intact. There is age-related diffuse cortical atrophy. There are prominent patchy white matter hyperintensities in keeping with chronic microvascular ischemic  disease. There is encephalomalacia and gliosis in the left occipital lobe related to prior infarct. Remote lacunar infarct of the right thalamus. Calvarial and superficial soft tissue signal is within normal limits. Orbits appear unremarkable. There is trace mucosal thickening in the bilateral ethmoid air cells. The mastoid air cells are clear.     Impression: Impression: 1. Multiple small acute infarcts of the right corona radiata. 2. Age-related atrophy, chronic microvascular ischemic disease and remote left occipital lobe infarct. Electronically Signed: Madelyn Dale MD  7/12/2024 10:09 AM EDT  Workstation ID: [id number]      Workup to date: Transesophageal  echocardiogram performed on March 26, 2018 was interpreted as follows:    Interpretation Summary    No evidence of a left atrial appendage thrombus was present.  Moderate patent foramen ovale present. Saline test results are positive.  Left ventricular systolic function is normal.  Moderate aortic valve regurgitation is present.  Mild mitral valve regurgitation is present       Results for orders placed during the hospital encounter of 07/11/24    Adult Transthoracic Echo Complete W/ Cont if Necessary Per Protocol (With Agitated Saline)    Interpretation Summary    Left ventricular systolic function is normal. Calculated left ventricular EF = 64.4%    Left ventricular wall thickness is consistent with hypertrophy. Sigmoid-shaped ventricular septum is present.    Left ventricular diastolic function was normal.    There is mild calcification of the aortic valve.    Systolic anterior motion of the chordal apparatus is present. There is no outflow tract obstruction.    Estimated right ventricular systolic pressure from tricuspid regurgitation is normal (<35 mmHg).         Additional testing: Recent electrocardiogram showed the following:    HEART RATE=65  bpm  RR Qjdszqon=9368  ms  AZ Tdrryuhh=915  ms  P Horizontal Axis=-36  deg  P Front Axis=20  deg  QRSD Interval=95  ms  QT Xbgilhal=454  ms  VUfI=671  ms  QRS Axis=-20  deg  T Wave Axis=65  deg  - ABNORMAL ECG -  Sinus rhythm  Ventricular premature complex  Prolonged AZ interval  Inferior  infarct, old  Anterior  infarct, old  When compared with ECG of 11-Jul-2024 18:00:16,  No significant change      Diagnosis: Patient with recurrent strokes and waxing and waning of the symptoms.    Plan:  1.  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  2.  Patient has been switched to a combination of baby aspirin and Brilinta 90 mg twice daily with food.  3.  As per the recommendations from the  cardiologist Dr. Christopher Sawant a transesophageal echocardiogram for tomorrow has been planned and also the phenytoin has been switched to Vimpat 50 mg twice daily with food as he passed the bedside swallow and also not to use the Keppra in this age group as it tends to add to confusion.  Patient does not remember when he had his last seizure.  4.  If the THIERRY is showing that the PFO closure has been working fine and there is no cardiac source of embolism then we will contact Dr. Armendariz at the [facility] for possible stenting of the right M1 segment once the patient is medically stable and at least 2 weeks out from the recent stroke.    Discussed with the patient and he will be followed up by our inpatient teleneurology service with me tomorrow    Spent a total of 30 minutes in face-to-face evaluation and management of the patient using the dedicated telemedicine device without any interruption with the help of Rosanne the rounding nurse with the patient located at the [facility] and myself at a remote location.    Electronically signed by Yojana Ware MD, 07/16/24, 12:05 PM EDT.

## 2024-07-16 NOTE — MBS/VFSS/FEES
Acute Care - Speech Language Pathology   Swallow Initial Evaluation [place] [facility]     Patient Name: Ronnell Rizvi  : 1950  MRN: [id number]  Today's Date: 2024  Onset of Illness/Injury or Date of Surgery: 07/15/24     Referring Physician: Dr Huang      Admit Date: 7/15/2024    Visit Dx:     ICD-10-CM ICD-9-CM   1. Ischemic stroke  I63.9 434.91   2. Weakness of left side of body  R53.1 728.87   3. Dysarthria  R47.1 784.51   4. Oropharyngeal dysphagia [R13.12]  R13.12 787.22     Patient Active Problem List   Diagnosis    Anemia, unspecified    Vitamin B12 deficiency    Chronic fatigue    Benign prostatic hypertrophy (BPH) with weak urinary stream    Chronic bronchitis    ELIZABETH (obstructive sleep apnea)    Sinoatrial block    Cerebral aneurysm, nonruptured    TIA (transient ischemic attack)    Intracranial vascular stenosis    Sinus pause    S/P percutaneous patent foramen ovale closure    TIA on medication    HTN (hypertension)    COPD (chronic obstructive pulmonary disease)    Nocturnal seizures    Encounter for screening for malignant neoplasm of colon    Seizure    HL (hearing loss)    Pre-syncope    Mixed hyperlipidemia    History of stroke    Melena    Irritable bowel syndrome with both constipation and diarrhea    Gastroesophageal reflux disease    Stroke    CVA (cerebral vascular accident)     Past Medical History:   Diagnosis Date    Acquired dyslexia     pt  reports after 3rd stroke    Allergic 1985    Allergic rhinitis     Anal fissure     Aortic insufficiency     moderate, THIERRY 2017    Asthma     Asthma     Benign prostatic hypertrophy (BPH) with weak urinary stream 2016    Cataract     Chronic bronchitis     Colon polyp     COPD (chronic obstructive pulmonary disease)     Emphysema lung     Falls frequently     Fatty liver     GERD (gastroesophageal reflux disease)     Headache     Hepatitis     thought to be Hepatitis A     History of pneumonia     With left lower lobe atelectasis and  scar tissue, being followed    HL (hearing loss)     Hypertension     Low back pain     Macular degeneration     Memory loss     Mixed hyperlipidemia 01/30/2023    Obstructive sleep apnea syndrome 08/23/2017    refuses c-pap    PFO (patent foramen ovale)     s/p percutaneous closure with a 25mm Amplatzer device in December 2018    Pneumonia     LLL with scar tissue    Seizures     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 total of 3 strokes    Tremor     Comes & goes    Vision loss      Past Surgical History:   Procedure Laterality Date    CARDIAC CATHETERIZATION N/A 12/12/2018    Procedure: Patent foramen ovale closure- Abbott;  Surgeon: Deangelo Harris MD;  Location:  [facility];  Service: Cardiology    CARDIAC CATHETERIZATION N/A 12/12/2018    Procedure: Intracardiac echocardiogram;  Surgeon: Deangelo Harris MD;  Location:  [facility];  Service: Cardiology    CARDIAC ELECTROPHYSIOLOGY PROCEDURE N/A 03/26/2018    Procedure: Loop insertion   CONFIRM RX;  Surgeon: uLis Wyatt MD;  Location:  [facility];  Service: Cardiovascular    CARDIAC ELECTROPHYSIOLOGY PROCEDURE N/A 07/15/2020    Procedure: Loop recorder removal;  Surgeon: Starr Driscoll MD;  Location:  [facility];  Service: Cardiovascular;  Laterality: N/A;    CARDIAC SURGERY      CATARACT EXTRACTION Bilateral     COLONOSCOPY      COLONOSCOPY N/A 10/09/2020    Procedure: COLONOSCOPY with polypectomy;  Surgeon: Ras Mendoza MD;  Location:  [facility];  Service: Gastroenterology;  Laterality: N/A;  diverticulosis  ascending polyp  transverse polyp  sigmoid polyps X 2  rectal polyp    EYE SURGERY      HAND SURGERY Bilateral     INGUINAL HERNIA REPAIR Left     OTHER SURGICAL HISTORY      inguinal hernia repair for person over age 5    TONSILLECTOMY      TONSILLECTOMY         SLP Recommendation and Plan  SLP  Swallowing Diagnosis: mild-moderate, oral dysphagia, pharyngeal dysphagia (07/16/24 1515)  SLP Diet Recommendation: soft to chew textures, whole, nectar thick liquids, water between meals after oral care, with supervision (07/16/24 1515)  Recommended Precautions and Strategies: upright posture during/after eating, small bites of food and sips of liquid, chin tuck, volitional throat clear, general aspiration precautions, fatigue precautions (07/16/24 1515)  SLP Rec. for Method of Medication Administration: meds whole, with thick liquids, with puree, as tolerated (07/16/24 1515)     Monitor for Signs of Aspiration: cough, throat clearing, none - silent aspiration present (07/16/24 1515)  Recommended Diagnostics: VFSS (MBS) (07/16/24 0936)  Swallow Criteria for Skilled Therapeutic Interventions Met: demonstrates skilled criteria (07/16/24 1515)  Anticipated Discharge Disposition (SLP): inpatient rehabilitation facility (07/16/24 1515)  Rehab Potential/Prognosis, Swallowing: good, to achieve stated therapy goals (07/16/24 1515)  Therapy Frequency (Swallow): daily, 5 days per week (07/16/24 1515)  Predicted Duration Therapy Intervention (Days): 1 week, until discharge (07/16/24 1515)  Oral Care Recommendations: Oral Care before breakfast, after meals and PRN, Toothbrush (07/16/24 1515)  Demonstrates Need for Referral to Another Service: other (see comments) (speech therapy for dysarthria) (07/16/24 1515)               Patient/Family Concerns, Anticipated Discharge Disposition (SLP): No current concerns reported. Pt agreeable to current d/c recommendations (07/16/24 1515)                     Plan of Care Reviewed With: patient, other (see comments) (RN, Rosanne)  Outcome Evaluation: MBS: Completed swallow study. Pt continues with mild to moderate oropharyngeal dysphagia. He demonstrates mild oral deficits of prep w/anterior loss of liquids by spoon and cup, decreased oral transit with decreased back of tongue control  resulting in pooling of material before the swallow in the pharynx. Also with piecemeal transit of solids noted. Oral clearance is WFL. Occasional subsequent swallows noted to clear oral cavity. Pharyngeal deficits include mild delay of swallow and decreased epiglottic deflection during the swallow felt to be due to hyolaryngeal movement and pharyngeal squeeze. This results in deep penetration on spoon, cup trials of thins. One episode of posterior aspiration noted during the swallow. All penetratio and aspiration were silent in nature. Cued cough was effective in clearing the vestibule, but trace contrast on the anterior surface of the upper trachea was not cleared. Chin tuck maneuver was also trialed on nectar thick and thins from straw and was found to be effective on all trials with no penetration or aspiration viewed. Recommend to continue with current diet  of soft, whole with nectar thick and use of free water in between meals. Meds whole with thickened liquids or in puree as tolerated. SLP to follow for training of compensatory strategies, education and advancement of diet.      SWALLOW EVALUATION (Last 72 Hours)       SLP Adult Swallow Evaluation       Row Name 07/16/24 1515 07/16/24 0936 07/16/24 0815             Rehab Evaluation    Document Type evaluation  MBS  -AD evaluation  -AD evaluation  -AD      Subjective Information no complaints  -AD no complaints  -AD no complaints  -AD      Patient Observations alert;cooperative  -AD alert;cooperative  -AD alert;cooperative  -AD      Patient/Family/Caregiver Comments/Observations Pt seen upright in video imaging chair in radiology.  -AD Wife at bedside, pt up in chair at this time. Cleared for po intake per cardiology. THIERRY planned for tomorrow.  -AD Pt was seen at bedside with wife present. Alert and cooperative. Reports after taking some drinks of water that they may take a look at his heart by 'going down his esophagus'. Further po discontinued at this time  and on hold per radiology.  -AD      Patient Effort good  -AD good  -AD good  -AD      Symptoms Noted During/After Treatment none  -AD none  -AD none  -AD         General Information    Patient Profile Reviewed yes  -AD yes  -AD yes  -AD      Pertinent History Of Current Problem No changes in history since evaluation in am.  -AD No current changes. Cleared for po for today.  -AD Pt admitted with progression of stroke from 6/11/24. MRI with 'Acute diffusion restriction abnormality within the deep white matter/corona radiata on the right appears similar in distribution with slight increase in size compatible with progression/evolution of small acute/subacute infarctions.' Pt reports increasing numbness in left hand and falling due to inability to maintain weight/balance in left leg. He also reports more consistent facial droop with loss of liquids out of the left side of his mouth. Facial droop was present on 7/12/24 at rest, but pt with functional ROM of lips and cheek w/movement. Posterior lingual weakness and mild to moderate dysarthria present as well on 7/12/24. Hx from initial evaluation on 7/12/24 as follows: Pt is a 73 y/o male admitted due to slurred speech and suspected stroke. Hx of stroke in 2017 affecting his vision. Hx of seizure disorder as well. Priorr PFO with repair in 2018. Head imaging significant for focal severe stenosis of right M2 branch w/minimal thready flow, multifocal remote infarcts w/area of encephalomalacia and gliosis in left occipital lobe from prior infarct. New, multiple small acute infacts in the right corona radiata. Age related diffiuse cortical atrophy most likely due to chronic microvascular ischemic disease. Pt currently with noted left facial droop and dysarthric speech. Pt states it is worse today than yesterday. He lives at home with his wife and is mostly retired.  -AD      Current Method of Nutrition soft to chew textures;whole;nectar/syrup-thick liquids  -AD NPO  -AD NPO   -AD      Precautions/Limitations, Vision vision impairment, bilaterally;other (see comments)  prior deficit from occipital stroke in 2017; suspect hemianopsia.  -AD -- vision impairment, bilaterally;other (see comments)  prior deficit from occipital stroke in 2017; suspect hemianopsia.  -AD      Precautions/Limitations, Hearing hearing impairment, bilaterally;hearing aid, left;hearing aid, right  -AD -- hearing impairment, bilaterally;hearing aid, left;hearing aid, right  needs increased volume w/aids worn  -AD      Prior Level of Function-Communication motor speech impairment  from recent stroke on 7/11/24  -AD -- motor speech impairment  mild to moderate dysarthria  -AD      Prior Level of Function-Swallowing no diet consistency restrictions;regular textures;thin liquids  -AD -- no diet consistency restrictions;regular textures;thin liquids  -AD      Plans/Goals Discussed with patient;agreed upon  Rosanne RIVERO  -[facility] patient;spouse/S.O.;agreed upon;other (see comments)  Dr. Huang  -[facility] patient;spouse/S.O.;other (see comments);agreed upon  Rosanne RIVERO      Barriers to Rehab none identified  -AD -- previous functional deficit  -AD      Patient's Goals for Discharge eat/drink without coughing/choking  -AD -- return home;return to PO diet;eat/drink without coughing/choking  -AD      Family Goals for Discharge -- -- patient able to return to PO diet;patient able to eat/drink without coughing/choking  -AD         Pain    Additional Documentation --  no current pain reported  -AD --  No current pain reported.  -AD --  no current pain reported  -AD         Oral Motor Structure and Function    Oral Lesions or Structural Abnormalities and/or variants -- -- none  -AD      Dentition Assessment -- -- natural, present and adequate  -AD      Secretion Management -- -- anterior loss  reported out of left side; not observed  -AD      Mucosal Quality -- -- moist, healthy  -AD      Volitional Swallow -- -- WFL  -AD      Volitional  Cough -- -- WFL  -AD         Oral Musculature and Cranial Nerve Assessment    Oral Motor General Assessment -- -- oral labial or buccal impairment;lingual impairment  -AD      Oral Labial or Buccal Impairment, Detail, Cranial Nerve VII (Facial): -- -- left labial droop;reduced ROM  reduced ROM and strength on the left lower face  -AD      Lingual Impairment, Detail. Cranial Nerves IX, XII (Glossopharyngeal and Hypoglossal) -- -- other (see comments)  mild deviation to the right w/protrusion but quickly corrects. ROM WFL. previous posterior weakness improved from 7/12/24; mild left sided weakness of tongue.  -AD      Velar Impairment, Detail, Cranial Nerves X, XI (Vagus and Accessory) -- -- --  difficult to view due to posterior tongue; Mallampati IV  -AD         General Eating/Swallowing Observations    Respiratory Support Currently in Use room air  -AD room air  -AD room air  -AD      Eating/Swallowing Skills fed by SLP  Pt controlled volume on straw and cup drinks  -AD self-fed;appropriate self-feeding skills observed  -AD self-fed;appropriate self-feeding skills observed  -AD      Positioning During Eating upright 90 degree;upright in chair  -AD upright 90 degree;upright in chair  -AD upright 90 degree;upright in bed  -AD      Utensils Used -- spoon;straw  -AD spoon;cup;straw  -AD      Consistencies Trialed -- regular textures;pureed;thin liquids  -AD ice chips;thin liquids  water only  -AD         Respiratory    Respiratory Status WFL;room air;during swallowing/eating  -AD WFL;room air;during swallowing/eating  -AD WFL;room air;during swallowing/eating  -AD         Clinical Swallow Eval    Oral Prep Phase -- impaired  -AD impaired  -AD      Oral Transit -- impaired  -AD impaired  -AD      Oral Residue -- WFL  -AD WFL  -AD      Pharyngeal Phase -- suspected pharyngeal impairment  -AD suspected pharyngeal impairment  -AD      Esophageal Phase -- unremarkable  -AD unremarkable  -AD      Clinical Swallow Evaluation  Summary -- Pt demonstrates mild oropharyngeal dysphagia with mild anterior loss of liquid and puree out of left side of mouth. Pt w/one episode of throat clearing on the initial trial of thins from straw. Pt w/no further s/s of aspiration on repeat trials of thins, puree and solids. Recommend further assessment via instrumental assessment.  MD in agreement. Recommend meds whole with nectar thick or in puree. Recommend soft, whole diet with nectar thick liquids. Okay for sips of water 30 minutes after meals and following oral care and up to next meal. No water or other thin iiquids with meals. SLP to continue to follow. Recommend acute rehab at discharge.  -AD Pt only assessed on ice chips and thin water at this time due to possible procedure requiring NPO status. Pt with tolerance of ice chips and small sips of water from cup. Tolerates small, single drinks of thins by straw. One episode of coughing and choking on thins from consecutive straw drinks. Wife reports that he has had some coughing off and on for years. Follow up single drinks of thins from straw w/o clinical s/s of aspiration/coughing.  -AD         Oral Prep Concerns    Oral Prep Concerns -- anterior loss  -AD anterior loss  -AD      Anterior Loss -- thin;other (see comments)  applesauce  -AD thin  -AD      Oral Prep Concerns, Comment -- Mild anterior loss out of left side of mouth. Pt aware and clears with napkin.  -AD --         Oral Transit Concerns    Oral Transit Concerns -- other (see comments)  -AD other (see comments)  -AD      Oral Transit Concerns, Comment -- Suspected decreased back of tongue control and possible spill/pooling into pharynx resulting in delay of swallow and possible penetration/aspiration.  -AD Suspect decreased back of tongue resulting in spill of thins.  -AD         Pharyngeal Phase Concerns    Pharyngeal Phase Concerns -- throat clear  -AD cough  -AD      Cough -- -- thin  -AD      Pharyngeal Phase Concerns, Comment --  Throat clearing on one trial of thins on initial single straw drink. No further throat clearing or coughing noted on further trials of thins as well as puree and solids.  -AD Coughing on thins on one trial from consecutive straw drinks. Only noted on one of multiple trials of thins.  -AD         MBS/VFSS    Utensils Used spoon;cup;straw  -AD -- --      Consistencies Trialed regular textures;pureed;thin liquids;nectar/syrup-thick liquids;honey-thick liquids  -AD -- --         MBS/VFSS Interpretation    Oral Prep Phase impaired oral phase of swallowing  -AD -- --      Oral Transit Phase impaired  -AD -- --      Oral Residue impaired  -AD -- --      VFSS Summary Pt continues with mild to moderate oropharyngeal dysphagia. He demonstrates mild oral deficits of prep w/anterior loss of liquids by spoon and cup, decreased oral transit with decreased back of tongue control resulting in pooling of material before the swallow in the pharynx. Also with piecemeal transit of solids noted. Oral clearance is WFL. Occasional subsequent swallows noted to clear oral cavity. Pharyngeal deficits include mild delay of swallow and decreased epiglottic deflection during the swallow felt to be due to hyolaryngeal movement and pharyngeal squeeze. This results in deep penetration on spoon, cup trials of thins. One episode of posterior aspiration noted during the swallow. All penetration and aspiration were silent in nature. Cued cough was effective in clearing the vestibule, but trace contrast on the anterior surface of the upper trachea was not cleared. Chin tuck maneuver was also trialed on nectar thick and thins from straw and was found to be effective on all trials with no penetration or aspiration viewed. Recommend to continue with current diet  of soft, whole with nectar thick and use of free water in between meals. Meds whole with thickened liquids or in puree as tolerated. SLP to follow for training of compensatory strategies,  education and advancement of diet.  -AD -- --      Oral Phase, Comment Mild oral deficits with anterior loss of all liquids out of the left side of the mouth and increased prep time of solids. Transit concerns with decreased back of tongue and spill of all consistencies into the vallecule prior to initiation of the swallow. Piecemeal swallow noted on larger bolus of thins/nectar and solids. Mild oral residue on tongue surface on liquids and solid with spontaneous subsequent swallow to clear.  -AD -- --         Oral Preparatory Phase    Oral Preparatory Phase anterior loss;prolonged manipulation  -AD -- --      Anterior Loss thin liquids;nectar-thick liquids;honey-thick liquids;secondary to reduced labial seal;secondary to reduced lingual strength  -AD -- --      Prolonged Manipulation regular textures;secondary to reduced lingual strength  -AD -- --         Oral Transit Phase    Impaired Oral Transit Phase piecemeal oral transit;premature spillage of liquids into pharynx  -AD -- --      Piecemeal Oral Transit thin liquids;nectar-thick liquids;regular textures;secondary to reduced lingual control  -AD -- --      Premature Spillage of Liquids into Pharynx thin liquids;nectar-thick liquids;honey-thick liquids;pudding/puree;regular textures;all consistencies tested;secondary to reduced lingual control  decreased back of tongue control results in pooling in pharynx/valleculae  -AD -- --         Oral Residue    Impaired Oral Residue lingual residue  -AD -- --      Lingual Residue thin liquids;nectar-thick liquids;regular textures;secondary to reduced lingual strength  -AD -- --      Response to Oral Residue cleared residue;with spontaneous subsequent swallow  -AD -- --         Initiation of Pharyngeal Swallow    Initiation of Pharyngeal Swallow bolus in valleculae  -AD -- --      Pharyngeal Phase impaired pharyngeal phase of swallowing  -AD -- --      Penetration During the Swallow thin liquids;nectar-thick  liquids;secondary to delayed swallow initiation or mistiming;secondary to reduced vestibular closure  -AD -- --      Aspiration During the Swallow thin liquids;secondary to delayed swallow initiation or mistiming;secondary to reduced vestibular closure  -AD -- --      Depth of Penetration deep  -AD -- --      Response to Penetration No  -AD -- --      No spontaneous response to penetration and effective laryngeal clearance with cue (see comments)  cued cough cleared cords and vestibule  -AD -- --      Response to Aspiration No  -AD -- --      No spontaneous response to aspiration and non-effective subglottic clearance with cue (see comments)  cued cough  -AD -- --      Rosenbek's Scale thin:;8--->level 8;nectar:;5--->level 5;honey:;pudding/puree:;regular textures:;1--->level 1  -AD -- --      Pharyngeal Residue thin liquids;nectar-thick liquids;honey-thick liquids;valleculae;secondary to reduced hyolaryngeal excursion  mild residue  -AD -- --      Response to Residue cleared residue with spontaneous subsequent swallow  -AD -- --      Attempted Compensatory Maneuvers bolus size;chin tuck;additional subsequent swallow;other (see comments)  cough after the swallow  -AD -- --      Response to Attempted Compensatory Maneuvers prevented penetration;prevented aspiration;reduced residue  chin tuck prevented penetration/aspiration; subsequent swallow cleared residue; cough cleared vestibular residue  -AD -- --      Successful Compensatory Maneuver Competency patient able to;demonstrate compensations;with cues  -AD -- --      Pharyngeal Phase, Comment Pt with mild to moderate pharyngeal dysphagia with greatest deficits in delay of swallow, decreased pharyngeal squeeze and anterior hyolaryngeal movement. This results in decreased epiglottic deflection and vestibular closure during the swallow. These also result in deep penetration and aspiration of thins and one episode of deep penetration of nectar by spoon. All of these were  silent in nature. Pt with inability to sense and no changes in vocal quality noted during these instances. Cued cough cleared material from the vestibule but not subglottic material on aspiration and penetration episodes. Chin tuck was effective in eliminating penetration/aspiration of thins and nectar. No penetration or aspiration noted on honey thick, pudding and solids. Mild vallecular residue but pt able to spontaneously clear w/subsequent swallows.  -AD -- --         SLP Evaluation Clinical Impression    SLP Swallowing Diagnosis mild-moderate;oral dysphagia;pharyngeal dysphagia  -AD mild;oral dysphagia;suspected pharyngeal dysphagia  -AD --      Functional Impact risk of aspiration/pneumonia  -AD risk of aspiration/pneumonia  -AD --      Rehab Potential/Prognosis, Swallowing good, to achieve stated therapy goals  -AD good, to achieve stated therapy goals  -AD --      Swallow Criteria for Skilled Therapeutic Interventions Met demonstrates skilled criteria  -AD demonstrates skilled criteria  -AD --         Recommendations    Therapy Frequency (Swallow) daily;5 days per week  -AD daily;5 days per week  -AD other (see comments)  Will follow up for further evaluation after seen by cardiology.  -AD      Predicted Duration Therapy Intervention (Days) 1 week;until discharge  -AD 1 week;until discharge  -AD --      SLP Diet Recommendation soft to chew textures;whole;nectar thick liquids;water between meals after oral care, with supervision  -AD soft to chew textures;whole;nectar thick liquids;water between meals after oral care, with supervision  -AD --      Recommended Diagnostics -- VFSS (MBS)  -AD --      Recommended Precautions and Strategies upright posture during/after eating;small bites of food and sips of liquid;chin tuck;volitional throat clear;general aspiration precautions;fatigue precautions  -AD upright posture during/after eating;small bites of food and sips of liquid;general aspiration precautions  -AD --       Oral Care Recommendations Oral Care before breakfast, after meals and PRN;Toothbrush  -AD Oral Care before breakfast, after meals and PRN;Toothbrush  -AD --      SLP Rec. for Method of Medication Administration meds whole;with thick liquids;with puree;as tolerated  -AD meds whole;with thick liquids;with puree;as tolerated  -AD --      Monitor for Signs of Aspiration cough;throat clearing;none - silent aspiration present  -AD yes;cough;throat clearing  -AD --      Anticipated Discharge Disposition (SLP) inpatient rehabilitation facility  -AD inpatient rehabilitation facility  -AD --      Patient/Family Concerns, Anticipated Discharge Disposition (SLP) No current concerns reported. Pt agreeable to current d/c recommendations  -AD No current concerns reported regarding discharge  -AD --      Demonstrates Need for Referral to Another Service other (see comments)  speech therapy for dysarthria  -AD speech therapy  for dysarthria  -AD --         Swallow Goals (SLP)    Swallow LTGs -- Patient will demonstrate progress toward functional swallow for  -AD --      Swallow STGs swallow management recall goal selection (SLP);swallow compensatory strategies goal selection (SLP)  -AD diet tolerance goal selection (SLP);labial strengthening goal selection (SLP);lingual strengthening goal selection (SLP)  -AD --      Diet Tolerance Goal Selection (SLP) -- Patient will tolerate trials of  -AD --      Labial Strengthening Goal Selection (SLP) -- labial strengthening, SLP goal 1  -AD --      Lingual Strengthening Goal Selection (SLP) -- lingual strengthening, SLP goal 1  -AD --      Swallow Management Recall Goal Selection (SLP) swallow management recall, SLP goal 1  -AD -- --      Swallow Compensatory Strategies Goal Selection (SLP) swallow compensatory strategies, SLP goal 1  -AD -- --         (LTG) Patient will demonstrate progress toward functional swallow for    Diet Texture (Demonstrate progress toward functional swallow) --  regular textures  -AD --      Liquid viscosity (Demonstrate progress toward functional swallow) -- thin liquids  -AD --      [place] (Demonstrate progress towards functional swallow) -- with minimal cues (75-90% accuracy)  -AD --      Time Frame (Demonstrate progress toward functional swallow) -- 1 week;by discharge  -AD --      Progress/Outcomes (Demonstrate progress toward functional swallow) -- new goal  -AD --         (STG) Patient will tolerate trials of    Consistencies Trialed (Tolerate trials) -- regular textures;thin liquids  -AD --      Desired Outcome (Tolerate trials) -- without signs/symptoms of aspiration;with adequate oral prep/transit/clearance  -AD --      [place] (Tolerate trials) -- with minimal cues (75-90% accuracy)  -AD --      Time Frame (Tolerate trials) -- 1 week;by discharge  -AD --      Progress/Outcomes (Tolerate trials) -- new goal  -AD --         (STG) Labial Strengthening Goal 1 (SLP)    Activity (Labial Strengthening Goal 1, SLP) -- increase labial sensation/afferent drive  -AD --      Increase Labial Sensation/Afferent Drive -- labial resistance exercises  -AD --      [place]/Accuracy (Labial Strengthening Goal 1, SLP) -- with minimal cues (75-90% accuracy)  -AD --      Time Frame (Labial Strengthening Goal 1, SLP) -- short term goal (STG);1 week;by discharge  -AD --      Progress/Outcomes (Labial Strengthening Goal 1, SLP) -- new goal  -AD --         (STG) Lingual Strengthening Goal 1 (SLP)    Activity (Lingual Strengthening Goal 1, SLP) increase tongue back strength  -AD -- --      Increase Tongue Back Strength lingual resistance exercises  -AD -- --      [place]/Accuracy (Lingual Strengthening Goal 1, SLP) with minimal cues (75-90% accuracy)  -AD -- --      Time Frame (Lingual Strengthening Goal 1, SLP) short term goal (STG);1 week  -AD -- --      Progress/Outcomes (Lingual Strengthening Goal 1, SLP) new goal  -AD -- --         (STG) Swallow Management Recall  Goal 1 (SLP)    Activity (Swallow Management Recall Goal 1, SLP) independent recall of;aspiration precautions;oral care recommendations;safe diet/liquid level;compensatory swallow strategies/techniques;postural techniques;rationale for use of strategies/techniques  -AD -- --      [place]/Accuracy (Swallow Management Recall Goal 1, SLP) independently (over 90% accuracy)  -AD -- --      Time Frame (Swallow Management Recall Goal 1, SLP) short term goal (STG);1 week  -AD -- --      Progress/Outcomes (Swallow Management Recall Goal 1, SLP) new goal  -AD -- --         (STG) Swallow Compensatory Strategies Goal 1 (SLP)    Activity (Swallow Compensatory Strategies/Techniques Goal 1, SLP) aspiration precautions;compensatory strategies;postural techniques;during meal intake;during p.o. trials;small bites;small straw sips;chin tuck posture;throat clear/extra swallow  -AD -- --      [place]/Accuracy (Swallow Compensatory Strategies/Techniques Goal 1, SLP) independently (over 90% accuracy)  -AD -- --      Time Frame (Swallow Compensatory Strategies/Techniques Goal 1, SLP) short term goal (STG);1 week  -AD -- --      Progress/Outcomes (Swallow Compensatory Strategies/Techniques Goal 1, SLP) new goal  -AD -- --                User Key  (r) = Recorded By, (t) = Taken By, (c) = Cosigned By      Initials Name Effective Dates    Sunshine Starr MS CCC-SLP 06/16/21 -                     EDUCATION  The patient has been educated in the following areas:   Dysphagia (Swallowing Impairment) Oral Care/Hydration Modified Diet Instruction Mic patel . Pt verbalizes understanding of results and compensatory strategies.         SLP GOALS       Row Name 07/16/24 1515 07/16/24 0936          (LTG) Patient will demonstrate progress toward functional swallow for    Diet Texture (Demonstrate progress toward functional swallow) -- regular textures  -AD     Liquid viscosity (Demonstrate progress toward functional swallow) --  thin liquids  -AD     [place] (Demonstrate progress towards functional swallow) -- with minimal cues (75-90% accuracy)  -AD     Time Frame (Demonstrate progress toward functional swallow) -- 1 week;by discharge  -AD     Progress/Outcomes (Demonstrate progress toward functional swallow) -- new goal  -AD        (STG) Patient will tolerate trials of    Consistencies Trialed (Tolerate trials) -- regular textures;thin liquids  -AD     Desired Outcome (Tolerate trials) -- without signs/symptoms of aspiration;with adequate oral prep/transit/clearance  -AD     [place] (Tolerate trials) -- with minimal cues (75-90% accuracy)  -AD     Time Frame (Tolerate trials) -- 1 week;by discharge  -AD     Progress/Outcomes (Tolerate trials) -- new goal  -AD        (STG) Labial Strengthening Goal 1 (SLP)    Activity (Labial Strengthening Goal 1, SLP) -- increase labial sensation/afferent drive  -AD     Increase Labial Sensation/Afferent Drive -- labial resistance exercises  -AD     [place]/Accuracy (Labial Strengthening Goal 1, SLP) -- with minimal cues (75-90% accuracy)  -AD     Time Frame (Labial Strengthening Goal 1, SLP) -- short term goal (STG);1 week;by discharge  -AD     Progress/Outcomes (Labial Strengthening Goal 1, SLP) -- new goal  -AD        (STG) Lingual Strengthening Goal 1 (SLP)    Activity (Lingual Strengthening Goal 1, SLP) increase tongue back strength  -AD --     Increase Tongue Back Strength lingual resistance exercises  -AD --     [place]/Accuracy (Lingual Strengthening Goal 1, SLP) with minimal cues (75-90% accuracy)  -AD --     Time Frame (Lingual Strengthening Goal 1, SLP) short term goal (STG);1 week  -AD --     Progress/Outcomes (Lingual Strengthening Goal 1, SLP) new goal  -AD --        (STG) Swallow Management Recall Goal 1 (SLP)    Activity (Swallow Management Recall Goal 1, SLP) independent recall of;aspiration precautions;oral care recommendations;safe diet/liquid level;compensatory  swallow strategies/techniques;postural techniques;rationale for use of strategies/techniques  -AD --     [place]/Accuracy (Swallow Management Recall Goal 1, SLP) independently (over 90% accuracy)  -AD --     Time Frame (Swallow Management Recall Goal 1, SLP) short term goal (STG);1 week  -AD --     Progress/Outcomes (Swallow Management Recall Goal 1, SLP) new goal  -AD --        (STG) Swallow Compensatory Strategies Goal 1 (SLP)    Activity (Swallow Compensatory Strategies/Techniques Goal 1, SLP) aspiration precautions;compensatory strategies;postural techniques;during meal intake;during p.o. trials;small bites;small straw sips;chin tuck posture;throat clear/extra swallow  -AD --     [place]/Accuracy (Swallow Compensatory Strategies/Techniques Goal 1, SLP) independently (over 90% accuracy)  -AD --     Time Frame (Swallow Compensatory Strategies/Techniques Goal 1, SLP) short term goal (STG);1 week  -AD --     Progress/Outcomes (Swallow Compensatory Strategies/Techniques Goal 1, SLP) new goal  -AD --               User Key  (r) = Recorded By, (t) = Taken By, (c) = Cosigned By      Initials Name Provider Type    AD Sunshine Ballard MS CCC-SLP Speech and Language Pathologist                         Time Calculation:    Time Calculation- SLP       Row Name 07/16/24 1758 07/16/24 1034 07/16/24 0852       Time Calculation- SLP    SLP Start Time 1515  -AD 0936  -AD 0815  -AD    SLP Stop Time 1638  -AD 0957  -AD 0847  -AD    SLP Time Calculation (min) 83 min  -AD 21 min  -AD 32 min  -AD    Total Timed Code Minutes- SLP 0 minute(s)  -AD 0 minute(s)  -AD 0 minute(s)  -AD    SLP Non-Billable Time (min) 0 min  -AD 0 min  -AD 0 min  -AD    SLP Received On 07/16/24  -AD 07/16/24  -AD 07/16/24  -AD    SLP - Next Appointment 07/17/24  -AD 07/16/24  -AD --       Untimed Charges    SLP Eval/Re-eval  [facility] Flu[facility] Eval Swallow - 08224  -AD -- --    71083-TE Eval Oral Pharyng Swallow Minutes -- 21  -AD 32  -AD     13727-[place] [facility] Fluoro Eval Swallow Minutes 83  -AD -- --       Total Minutes    Untimed Charges Total Minutes 83  -AD 21  -AD 32  -AD     Total Minutes 83  -AD 21  -AD 32  -AD              User Key  (r) = Recorded By, (t) = Taken By, (c) = Cosigned By      Initials Name Provider Type    AD Sunshine Ballard, MS CCC-SLP Speech and Language Pathologist                    Therapy Charges for Today       Code Description Service Date Service Provider Modifiers Qty    48817897561 HC ST EVAL ORAL PHARYNG SWALLOW 4 7/16/2024 Sunshine Ballard MS CCC-SLP GN 1    [id number] HC ST MOTION FLUORO EVAL SWALLOW 6 7/16/2024 Sunshine Ballard MS RICK-SLP GN, 59 1                 Sunshine GARCIA. MS RICK Ballard-SLP  7/16/2024

## 2024-07-16 NOTE — CONSULTS
Date of Hospital Visit: 24  Encounter Provider: Christopher Sawant MD  Place of Service: [facility] CARDIOLOGY  Patient Name: Ronnell Rizvi  :1950  Referral Provider: Dr Huang    Chief complaint: left upper extremity weakness and facial droop    History of Present Illness     Mr Ronnell Rizvi is a 74 year old man well known to me with a history of PFO/stroke s/p percutaneous PFO closure, SA exit block, and HTN.    I first met him in 2016 when he was hospitalized for chest pain.  A Cardiolite stress was normal (EF 54%).  He did have some Wenckebach at the beginning of stress which normalized with exercise.     In 2017, he had an episode of visual changes that resolved on their own.  The next morning it happened again and he came to the ED and was diagnosed with a stroke of the left occipital/temporal lobe.  He was found to have some diffuse small vessel disease.  An echo wasn't performed but a Zio patch was placed.  We were not consulted in the hospital.  He was placed on clopidogrel and atorvastatin.     In 2017, the Zio showed some atrial ectopy (there was a 13 beat run of PACs) but no atrial fibrillation.  An echo showed normal LV systolic function and a small PFO.     His studies were then reviewed by a different neurologist, who felt that this was actually embolic. A THIERRY showed a moderate sized PFO and moderate aortic insufficiency but was otherwise normal. A loop recorder was placed. This diagnosed type II SA block, but he was never noted to have any atrial fibrillation, and the device was explanted in .      In 2018, he presented with recurrent neurological symptoms.  He was found to have a subacute infarct as well as diffuse intracranial atherosclerosis.  He underwent percutaneous PFO closure with a 25mm Amplatzer device during that admission.    He has been on clopidogrel as monotherapy for years; he reports a history of GI  intolerance of aspirin. He has been on phenytoin for a long time as well, and in fact, recently decreased the dose from 600mg to 400mg daily due to somnolence.    He was recently hospitalized for an acute stroke and was discharged on clopidogrel and aspirin and has been compliant with both. Unfortunately he has now suffered another new stroke.     ECHO 7/12/24  Left ventricular systolic function is normal. Calculated left ventricular EF = 64.4%    Left ventricular wall thickness is consistent with hypertrophy. Sigmoid-shaped ventricular septum is present.    Left ventricular diastolic function was normal.    There is mild calcification of the aortic valve.    Systolic anterior motion of the chordal apparatus is present. There is no outflow tract obstruction.    Estimated right ventricular systolic pressure from tricuspid regurgitation is normal (<35 mmHg).    Stress test 11/23/20  Left ventricular ejection fraction is normal. (Calculated EF = 54%).  Myocardial perfusion imaging indicates a normal myocardial perfusion study with no evidence of ischemia.  Impressions are consistent with a low risk study.      Past Medical History:   Diagnosis Date    Acquired dyslexia     pt  reports after 3rd stroke    Allergic 1985    Allergic rhinitis     Anal fissure     Aortic insufficiency     moderate, THIERRY 2017    Asthma     Asthma     Benign prostatic hypertrophy (BPH) with weak urinary stream 11/01/2016    Cataract     Chronic bronchitis     Colon polyp     COPD (chronic obstructive pulmonary disease)     Emphysema lung     Falls frequently     Fatty liver     GERD (gastroesophageal reflux disease)     Headache     Hepatitis     thought to be Hepatitis A     History of pneumonia     With left lower lobe atelectasis and scar tissue, being followed    HL (hearing loss)     Hypertension     Low back pain     Macular degeneration     Memory loss     Mixed hyperlipidemia 01/30/2023    Obstructive sleep apnea syndrome 08/23/2017     refuses c-pap    PFO (patent foramen ovale)     s/p percutaneous closure with a 25mm Amplatzer device in December 2018    Pneumonia     LLL with scar tissue    Seizures     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 total of 3 strokes    Tremor     Comes & goes    Vision loss        Past Surgical History:   Procedure Laterality Date    CARDIAC CATHETERIZATION N/A 12/12/2018    Procedure: Patent foramen ovale closure- [employer];  Surgeon: Deangelo Harris MD;  Location:  [facility];  Service: Cardiology    CARDIAC CATHETERIZATION N/A 12/12/2018    Procedure: Intracardiac echocardiogram;  Surgeon: Deangelo Harris MD;  Location:  [facility];  Service: Cardiology    CARDIAC ELECTROPHYSIOLOGY PROCEDURE N/A 03/26/2018    Procedure: Loop insertion   CONFIRM RX;  Surgeon: Luis Wyatt MD;  Location:  [facility];  Service: Cardiovascular    CARDIAC ELECTROPHYSIOLOGY PROCEDURE N/A 07/15/2020    Procedure: Loop recorder removal;  Surgeon: Starr Driscoll MD;  Location:  [facility];  Service: Cardiovascular;  Laterality: N/A;    CARDIAC SURGERY      CATARACT EXTRACTION Bilateral     COLONOSCOPY      COLONOSCOPY N/A 10/09/2020    Procedure: COLONOSCOPY with polypectomy;  Surgeon: Ras Mendoza MD;  Location: [facility];  Service: Gastroenterology;  Laterality: N/A;  diverticulosis  ascending polyp  transverse polyp  sigmoid polyps X 2  rectal polyp    EYE SURGERY      HAND SURGERY Bilateral     INGUINAL HERNIA REPAIR Left     OTHER SURGICAL HISTORY      inguinal hernia repair for person over age 5    TONSILLECTOMY      TONSILLECTOMY         Prior to Admission medications    Medication Sig Start Date End Date Taking? Authorizing Provider   aspirin 81 MG EC tablet Take 1 tablet by mouth Daily. Take with Pantoprazole. 7/12/24   Nael Burr MD   clopidogrel (PLAVIX) 75 MG  tablet Take 1 tablet by mouth Daily. 4/3/24   Christopher Sawant MD   Cyanocobalamin 1000 MCG/ML kit Inject  as directed 1 (One) Time Per Week.  of a cc IM every     Sariah Telles MD   fexofenadine (ALLEGRA) 180 MG tablet Take 1 tablet by mouth Daily As Needed.    Sariah Telles MD   guaiFENesin (MUCINEX) 600 MG 12 hr tablet Take 1 tablet by mouth Daily.    Sariah Telles MD   pantoprazole (Protonix) 40 MG EC tablet Take 1 tablet by mouth Daily. 24   Nael Burr MD   phenytoin ER (DILANTIN) 100 MG capsule Take 2 capsules by mouth 2 (Two) Times a Day. 2 in the morning, 2 at night  PT Reports he takes all 4 capsules at night 24   Sariah Telles MD   rosuvastatin (Crestor) 40 MG tablet Take 1 tablet by mouth Every Night. 24   Nael Burr MD       Social History     Socioeconomic History    Marital status:      Spouse name: Joey   Tobacco Use    Smoking status: Former     Current packs/day: 0.00     Average packs/day: 1.5 packs/day for 15.0 years (22.5 ttl pk-yrs)     Types: Cigarettes     Start date: 1964     Quit date: 1979     Years since quittin.4     Passive exposure: Past    Smokeless tobacco: Never    Tobacco comments:     caffeine use: Drinks 4 glasses of Dt coke on avg.    Vaping Use    Vaping status: Never Used   Substance and Sexual Activity    Alcohol use: No    Drug use: No    Sexual activity: Defer       Family History   Problem Relation Age of Onset    Obesity Mother     Clotting disorder Mother         Upper extremities    Alcohol abuse Father     Cancer Father 63        Esophagus and lung    Other Father         cardiac disorder    Heart disease Father     Depression Father     Kidney disease Sister     Kidney failure Sister     Drug abuse Paternal Uncle     Stroke Sister     Throat cancer Sister     Drug abuse Paternal Uncle        Review of Systems   Constitutional:  Positive for fatigue.   Neurological:  Positive for  "seizures, facial asymmetry, speech difficulty and weakness.        Objective:     Vitals:    07/15/24 2325 07/16/24 0322 07/16/24 0727 07/16/24 0800   BP: 152/67 123/58 139/73    BP Location: Right arm Right arm Right arm    Patient Position: Lying Lying Lying    Pulse:    67   Resp: 14 14 16    Temp: 97.6 °F (36.4 °C) 97.1 °F (36.2 °C) 97.6 °F (36.4 °C)    TempSrc: Axillary Axillary Axillary    SpO2: 96% 95% 94%    Weight:       Height:         Body mass index is 24 kg/m².  Flowsheet Rows      Flowsheet Row First Filed Value   Admission Height 182.9 cm (72.01\") Documented at 07/15/2024 1519   Admission Weight 80.3 kg (177 lb) Documented at 07/15/2024 1519            Physical Exam  Vitals reviewed.   Constitutional:       Appearance: He is well-developed.   HENT:      Head: Normocephalic.      Nose: Nose normal.      Mouth/Throat:      Pharynx: Oropharynx is clear.   Eyes:      Conjunctiva/sclera: Conjunctivae normal.   Neck:      Vascular: No JVD.   Cardiovascular:      Rate and Rhythm: Normal rate and regular rhythm.      Pulses: Normal pulses.      Heart sounds: Normal heart sounds.   Pulmonary:      Effort: Pulmonary effort is normal.      Breath sounds: Normal breath sounds.   Abdominal:      Palpations: Abdomen is soft.      Tenderness: There is no abdominal tenderness.   Musculoskeletal:         General: No swelling. Normal range of motion.      Cervical back: Normal range of motion.   Skin:     General: Skin is warm and dry.   Neurological:      Mental Status: He is alert.      Comments: Facial droop, dysarthria   Psychiatric:         Behavior: Behavior normal.         Thought Content: Thought content normal.         Judgment: Judgment normal.                 Lab Review:                Results from last 7 days   Lab Units 07/15/24  1535   SODIUM mmol/L 138   POTASSIUM mmol/L 4.0   CHLORIDE mmol/L 103   CO2 mmol/L 26.9   BUN mg/dL 10   CREATININE mg/dL 0.92   GLUCOSE mg/dL 100*   CALCIUM mg/dL 8.9     Results " from last 7 days   Lab Units 07/15/24  1535 07/11/24  1805   HSTROP T ng/L 17 14     Results from last 7 days   Lab Units 07/15/24  1535   WBC 10*3/mm3 5.99   HEMOGLOBIN g/dL 15.2   HEMATOCRIT % 44.3   PLATELETS 10*3/mm3 206     Results from last 7 days   Lab Units 07/15/24  1535 07/12/24  0437 07/11/24  1805   INR  0.98 1.04 1.00   APTT seconds 22.2* 22.9* 22.2*     Results from last 7 days   Lab Units 07/12/24  0437   CHOLESTEROL mg/dL 203*         Results from last 7 days   Lab Units 07/12/24  0437   CHOLESTEROL mg/dL 203*   TRIGLYCERIDES mg/dL 58   HDL CHOL mg/dL 56   LDL CHOL mg/dL 136*           I personally viewed and interpreted the patient's EKG/Telemetry data    Assessment/Plan:     1. Recurrent stroke  2. PFO s/p percutaneous closure  3. History of loop recorder 4213-0975 without AF (although he had type II SA block)  4. Intracranial atherosclerosis  5. HTN  6. Seizure d/p    He was on clopidogrel and phenytoin for years without issue. Recently he's been on aspirin and clopidogrel (and compliant with both) AND decreased his dose of phenytoin. Thus, it seems odd to think that the combo would be the cause. I agree w switch to Keppra to avoid CYP interactions. However, I'm concerned about a cardioembolic cause. We will proceed with THIERRY when PACU scheduling allows (tomorrow AM at earliest). He doesn't need to be NPO today.

## 2024-07-16 NOTE — PLAN OF CARE
Goal Outcome Evaluation:  Plan of Care Reviewed With: patient           Outcome Evaluation: Remains NPO. VSS. Left side deficit remains  and basic neuro checks unchanged exceot tongue is more centered in mouth when asked to stick it out. Left facial drooping noted. No complaints.

## 2024-07-16 NOTE — PROGRESS NOTES
Adult Nutrition  Assessment/PES    Patient Name:  Ronnell Rizvi  YOB: 1950  MRN: [id number]  Admit Date:  7/15/2024    Assessment Date:  7/16/2024    Comments:  Pt seen for admit screen.    SLP follow and noted diet/liquid modifications.    Encourage po and voice food prefs as able.    Will cont to follow and monitor.      Reason for Assessment       Row Name 07/16/24 1100          Reason for Assessment    Reason For Assessment identified at risk by screening criteria     Diagnosis neurologic conditions  Acute CVA, Facial Droop                    Nutrition/Diet History       Row Name 07/16/24 1100          Nutrition/Diet History    Typical Intake (Food/Fluid/EN/PN) Spoke w pt & wife at chairside. NKFA. Denies new issue chew/swallowing. SLP seeing today. Denies wt loss recently, #. Denies appetite changes or changes in clothes. Not stable with standing/walking due to CVA so limited mobility.     Meal/Snack Patterns 3 per day, does like pro sources     Supplemental Drinks/Foods/Additives denies     Typical Activity Patterns sedentary     Functional Status access to food                    Labs/Tests/Procedures/Meds       Row Name 07/16/24 1101          Labs/Procedures/Meds    Lab Results Reviewed reviewed     Lab Results Comments HgA1c 5.3 wnl, chol 203 elevated        Diagnostic Tests/Procedures    Diagnostic Test/Procedure Reviewed reviewed        Medications    Pertinent Medications Reviewed reviewed                      Estimated/Assessed Needs - Anthropometrics       Row Name 07/16/24 1102          Anthropometrics    Weight for Calculation 80.3 kg (177 lb 0.5 oz)     Additional Documentation --  # stated, 4/2024 185.2#, loss 8.2# which is 4.4% BW        Estimated/Assessed Needs    Additional Documentation Estimated Calorie Needs (Group);Fluid Requirements (Group);Protein Requirements (Group)        Estimated Calorie Needs    Estimated Calorie Requirement (kcal/day) 2050 kcal (  mifflin x 1.3)     Estimated Calorie Need Method Stone-St Jeor        Protein Requirements    Est Protein Requirement Amount (gms/kg) 1.0 gm protein  80 gm pro        Fluid Requirements    Estimated Fluid Requirement Method RDA Method  2050 ml                    Nutrition Prescription Ordered       Row Name 07/16/24 1103          Nutrition Prescription PO    Current PO Diet Soft Texture     Texture Whole foods     Fluid Consistency Nectar/syrup thick     Common Modifiers Cardiac                    Evaluation of Received Nutrient/Fluid Intake       Row Name 07/16/24 1104          Fluid Intake Evaluation    Oral Fluid (mL) --  insufficient data        PO Evaluation    Number of Days PO Intake Evaluated Insufficient Data                       Problem/Interventions:   Problem 1       Row Name 07/16/24 1104          Nutrition Diagnoses Problem 1    Problem 1 --  swallowing difficulty related to CVA as evidenced by theraputic modified diet.                          Intervention Goal       Row Name 07/16/24 1105          Intervention Goal    General Meet nutritional needs for age/condition     PO Establish PO;PO intake (%)     PO Intake % 50 %  or greater                    Nutrition Intervention       Row Name 07/16/24 1105          Nutrition Intervention    RD/Tech Action Follow Tx progress;Encourage intake                      Education/Evaluation       Row Name 07/16/24 1105          Education    Education Provided education regarding  Edu on NPO ( was diet at visit ) . Edu on Nutrition exam for possible losses. Discussed easy pro sources.        Monitor/Evaluation    Monitor Per protocol;I&O;PO intake;Pertinent labs;Weight;Symptoms     Education Follow-up Other (comment)  pt & wife verbalize understanding                     Electronically signed by:  Kristen Vasquez RD  07/16/24 11:06 EDT

## 2024-07-16 NOTE — THERAPY EVALUATION
Acute Care - Speech Language Pathology   Swallow Initial Evaluation [place] [facility]     Patient Name: Ronnell Rizvi  : 1950  MRN: [id number]  Today's Date: 2024               Admit Date: 7/15/2024    Visit Dx:     ICD-10-CM ICD-9-CM   1. Ischemic stroke  I63.9 434.91   2. Weakness of left side of body  R53.1 728.87   3. Dysarthria  R47.1 784.51   4. Oropharyngeal dysphagia [R13.12]  R13.12 787.22     Patient Active Problem List   Diagnosis    Anemia, unspecified    Vitamin B12 deficiency    Chronic fatigue    Benign prostatic hypertrophy (BPH) with weak urinary stream    Chronic bronchitis    ELIZABETH (obstructive sleep apnea)    Sinoatrial block    Cerebral aneurysm, nonruptured    TIA (transient ischemic attack)    Intracranial vascular stenosis    Sinus pause    S/P percutaneous patent foramen ovale closure    TIA on medication    HTN (hypertension)    COPD (chronic obstructive pulmonary disease)    Nocturnal seizures    Encounter for screening for malignant neoplasm of colon    Seizure    HL (hearing loss)    Pre-syncope    Mixed hyperlipidemia    History of stroke    Melena    Irritable bowel syndrome with both constipation and diarrhea    Gastroesophageal reflux disease    Stroke    CVA (cerebral vascular accident)     Past Medical History:   Diagnosis Date    Acquired dyslexia     pt  reports after 3rd stroke    Allergic 1985    Allergic rhinitis     Anal fissure     Aortic insufficiency     moderate, THIERRY 2017    Asthma     Asthma     Benign prostatic hypertrophy (BPH) with weak urinary stream 2016    Cataract     Chronic bronchitis     Colon polyp     COPD (chronic obstructive pulmonary disease)     Emphysema lung     Falls frequently     Fatty liver     GERD (gastroesophageal reflux disease)     Headache     Hepatitis     thought to be Hepatitis A     History of pneumonia     With left lower lobe atelectasis and scar tissue, being followed    HL (hearing loss)     Hypertension     Low back pain      Macular degeneration     Memory loss     Mixed hyperlipidemia 01/30/2023    Obstructive sleep apnea syndrome 08/23/2017    refuses c-pap    PFO (patent foramen ovale)     s/p percutaneous closure with a 25mm Amplatzer device in December 2018    Pneumonia     LLL with scar tissue    Seizures     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 total of 3 strokes    Tremor     Comes & goes    Vision loss      Past Surgical History:   Procedure Laterality Date    CARDIAC CATHETERIZATION N/A 12/12/2018    Procedure: Patent foramen ovale closure- [employer];  Surgeon: Deangelo Harris MD;  Location:  [facility];  Service: Cardiology    CARDIAC CATHETERIZATION N/A 12/12/2018    Procedure: Intracardiac echocardiogram;  Surgeon: Deangelo Harris MD;  Location:  [facility];  Service: Cardiology    CARDIAC ELECTROPHYSIOLOGY PROCEDURE N/A 03/26/2018    Procedure: Loop insertion   CONFIRM RX;  Surgeon: Luis Wyatt MD;  Location:  [facility];  Service: Cardiovascular    CARDIAC ELECTROPHYSIOLOGY PROCEDURE N/A 07/15/2020    Procedure: Loop recorder removal;  Surgeon: Starr Driscoll MD;  Location:  [facility];  Service: Cardiovascular;  Laterality: N/A;    CARDIAC SURGERY      CATARACT EXTRACTION Bilateral     COLONOSCOPY      COLONOSCOPY N/A 10/09/2020    Procedure: COLONOSCOPY with polypectomy;  Surgeon: Ras Mendoza MD;  Location: [facility];  Service: Gastroenterology;  Laterality: N/A;  diverticulosis  ascending polyp  transverse polyp  sigmoid polyps X 2  rectal polyp    EYE SURGERY      HAND SURGERY Bilateral     INGUINAL HERNIA REPAIR Left     OTHER SURGICAL HISTORY      inguinal hernia repair for person over age 5    TONSILLECTOMY      TONSILLECTOMY         SLP Recommendation and Plan  SLP Swallowing Diagnosis: mild, oral dysphagia, suspected pharyngeal dysphagia (07/16/24  0936)  SLP Diet Recommendation: soft to chew textures, whole, nectar thick liquids, water between meals after oral care, with supervision (07/16/24 0936)  Recommended Precautions and Strategies: upright posture during/after eating, small bites of food and sips of liquid, general aspiration precautions (07/16/24 0936)  SLP Rec. for Method of Medication Administration: meds whole, with thick liquids, with puree, as tolerated (07/16/24 0936)     Monitor for Signs of Aspiration: yes, cough, throat clearing (07/16/24 0936)  [place] Diagn[place] [place]07/16/24 0936[place]  Swallow Criteria for Skilled Therapeutic Interventions Met: demonstrates skilled criteria (07/16/24 0936)  Anticipated Discharge Disposition (SLP): inpatient rehabilitation facility (07/16/24 0936)  Rehab Potential/Prognosis, Swallowing: good, to achieve stated therapy goals (07/16/24 0936)  Therapy Frequency (Swallow): daily, 5 days per week (07/16/24 0936)  Predicted Duration Therapy Intervention (Days): 1 week, until discharge (07/16/24 0936)  Oral Care Recommendations: Oral Care before breakfast, after meals and PRN, Toothbrush (07/16/24 0936)  Demonstrates Need for Referral to Another Service: speech therapy (for dysarthria) (07/16/24 0936)               Patient/Family Concerns, Anticipated Discharge Disposition (SLP): No current concerns reported regarding discharge (07/16/24 0936)                     Plan of Care Reviewed With: patient, spouse, other (see comments) (Dr Lopez)  Outcome Evaluation: SLP Clinical Swallow Eval: Pt demonstrates mild oropharyngeal dysphagia with mild anterior loss of liquid and puree out of left side of mouth. Pt w/one episode of throat clearing on the initial trial of thins from straw. Pt w/no further s/s of aspiration on repeat trials of thins, puree and solids. Recommend further assessment via instrumental assessment. MD in agreement. Recommend meds whole with nectar thick or in puree. Recommend soft, whole  diet with nectar thick liquids. Okay for sips of water 30 minutes after meals and following oral care and up to next meal. No water or other thin iiquids with meals. SLP to continue to follow. Recommend acute rehab at discharge.      SWALLOW EVALUATION (Last 72 Hours)       SLP Adult Swallow Evaluation       Row Name 07/16/24 0936 07/16/24 0815                Rehab Evaluation    Document Type evaluation  -AD evaluation  -AD       Subjective Information no complaints  -AD no complaints  -AD       Patient Observations alert;cooperative  -AD alert;cooperative  -AD       Patient/Family/Caregiver Comments/Observations Wife at bedside, pt up in chair at this time. Cleared for po intake per cardiology. THIERRY planned for tomorrow.  -AD Pt was seen at bedside with wife present. Alert and cooperative. Reports after taking some drinks of water that they may take a look at his heart by 'going down his esophagus'. Further po discontinued at this time and on hold per radiology.  -AD       Patient Effort good  -AD good  -AD       Symptoms Noted During/After Treatment none  -AD none  -AD          General Information    Patient Profile Reviewed yes  -AD yes  -AD       Pertinent History Of Current Problem No current changes. Cleared for po for today.  -AD Pt admitted with progression of stroke from 6/11/24. MRI with 'Acute diffusion restriction abnormality within the deep white matter/corona radiata on the right appears similar in distribution with slight increase in size compatible with progression/evolution of small acute/subacute infarctions.' Pt reports increasing numbness in left hand and falling due to inability to maintain weight/balance in left leg. He also reports more consistent facial droop with loss of liquids out of the left side of his mouth. Facial droop was present on 7/12/24 at rest, but pt with functional ROM of lips and cheek w/movement. Posterior lingual weakness and mild to moderate dysarthria present as well on  7/12/24. Hx from initial evaluation on 7/12/24 as follows: Pt is a 73 y/o male admitted due to slurred speech and suspected stroke. Hx of stroke in 2017 affecting his vision. Hx of seizure disorder as well. Priorr PFO with repair in 2018. Head imaging significant for focal severe stenosis of right M2 branch w/minimal thready flow, multifocal remote infarcts w/area of encephalomalacia and gliosis in left occipital lobe from prior infarct. New, multiple small acute infacts in the right corona radiata. Age related diffiuse cortical atrophy most likely due to chronic microvascular ischemic disease. Pt currently with noted left facial droop and dysarthric speech. Pt states it is worse today than yesterday. He lives at home with his wife and is mostly retired.  -AD       Current Method of Nutrition NPO  -AD NPO  -AD       Precautions/Limitations, Vision -- vision impairment, bilaterally;other (see comments)  prior deficit from occipital stroke in 2017; suspect hemianopsia.  -AD       Precautions/Limitations, Hearing -- hearing impairment, bilaterally;hearing aid, left;hearing aid, right  needs increased volume w/aids worn  -AD       Prior Level of Function-Communication -- motor speech impairment  mild to moderate dysarthria  -AD       Prior Level of Function-Swallowing -- no diet consistency restrictions;regular textures;thin liquids  -AD       Plans/Goals Discussed with patient;spouse/S.O.;agreed upon;other (see comments)  Dr. Huang  -AD patient;spouse/S.O.;other (see comments);agreed upon  Rosanne RIVERO  -GIOVANA       Barriers to Rehab -- previous functional deficit  -AD       Patient's Goals for Discharge -- return home;return to PO diet;eat/drink without coughing/choking  -AD       Family Goals for Discharge -- patient able to return to PO diet;patient able to eat/drink without coughing/choking  -AD          Pain    Additional Documentation --  No current pain reported.  -AD --  no current pain reported  -AD          Oral  Motor Structure and Function    Oral Lesions or Structural Abnormalities and/or variants -- none  -AD       Dentition Assessment -- natural, present and adequate  -AD       Secretion Management -- anterior loss  reported out of left side; not observed  -AD       Mucosal Quality -- moist, healthy  -AD       Volitional Swallow -- WFL  -AD       Volitional Cough -- WFL  -AD          Oral Musculature and Cranial Nerve Assessment    Oral Motor General Assessment -- oral labial or buccal impairment;lingual impairment  -AD       Oral Labial or Buccal Impairment, Detail, Cranial Nerve VII (Facial): -- left labial droop;reduced ROM  reduced ROM and strength on the left lower face  -AD       Lingual Impairment, Detail. Cranial Nerves IX, XII (Glossopharyngeal and Hypoglossal) -- other (see comments)  mild deviation to the right w/protrusion but quickly corrects. ROM WFL. previous posterior weakness improved from 7/12/24; mild left sided weakness of tongue.  -AD       Velar Impairment, Detail, Cranial Nerves X, XI (Vagus and Accessory) -- --  difficult to view due to posterior tongue; Mallampati IV  -AD          General Eating/Swallowing Observations    Respiratory Support Currently in Use room air  -AD room air  -AD       Eating/Swallowing Skills self-fed;appropriate self-feeding skills observed  -AD self-fed;appropriate self-feeding skills observed  -AD       Positioning During Eating upright 90 degree;upright in chair  -AD upright 90 degree;upright in bed  -AD       Utensils Used spoon;straw  -AD spoon;cup;straw  -AD       Consistencies Trialed regular textures;pureed;thin liquids  -AD ice chips;thin liquids  water only  -AD          Respiratory    Respiratory Status WFL;room air;during swallowing/eating  -AD WFL;room air;during swallowing/eating  -AD          Clinical Swallow Eval    Oral Prep Phase impaired  -AD impaired  -AD       Oral Transit impaired  -AD impaired  -AD       Oral Residue WFL  -AD WFL  -AD        Pharyngeal Phase suspected pharyngeal impairment  -AD suspected pharyngeal impairment  -AD       Esophageal Phase unremarkable  -AD unremarkable  -AD       Clinical Swallow Evaluation Summary Pt demonstrates mild oropharyngeal dysphagia with mild anterior loss of liquid and puree out of left side of mouth. Pt w/one episode of throat clearing on the initial trial of thins from straw. Pt w/no further s/s of aspiration on repeat trials of thins, puree and solids. Recommend further assessment via instrumental assessment.  MD in agreement. Recommend meds whole with nectar thick or in puree. Recommend soft, whole diet with nectar thick liquids. Okay for sips of water 30 minutes after meals and following oral care and up to next meal. No water or other thin iiquids with meals. SLP to continue to follow. Recommend acute rehab at discharge.  -AD Pt only assessed on ice chips and thin water at this time due to possible procedure requiring NPO status. Pt with tolerance of ice chips and small sips of water from cup. Tolerates small, single drinks of thins by straw. One episode of coughing and choking on thins from consecutive straw drinks. Wife reports that he has had some coughing off and on for years. Follow up single drinks of thins from straw w/o clinical s/s of aspiration/coughing.  -AD          Oral Prep Concerns    Oral Prep Concerns anterior loss  -AD anterior loss  -AD       Anterior Loss thin;other (see comments)  applesauce  -AD thin  -AD       Oral Prep Concerns, Comment Mild anterior loss out of left side of mouth. Pt aware and clears with napkin.  -AD --          Oral Transit Concerns    Oral Transit Concerns other (see comments)  -AD other (see comments)  -AD       Oral Transit Concerns, Comment Suspected decreased back of tongue control and possible spill/pooling into pharynx resulting in delay of swallow and possible penetration/aspiration.  -AD Suspect decreased back of tongue resulting in spill of thins.  -AD           Pharyngeal Phase Concerns    Pharyngeal Phase Concerns throat clear  -AD cough  -AD       Cough -- thin  -AD       Pharyngeal Phase Concerns, Comment Throat clearing on one trial of thins on initial single straw drink. No further throat clearing or coughing noted on further trials of thins as well as puree and solids.  -AD Coughing on thins on one trial from consecutive straw drinks. Only noted on one of multiple trials of thins.  -AD          SLP Evaluation Clinical Impression    SLP Swallowing Diagnosis mild;oral dysphagia;suspected pharyngeal dysphagia  -AD --       Functional Impact risk of aspiration/pneumonia  -AD --       Rehab Potential/Prognosis, Swallowing good, to achieve stated therapy goals  -AD --       Swallow Criteria for Skilled Therapeutic Interventions Met demonstrates skilled criteria  -AD --          Recommendations    Therapy Frequency (Swallow) daily;5 days per week  -AD other (see comments)  Will follow up for further evaluation after seen by cardiology.  -AD       Predicted Duration Therapy Intervention (Days) 1 week;until discharge  -AD --       SLP Diet Recommendation soft to chew textures;whole;nectar thick liquids;water between meals after oral care, with supervision  -AD --       Recommended Diagnostics VFSS ([facility])  -AD --       Recommended Precautions and Strategies upright posture during/after eating;small bites of food and sips of liquid;general aspiration precautions  -AD --       Oral Care Recommendations Oral Care before breakfast, after meals and PRN;Toothbrush  -AD --       SLP Rec. for Method of Medication Administration meds whole;with thick liquids;with puree;as tolerated  -AD --       Monitor for Signs of Aspiration yes;cough;throat clearing  -AD --       Anticipated Discharge Disposition (SLP) inpatient rehabilitation facility  -AD --       Patient/Family Concerns, Anticipated Discharge Disposition (SLP) No current concerns reported regarding discharge  -AD --        Demonstrates Need for Referral to Another Service speech therapy  for dysarthria  -AD --          Swallow Goals (SLP)    Swallow LTGs Patient will demonstrate progress toward functional swallow for  -AD --       Swallow STGs diet tolerance goal selection (SLP);labial strengthening goal selection (SLP);lingual strengthening goal selection (SLP)  -AD --       Diet Tolerance Goal Selection (SLP) Patient will tolerate trials of  -AD --       Labial Strengthening Goal Selection (SLP) labial strengthening, SLP goal 1  -AD --       Lingual Strengthening Goal Selection (SLP) lingual strengthening, SLP goal 1  -AD --          (LTG) Patient will demonstrate progress toward functional swallow for    Diet Texture (Demonstrate progress toward functional swallow) regular textures  -AD --       Liquid viscosity (Demonstrate progress toward functional swallow) thin liquids  -AD --       [place] (Demonstrate progress towards functional swallow) with minimal cues (75-90% accuracy)  -AD --       Time Frame (Demonstrate progress toward functional swallow) 1 week;by discharge  -AD --       Progress/Outcomes (Demonstrate progress toward functional swallow) new goal  -AD --          (STG) Patient will tolerate trials of    Consistencies Trialed (Tolerate trials) regular textures;thin liquids  -AD --       Desired Outcome (Tolerate trials) without signs/symptoms of aspiration;with adequate oral prep/transit/clearance  -AD --       [place] (Tolerate trials) with minimal cues (75-90% accuracy)  -AD --       Time Frame (Tolerate trials) 1 week;by discharge  -AD --       Progress/Outcomes (Tolerate trials) new goal  -AD --          (STG) Labial Strengthening Goal 1 (SLP)    Activity (Labial Strengthening Goal 1, SLP) increase labial sensation/afferent drive  -AD --       Increase Labial Sensation/Afferent Drive labial resistance exercises  -AD --       [place]/Accuracy (Labial Strengthening Goal 1, SLP) with minimal cues (75-90%  accuracy)  -AD --       Time Frame (Labial Strengthening Goal 1, SLP) short term goal (STG);1 week;by discharge  -AD --       Progress/Outcomes (Labial Strengthening Goal 1, SLP) new goal  -AD --                 User Key  (r) = Recorded By, (t) = Taken By, (c) = Cosigned By      Initials Name Effective Dates    AD Sunshine Ballard, MS CCC-SLP 06/16/21 -                     EDUCATION  The patient has been educated in the following areas:   Dysphagia (Swallowing Impairment). Pt verbalizes understanding of swallow eval results, recommendations and in agreement with further instrumental evaulation.        SLP GOALS       Row Name 07/16/24 0936             (LTG) Patient will demonstrate progress toward functional swallow for    Diet Texture (Demonstrate progress toward functional swallow) regular textures  -AD      Liquid viscosity (Demonstrate progress toward functional swallow) thin liquids  -AD      [place] (Demonstrate progress towards functional swallow) with minimal cues (75-90% accuracy)  -AD      Time Frame (Demonstrate progress toward functional swallow) 1 week;by discharge  -AD      Progress/Outcomes (Demonstrate progress toward functional swallow) new goal  -AD         (STG) Patient will tolerate trials of    Consistencies Trialed (Tolerate trials) regular textures;thin liquids  -AD      Desired Outcome (Tolerate trials) without signs/symptoms of aspiration;with adequate oral prep/transit/clearance  -AD      [place] (Tolerate trials) with minimal cues (75-90% accuracy)  -AD      Time Frame (Tolerate trials) 1 week;by discharge  -AD      Progress/Outcomes (Tolerate trials) new goal  -AD         (STG) Labial Strengthening Goal 1 (SLP)    Activity (Labial Strengthening Goal 1, SLP) increase labial sensation/afferent drive  -AD      Increase Labial Sensation/Afferent Drive labial resistance exercises  -AD      [place]/Accuracy (Labial Strengthening Goal 1, SLP) with minimal cues (75-90% accuracy)   -AD      Time Frame (Labial Strengthening Goal 1, SLP) short term goal (STG);1 week;by discharge  -AD      Progress/Outcomes (Labial Strengthening Goal 1, SLP) new goal  -AD                User Key  (r) = Recorded By, (t) = Taken By, (c) = Cosigned By      Initials Name Provider Type    Sunshine Starr MS CCC-SLP Speech and Language Pathologist                         Time Calculation:    Time Calculation- SLP       Row Name 07/16/24 1034 07/16/24 0852          Time Calculation- SLP    SLP Start Time 0936  -AD 0815  -AD     SLP Stop Time 0957  -AD 0847  -AD     SLP Time Calculation (min) 21 min  -AD 32 min  -AD     Total Timed Code Minutes- SLP 0 minute(s)  -AD 0 minute(s)  -AD     SLP Non-Billable Time (min) 0 min  -AD 0 min  -AD     SLP Received On 07/16/24  -AD 07/16/24  -AD     SLP - Next Appointment 07/16/24  -AD --        Untimed Charges    95581-[id number] Eval Oral Pharyng Swallow Minutes 21  -AD 32  -AD        Total Minutes    Untimed Charges Total Minutes 21  -AD 32  -AD      Total Minutes 21  -AD 32  -AD               User Key  (r) = Recorded By, (t) = Taken By, (c) = Cosigned By      Initials Name Provider Type    Sunshine Starr MS CCC-SLP Speech and Language Pathologist                    Therapy Charges for Today       Code Description Service Date Service Provider Modifiers Qty    [id number] HC ST EVAL ORAL PHARYNG SWALLOW 4 7/16/2024 Sunshine Ballard MS CCC-SLP GN 1                 Sunshine Ballard MS CCC-SLP  7/16/2024

## 2024-07-16 NOTE — PROGRESS NOTES
"SERVICE: [facility] HOSPITALIST    CONSULTANTS: Neurology, stroke neurology, cardiology    CHIEF COMPLAINT:  weakness    SUBJECTIVE: Patient seen and examined at bedside. Patient reports he was told he failed his swallow test, and is confused by this.  I informed him per nursing protocol if facial droop automatically fails him, however we will have speech therapy do a formal evaluation.  He expresses understanding.  He denies any acute complaints or concerns.     OBJECTIVE:    Physical exam is largely unchanged from previous exam, except where documented below, examination is accurate as of 7/16/2024    Physical Exam:  General: Patient is awake and alert.  Elderly male. No acute distress noted.   HENT: Head is atraumatic, normocephalic. Hearing is grossly intact. Nose is without obvious congestion and appears patent. Neck is supple and trachea is midline.   Eyes: Vision is grossly intact. Pupils appear equal and round.   Cardiovascular: Heart has regular rate and rhythm with no murmurs, rubs or gallops noted.   Respiratory: Lungs are clear to ausculation without wheezes, rhonchi or rales.   Abdominal/GI: Soft, nontender, bowel sounds present. No rebound or guarding present.   Extremities: No peripheral edema noted.   Musculoskeletal: Spontaneous movement of bilateral upper and lower extremities against gravity noted. No signs of injury or deformity noted.   Skin: Warm and dry.   Psych: Mood and affect are appropriate. Cooperative with exam.   Neuro: No facial asymmetry noted. No focal deficits noted, hearing and vision are grossly intact.     /73 (BP Location: Right arm, Patient Position: Lying)   Pulse 56   Temp 97.6 °F (36.4 °C) (Axillary)   Resp 16   Ht 182.9 cm (72.01\")   Wt 80.3 kg (177 lb)   SpO2 94%   BMI 24.00 kg/m²     MEDS/LABS REVIEWED AND ORDERED    [Held by provider] aspirin, 81 mg, Oral, Daily  aspirin, 300 mg, Rectal, Daily  [Held by provider] cetirizine, 10 mg, Oral, " Daily  levETIRAcetam, 500 mg, Intravenous, Q12H  [Held by provider] pantoprazole, 40 mg, Oral, Daily  [Held by provider] rosuvastatin, 40 mg, Oral, Nightly  sodium chloride, 10 mL, Intravenous, Q12H  [Held by provider] ticagrelor, 90 mg, Oral, BID          LAB/DIAGNOSTICS:    Lab Results (last 24 hours)       Procedure Component Value Units Date/Time    Respiratory Panel PCR w/COVID-19(SARS-CoV-2) HOLA/MARVIN/JASSON/PAD/COR/BETI In-House, NP Swab in UTM/VTM, 2 HR TAT - Swab, Nasopharynx [[id number]] Collected: 07/15/24 2349    Specimen: Swab from Nasopharynx Updated: 07/15/24 2349    Comprehensive Metabolic Panel [[id number]]  (Abnormal) Collected: 07/15/24 1535    Specimen: Blood Updated: 07/15/24 1555     Glucose 100 mg/dL      BUN 10 mg/dL      Creatinine 0.92 mg/dL      Sodium 138 mmol/L      Potassium 4.0 mmol/L      Chloride 103 mmol/L      CO2 26.9 mmol/L      Calcium 8.9 mg/dL      Total Protein 6.3 g/dL      Albumin 3.9 g/dL      ALT (SGPT) 13 U/L      AST (SGOT) 19 U/L      Alkaline Phosphatase 97 U/L      Total Bilirubin 0.2 mg/dL      Globulin 2.4 gm/dL      A/G Ratio 1.6 g/dL      BUN/Creatinine Ratio 10.9     Anion Gap 8.1 mmol/L      eGFR 86.7 mL/min/1.73     Narrative:      GFR Normal >60  Chronic Kidney Disease <60  Kidney Failure <15    The GFR formula is only valid for adults with stable renal function between ages 18 and 70.    Single High Sensitivity Troponin T [[id number]]  (Normal) Collected: 07/15/24 1535    Specimen: Blood Updated: 07/15/24 1555     HS Troponin T 17 ng/L     Narrative:      High Sensitive Troponin T Reference Range:  <14.0 ng/L- Negative Female for AMI  <22.0 ng/L- Negative Male for AMI  >=14 - Abnormal Female indicating possible myocardial injury.  >=22 - Abnormal Male indicating possible myocardial injury.   Clinicians would have to utilize clinical acumen, EKG, Troponin, and serial changes to determine if it is an Acute Myocardial Infarction or myocardial injury due to an underlying  chronic condition.         Protime-INR [[id number]]  (Normal) Collected: 07/15/24 1535    Specimen: Blood Updated: 07/15/24 1549     Protime 13.0 Seconds      INR 0.98    Narrative:      Therapeutic Ranges for INR: 2.0-3.0 (PT 20-30)                              2.5-3.5 (PT 25-34)    aPTT [[id number]]  (Abnormal) Collected: 07/15/24 1535    Specimen: Blood Updated: 07/15/24 1549     PTT 22.2 seconds     Narrative:      PTT = The equivalent PTT values for the therapeutic range of heparin levels at 0.1 to 0.7 U/ml are 53 to 110 seconds.      [facility] Draw [[id number]] Collected: 07/15/24 1535    Specimen: Blood Updated: 07/15/24 1545    Narrative:      The following orders were created for panel order [facility] Draw.  Procedure                               Abnormality         Status                     ---------                               -----------         ------                     Green Top (Gel)[[id number]]                                  Final result               Lavender Top[[id number]]                                     Final result               Gold Top - SST[[id number]]                                   Final result               Light Blue Top[[id number]]                                   Final result                 Please view results for these tests on the individual orders.    Green Top (Gel) [[id number]] Collected: 07/15/24 1535    Specimen: Blood Updated: 07/15/24 1545     Extra Tube Hold for add-ons.     Comment: Auto resulted.       Lavender Top [[id number]] Collected: 07/15/24 1535    Specimen: Blood Updated: 07/15/24 1545     Extra Tube hold for add-on     Comment: Auto resulted       Gold Top - SST [[id number]] Collected: 07/15/24 1535    Specimen: Blood Updated: 07/15/24 1545     Extra Tube Hold for add-ons.     Comment: Auto resulted.       Light Blue Top [[id number]] Collected: 07/15/24 1535    Specimen: Blood Updated: 07/15/24 1545     Extra Tube Hold for add-ons.     Comment: Auto resulted        CBC & Differential [[id number]]  (Abnormal) Collected: 07/15/24 1535    Specimen: Blood Updated: 07/15/24 1539    Narrative:      The following orders were created for panel order CBC & Differential.  Procedure                               Abnormality         Status                     ---------                               -----------         ------                     CBC Auto Differential[[id number]]        Abnormal            Final result                 Please view results for these tests on the individual orders.    CBC Auto Differential [[id number]]  (Abnormal) Collected: 07/15/24 1535    Specimen: Blood Updated: 07/15/24 1539     WBC 5.99 10*3/mm3      RBC 4.67 10*6/mm3      Hemoglobin 15.2 g/dL      Hematocrit 44.3 %      MCV 94.9 fL      MCH 32.5 pg      MCHC 34.3 g/dL      RDW 12.7 %      RDW-SD 44.1 fl      MPV 9.8 fL      Platelets 206 10*3/mm3      Neutrophil % 57.3 %      Lymphocyte % 27.5 %      Monocyte % 12.2 %      Eosinophil % 2.2 %      Basophil % 0.5 %      Immature Grans % 0.3 %      Neutrophils, Absolute 3.43 10*3/mm3      Lymphocytes, Absolute 1.65 10*3/mm3      Monocytes, Absolute 0.73 10*3/mm3      Eosinophils, Absolute 0.13 10*3/mm3      Basophils, Absolute 0.03 10*3/mm3      Immature Grans, Absolute 0.02 10*3/mm3      nRBC 0.0 /100 WBC     POC Glucose Once [[id number]]  (Normal) Collected: 07/15/24 1524    Specimen: Blood Updated: 07/15/24 1530     Glucose 100 mg/dL           ECG 12 Lead ED Triage Standing Order; Acute Stroke (Onset <24 hrs)   Preliminary Result   HEART RATE=65  bpm   RR Theiidsb=5725  ms   MI Tgzlhhka=043  ms   P Horizontal Axis=-36  deg   P Front Axis=20  deg   QRSD Interval=95  ms   QT Fsfhlfbk=330  ms   EVzS=995  ms   QRS Axis=-20  deg   T Wave Axis=65  deg   - ABNORMAL ECG -   Sinus rhythm   Ventricular premature complex   Prolonged MI interval   Inferior infarct, old   Anterior infarct, old   Date and Time of Study:2024-07-15 15:38:35        Results for orders placed  during the hospital encounter of 07/11/24    Adult Transthoracic Echo Complete W/ Cont if Necessary Per Protocol (With Agitated Saline)    Interpretation Summary    Left ventricular systolic function is normal. Calculated left ventricular EF = 64.4%    Left ventricular wall thickness is consistent with hypertrophy. Sigmoid-shaped ventricular septum is present.    Left ventricular diastolic function was normal.    There is mild calcification of the aortic valve.    Systolic anterior motion of the chordal apparatus is present. There is no outflow tract obstruction.    Estimated right ventricular systolic pressure from tricuspid regurgitation is normal (<35 mmHg).    MRI Brain Without Contrast    Result Date: 7/15/2024  Impression: 1. Acute diffusion restriction abnormality within the deep white matter/corona radiata on the right appears similar in distribution with slight increase in size compatible with progression/evolution of small acute/subacute infarctions. 2. No evidence of acute hemorrhage. 3. Chronic white matter changes similar to the prior study as well as sequela of prior remote left occipital infarct. Findings were discussed with the emergency department at the time of interpretation Electronically Signed: Star Khalil MD  7/15/2024 4:25 PM EDT  Workstation ID: [id number]    XR Chest 1 View    Result Date: 7/15/2024  Impression: No radiographic evidence of acute chest process. Electronically Signed: Negrito Kemp MD  7/15/2024 3:45 PM EDT  Workstation ID: [id number]    CT Head Without Contrast Stroke Protocol    Result Date: 7/15/2024  Impression: No acute intracranial abnormality. Chronic findings as above. Electronically Signed: Remy Flower MD  7/15/2024 3:42 PM EDT  Workstation ID: [id number]       ASSESSMENT/PLAN:  Please note portions of this assessment/plan may have been copied and pasted, but I have personally seen this patient and reviewed each line of this assessment and plan for accuracy and made  updates to reflect my necessary changes on 2024    Acute CVA with worsening extremity weakness on known recent CVA  -Repeat MRI from the ED prior to admission shows acute diffusion restriction abnormality within the deep white matter/corona radiata on the right with increased size/progression of small acute/subacute infarction seen on previous MRI  -Stroke neurology consulted from ED, patient previously discharged on aspirin and Plavix,  Patient also on phenytoin which is a known CY inhibitor which likely did not allow Plavix to provide maximum benefit.   -Therefore stroke neurologist recommending patient receive Brilinta as well as aspirin  -I have also discussed case with Dr. Huerta in-house neurologist, for advice regarding phenytoin, he recommends discontinuing this medication for now placing patient on 500 mg IV every 12 hours of Keppra with plan to transition to 500 mg p.o. twice daily as able.  Consult placed to Dr. Huerta  -Cardiology consult placed to evaluate patient for transesophageal echo, will make patient n.p.o. after midnight for this.   -Monitor closely with neurochecks.   -Given significant facial droop, patient will be kept n.p.o. until bedside swallow eval performed, speech consulted  -PT and OT consulted for assistance as well  -Patient failed bedside swallow due to facial droop, Dobbhoff was to be placed but patient will not allow, therefore he is only received rectal aspirin to this point.  Hopefully dysphagia will improve to allow oral intake and medication, may need to consider Lovenox or other anticoagulant via alternative route.     Seasonal allergic rhinitis-hold home Allegra, therapeutic interchange with cetirizine     GERD-continue home Protonix     Hyperlipidemia-continue home Crestor           DVT PPX: SCDs    PLAN FOR DISPOSITION: ROLANDO Huang DO  24  07:32 EDT    At [facility], we believe that sharing information builds trust and better  "relationships. You are receiving this note because you recently visited [facility]. It is possible you will see health information before a provider has talked with you about it. This kind of information can be easy to misunderstand. To help you fully understand what it means for your health, we urge you to discuss this note with your provider.    \"Dictated utilizing Dragon dictation\"    "

## 2024-07-16 NOTE — THERAPY EVALUATION
Acute Care - Occupational Therapy Initial Evaluation  [place] [facility]     Patient Name: Ronnell Rizvi  : 1950  MRN: [id number]  Today's Date: 2024  Onset of Illness/Injury or Date of Surgery: 07/15/24  Date of Referral to OT: 24  Referring Physician: Dr Huang    Admit Date: 7/15/2024       ICD-10-CM ICD-9-CM   1. Ischemic stroke  I63.9 434.91   2. Weakness of left side of body  R53.1 728.87   3. Dysarthria  R47.1 784.51   4. Oropharyngeal dysphagia [R13.12]  R13.12 787.22     Patient Active Problem List   Diagnosis    Anemia, unspecified    Vitamin B12 deficiency    Chronic fatigue    Benign prostatic hypertrophy (BPH) with weak urinary stream    Chronic bronchitis    ELIZABETH (obstructive sleep apnea)    Sinoatrial block    Cerebral aneurysm, nonruptured    TIA (transient ischemic attack)    Intracranial vascular stenosis    Sinus pause    S/P percutaneous patent foramen ovale closure    TIA on medication    HTN (hypertension)    COPD (chronic obstructive pulmonary disease)    Nocturnal seizures    Encounter for screening for malignant neoplasm of colon    Seizure    HL (hearing loss)    Pre-syncope    Mixed hyperlipidemia    History of stroke    Melena    Irritable bowel syndrome with both constipation and diarrhea    Gastroesophageal reflux disease    Stroke    CVA (cerebral vascular accident)     Past Medical History:   Diagnosis Date    Acquired dyslexia     pt  reports after 3rd stroke    Allergic     Allergic rhinitis     Anal fissure     Aortic insufficiency     moderate, THIERRY 2017    Asthma     Asthma     Benign prostatic hypertrophy (BPH) with weak urinary stream 2016    Cataract     Chronic bronchitis     Colon polyp     COPD (chronic obstructive pulmonary disease)     Emphysema lung     Falls frequently     Fatty liver     GERD (gastroesophageal reflux disease)     Headache     Hepatitis     thought to be Hepatitis A     History of pneumonia     With left lower lobe atelectasis and  scar tissue, being followed    HL (hearing loss)     Hypertension     Low back pain     Macular degeneration     Memory loss     Mixed hyperlipidemia 01/30/2023    Obstructive sleep apnea syndrome 08/23/2017    refuses c-pap    PFO (patent foramen ovale)     s/p percutaneous closure with a 25mm Amplatzer device in December 2018    Pneumonia     LLL with scar tissue    Seizures     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 total of 3 strokes    Tremor     Comes & goes    Vision loss      Past Surgical History:   Procedure Laterality Date    CARDIAC CATHETERIZATION N/A 12/12/2018    Procedure: Patent foramen ovale closure- [employer];  Surgeon: Deangelo Harris MD;  Location:  [facility];  Service: Cardiology    CARDIAC CATHETERIZATION N/A 12/12/2018    Procedure: Intracardiac echocardiogram;  Surgeon: Deangelo Harris MD;  Location:  [facility];  Service: Cardiology    CARDIAC ELECTROPHYSIOLOGY PROCEDURE N/A 03/26/2018    Procedure: Loop insertion   CONFIRM RX;  Surgeon: Luis Wyatt MD;  Location:  [facility];  Service: Cardiovascular    CARDIAC ELECTROPHYSIOLOGY PROCEDURE N/A 07/15/2020    Procedure: Loop recorder removal;  Surgeon: Starr Driscoll MD;  Location:  [facility];  Service: Cardiovascular;  Laterality: N/A;    CARDIAC SURGERY      CATARACT EXTRACTION Bilateral     COLONOSCOPY      COLONOSCOPY N/A 10/09/2020    Procedure: COLONOSCOPY with polypectomy;  Surgeon: Ras Mendoza MD;  Location:  [facility];  Service: Gastroenterology;  Laterality: N/A;  diverticulosis  ascending polyp  transverse polyp  sigmoid polyps X 2  rectal polyp    EYE SURGERY      HAND SURGERY Bilateral     INGUINAL HERNIA REPAIR Left     OTHER SURGICAL HISTORY      inguinal hernia repair for person over age 5    TONSILLECTOMY      TONSILLECTOMY           OT ASSESSMENT FLOWSHEET (Last  12 Hours)       OT Evaluation and Treatment       Row Name 07/16/24 1315 07/16/24 0930                OT Time and Intention    Subjective Information complains of;fatigue  -JJ no complaints  -JJ       Document Type therapy note (daily note)  -JJ evaluation  -JJ       Mode of Treatment occupational therapy  -JJ occupational therapy  -JJ       Patient Effort good  -JJ good  -JJ       Symptoms Noted During/After Treatment -- none  -JJ          General Information    Patient Profile Reviewed -- yes  -JJ       Onset of Illness/Injury or Date of Surgery -- 07/15/24  -JJ       Referring Physician -- Dr Huang  -       Patient/Family/Caregiver Comments/Observations pt supine in bed, taking nap, agreed to treatment  -JJ pt supine in bed, agreed to evaluation  -JJ       Prior Level of Function -- independent:;all household mobility;transfer;ADL's  -JJ       Equipment Currently Used at Home -- none  -JJ       Pertinent History of Current Functional Problem -- pt with recent admission for acute CVA, discharged home last week with facial droop and slurred speech. pt dx this admission with extended infarct. pt now has left sided weakness and increased facial droop.  -JJ       Existing Precautions/Restrictions fall  -JJ fall  -JJ       Risks Reviewed -- patient:;LOB  -JJ       Benefits Reviewed -- patient:;improve function;increase independence  -JJ       Barriers to Rehab -- none identified  -JJ          Living Environment    Current Living Arrangements -- home  -JJ       People in Home -- spouse  -JJ          Home Main Entrance    Number of Stairs, Main Entrance -- two  -JJ       Stair Railings, Main Entrance -- --  with two grab bars  -JJ          Pain Assessment    Pretreatment Pain Rating 0/10 - no pain  -JJ 0/10 - no pain  -JJ       Posttreatment Pain Rating 0/10 - no pain  -JJ 0/10 - no pain  -JJ          Cognition    Orientation Status (Cognition) -- oriented x 3  -JJ       Follows Commands (Cognition) -- WFL  -JJ        Personal Safety Interventions gait belt;nonskid shoes/slippers when out of bed  -JJ gait belt;nonskid shoes/slippers when out of bed  -          Range of Motion (ROM)    Range of Motion -- right upper extremity;ROM is WFL  L UE AROM WFL with exception of L shoulder emilia 75% AROM  -JJ          Strength (Manual Muscle Testing)    Strength (Manual Muscle Testing) -- right upper extremity;strength is WFL  L UE MMT 3+/5  -JJ          Strength Comprehensive (MMT)    Comment, General Manual Muscle Testing (MMT) Assessment  strength: R: 80 lbs  L:40lbs  -JJ --          Sensory Assessment (Somatosensory)    Sensory Assessment (Somatosensory) -- left UE  -JJ       Left UE Sensory Assessment -- --  pt with co numbness in L hand  -J          Activities of Daily Living    BADL Assessment/Intervention -- lower body dressing  -          Lower Body Dressing Assessment/Training    Comment, (Lower Body Dressing) -- anticipate pt will require min-mod assist for lb adls  -          Bed Mobility    Bed Mobility -- supine-sit  -       Supine-Sit [place] (Bed Mobility) -- standby assist  -       Assistive Device (Bed Mobility) -- head of bed elevated  -          Functional Mobility    Functional Mobility- Ind. Level -- minimum assist (75% patient effort);verbal cues required  -       Functional Mobility- Device -- walker, front-wheeled  -       Functional Mobility-Distance (Feet) -- 20  -JJ          Transfer Assessment/Treatment    Transfers -- sit-stand transfer;stand-sit transfer  -       Comment, (Transfers) -- required verbal cues for hand placement  -          Sit-Stand Transfer    Sit-Stand [place] (Transfers) -- minimum assist (75% patient effort);verbal cues  -       Assistive Device (Sit-Stand Transfers) -- walker, front-wheeled  -          Motor Skills    Motor Skills -- coordination  -J       Coordination -- fine motor deficit;left;upper extremity  -J       Comments, Coordination  Assessment/Intervention pt provided with and educated on functional hand exercises, and theraputty exercises  -JJ pt with decreased coordination in L UE. pt with decreased finger opposition, targeting objects with L hand and diadokinesis  -JJ       Additional Documentation -- Comments, Coordination Assessment/Intervention (Row)  -          Balance    Comment, Balance -- static standing balance- CGA with RW dynamic standing balance- min assist with RW  -JJ          Plan of Care Review    Plan of Care Reviewed With patient  -JJ patient  -JJ       Outcome Evaluation OT: pt provided with and educated on functional hand exercises and theraputty (yellow) exercises.  strength: R: 80lbs L: 40 lbs. pt continues with slurred speech and co fatigue this afternoon. cont to rec acute rehab  - OT evaluation completed/ pt required SBA for supine to sit transfer to EOB. pt required min assist for functional transfers from EOB with RW. pt required min assist for functional mobility in room with RW x 20 feet. anticipate pt will require min-mod assist for lb adls. pt with decreased strength and coordination in L UE and co numbness in L hand. pt with slurred speech and facial droop. pt would benefit from skilled OT services. will see pt 5x week during hospitalization with anticipated discharge to acute rehab  -          Positioning and Restraints    Pre-Treatment Position in bed  -J in bed  -       Post Treatment Position bed  - chair  -J       In Bed call light within reach;supine  - --       In Chair -- notified nsg;sitting;call light within reach;encouraged to call for assist;with family/caregiver  -          Therapy Assessment/Plan (OT)    Date of Referral to OT -- 07/16/24  -       Patient/Family Therapy Goal Statement (OT) -- return to normal activity, strength  -JJ       OT Diagnosis -- CVA  -       Rehab Potential (OT) -- good, to achieve stated therapy goals  -       Criteria for Skilled Therapeutic  Interventions Met (OT) -- yes;skilled treatment is necessary  -       Therapy Frequency (OT) -- 5 times/wk  -       Predicted Duration of Therapy Intervention (OT) -- x 1 week  -       Problem List (OT) -- balance;coordination;mobility;range of motion (ROM);strength  -       Activity Limitations Related to Problem List (OT) -- unable to ambulate safely;unable to transfer safely;BADLs not performed adequately or safely;IADLs not performed adequately or safely  -       Planned Therapy Interventions (OT) -- BADL retraining;functional balance retraining;patient/caregiver education/training;ROM/therapeutic exercise;strengthening exercise  -          Evaluation Complexity (OT)    Overall Complexity of Evaluation (OT) -- low complexity  -          Progress Summary (OT)    Progress Toward Functional Goals (OT) progress toward functional goals as expected  - --       Daily Progress Summary (OT) cont poc  - --          Therapy Plan Review/Discharge Plan (OT)    Anticipated Discharge Disposition (OT) inpatient rehabilitation facility  - inpatient rehabilitation facility  [facility]          Transfer Goal 1 (OT)    Activity/Assistive Device (Transfer Goal 1, OT) -- toilet;walker, rolling  -       [place] Level/Cues Needed (Transfer Goal 1, OT) -- standby assist  -       Time Frame (Transfer Goal 1, OT) -- 1 week  -       Progress/Outcome (Transfer Goal 1, OT) -- --  new goal  -          Dressing Goal 1 (OT)    Activity/Device (Dressing Goal 1, OT) -- lower body dressing  -       [place]/Cues Needed (Dressing Goal 1, OT) -- standby assist  -       Time Frame (Dressing Goal 1, OT) -- 1 week  -       Progress/Outcome (Dressing Goal 1, OT) -- --  new goal  -          Balance Goal 1 (OT)    Activity/Assistive Device (Balance Goal 1, OT) -- standing, dynamic;walker, rolling  -       [place] Level/Cues Needed (Balance Goal 1, OT) -- verbal cues required;standby assist  x 5 minutes to  increase I with adls  -       Time Frame (Balance Goal 1, OT) -- 1 week  -       Progress/Outcomes (Balance Goal 1, OT) -- --  new goal  -          Patient Education Goal (OT)    Activity (Patient Education Goal, OT) -- pt verbalize independence with L UE functional coordination exercise program  -       Time Frame (Patient Education Goal, OT) -- 1 week  -       Progress/Outcome (Patient Education Goal, OT) -- --  new goal  -                 User Key  (r) = Recorded By, (t) = Taken By, (c) = Cosigned By      Initials Name Effective Dates    Saskia García OTR 06/16/21 -                      Occupational Therapy Education       Title: PT OT SLP Therapies (In Progress)       Topic: Occupational Therapy (Done)       Point: ADL training (Done)       Description:   Instruct learner(s) on proper safety adaptation and remediation techniques during self care or transfers.   Instruct in proper use of assistive devices.                  Learning Progress Summary             Patient Acceptance, E,TB, VU by  at 7/16/2024 [id number]    Comment: pt educated on adls and safety with functional transfers and mobility                                         User Key       Initials Effective Dates Name Provider Type Discipline     06/16/21 -  Saskia Hernández OTTRACIE Occupational Therapist OT                      OT Recommendation and Plan  Planned Therapy Interventions (OT): BADL retraining, functional balance retraining, patient/caregiver education/training, ROM/therapeutic exercise, strengthening exercise  Therapy Frequency (OT): 5 times/wk  Progress Toward Functional Goals (OT): progress toward functional goals as expected  Plan of Care Review  Plan of Care Reviewed With: patient  Outcome Evaluation: OT: pt provided with and educated on functional hand exercises and theraputty (yellow) exercises.  strength: R: 80lbs L: 40 lbs. pt continues with slurred speech and co fatigue this afternoon. cont to rec  acute rehab  Plan of Care Reviewed With: patient  Outcome Evaluation: OT: pt provided with and educated on functional hand exercises and theraputty (yellow) exercises.  strength: R: 80lbs L: 40 lbs. pt continues with slurred speech and co fatigue this afternoon. cont to rec acute rehab     Outcome Measures       Row Name 07/16/24 0930             How much help from another is currently needed...    Putting on and taking off regular lower body clothing? 2  -JJ      Bathing (including washing, rinsing, and drying) 2  -JJ      Toileting (which includes using toilet bed pan or urinal) 3  -JJ      Putting on and taking off regular upper body clothing 4  -JJ      Taking care of personal grooming (such as brushing teeth) 4  -JJ      Eating meals 4  -JJ      AM-PAC 6 Clicks Score (OT) 19  -JJ         Modified Sherman Scale    Pre-Stroke Modified Sherman Scale 1 - No significant disability despite symptoms.  Able to carry out all usual duties and activities.  -      Modified Sherman Scale 4 - Moderately severe disability.  Unable to walk without assistance, and unable to attend to own bodily needs without assistance.  -         Functional Assessment    Outcome Measure Options AM-PAC 6 Clicks Daily Activity (OT)  -                User Key  (r) = Recorded By, (t) = Taken By, (c) = Cosigned By      Initials Name Provider Type    Saskia García OTR Occupational Therapist                    Time Calculation:    Time Calculation- OT       Row Name 07/16/24 1506 07/16/24 1505          Time Calculation- OT    OT Start Time 1315  -JJ 0900  -JJ     OT Stop Time 1330  -JJ --     OT Time Calculation (min) 15 min  -JJ --               User Key  (r) = Recorded By, (t) = Taken By, (c) = Cosigned By      Initials Name Provider Type    Saskia Gacría OTR Occupational Therapist                  Therapy Charges for Today       Code Description Service Date Service Provider Modifiers Qty    [id number]  OT NEVILLEAL  LOW COMPLEXITY 2 7/16/2024 Saskia Hernández, OTR GO 1    [id number]  OT THERAPEUTIC ACT EA 15 MIN 7/16/2024 Saskia Hernández, OTTRACIE GO 1                 Saskia Hernández, OTTRACIE  7/16/2024

## 2024-07-16 NOTE — PLAN OF CARE
Goal Outcome Evaluation:  Plan of Care Reviewed With: patient, spouse        Progress: improving  Outcome Evaluation: Pt tolerating modified diet. Up to chair. Use of urinal. Needs assist when ambulating, unsteady gait noted. NPO at 0001 on 7/17/24 for THIERRY. Telemetry noted NSR. Changes made to medications. All care explained. All questions answered. Pt and family verb understanding. Call light within reach

## 2024-07-16 NOTE — THERAPY TREATMENT NOTE
Acute Care - Physical Therapy Treatment Note  [place] [facility]     Patient Name: Ronnell Rizvi  : 1950  MRN: [id number]  Today's Date: 2024   Onset of Illness/Injury or Date of Surgery: 07/15/24  Visit Dx:     ICD-10-CM ICD-9-CM   1. Ischemic stroke  I63.9 434.91   2. Weakness of left side of body  R53.1 728.87   3. Dysarthria  R47.1 784.51   4. Oropharyngeal dysphagia [R13.12]  R13.12 787.22     Patient Active Problem List   Diagnosis    Anemia, unspecified    Vitamin B12 deficiency    Chronic fatigue    Benign prostatic hypertrophy (BPH) with weak urinary stream    Chronic bronchitis    ELIZABETH (obstructive sleep apnea)    Sinoatrial block    Cerebral aneurysm, nonruptured    TIA (transient ischemic attack)    Intracranial vascular stenosis    Sinus pause    S/P percutaneous patent foramen ovale closure    TIA on medication    HTN (hypertension)    COPD (chronic obstructive pulmonary disease)    Nocturnal seizures    Encounter for screening for malignant neoplasm of colon    Seizure    HL (hearing loss)    Pre-syncope    Mixed hyperlipidemia    History of stroke    Melena    Irritable bowel syndrome with both constipation and diarrhea    Gastroesophageal reflux disease    Stroke    CVA (cerebral vascular accident)     Past Medical History:   Diagnosis Date    Acquired dyslexia     pt  reports after 3rd stroke    Allergic     Allergic rhinitis     Anal fissure     Aortic insufficiency     moderate, THIERRY 2017    Asthma     Asthma     Benign prostatic hypertrophy (BPH) with weak urinary stream 2016    Cataract     Chronic bronchitis     Colon polyp     COPD (chronic obstructive pulmonary disease)     Emphysema lung     Falls frequently     Fatty liver     GERD (gastroesophageal reflux disease)     Headache     Hepatitis     thought to be Hepatitis A     History of pneumonia     With left lower lobe atelectasis and scar tissue, being followed    HL (hearing loss)     Hypertension     Low back pain      Macular degeneration     Memory loss     Mixed hyperlipidemia 01/30/2023    Obstructive sleep apnea syndrome 08/23/2017    refuses c-pap    PFO (patent foramen ovale)     s/p percutaneous closure with a 25mm Amplatzer device in December 2018    Pneumonia     LLL with scar tissue    Seizures     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 total of 3 strokes    Tremor     Comes & goes    Vision loss      Past Surgical History:   Procedure Laterality Date    CARDIAC CATHETERIZATION N/A 12/12/2018    Procedure: Patent foramen ovale closure- Abbott;  Surgeon: Deangelo Harris MD;  Location:  [facility];  Service: Cardiology    CARDIAC CATHETERIZATION N/A 12/12/2018    Procedure: Intracardiac echocardiogram;  Surgeon: Deangelo Harris MD;  Location:  [facility];  Service: Cardiology    CARDIAC ELECTROPHYSIOLOGY PROCEDURE N/A 03/26/2018    Procedure: Loop insertion   CONFIRM RX;  Surgeon: Luis Wyatt MD;  Location:  [facility];  Service: Cardiovascular    CARDIAC ELECTROPHYSIOLOGY PROCEDURE N/A 07/15/2020    Procedure: Loop recorder removal;  Surgeon: Starr Driscoll MD;  Location:  [facility];  Service: Cardiovascular;  Laterality: N/A;    CARDIAC SURGERY      CATARACT EXTRACTION Bilateral     COLONOSCOPY      COLONOSCOPY N/A 10/09/2020    Procedure: COLONOSCOPY with polypectomy;  Surgeon: Ras Mendoza MD;  Location: [facility] OR;  Service: Gastroenterology;  Laterality: N/A;  diverticulosis  ascending polyp  transverse polyp  sigmoid polyps X 2  rectal polyp    EYE SURGERY      HAND SURGERY Bilateral     INGUINAL HERNIA REPAIR Left     OTHER SURGICAL HISTORY      inguinal hernia repair for person over age 5    TONSILLECTOMY      TONSILLECTOMY       PT Assessment (Last 12 Hours)       PT Evaluation and Treatment       Row Name 07/16/24 1914          Physical Therapy  Time and Intention    Subjective Information complains of;fatigue  -BP     Document Type therapy note (daily note)  -BP     Mode of Treatment physical therapy  -BP     Patient Effort good  -BP     Symptoms Noted During/After Treatment fatigue  -BP       Row Name 07/16/24 1436          General Information    Patient Profile Reviewed yes  -BP     Patient/Family/Caregiver Comments/Observations Patient sitting in bedside chair. Patient agreeable to PT treatment.  -BP     Existing Precautions/Restrictions fall  -BP     Risks Reviewed patient:;LOB;increased discomfort  -BP     Benefits Reviewed patient:;improve function;increase independence;increase strength  -BP     Barriers to Rehab none identified  -BP       Row Name 07/16/24 1436          Pain    Pre/Posttreatment Pain Comment Patient does not complain of pain  -BP       Row Name 07/16/24 1436          Cognition    Personal Safety Interventions gait belt;nonskid shoes/slippers when out of bed  -BP       Row Name 07/16/24 1436          Bed Mobility    Comment, (Bed Mobility) deferred, patient up in chair  -BP       Row Name 07/16/24 1436          Transfers    Transfers sit-stand transfer;stand-sit transfer  -BP     Comment, (Transfers) verbal cues for hand placement  -BP       Row Name 07/16/24 1436          Sit-Stand Transfer    Sit-Stand [place] (Transfers) verbal cues;minimum assist (75% patient effort)  -BP     Assistive Device (Sit-Stand Transfers) walker, front-wheeled  -BP       Row Name 07/16/24 1436          Stand-Sit Transfer    Stand-Sit [place] (Transfers) minimum assist (75% patient effort);verbal cues  -BP     Assistive Device (Stand-Sit Transfers) walker, front-wheeled  -BP       Row Name 07/16/24 1436          Gait/Stairs (Locomotion)    [place] Level (Gait) minimum assist (75% patient effort);verbal cues;moderate assist (50% patient effort)  -BP     Assistive Device (Gait) walker, front-wheeled  -BP     Distance in Feet (Gait) 56  -BP      Deviations/Abnormal Patterns (Gait) liyah decreased;base of support, narrow;stride length decreased  -BP     Bilateral Gait Deviations forward flexed posture  -BP     Comment, (Gait/Stairs) Patient continues with difficulty advancing L LE during swing. With min A provided for weight shifting, patient able to increase step length on the L. No knee buckling noted however patient with persistent L lateral lean.  -BP       Row Name 07/16/24 1436          Safety Issues, Functional Mobility    Safety Issues Affecting Function (Mobility) positioning of assistive device  -BP     Comment, Safety Issues/Impairments (Mobility) requires assist to manage device throughout.  -BP       Row Name 07/16/24 1436          Balance    Comment, Balance Static standing  balance-CGA with device. Dynamic standing balance-min A with device  -       Row Name 07/16/24 1436          Motor Skills    Therapeutic Exercise --  performed seated L LAQ, hip flexion, SLR, hip ABD/ADD, and heel slides x 15 reps with min A  -BP       Row Name 07/16/24 1436          Plan of Care Review    Plan of Care Reviewed With patient  -BP     Outcome Evaluation PT: Patient performs sit to/from stand transfers with min A and gait x 56 feet with min/mod A with use of FWW. Patient with continued L lateral lean during static standing and with gait. Requires min/mod A to manage device as well as min/mod A for weight shifting right to advance L LE. Patients is very motivated to participate and is agreeable to recommendation of inpatient acute rehab at discharge. Will continue to see QD/BID while in the hospital.  -BP       Row Name 07/16/24 1436          Positioning and Restraints    Pre-Treatment Position sitting in chair/recliner  -BP     Post Treatment Position chair  -BP     In Chair notified nsg;reclined;call light within reach;encouraged to call for assist  -       Row Name 07/16/24 1436          Progress Summary (PT)    Progress Toward Functional Goals (PT)  progress toward functional goals is gradual  -       Row Name 07/16/24 1436          Therapy Plan Review/Discharge Plan (PT)    Therapy Plan Review (PT) patient;risks/benefits reviewed;participants included  -               User Key  (r) = Recorded By, (t) = Taken By, (c) = Cosigned By      Initials Name Provider Type    BP Prosper Jarquin, PT Physical Therapist                    Physical Therapy Education       Title: PT OT SLP Therapies (Done)       Topic: Physical Therapy (Done)       Point: Mobility training (Done)       Learning Progress Summary             Patient Acceptance, E,TB, VU by  at 7/16/2024 1507    Acceptance, E,TB, VU by  at 7/16/2024 1329                         Point: Home exercise program (Done)       Learning Progress Summary             Patient Acceptance, E,TB, VU by  at 7/16/2024 1507                                         User Key       Initials Effective Dates Name Provider Type [facility] 06/16/21 -  Prosper Jarquin, PT Physical Therapist PT                  PT Recommendation and Plan  Anticipated Discharge Disposition (PT): inpatient rehabilitation facility  Planned Therapy Interventions (PT): balance training, bed mobility training, gait training, home exercise program, patient/family education, transfer training, strengthening  Therapy Frequency (PT): other (see comments)  Progress Summary (PT)  Progress Toward Functional Goals (PT): progress toward functional goals is gradual  Plan of Care Reviewed With: patient  Outcome Evaluation: PT: Patient performs sit to/from stand transfers with min A and gait x 56 feet with min/mod A with use of FWW. Patient with continued L lateral lean during static standing and with gait. Requires min/mod A to manage device as well as min/mod A for weight shifting right to advance L LE. Patients is very motivated to participate and is agreeable to recommendation of inpatient acute rehab at discharge. Will continue to see QD/BID while  in the hospital.   Outcome Measures       Row Name 07/16/24 1436 07/16/24 0930          How much help from another person do you currently need...    Turning from your back to your side while in flat bed without using bedrails? 3  -BP --     Moving from lying on back to sitting on the side of a flat bed without bedrails? 3  -BP --     Moving to and from a bed to a chair (including a wheelchair)? 3  -BP --     Standing up from a chair using your arms (e.g., wheelchair, bedside chair)? 3  -BP --     Climbing 3-5 steps with a railing? 2  -BP --     To walk in hospital room? 2  -BP --     AM-PAC 6 Clicks Score (PT) 16  -BP --     Highest Level of Mobility Goal 5 --> Static standing  -BP --        How much help from another is currently needed...    Putting on and taking off regular lower body clothing? -- 2  -JJ     Bathing (including washing, rinsing, and drying) -- 2  -JJ     Toileting (which includes using toilet bed pan or urinal) -- 3  -JJ     Putting on and taking off regular upper body clothing -- 4  -JJ     Taking care of personal grooming (such as brushing teeth) -- 4  -JJ     Eating meals -- 4  -JJ     AM-PAC 6 Clicks Score (OT) -- 19  -JJ        Modified Charlotte Scale    Pre-Stroke Modified Charlotte Scale -- 1 - No significant disability despite symptoms.  Able to carry out all usual duties and activities.  -     Modified Charlotte Scale -- 4 - Moderately severe disability.  Unable to walk without assistance, and unable to attend to own bodily needs without assistance.  -        Functional Assessment    Outcome Measure Options AM-PAC 6 Clicks Basic Mobility (PT)  -BP AM-PAC 6 Clicks Daily Activity (OT)  -J               User Key  (r) = Recorded By, (t) = Taken By, (c) = Cosigned By      Initials Name Provider Type    Saskia García, OTR Occupational Therapist    Prosper Benitez, PT Physical Therapist                     Time Calculation:    PT Charges       Row Name 07/16/24 1509 07/16/24 3264           Time Calculation    Start Time 1436  -BP 0900  -BP     Stop Time 1448  -BP --     Time Calculation (min) 12 min  -BP --     PT Received On 07/16/24  -BP 07/16/24  -BP     PT - Next Appointment 07/17/24  -BP 07/16/24  -BP        Timed Charges    37513 - PT Therapeutic Exercise Minutes 4  -BP --     89316 - Gait Training Minutes  8  -BP --        Total Minutes    Timed Charges Total Minutes 12  -BP --      Total Minutes 12  -BP --               User Key  (r) = Recorded By, (t) = Taken By, (c) = Cosigned By      Initials Name Provider Type    BP Prosper Jarquin, PT Physical Therapist                  Therapy Charges for Today       Code Description Service Date Service Provider Modifiers Qty    31569604498 HC PT EVAL LOW COMPLEXITY 2 7/16/2024 Prosper Jarquin, PT GP 1    51972842170 HC GAIT TRAINING EA 15 MIN 7/16/2024 Prosper Jarquin, PT GP 1            PT G-Codes  Outcome Measure Options: AM-PAC 6 Clicks Basic Mobility (PT)  AM-PAC 6 Clicks Score (PT): 16  AM-PAC 6 Clicks Score (OT): 19  Modified Gely Scale: 4 - Moderately severe disability.  Unable to walk without assistance, and unable to attend to own bodily needs without assistance.    Prosper Jarquin PT  7/16/2024

## 2024-07-16 NOTE — DISCHARGE PLACEMENT REQUEST
"Gabino Rizvi (74 y.o. Male)       Date of Birth   1950    Social Security Number       Address   [address] [place] [address] [address] [place] [address]    Home Phone   [phone]    MRN   [id number]       Latter day   Non-Caodaism    Marital Status                               Admission Date   7/15/24    Admission Type   Emergency    Admitting Provider   Jace Huang DO    Attending Provider   Jace Huang DO    Department, Room/Bed   [facility] SURG, 1406/1       Discharge Date       Discharge Disposition       Discharge Destination                                 Attending Provider: Jace Huang DO    Allergies: Aspirin, Citrus, Combivent [Ipratropium-albuterol], Lactose Intolerance (Gi), Statins    Isolation: None   Infection: None   Code Status: CPR    Ht: 182.9 cm (72.01\")   Wt: 80.3 kg (177 lb)    Admission Cmt: None   Principal Problem: Stroke [I63.9]                   Active Insurance as of 7/15/2024       Primary Coverage       Payor Plan Insurance Group Employer/Plan Group    MEDICARE MEDICARE A & B        Payor Plan Address Payor Plan Phone Number Payor Plan Fax Number Effective Dates    [address] [phone]  1/1/2014 - None Entered    [facility] [id number]         Subscriber Name Subscriber Birth Date Member ID       GABINO RIZVI 1950 [id number]               Secondary Coverage       Payor Plan Insurance Group Employer/Plan Group    [place] [id number]       Payor Plan Address Payor Plan Phone Number Payor Plan Fax Number Effective Dates    [address]   1/1/2018 - None Entered    [facility] [address]         Subscriber Name Subscriber Birth Date Member ID       MELISSA RIZVI 9/26/1943 [id number]                     Emergency Contacts        (Rel.) Home Phone Work Phone Mobile Phone    Melissa Rizvi (Spouse) [phone] -- --                "

## 2024-07-16 NOTE — NURSING NOTE
Attempt made by primary RN to place Dobhoff; at this time patient adamantly refusing dobhoff, insisting he is able to swallow medications. Primary RN again preformed bedside swallow, which patient failed.     2025-  Notified APRN of patients refusal. This RN will again speak to patient regarding the dobhoff placement and importance of taking aspirin and brilinta.     2030-  Patient again refusing dobhoff placement and verbalizes understanding the possible repercussions of not taking his aspirin and Brilinta tonight. ASA changed to rectal suppository. Pt will remain strict NPO overnight.

## 2024-07-16 NOTE — PLAN OF CARE
Goal Outcome Evaluation:  Plan of Care Reviewed With: patient, other (see comments) (RN, Rosanne)           Outcome Evaluation: MBS: Completed swallow study. Pt continues with mild to moderate oropharyngeal dysphagia. He demonstrates mild oral deficits of prep w/anterior loss of liquids by spoon and cup, decreased oral transit with decreased back of tongue control resulting in pooling of material before the swallow in the pharynx. Also with piecemeal transit of solids noted. Oral clearance is WFL. Occasional subsequent swallows noted to clear oral cavity. Pharyngeal deficits include mild delay of swallow and decreased epiglottic deflection during the swallow felt to be due to hyolaryngeal movement and pharyngeal squeeze. This results in deep penetration on spoon, cup trials of thins. One episode of posterior aspiration noted during the swallow. All penetration and aspiration were silent in nature. Cued cough was effective in clearing the vestibule, but trace contrast on the anterior surface of the upper trachea was not cleared. Chin tuck maneuver was also trialed on nectar thick and thins from straw and was found to be effective on all trials with no penetration or aspiration viewed. Recommend to continue with current diet  of soft, whole with nectar thick and use of free water in between meals. Meds whole with thickened liquids or in puree as tolerated. SLP to follow for training of compensatory strategies, education and advancement of diet.      Anticipated Discharge Disposition (SLP): inpatient rehabilitation facility          SLP Swallowing Diagnosis: mild, oral dysphagia, suspected pharyngeal dysphagia (07/16/24 0936)

## 2024-07-16 NOTE — PLAN OF CARE
Problem: Adult Inpatient Plan of Care  Goal: Plan of Care Review  Recent Flowsheet Documentation  Taken 7/16/2024 1315 by Saskia Hernández OTR  Plan of Care Reviewed With: patient  Outcome Evaluation: OT: pt provided with and educated on functional hand exercises and theraputty (yellow) exercises.  strength: R: 80lbs L: 40 lbs. pt continues with slurred speech and co fatigue this afternoon. cont to rec acute rehab  Taken 7/16/2024 0930 by Saskia Hernández OTR  Plan of Care Reviewed With: patient  Outcome Evaluation: OT evaluation completed/ pt required SBA for supine to sit transfer to EOB. pt required min assist for functional transfers from EOB with RW. pt required min assist for functional mobility in room with RW x 20 feet. anticipate pt will require min-mod assist for lb adls. pt with decreased strength and coordination in L UE and co numbness in L hand. pt with slurred speech and facial droop. pt would benefit from skilled OT services. will see pt 5x week during hospitalization with anticipated discharge to acute rehab   Goal Outcome Evaluation:  Plan of Care Reviewed With: patient           Outcome Evaluation: OT: pt provided with and educated on functional hand exercises and theraputty (yellow) exercises.  strength: R: 80lbs L: 40 lbs. pt continues with slurred speech and co fatigue this afternoon. cont to rec acute rehab      Anticipated Discharge Disposition (OT): inpatient rehabilitation facility

## 2024-07-17 ENCOUNTER — ANESTHESIA (OUTPATIENT)
Dept: CARDIOLOGY | Facility: HOSPITAL | Age: 74
End: 2024-07-17
Payer: MEDICARE

## 2024-07-17 ENCOUNTER — APPOINTMENT (OUTPATIENT)
Dept: CARDIOLOGY | Facility: HOSPITAL | Age: 74
DRG: 065 | End: 2024-07-17
Payer: MEDICARE

## 2024-07-17 ENCOUNTER — ANESTHESIA EVENT (OUTPATIENT)
Dept: CARDIOLOGY | Facility: HOSPITAL | Age: 74
End: 2024-07-17
Payer: MEDICARE

## 2024-07-17 VITALS
DIASTOLIC BLOOD PRESSURE: 70 MMHG | HEIGHT: 72 IN | TEMPERATURE: 97.8 F | WEIGHT: 177 LBS | BODY MASS INDEX: 23.98 KG/M2 | RESPIRATION RATE: 16 BRPM | OXYGEN SATURATION: 97 % | SYSTOLIC BLOOD PRESSURE: 146 MMHG | HEART RATE: 61 BPM

## 2024-07-17 PROCEDURE — 25810000003 LACTATED RINGERS PER 1000 ML

## 2024-07-17 PROCEDURE — 93325 DOPPLER ECHO COLOR FLOW MAPG: CPT

## 2024-07-17 PROCEDURE — 93325 DOPPLER ECHO COLOR FLOW MAPG: CPT | Performed by: INTERNAL MEDICINE

## 2024-07-17 PROCEDURE — 93312 ECHO TRANSESOPHAGEAL: CPT

## 2024-07-17 PROCEDURE — 25010000002 PROPOFOL 200 MG/20ML EMULSION

## 2024-07-17 PROCEDURE — 93320 DOPPLER ECHO COMPLETE: CPT

## 2024-07-17 PROCEDURE — 92526 ORAL FUNCTION THERAPY: CPT

## 2024-07-17 PROCEDURE — 99232 SBSQ HOSP IP/OBS MODERATE 35: CPT | Performed by: INTERNAL MEDICINE

## 2024-07-17 PROCEDURE — 93320 DOPPLER ECHO COMPLETE: CPT | Performed by: INTERNAL MEDICINE

## 2024-07-17 PROCEDURE — 93312 ECHO TRANSESOPHAGEAL: CPT | Performed by: INTERNAL MEDICINE

## 2024-07-17 PROCEDURE — 94799 UNLISTED PULMONARY SVC/PX: CPT

## 2024-07-17 PROCEDURE — 25010000002 ONDANSETRON PER 1 MG

## 2024-07-17 PROCEDURE — 99232 SBSQ HOSP IP/OBS MODERATE 35: CPT | Performed by: PSYCHIATRY & NEUROLOGY

## 2024-07-17 PROCEDURE — 97116 GAIT TRAINING THERAPY: CPT

## 2024-07-17 PROCEDURE — 99238 HOSP IP/OBS DSCHRG MGMT 30/<: CPT | Performed by: HOSPITALIST

## 2024-07-17 RX ORDER — LACOSAMIDE 50 MG/1
50 TABLET ORAL EVERY 12 HOURS SCHEDULED
Start: 2024-07-17

## 2024-07-17 RX ORDER — ONDANSETRON 2 MG/ML
4 INJECTION INTRAMUSCULAR; INTRAVENOUS ONCE AS NEEDED
Status: COMPLETED | OUTPATIENT
Start: 2024-07-17 | End: 2024-07-17

## 2024-07-17 RX ORDER — LIDOCAINE HYDROCHLORIDE 20 MG/ML
INJECTION, SOLUTION INFILTRATION; PERINEURAL AS NEEDED
Status: DISCONTINUED | OUTPATIENT
Start: 2024-07-17 | End: 2024-07-17 | Stop reason: SURG

## 2024-07-17 RX ORDER — SODIUM CHLORIDE, SODIUM LACTATE, POTASSIUM CHLORIDE, CALCIUM CHLORIDE 600; 310; 30; 20 MG/100ML; MG/100ML; MG/100ML; MG/100ML
9 INJECTION, SOLUTION INTRAVENOUS CONTINUOUS
Status: DISCONTINUED | OUTPATIENT
Start: 2024-07-17 | End: 2024-07-17 | Stop reason: HOSPADM

## 2024-07-17 RX ORDER — PROPOFOL 10 MG/ML
INJECTION, EMULSION INTRAVENOUS AS NEEDED
Status: DISCONTINUED | OUTPATIENT
Start: 2024-07-17 | End: 2024-07-17 | Stop reason: SURG

## 2024-07-17 RX ORDER — SODIUM CHLORIDE, SODIUM LACTATE, POTASSIUM CHLORIDE, CALCIUM CHLORIDE 600; 310; 30; 20 MG/100ML; MG/100ML; MG/100ML; MG/100ML
100 INJECTION, SOLUTION INTRAVENOUS ONCE
Status: COMPLETED | OUTPATIENT
Start: 2024-07-17 | End: 2024-07-17

## 2024-07-17 RX ORDER — ONDANSETRON 2 MG/ML
4 INJECTION INTRAMUSCULAR; INTRAVENOUS ONCE AS NEEDED
Status: DISCONTINUED | OUTPATIENT
Start: 2024-07-17 | End: 2024-07-17 | Stop reason: HOSPADM

## 2024-07-17 RX ADMIN — ASPIRIN 81 MG: 81 TABLET, COATED ORAL at 13:03

## 2024-07-17 RX ADMIN — LIDOCAINE HYDROCHLORIDE 60 MG: 20 INJECTION, SOLUTION INFILTRATION; PERINEURAL at 09:13

## 2024-07-17 RX ADMIN — PANTOPRAZOLE SODIUM 40 MG: 40 TABLET, DELAYED RELEASE ORAL at 13:03

## 2024-07-17 RX ADMIN — SODIUM CHLORIDE, POTASSIUM CHLORIDE, SODIUM LACTATE AND CALCIUM CHLORIDE 9 ML/HR: 600; 310; 30; 20 INJECTION, SOLUTION INTRAVENOUS at 08:45

## 2024-07-17 RX ADMIN — PROPOFOL 180 MG: 10 INJECTION, EMULSION INTRAVENOUS at 09:15

## 2024-07-17 RX ADMIN — LIDOCAINE HYDROCHLORIDE 40 MG: 20 INJECTION, SOLUTION INFILTRATION; PERINEURAL at 09:15

## 2024-07-17 RX ADMIN — LACOSAMIDE 50 MG: 50 TABLET, FILM COATED ORAL at 13:03

## 2024-07-17 RX ADMIN — ONDANSETRON 4 MG: 2 INJECTION INTRAMUSCULAR; INTRAVENOUS at 08:45

## 2024-07-17 RX ADMIN — PROPOFOL 30 MG: 10 INJECTION, EMULSION INTRAVENOUS at 09:13

## 2024-07-17 RX ADMIN — TICAGRELOR 90 MG: 90 TABLET ORAL at 13:03

## 2024-07-17 RX ADMIN — SODIUM CHLORIDE, POTASSIUM CHLORIDE, SODIUM LACTATE AND CALCIUM CHLORIDE 100 ML/HR: 600; 310; 30; 20 INJECTION, SOLUTION INTRAVENOUS at 10:05

## 2024-07-17 RX ADMIN — CETIRIZINE HYDROCHLORIDE 10 MG: 10 TABLET, FILM COATED ORAL at 13:03

## 2024-07-17 RX ADMIN — Medication 10 ML: at 13:03

## 2024-07-17 NOTE — PLAN OF CARE
Goal Outcome Evaluation:  Plan of Care Reviewed With: patient           Outcome Evaluation: PT: Patient performs supine to sit transfer with supervision, sit to/from stand transfers with CGA/min A, and gait x 96 feet with min A with use of FWW. Patient continues to require min A for weight shifting during swing to safely advance L LE. Without assist provided, patient unable to fully advance L LE or clear foot from the ground adequately. Patient with improved management of device during forward gait training. Requires cues for safety during transfers and directional changes. Patient to have THIERRY following session, will see this afternoon for further mobility training as well as ther ex. Continue to recommend acute rehab at discharge.      Anticipated Discharge Disposition (PT): inpatient rehabilitation facility

## 2024-07-17 NOTE — CASE MANAGEMENT/SOCIAL WORK
Continued Stay Note  [place]     Patient Name: Ronnell Rizvi  MRN: [id number]  Today's Date: 7/17/2024    Admit Date: 7/15/2024    [facility]: [facility]   Discharge Plan       Row Name 07/17/24 1221       Plan    Plan [facility]    Patient/Family in Agreement with Plan yes    Plan Comments Spoke with [facility] to update patient will be ready for dc after lunch. She states they can accept patient between 3-4pm today. Marva RIVERO updated. CM will continue to follow.      Row Name 07/17/24 1130       Plan    Plan [facility]    Patient/Family in Agreement with Plan yes    Plan Comments Follow up call to [facility] who states able to accept patient today, request Cm update with estimated dc time. CALL REPORT TO  [phone]. CM faxed patient MAR to facility per request. Kera RIVERO updated. Dr Burr updated. CM will continue to follow.                   Discharge Codes    No documentation.                       Jose Valdes, RN

## 2024-07-17 NOTE — CASE MANAGEMENT/SOCIAL WORK
Continued Stay Note  [facility]     Patient Name: Ronnell Rizvi  MRN: [id number]  Today's Date: 7/17/2024    Admit Date: 7/15/2024    [facility]   Discharge Plan       Row Name 07/17/24 1130       Plan    Plan [facility]    Patient/Family in Agreement with Plan yes    Plan Comments Follow up call to [facility] who states able to accept patient today, request CM update with estimated dc time. CALL REPORT TO  [phone]. CM faxed patient MAR to facility per request. Kera RIVERO updated. Dr Burr updated. CM will continue to follow.      Row Name 07/17/24 0843       Plan    Plan Referral [facility]    Patient/Family in Agreement with Plan yes    Plan Comments Follow up call regarding referral. LVM [facility] - request return call for ability to accept. CM will continue to follow.                   Discharge Codes    No documentation.                       Jose Valdes RN

## 2024-07-17 NOTE — ANESTHESIA POSTPROCEDURE EVALUATION
Patient: Ronnell Rizvi    Procedure Summary       Date: 07/17/24 Room / Location: [facility] CARDIOLOGY; [facility] OR    Anesthesia Start: 0853 Anesthesia Stop: 0931    Procedure: ADULT TRANSESOPHAGEAL ECHO (THIERRY) W/ CONT IF NECESSARY PER PROTOCOL Diagnosis: (Cardiac Source of Emboli)    Scheduled Providers: Martinez Amador III, MD Provider: Billy Briones CRNA    Anesthesia Type: MAC ASA Status: 3            Anesthesia Type: MAC    Vitals  Vitals Value Taken Time   /75 07/17/24 0950   Temp 96.7 °F (35.9 °C) 07/17/24 0935   Pulse 64 07/17/24 0955   Resp 10 07/17/24 0935   SpO2 93 % 07/17/24 0955   Vitals shown include unfiled device data.        Post Anesthesia Care and Evaluation    Patient location during evaluation: PHASE II  Patient participation: complete - patient participated  Level of consciousness: awake  Pain score: 0  Pain management: adequate    Airway patency: patent  Anesthetic complications: No anesthetic complications  PONV Status: none  Cardiovascular status: acceptable  Respiratory status: acceptable  Hydration status: acceptable

## 2024-07-17 NOTE — CASE MANAGEMENT/SOCIAL WORK
Continued Stay Note  [place] [facility]     Patient Name: Ronnell Rizvi  MRN: [id number]  Today's Date: 7/17/2024    Admit Date: 7/15/2024    Plan: [facility]erral [facility]   Discharge Plan       Row Name 07/17/24 0839       Plan    Plan Referral [facility]    Patient/Family in Agreement with Plan yes    Plan Comments Follow up call regarding referral. LVM [facility] - request return call for ability to accept. CM will continue to follow.                   Discharge Codes    No documentation.                       Jose Valdes RN

## 2024-07-17 NOTE — PLAN OF CARE
Goal Outcome Evaluation:  Plan of Care Reviewed With: patient        Progress: no change  Outcome Evaluation: Vital signs stable. Oxygen remains on room air. IV saline locked.  NPO since midnight. anticipating THIERRY scheduled for later today. Patient appears to have rested well overnight.

## 2024-07-17 NOTE — ANESTHESIA PREPROCEDURE EVALUATION
Anesthesia Evaluation     Patient summary reviewed and Nursing notes reviewed   no history of anesthetic complications:   NPO Solid Status: > 8 hours  NPO Liquid Status: > 8 hours           Airway   Mallampati: III  Small opening and Possible difficult intubation  Dental    (+) poor dentition    Pulmonary - normal exam    breath sounds clear to auscultation  (+) pneumonia , a smoker (quit in 1979) Former, COPD, asthma,sleep apnea  Cardiovascular - normal exam    ECG reviewed  Rhythm: regular  Rate: normal    (+) hypertension well controlled 2 medications or greater, dysrhythmias (1st degree HB), hyperlipidemia  (-) past MI, angina, DVT    ROS comment: 12 lead EKG 7/15/24  HEART RATE=65  bpm  RR Pxosglbg=0450  ms  AZ Trhtsust=356  ms  P Horizontal Axis=-36  deg  P Front Axis=20  deg  QRSD Interval=95  ms  QT Mhkiebsk=904  ms  WVrL=719  ms  QRS Axis=-20  deg  T Wave Axis=65  deg  - ABNORMAL ECG -  Sinus rhythm  Ventricular premature complex  Prolonged AZ interval  Inferior  infarct, old  Anterior  infarct, old  When compared with ECG of 11-Jul-2024 18:00:16,  No significant change  Electronically Signed By: Steffen Hough ([facility]) 2024-07-16 12:08:26  Date and Time of Study:2024-07-15 15:38:35    THIERRY 7/12/24   Left ventricular systolic function is normal. Calculated left ventricular EF = 64.4%  ·  Left ventricular wall thickness is consistent with hypertrophy. Sigmoid-shaped ventricular septum is present.  ·  Left ventricular diastolic function was normal.  ·  There is mild calcification of the aortic valve.  ·  Systolic anterior motion of the chordal apparatus is present. There is no outflow tract obstruction.  ·  Estimated right ventricular systolic pressure from tricuspid regurgitation is normal (<35 mmHg).      Neuro/Psych  (+) seizures well controlled, TIA, CVA (left side weakness, facial droop) residual symptoms, headaches, tremors, poor historian.  GI/Hepatic/Renal/Endo    (+) GERD well controlled, hepatitis,  liver disease    Musculoskeletal     (+) neck stiffness  Abdominal  - normal exam    Abdomen: soft.   Substance History - negative use     OB/GYN          Other - negative ROS                         Anesthesia Plan    ASA 3     MAC   total IV anesthesia  intravenous induction     Anesthetic plan, risks, benefits, and alternatives have been provided, discussed and informed consent has been obtained with: patient.    Use of blood products discussed with patient  Consented to blood products.    Plan discussed with CRNA.        CODE STATUS:    Code Status (Patient has no pulse and is not breathing): CPR (Attempt to Resuscitate)  Medical Interventions (Patient has pulse or is breathing): Full Support

## 2024-07-17 NOTE — PROGRESS NOTES
"DOS: 2024  NAME: Ronnell Rizvi   : 1950  PCP: Deena Tarango MD  Chief Complaint   Patient presents with    Extremity Weakness       Chief complaint: Minimal left-sided facial droop noted along with minimal left-sided left upper extremity drift noted.  Subjective: His speech is remarkably improved and the dysarthria cannot be made out at this time.  He has been taking medications by mouth without any issues.  The PFO Amplatzer closure seems to be holding good as per the transesophageal echocardiogram report performed by Dr. Martinez Amador this morning.  However patient also has a very tight stenosis at the right M1 segment and so he might be a good candidate for stenting if he tends to have recurrent symptoms in spite of dual antiplatelet therapy.    Objective:  Vital signs: /70   Pulse 61   Temp 97.8 °F (36.6 °C) (Oral)   Resp 16   Ht 182.9 cm (72.01\")   Wt 80.3 kg (177 lb)   SpO2 97%   BMI 24.00 kg/m²    Gen: NAD, vitals reviewed  MS: Normal.  CN: Cranials 2-12: No focal deficits noted.  Motor: Muscles of both upper and lower extremities show minimal drift in the left upper extremity compared to the right  Sensory: No sensory loss noted.  Coordination: Normal finger-to-nose coordination bilaterally and normal heel-to-shin testing.  Gait: He is able to get up and ambulate with a walker.    ROS:  General: Pleasant gentleman currently feeling better with the blood pressure better controlled.  Neurological: The NIH stroke scale is 2.    Laboratory results:  Lab Results   Component Value Date    GLUCOSE 100 (H) 07/15/2024    CALCIUM 8.9 07/15/2024     07/15/2024    K 4.0 07/15/2024    CO2 26.9 07/15/2024     07/15/2024    BUN 10 07/15/2024    CREATININE 0.92 07/15/2024    EGFRIFAFRI 74 2021    EGFRIFNONA 64 2021    BCR 10.9 07/15/2024    ANIONGAP 8.1 07/15/2024     Lab Results   Component Value Date    WBC 5.99 07/15/2024    HGB 15.2 07/15/2024    HCT 44.3 07/15/2024 "    MCV 94.9 07/15/2024     07/15/2024     Lab Results   Component Value Date     (H) 07/12/2024     (H) 05/15/2024     (H) 04/23/2024         Lab 07/12/24  0437   HEMOGLOBIN A1C 5.37        Review of labs: The LDL is elevated at 136.  The total cholesterol is 269.     CMP:        Lab 07/15/24  1535 07/12/24  0437 07/11/24  1805   SODIUM 138 140 136   POTASSIUM 4.0 3.4* 3.6   CHLORIDE 103 105 101   CO2 26.9 26.3 25.9   ANION GAP 8.1 8.7 9.1   BUN 10 11 11   CREATININE 0.92 0.82 0.96   EGFR 86.7 91.6 82.4   GLUCOSE 100* 88 95   CALCIUM 8.9 8.9 9.1   TOTAL PROTEIN 6.3 5.8* 6.1   ALBUMIN 3.9 3.7 3.7   GLOBULIN 2.4 2.1 2.4   ALT (SGPT) 13 13 13   AST (SGOT) 19 17 17   BILIRUBIN 0.2 0.2 0.2   ALK PHOS 97 84 92        TSH          4/23/2024    14:37 7/12/2024    04:37   TSH   TSH 3.290  3.060         Lipid Panel          4/23/2024    14:37 5/15/2024    09:04 7/12/2024    04:37   Lipid Panel   Total Cholesterol  213  203    Total Cholesterol 269      Triglycerides 86  85  58    HDL Cholesterol 59  60  56    VLDL Cholesterol 14  15  11    LDL Cholesterol  196  138  136    LDL/HDL Ratio 3.27  2.27  2.42         Lab Results   Component Value Date    DVFVORBQ35 465 07/12/2024       Lab Results   Component Value Date    FOLATE 9.52 07/12/2024       Lab Results   Component Value Date    HGBA1C 5.37 07/12/2024           Review and interpretation of imaging: The recent CT angiogram of the head and neck with contrast showed the following:     Findings: The common carotid arteries are normal. Moderate atheromatous disease of the proximal right internal carotid artery without significant stenosis. The extracranial right internal carotid artery is normal otherwise. The intracranial segments of   the left internal carotid arteries are normal. The right carotid terminus is normal. Focal severe stenosis of the right M2 branch seen on axial image #278 series 6 which is nearly 60% stenosis completely occluded.  Otherwise the visualized segments of the   right anterior and middle cerebral arteries are normal.     The left common carotid artery is normal. Moderate atheromatous disease of the left carotid bulb and proximal left internal carotid artery with a minimal residual vessel lumen diameter of 4 mm and a native vessel lumen diameter of 5 mm consistent with   approximately 20% stenosis per NASCET criteria.  Mild atheromatous disease involving the intracranial segments of the internal carotid arteries. The left carotid terminus is normal. The visualized branches of the left anterior and middle cerebral arteries appear normal.     Both vertebral arteries arise from the subclavian arteries. The right vertebral artery is dominant. Left vertebral artery supplies the basilar artery. The basilar artery and basilar artery tip are normal. The basilar artery terminates in bilateral   posterior cerebral arteries which appear normal.     The lung apices are clear. Mild emphysematous changes of the lung apices. The thyroid gland is normal. No cervical adenopathy. The visualized parotid and submandibular glands are normal.  Paranasal sinuses are clear. Bilateral lens replacements. No abnormal intracranial enhancement.     IMPRESSION:  Focal severe stenosis of the right M2 branch with minimal thready flow noted in the region. No vessel occlusion.    CT Head Without Contrast Stroke Protocol    Result Date: 7/15/2024  CT HEAD WO CONTRAST STROKE PROTOCOL Date of Exam: 7/15/2024 3:36 PM EDT Indication: Neuro deficit, acute, stroke suspected Neuro Deficit, acute, Stroke suspected. Comparison: 7/12/2024 MRI and 7/11/2024 CT Technique: Axial CT images were obtained of the head without contrast administration.  Reconstructed coronal and sagittal images were also obtained. Automated exposure control and iterative construction methods were used. Scan Time: 15:31 Findings: There is no evidence of acute intracranial hemorrhage, mass, midline  shift, new loss of gray-white matter differentiation, or extra-axial fluid collection. There is a left parieto-occipital encephalomalacia. Subcortical and periventricular white matter hypodensities consistent with sequela of chronic small vessel ischemic disease. There is global parenchymal volume loss with ex vacuo dilation of the ventricular system. The basal cisterns are patent. There is no evidence of fracture or suspicious osseous lesion. Paranasal sinuses and mastoid air cells are well aerated. There is bilateral ocular surgery. The included soft tissues are within normal limits.     Impression: Impression: No acute intracranial abnormality. Chronic findings as above. Electronically Signed: Remy Flower MD  7/15/2024 3:42 PM EDT  Workstation ID: [id number]    CT Head Without Contrast Stroke Protocol    Result Date: 7/11/2024  CT HEAD WO CONTRAST STROKE PROTOCOL Date of Exam: 7/11/2024 6:06 PM EDT Indication: Neuro deficit, acute, stroke suspected Neuro Deficit, acute, Stroke suspected. Comparison: 12/29/2023 Technique: Axial CT images were obtained of the head without contrast administration.  Reconstructed coronal and sagittal images were also obtained. Automated exposure control and iterative construction methods were used. Scan Time: 1812 hours Results discussed with the emergency room at 6:19 p.m. on 7/11/2024. FINDINGS: There is no evidence for acute intracranial hemorrhage. No definitive acute focal ischemia is identified. Nonspecific white matter changes are noted likely related to chronic small vessel ischemic changes and age-related changes. Associated diffuse volume loss is observed. Remote infarction is noted involving the inferior aspect of the left parietal-occipital region. There is also evidence for remote infarction involving the region of the right basal ganglia and right thalamus. These changes are stable. There is no evidence for abnormal cerebral edema. There is no mass effect or midline  shift. The ventricular system is nondilated. The basilar cisterns are patent. The skull is intact. The paranasal sinuses and mastoid air cells are clear.     Impression: 1.No evidence for acute intracranial abnormality. 2.Nonspecific white matter changes are noted with associated diffuse volume loss. These findings are likely related to chronic small vessel ischemic changes and/or age-related changes. 3.Multifocal remote infarcts are again noted which appear stable. Electronically Signed: Domingo Cabrera MD  7/11/2024 6:22 PM EDT  Workstation ID: [id number]          MRI Brain Without Contrast    Result Date: 7/15/2024  MRI BRAIN WO CONTRAST Date of Exam: 7/15/2024 3:50 PM EDT Indication: acute stroke.  Comparison: CT same day and MRI 7/12/2024 Technique:  Routine multiplanar/multisequence sequence images of the brain were obtained without contrast administration. Findings: Areas of acute diffusion restriction abnormality demonstrates mild progression within the previously noted distribution within the deep white matter, corona radiata noted on the comparison study. No new areas are noted of acute diffusion restriction abnormality. The ventricles, cisterns and sulci appear stable. No evidence of acute hemorrhage noted. Remote infarct with encephalomalacia in the left occipital region again noted. There is associated gliosis of the surrounding brain parenchyma. There is extensive patchy and confluent FLAIR and T2 signal hyperintensity noted within the supratentorial brain compatible with sequela small vessel ischemic disease. Changes also noted within the alonzo and midbrain. Major intracranial flow voids appear grossly patent. Mild mucosal thickening noted. Globes and orbits appear stable. Mastoid air cells appear stable. The midline structures of the brain appear stable.     Impression: Impression: 1. Acute diffusion restriction abnormality within the deep white matter/corona radiata on the right appears similar in  distribution with slight increase in size compatible with progression/evolution of small acute/subacute infarctions. 2. No evidence of acute hemorrhage. 3. Chronic white matter changes similar to the prior study as well as sequela of prior remote left occipital infarct. Findings were discussed with the emergency department at the time of interpretation Electronically Signed: Star Khalil MD  7/15/2024 4:25 PM EDT  Workstation ID: [id number]    MRI Brain Without Contrast    Result Date: 7/12/2024  MRI BRAIN WO CONTRAST Date of Exam: 7/12/2024 9:54 AM EDT Indication: Stroke, follow up stroke protocol.  Comparison: CTA head stroke study 7/11/2022 Technique:  Routine multiplanar/multisequence sequence images of the brain were obtained without contrast administration. Findings: There are multiple small punctate foci of restricted diffusion in the right corona radiata consistent with small acute infarcts. There is no evidence of acute or chronic intracranial hemorrhage. No mass effect or midline shift. No abnormal extra-axial collections. The basal ganglia, brainstem and cerebellum appear within normal limits. Midline structures are intact. There is age-related diffuse cortical atrophy. There are prominent patchy white matter hyperintensities in keeping with chronic microvascular ischemic  disease. There is encephalomalacia and gliosis in the left occipital lobe related to prior infarct. Remote lacunar infarct of the right thalamus. Calvarial and superficial soft tissue signal is within normal limits. Orbits appear unremarkable. There is trace mucosal thickening in the bilateral ethmoid air cells. The mastoid air cells are clear.     Impression: Impression: 1. Multiple small acute infarcts of the right corona radiata. 2. Age-related atrophy, chronic microvascular ischemic disease and remote left occipital lobe infarct. Electronically Signed: Madelyn Dale MD  7/12/2024 10:09 AM EDT  Workstation ID: [id number]      Workup  to date: The transesophageal cardiogram was performed this morning and reported by Dr. Martinez Amador, the performing cardiologist as follows:    1.  Transesophageal echocardiogram this morning shows no evidence of any potential cardiac source of emboli.  PFO closure is intact with no atrial level shunt.  Left atrial appendage is small, with entirely normal velocities and no thrombus.  No other cardiac pathology or ascending aorta pathology is noted.  2. PFO s/p percutaneous closure-intact closure with no atrial level shunt  3. History of loop recorder [id number] without AF (although he had type II SA block)  4. Intracranial atherosclerosis    Results for orders placed during the hospital encounter of 07/11/24    Adult Transthoracic Echo Complete W/ Cont if Necessary Per Protocol (With Agitated Saline)    Interpretation Summary    Left ventricular systolic function is normal. Calculated left ventricular EF = 64.4%    Left ventricular wall thickness is consistent with hypertrophy. Sigmoid-shaped ventricular septum is present.    Left ventricular diastolic function was normal.    There is mild calcification of the aortic valve.    Systolic anterior motion of the chordal apparatus is present. There is no outflow tract obstruction.    Estimated right ventricular systolic pressure from tricuspid regurgitation is normal (<35 mmHg).            Diagnoses: Patient with recurrent stroke most probably from atherosclerotic intracranial vascular disease at the right M1 M2 site which shows severe stenosis.      Comment: The transesophageal echocardiogram by Dr. Martinez Amador was unremarkable.    Plan:  1.  Continue dual antiplatelet therapy with baby aspirin and Brilinta 90 mg twice daily with food.  2.  He needs to be on the atorvastatin 80 mg daily with food as he was mistaken that it was causing him to have memory issues.  Actually patient is in vascular dementia improved being on high-dose statins.  3.  Continue physical therapy  and Occupational Therapy as before.  4.  Keep the systolic blood pressure between 1 20-1 40 and the diastolic between 70-80.  5.  Patient is currently being transferred to the inpatient [facility] rehabilitation unit and [facility].  6.  Discussed with Dr. Rayo Sim about his current condition including the high-grade stenosis of the right M1 M2 junction and if he is still a medical failure with recurrent strokes, he would be a good candidate for stenting and Dr. Sim is going to have him follow-up with him in the outpatient neurovascular clinic at the [facility] in 3 to 4 weeks time.    Discussed with the patient and his wife and he is currently getting transferred to the inpatient rehabilitation unit as discussed above.    Spent a total of 30 minutes in face-to-face evaluation and management of the patient using the dedicated telemedicine device without any interruption with the help of Rosanne the charge nurse with the patient located at the [facility] and myself at a remote location.    Electronically signed by Yojana Ware MD, 07/17/24, 2:13 PM EDT.

## 2024-07-17 NOTE — PROGRESS NOTES
"    Patient Name: Ronnell Rizvi  :1950  74 y.o.      Patient Care Team:  Deena Tarango MD as PCP - General (Internal Medicine)  Gregory King MD as Consulting Physician (Hematology and Oncology)  Chase Haque MD as Referring Physician (Internal Medicine)  Luis Wyatt MD as Consulting Physician (Cardiology)    Chief Complaint: CVA    Interval History: No chest discomfort, we performed a transesophageal echocardiogram this morning.       Objective   Vital Signs  Temp:  [97.1 °F (36.2 °C)-97.9 °F (36.6 °C)] 97.1 °F (36.2 °C)  Heart Rate:  [58-78] 78  Resp:  [14-20] 14  BP: (131-188)/(64-81) 143/81    Intake/Output Summary (Last 24 hours) at 2024 0754  Last data filed at 2024 2320  Gross per 24 hour   Intake 600 ml   Output 1100 ml   Net -500 ml     Flowsheet Rows      Flowsheet Row First Filed Value   Admission Height 182.9 cm (72.01\") Documented at 07/15/2024 1519   Admission Weight 80.3 kg (177 lb) Documented at 07/15/2024 1519            Physical Exam:   General Appearance:    Chronically ill   Lungs:     Clear to auscultation.  Normal respiratory effort and rate.      Heart:    Regular rhythm and normal rate, normal S1 and S2, no murmurs, gallops or rubs.     Chest Wall:    No abnormalities observed   Abdomen:     Soft, nontender, positive bowel sounds.     Extremities:   no cyanosis, clubbing or edema.  No marked joint deformities.        Results Review:    Results from last 7 days   Lab Units 07/15/24  1535   SODIUM mmol/L 138   POTASSIUM mmol/L 4.0   CHLORIDE mmol/L 103   CO2 mmol/L 26.9   BUN mg/dL 10   CREATININE mg/dL 0.92   GLUCOSE mg/dL 100*   CALCIUM mg/dL 8.9     Results from last 7 days   Lab Units 07/15/24  1535 24  1805   HSTROP T ng/L 17 14     Results from last 7 days   Lab Units 07/15/24  1535   WBC 10*3/mm3 5.99   HEMOGLOBIN g/dL 15.2   HEMATOCRIT % 44.3   PLATELETS 10*3/mm3 206     Results from last 7 days   Lab Units 07/15/24  1535 24  0437 " 07/11/24  1805   INR  0.98 1.04 1.00   APTT seconds 22.2* 22.9* 22.2*         Results from last 7 days   Lab Units 07/12/24  0437   CHOLESTEROL mg/dL 203*   TRIGLYCERIDES mg/dL 58   HDL CHOL mg/dL 56   LDL CHOL mg/dL 136*               Medication Review:   aspirin, 81 mg, Oral, Daily  cetirizine, 10 mg, Oral, Daily  lacosamide, 50 mg, Oral, Q12H  pantoprazole, 40 mg, Oral, Daily  rosuvastatin, 40 mg, Oral, Nightly  sodium chloride, 10 mL, Intravenous, Q12H  ticagrelor, 90 mg, Oral, BID              Assessment & Plan   Active Hospital Problems    Diagnosis  POA    **Stroke [I63.9]  Yes    CVA (cerebral vascular accident) [I63.9]  Yes    Seizure [R56.9]  Yes    S/P percutaneous patent foramen ovale closure [Z87.74]  Not Applicable    Intracranial vascular stenosis [I67.9]  Yes    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]  Yes      Resolved Hospital Problems    Diagnosis Date Resolved POA    CVA (cerebral vascular accident) [I63.9] 07/16/2024 Yes     1. Recurrent stroke-transesophageal echocardiogram this morning shows no evidence of any potential cardiac source of emboli.  PFO closure is intact with no atrial level shunt.  Left atrial appendage is small, with entirely normal velocities and no thrombus.  No other cardiac pathology or ascending aorta pathology is noted.  2. PFO s/p percutaneous closure-intact closure with no atrial level shunt  3. History of loop recorder [id number] without AF (although he had type II SA block)  4. Intracranial atherosclerosis  5. HTN  6. Seizure d/p    At this point no cardiac source of emboli seen, appears most likely to be secondary to intracranial atherosclerotic disease.  We will sign off, please call if we can help in any way.    Martinez Amador III, MD  [facility]  07/17/24  07:54 EDT

## 2024-07-17 NOTE — THERAPY DISCHARGE NOTE
Acute Care - Speech Language Pathology   Swallow Treatment Note/Discharge [place] [facility]     Patient Name: Ronnell Rizvi  : 1950  MRN: [id number]  Today's Date: 2024  Onset of Illness/Injury or Date of Surgery: 07/15/24     Referring Physician: Dr Huang      Admit Date: 7/15/2024    Visit Dx:    ICD-10-CM ICD-9-CM   1. Ischemic stroke  I63.9 434.91   2. Weakness of left side of body  R53.1 728.87   3. Dysarthria  R47.1 784.51   4. Oropharyngeal dysphagia [R13.12]  R13.12 787.22   5. Nocturnal seizures  R56.9 780.39     Patient Active Problem List   Diagnosis    Anemia, unspecified    Vitamin B12 deficiency    Chronic fatigue    Benign prostatic hypertrophy (BPH) with weak urinary stream    Chronic bronchitis    ELIZABETH (obstructive sleep apnea)    Sinoatrial block    Cerebral aneurysm, nonruptured    TIA (transient ischemic attack)    Intracranial vascular stenosis    Sinus pause    S/P percutaneous patent foramen ovale closure    TIA on medication    HTN (hypertension)    COPD (chronic obstructive pulmonary disease)    Nocturnal seizures    Encounter for screening for malignant neoplasm of colon    Seizure    HL (hearing loss)    Pre-syncope    Mixed hyperlipidemia    History of stroke    Melena    Irritable bowel syndrome with both constipation and diarrhea    Gastroesophageal reflux disease    Stroke    CVA (cerebral vascular accident)     Past Medical History:   Diagnosis Date    Acquired dyslexia     pt  reports after 3rd stroke    Allergic     Allergic rhinitis     Anal fissure     Aortic insufficiency     moderate, THIERRY     Asthma     Asthma     Benign prostatic hypertrophy (BPH) with weak urinary stream 2016    Cataract     Chronic bronchitis     Colon polyp     COPD (chronic obstructive pulmonary disease)     Emphysema lung     Falls frequently     Fatty liver     GERD (gastroesophageal reflux disease)     Headache     Hepatitis     thought to be Hepatitis A     History of pneumonia      With left lower lobe atelectasis and scar tissue, being followed    HL (hearing loss)     Hypertension     Low back pain     Macular degeneration     Memory loss     Mixed hyperlipidemia 01/30/2023    Obstructive sleep apnea syndrome 08/23/2017    refuses c-pap    PFO (patent foramen ovale)     s/p percutaneous closure with a 25mm Amplatzer device in December 2018    Pneumonia     LLL with scar tissue    Seizures     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 total of 3 strokes    Tremor     Comes & goes    Vision loss      Past Surgical History:   Procedure Laterality Date    CARDIAC CATHETERIZATION N/A 12/12/2018    Procedure: Patent foramen ovale closure- Abbott;  Surgeon: Deangelo Harris MD;  Location:  [facility];  Service: Cardiology    CARDIAC CATHETERIZATION N/A 12/12/2018    Procedure: Intracardiac echocardiogram;  Surgeon: Deangelo Harris MD;  Location:  [facility];  Service: Cardiology    CARDIAC ELECTROPHYSIOLOGY PROCEDURE N/A 03/26/2018    Procedure: Loop insertion   CONFIRM RX;  Surgeon: Luis Wyatt MD;  Location:  [facility];  Service: Cardiovascular    CARDIAC ELECTROPHYSIOLOGY PROCEDURE N/A 07/15/2020    Procedure: Loop recorder removal;  Surgeon: Starr Driscoll MD;  Location:  [facility];  Service: Cardiovascular;  Laterality: N/A;    CARDIAC SURGERY      CATARACT EXTRACTION Bilateral     COLONOSCOPY      COLONOSCOPY N/A 10/09/2020    Procedure: COLONOSCOPY with polypectomy;  Surgeon: Ras Mendoza MD;  Location:  [facility];  Service: Gastroenterology;  Laterality: N/A;  diverticulosis  ascending polyp  transverse polyp  sigmoid polyps X 2  rectal polyp    EYE SURGERY      HAND SURGERY Bilateral     INGUINAL HERNIA REPAIR Left     OTHER SURGICAL HISTORY      inguinal hernia repair for person over age 5    TONSILLECTOMY      TONSILLECTOMY          SLP Recommendation and Plan     SLP Diet Recommendation: soft to chew textures, whole, nectar thick liquids, water between meals after oral care, with supervision (07/17/24 1230)     Monitor for Signs of Aspiration: cough, throat clearing, none - silent aspiration present (07/17/24 1230)  Recommended Diagnostics: reassess via VFSS (MBS) (07/17/24 1230)     Anticipated Discharge Disposition (SLP): inpatient rehabilitation facility (07/17/24 1636)              Daily Summary of Progress (SLP): progress toward functional goals as expected, prepare for discharge (due to transfer) (07/17/24 1230)     Anticipated Discharge Disposition (SLP): inpatient rehabilitation facility (07/17/24 1636)  Patient/Family Concerns, Anticipated Discharge Disposition (SLP): No current concerns reported. (07/17/24 1230)        Reason for Discharge: discharge from this facility (07/17/24 1636)     Treatment Assessment (SLP): continued, mild-moderate, oral dysphagia, pharyngeal dysphagia, toleration of diet (07/17/24 1230)  Treatment Assessment Comments (SLP): Pt tolerating his diet of soft, whole with nectar thick liquids. He continues with mild oral loss of foods out of the left side of the mouth. Tolerates liquids w/o loss w/use of straw. Adequate oral prep of solids. Able to demonstrate use of chin tuck inconsistently on nectar thick liquids. Progressing towards goals. Transferring to acute in rehab this afternoon. Pt and wife educated on MBS results and recommendations. Both verbalize understanding and no further questions at this time. (07/17/24 1230)  Plan for Continued Treatment (SLP): other (see comments) (Continue with therapy at next level of care for both dysphagia and speech therapy.) (07/17/24 1230)    Plan of Care Reviewed With: patient, other (see comments) (RN, Rosanne) (07/16/24 7708)  Outcome Evaluation: MBS: Completed swallow study. Pt continues with mild to moderate oropharyngeal dysphagia. He demonstrates mild oral  deficits of prep w/anterior loss of liquids by spoon and cup, decreased oral transit with decreased back of tongue control resulting in pooling of material before the swallow in the pharynx. Also with piecemeal transit of solids noted. Oral clearance is WFL. Occasional subsequent swallows noted to clear oral cavity. Pharyngeal deficits include mild delay of swallow and decreased epiglottic deflection during the swallow felt to be due to hyolaryngeal movement and pharyngeal squeeze. This results in deep penetration on spoon, cup trials of thins. One episode of posterior aspiration noted during the swallow. All penetratio and aspiration were silent in nature. Cued cough was effective in clearing the vestibule, but trace contrast on the anterior surface of the upper trachea was not cleared. Chin tuck maneuver was also trialed on nectar thick and thins from straw and was found to be effective on all trials with no penetration or aspiration viewed. Recommend to continue with current diet  of soft, whole with nectar thick and use of free water in between meals. Meds whole with thickened liquids or in puree as tolerated. SLP to follow for training of compensatory strategies, education and advancement of diet. (07/16/24 1426)    SWALLOW EVALUATION (Last 72 Hours)       SLP Adult Swallow Evaluation       Row Name 07/17/24 1230 07/16/24 1515 07/16/24 0936 07/16/24 0815          Rehab Evaluation    Document Type discharge treatment  -AD evaluation  MBS  -AD evaluation  -AD evaluation  -AD     Subjective Information no complaints  -AD no complaints  -AD no complaints  -AD no complaints  -AD     Patient Observations alert;cooperative;agree to therapy  -AD alert;cooperative  -AD alert;cooperative  -AD alert;cooperative  -AD     Patient/Family/Caregiver Comments/Observations Pt seen during lunch, upright in a chair with wife present.  -AD Pt seen upright in video imaging chair in radiology.  -AD Wife at bedside, pt up in chair at  this time. Cleared for po intake per cardiology. THIERRY planned for tomorrow.  -AD Pt was seen at bedside with wife present. Alert and cooperative. Reports after taking some drinks of water that they may take a look at his heart by 'going down his esophagus'. Further po discontinued at this time and on hold per radiology.  -AD     Patient Effort good  -AD good  -AD good  -AD good  -AD     Symptoms Noted During/After Treatment none  -AD none  -AD none  -AD none  -AD     Oral Care teeth brushed - regular toothbrush  performed after the meal  -AD -- -- --        General Information    Patient Profile Reviewed yes  -AD yes  -AD yes  -AD yes  -AD     Pertinent History Of Current Problem No changes in current history.  -AD No changes in history since evaluation in am.  -AD No current changes. Cleared for po for today.  -AD Pt admitted with progression of stroke from 6/11/24. MRI with 'Acute diffusion restriction abnormality within the deep white matter/corona radiata on the right appears similar in distribution with slight increase in size compatible with progression/evolution of small acute/subacute infarctions.' Pt reports increasing numbness in left hand and falling due to inability to maintain weight/balance in left leg. He also reports more consistent facial droop with loss of liquids out of the left side of his mouth. Facial droop was present on 7/12/24 at rest, but pt with functional ROM of lips and cheek w/movement. Posterior lingual weakness and mild to moderate dysarthria present as well on 7/12/24. Hx from initial evaluation on 7/12/24 as follows: Pt is a 75 y/o male admitted due to slurred speech and suspected stroke. Hx of stroke in 2017 affecting his vision. Hx of seizure disorder as well. Priorr PFO with repair in 2018. Head imaging significant for focal severe stenosis of right M2 branch w/minimal thready flow, multifocal remote infarcts w/area of encephalomalacia and gliosis in left occipital lobe from prior  infarct. New, multiple small acute infacts in the right corona radiata. Age related diffiuse cortical atrophy most likely due to chronic microvascular ischemic disease. Pt currently with noted left facial droop and dysarthric speech. Pt states it is worse today than yesterday. He lives at home with his wife and is mostly retired.  -AD     Current Method of Nutrition soft to chew textures;whole;nectar/syrup-thick liquids  -AD soft to chew textures;whole;nectar/syrup-thick liquids  -AD NPO  -AD NPO  -AD     Precautions/Limitations, Vision -- vision impairment, bilaterally;other (see comments)  prior deficit from occipital stroke in 2017; suspect hemianopsia.  -AD -- vision impairment, bilaterally;other (see comments)  prior deficit from occipital stroke in 2017; suspect hemianopsia.  -AD     Precautions/Limitations, Hearing -- hearing impairment, bilaterally;hearing aid, left;hearing aid, right  -AD -- hearing impairment, bilaterally;hearing aid, left;hearing aid, right  needs increased volume w/aids worn  -AD     Prior Level of Function-Communication -- motor speech impairment  from recent stroke on 7/11/24  -AD -- motor speech impairment  mild to moderate dysarthria  -AD     Prior Level of Function-Swallowing -- no diet consistency restrictions;regular textures;thin liquids  -AD -- no diet consistency restrictions;regular textures;thin liquids  -AD     Plans/Goals Discussed with patient;spouse/S.O.;agreed upon  -AD patient;agreed upon  Rosanne RIVERO  -[place] patient;spouse/S.O.;agreed upon;other (see comments)  Dr. Huang  -AD patient;spouse/S.O.;other (see comments);agreed upon  Rosanne RIVERO     Barriers to Rehab none identified  -AD none identified  -AD -- previous functional deficit  -AD     Patient's Goals for Discharge return to regular diet  -AD eat/drink without coughing/choking  -AD -- return home;return to PO diet;eat/drink without coughing/choking  -AD     Family Goals for Discharge -- -- -- patient able to  return to PO diet;patient able to eat/drink without coughing/choking  -AD        Pain    Additional Documentation --  no pain reported  -AD --  no current pain reported  -AD --  No current pain reported.  -AD --  no current pain reported  -AD        Oral Motor Structure and Function    Oral Lesions or Structural Abnormalities and/or variants -- -- -- none  -AD     Dentition Assessment -- -- -- natural, present and adequate  -AD     Secretion Management -- -- -- anterior loss  reported out of left side; not observed  -AD     Mucosal Quality -- -- -- moist, healthy  -AD     Volitional Swallow -- -- -- WFL  -AD     Volitional Cough -- -- -- WFL  -AD        Oral Musculature and Cranial Nerve Assessment    Oral Motor General Assessment -- -- -- oral labial or buccal impairment;lingual impairment  -AD     Oral Labial or Buccal Impairment, Detail, Cranial Nerve VII (Facial): -- -- -- left labial droop;reduced ROM  reduced ROM and strength on the left lower face  -AD     Lingual Impairment, Detail. Cranial Nerves IX, XII (Glossopharyngeal and Hypoglossal) -- -- -- other (see comments)  mild deviation to the right w/protrusion but quickly corrects. ROM WFL. previous posterior weakness improved from 7/12/24; mild left sided weakness of tongue.  -AD     Velar Impairment, Detail, Cranial Nerves X, XI (Vagus and Accessory) -- -- -- --  difficult to view due to posterior tongue; Mallampati IV  -AD        General Eating/Swallowing Observations    Respiratory Support Currently in Use room air  -AD room air  -AD room air  -AD room air  -AD     Eating/Swallowing Skills self-fed;appropriate self-feeding skills observed  -AD fed by SLP  Pt controlled volume on straw and cup drinks  -AD self-fed;appropriate self-feeding skills observed  -AD self-fed;appropriate self-feeding skills observed  -AD     Positioning During Eating upright 90 degree;upright in chair  -AD upright 90 degree;upright in chair  -AD upright 90 degree;upright in chair   -AD upright 90 degree;upright in bed  -AD     Utensils Used -- -- spoon;straw  -AD spoon;cup;straw  -AD     Consistencies Trialed -- -- regular textures;pureed;thin liquids  -AD ice chips;thin liquids  water only  -AD        Respiratory    Respiratory Status WFL;room air;during swallowing/eating  -AD WFL;room air;during swallowing/eating  -AD WFL;room air;during swallowing/eating  -AD WFL;room air;during swallowing/eating  -AD        Clinical Swallow Eval    Oral Prep Phase -- -- impaired  -AD impaired  -AD     Oral Transit -- -- impaired  -AD impaired  -AD     Oral Residue -- -- WFL  -AD WFL  -AD     Pharyngeal Phase -- -- suspected pharyngeal impairment  -AD suspected pharyngeal impairment  -AD     Esophageal Phase -- -- unremarkable  -AD unremarkable  -AD     Clinical Swallow Evaluation Summary -- -- Pt demonstrates mild oropharyngeal dysphagia with mild anterior loss of liquid and puree out of left side of mouth. Pt w/one episode of throat clearing on the initial trial of thins from straw. Pt w/no further s/s of aspiration on repeat trials of thins, puree and solids. Recommend further assessment via instrumental assessment.  MD in agreement. Recommend meds whole with nectar thick or in puree. Recommend soft, whole diet with nectar thick liquids. Okay for sips of water 30 minutes after meals and following oral care and up to next meal. No water or other thin iiquids with meals. SLP to continue to follow. Recommend acute rehab at discharge.  -AD Pt only assessed on ice chips and thin water at this time due to possible procedure requiring NPO status. Pt with tolerance of ice chips and small sips of water from cup. Tolerates small, single drinks of thins by straw. One episode of coughing and choking on thins from consecutive straw drinks. Wife reports that he has had some coughing off and on for years. Follow up single drinks of thins from straw w/o clinical s/s of aspiration/coughing.  -AD        Oral Prep  Concerns    Oral Prep Concerns -- -- anterior loss  -AD anterior loss  -AD     Anterior Loss -- -- thin;other (see comments)  applesauce  -AD thin  -AD     Oral Prep Concerns, Comment -- -- Mild anterior loss out of left side of mouth. Pt aware and clears with napkin.  -AD --        Oral Transit Concerns    Oral Transit Concerns -- -- other (see comments)  -AD other (see comments)  -AD     Oral Transit Concerns, Comment -- -- Suspected decreased back of tongue control and possible spill/pooling into pharynx resulting in delay of swallow and possible penetration/aspiration.  -AD Suspect decreased back of tongue resulting in spill of thins.  -AD        Pharyngeal Phase Concerns    Pharyngeal Phase Concerns -- -- throat clear  -AD cough  -AD     Cough -- -- -- thin  -AD     Pharyngeal Phase Concerns, Comment -- -- Throat clearing on one trial of thins on initial single straw drink. No further throat clearing or coughing noted on further trials of thins as well as puree and solids.  -AD Coughing on thins on one trial from consecutive straw drinks. Only noted on one of multiple trials of thins.  -AD        MBS/VFSS    Utensils Used -- spoon;cup;straw  -AD -- --     Consistencies Trialed -- regular textures;pureed;thin liquids;nectar/syrup-thick liquids;honey-thick liquids  -AD -- --        MBS/VFSS Interpretation    Oral Prep Phase -- impaired oral phase of swallowing  -AD -- --     Oral Transit Phase -- impaired  -AD -- --     Oral Residue -- impaired  -AD -- --     VFSS Summary -- Pt continues with mild to moderate oropharyngeal dysphagia. He demonstrates mild oral deficits of prep w/anterior loss of liquids by spoon and cup, decreased oral transit with decreased back of tongue control resulting in pooling of material before the swallow in the pharynx. Also with piecemeal transit of solids noted. Oral clearance is WFL. Occasional subsequent swallows noted to clear oral cavity. Pharyngeal deficits include mild delay of  swallow and decreased epiglottic deflection during the swallow felt to be due to hyolaryngeal movement and pharyngeal squeeze. This results in deep penetration on spoon, cup trials of thins. One episode of posterior aspiration noted during the swallow. All penetration and aspiration were silent in nature. Cued cough was effective in clearing the vestibule, but trace contrast on the anterior surface of the upper trachea was not cleared. Chin tuck maneuver was also trialed on nectar thick and thins from straw and was found to be effective on all trials with no penetration or aspiration viewed. Recommend to continue with current diet  of soft, whole with nectar thick and use of free water in between meals. Meds whole with thickened liquids or in puree as tolerated. SLP to follow for training of compensatory strategies, education and advancement of diet.  -AD -- --     Oral Phase, Comment -- Mild oral deficits with anterior loss of all liquids out of the left side of the mouth and increased prep time of solids. Transit concerns with decreased back of tongue and spill of all consistencies into the vallecule prior to initiation of the swallow. Piecemeal swallow noted on larger bolus of thins/nectar and solids. Mild oral residue on tongue surface on liquids and solid with spontaneous subsequent swallow to clear.  -AD -- --        Oral Preparatory Phase    Oral Preparatory Phase -- anterior loss;prolonged manipulation  -AD -- --     Anterior Loss -- thin liquids;nectar-thick liquids;honey-thick liquids;secondary to reduced labial seal;secondary to reduced lingual strength  -AD -- --     Prolonged Manipulation -- regular textures;secondary to reduced lingual strength  -AD -- --        Oral Transit Phase    Impaired Oral Transit Phase -- piecemeal oral transit;premature spillage of liquids into pharynx  -AD -- --     Piecemeal Oral Transit -- thin liquids;nectar-thick liquids;regular textures;secondary to reduced lingual  control  -AD -- --     Premature Spillage of Liquids into Pharynx -- thin liquids;nectar-thick liquids;honey-thick liquids;pudding/puree;regular textures;all consistencies tested;secondary to reduced lingual control  decreased back of tongue control results in pooling in pharynx/valleculae  -AD -- --        Oral Residue    Impaired Oral Residue -- lingual residue  -AD -- --     Lingual Residue -- thin liquids;nectar-thick liquids;regular textures;secondary to reduced lingual strength  -AD -- --     Response to Oral Residue -- cleared residue;with spontaneous subsequent swallow  -AD -- --        Initiation of Pharyngeal Swallow    Initiation of Pharyngeal Swallow -- bolus in valleculae  -AD -- --     Pharyngeal Phase -- impaired pharyngeal phase of swallowing  -AD -- --     Penetration During the Swallow -- thin liquids;nectar-thick liquids;secondary to delayed swallow initiation or mistiming;secondary to reduced vestibular closure  -AD -- --     Aspiration During the Swallow -- thin liquids;secondary to delayed swallow initiation or mistiming;secondary to reduced vestibular closure  -AD -- --     Depth of Penetration -- deep  -AD -- --     Response to Penetration -- No  -AD -- --     No spontaneous response to penetration and -- effective laryngeal clearance with cue (see comments)  cued cough cleared cords and vestibule  -AD -- --     Response to Aspiration -- No  -AD -- --     No spontaneous response to aspiration and -- non-effective subglottic clearance with cue (see comments)  cued cough  -AD -- --     Rosenbek's Scale -- thin:;8--->level 8;nectar:;5--->level 5;honey:;pudding/puree:;regular textures:;1--->level 1  -AD -- --     Pharyngeal Residue -- thin liquids;nectar-thick liquids;honey-thick liquids;valleculae;secondary to reduced hyolaryngeal excursion  mild residue  -AD -- --     Response to Residue -- cleared residue with spontaneous subsequent swallow  -AD -- --     Attempted Compensatory Maneuvers --  bolus size;chin tuck;additional subsequent swallow;other (see comments)  cough after the swallow  -AD -- --     Response to Attempted Compensatory Maneuvers -- prevented penetration;prevented aspiration;reduced residue  chin tuck prevented penetration/aspiration; subsequent swallow cleared residue; cough cleared vestibular residue  -AD -- --     Successful Compensatory Maneuver Competency -- patient able to;demonstrate compensations;with cues  -AD -- --     Pharyngeal Phase, Comment -- Pt with mild to moderate pharyngeal dysphagia with greatest deficits in delay of swallow, decreased pharyngeal squeeze and anterior hyolaryngeal movement. This results in decreased epiglottic deflection and vestibular closure during the swallow. These also result in deep penetration and aspiration of thins and one episode of deep penetration of nectar by spoon. All of these were silent in nature. Pt with inability to sense and no changes in vocal quality noted during these instances. Cued cough cleared material from the vestibule but not subglottic material on aspiration and penetration episodes. Chin tuck was effective in eliminating penetration/aspiration of thins and nectar. No penetration or aspiration noted on honey thick, pudding and solids. Mild vallecular residue but pt able to spontaneously clear w/subsequent swallows.  -AD -- --        SLP Evaluation Clinical Impression    SLP Swallowing Diagnosis -- mild-moderate;oral dysphagia;pharyngeal dysphagia  -AD mild;oral dysphagia;suspected pharyngeal dysphagia  -AD --     Functional Impact -- risk of aspiration/pneumonia  -AD risk of aspiration/pneumonia  -AD --     Rehab Potential/Prognosis, Swallowing -- good, to achieve stated therapy goals  -AD good, to achieve stated therapy goals  -AD --     Swallow Criteria for Skilled Therapeutic Interventions Met -- demonstrates skilled criteria  -AD demonstrates skilled criteria  -AD --        SLP Treatment Clinical Impressions     Treatment Assessment (SLP) continued;mild-moderate;oral dysphagia;pharyngeal dysphagia;toleration of diet  -AD -- -- --     Treatment Assessment Comments (SLP) Pt tolerating his diet of soft, whole with nectar thick liquids. He continues with mild oral loss of foods out of the left side of the mouth. Tolerates liquids w/o loss w/use of straw. Adequate oral prep of solids. Able to demonstrate use of chin tuck inconsistently on nectar thick liquids. Progressing towards goals. Transferring to acute in rehab this afternoon. Pt and wife educated on MBS results and recommendations. Both verbalize understanding and no further questions at this time.  -AD -- -- --     Daily Summary of Progress (SLP) progress toward functional goals as expected;prepare for discharge  due to transfer  -AD -- -- --     Plan for Continued Treatment (SLP) other (see comments)  Continue with therapy at next level of care for both dysphagia and speech therapy.  -AD -- -- --     Care Plan Review evaluation/treatment results reviewed;care plan/treatment goals reviewed;risks/benefits reviewed;current/potential barriers reviewed;patient/other agree to care plan  -AD -- -- --     Care Plan Review, Other Participant(s) spouse  agreed  -AD -- -- --        Recommendations    Therapy Frequency (Swallow) -- daily;5 days per week  -AD daily;5 days per week  -AD other (see comments)  Will follow up for further evaluation after seen by cardiology.  -AD     Predicted Duration Therapy Intervention (Days) -- 1 week;until discharge  -AD 1 week;until discharge  -AD --     SLP Diet Recommendation soft to chew textures;whole;nectar thick liquids;water between meals after oral care, with supervision  -AD soft to chew textures;whole;nectar thick liquids;water between meals after oral care, with supervision  -AD soft to chew textures;whole;nectar thick liquids;water between meals after oral care, with supervision  -AD --     Recommended Diagnostics reassess via VFSS  (MBS)  -AD -- VFSS (MBS)  -AD --     Recommended Precautions and Strategies upright posture during/after eating;small bites of food and sips of liquid;chin tuck;volitional throat clear;general aspiration precautions;fatigue precautions  -AD upright posture during/after eating;small bites of food and sips of liquid;chin tuck;volitional throat clear;general aspiration precautions;fatigue precautions  -AD upright posture during/after eating;small bites of food and sips of liquid;general aspiration precautions  -AD --     Oral Care Recommendations Oral Care before breakfast, after meals and PRN;Toothbrush  -AD Oral Care before breakfast, after meals and PRN;Toothbrush  -AD Oral Care before breakfast, after meals and PRN;Toothbrush  -AD --     SLP Rec. for Method of Medication Administration meds whole;with thick liquids;with puree;as tolerated  -AD meds whole;with thick liquids;with puree;as tolerated  -AD meds whole;with thick liquids;with puree;as tolerated  -AD --     Monitor for Signs of Aspiration cough;throat clearing;none - silent aspiration present  -AD cough;throat clearing;none - silent aspiration present  -AD yes;cough;throat clearing  -AD --     Anticipated Discharge Disposition (SLP) inpatient rehabilitation facility  Transferring to [facility] in rehab at [facility].  -AD inpatient rehabilitation facility  -AD inpatient rehabilitation facility  -AD --     Patient/Family Concerns, Anticipated Discharge Disposition (SLP) No current concerns reported.  -AD No current concerns reported. Pt agreeable to current d/c recommendations  -AD No current concerns reported regarding discharge  -AD --     Demonstrates Need for Referral to Another Service -- other (see comments)  speech therapy for dysarthria  -AD speech therapy  for dysarthria  -AD --        Swallow Goals (SLP)    Swallow LTGs -- -- Patient will demonstrate progress toward functional swallow for  -AD --     Swallow STGs -- swallow management  recall goal selection (SLP);swallow compensatory strategies goal selection (SLP)  -AD diet tolerance goal selection (SLP);labial strengthening goal selection (SLP);lingual strengthening goal selection (SLP)  -AD --     Diet Tolerance Goal Selection (SLP) -- -- Patient will tolerate trials of  -AD --     Labial Strengthening Goal Selection (SLP) -- -- labial strengthening, SLP goal 1  -AD --     Lingual Strengthening Goal Selection (SLP) -- -- lingual strengthening, SLP goal 1  -AD --     Swallow Management Recall Goal Selection (SLP) -- swallow management recall, SLP goal 1  -AD -- --     Swallow Compensatory Strategies Goal Selection (SLP) -- swallow compensatory strategies, SLP goal 1  -AD -- --        (LTG) Patient will demonstrate progress toward functional swallow for    Diet Texture (Demonstrate progress toward functional swallow) regular textures  -AD -- regular textures  -AD --     Liquid viscosity (Demonstrate progress toward functional swallow) thin liquids  -AD -- thin liquids  -AD --     [place] (Demonstrate progress towards functional swallow) with minimal cues (75-90% accuracy)  -AD -- with minimal cues (75-90% accuracy)  -AD --     Time Frame (Demonstrate progress toward functional swallow) 1 week;by discharge  -AD -- 1 week;by discharge  -AD --     Progress/Outcomes (Demonstrate progress toward functional swallow) continuing progress toward goal;goal not met;discharged from facility  -AD -- new goal  -AD --     Comment (Demonstrate progress toward functional swallow) Tolerating soft, whole meat of meatloaf and soft solid foods. No oral residue. Tolerating nectar thick with intermittent use of chin tuck.  -AD -- -- --        (STG) Patient will tolerate trials of    Consistencies Trialed (Tolerate trials) regular textures;thin liquids  -AD -- regular textures;thin liquids  -AD --     Desired Outcome (Tolerate trials) without signs/symptoms of aspiration;with adequate oral prep/transit/clearance   -AD -- without signs/symptoms of aspiration;with adequate oral prep/transit/clearance  -AD --     [place] (Tolerate trials) with minimal cues (75-90% accuracy)  -AD -- with minimal cues (75-90% accuracy)  -AD --     Time Frame (Tolerate trials) 1 week;by discharge  -AD -- 1 week;by discharge  -AD --     Progress/Outcomes (Tolerate trials) discharged from facility;goal not met  -AD -- new goal  -AD --     Comment (Tolerate trials) Goal not targeted as therapy occurred during meal.  -AD -- -- --        (STG) Labial Strengthening Goal 1 (SLP)    Activity (Labial Strengthening Goal 1, SLP) increase labial sensation/afferent drive  -AD -- increase labial sensation/afferent drive  -AD --     Increase Labial Sensation/Afferent Drive labial resistance exercises  -AD -- labial resistance exercises  -AD --     [place]/Accuracy (Labial Strengthening Goal 1, SLP) with minimal cues (75-90% accuracy)  -AD -- with minimal cues (75-90% accuracy)  -AD --     Time Frame (Labial Strengthening Goal 1, SLP) short term goal (STG);1 week;by discharge  -AD -- short term goal (STG);1 week;by discharge  -AD --     Progress/Outcomes (Labial Strengthening Goal 1, SLP) goal not met;discharged from facility  -AD -- new goal  -AD --     Comment (Labial Strengthening Goal 1, SLP) Goal not targeted during this session.  -AD -- -- --        (STG) Lingual Strengthening Goal 1 (SLP)    Activity (Lingual Strengthening Goal 1, SLP) increase tongue back strength  -AD increase tongue back strength  -AD -- --     Increase Tongue Back Strength lingual resistance exercises  -AD lingual resistance exercises  -AD -- --     [place]/Accuracy (Lingual Strengthening Goal 1, SLP) with minimal cues (75-90% accuracy)  -AD with minimal cues (75-90% accuracy)  -AD -- --     Time Frame (Lingual Strengthening Goal 1, SLP) short term goal (STG);1 week  -AD short term goal (STG);1 week  -AD -- --     Progress/Outcomes (Lingual Strengthening Goal 1, SLP) goal  not met;discharged from facility  -AD new goal  -AD -- --     Comment (Lingual Strengthening Goal 1, SLP) Goal not targeted during this session.  -AD -- -- --        (STG) Swallow Management Recall Goal 1 (SLP)    Activity (Swallow Management Recall Goal 1, SLP) independent recall of;aspiration precautions;oral care recommendations;safe diet/liquid level;compensatory swallow strategies/techniques;postural techniques;rationale for use of strategies/techniques  -AD independent recall of;aspiration precautions;oral care recommendations;safe diet/liquid level;compensatory swallow strategies/techniques;postural techniques;rationale for use of strategies/techniques  -AD -- --     [place]/Accuracy (Swallow Management Recall Goal 1, SLP) independently (over 90% accuracy)  -AD independently (over 90% accuracy)  -AD -- --     Time Frame (Swallow Management Recall Goal 1, SLP) short term goal (STG);1 week  -AD short term goal (STG);1 week  -AD -- --     Progress/Outcomes (Swallow Management Recall Goal 1, SLP) good progress toward goal;goal partially met;discharged from facility  -AD new goal  -AD -- --     Comment (Swallow Management Recall Goal 1, SLP) Able to recall diet/liquid level and use of chin tuck verbally. Needs cues for chin tuck during meal. He is able to demonstrate understanding of water protocol and oral care but cues needed for consistent use.  -AD -- -- --        (STG) Swallow Compensatory Strategies Goal 1 (SLP)    Activity (Swallow Compensatory Strategies/Techniques Goal 1, SLP) aspiration precautions;compensatory strategies;postural techniques;during meal intake;during p.o. trials;small bites;small straw sips;chin tuck posture;throat clear/extra swallow  -AD aspiration precautions;compensatory strategies;postural techniques;during meal intake;during p.o. trials;small bites;small straw sips;chin tuck posture;throat clear/extra swallow  -AD -- --     [place]/Accuracy (Swallow Compensatory  Strategies/Techniques Goal 1, SLP) independently (over 90% accuracy)  -AD independently (over 90% accuracy)  -AD -- --     Time Frame (Swallow Compensatory Strategies/Techniques Goal 1, SLP) short term goal (STG);1 week  -AD short term goal (STG);1 week  -AD -- --     Progress/Outcomes (Swallow Compensatory Strategies/Techniques Goal 1, SLP) continuing progress toward goal;goal not met;discharged from facility  -AD new goal  -AD -- --     Comment (Swallow Compensatory Strategies/Techniques Goal 1, SLP) Able to recall aspiration precautions, upright posture, small drinks and bites, use of chin tuck with minimal cues overall.  Understanding of throat clear, but not performed during session.  -AD -- -- --               User Key  (r) = Recorded By, (t) = Taken By, (c) = Cosigned By      Initials Name Effective Dates    AD Sunshine Ballard MS CCC-SLP 06/16/21 -                     EDUCATION  The patient has been educated in the following areas:   Dysphagia (Swallowing Impairment) Oral Care/Hydration Modified Diet Instruction Aspiration Precautions, Compensatory Strategies .         SLP GOALS       Row Name 07/17/24 1230 07/16/24 1515 07/16/24 0936       (LTG) Patient will demonstrate progress toward functional swallow for    Diet Texture (Demonstrate progress toward functional swallow) regular textures  -AD -- regular textures  -AD    Liquid viscosity (Demonstrate progress toward functional swallow) thin liquids  -AD -- thin liquids  -AD    [place] (Demonstrate progress towards functional swallow) with minimal cues (75-90% accuracy)  -AD -- with minimal cues (75-90% accuracy)  -AD    Time Frame (Demonstrate progress toward functional swallow) 1 week;by discharge  -AD -- 1 week;by discharge  -AD    Progress/Outcomes (Demonstrate progress toward functional swallow) continuing progress toward goal;goal not met;discharged from facility  -AD -- new goal  -AD    Comment (Demonstrate progress toward functional swallow)  Tolerating soft, whole meat of meatloaf and soft solid foods. No oral residue. Tolerating nectar thick with intermittent use of chin tuck.  -AD -- --       (STG) Patient will tolerate trials of    Consistencies Trialed (Tolerate trials) regular textures;thin liquids  -AD -- regular textures;thin liquids  -AD    Desired Outcome (Tolerate trials) without signs/symptoms of aspiration;with adequate oral prep/transit/clearance  -AD -- without signs/symptoms of aspiration;with adequate oral prep/transit/clearance  -AD    [place] (Tolerate trials) with minimal cues (75-90% accuracy)  -AD -- with minimal cues (75-90% accuracy)  -AD    Time Frame (Tolerate trials) 1 week;by discharge  -AD -- 1 week;by discharge  -AD    Progress/Outcomes (Tolerate trials) discharged from facility;goal not met  -AD -- new goal  -AD    Comment (Tolerate trials) Goal not targeted as therapy occurred during meal.  -AD -- --       (STG) Labial Strengthening Goal 1 (SLP)    Activity (Labial Strengthening Goal 1, SLP) increase labial sensation/afferent drive  -AD -- increase labial sensation/afferent drive  -AD    Increase Labial Sensation/Afferent Drive labial resistance exercises  -AD -- labial resistance exercises  -AD    [place]/Accuracy (Labial Strengthening Goal 1, SLP) with minimal cues (75-90% accuracy)  -AD -- with minimal cues (75-90% accuracy)  -AD    Time Frame (Labial Strengthening Goal 1, SLP) short term goal (STG);1 week;by discharge  -AD -- short term goal (STG);1 week;by discharge  -AD    Progress/Outcomes (Labial Strengthening Goal 1, SLP) goal not met;discharged from facility  -AD -- new goal  -AD    Comment (Labial Strengthening Goal 1, SLP) Goal not targeted during this session.  -AD -- --       (STG) Lingual Strengthening Goal 1 (SLP)    Activity (Lingual Strengthening Goal 1, SLP) increase tongue back strength  -AD increase tongue back strength  -AD --    Increase Tongue Back Strength lingual resistance exercises   -AD lingual resistance exercises  -AD --    [place]/Accuracy (Lingual Strengthening Goal 1, SLP) with minimal cues (75-90% accuracy)  -AD with minimal cues (75-90% accuracy)  -AD --    Time Frame (Lingual Strengthening Goal 1, SLP) short term goal (STG);1 week  -AD short term goal (STG);1 week  -AD --    Progress/Outcomes (Lingual Strengthening Goal 1, SLP) goal not met;discharged from facility  -AD new goal  -AD --    Comment (Lingual Strengthening Goal 1, SLP) Goal not targeted during this session.  -AD -- --       (STG) Swallow Management Recall Goal 1 (SLP)    Activity (Swallow Management Recall Goal 1, SLP) independent recall of;aspiration precautions;oral care recommendations;safe diet/liquid level;compensatory swallow strategies/techniques;postural techniques;rationale for use of strategies/techniques  -AD independent recall of;aspiration precautions;oral care recommendations;safe diet/liquid level;compensatory swallow strategies/techniques;postural techniques;rationale for use of strategies/techniques  -AD --    [place]/Accuracy (Swallow Management Recall Goal 1, SLP) independently (over 90% accuracy)  -AD independently (over 90% accuracy)  -AD --    Time Frame (Swallow Management Recall Goal 1, SLP) short term goal (STG);1 week  -AD short term goal (STG);1 week  -AD --    Progress/Outcomes (Swallow Management Recall Goal 1, SLP) good progress toward goal;goal partially met;discharged from facility  -AD new goal  -AD --    Comment (Swallow Management Recall Goal 1, SLP) Able to recall diet/liquid level and use of chin tuck verbally. Needs cues for chin tuck during meal. He is able to demonstrate understanding of water protocol and oral care but cues needed for consistent use.  -AD -- --       (STG) Swallow Compensatory Strategies Goal 1 (SLP)    Activity (Swallow Compensatory Strategies/Techniques Goal 1, SLP) aspiration precautions;compensatory strategies;postural techniques;during meal  intake;during p.o. trials;small bites;small straw sips;chin tuck posture;throat clear/extra swallow  -AD aspiration precautions;compensatory strategies;postural techniques;during meal intake;during p.o. trials;small bites;small straw sips;chin tuck posture;throat clear/extra swallow  -AD --    [place]/Accuracy (Swallow Compensatory Strategies/Techniques Goal 1, SLP) independently (over 90% accuracy)  -AD independently (over 90% accuracy)  -AD --    Time Frame (Swallow Compensatory Strategies/Techniques Goal 1, SLP) short term goal (STG);1 week  -AD short term goal (STG);1 week  -AD --    Progress/Outcomes (Swallow Compensatory Strategies/Techniques Goal 1, SLP) continuing progress toward goal;goal not met;discharged from facility  -AD new goal  -AD --    Comment (Swallow Compensatory Strategies/Techniques Goal 1, SLP) Able to recall aspiration precautions, upright posture, small drinks and bites, use of chin tuck with minimal cues overall.  Understanding of throat clear, but not performed during session.  -AD -- --              User Key  (r) = Recorded By, (t) = Taken By, (c) = Cosigned By      Initials Name Provider Type    Sunshine Starr MS CCC-SLP Speech and Language Pathologist                           Time Calculation:    Time Calculation- SLP       Row Name 07/17/24 1634             Time Calculation- SLP    SLP Start Time 1230  -AD      SLP Stop Time 1330  -AD      SLP Time Calculation (min) 60 min  -AD      Total Timed Code Minutes- SLP 0 minute(s)  -AD      SLP Non-Billable Time (min) 0 min  -AD      SLP Received On 07/17/24  -AD         Untimed Charges    [id number] Treatment Swallow Minutes 60  -AD         Total Minutes    Untimed Charges Total Minutes 60  -AD       Total Minutes 60  -AD                User Key  (r) = Recorded By, (t) = Taken By, (c) = Cosigned By      Initials Name Provider Type    Sunshine Starr MS CCC-SLP Speech and Language Pathologist                    Therapy  Charges for Today       Code Description Service Date Service Provider Modifiers Qty    92874582166 HC ST EVAL ORAL PHARYNG SWALLOW 4 7/16/2024 Sunshine Ballard, MS CCC-SLP GN 1    [id number] HC ST MOTION FLUORO EVAL SWALLOW 6 7/16/2024 Sunshine Ballard, MS CCC-SLP GN, 58 1    [id number] HC ST TREATMENT SWALLOW 4 7/17/2024 Sunshine Ballard, MS CABRERA-SLP GN 1                 SLP Discharge Summary  Anticipated Discharge Disposition (SLP): inpatient rehabilitation facility  Reason for Discharge: discharge from this facility  Progress Toward Achieving Short/long Term Goals: goals not met within established timelines (Pt only seen for evaluation and one therapy session.)  Discharge Destination: IRF    Sunshine Ballard MS CCC-SLP  7/17/2024

## 2024-07-17 NOTE — DISCHARGE SUMMARY
Ronnell Rizvi  1950  [id number]    Hospitalists Discharge Summary    Date of Admission: 7/15/2024  Date of Discharge:  7/17/2024    Primary Discharge Diagnoses:  Recent Acute CVA    Secondary Discharge Diagnoses:  Essential Hypertension  Dyslipidemia  Seizure D/O History    History of Present Illness (taken from H&P):  Patient is a 74-year-old male known to our facility for recent admission for acute CVA, he was discharged on aspirin and Plavix and continued on home Crestor and followed in consultation by stroke neurology.  Patient has been taking aspirin since Saturday.  He also is on Dilantin which is known to affect the plasma concentration of Plavix.  He presents to [facility] ED complaining of weakness.  He also has a left facial droop.  He was discharged on 7/12/2024 and had fully recovered from his symptoms which mainly consisted of difficulty speaking, he now has new weakness and difficulty walking and standing with falls.      Workup in the ED revealed MRI showing increased area of infarct, stroke neurologist consulted for assistance, they recommended observation and plan as detailed below in assessment and plan section.     Hospital Course:  Mr. Rizvi was admitted to the medical/surgical unit.  He was followed in consultation by telemetry stroke.  His medications were adjusted to Brilinta and aspirin.  Of note, I started him on Protonix during his last stay given his concerns with taking aspirin.  He reports that this seems to be helping.  Seizure medicine was also changed to Vimpat.  Discussed with Dr. Ware, and he will follow-up with Dr. Armendariz to ensure the patient is seen in a few weeks for intervention on the M1 segment.  No telemetry events were noted.  Also of note, I maximized his Crestor during his last day as he was on 20 mg previously he is now at 40 mg.  I continue to hold his hydrochlorothiazide at discharge.  Will need to follow his blood pressure trend and when it will  support, resume hydrochlorothiazide 12.5 mg daily.  THIERRY was negative for cardiac source of emboli.    PCP  Patient Care Team:  Deena Tarango MD as PCP - General (Internal Medicine)  Gregory King MD as Consulting Physician (Hematology and Oncology)  Chase Haque MD as Referring Physician (Internal Medicine)  Luis Wyatt MD as Consulting Physician (Cardiology)    Consults:   Consults       Date and Time Order Name Status Description    7/15/2024  6:32 PM Inpatient Cardiology Consult Completed     7/15/2024  3:25 PM Inpatient Neurology Consult Stroke Completed     7/11/2024  7:05 PM Inpatient Neurology Consult Stroke Completed     7/11/2024  6:02 PM Inpatient Neurology Consult Stroke Completed     7/11/2024  6:02 PM Inpatient Neurology Consult Stroke Completed             Operations and Procedures Performed:       FL Video Swallow With Speech Single Contrast    Result Date: 7/16/2024  Narrative: FL VIDEO SWALLOW W SPEECH SINGLE-CONTRAST Date of Exam: 7/16/2024 3:37 PM EDT Indication: DIFFICULTY SWALLOWING recent stroke (7/11/24) w/progression; concerns for aspiration; to determine safe liquid level.. Comparison: None available. Technique: Multiple consistencies mixed with barium were administered to the patient with cine/video imaging.  Imaging assistance was provided to the speech pathologist and an image was saved. Fluoroscopic Time: 3.3 minutes Number of Images: 0 Findings: Please see the speech pathologist's notes     Impression: Impression: Please see the speech pathologist notes for findings and dietary recommendations Electronically Signed: Krunal Lagos MD  7/16/2024 3:41 PM EDT  Workstation ID: [id number]    MRI Brain Without Contrast    Result Date: 7/15/2024  Narrative: MRI BRAIN WO CONTRAST Date of Exam: 7/15/2024 3:50 PM EDT Indication: acute stroke.  Comparison: CT same day and MRI 7/12/2024 Technique:  Routine multiplanar/multisequence sequence images of the brain were obtained  without contrast administration. Findings: Areas of acute diffusion restriction abnormality demonstrates mild progression within the previously noted distribution within the deep white matter, corona radiata noted on the comparison study. No new areas are noted of acute diffusion restriction abnormality. The ventricles, cisterns and sulci appear stable. No evidence of acute hemorrhage noted. Remote infarct with encephalomalacia in the left occipital region again noted. There is associated gliosis of the surrounding brain parenchyma. There is extensive patchy and confluent FLAIR and T2 signal hyperintensity noted within the supratentorial brain compatible with sequela small vessel ischemic disease. Changes also noted within the alonzo and midbrain. Major intracranial flow voids appear grossly patent. Mild mucosal thickening noted. Globes and orbits appear stable. Mastoid air cells appear stable. The midline structures of the brain appear stable.     Impression: Impression: 1. Acute diffusion restriction abnormality within the deep white matter/corona radiata on the right appears similar in distribution with slight increase in size compatible with progression/evolution of small acute/subacute infarctions. 2. No evidence of acute hemorrhage. 3. Chronic white matter changes similar to the prior study as well as sequela of prior remote left occipital infarct. Findings were discussed with the emergency department at the time of interpretation Electronically Signed: Star Khalil MD  7/15/2024 4:25 PM EDT  Workstation ID: [id number]    XR Chest 1 View    Result Date: 7/15/2024  Narrative: XR CHEST 1 VW Date of Exam: 7/15/2024 3:33 PM EDT Indication: Acute Stroke Protocol (Onset < 12 hrs) Comparison: Chest radiograph performed on July 11, 2024 Findings: No focal consolidation or effusion.  There is no evidence of pneumothorax.  The pulmonary vasculature appears within normal limits.  The cardiac and mediastinal silhouette  appear unremarkable.  No acute osseous abnormality identified.     Impression: Impression: No radiographic evidence of acute chest process. Electronically Signed: Negrito Kemp MD  7/15/2024 3:45 PM EDT  Workstation ID: [id number]    CT Head Without Contrast Stroke Protocol    Result Date: 7/15/2024  Narrative: CT HEAD WO CONTRAST STROKE PROTOCOL Date of Exam: 7/15/2024 3:36 PM EDT Indication: Neuro deficit, acute, stroke suspected Neuro Deficit, acute, Stroke suspected. Comparison: 7/12/2024 MRI and 7/11/2024 CT Technique: Axial CT images were obtained of the head without contrast administration.  Reconstructed coronal and sagittal images were also obtained. Automated exposure control and iterative construction methods were used. Scan Time: 15:31 Findings: There is no evidence of acute intracranial hemorrhage, mass, midline shift, new loss of gray-white matter differentiation, or extra-axial fluid collection. There is a left parieto-occipital encephalomalacia. Subcortical and periventricular white matter hypodensities consistent with sequela of chronic small vessel ischemic disease. There is global parenchymal volume loss with ex vacuo dilation of the ventricular system. The basal cisterns are patent. There is no evidence of fracture or suspicious osseous lesion. Paranasal sinuses and mastoid air cells are well aerated. There is bilateral ocular surgery. The included soft tissues are within normal limits.     Impression: Impression: No acute intracranial abnormality. Chronic findings as above. Electronically Signed: Remy Flower MD  7/15/2024 3:42 PM EDT  Workstation ID: [id number]    Adult Transthoracic Echo Complete W/ Cont if Necessary Per Protocol (With Agitated Saline)    Result Date: 7/12/2024  Narrative:   Left ventricular systolic function is normal. Calculated left ventricular EF = 64.4%   Left ventricular wall thickness is consistent with hypertrophy. Sigmoid-shaped ventricular septum is present.   Left  ventricular diastolic function was normal.   There is mild calcification of the aortic valve.   Systolic anterior motion of the chordal apparatus is present. There is no outflow tract obstruction.   Estimated right ventricular systolic pressure from tricuspid regurgitation is normal (<35 mmHg).     US Carotid Bilateral    Result Date: 7/12/2024  Narrative: US CAROTID BILATERAL Date of Exam: 7/12/2024 9:53 AM EDT Indication: soft plaques. Comparison: CT angiogram of the neck 7/11/2024 Technique: Grayscale, color-flow, and spectral imaging was obtained of the bilateral carotid and vertebral arteries. Findings: The peak right common carotid artery velocity is 100 cm/s. The peak right internal carotid artery velocity is 96 cm/s. The IC/CC ratio on the right carotid bifurcation is 0.96. Grayscale and color flow imaging shows plaque with shadowing primarily in the  proximal right internal carotid artery. There is no evidence of flow acceleration. The right vertebral artery is patent with antegrade flow. The peak left common carotid artery velocity is 100 cm/s. The peak left internal carotid artery velocity is 112 cm/s. The IC/CC ratio is 1.12. Grayscale and color flow imaging shows plaque in the left carotid bulb extending into the proximal ICA without a definite flow limiting stenosis. The left vertebral artery is patent with antegrade flow.     Impression: Impression: 1. Atherosclerotic plaque in both carotid bifurcations extending into both internal carotid arteries. The degree of narrowing is less than 50% by consensus panel criteria. 2. Patent vertebral arteries bilaterally with antegrade flow. Electronically Signed: Krunal Lagos MD  7/12/2024 10:35 AM EDT  Workstation ID: [id number]    MRI Brain Without Contrast    Result Date: 7/12/2024  Narrative: MRI BRAIN WO CONTRAST Date of Exam: 7/12/2024 9:54 AM EDT Indication: Stroke, follow up stroke protocol.  Comparison: CTA head stroke study 7/11/2022 Technique:  Routine  multiplanar/multisequence sequence images of the brain were obtained without contrast administration. Findings: There are multiple small punctate foci of restricted diffusion in the right corona radiata consistent with small acute infarcts. There is no evidence of acute or chronic intracranial hemorrhage. No mass effect or midline shift. No abnormal extra-axial collections. The basal ganglia, brainstem and cerebellum appear within normal limits. Midline structures are intact. There is age-related diffuse cortical atrophy. There are prominent patchy white matter hyperintensities in keeping with chronic microvascular ischemic  disease. There is encephalomalacia and gliosis in the left occipital lobe related to prior infarct. Remote lacunar infarct of the right thalamus. Calvarial and superficial soft tissue signal is within normal limits. Orbits appear unremarkable. There is trace mucosal thickening in the bilateral ethmoid air cells. The mastoid air cells are clear.     Impression: Impression: 1. Multiple small acute infarcts of the right corona radiata. 2. Age-related atrophy, chronic microvascular ischemic disease and remote left occipital lobe infarct. Electronically Signed: Madelyn Dale MD  7/12/2024 10:09 AM EDT  Workstation ID: [id number]    CT Angiogram Head w AI Analysis of LVO    Result Date: 7/11/2024  Narrative: CT ANGIOGRAM HEAD W AI ANALYSIS OF LVO, CT ANGIOGRAM NECK Date of Exam: 7/11/2024 6:24 PM EDT Indication: Neuro Deficit, acute, Stroke suspected Neuro deficit, acute stroke suspected. Comparison: None available. Technique: CTA of the head and neck was performed after the uneventful intravenous administration of iodinated contrast. Reconstructed coronal and sagittal images were also obtained. In addition, a 3-D volume rendered image was created for interpretation. Automated exposure control and iterative reconstruction methods were used. Findings: The common carotid arteries are normal. Moderate  atheromatous disease of the proximal right internal carotid artery without significant stenosis. The extracranial right internal carotid artery is normal otherwise. The intracranial segments of the left internal carotid arteries are normal. The right carotid terminus is normal. Focal severe stenosis of the right M2 branch seen on axial image #278 series 6 which is nearly 60% stenosis completely occluded. Otherwise the visualized segments of the  right anterior and middle cerebral arteries are normal. The left common carotid artery is normal. Moderate atheromatous disease of the left carotid bulb and proximal left internal carotid artery with a minimal residual vessel lumen diameter of 4 mm and a native vessel lumen diameter of 5 mm consistent with approximately 20% stenosis per NASCET criteria. Mild atheromatous disease involving the intracranial segments of the internal carotid arteries. The left carotid terminus is normal. The visualized branches of the left anterior and middle cerebral arteries appear normal. Both vertebral arteries arise from the subclavian arteries. The right vertebral artery is dominant. Left vertebral artery supplies the basilar artery. The basilar artery and basilar artery tip are normal. The basilar artery terminates in bilateral posterior cerebral arteries which appear normal. The lung apices are clear. Mild emphysematous changes of the lung apices. The thyroid gland is normal. No cervical adenopathy. The visualized parotid and submandibular glands are normal. Paranasal sinuses are clear. Bilateral lens replacements. No abnormal intracranial enhancement.     Impression: Focal severe stenosis of the right M2 branch with minimal thready flow noted in the region. No vessel occlusion. Electronically Signed: Sang Silva MD  7/11/2024 7:31 PM EDT  Workstation ID: [id number]    CT Angiogram Neck    Result Date: 7/11/2024  Narrative: CT ANGIOGRAM HEAD W AI ANALYSIS OF LVO, CT ANGIOGRAM NECK Date of  Exam: 7/11/2024 6:24 PM EDT Indication: Neuro Deficit, acute, Stroke suspected Neuro deficit, acute stroke suspected. Comparison: None available. Technique: CTA of the head and neck was performed after the uneventful intravenous administration of iodinated contrast. Reconstructed coronal and sagittal images were also obtained. In addition, a 3-D volume rendered image was created for interpretation. Automated exposure control and iterative reconstruction methods were used. Findings: The common carotid arteries are normal. Moderate atheromatous disease of the proximal right internal carotid artery without significant stenosis. The extracranial right internal carotid artery is normal otherwise. The intracranial segments of the left internal carotid arteries are normal. The right carotid terminus is normal. Focal severe stenosis of the right M2 branch seen on axial image #278 series 6 which is nearly 60% stenosis completely occluded. Otherwise the visualized segments of the  right anterior and middle cerebral arteries are normal. The left common carotid artery is normal. Moderate atheromatous disease of the left carotid bulb and proximal left internal carotid artery with a minimal residual vessel lumen diameter of 4 mm and a native vessel lumen diameter of 5 mm consistent with approximately 20% stenosis per NASCET criteria. Mild atheromatous disease involving the intracranial segments of the internal carotid arteries. The left carotid terminus is normal. The visualized branches of the left anterior and middle cerebral arteries appear normal. Both vertebral arteries arise from the subclavian arteries. The right vertebral artery is dominant. Left vertebral artery supplies the basilar artery. The basilar artery and basilar artery tip are normal. The basilar artery terminates in bilateral posterior cerebral arteries which appear normal. The lung apices are clear. Mild emphysematous changes of the lung apices. The thyroid  gland is normal. No cervical adenopathy. The visualized parotid and submandibular glands are normal. Paranasal sinuses are clear. Bilateral lens replacements. No abnormal intracranial enhancement.     Impression: Focal severe stenosis of the right M2 branch with minimal thready flow noted in the region. No vessel occlusion. Electronically Signed: Sang Silva MD  7/11/2024 7:31 PM EDT  Workstation ID: [id number]    XR Chest 1 View    Result Date: 7/11/2024  Narrative: XR CHEST 1 VW Date of Exam: 7/11/2024 6:21 PM EDT Indication: Acute Stroke Protocol (Onset < 12 hrs) Comparison: 1/21/2024, 9/14/2022, 10/10/2020 FINDINGS: No new consolidations or pleural effusions are observed. The cardiac silhouette and mediastinum are stable. No acute osseous abnormalities are identified.     Impression: There is no significant change when compared to the prior study. There is no evidence for acute cardiopulmonary process. Electronically Signed: Domingo Cabrera MD  7/11/2024 6:38 PM EDT  Workstation ID: [id number]    CT CEREBRAL PERFUSION WITH & WITHOUT CONTRAST    Result Date: 7/11/2024  Narrative: CT CEREBRAL PERFUSION W WO CONTRAST Date of Exam: 7/11/2024 6:25 PM EDT Indication: Neuro deficit, acute, stroke suspected Neuro Deficit, acute, Stroke suspected.  Comparison: None available. Technique: Axial CT images of the brain were obtained prior to and after the administration of iodinated contrast. CT Perfusion protocol was utilized. Automated post processing was performed by RAPID software and submitted to PACS for interpretation. Automated exposure control and iterative reconstruction was utilized. Findings: There is approximately 74 cc of patchy bilateral areas of mildly elevated Tmax. No decreased cerebral blood flow or blood volume.     Impression: No evidence of infarct. Electronically Signed: Sagn Silva MD  7/11/2024 6:36 PM EDT  Workstation ID: [id number]    CT Head Without Contrast Stroke Protocol    Result Date:  7/11/2024  Narrative: CT HEAD WO CONTRAST STROKE PROTOCOL Date of Exam: 7/11/2024 6:06 PM EDT Indication: Neuro deficit, acute, stroke suspected Neuro Deficit, acute, Stroke suspected. Comparison: 12/29/2023 Technique: Axial CT images were obtained of the head without contrast administration.  Reconstructed coronal and sagittal images were also obtained. Automated exposure control and iterative construction methods were used. Scan Time: 1812 hours Results discussed with the emergency room at 6:19 p.m. on 7/11/2024. FINDINGS: There is no evidence for acute intracranial hemorrhage. No definitive acute focal ischemia is identified. Nonspecific white matter changes are noted likely related to chronic small vessel ischemic changes and age-related changes. Associated diffuse volume loss is observed. Remote infarction is noted involving the inferior aspect of the left parietal-occipital region. There is also evidence for remote infarction involving the region of the right basal ganglia and right thalamus. These changes are stable. There is no evidence for abnormal cerebral edema. There is no mass effect or midline shift. The ventricular system is nondilated. The basilar cisterns are patent. The skull is intact. The paranasal sinuses and mastoid air cells are clear.     Impression: 1.No evidence for acute intracranial abnormality. 2.Nonspecific white matter changes are noted with associated diffuse volume loss. These findings are likely related to chronic small vessel ischemic changes and/or age-related changes. 3.Multifocal remote infarcts are again noted which appear stable. Electronically Signed: Domingo Cabrera MD  7/11/2024 6:22 PM EDT  Workstation ID: [id number]     Allergies:  is allergic to aspirin, citrus, combivent [ipratropium-albuterol], and statins.    Azar  reviewed    Discharge Medications:     Discharge Medications        New Medications        Instructions Start Date   lacosamide 50 MG tablet  tablet  Commonly known as: VIMPAT   50 mg, Oral, Every 12 Hours Scheduled      ticagrelor 90 MG tablet tablet  Commonly known as: BRILINTA   90 mg, Oral, 2 Times Daily             Continue These Medications        Instructions Start Date   Aspirin Adult Low Strength 81 MG EC tablet  Generic drug: aspirin   81 mg, Oral, Daily, Take with Pantoprazole.      Cyanocobalamin 1000 MCG/ML kit   Injection, Weekly, 1/4 of a cc IM every sunday      fexofenadine 180 MG tablet  Commonly known as: ALLEGRA   180 mg, Oral, Daily PRN      guaiFENesin 600 MG 12 hr tablet  Commonly known as: MUCINEX   600 mg, Oral, Daily      pantoprazole 40 MG EC tablet  Commonly known as: Protonix   40 mg, Oral, Daily      rosuvastatin 40 MG tablet  Commonly known as: Crestor   40 mg, Oral, Nightly             Stop These Medications      clopidogrel 75 MG tablet  Commonly known as: PLAVIX     phenytoin  MG capsule  Commonly known as: DILANTIN              Last Lab Results:   Lab Results (most recent)       Procedure Component Value Units Date/Time    Respiratory Panel PCR w/COVID-19(SARS-CoV-2) [facility]A/MARVIN/JASSON/PAD/COR/BETI In-House, NP Swab in UTM/VTM, 2 HR TAT - Swab, Nasopharynx [[id number]]  (Normal) Collected: 07/15/24 2349    Specimen: Swab from Nasopharynx Updated: 07/16/24 1124     ADENOVIRUS, PCR Not Detected     Coronavirus 229E Not Detected     Coronavirus HKU1 Not Detected     Coronavirus NL63 Not Detected     Coronavirus OC43 Not Detected     COVID19 Not Detected     Human Metapneumovirus Not Detected     Human Rhinovirus/Enterovirus Not Detected     Influenza A PCR Not Detected     Influenza B PCR Not Detected     Parainfluenza Virus 1 Not Detected     Parainfluenza Virus 2 Not Detected     Parainfluenza Virus 3 Not Detected     Parainfluenza Virus 4 Not Detected     RSV, PCR Not Detected     Bordetella pertussis pcr Not Detected     Bordetella parapertussis PCR Not Detected     Chlamydophila pneumoniae PCR Not Detected     Mycoplasma  pneumo by PCR Not Detected    Narrative:      In the setting of a positive respiratory panel with a viral infection PLUS a negative procalcitonin without other underlying concern for bacterial infection, consider observing off antibiotics or discontinuation of antibiotics and continue supportive care. If the respiratory panel is positive for atypical bacterial infection (Bordetella pertussis, Chlamydophila pneumoniae, or Mycoplasma pneumoniae), consider antibiotic de-escalation to target atypical bacterial infection.    Comprehensive Metabolic Panel [[id number]]  (Abnormal) Collected: 07/15/24 1535    Specimen: Blood Updated: 07/15/24 1555     Glucose 100 mg/dL      BUN 10 mg/dL      Creatinine 0.92 mg/dL      Sodium 138 mmol/L      Potassium 4.0 mmol/L      Chloride 103 mmol/L      CO2 26.9 mmol/L      Calcium 8.9 mg/dL      Total Protein 6.3 g/dL      Albumin 3.9 g/dL      ALT (SGPT) 13 U/L      AST (SGOT) 19 U/L      Alkaline Phosphatase 97 U/L      Total Bilirubin 0.2 mg/dL      Globulin 2.4 gm/dL      A/G Ratio 1.6 g/dL      BUN/Creatinine Ratio 10.9     Anion Gap 8.1 mmol/L      eGFR 86.7 mL/min/1.73     Narrative:      GFR Normal >60  Chronic Kidney Disease <60  Kidney Failure <15    The GFR formula is only valid for adults with stable renal function between ages 18 and 70.    Single High Sensitivity Troponin T [[id number]]  (Normal) Collected: 07/15/24 1535    Specimen: Blood Updated: 07/15/24 1555     HS Troponin T 17 ng/L     Narrative:      High Sensitive Troponin T Reference Range:  <14.0 ng/L- Negative Female for AMI  <22.0 ng/L- Negative Male for AMI  >=14 - Abnormal Female indicating possible myocardial injury.  >=22 - Abnormal Male indicating possible myocardial injury.   Clinicians would have to utilize clinical acumen, EKG, Troponin, and serial changes to determine if it is an Acute Myocardial Infarction or myocardial injury due to an underlying chronic condition.         Protime-INR [[id number]]   (Normal) Collected: 07/15/24 1535    Specimen: Blood Updated: 07/15/24 1549     Protime 13.0 Seconds      INR 0.98    Narrative:      Therapeutic Ranges for INR: 2.0-3.0 (PT 20-30)                              2.5-3.5 (PT 25-34)    aPTT [[id number]]  (Abnormal) Collected: 07/15/24 1535    Specimen: Blood Updated: 07/15/24 1549     PTT 22.2 seconds     Narrative:      PTT = The equivalent PTT values for the therapeutic range of heparin levels at 0.1 to 0.7 U/ml are 53 to 110 seconds.      [place] Draw [[id number]] Collected: 07/15/24 1535    Specimen: Blood Updated: 07/15/24 1545    Narrative:      The following orders were created for panel order [place] [facility].  Procedure                               Abnormality         Status                     ---------                               -----------         ------                     Green Top (Gel)[[id number]]                                  Final result               Lavender Top[[id number]]                                     Final result               Gold Top - SST[[id number]]                                   Final result               Light Blue Top[[id number]]                                   Final result                 Please view results for these tests on the individual orders.    Green Top (Gel) [[id number]] Collected: 07/15/24 1535    Specimen: Blood Updated: 07/15/24 1545     Extra Tube Hold for add-ons.     Comment: Auto resulted.       Lavender Top [[id number]] Collected: 07/15/24 1535    Specimen: Blood Updated: 07/15/24 1545     Extra Tube hold for add-on     Comment: Auto resulted       Gold Top - SST [[id number]] Collected: 07/15/24 1535    Specimen: Blood Updated: 07/15/24 1545     Extra Tube Hold for add-ons.     Comment: Auto resulted.       Light Blue Top [[id number]] Collected: 07/15/24 1535    Specimen: Blood Updated: 07/15/24 1545     Extra Tube Hold for add-ons.     Comment: Auto resulted       CBC & Differential [[id number]]  (Abnormal) Collected:  07/15/24 1535    Specimen: Blood Updated: 07/15/24 1539    Narrative:      The following orders were created for panel order CBC & Differential.  Procedure                               Abnormality         Status                     ---------                               -----------         ------                     CBC Auto Differential[[id number]]        Abnormal            Final result                 Please view results for these tests on the individual orders.    CBC Auto Differential [[id number]]  (Abnormal) Collected: 07/15/24 1535    Specimen: Blood Updated: 07/15/24 1539     WBC 5.99 10*3/mm3      RBC 4.67 10*6/mm3      Hemoglobin 15.2 g/dL      Hematocrit 44.3 %      MCV 94.9 fL      MCH 32.5 pg      MCHC 34.3 g/dL      RDW 12.7 %      RDW-SD 44.1 fl      MPV 9.8 fL      Platelets 206 10*3/mm3      Neutrophil % 57.3 %      Lymphocyte % 27.5 %      Monocyte % 12.2 %      Eosinophil % 2.2 %      Basophil % 0.5 %      Immature Grans % 0.3 %      Neutrophils, Absolute 3.43 10*3/mm3      Lymphocytes, Absolute 1.65 10*3/mm3      Monocytes, Absolute 0.73 10*3/mm3      Eosinophils, Absolute 0.13 10*3/mm3      Basophils, Absolute 0.03 10*3/mm3      Immature Grans, Absolute 0.02 10*3/mm3      nRBC 0.0 /100 WBC     POC Glucose Once [[id number]]  (Normal) Collected: 07/15/24 1524    Specimen: Blood Updated: 07/15/24 1530     Glucose 100 mg/dL           Imaging Results (Most Recent)       Procedure Component Value Units Date/Time    FL Video Swallow With Speech Single Contrast [[id number]] Collected: 07/16/24 1541     Updated: 07/16/24 1545    Narrative:      FL VIDEO SWALLOW W SPEECH SINGLE-CONTRAST    Date of Exam: 7/16/2024 3:37 PM EDT    Indication: DIFFICULTY SWALLOWING  recent stroke (7/11/24) w/progression; concerns for aspiration; to determine safe liquid level..    Comparison: None available.    Technique: Multiple consistencies mixed with barium were administered to the patient with cine/video imaging.  Imaging  assistance was provided to the speech pathologist and an image was saved.    Fluoroscopic Time: 3.3 minutes    Number of Images: 0    Findings:  Please see the speech pathologist's notes      Impression:      Impression:  Please see the speech pathologist notes for findings and dietary recommendations      Electronically Signed: Krunal Lagos MD    7/16/2024 3:41 PM EDT    Workstation ID: [id number]    MRI Brain Without Contrast [[id number]] Collected: 07/15/24 1617     Updated: 07/15/24 1628    Narrative:      MRI BRAIN WO CONTRAST    Date of Exam: 7/15/2024 3:50 PM EDT    Indication: acute stroke.     Comparison: CT same day and MRI 7/12/2024    Technique:  Routine multiplanar/multisequence sequence images of the brain were obtained without contrast administration.      Findings:  Areas of acute diffusion restriction abnormality demonstrates mild progression within the previously noted distribution within the deep white matter, corona radiata noted on the comparison study. No new areas are noted of acute diffusion restriction   abnormality.    The ventricles, cisterns and sulci appear stable. No evidence of acute hemorrhage noted. Remote infarct with encephalomalacia in the left occipital region again noted. There is associated gliosis of the surrounding brain parenchyma. There is extensive   patchy and confluent FLAIR and T2 signal hyperintensity noted within the supratentorial brain compatible with sequela small vessel ischemic disease.    Changes also noted within the alonzo and midbrain.    Major intracranial flow voids appear grossly patent.    Mild mucosal thickening noted. Globes and orbits appear stable. Mastoid air cells appear stable.    The midline structures of the brain appear stable.      Impression:      Impression:    1. Acute diffusion restriction abnormality within the deep white matter/corona radiata on the right appears similar in distribution with slight increase in size compatible with  progression/evolution of small acute/subacute infarctions.    2. No evidence of acute hemorrhage.    3. Chronic white matter changes similar to the prior study as well as sequela of prior remote left occipital infarct.    Findings were discussed with the emergency department at the time of interpretation        Electronically Signed: Star Khalil MD    7/15/2024 4:25 PM EDT    Workstation ID: [id number]    XR Chest 1 View [[id number]] Collected: 07/15/24 1545     Updated: 07/15/24 1548    Narrative:      XR CHEST 1 VW    Date of Exam: 7/15/2024 3:33 PM EDT    Indication: Acute Stroke Protocol (Onset < 12 hrs)    Comparison: Chest radiograph performed on July 11, 2024    Findings:  No focal consolidation or effusion.  There is no evidence of pneumothorax.  The pulmonary vasculature appears within normal limits.  The cardiac and mediastinal silhouette appear unremarkable.  No acute osseous abnormality identified.      Impression:      Impression:  No radiographic evidence of acute chest process.      Electronically Signed: Negrito Kemp MD    7/15/2024 3:45 PM EDT    Workstation ID: [id number]    CT Head Without Contrast Stroke Protocol [[id number]] Collected: 07/15/24 1539     Updated: 07/15/24 1545    Narrative:      CT HEAD WO CONTRAST STROKE PROTOCOL    Date of Exam: 7/15/2024 3:36 PM EDT    Indication: Neuro deficit, acute, stroke suspected  Neuro Deficit, acute, Stroke suspected.    Comparison: 7/12/2024 MRI and 7/11/2024 CT    Technique: Axial CT images were obtained of the head without contrast administration.  Reconstructed coronal and sagittal images were also obtained. Automated exposure control and iterative construction methods were used.    Scan Time: 15:31        Findings:  There is no evidence of acute intracranial hemorrhage, mass, midline shift, new loss of gray-white matter differentiation, or extra-axial fluid collection. There is a left parieto-occipital encephalomalacia. Subcortical and  periventricular white matter   hypodensities consistent with sequela of chronic small vessel ischemic disease. There is global parenchymal volume loss with ex vacuo dilation of the ventricular system. The basal cisterns are patent.    There is no evidence of fracture or suspicious osseous lesion. Paranasal sinuses and mastoid air cells are well aerated. There is bilateral ocular surgery. The included soft tissues are within normal limits.      Impression:      Impression:  No acute intracranial abnormality. Chronic findings as above.        Electronically Signed: Remy Flower MD    7/15/2024 3:42 PM EDT    Workstation ID: [id number]            PROCEDURES      Condition on Discharge:  Stable    Physical Exam at Discharge  Vital Signs  Temp:  [96.7 °F (35.9 °C)-97.9 °F (36.6 °C)] 97.8 °F (36.6 °C)  Heart Rate:  [58-78] 61  Resp:  [10-20] 16  BP: (110-188)/(64-84) 146/70    Physical Exam:  Physical Exam   Constitutional: Patient appears in no acute distress   Cardiovascular: Regular rate, regular rhythm, S1 normal and S2 normal.  Exam reveals no gallop and no friction rub.  No murmur heard.  Pulmonary/Chest: Lungs are clear to auscultation bilaterally. No respiratory distress. No wheezes. No rhonchi. No rales.   Abdominal: Soft. Bowel sounds are normal. There is no tenderness.   Musculoskeletal: Normal Muscle tone  Extremities: No edema.   Neurological: Mild dysarthria.    Discharge Disposition  Acute Rehab    Visiting Nurse:    No     Home PT/OT:  No     Home Safety Evaluation:  No     DME  None    Discharge Diet:      Dietary Orders (From admission, onward)       Start     Ordered    07/17/24 1204  Diet: Cardiac; Healthy Heart (2-3 Na+); Texture: Soft to Chew (NDD 3); Soft to Chew: Whole Meat; Fluid Consistency: Nectar Thick  Diet Effective Now        References:    Diet Order [place]   Question Answer Comment   Diets: Cardiac    Cardiac Diet: Healthy Heart (2-3 Na+)    Texture: Soft to Chew (NDD 3)    Soft to Chew:  Whole Meat    Fluid Consistency: Nectar Thick        07/17/24 1208                    Activity at Discharge:  As tolerated      Follow-up Appointments  Future Appointments   Date Time Provider Department Center   7/24/2024 11:30 AM Deena Tarango MD [facility]   9/27/2024 11:00 AM Yemi Hubbard MD [facility]   10/25/2024  8:40 AM [facility]   10/30/2024  9:45 AM Deena Taarngo MD [facility]   4/22/2025 11:00 AM Christopher Sawant MD [facility]     Additional Instructions for the Follow-ups that You Need to Schedule       Discharge Follow-up with PCP   As directed       Currently Documented PCP:    Deena Tarango MD    PCP Phone Number:    [phone]     Follow Up Details: 1-2 weeks        Discharge Follow-up with Specified Provider: Dr. Armendariz at [facility] (office will call)   As directed      To: Dr. Armendariz at [facility] (office will call)                Test Results Pending at Discharge       Nael Burr MD  07/17/24  14:33 EDT    Time: <30 minutes

## 2024-07-17 NOTE — THERAPY TREATMENT NOTE
Acute Care - Physical Therapy Treatment Note  [place] [facility]     Patient Name: Ronnell Rizvi  : 1950  MRN: [id number]  Today's Date: 2024   Onset of Illness/Injury or Date of Surgery: 07/15/24  Visit Dx:     ICD-10-CM ICD-9-CM   1. Ischemic stroke  I63.9 434.91   2. Weakness of left side of body  R53.1 728.87   3. Dysarthria  R47.1 784.51   4. Oropharyngeal dysphagia [R13.12]  R13.12 787.22     Patient Active Problem List   Diagnosis    Anemia, unspecified    Vitamin B12 deficiency    Chronic fatigue    Benign prostatic hypertrophy (BPH) with weak urinary stream    Chronic bronchitis    ELIZABETH (obstructive sleep apnea)    Sinoatrial block    Cerebral aneurysm, nonruptured    TIA (transient ischemic attack)    Intracranial vascular stenosis    Sinus pause    S/P percutaneous patent foramen ovale closure    TIA on medication    HTN (hypertension)    COPD (chronic obstructive pulmonary disease)    Nocturnal seizures    Encounter for screening for malignant neoplasm of colon    Seizure    HL (hearing loss)    Pre-syncope    Mixed hyperlipidemia    History of stroke    Melena    Irritable bowel syndrome with both constipation and diarrhea    Gastroesophageal reflux disease    Stroke    CVA (cerebral vascular accident)     Past Medical History:   Diagnosis Date    Acquired dyslexia     pt  reports after 3rd stroke    Allergic     Allergic rhinitis     Anal fissure     Aortic insufficiency     moderate, THIERRY 2017    Asthma     Asthma     Benign prostatic hypertrophy (BPH) with weak urinary stream 2016    Cataract     Chronic bronchitis     Colon polyp     COPD (chronic obstructive pulmonary disease)     Emphysema lung     Falls frequently     Fatty liver     GERD (gastroesophageal reflux disease)     Headache     Hepatitis     thought to be Hepatitis A     History of pneumonia     With left lower lobe atelectasis and scar tissue, being followed    HL (hearing loss)     Hypertension     Low back pain      Macular degeneration     Memory loss     Mixed hyperlipidemia 01/30/2023    Obstructive sleep apnea syndrome 08/23/2017    refuses c-pap    PFO (patent foramen ovale)     s/p percutaneous closure with a 25mm Amplatzer device in December 2018    Pneumonia     LLL with scar tissue    Seizures     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 total of 3 strokes    Tremor     Comes & goes    Vision loss      Past Surgical History:   Procedure Laterality Date    CARDIAC CATHETERIZATION N/A 12/12/2018    Procedure: Patent foramen ovale closure- [employer];  Surgeon: Deangelo Harris MD;  Location:  [facility];  Service: Cardiology    CARDIAC CATHETERIZATION N/A 12/12/2018    Procedure: Intracardiac echocardiogram;  Surgeon: Deangelo Harris MD;  Location:  [facility];  Service: Cardiology    CARDIAC ELECTROPHYSIOLOGY PROCEDURE N/A 03/26/2018    Procedure: Loop insertion   CONFIRM RX;  Surgeon: Luis Wyatt MD;  Location:  [facility];  Service: Cardiovascular    CARDIAC ELECTROPHYSIOLOGY PROCEDURE N/A 07/15/2020    Procedure: Loop recorder removal;  Surgeon: Starr Driscoll MD;  Location:  [facility];  Service: Cardiovascular;  Laterality: N/A;    CARDIAC SURGERY      CATARACT EXTRACTION Bilateral     COLONOSCOPY      COLONOSCOPY N/A 10/09/2020    Procedure: COLONOSCOPY with polypectomy;  Surgeon: Ras Mendoza MD;  Location: [facility] OR;  Service: Gastroenterology;  Laterality: N/A;  diverticulosis  ascending polyp  transverse polyp  sigmoid polyps X 2  rectal polyp    EYE SURGERY      HAND SURGERY Bilateral     INGUINAL HERNIA REPAIR Left     OTHER SURGICAL HISTORY      inguinal hernia repair for person over age 5    TONSILLECTOMY      TONSILLECTOMY       PT Assessment (Last 12 Hours)       PT Evaluation and Treatment       Row Name 07/17/24 0803          Physical Therapy  Time and Intention    Subjective Information no complaints  -BP     Document Type therapy note (daily note)  -BP     Mode of Treatment physical therapy  -BP     Patient Effort good  -BP     Symptoms Noted During/After Treatment fatigue  -BP       Row Name 07/17/24 0803          General Information    Patient Profile Reviewed yes  -BP     Patient/Family/Caregiver Comments/Observations Patient supine in bed with HOB elevated. Patient agreeable to PT treatment. Patient to have THIERRY this morning.  -BP     Existing Precautions/Restrictions fall  -BP     Risks Reviewed patient:;LOB;increased discomfort  -BP     Benefits Reviewed patient:;improve function;increase independence;increase strength  -BP     Barriers to Rehab none identified  -BP       Row Name 07/17/24 0803          Pain    Pre/Posttreatment Pain Comment denies pain  -BP       Row Name 07/17/24 0803          Cognition    Personal Safety Interventions gait belt;nonskid shoes/slippers when out of bed  -BP       Row Name 07/17/24 0803          Bed Mobility    Bed Mobility supine-sit  -BP     Supine-Sit [place] (Bed Mobility) supervision  -BP     Assistive Device (Bed Mobility) head of bed elevated  -BP       Row Name 07/17/24 0803          Transfers    Transfers sit-stand transfer;stand-sit transfer  -BP     Comment, (Transfers) verbal cues for hand placement as well as assist to maintain L UE on device prior to transfer to stand. Verbal cues to complete directional change prior to sit.  -BP       Row Name 07/17/24 0803          Sit-Stand Transfer    Sit-Stand [place] (Transfers) contact guard;minimum assist (75% patient effort);verbal cues  -BP     Assistive Device (Sit-Stand Transfers) walker, front-wheeled  -BP       Row Name 07/17/24 0803          Stand-Sit Transfer    Stand-Sit [place] (Transfers) contact guard;minimum assist (75% patient effort);verbal cues  -BP     Assistive Device (Stand-Sit Transfers) walker, front-wheeled  -BP       Row  Name 07/17/24 0803          Gait/Stairs (Locomotion)    [place] Level (Gait) minimum assist (75% patient effort);verbal cues;moderate assist (50% patient effort)  -BP     Assistive Device (Gait) walker, front-wheeled  -BP     Distance in Feet (Gait) 96  -BP     Comment, (Gait/Stairs) Patient continues to require min A for weight shifting to advance L LE during swing. Without assist provided, patient unable to safely advance L LE. Patient with improved management of device.  -BP       Row Name 07/17/24 0803          Safety Issues, Functional Mobility    Safety Issues Affecting Function (Mobility) positioning of assistive device  -BP       Row Name 07/17/24 0803          Balance    Comment, Balance static sitting balance-supervision. Static standing balance-CGA, dynamic standing balance-min A.  -BP       Row Name 07/17/24 0803          Motor Skills    Therapeutic Exercise --  Patient to have THIERRY, will hold exercises until PM session.  -BP       Row Name 07/17/24 0803          Plan of Care Review    Plan of Care Reviewed With patient  -BP     Outcome Evaluation PT: Patient performs supine to sit transfer with supervision, sit to/from stand transfers with CGA/min A, and gait x 96 feet with min A with use of FWW. Patient continues to require min A for weight shifting during swing to safely advance L LE. Without assist provided, patient unable to fully advance L LE or clear foot from the ground adequately. Patient with improved management of device during forward gait training. Requires cues for safety during transfers and directional changes. Patient to have THIERRY following session, will see this afternoon for further mobility training as well as ther ex. Continue to recommend acute rehab at discharge.  -BP       Row Name 07/17/24 0803          Positioning and Restraints    Pre-Treatment Position in bed  -BP     Post Treatment Position chair  -BP     In Chair reclined;call light within reach;encouraged to call for  assist  -BP       Row Name 07/17/24 0803          Progress Summary (PT)    Progress Toward Functional Goals (PT) progress toward functional goals is gradual  -BP       Row Name 07/17/24 0803          Therapy Plan Review/Discharge Plan (PT)    Therapy Plan Review (PT) risks/benefits reviewed;patient  -BP               User Key  (r) = Recorded By, (t) = Taken By, (c) = Cosigned By      Initials Name Provider Type    BP Prosper Jarquin, PT Physical Therapist                    Physical Therapy Education       Title: PT OT SLP Therapies (Done)       Topic: Physical Therapy (Done)       Point: Mobility training (Done)       Learning Progress Summary             Patient Acceptance, E,TB, VU by  at 7/17/2024 0909    Acceptance, E,TB, VU by BP at 7/16/2024 1507    Acceptance, E,TB, VU by  at 7/16/2024 1329                         Point: Home exercise program (Done)       Learning Progress Summary             Patient Acceptance, E,TB, VU by  at 7/16/2024 1507                                         User Key       Initials Effective Dates Name Provider Type Located within [facility] 06/16/21 -  Prosper Jarquin, PT Physical Therapist PT                  PT Recommendation and Plan  Anticipated Discharge Disposition (PT): inpatient rehabilitation facility  Planned Therapy Interventions (PT): balance training, bed mobility training, gait training, home exercise program, patient/family education, transfer training, strengthening  Therapy Frequency (PT): other (see comments)  Progress Summary (PT)  Progress Toward Functional Goals (PT): progress toward functional goals is gradual  Plan of Care Reviewed With: patient  Outcome Evaluation: PT: Patient performs supine to sit transfer with supervision, sit to/from stand transfers with CGA/min A, and gait x 96 feet with min A with use of FWW. Patient continues to require min A for weight shifting during swing to safely advance L LE. Without assist provided, patient unable to fully  advance L LE or clear foot from the ground adequately. Patient with improved management of device during forward gait training. Requires cues for safety during transfers and directional changes. Patient to have THIERRY following session, will see this afternoon for further mobility training as well as ther ex. Continue to recommend acute rehab at discharge.   Outcome Measures       Row Name 07/17/24 0803 07/16/24 1436 07/16/24 0930       How much help from another person do you currently need...    Turning from your back to your side while in flat bed without using bedrails? 3  -BP 3  -BP --    Moving from lying on back to sitting on the side of a flat bed without bedrails? 3  -BP 3  -BP --    Moving to and from a bed to a chair (including a wheelchair)? 3  -BP 3  -BP --    Standing up from a chair using your arms (e.g., wheelchair, bedside chair)? 3  -BP 3  -BP --    Climbing 3-5 steps with a railing? 2  -BP 2  -BP --    To walk in hospital room? 3  -BP 2  -BP --    AM-PAC 6 Clicks Score (PT) 17  -BP 16  -BP --    Highest Level of Mobility Goal 5 --> Static standing  -BP 5 --> Static standing  -BP --       How much help from another is currently needed...    Putting on and taking off regular lower body clothing? -- -- 2  -JJ    Bathing (including washing, rinsing, and drying) -- -- 2  -JJ    Toileting (which includes using toilet bed pan or urinal) -- -- 3  -JJ    Putting on and taking off regular upper body clothing -- -- 4  -JJ    Taking care of personal grooming (such as brushing teeth) -- -- 4  -JJ    Eating meals -- -- 4  -JJ    AM-PAC 6 Clicks Score (OT) -- -- 19  -JJ       Modified Ogle Scale    Pre-Stroke Modified Ogle Scale -- -- 1 - No significant disability despite symptoms.  Able to carry out all usual duties and activities.  -    Modified [place] Scale -- -- 4 - Moderately severe disability.  Unable to walk without assistance, and unable to attend to own bodily needs without assistance.  -J        Functional Assessment    Outcome Measure Options AM-PAC 6 Clicks Basic Mobility (PT)  -BP AM-PAC 6 Clicks Basic Mobility (PT)  -BP AM-PAC 6 Clicks Daily Activity (OT)  -JJ              User Key  (r) = Recorded By, (t) = Taken By, (c) = Cosigned By      Initials Name Provider Type    Saskia García, OTR Occupational Therapist    Prosper Benitez PT Physical Therapist                     Time Calculation:    PT Charges       Row Name 07/17/24 0910             Time Calculation    Start Time 0803  -BP      Stop Time 0812  -BP      Time Calculation (min) 9 min  -BP      PT Received On 07/17/24  -BP      PT - Next Appointment 07/17/24  -BP         Timed Charges    57153 - Gait Training Minutes  9  -BP         Total Minutes    Timed Charges Total Minutes 9  -BP       Total Minutes 9  -BP                User Key  (r) = Recorded By, (t) = Taken By, (c) = Cosigned By      Initials Name Provider Type    Prosper Benitez, PT Physical Therapist                  Therapy Charges for Today       Code Description Service Date Service Provider Modifiers Qty    73134774911 HC PT EVAL LOW COMPLEXITY 2 7/16/2024 Prosper Jarquin, PT GP 1    09813130691 HC GAIT TRAINING EA 15 MIN 7/16/2024 Prosper Jarquin, PT GP 1    [id number] HC GAIT TRAINING EA 15 MIN 7/17/2024 Prosper Jarquin, PT GP 1            PT G-Codes  Outcome Measure Options: AM-PAC 6 Clicks Basic Mobility (PT)  AM-PAC 6 Clicks Score (PT): 17  AM-PAC 6 Clicks Score (OT): 19  Modified [place] Scale: 4 - Moderately severe disability.  Unable to walk without assistance, and unable to attend to own bodily needs without assistance.    Prosper Jarquin PT  7/17/2024

## 2024-07-18 LAB — LV EF 2D ECHO EST: 60 %

## 2024-07-18 NOTE — CASE MANAGEMENT/SOCIAL WORK
Case Management Discharge Note      Final Note: Discharged to [facility] - Discharged on 7/17/2024 Admission date: 7/15/2024 - Discharge disposition: Skilled Nursing Facility (DC - External)      Destination    No services have been selected for the patient.                Durable Medical Equipment    No services have been selected for the patient.                Dialysis/Infusion    No services have been selected for the patient.                Home Medical Care    No services have been selected for the patient.                Therapy    No services have been selected for the patient.                Community Resources    No services have been selected for the patient.                Community & DME    No services have been selected for the patient.                         Final Discharge Disposition Code: 03 - skilled nursing facility (SNF)

## 2024-07-24 ENCOUNTER — TELEPHONE (OUTPATIENT)
Dept: INTERNAL MEDICINE | Facility: CLINIC | Age: 74
End: 2024-07-24

## 2024-07-24 NOTE — TELEPHONE ENCOUNTER
Caller: Joey Rizvi    Relationship to patient: Emergency Contact    Best call back number: [phone]     Chief complaint: FOLLOW UP, RECENT STROKE    Type of visit: HOSPITAL FOLLOW UP    Requested date: WITHIN A WEEK OF 7/29/24    If rescheduling, when is the original appointment: 7/24/24    Additional notes:PATIENT'S WIFE STATES THAT HE IS STILL CURRENTLY AT [facility] FOLLOWING A STROKE, BUT IS EXPECTED TO BE DISCHARGED ON SUNDAY. NO HOSPITAL FOLLOW UP WITH PCP AVAILABLE FOR THE FOLLOWING WEEK. PLEASE CALL TO SCHEDULE

## 2024-07-25 ENCOUNTER — READMISSION MANAGEMENT (OUTPATIENT)
Dept: CALL CENTER | Facility: HOSPITAL | Age: 74
End: 2024-07-25
Payer: MEDICARE

## 2024-07-25 ENCOUNTER — PATIENT OUTREACH (OUTPATIENT)
Dept: CASE MANAGEMENT | Facility: OTHER | Age: 74
End: 2024-07-25
Payer: MEDICARE

## 2024-07-25 NOTE — OUTREACH NOTE
Prep Survey      Flowsheet Row Responses   [facility] patient discharged from? Non-BH   Is LACE score < 7 ? Non-BH Discharge   Eligibility [facility]   Date of Discharge 07/28/24   Discharge Disposition Home or Self Care   Discharge diagnosis unknown   Does the patient have one of the following disease processes/diagnoses(primary or secondary)? Other   Does the patient have Home health ordered? No   Is there a DME ordered? No   Comments regarding appointments plan outpatient PT, OT and ST   Prep survey completed? Yes            Carissa WILSON - Registered Nurse

## 2024-07-25 NOTE — OUTREACH NOTE
AMBULATORY CASE MANAGEMENT NOTE    Names and Relationships of Patient/Support Persons: Contact: Sanjuanita/ [facility]; Relationship: Other -       Care Coordination  RN-ACM outreach to [facility] [phone]. Talked with Sanjuanita/ Case Management stating patient is scheduled for discharge home on 7/28/24 and outpatient PT, OT and ST has been arranged . Note routed to Readmission Management for TCM.     SNF Follow-up    Questions/Answers      Flowsheet Row Responses   Acute Facility Discharged From [facility]   Acute Discharge Date 07/17/24   Name of the Skilled Nursing Facility? [facility]   Purpose of SNF Admission PT, OT, SN   Estimated length of stay for the patient? TBD   Who is the insurance provider or payor of patient stay? Medicare   Progression of Patient? Talked with Sanjuanita/ Case Terry who states patient is scheduled to discharged home on 7/28/24 and arranged outpatient PT, OT and ST.   Skilled Nursing Discharge Date? 07/28/24          Nieyc GARCIA  Ambulatory Case Management    7/25/2024, 10:55 EDT

## 2024-07-26 ENCOUNTER — TRANSITIONAL CARE MANAGEMENT TELEPHONE ENCOUNTER (OUTPATIENT)
Dept: CALL CENTER | Facility: HOSPITAL | Age: 74
End: 2024-07-26
Payer: MEDICARE

## 2024-07-26 ENCOUNTER — TELEPHONE (OUTPATIENT)
Dept: NEUROLOGY | Facility: CLINIC | Age: 74
End: 2024-07-26
Payer: MEDICARE

## 2024-07-26 NOTE — OUTREACH NOTE
Call Center TCM Note      Flowsheet Row Responses   [facility] patient discharged from? Non-   Does the patient have one of the following disease processes/diagnoses(primary or secondary)? Other   TCM attempt successful? Yes   Call start time 0919   Change in Health Status Readmitted  [[facility]]   Call end time 0922   Person spoke with today (if not patient) and relationship pt   Does the patient have an appointment with their PCP within 7-14 days of discharge? Yes  [7/31/2024 11:00 AM]   Call end time 0922            Stephy Evans RN    7/26/2024, 09:23 EDT

## 2024-07-26 NOTE — TELEPHONE ENCOUNTER
Caller: Joey Rizvi    Relationship to patient: Emergency Contact    Best call back number: [phone]    New or established patient?  [] New  [x] Established    Date of discharge: 7/17/2024    Facility discharged from: [facility]    Diagnosis/Symptoms: STROKE    Length of stay (If applicable): 2 DAYS    Specialty Only: Did you see a [facility] provider?    [x] Yes  [] No  If so, who? DR GUZMÁN      WAS ADVISED TO FOLLOW UP IN 2 WEEKS. JUST DISCHARGED FROM REHAB FACILITY. PLEASE ADVISE ON SCHEDULING, THANK YOU.

## 2024-07-28 ENCOUNTER — READMISSION MANAGEMENT (OUTPATIENT)
Dept: CALL CENTER | Facility: HOSPITAL | Age: 74
End: 2024-07-28
Payer: MEDICARE

## 2024-07-29 ENCOUNTER — TRANSITIONAL CARE MANAGEMENT TELEPHONE ENCOUNTER (OUTPATIENT)
Dept: CALL CENTER | Facility: HOSPITAL | Age: 74
End: 2024-07-29
Payer: MEDICARE

## 2024-07-29 NOTE — OUTREACH NOTE
Prep Survey      Flowsheet Row Responses   [facility] facility patient discharged from? Non-BH   Is LACE score < 7 ? Non-BH Discharge   Eligibility [facility] - In[facility]   Date of Discharge 07/28/24   Discharge Disposition Home or Self Care   Discharge diagnosis Hemiplegia and hemiparesis following cerebral infarction affecting left non-dominant side   Does the patient have one of the following disease processes/diagnoses(primary or secondary)? Other   Does the patient have Home health ordered? No   Prep survey completed? Yes            Yennifer WILSON - Registered Nurse

## 2024-07-29 NOTE — OUTREACH NOTE
Call Center TCM Note      Flowsheet Row Responses   [facility] patient discharged from? Non-  [[facility]]   Does the patient have one of the following disease processes/diagnoses(primary or secondary)? Other   TCM attempt successful? Yes   Call start time 1332   Call end time 1338   General alerts for this patient Please call spouse for f/u calls   Discharge diagnosis Hemiplegia and hemiparesis following cerebral infarction affecting left non-dominant side   Person spoke with today (if not patient) and relationship Spouse, Joey Diego reviewed with patient/caregiver? Yes   Is the patient having any side effects they believe may be caused by any medication additions or changes? No   Does the patient have all medications ordered at discharge? No   What is keeping the patient from filling the prescriptions? --  [Has one RX picked up, will p/u other RX's when ready, which should be today]   Is the patient taking all medications as directed (includes completed medication regime)? Yes   Medication comments Spouse has some questions regarding pt's medication names. She is heading to pharmacy to p/u new RX's and will talk to pharmacy.   Comments Hospital d/c f/u appt on 7/31/24 @11am   Does the patient have an appointment with their PCP within 7-14 days of discharge? Yes   Has home health visited the patient within 72 hours of discharge? N/A   DME comments pt uses a walker/cane for balance   Psychosocial issues? No   Did the patient receive a copy of their discharge instructions? Yes   What is the patient's perception of their health status since discharge? Improving  [Has some left sided weakness, has improved alot. Can ambulate some w/o walker, has had some speech improvements]   Is the patient/caregiver able to teach back the hierarchy of who to call/visit for symptoms/problems? PCP, Specialist, Home health nurse, Urgent Care, ED, 911 Yes   TCM call completed? Yes   Call end time 1338   Would this patient  benefit from a Referral to [facility] Social Work? No   Is the patient interested in additional calls from an ambulatory ? No            Donna Coleman RN    7/29/2024, 13:40 EDT

## 2024-07-31 ENCOUNTER — OFFICE VISIT (OUTPATIENT)
Dept: INTERNAL MEDICINE | Facility: CLINIC | Age: 74
End: 2024-07-31
Payer: MEDICARE

## 2024-07-31 VITALS
HEART RATE: 65 BPM | BODY MASS INDEX: 24.05 KG/M2 | DIASTOLIC BLOOD PRESSURE: 80 MMHG | TEMPERATURE: 98 F | OXYGEN SATURATION: 96 % | SYSTOLIC BLOOD PRESSURE: 130 MMHG | WEIGHT: 177.4 LBS

## 2024-07-31 DIAGNOSIS — E78.2 MIXED HYPERLIPIDEMIA: ICD-10-CM

## 2024-07-31 DIAGNOSIS — I10 PRIMARY HYPERTENSION: ICD-10-CM

## 2024-07-31 DIAGNOSIS — R56.9 SEIZURE: ICD-10-CM

## 2024-07-31 DIAGNOSIS — Z09 HOSPITAL DISCHARGE FOLLOW-UP: Primary | ICD-10-CM

## 2024-07-31 DIAGNOSIS — I63.9 CEREBROVASCULAR ACCIDENT (CVA), UNSPECIFIED MECHANISM: ICD-10-CM

## 2024-07-31 PROCEDURE — 99495 TRANSJ CARE MGMT MOD F2F 14D: CPT | Performed by: INTERNAL MEDICINE

## 2024-07-31 PROCEDURE — 3075F SYST BP GE 130 - 139MM HG: CPT | Performed by: INTERNAL MEDICINE

## 2024-07-31 PROCEDURE — 3079F DIAST BP 80-89 MM HG: CPT | Performed by: INTERNAL MEDICINE

## 2024-07-31 PROCEDURE — 1111F DSCHRG MED/CURRENT MED MERGE: CPT | Performed by: INTERNAL MEDICINE

## 2024-07-31 PROCEDURE — 1126F AMNT PAIN NOTED NONE PRSNT: CPT | Performed by: INTERNAL MEDICINE

## 2024-07-31 RX ORDER — AMANTADINE HYDROCHLORIDE 50 MG/5ML
50 SOLUTION ORAL
COMMUNITY
Start: 2024-07-30

## 2024-07-31 RX ORDER — HYDROCHLOROTHIAZIDE 12.5 MG/1
12.5 TABLET ORAL DAILY
Qty: 90 TABLET | Refills: 1 | Status: SHIPPED | OUTPATIENT
Start: 2024-07-31 | End: 2024-08-04

## 2024-07-31 NOTE — PROGRESS NOTES
Transitional Care Follow Up Visit  Subjective     Ronnell Rizvi is a 74 y.o. male who presents for a transitional care management visit.    Within 48 business hours after discharge our office contacted him via telephone to coordinate his care and needs.      I reviewed and discussed the details of that call along with the discharge summary, hospital problems, inpatient lab results, inpatient diagnostic studies, and consultation reports with Ronnell.     Current outpatient and discharge medications have been reconciled for the patient.  Reviewed by: Gladis Ron MD          7/28/2024    11:13 PM   Date of TCM Phone Call   [facility] - Inpatient   Date of Discharge 7/28/2024   Discharge Disposition Home or Self Care     Risk for Readmission (LACE) Score: 10 (7/17/2024  6:00 AM)      Follow-up     Course During Hospital Stay:  Pt was admitted to [facility] 7/15-7/717 bc of acute cva, htn, and dyslipidemia.  He was on dilantin, plavix and asa but changd to brilinta, lacosamide, and ASA to decrease med interactions.  He is also on protonix and crestor.  After d/c he was sent to [facility] rehab on [address].  His speech is getting better.  He still looses balance.  He has left sided weakness.  He is walking with a cane.  He is transitionin to outpaient pt/ot/speech at [facility].     Htn - off hctz.  Sbp's at home 140s.     Pt has sleep apnea on problem list but per pt and his wife this is only if pt sleeps on his back.  He does not have a cpap.      Amantadine was started bout a week ago    The following portions of the patient's history were reviewed and updated as appropriate: allergies, current medications, past family history, past medical history, past social history, past surgical history, and problem list.    Review of Systems   Constitutional: Negative.    HENT: Negative.     Eyes: Negative.    Respiratory: Negative.     Cardiovascular: Negative.    Gastrointestinal:  Negative.    Endocrine: Negative.    Genitourinary: Negative.    Musculoskeletal: Negative.    Skin: Negative.    Allergic/Immunologic: Negative.    Neurological: Negative.    Hematological: Negative.    Psychiatric/Behavioral: Negative.     All other systems reviewed and are negative.      Objective   /80 (BP Location: Left arm, Patient Position: Sitting, Cuff Size: Adult)   Pulse 65   Temp 98 °F (36.7 °C) (Infrared)   Wt 80.5 kg (177 lb 6.4 oz)   SpO2 96%   BMI 24.05 kg/m²     Physical Exam  Vitals and nursing note reviewed.   Constitutional:       General: He is not in acute distress.     Appearance: He is well-developed.   HENT:      Head: Normocephalic and atraumatic.      Right Ear: External ear normal.      Left Ear: External ear normal.      Nose: Nose normal.   Eyes:      Conjunctiva/sclera: Conjunctivae normal.      Pupils: Pupils are equal, round, and reactive to light.   Cardiovascular:      Rate and Rhythm: Normal rate and regular rhythm.      Heart sounds: Normal heart sounds.   Pulmonary:      Effort: Pulmonary effort is normal. No respiratory distress.      Breath sounds: Normal breath sounds. No wheezing.   Musculoskeletal:         General: Normal range of motion.      Cervical back: Normal range of motion and neck supple.      Comments: Normal gait   Skin:     General: Skin is warm and dry.   Neurological:      Mental Status: He is alert and oriented to person, place, and time.      Comments: Left sided weakness  Mild speech changes   Psychiatric:         Mood and Affect: Mood normal.         Behavior: Behavior normal.         Thought Content: Thought content normal.         Judgment: Judgment normal.         Common labs          7/11/2024    18:05 7/12/2024    04:37 7/15/2024    15:35   Common Labs   Glucose 95  88  100    BUN 11  11  10    Creatinine 0.96  0.82  0.92    Sodium 136  140  138    Potassium 3.6  3.4  4.0    Chloride 101  105  103    Calcium 9.1  8.9  8.9    Albumin 3.7   3.7  3.9    Total Bilirubin 0.2  0.2  0.2    Alkaline Phosphatase 92  84  97    AST (SGOT) 17  17  19    ALT (SGPT) 13  13  13    WBC 5.65  5.08  5.99    Hemoglobin 14.0  14.1  15.2    Hematocrit 40.3  41.3  44.3    Platelets 194  188  206    Total Cholesterol  203     Triglycerides  58     HDL Cholesterol  56     LDL Cholesterol   136     Hemoglobin A1C  5.37         FL Video Swallow With Speech Single Contrast     Result Date: 7/16/2024  Narrative: FL VIDEO SWALLOW W SPEECH SINGLE-CONTRAST Date of Exam: 7/16/2024 3:37 PM EDT Indication: DIFFICULTY SWALLOWING recent stroke (7/11/24) w/progression; concerns for aspiration; to determine safe liquid level.. Comparison: None available. Technique: Multiple consistencies mixed with barium were administered to the patient with cine/video imaging.  Imaging assistance was provided to the speech pathologist and an image was saved. Fluoroscopic Time: 3.3 minutes Number of Images: 0 Findings: Please see the speech pathologist's notes      Impression: Impression: Please see the speech pathologist notes for findings and dietary recommendations Electronically Signed: Krunal Lagos MD  7/16/2024 3:41 PM EDT  Workstation ID: [id number]     MRI Brain Without Contrast     Result Date: 7/15/2024  Narrative: MRI BRAIN WO CONTRAST Date of Exam: 7/15/2024 3:50 PM EDT Indication: acute stroke.  Comparison: CT same day and MRI 7/12/2024 Technique:  Routine multiplanar/multisequence sequence images of the brain were obtained without contrast administration. Findings: Areas of acute diffusion restriction abnormality demonstrates mild progression within the previously noted distribution within the deep white matter, corona radiata noted on the comparison study. No new areas are noted of acute diffusion restriction abnormality. The ventricles, cisterns and sulci appear stable. No evidence of acute hemorrhage noted. Remote infarct with encephalomalacia in the left occipital region again noted.  There is associated gliosis of the surrounding brain parenchyma. There is extensive patchy and confluent FLAIR and T2 signal hyperintensity noted within the supratentorial brain compatible with sequela small vessel ischemic disease. Changes also noted within the alonzo and midbrain. Major intracranial flow voids appear grossly patent. Mild mucosal thickening noted. Globes and orbits appear stable. Mastoid air cells appear stable. The midline structures of the brain appear stable.      Impression: Impression: 1. Acute diffusion restriction abnormality within the deep white matter/corona radiata on the right appears similar in distribution with slight increase in size compatible with progression/evolution of small acute/subacute infarctions. 2. No evidence of acute hemorrhage. 3. Chronic white matter changes similar to the prior study as well as sequela of prior remote left occipital infarct. Findings were discussed with the emergency department at the time of interpretation Electronically Signed: Star Khalil MD  7/15/2024 4:25 PM EDT  Workstation ID: [id number]     XR Chest 1 View     Result Date: 7/15/2024  Narrative: XR CHEST 1 VW Date of Exam: 7/15/2024 3:33 PM EDT Indication: Acute Stroke Protocol (Onset < 12 hrs) Comparison: Chest radiograph performed on July 11, 2024 Findings: No focal consolidation or effusion.  There is no evidence of pneumothorax.  The pulmonary vasculature appears within normal limits.  The cardiac and mediastinal silhouette appear unremarkable.  No acute osseous abnormality identified.      Impression: Impression: No radiographic evidence of acute chest process. Electronically Signed: Negrito Kemp MD  7/15/2024 3:45 PM EDT  Workstation ID: [id number]     CT Head Without Contrast Stroke Protocol     Result Date: 7/15/2024  Narrative: CT HEAD WO CONTRAST STROKE PROTOCOL Date of Exam: 7/15/2024 3:36 PM EDT Indication: Neuro deficit, acute, stroke suspected Neuro Deficit, acute, Stroke  suspected. Comparison: 7/12/2024 MRI and 7/11/2024 CT Technique: Axial CT images were obtained of the head without contrast administration.  Reconstructed coronal and sagittal images were also obtained. Automated exposure control and iterative construction methods were used. Scan Time: 15:31 Findings: There is no evidence of acute intracranial hemorrhage, mass, midline shift, new loss of gray-white matter differentiation, or extra-axial fluid collection. There is a left parieto-occipital encephalomalacia. Subcortical and periventricular white matter hypodensities consistent with sequela of chronic small vessel ischemic disease. There is global parenchymal volume loss with ex vacuo dilation of the ventricular system. The basal cisterns are patent. There is no evidence of fracture or suspicious osseous lesion. Paranasal sinuses and mastoid air cells are well aerated. There is bilateral ocular surgery. The included soft tissues are within normal limits.      Impression: Impression: No acute intracranial abnormality. Chronic findings as above. Electronically Signed: Remy Flower MD  7/15/2024 3:42 PM EDT  Workstation ID: [id number]     Adult Transthoracic Echo Complete W/ Cont if Necessary Per Protocol (With Agitated Saline)     Result Date: 7/12/2024  Narrative:   Left ventricular systolic function is normal. Calculated left ventricular EF = 64.4%   Left ventricular wall thickness is consistent with hypertrophy. Sigmoid-shaped ventricular septum is present.   Left ventricular diastolic function was normal.   There is mild calcification of the aortic valve.   Systolic anterior motion of the chordal apparatus is present. There is no outflow tract obstruction.   Estimated right ventricular systolic pressure from tricuspid regurgitation is normal (<35 mmHg).      US Carotid Bilateral     Result Date: 7/12/2024  Narrative: US CAROTID BILATERAL Date of Exam: 7/12/2024 9:53 AM EDT Indication: soft plaques. Comparison: CT  angiogram of the neck 7/11/2024 Technique: Grayscale, color-flow, and spectral imaging was obtained of the bilateral carotid and vertebral arteries. Findings: The peak right common carotid artery velocity is 100 cm/s. The peak right internal carotid artery velocity is 96 cm/s. The IC/CC ratio on the right carotid bifurcation is 0.96. Grayscale and color flow imaging shows plaque with shadowing primarily in the  proximal right internal carotid artery. There is no evidence of flow acceleration. The right vertebral artery is patent with antegrade flow. The peak left common carotid artery velocity is 100 cm/s. The peak left internal carotid artery velocity is 112 cm/s. The IC/CC ratio is 1.12. Grayscale and color flow imaging shows plaque in the left carotid bulb extending into the proximal ICA without a definite flow limiting stenosis. The left vertebral artery is patent with antegrade flow.      Impression: Impression: 1. Atherosclerotic plaque in both carotid bifurcations extending into both internal carotid arteries. The degree of narrowing is less than 50% by consensus panel criteria. 2. Patent vertebral arteries bilaterally with antegrade flow. Electronically Signed: Krunal Lagos MD  7/12/2024 10:35 AM EDT  Workstation ID: [id number]     MRI Brain Without Contrast     Result Date: 7/12/2024  Narrative: MRI BRAIN WO CONTRAST Date of Exam: 7/12/2024 9:54 AM EDT Indication: Stroke, follow up stroke protocol.  Comparison: CTA head stroke study 7/11/2022 Technique:  Routine multiplanar/multisequence sequence images of the brain were obtained without contrast administration. Findings: There are multiple small punctate foci of restricted diffusion in the right corona radiata consistent with small acute infarcts. There is no evidence of acute or chronic intracranial hemorrhage. No mass effect or midline shift. No abnormal extra-axial collections. The basal ganglia, brainstem and cerebellum appear within normal limits.  Midline structures are intact. There is age-related diffuse cortical atrophy. There are prominent patchy white matter hyperintensities in keeping with chronic microvascular ischemic  disease. There is encephalomalacia and gliosis in the left occipital lobe related to prior infarct. Remote lacunar infarct of the right thalamus. Calvarial and superficial soft tissue signal is within normal limits. Orbits appear unremarkable. There is trace mucosal thickening in the bilateral ethmoid air cells. The mastoid air cells are clear.      Impression: Impression: 1. Multiple small acute infarcts of the right corona radiata. 2. Age-related atrophy, chronic microvascular ischemic disease and remote left occipital lobe infarct. Electronically Signed: Madelyn Dale MD  7/12/2024 10:09 AM EDT  Workstation ID: [id number]     CT Angiogram Head w AI Analysis of LVO     Result Date: 7/11/2024  Narrative: CT ANGIOGRAM HEAD W AI ANALYSIS OF LVO, CT ANGIOGRAM NECK Date of Exam: 7/11/2024 6:24 PM EDT Indication: Neuro Deficit, acute, Stroke suspected Neuro deficit, acute stroke suspected. Comparison: None available. Technique: CTA of the head and neck was performed after the uneventful intravenous administration of iodinated contrast. Reconstructed coronal and sagittal images were also obtained. In addition, a 3-D volume rendered image was created for interpretation. Automated exposure control and iterative reconstruction methods were used. Findings: The common carotid arteries are normal. Moderate atheromatous disease of the proximal right internal carotid artery without significant stenosis. The extracranial right internal carotid artery is normal otherwise. The intracranial segments of the left internal carotid arteries are normal. The right carotid terminus is normal. Focal severe stenosis of the right M2 branch seen on axial image #278 series 6 which is nearly 60% stenosis completely occluded. Otherwise the visualized segments of the   right anterior and middle cerebral arteries are normal. The left common carotid artery is normal. Moderate atheromatous disease of the left carotid bulb and proximal left internal carotid artery with a minimal residual vessel lumen diameter of 4 mm and a native vessel lumen diameter of 5 mm consistent with approximately 20% stenosis per NASCET criteria. Mild atheromatous disease involving the intracranial segments of the internal carotid arteries. The left carotid terminus is normal. The visualized branches of the left anterior and middle cerebral arteries appear normal. Both vertebral arteries arise from the subclavian arteries. The right vertebral artery is dominant. Left vertebral artery supplies the basilar artery. The basilar artery and basilar artery tip are normal. The basilar artery terminates in bilateral posterior cerebral arteries which appear normal. The lung apices are clear. Mild emphysematous changes of the lung apices. The thyroid gland is normal. No cervical adenopathy. The visualized parotid and submandibular glands are normal. Paranasal sinuses are clear. Bilateral lens replacements. No abnormal intracranial enhancement.      Impression: Focal severe stenosis of the right M2 branch with minimal thready flow noted in the region. No vessel occlusion. Electronically Signed: Sang Silva MD  7/11/2024 7:31 PM EDT  Workstation ID: [id number]     CT Angiogram Neck     Result Date: 7/11/2024  Narrative: CT ANGIOGRAM HEAD W AI ANALYSIS OF LVO, CT ANGIOGRAM NECK Date of Exam: 7/11/2024 6:24 PM EDT Indication: Neuro Deficit, acute, Stroke suspected Neuro deficit, acute stroke suspected. Comparison: None available. Technique: CTA of the head and neck was performed after the uneventful intravenous administration of iodinated contrast. Reconstructed coronal and sagittal images were also obtained. In addition, a 3-D volume rendered image was created for interpretation. Automated exposure control and iterative  reconstruction methods were used. Findings: The common carotid arteries are normal. Moderate atheromatous disease of the proximal right internal carotid artery without significant stenosis. The extracranial right internal carotid artery is normal otherwise. The intracranial segments of the left internal carotid arteries are normal. The right carotid terminus is normal. Focal severe stenosis of the right M2 branch seen on axial image #278 series 6 which is nearly 60% stenosis completely occluded. Otherwise the visualized segments of the  right anterior and middle cerebral arteries are normal. The left common carotid artery is normal. Moderate atheromatous disease of the left carotid bulb and proximal left internal carotid artery with a minimal residual vessel lumen diameter of 4 mm and a native vessel lumen diameter of 5 mm consistent with approximately 20% stenosis per NASCET criteria. Mild atheromatous disease involving the intracranial segments of the internal carotid arteries. The left carotid terminus is normal. The visualized branches of the left anterior and middle cerebral arteries appear normal. Both vertebral arteries arise from the subclavian arteries. The right vertebral artery is dominant. Left vertebral artery supplies the basilar artery. The basilar artery and basilar artery tip are normal. The basilar artery terminates in bilateral posterior cerebral arteries which appear normal. The lung apices are clear. Mild emphysematous changes of the lung apices. The thyroid gland is normal. No cervical adenopathy. The visualized parotid and submandibular glands are normal. Paranasal sinuses are clear. Bilateral lens replacements. No abnormal intracranial enhancement.      Impression: Focal severe stenosis of the right M2 branch with minimal thready flow noted in the region. No vessel occlusion. Electronically Signed: Sang Silva MD  7/11/2024 7:31 PM EDT  Workstation ID: [id number]     XR Chest 1 View     Result  Date: 7/11/2024  Narrative: XR CHEST 1 VW Date of Exam: 7/11/2024 6:21 PM EDT Indication: Acute Stroke Protocol (Onset < 12 hrs) Comparison: 1/21/2024, 9/14/2022, 10/10/2020 FINDINGS: No new consolidations or pleural effusions are observed. The cardiac silhouette and mediastinum are stable. No acute osseous abnormalities are identified.      Impression: There is no significant change when compared to the prior study. There is no evidence for acute cardiopulmonary process. Electronically Signed: Domingo Cabrera MD  7/11/2024 6:38 PM EDT  Workstation ID: [id number]     CT CEREBRAL PERFUSION WITH & WITHOUT CONTRAST     Result Date: 7/11/2024  Narrative: CT CEREBRAL PERFUSION W WO CONTRAST Date of Exam: 7/11/2024 6:25 PM EDT Indication: Neuro deficit, acute, stroke suspected Neuro Deficit, acute, Stroke suspected.  Comparison: None available. Technique: Axial CT images of the brain were obtained prior to and after the administration of iodinated contrast. CT Perfusion protocol was utilized. Automated post processing was performed by RAPID software and submitted to PACS for interpretation. Automated exposure control and iterative reconstruction was utilized. Findings: There is approximately 74 cc of patchy bilateral areas of mildly elevated Tmax. No decreased cerebral blood flow or blood volume.      Impression: No evidence of infarct. Electronically Signed: Sang Silva MD  7/11/2024 6:36 PM EDT  Workstation ID: [id number]     CT Head Without Contrast Stroke Protocol     Result Date: 7/11/2024  Narrative: CT HEAD WO CONTRAST STROKE PROTOCOL Date of Exam: 7/11/2024 6:06 PM EDT Indication: Neuro deficit, acute, stroke suspected Neuro Deficit, acute, Stroke suspected. Comparison: 12/29/2023 Technique: Axial CT images were obtained of the head without contrast administration.  Reconstructed coronal and sagittal images were also obtained. Automated exposure control and iterative construction methods were used. Scan Time: 1812  hours Results discussed with the emergency room at 6:19 p.m. on 7/11/2024. FINDINGS: There is no evidence for acute intracranial hemorrhage. No definitive acute focal ischemia is identified. Nonspecific white matter changes are noted likely related to chronic small vessel ischemic changes and age-related changes. Associated diffuse volume loss is observed. Remote infarction is noted involving the inferior aspect of the left parietal-occipital region. There is also evidence for remote infarction involving the region of the right basal ganglia and right thalamus. These changes are stable. There is no evidence for abnormal cerebral edema. There is no mass effect or midline shift. The ventricular system is nondilated. The basilar cisterns are patent. The skull is intact. The paranasal sinuses and mastoid air cells are clear.      Impression: 1.No evidence for acute intracranial abnormality. 2.Nonspecific white matter changes are noted with associated diffuse volume loss. These findings are likely related to chronic small vessel ischemic changes and/or age-related changes. 3.Multifocal remote infarcts are again noted which appear stable. Electronically Signed: Domingo Cabrera MD  7/11/2024 6:22 PM EDT  Workstation ID: [id number]   Assessment & Plan   Diagnoses and all orders for this visit:    1. Hospital discharge follow-up (Primary)    2. Cerebrovascular accident (CVA), unspecified mechanism - on brilinta, asa, crestor.  Has f/u with neuro scheduled.  PT/OT/Speech therapy scheduled at [facility].     3. Primary hypertension - previously on hctz.  BP a little high.  Restart hctz    4. Mixed hyperlipidemia - on max dose crestor    5. Seizure - on lacosamide.     Ok to take senna-s PRN constipation rather than bid scheduled.     Cont Pantoprazole daily.    Keep f/u appt with dr. Tarango in October.  F/u sooner if any issues arise.

## 2024-08-01 ENCOUNTER — PATIENT OUTREACH (OUTPATIENT)
Dept: CASE MANAGEMENT | Facility: OTHER | Age: 74
End: 2024-08-01
Payer: MEDICARE

## 2024-08-01 NOTE — OUTREACH NOTE
AMBULATORY CASE MANAGEMENT NOTE    Names and Relationships of Patient/Support Persons: Contact: Joey Rizvi; Relationship: Emergency Contact -     Patient Outreach  RN-ACM outreach with patient's spouse following Call Center Handoff. Discussed 7/15/24 to 7/17/24 hospitalization regarding stroke. Spouse states patient discharged home and will scheduled to receive outpatient  PT 8/2/24. Spouse states patient ambulating with cane on short walks and uses walker for longer distance walking. Spouse states improvement regarding ambulation; balance and left sided weakness. Spouse states patient compliant with medications and medical appointments. Spouse states patient has no difficulty with fever; chest pain; SOB; appetite or sleeping. Reviewed with spouse education; role of RN-ACM and HRCM case management services. Spouse verbalized understanding; states to appreciate outreach and declines needs for further outreach at this time.   Adult Patient Profile  Questions/Answers      Flowsheet Row Most Recent Value   Symptoms/Conditions Managed at Home neurological, cardiovascular   Cardiovascular Symptoms/Conditions hypertension   Cardiovascular Management Strategies medication therapy, other (see comments)  [Phyician follow up]   Neurological Symptoms/Conditions other (see comments)  [CVA]   Neurological Management Strategies medication therapy, other (see comments)  [Physician follow up]   Barriers to Taking Medication as Prescribed none   Primary Source of Support/Comfort spouse   People in Home spouse        Social Work Assessment  Questions/Answers      Flowsheet Row Most Recent Value   People in Home spouse   Functional Status Comments Spouse states patient receiving assistance as needed with ADL's,  transportation and ambulating with cane as needed.        Send Education  Questions/Answers      Flowsheet Row Most Recent Value   Annual Wellness Visit:  Patient Has Completed   Other Patient Education/Resources  24/7  [facility] Nurse Call Line, Advanced Care Planning, [facility]   24/7 Nurse Call Line Education Method Verbal   Advanced Directives: Patient Has        SDOH updated and reviewed with the patient during this program:  --     Food Insecurity: No Food Insecurity (7/12/2024)    Hunger Vital Sign     Worried About Running Out of Food in the Last Year: Never true     Ran Out of Food in the Last Year: Never true      --     Transportation Needs: No Transportation Needs (7/12/2024)    PRAPARE - Transportation     Lack of Transportation (Medical): No     Lack of Transportation (Non-Medical): No       Education Documentation  Bleeding, taught by Niecy Torrez, RN at 8/1/2024  3:15 PM.  Learner: Family  Readiness: Acceptance  Method: Explanation  Response: Verbalizes Understanding    When to Seek Medical Attention, taught by Niecy Torrez, RN at 8/1/2024  3:15 PM.  Learner: Family  Readiness: Acceptance  Method: Explanation  Response: Verbalizes Understanding    Adherence to Disease Management Plan, taught by Niecy Torrez, RN at 8/1/2024  3:15 PM.  Learner: Family  Readiness: Acceptance  Method: Explanation  Response: Verbalizes Understanding          Niecy GARCIA  Ambulatory Case Management    8/1/2024, 15:15 EDT

## 2024-08-02 ENCOUNTER — HOSPITAL ENCOUNTER (OUTPATIENT)
Dept: OCCUPATIONAL THERAPY | Facility: HOSPITAL | Age: 74
Setting detail: THERAPIES SERIES
Discharge: HOME | End: 2024-08-02
Payer: MEDICARE

## 2024-08-02 ENCOUNTER — HOSPITAL ENCOUNTER (OUTPATIENT)
Dept: SPEECH THERAPY | Facility: HOSPITAL | Age: 74
Setting detail: THERAPIES SERIES
Discharge: HOME | End: 2024-08-02
Payer: MEDICARE

## 2024-08-02 DIAGNOSIS — I69.398 LACK OF COORDINATION AS LATE EFFECT OF CEREBROVASCULAR ACCIDENT (CVA): ICD-10-CM

## 2024-08-02 DIAGNOSIS — I69.354 HEMIPARESIS AFFECTING LEFT SIDE AS LATE EFFECT OF CEREBROVASCULAR ACCIDENT (CVA): ICD-10-CM

## 2024-08-02 DIAGNOSIS — I69.322 DYSARTHRIA AS LATE EFFECT OF CEREBROVASCULAR ACCIDENT (CVA): Primary | ICD-10-CM

## 2024-08-02 DIAGNOSIS — I69.319 COGNITIVE DEFICIT AS LATE EFFECT OF CEREBROVASCULAR ACCIDENT (CVA): ICD-10-CM

## 2024-08-02 DIAGNOSIS — I63.9 RECURRENT STROKES: Primary | ICD-10-CM

## 2024-08-02 DIAGNOSIS — R27.9 LACK OF COORDINATION AS LATE EFFECT OF CEREBROVASCULAR ACCIDENT (CVA): ICD-10-CM

## 2024-08-02 DIAGNOSIS — I63.9 RECURRENT STROKES: ICD-10-CM

## 2024-08-02 PROCEDURE — 92523 SPEECH SOUND LANG COMPREHEN: CPT

## 2024-08-02 PROCEDURE — 97165 OT EVAL LOW COMPLEX 30 MIN: CPT

## 2024-08-02 NOTE — THERAPY EVALUATION
Outpatient Occupational Therapy Neuro Initial Evaluation  [place]     Patient Name: Ronnell Rizvi  : 1950  MRN: [id number]  Today's Date: 2024      Visit Date: 2024    Patient Active Problem List   Diagnosis    Anemia, unspecified    Vitamin B12 deficiency    Chronic fatigue    Benign prostatic hypertrophy (BPH) with weak urinary stream    Chronic bronchitis    ELIZABETH (obstructive sleep apnea)    Sinoatrial block    Cerebral aneurysm, nonruptured    TIA (transient ischemic attack)    Intracranial vascular stenosis    Sinus pause    S/P percutaneous patent foramen ovale closure    TIA on medication    HTN (hypertension)    COPD (chronic obstructive pulmonary disease)    Nocturnal seizures    Encounter for screening for malignant neoplasm of colon    Seizure    HL (hearing loss)    Pre-syncope    Mixed hyperlipidemia    History of stroke    Melena    Irritable bowel syndrome with both constipation and diarrhea    Gastroesophageal reflux disease    Stroke    CVA (cerebral vascular accident)        Past Medical History:   Diagnosis Date    Acquired dyslexia     pt  reports after 3rd stroke    Allergic     Allergic rhinitis     Anal fissure     Aortic insufficiency     moderate, THIERRY     Asthma     Asthma     Benign prostatic hypertrophy (BPH) with weak urinary stream 2016    Cataract     Chronic bronchitis     Colon polyp     COPD (chronic obstructive pulmonary disease)     Emphysema lung     Falls frequently     Fatty liver     GERD (gastroesophageal reflux disease)     Headache     Hepatitis     thought to be Hepatitis A     History of pneumonia     With left lower lobe atelectasis and scar tissue, being followed    HL (hearing loss)     Hypertension     Low back pain     Macular degeneration     Memory loss     Mixed hyperlipidemia 2023    Obstructive sleep apnea syndrome 2017    refuses c-pap    PFO (patent foramen ovale)     s/p percutaneous closure with a 25mm  Amplatzer device in December 2018    Pneumonia     LLL with scar tissue    Seizures     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 total of 3 strokes    Tremor     Comes & goes    Vision loss         Past Surgical History:   Procedure Laterality Date    CARDIAC CATHETERIZATION N/A 12/12/2018    Procedure: Patent foramen ovale closure- [employer];  Surgeon: Deangelo Harris MD;  Location:  [facility];  Service: Cardiology    CARDIAC CATHETERIZATION N/A 12/12/2018    Procedure: Intracardiac echocardiogram;  Surgeon: Deangelo Harris MD;  Location:  [facility];  Service: Cardiology    CARDIAC ELECTROPHYSIOLOGY PROCEDURE N/A 03/26/2018    Procedure: Loop insertion   CONFIRM RX;  Surgeon: Luis Wyatt MD;  Location:  [facility];  Service: Cardiovascular    CARDIAC ELECTROPHYSIOLOGY PROCEDURE N/A 07/15/2020    Procedure: Loop recorder removal;  Surgeon: Starr Driscoll MD;  Location:  [facility];  Service: Cardiovascular;  Laterality: N/A;    CARDIAC SURGERY      CATARACT EXTRACTION Bilateral     COLONOSCOPY      COLONOSCOPY N/A 10/09/2020    Procedure: COLONOSCOPY with polypectomy;  Surgeon: Ras Mendoza MD;  Location:  [facility];  Service: Gastroenterology;  Laterality: N/A;  diverticulosis  ascending polyp  transverse polyp  sigmoid polyps X 2  rectal polyp    EYE SURGERY      HAND SURGERY Bilateral     INGUINAL HERNIA REPAIR Left     OTHER SURGICAL HISTORY      inguinal hernia repair for person over age 5    TONSILLECTOMY      TONSILLECTOMY           Visit Dx:      ICD-10-CM ICD-9-CM   1. Recurrent strokes  I63.9 434.91   2. Hemiparesis affecting left side as late effect of cerebrovascular accident (CVA)  I69.354 438.20   3. Lack of coordination as late effect of cerebrovascular accident (CVA)  I69.398 438.89    R27.9 781.3        Patient History       Row Name  08/02/24 1100             History    Chief Complaint Difficulty with daily activities;Muscle weakness;Impaired sensation  left side weakness  -SD      Date Current Problem(s) Began 07/11/24  -SD      Brief Description of Current Complaint Pt with facial droop and difficulty with speech on 7-11-24 and came to ER at [facility]. MRI revealed multiple small acute infarcts of right corona radiata. Pt was discharged home on 7-12-24. Pt returned to [facility] on 7-15-24 due to increased left side weakness. MRI revealed a progression/evolution of the small acute/subacute infarts. Pt was discharged to acute rehab. Pt in acute rehab x 2 weeks and has been referred to outpt therapy (OT/ST/PT).  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 Pt also has consulations with ST and PT  -SD      How has patient tried to help current problem? acute rehab  -SD      What clinical tests have you had for this problem? MRI  -SD      Results of Clinical Tests revealed infarcts of right corona radiata  -SD      Related/Recent Hospitalizations Yes  -SD      Date of Hospitalization 07/11/24  initial hospitalizaiton 7-11-24, readmission on 7-15-24 and acute rehab on 717-24  -SD      Location (Hospital Name) [facility] for acute and [facility] for acute rehab  -SD         Pain     Pain Location --  no upper extremity pain. pain in left knee from recent fall 7-11-24.  -SD      Pain at Present 0  -SD      Pain at Best 0  -SD      Pain at Worst --  pt reports pain in left knee was 10/10 after a fall a few weeks ago.  -SD      Is your sleep disturbed? --  pt reports hx of poor sleep pattern  -SD      Difficulties at work?  retired  -SD      Difficulties with ADL's? difficulty with LUE coordination which impacts dressing activity  -SD         Fall Risk Assessment    Any falls in the past year: Yes  -SD      Number of falls reported in the last 12 months 3  -SD      Factors that contributed to the fall: Other (comment);Lost balance;Slippery surface  one fall was slip on deck prior to cva. others were loss of balance after cva  -SD      Does patient have a fear of falling No  -SD         Services    Prior Rehab/Home Health Experiences Yes  -SD      When was the prior experience with Rehab/Home Health pt discharged from [facility] rehab on 7-28-24  -SD      Where was the prior experience with Rehab/Home Health [facility] rehab  -SD      Are you currently receiving Home Health services No  -SD      Do you plan to receive Home Health services in the near future No  -SD         Daily Activities    Primary Language English  -SD      Are you able to read Other (comment)  pt's reading/writing skills impacted from 2017 CVA.  -SD      Are you able to write Other (comment)  pt's reading/writing skills impacted from 2017 CVA.  -SD      How does patient learn best? Listening  -SD      Teaching needs identified Home Exercise Program;Management of Condition  -SD      Patient is concerned about/has problems with Difficulty with self care (i.e. bathing, dressing, toileting:;Grasping objects lifting;Coordination  -SD      Does patient have problems with the following? None  -SD      Barriers to learning Other (comment)  pt with SLP consulation to address communication/cognitive issues  -SD      Functional Status mobility issues preventing performance of daily activities  pt has physical therapy referral for balance issues/mobility issues  -SD      Explanation of Functional Status Problem pt has Physical therapy referral for balance issues/mobility issues  -SD      Pt Participated in POC and Goals Yes  -SD         Safety    Are  "you being hurt, hit, or frightened by anyone at home or in your life? No  -SD      Are you being neglected by a caregiver No  -SD      Have you had any of the following issues with N/A  -SD                User Key  (r) = Recorded By, (t) = Taken By, (c) = Cosigned By      Initials Name Provider Type    SD Figueroa Ballard, OTR Occupational Therapist                     OT Neuro       Row Name 08/02/24 1100          Subjective Comments    Subjective Comments Pt reports numbness in left hand, coordination issues and weakness of LUE impact his management of daily tasks. \"It takes me longer to get ready in the morning.\"  -SD        Vision- Basic    Current Vision No visual deficits  -SD        Sensation    Additional Comments Pt with c/o numbness/tingling of left hand (mostly digits 1-3). He reports numbness impacts his coordination  -SD        Proprioception    Proprioception decreased proprioceptive and kinesthetic awareness of LUE  -SD        Coordination    Coordination Tests 9-Hole Peg;Finger to nose eyes open;Finger to nose eyes closed  -SD     Finger to Nose Eyes Open Intact  -SD     Finger to Nose Eyes Closed Left:;Impaired  -SD     9-Hole Peg Left 1 min 16 sec  -SD     9-Hole Peg Right 31 sec  -SD        Gross Motor Training    Gross Motor Skill, Impairments Detail mild incoordination of LUE  -SD        General ROM    GENERAL ROM COMMENTS Pt with functional BUE rom  -SD        MMT (Manual Muscle Testing)    General MMT Comments RUE MMT 5/5, LUE 4/5  -SD        ADL Assessment/Intervention    ADL's Assessed? Lower Body Dressing;Grooming  -SD        Lower Body Dressing Assessment/Training    Comment, (Lower Body Dressing) pt reports difficulty with clothing fasteners (especially tying shoes).  Pt reports it takes an extended time to manage fasteners  -SD        Grooming Assessment/Training    Comment, (Grooming) Pt is using right hand to manage light grooming tasks. He does report some difficulty with management " of caps/lids when he has to use both hands.  -SD               User Key  (r) = Recorded By, (t) = Taken By, (c) = Cosigned By      Initials Name Provider Type    Figueroa Carbajal OTR Occupational Therapist                    Hand Therapy (Last 24 Hours)       Hand Eval       Row Name 08/02/24 1100              Strength Right    # Reps 1  -SD      Right Rung 2  -SD      Right  Test 1 92  -SD       Strength Average Right 92  -SD          Strength Left    # Reps 2  -SD      Left Rung 2  -SD      Left  Test 1 64  -SD      Left  Test 2 72  -SD       Strength Average Left 68  -SD         Right Hand Strength - Pinch (lbs)    Lateral 20 lbs  -SD         Left Hand Strength - Pinch (lbs)    Lateral 14 lbs  -SD                User Key  (r) = Recorded By, (t) = Taken By, (c) = Cosigned By      Initials Name Provider Type    Figueroa Carbajal OTR Occupational Therapist                        Therapy Education  Education Details: Education provided regading benefits of active participation/use of LUE for daily tasks, safey during adl's due to balance issues, compensatory strategies for adl management due to fmc deficits and neuromuscular re-education program of LUE to address sensory issues, coordination and functional strength  Given: HEP, Symptoms/condition management  Program: Reinforced (Pt recently discharged from acute rehab. Pt has theraband program from acute rehab for LUE strengthening and was provided with soft resistive theraputty in acute care for hand strengthening.  Reinforced daily participation in program)  How Provided: Verbal  Provided to: Patient, Caregiver (spouse present during OT evaluation)  Level of Understanding: Teach back education performed, Verbalized, Demonstrated     OT Goals       Row Name 08/02/24 1300          OT Short Term Goals    STG Date to Achieve 08/30/24  -SD     STG 1 Pt to be I with home neuromuscular re-education program to address LUE sensory  awareness, coordination and strength.  -SD     STG 1 Progress New  -SD     STG 2 Pt to manage clothing fasteners independently utilizing compensatory strategies as needed.  -SD     STG 2 Progress New  -SD        Long Term Goals    LTG Date to Achieve 09/27/24  -SD     LTG 1 Pt to improve Quick Dash measure by 10 to demonstrate increased function for daily tasks.  -SD     LTG 1 Progress New  -SD     LTG 1 Progress Comments Initial Quick Dash measure 31.82  -SD     LTG 2 Pt to improve left hand coordination as demonstrated by a 20 second improvement of 9 hole peg test.  -SD     LTG 2 Progress New  -SD     LTG 2 Progress Comments Initial left hand 9 hole peg  1 min 16 seconds  -SD               User Key  (r) = Recorded By, (t) = Taken By, (c) = Cosigned By      Initials Name Provider Type    Figueroa Carbajal OTR Occupational Therapist                            OT Assessment/Plan       Row Name 08/02/24 1327          OT Assessment    Functional Limitations Performance in self-care ADL;Limitation in home management  -SD     Impairments Coordination;Sensation;Muscle strength  -SD     Assessment Comments Pt came to ER at [facility] secondary to facial droop and speech deficits on 7-11-24. MRI revealed multiple small acute infarcts of right corona radiata. Pt was discharged home on 7-12-24. Pt returned to [facility] on 7-15-24 due to increased left side weakness. MRI revealed a progression/evolution of the small acute/subacute infarcts. Pt was discharged to acute rehab on 7-17-24. Pt in acute rehab x 2 weeks and has been referred to outpt therapy (OT/ST/PT). Pt continues to report weakness, sensory issues and coordination deficits of LUE which are impacting his function for daily tasks. Pt demonstrates functional LUE rom. His strength is decreased in LUE (4/5) compared to RUE (5/5), and he demonstrates mild gross motor and mod fine motor coordination deficits. Pt reports he requires and extended time to manage his basic adl  activities due to the deficits. Education provided regarding benefits of activity, safety with adl tasks due to balance deficits and neuromuscular re-education program to address LUE sensory issues, coordination and functional strength.  -SD     Please refer to paper survey for additional self-reported information Yes  -SD     OT Diagnosis CVA of left side weakness, sensory deficits and coordination deficits  -SD     OT Rehab Potential Good  -SD     Patient/caregiver participated in establishment of treatment plan and goals Yes  -SD     Patient would benefit from skilled therapy intervention Yes  -SD        OT Plan    OT Frequency 1x/week  -SD     Predicted Duration of Therapy Intervention (OT) 8 weeks  -SD     Planned CPT's? OT EVAL LOW COMPLEXITY: 59053;OT NEUROMUSC RE EDUCATION EA 15 MIN: 12489;OT THER ACT EA 15 MIN: [id number]  -SD     Planned Therapy Interventions (Optional Details) neuromuscular re-education;motor coordination training;strengthening;other (see comments)  sensory re-education of LUE  -SD     OT Plan Comments Rec OT services weekly x 8 weeks for LUE neuromuscular re-education to allow for improved management of daily tasks.  -SD               User Key  (r) = Recorded By, (t) = Taken By, (c) = Cosigned By      Initials Name Provider Type    Figueroa Carbajal OTR Occupational Therapist                   OT Exercises       Row Name 08/02/24 1100             Exercise 1    Exercise Name 1 Education with LUE neuromuscular re-education program for home  -SD                User Key  (r) = Recorded By, (t) = Taken By, (c) = Cosigned By      Initials Name Provider Type    Figueroa Carbajal, OTR Occupational Therapist                      Outcome Measure Options: Quick DASH  9 Hole Peg  9-Hole Peg Left: 1 min 16 sec  9-Hole Peg Right: 31 sec  Quick DASH  Open a tight or new jar.: Mild Difficulty  Do heavy household chores (e.g., wash walls, wash floors): Unable  Carry a shopping bag or briefcase: Mild  Difficulty  Wash your back: Severe Difficulty  Use a knife to cut food: Mild Difficulty  Recreational activities in which you take some force or impact through your arm, should or hand (e.g. golf, hammering, tennis, etc.): Moderate Difficulty  During the past week, to what extent has your arm, shoulder, or hand problem interfered with your normal social activites with family, friends, neighbors or groups?: Slightly  During the past week, were you limited in your work or other regular daily activities as a result of your arm, shoulder or hand problem?: Slightly Limited  Arm, Shoulder, or hand pain: None  Tingling (pins and needles) in your arm, shoulder, or hand: None  During the past week, how much difficulty have you had sleeping because of the pain in your arm, shoulder or hand?: No difficulty  Number of Questions Answered: 11  Quick DASH Score: 31.82         Time Calculation:   OT Start Time: 1110  OT Stop Time: 1205  OT Time Calculation (min): 55 min  Untimed Charges  OT Eval/Re-eval Minutes: 55  Total Minutes  Untimed Charges Total Minutes: 55   Total Minutes: 55     Therapy Charges for Today       Code Description Service Date Service Provider Modifiers Qty    [id number]  OT EVAL LOW COMPLEXITY 4 8/2/2024 Figueroa Ballard OTR GO 1                       DULCE Gastelum  8/2/2024

## 2024-08-02 NOTE — THERAPY EVALUATION
Outpatient Speech Language Pathology   Adult Speech Language Cognitive Initial Evaluation  [place]     Patient Name: Ronnell Rizvi  : 1950  MRN: [id number]  Today's Date: 2024        Visit Date: 2024   Patient Active Problem List   Diagnosis    Anemia, unspecified    Vitamin B12 deficiency    Chronic fatigue    Benign prostatic hypertrophy (BPH) with weak urinary stream    Chronic bronchitis    ELIZABETH (obstructive sleep apnea)    Sinoatrial block    Cerebral aneurysm, nonruptured    TIA (transient ischemic attack)    Intracranial vascular stenosis    Sinus pause    S/P percutaneous patent foramen ovale closure    TIA on medication    HTN (hypertension)    COPD (chronic obstructive pulmonary disease)    Nocturnal seizures    Encounter for screening for malignant neoplasm of colon    Seizure    HL (hearing loss)    Pre-syncope    Mixed hyperlipidemia    History of stroke    Melena    Irritable bowel syndrome with both constipation and diarrhea    Gastroesophageal reflux disease    Stroke    CVA (cerebral vascular accident)        Past Medical History:   Diagnosis Date    Acquired dyslexia     pt  reports after 3rd stroke    Allergic 1985    Allergic rhinitis     Anal fissure     Aortic insufficiency     moderate, THIERRY     Asthma     Asthma     Benign prostatic hypertrophy (BPH) with weak urinary stream 2016    Cataract     Chronic bronchitis     Colon polyp     COPD (chronic obstructive pulmonary disease)     Emphysema lung     Falls frequently     Fatty liver     GERD (gastroesophageal reflux disease)     Headache     Hepatitis     thought to be Hepatitis A     History of pneumonia     With left lower lobe atelectasis and scar tissue, being followed    HL (hearing loss)     Hypertension     Low back pain     Macular degeneration     Memory loss     Mixed hyperlipidemia 2023    Obstructive sleep apnea syndrome 2017    refuses c-pap    PFO (patent foramen ovale)     s/p  percutaneous closure with a 25mm Amplatzer device in December 2018    Pneumonia     LLL with scar tissue    Seizures     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 total of 3 strokes    Tremor     Comes & goes    Vision loss         Past Surgical History:   Procedure Laterality Date    CARDIAC CATHETERIZATION N/A 12/12/2018    Procedure: Patent foramen ovale closure- [employer];  Surgeon: Deangelo Harris MD;  Location:  [facility];  Service: Cardiology    CARDIAC CATHETERIZATION N/A 12/12/2018    Procedure: Intracardiac echocardiogram;  Surgeon: Deangelo Harris MD;  Location:  [facility];  Service: Cardiology    CARDIAC ELECTROPHYSIOLOGY PROCEDURE N/A 03/26/2018    Procedure: Loop insertion   CONFIRM RX;  Surgeon: Luis Wyatt MD;  Location:  [facility];  Service: Cardiovascular    CARDIAC ELECTROPHYSIOLOGY PROCEDURE N/A 07/15/2020    Procedure: Loop recorder removal;  Surgeon: Starr Driscoll MD;  Location:  [facility];  Service: Cardiovascular;  Laterality: N/A;    CARDIAC SURGERY      CATARACT EXTRACTION Bilateral     COLONOSCOPY      COLONOSCOPY N/A 10/09/2020    Procedure: COLONOSCOPY with polypectomy;  Surgeon: Ras Mendoza MD;  Location:  [facility];  Service: Gastroenterology;  Laterality: N/A;  diverticulosis  ascending polyp  transverse polyp  sigmoid polyps X 2  rectal polyp    EYE SURGERY      HAND SURGERY Bilateral     INGUINAL HERNIA REPAIR Left     OTHER SURGICAL HISTORY      inguinal hernia repair for person over age 5    TONSILLECTOMY      TONSILLECTOMY           Visit Dx:    ICD-10-CM ICD-9-CM   1. Dysarthria as late effect of cerebrovascular accident (CVA)  I69.322 438.13   2. Cognitive deficit as late effect of cerebrovascular accident (CVA)  I69.319 438.0   3. Recurrent strokes  I63.9 434.91            OP SLP Assessment/Plan - 08/02/24 1020           SLP Assessment    Functional Problems Speech Language- Adult/Cognition  -AD    Impact on Function: Adult Speech Language/Cognition Restrictions in personal and social life;Difficulty communicating wants, needs, and ideas;Other (comment0   Difficulty completing home management tasks -AD    Clinical Impression: Speech Language-Adult/Congnition Mild:;Cognitive Communication Impairment;Mild-Moderate:;Dysarthria- Flaccid  -AD    Functional Problems Comment Decreased intelligibility to an unfamiliar listener and at times to a familiar listener. Decreased recall of information after a delay.  -AD    Clinical Impression Comments Mild imprecise speech that worsens with fatigue and in a noisy environment. Decreased delayed recal of information.  -AD    SLP Diagnosis Mild cognitive communication deficit and mild to moderate flaccid dysarthria of speech as late effect of CVA  -AD    Prognosis Good (comment)   motivation, support and premorbid level of functioning -AD    Patient/caregiver participated in establishment of treatment plan and goals Yes  -AD    Patient would benefit from skilled therapy intervention Yes  -AD       SLP Plan    Frequency once weekly  -AD    Duration 3 months  -AD    Planned CPT's? SLP INDIVIDUAL SPEECH THERAPY: 19805;SLP DEV COG SKILLS INITIAL (15 MIN) : 21286;SLP DEV COG SKILLS ADD (15 MIN) : 10432  -AD    Expected Duration of Therapy Session (SLP Eval) 45  -AD    Plan Comments Will initiate therapy next week. Pt to bring in EMST given at  [facility].  -AD              User Key  (r) = Recorded By, (t) = Taken By, (c) = Cosigned By      Initials Name Provider Type    AD Sunshine Ballard MS CCC-SLP Speech and Language Pathologist                     SLP [facility] Evaluation - 08/02/24 1025          Communication Assessment/Intervention    Document Type evaluation  -AD    Subjective Information no complaints  -AD    Patient Observations alert;cooperative  -AD    Patient/Family/Caregiver  Comments/Observations Pt was seen for an initial evaluation. He was accompanied by his wife with his permission.  -AD    Patient Effort good  -AD    Symptoms Noted During/After Treatment none  -AD       General Information    Patient Profile Reviewed yes  -AD    Pertinent History Of Current Problem Pt was referred for outpt therapy following inpatient rehab admission following acute stroke (7/15-7/17/24 and 7/11-7/12/24). Continued dysarthria and mild cognitive impairment following CVA. Hx from inpt admission as follows:7/16/24: Pt admitted with progression of stroke from 6/11/24. MRI with 'Acute diffusion restriction abnormality within the deep white matter/corona radiata on the right appears similar in distribution with slight increase in size compatible with progression/evolution of small acute/subacute infarctions.' Pt reports increasing numbness in left hand and falling due to inability to maintain weight/balance in left leg. He also reports more consistent facial droop with loss of liquids out of the left side of his mouth. Facial droop was present on 7/12/24 at rest, but pt with functional ROM of lips and cheek w/movement. Posterior lingual weakness and mild to moderate dysarthria present as well on 7/12/24. Hx from initial evaluation on 7/12/24 as follows: Pt is a 75 y/o male admitted due to slurred speech and suspected stroke. Hx of stroke in 2017 affecting his vision. Hx of seizure disorder as well. Priorr PFO with repair in 2018. Head imaging significant for focal severe stenosis of right M2 branch w/minimal thready flow, multifocal remote infarcts w/area of encephalomalacia and gliosis in left occipital lobe from prior infarct. New, multiple small acute infacts in the right corona radiata. Age related diffiuse cortical atrophy most likely due to chronic microvascular ischemic disease. Pt currently with noted left facial droop and dysarthric speech. Pt states it is worse today than yesterday. He lives at  home with his wife and is mostly retired.  -AD    Precautions/Limitations, Vision vision impairment, bilaterally;WFL;for purposes of eval   impairment from left occipital stroke in 2017 -AD    Precautions/Limitations, Hearing hearing impairment, bilaterally;hearing aid, right   left hearing aid is broken currently -AD    Patient Level of Education [employer] graduate  -AD    Prior Level of Function-Communication cognitive-linguistic impairment   prior to most recent strokes this year; mild memory impairment -AD    Plans/Goals Discussed with patient;spouse/S.O.;agreed upon  -AD    Barriers to Rehab none identified  -AD    Patient's Goals for Discharge return to all previous roles/activities;functional communication;functional cognition  -AD    Family Goals for Discharge patient able to return to previous activities/roles;functional communication;functional cognition  -AD    Standardized Assessment Used SLUMS  -AD       Pain    Additional Documentation --   pain in left knee prior to therapy; reports level 10 w/o change during session -AD       Comprehension Assessment/Intervention    Comprehension Assessment/Intervention Auditory Comprehension  -AD       Auditory Comprehension Assessment/Intervention    Auditory Comprehension (Communication) WFL  -AD    Answers Questions (Communication) yes/no;wh questions;personal;simple;concrete;WFL  -AD    Able to Follow Commands (Communication) 1-step;2-step;3-step;WFL  -AD    Narrative Discourse simple paragraph level;conversational level;WFL  -AD    Successful Auditory Strategies (Communication) repetition   decrease background noise; due to hearing loss -AD       Expression Assessment/Intervention    Expression Assessment/Intervention verbal expression  -AD       Verbal Expression Assessment/Intervention    Verbal Expression WFL  -AD    Automatic Speech (Communication) response to greeting;WFL  -AD    Repetition words;phrases;sentences;WFL  -AD    Confrontational Naming high  frequency;WFL  -AD    Spontaneous/Functional Words simple;complex;WFL  -AD    Conversational Discourse/Fluency WFL  -AD       Oral Motor Structure and Function    Oral Motor Structure and Function mild impairment;moderate impairment  -AD    Oral Lesions or Structural Abnormalities and/or variants none  -AD    Dentition Assessment natural, present and adequate;missing teeth  -AD    Mucosal Quality moist, healthy  -AD       Oral Musculature and Cranial Nerve Assessment    Oral Motor General Assessment mandibular impairment;oral labial or buccal impairment;lingual impairment  -AD    Mandibular Impairment Detail, Cranial Nerve V (Trigeminal) reduced strength on left   Mild decreased masseter contraction on left -AD    Oral Labial or Buccal Impairment, Detail, Cranial Nerve VII (Facial): left labial droop;reduced ROM   reduced range on left w/slower movement as well -AD    Lingual Impairment, Detail. Cranial Nerves IX, XII (Glossopharyngeal and Hypoglossal) reduced strength;reduced strength left;reduced lingual ROM   reduced ROM of tongue to left with lateralization -AD       Motor Speech Assessment/Intervention    Motor Speech Function mild impairment;moderate impairment  -AD    Characteristics Consistent with Dysarthria decreased intensity;slurred speech  -AD    Initiation of Phonation (Communication) voluntary;involuntary - (e.g. sneezing, laughing, yawning);WFL  -AD    Automatic Speech (Communication) response to greeting;WFL  -AD    Verbal Repetition (Communication) monosyllabic words;polysyllabic words;phrases;WFL;sentences;mild impairment   articulatory contact decreases with length and complexity and wanes over longer speaking times. -AD    Conversational Speech (Communication) moderate complexity;mild impairment;moderate impairment  -AD    Speech intelligibility 90%;100%;in quiet environment;in connected speech;with unfamiliar listener  -AD    Motor Speech, Comment Pt demonstrates a mild to moderate flaccid  dysarthria of speech. Significant improvement noted since onset in acute care. Pt with ability to use compensatory strategies to aid with overall intelligibility. Articulatory skills and intelligibility occur over time and with increasing complexity of his conversation. Pt and wife report fatigue and declining skills over the course of the day, but also report that family is reporting increased intelligibility during phone conversations.  -AD       Cursory Voice Assessment/Intervention    Quality and Resonance (Voice) WFL  -AD       Standardized Tests    Cognitive/Memory Tests SLUMS: [facility] Mental Status Examination  -AD       SLUMS: [facility] Mental Status Examination    SLUMS Score 26  -AD    SLUMS Range 21-26: Mild Neurocognitive Disorder (High school education or higher)  -AD    SLUMS Comments Ronnell demonstrates improvement in cognition per results of SLUMS compared to MoCA completed in [facility].  Although scores do not exactly correlate, he demonstrated a score of 26 with the goal of 23/30 on MoCA. Errors on SLUMS include wrong time on hands of clock with noted visuospatial deficit of hands not going to the center of the clock and no distinguishable short and long hand. Other errors were recall of 3 of 5 unrelated targets after a delay with interference. Attention/Orientation, Immediate Recall, Calculation/Registration, Category Naming with time constraint, Registration and Digit Span, Visuospatial and Story Recall with Executive Functions were all w/o error. Pt reports recall as his greatest deficits, but demonstrates functional working memory. Delayed memory deficits noted per this therapist.  -AD       SLP Evaluation Clinical Impressions    SLP Diagnosis mild-moderate;dysarthria;mild;cognitive-linguistic disorder  -AD    Rehab Potential/Prognosis good  -AD    SLC Criteria for Skilled Therapy Interventions Met yes  -AD    Functional Impact difficulty in expressing complex  messages;restrictions in personal and social life;difficulty completing home management task  -AD       Recommendations    Therapy Frequency (SLP SLC) 1 day per week  -AD    Predicted Duration Therapy Intervention (Days) other (see comments)   2-3 months -AD    Patient/Family Concerns, Anticipated Discharge Disposition (SLP) No current concerns reported per either pt or his wife.  -AD              User Key  (r) = Recorded By, (t) = Taken By, (c) = Cosigned By      Initials Name Provider Type    Sunshine Starr MS CCC-SLP Speech and Language Pathologist                      OP SLP Education       Row Name 08/02/24 1025       Education    Barriers to Learning No barriers identified  -AD    Education Provided Patient expressed understanding of evaluation;Family/caregivers expressed understanding of evaluation;Patient participated in establishing goals and treatment plan;Family/caregivers participated in establishing goals and treatment plan;Patient demonstrated recommended strategies;Family/caregivers demonstrated recommended strategies  -AD    Assessed Learning needs;Learning motivation;Learning preferences;Learning readiness  -AD    Learning Motivation Strong  -AD    Learning Method Explanation  -AD    Teaching Response Verbalized understanding  -AD    Education Comments Pt and wife verbalize understanding of results of testing, diagnoses and goals.  -AD              User Key  (r) = Recorded By, (t) = Taken By, (c) = Cosigned By      Initials Name Effective Dates    Sunshine Starr MS CCC-SLP 06/16/21 -                    SLP OP Goals       Row Name 08/02/24 1027          Goal Type Needed    Goal Type Needed Dysarthria;Memory  -AD        Subjective Comments    Subjective Comments Goldy was seen for an initial evaluation by SLP w/wife present. He was alert and cooperative throughout the evaluation.  -AD        Subjective Pain    Able to rate subjective pain? yes  -AD     Pre-Treatment Pain Level 10  -AD      Post-Treatment Pain Level 10  -AD     Subjective Pain Comment pain in left knee  -AD        Dysarthria Goals     Dysarthria LTG's Patient will be able to engage in speech at the conversational level with maximum articulatory accuracy so that speech is understood by familiar /unfamiliar listeners  -AD     Patient will be able to engage in speech at the conversational level with maximum articulatory accuracy so that speech is understood by familiar /unfamiliar listeners 90%:;without cues  -AD     Status: Patient will be able to engage in speech at the conversational level with maximum articulatory accuracy so that speech is understood by familiar /unfamiliar listeners New  -AD     Patient will improve comprehensibility of verbal messages by speaking at an appropriate vocal intensity 90%:;without cues  -AD     Status: Patient will improve comprehensibility of verbal messages by speaking at an appropriate vocal intensity New  -AD     Patient will improve respiratory support and the use of respiration for speech through use of increased strength of expiratory muscles as measured by Expiratory Muscle Strength Training device 90%:;without cues  -AD     Status: Patient will improve respiratory support and the use of respiration for speech through use of increased strength of expiratory muscles as measured by Expiratory Muscle Strength Training device New  -AD     Patient will apply compensatory strategies to improve intelligibility of speech during in spontaneous speech 90%:;without cues  -AD     Status: Patient will apply compensatory strategies to improve intelligibility of speech during in spontaneous speech New  -AD        Memory Goals    Memory LTG's Patient will be able to remember  information needed to participate in activities of daily living  -AD     Patient will be able to remember  information needed to participate in activities of daily living 90% w/use of internal and external strategies and aids  -AD      Status: Patient will be able to remember  information needed to participate in activities of daily living New  -AD     Patient’s memory skills will be enhanced as reported by patient by utilizing internal memory strategies to recall up to 3 pieces of information after a 5- minute delay 90%:;without cues  -AD     Status: Patient’s memory skills will be enhanced as reported by patient by utilizing internal memory strategies to recall up to 3 pieces of information after a 5- minute delay New  -AD     Patient’s memory skills will be enhanced as reported by patient by using external memory aides 90%:;without cues  -AD     Status: Patient’s memory skills will be enhanced as reported by patient by using external memory aides New  -AD        SLP Time Calculation    SLP Goal Re-Cert Due Date 10/30/24  -AD               User Key  (r) = Recorded By, (t) = Taken By, (c) = Cosigned By      Initials Name Provider Type    AD Sunshine Ballard, MS CCC-SLP Speech and Language Pathologist                           Time Calculation:   SLP Start Time: 1025  SLP Stop Time: 1130 (direct time, scoring and interpretation and reporting)  SLP Time Calculation (min): 65 min  SLP Non-Billable Time (min): 0 min  Total Timed Code Minutes- SLP: 0 minute(s)  Untimed Charges  SLP Eval/Re-eval : ST Eval Speech and Production w/ Language - 61296  [id number] Eval Speech and Production w/ Language Minutes: 65  Total Minutes  Untimed Charges Total Minutes: 65   Total Minutes: 65    Therapy Charges for Today       Code Description Service Date Service Provider Modifiers Qty    [id number] HC ST EVAL SPEECH AND PROD W LANG  4 8/2/2024 Sunshine Ballard MS CCC-SLP GN 1                     Sunshine Ballard MS CCC-SLP  8/2/2024

## 2024-08-04 ENCOUNTER — APPOINTMENT (OUTPATIENT)
Dept: CT IMAGING | Facility: HOSPITAL | Age: 74
End: 2024-08-04
Payer: MEDICARE

## 2024-08-04 ENCOUNTER — APPOINTMENT (OUTPATIENT)
Dept: GENERAL RADIOLOGY | Facility: HOSPITAL | Age: 74
End: 2024-08-04
Payer: MEDICARE

## 2024-08-04 ENCOUNTER — HOSPITAL ENCOUNTER (EMERGENCY)
Facility: HOSPITAL | Age: 74
Discharge: HOME | End: 2024-08-04
Attending: STUDENT IN AN ORGANIZED HEALTH CARE EDUCATION/TRAINING PROGRAM | Admitting: STUDENT IN AN ORGANIZED HEALTH CARE EDUCATION/TRAINING PROGRAM
Payer: MEDICARE

## 2024-08-04 VITALS
HEART RATE: 85 BPM | BODY MASS INDEX: 23.98 KG/M2 | SYSTOLIC BLOOD PRESSURE: 140 MMHG | WEIGHT: 177 LBS | OXYGEN SATURATION: 98 % | HEIGHT: 72 IN | DIASTOLIC BLOOD PRESSURE: 62 MMHG | TEMPERATURE: 98.1 F | RESPIRATION RATE: 18 BRPM

## 2024-08-04 DIAGNOSIS — R53.1 WEAKNESS GENERALIZED: Primary | ICD-10-CM

## 2024-08-04 LAB
ALBUMIN SERPL-MCNC: 4.2 G/DL (ref 3.5–5.2)
ALBUMIN/GLOB SERPL: 1.6 G/DL
ALP SERPL-CCNC: 95 U/L (ref 39–117)
ALT SERPL W P-5'-P-CCNC: 18 U/L (ref 1–41)
ANION GAP SERPL CALCULATED.3IONS-SCNC: 10.8 MMOL/L (ref 5–15)
AST SERPL-CCNC: 27 U/L (ref 1–40)
BASOPHILS # BLD AUTO: 0.05 10*3/MM3 (ref 0–0.2)
BASOPHILS NFR BLD AUTO: 0.5 % (ref 0–1.5)
BILIRUB SERPL-MCNC: 0.3 MG/DL (ref 0–1.2)
BUN SERPL-MCNC: 20 MG/DL (ref 8–23)
BUN/CREAT SERPL: 18 (ref 7–25)
CALCIUM SPEC-SCNC: 9.7 MG/DL (ref 8.6–10.5)
CHLORIDE SERPL-SCNC: 104 MMOL/L (ref 98–107)
CO2 SERPL-SCNC: 24.2 MMOL/L (ref 22–29)
CREAT SERPL-MCNC: 1.11 MG/DL (ref 0.76–1.27)
DEPRECATED RDW RBC AUTO: 42.3 FL (ref 37–54)
EGFRCR SERPLBLD CKD-EPI 2021: 69.2 ML/MIN/1.73
EOSINOPHIL # BLD AUTO: 0.11 10*3/MM3 (ref 0–0.4)
EOSINOPHIL NFR BLD AUTO: 1.2 % (ref 0.3–6.2)
ERYTHROCYTE [DISTWIDTH] IN BLOOD BY AUTOMATED COUNT: 12.4 % (ref 12.3–15.4)
GLOBULIN UR ELPH-MCNC: 2.6 GM/DL
GLUCOSE SERPL-MCNC: 99 MG/DL (ref 65–99)
HCT VFR BLD AUTO: 45 % (ref 37.5–51)
HGB BLD-MCNC: 15.8 G/DL (ref 13–17.7)
IMM GRANULOCYTES # BLD AUTO: 0.03 10*3/MM3 (ref 0–0.05)
IMM GRANULOCYTES NFR BLD AUTO: 0.3 % (ref 0–0.5)
LIPASE SERPL-CCNC: 30 U/L (ref 13–60)
LYMPHOCYTES # BLD AUTO: 2.2 10*3/MM3 (ref 0.7–3.1)
LYMPHOCYTES NFR BLD AUTO: 23.1 % (ref 19.6–45.3)
MCH RBC QN AUTO: 32.4 PG (ref 26.6–33)
MCHC RBC AUTO-ENTMCNC: 35.1 G/DL (ref 31.5–35.7)
MCV RBC AUTO: 92.4 FL (ref 79–97)
MONOCYTES # BLD AUTO: 1.14 10*3/MM3 (ref 0.1–0.9)
MONOCYTES NFR BLD AUTO: 12 % (ref 5–12)
NEUTROPHILS NFR BLD AUTO: 6 10*3/MM3 (ref 1.7–7)
NEUTROPHILS NFR BLD AUTO: 62.9 % (ref 42.7–76)
NRBC BLD AUTO-RTO: 0 /100 WBC (ref 0–0.2)
PLATELET # BLD AUTO: 232 10*3/MM3 (ref 140–450)
PMV BLD AUTO: 10.2 FL (ref 6–12)
POTASSIUM SERPL-SCNC: 3.5 MMOL/L (ref 3.5–5.2)
PROT SERPL-MCNC: 6.8 G/DL (ref 6–8.5)
RBC # BLD AUTO: 4.87 10*6/MM3 (ref 4.14–5.8)
SODIUM SERPL-SCNC: 139 MMOL/L (ref 136–145)
WBC NRBC COR # BLD AUTO: 9.53 10*3/MM3 (ref 3.4–10.8)

## 2024-08-04 PROCEDURE — 70450 CT HEAD/BRAIN W/O DYE: CPT

## 2024-08-04 PROCEDURE — 85025 COMPLETE CBC W/AUTO DIFF WBC: CPT | Performed by: STUDENT IN AN ORGANIZED HEALTH CARE EDUCATION/TRAINING PROGRAM

## 2024-08-04 PROCEDURE — 36415 COLL VENOUS BLD VENIPUNCTURE: CPT

## 2024-08-04 PROCEDURE — 99284 EMERGENCY DEPT VISIT MOD MDM: CPT

## 2024-08-04 PROCEDURE — 83690 ASSAY OF LIPASE: CPT | Performed by: STUDENT IN AN ORGANIZED HEALTH CARE EDUCATION/TRAINING PROGRAM

## 2024-08-04 PROCEDURE — 80053 COMPREHEN METABOLIC PANEL: CPT | Performed by: STUDENT IN AN ORGANIZED HEALTH CARE EDUCATION/TRAINING PROGRAM

## 2024-08-04 PROCEDURE — 71045 X-RAY EXAM CHEST 1 VIEW: CPT

## 2024-08-04 RX ORDER — CETIRIZINE HYDROCHLORIDE 10 MG/1
10 TABLET ORAL DAILY
COMMUNITY

## 2024-08-04 NOTE — DISCHARGE INSTRUCTIONS

## 2024-08-04 NOTE — ED PROVIDER NOTES
Subjective   History of Present Illness  This is a 74-year-old who presents to the emergency department with concern for fatigue.  The patient had a stroke several weeks ago, has been in a rehab facility for the last couple of weeks and was sent home last week.  He has been doing well but has been doing at home and in person therapy/rehab since he was discharged from rehab facility.  Patient does not complain of any specific issue but states that he just feels tired.  Patient does have a history of seizures but takes no antiepileptics, he is taking amantadine which was recently started for him.  Patient has no other complaints.      Review of Systems   Constitutional:  Negative for activity change, chills, diaphoresis and fever.   HENT: Negative.     Respiratory: Negative.     Cardiovascular: Negative.    Skin: Negative.    Neurological:  Positive for weakness.   All other systems reviewed and are negative.      Past Medical History:   Diagnosis Date    Acquired dyslexia     pt  reports after 3rd stroke    Allergic 1985    Allergic rhinitis     Anal fissure     Aortic insufficiency     moderate, THIERRY 2017    Asthma     Asthma     Benign prostatic hypertrophy (BPH) with weak urinary stream 11/01/2016    Cataract     Chronic bronchitis     Colon polyp     COPD (chronic obstructive pulmonary disease)     Emphysema lung     Falls frequently     Fatty liver     GERD (gastroesophageal reflux disease)     Headache     Hepatitis     thought to be Hepatitis A     History of pneumonia     With left lower lobe atelectasis and scar tissue, being followed    HL (hearing loss)     Hypertension     Low back pain     Macular degeneration     Memory loss     Mixed hyperlipidemia 01/30/2023    Obstructive sleep apnea syndrome 08/23/2017    refuses c-pap    PFO (patent foramen ovale)     s/p percutaneous closure with a 25mm Amplatzer device in December 2018    Pneumonia     LLL with scar tissue    Seizures     Sinoatrial block  "10/09/2018    Spinal stenosis     Stroke     10/2017: left occipital/temporal. total of 3 strokes    Tremor     Comes & goes    Vision loss        Allergies   Allergen Reactions    Aspirin Nausea Only     Pt states he \"can tolerate enteric coated aspirin only.\"     Citrus      Blisters on mouth      Combivent [Ipratropium-Albuterol] Other (See Comments)     Throat swelling    Statins Mental Status Change     Severe memory impairment       Past Surgical History:   Procedure Laterality Date    CARDIAC CATHETERIZATION N/A 12/12/2018    Procedure: Patent foramen ovale closure- Abbott;  Surgeon: Deagnelo Harris MD;  Location:  [facility];  Service: Cardiology    CARDIAC CATHETERIZATION N/A 12/12/2018    Procedure: Intracardiac echocardiogram;  Surgeon: Deangelo Harris MD;  Location:  [facility];  Service: Cardiology    CARDIAC ELECTROPHYSIOLOGY PROCEDURE N/A 03/26/2018    Procedure: Loop insertion   CONFIRM RX;  Surgeon: Luis Wyatt MD;  Location:  [facility];  Service: Cardiovascular    CARDIAC ELECTROPHYSIOLOGY PROCEDURE N/A 07/15/2020    Procedure: Loop recorder removal;  Surgeon: Starr Driscoll MD;  Location:  [facility];  Service: Cardiovascular;  Laterality: N/A;    CARDIAC SURGERY      CATARACT EXTRACTION Bilateral     COLONOSCOPY      COLONOSCOPY N/A 10/09/2020    Procedure: COLONOSCOPY with polypectomy;  Surgeon: Ras Mendoza MD;  Location: [facility];  Service: Gastroenterology;  Laterality: N/A;  diverticulosis  ascending polyp  transverse polyp  sigmoid polyps X 2  rectal polyp    EYE SURGERY      HAND SURGERY Bilateral     INGUINAL HERNIA REPAIR Left     OTHER SURGICAL HISTORY      inguinal hernia repair for person over age 5    TONSILLECTOMY      TONSILLECTOMY         Family History   Problem Relation Age of Onset    Obesity Mother     Clotting disorder Mother         Upper extremities    Alcohol " abuse Father     Cancer Father 63        Esophagus and lung    Other Father         cardiac disorder    Heart disease Father     Depression Father     Kidney disease Sister     Kidney failure Sister     Drug abuse Paternal Uncle     Stroke Sister     Throat cancer Sister     Drug abuse Paternal Uncle        Social History     Socioeconomic History    Marital status:      Spouse name: Joey   Tobacco Use    Smoking status: Former     Current packs/day: 0.00     Average packs/day: 1.5 packs/day for 15.0 years (22.5 ttl pk-yrs)     Types: Cigarettes     Start date: 1964     Quit date: 1979     Years since quittin.4     Passive exposure: Past    Smokeless tobacco: Never    Tobacco comments:     caffeine use: Drinks 4 glasses of Dt coke on avg.    Vaping Use    Vaping status: Never Used   Substance and Sexual Activity    Alcohol use: No    Drug use: No    Sexual activity: Defer           Objective   Physical Exam  Vitals and nursing note reviewed.   Constitutional:       Appearance: Normal appearance. He is normal weight. He is not ill-appearing, toxic-appearing or diaphoretic.   HENT:      Head: Normocephalic and atraumatic.      Right Ear: External ear normal.      Left Ear: External ear normal.      Nose: Nose normal. No congestion or rhinorrhea.      Mouth/Throat:      Pharynx: No oropharyngeal exudate or posterior oropharyngeal erythema.   Eyes:      Extraocular Movements: Extraocular movements intact.      Conjunctiva/sclera: Conjunctivae normal.      Pupils: Pupils are equal, round, and reactive to light.   Cardiovascular:      Rate and Rhythm: Normal rate and regular rhythm.      Pulses: Normal pulses.   Pulmonary:      Effort: Pulmonary effort is normal.      Breath sounds: Normal breath sounds. No stridor. No wheezing, rhonchi or rales.   Chest:      Chest wall: No tenderness.   Abdominal:      General: There is no distension.      Palpations: Abdomen is soft. There is no mass.    Musculoskeletal:         General: No swelling, tenderness or signs of injury. Normal range of motion.      Cervical back: Normal range of motion. No rigidity or tenderness.   Skin:     General: Skin is warm.      Capillary Refill: Capillary refill takes less than 2 seconds.      Coloration: Skin is not jaundiced or pale.      Findings: No bruising.   Neurological:      General: No focal deficit present.      Mental Status: He is alert and oriented to person, place, and time. Mental status is at baseline.      Motor: No weakness.      Comments: The patient has some residual weakness diffusely today, equal on all sides.  No focal pain or weakness.  He does have a continual slight dysarthric speech and facial droop but this is not new, he and his wife state that this is his baseline         Procedures           ED Course                                             Medical Decision Making    MDM:    Escalation of care including admission/observation considered    - Discussions of management with other providers:  None    - Discussed/reviewed with Radiology regarding test interpretation    - Independent interpretation: Labs and CT    - Additional patient history obtained from: Partner    - Review of external non-ED record (if available):  Prior Inpt record, Office record, Outpt record, Prior Outpt labs, PCP record, Outside ED record, Other    - Chronic conditions affecting care: See HPI and medical Hx.    - Social Determinants of health significantly affecting care:  None        Medical Decision Making Discussion:    This is a 74-year-old who presents to the emergency department with weakness.  The patient is well-appearing, he has a baseline neurological exam with no new acute findings or change.  Due to the patient's recent history, workup was done here in the ED, labs are very reassuring showing no evidence of abnormality.  The patient CT head shows no acute change but chronic issue secondary to the stroke.  The  patient also has a stable chest x-ray.  Patient is ambulatory at his baseline, otherwise well-appearing, the amantadine that he takes may have some antiepileptic efficacy but I do recommend that he follow-up with his PCP concerning the use of other antiepileptics if this is necessary for him.  He has no new acute finding or abnormality.  Patient's workup is benign overall, he is stable for discharge at this time and I recommend good hydration and continued physical therapy outpatient.  He is agreeable.  Stable for discharge    The patient has been given very strict return precautions to return to the emergency department should there be any acute change or worsening of their condition.  I have explained my findings and the patient has expressed understanding to me.  I explained that the work-up performed in the ED has been based on the specific complaint and concern, as the nature of emergency medicine is complaint driven and they understand that new symptoms may arise.  I have told them that, should there be any new symptoms, worsening or changing symptoms, a new work-up may be indicated that they are encouraged to return to the emergency department or promptly contact their primary care physician. We have employed a shared decision-making process as the discussion of their disposition.  The patient has been educated as to the nature of the visit, the tests and work-up performed and the findings from today's visit. At this time, there does not appear to be any acute emergent process that necessitates admission to the hospital, however, the patient understands that this can change unexpectedly. At this time, the patient is stable for discharge home and agrees to follow-up with her primary care physician in the next 24 to 48 hours or earlier should they be able to obtain an appointment.    The patient was counseled regarding diagnostic results and treatment plan and patient has indicated understanding of these  instructions.      Problems Addressed:  Weakness generalized: complicated acute illness or injury    Amount and/or Complexity of Data Reviewed  Labs: ordered.  Radiology: ordered.        Final diagnoses:   Weakness generalized       ED Disposition  ED Disposition       ED Disposition   Discharge    Condition   Stable    Comment   --               No follow-up provider specified.       Medication List      No changes were made to your prescriptions during this visit.            Velasquez Olmos DO  08/04/24 2015

## 2024-08-06 ENCOUNTER — OFFICE VISIT (OUTPATIENT)
Dept: NEUROLOGY | Facility: CLINIC | Age: 74
End: 2024-08-06
Payer: MEDICARE

## 2024-08-06 VITALS
HEART RATE: 68 BPM | SYSTOLIC BLOOD PRESSURE: 122 MMHG | BODY MASS INDEX: 23.44 KG/M2 | WEIGHT: 172.8 LBS | OXYGEN SATURATION: 98 % | DIASTOLIC BLOOD PRESSURE: 70 MMHG

## 2024-08-06 DIAGNOSIS — Z86.73 HISTORY OF STROKE: ICD-10-CM

## 2024-08-06 DIAGNOSIS — G40.109 LOCALIZATION-RELATED SYMPTOMATIC EPILEPSY AND EPILEPTIC SYNDROMES WITH SIMPLE PARTIAL SEIZURES, NOT INTRACTABLE, WITHOUT STATUS EPILEPTICUS: Primary | ICD-10-CM

## 2024-08-06 PROCEDURE — 3074F SYST BP LT 130 MM HG: CPT | Performed by: PSYCHIATRY & NEUROLOGY

## 2024-08-06 PROCEDURE — 99215 OFFICE O/P EST HI 40 MIN: CPT | Performed by: PSYCHIATRY & NEUROLOGY

## 2024-08-06 PROCEDURE — 1159F MED LIST DOCD IN RCRD: CPT | Performed by: PSYCHIATRY & NEUROLOGY

## 2024-08-06 PROCEDURE — G2211 COMPLEX E/M VISIT ADD ON: HCPCS | Performed by: PSYCHIATRY & NEUROLOGY

## 2024-08-06 PROCEDURE — 1160F RVW MEDS BY RX/DR IN RCRD: CPT | Performed by: PSYCHIATRY & NEUROLOGY

## 2024-08-06 PROCEDURE — 3078F DIAST BP <80 MM HG: CPT | Performed by: PSYCHIATRY & NEUROLOGY

## 2024-08-06 RX ORDER — LACOSAMIDE 50 MG/1
TABLET ORAL EVERY 12 HOURS SCHEDULED
COMMUNITY
End: 2024-08-06 | Stop reason: SDUPTHER

## 2024-08-06 RX ORDER — LACOSAMIDE 100 MG/1
100 TABLET ORAL EVERY 12 HOURS SCHEDULED
Qty: 60 TABLET | Refills: 5 | Status: SHIPPED | OUTPATIENT
Start: 2024-08-06

## 2024-08-06 RX ORDER — HYDROCHLOROTHIAZIDE 12.5 MG/1
12.5 CAPSULE, GELATIN COATED ORAL DAILY
COMMUNITY

## 2024-08-06 NOTE — PROGRESS NOTES
Notes by MA:  Pt is here with his wife for a follow up for a recent stroke.      Subjective:     Patient ID: Ronnell Rizvi is a 74 y.o. male.    Stroke    Seizures       The following portions of the patient's history were reviewed and updated as appropriate: allergies, current medications, past family history, past medical history, past social history, past surgical history, and problem list.    Review of Systems   Neurological:  Positive for seizures.        Objective:    Neurologic Exam  Awake and alert pleasant cooperative with significantly dysarthric speech.  Speech comprehension is reasonable only hampered by his significant hearing loss.    Cranial nerve examination reveals a little bit of left lower facial droop but minimal asymmetry.    Motor exam reveals left-sided drift.  Walking with a cane.    Coordination testing reveals slow movements on the left.    Tendon reflexes are brisk on the left with an extensor plantar sign on that side.  Physical Exam    Assessment/Plan:     Diagnoses and all orders for this visit:    1. Localization-related symptomatic epilepsy and epileptic syndromes with simple partial seizures, not intractable, without status epilepticus (Primary)    2. History of stroke    Other orders  -     lacosamide (VIMPAT) 100 MG tablet tablet; Take 1 tablet by mouth Every 12 (Twelve) Hours.  Dispense: 60 tablet; Refill: 5     Recent right subcortical ischemic stroke, inpatient workup reviewed in detail from last month.  Continuing on dual antiplatelet therapy as well as increased statin dosing.  No changes from that standpoint.    He has also had a breakthrough seizure since last visit.  Some time since our last visit (apparently during hospitalization) is phenytoin was changed over to Vimpat.  He had a seizure on his current dose of 50 mg twice daily and uptitrating to 100 mg twice daily.  I reviewed this with him and his wife at length.  I answered their questions about his stroke and  multiple questions about rehab.  He is already plugged in appropriately to outpatient rehab and is doing well.    40 minutes total patient care time including extensive record review, examination, discussion and counseling, , and documentation.

## 2024-08-08 ENCOUNTER — HOSPITAL ENCOUNTER (OUTPATIENT)
Dept: OCCUPATIONAL THERAPY | Facility: HOSPITAL | Age: 74
Setting detail: THERAPIES SERIES
Discharge: HOME | End: 2024-08-08
Payer: MEDICARE

## 2024-08-08 ENCOUNTER — HOSPITAL ENCOUNTER (OUTPATIENT)
Dept: PHYSICAL THERAPY | Facility: HOSPITAL | Age: 74
Setting detail: THERAPIES SERIES
Discharge: HOME | End: 2024-08-08
Payer: MEDICARE

## 2024-08-08 ENCOUNTER — PATIENT OUTREACH (OUTPATIENT)
Dept: CASE MANAGEMENT | Facility: OTHER | Age: 74
End: 2024-08-08
Payer: MEDICARE

## 2024-08-08 ENCOUNTER — HOSPITAL ENCOUNTER (OUTPATIENT)
Dept: SPEECH THERAPY | Facility: HOSPITAL | Age: 74
Setting detail: THERAPIES SERIES
Discharge: HOME | End: 2024-08-08
Payer: MEDICARE

## 2024-08-08 DIAGNOSIS — I69.322 DYSARTHRIA AS LATE EFFECT OF CEREBROVASCULAR ACCIDENT (CVA): Primary | ICD-10-CM

## 2024-08-08 DIAGNOSIS — I69.354 HEMIPARESIS AFFECTING LEFT SIDE AS LATE EFFECT OF CEREBROVASCULAR ACCIDENT (CVA): ICD-10-CM

## 2024-08-08 DIAGNOSIS — I69.319 COGNITIVE DEFICIT AS LATE EFFECT OF CEREBROVASCULAR ACCIDENT (CVA): ICD-10-CM

## 2024-08-08 DIAGNOSIS — I63.9 RECURRENT STROKES: Primary | ICD-10-CM

## 2024-08-08 DIAGNOSIS — R27.9 LACK OF COORDINATION AS LATE EFFECT OF CEREBROVASCULAR ACCIDENT (CVA): ICD-10-CM

## 2024-08-08 DIAGNOSIS — I69.398 LACK OF COORDINATION AS LATE EFFECT OF CEREBROVASCULAR ACCIDENT (CVA): ICD-10-CM

## 2024-08-08 DIAGNOSIS — M62.3 IMMOBILITY SYNDROME: Primary | ICD-10-CM

## 2024-08-08 PROCEDURE — 92507 TX SP LANG VOICE COMM INDIV: CPT

## 2024-08-08 PROCEDURE — 97112 NEUROMUSCULAR REEDUCATION: CPT

## 2024-08-08 PROCEDURE — 97161 PT EVAL LOW COMPLEX 20 MIN: CPT

## 2024-08-08 NOTE — OUTREACH NOTE
AMBULATORY CASE MANAGEMENT NOTE    Names and Relationships of Patient/Support Persons: Contact: Joey Rizvi; Relationship: Emergency Contact -     Patient Outreach  RN-ACM outreach with patient's spouse. Discussed 8/4/24 ED visit regarding weakness. Patient treated and discharged. Spouse states patient compliant with ED recommendations; had 8/6/24 neurology appointment and taking medications as directed. Spouse states improvement regarding weakness; ambulating with cane and no difficulty with chest pain;SOB; appetite or sleeping. Spouse states patient continues with therapy and compliant with medical appointments. Reviewed  Education and spouse verbalized understanding. Spouse states to appreciate outreach and declines needs for further outreach at this time.No further questions voiced at this time.     Education Documentation  When to Seek Medical Attention, taught by Niecy Torrez, RN at 8/8/2024  1:33 PM.  Learner: Family  Readiness: Acceptance  Method: Explanation  Response: Verbalizes Understanding    Adherence to Disease Management Plan, taught by Niecy Torrez, RN at 8/8/2024  1:33 PM.  Learner: Family  Readiness: Acceptance  Method: Explanation  Response: Verbalizes Understanding          Niecy GARCIA  Ambulatory Case Management    8/8/2024, 13:34 EDT

## 2024-08-08 NOTE — THERAPY EVALUATION
.Outpatient Physical Therapy Neuro Initial Evaluation  [place] [facility]     Patient Name: Ronnell Rizvi  : 1950  MRN: [id number]  Today's Date: 2024      Visit Date: 2024    Patient Active Problem List   Diagnosis    Anemia, unspecified    Vitamin B12 deficiency    Chronic fatigue    Benign prostatic hypertrophy (BPH) with weak urinary stream    Chronic bronchitis    ELIZABETH (obstructive sleep apnea)    Sinoatrial block    Cerebral aneurysm, nonruptured    TIA (transient ischemic attack)    Intracranial vascular stenosis    Sinus pause    S/P percutaneous patent foramen ovale closure    TIA on medication    HTN (hypertension)    COPD (chronic obstructive pulmonary disease)    Nocturnal seizures    Encounter for screening for malignant neoplasm of colon    Seizure    HL (hearing loss)    Pre-syncope    Mixed hyperlipidemia    History of stroke    Melena    Irritable bowel syndrome with both constipation and diarrhea    Gastroesophageal reflux disease    Stroke    CVA (cerebral vascular accident)        Past Medical History:   Diagnosis Date    Acquired dyslexia     pt  reports after 3rd stroke    Allergic     Allergic rhinitis     Anal fissure     Aortic insufficiency     moderate, THIERRY     Asthma     Asthma     Benign prostatic hypertrophy (BPH) with weak urinary stream 2016    Cataract     Chronic bronchitis     Colon polyp     COPD (chronic obstructive pulmonary disease)     Emphysema lung     Falls frequently     Fatty liver     GERD (gastroesophageal reflux disease)     Headache     Hepatitis     thought to be Hepatitis A     History of pneumonia     With left lower lobe atelectasis and scar tissue, being followed    HL (hearing loss)     Hypertension     Low back pain     Macular degeneration     Memory loss     Mixed hyperlipidemia 2023    Obstructive sleep apnea syndrome 2017    refuses c-pap    PFO (patent foramen ovale)     s/p percutaneous closure with a 25mm Amplatzer  device in December 2018    Pneumonia     LLL with scar tissue    Seizures     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 total of 3 strokes    Tremor     Comes & goes    Vision loss         Past Surgical History:   Procedure Laterality Date    CARDIAC CATHETERIZATION N/A 12/12/2018    Procedure: Patent foramen ovale closure- [employer];  Surgeon: Deangelo Harris MD;  Location:  [facility];  Service: Cardiology    CARDIAC CATHETERIZATION N/A 12/12/2018    Procedure: Intracardiac echocardiogram;  Surgeon: Deangelo Harris MD;  Location:  [facility];  Service: Cardiology    CARDIAC ELECTROPHYSIOLOGY PROCEDURE N/A 03/26/2018    Procedure: Loop insertion   CONFIRM RX;  Surgeon: Luis Wyatt MD;  Location:  [facility];  Service: Cardiovascular    CARDIAC ELECTROPHYSIOLOGY PROCEDURE N/A 07/15/2020    Procedure: Loop recorder removal;  Surgeon: Starr Driscoll MD;  Location:  [facility];  Service: Cardiovascular;  Laterality: N/A;    CARDIAC SURGERY      CATARACT EXTRACTION Bilateral     COLONOSCOPY      COLONOSCOPY N/A 10/09/2020    Procedure: COLONOSCOPY with polypectomy;  Surgeon: Ras Mendoza MD;  Location:  [facility];  Service: Gastroenterology;  Laterality: N/A;  diverticulosis  ascending polyp  transverse polyp  sigmoid polyps X 2  rectal polyp    EYE SURGERY      HAND SURGERY Bilateral     INGUINAL HERNIA REPAIR Left     OTHER SURGICAL HISTORY      inguinal hernia repair for person over age 5    TONSILLECTOMY      TONSILLECTOMY           Visit Dx:   No diagnosis found.     Patient History       Row Name 08/08/24 1000             History    Chief Complaint Difficulty with daily activities;Muscle weakness;Impaired sensation;Balance Problems;Difficulty Walking;Falls/history of falls  left side weakness  -JV      Date Current Problem(s) Began 07/11/24  -JV      Brief  Description of Current Complaint Pt with facial droop and difficulty with speech on 7-11-24 and came to ER at [facility] [place]. MRI revealed multiple small acute infarcts of right corona radiata. Pt was discharged home on 7-12-24. Pt returned to [facility] [place] on 7-15-24 due to increased left side weakness. MRI revealed a progression/evolution of the small acute/subacute infarts. Pt was discharged to acute rehab. Pt in acute rehab x 2 weeks and has been referred to outpt therapy (OT/ST/PT).  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 OT/SLP  -JV      How has patient tried to help current problem? acute rehab  -JV      What clinical tests have you had for this problem? MRI  -JV      Results of Clinical Tests revealed infarcts of right corona radiata  -JV      Related/Recent Hospitalizations Yes  -JV      Date of Hospitalization 07/11/24  initial hospitalizaiton 7-11-24, readmission on 7-15-24 and acute rehab on 717-24  -JV      Location (Hospital Name) [facility] [place] for acute and [facility] for acute rehab  -JV         Fall Risk Assessment    Any falls in the past year: Yes  -JV      Number of falls reported in the last 12 months 4  -JV      Factors that contributed to the fall: Other (comment);Lost balance;Slippery surface  one fall was slip on deck prior to cva. others were loss of balance after cva  -JV      Does patient have a fear of falling No  -JV         Services    Prior Rehab/Home Health Experiences Yes  -JV      When was the prior experience with Rehab/Home Health pt discharged from [facility] Acute rehab on 7-28-24  -JV      Where was the prior experience with  Rehab/Home Health [facility] Acute rehab  -JV      Are you currently receiving Home Health services No  -JV      Do you plan to receive Home Health services in the near future No  -JV         Daily Activities    Primary Language English  -JV      Are you able to read Other (comment)  pt's reading/writing skills impacted from 2017 CVA.  -JV      Are you able to write Other (comment)  pt's reading/writing skills impacted from 2017 CVA.  -JV      How does patient learn best? Listening  -JV      Teaching needs identified Home Exercise Program;Management of Condition  -JV      Patient is concerned about/has problems with Difficulty with self care (i.e. bathing, dressing, toileting:;Grasping objects lifting;Coordination;Climbing Stairs;Performing home management (household chores, shopping, care of dependents);Transfers (getting out of a chair, bed);Walking  -JV      Does patient have problems with the following? None  -JV      Barriers to learning Other (comment)  pt with SLP consulation to address communication/cognitive issues  -JV      Functional Status --  -JV      Explanation of Functional Status Problem --  -JV      Pt Participated in POC and Goals Yes  -JV         Safety    Are you being hurt, hit, or frightened by anyone at home or in your life? No  -JV      Are you being neglected by a caregiver No  -JV      Have you had any of the following issues with N/A  -JV                User Key  (r) = Recorded By, (t) = Taken By, (c) = Cosigned By      Initials Name Provider Type    Kathryn Ivan, PT Physical Therapist                         PT Neuro       Row Name 08/08/24 1000             Subjective    Subjective Comments The pt reports he had a seizure last week and thinks it has negatively impacted his progress/recovery from CVA.  -JV         Precautions and Contraindications    Precautions/Limitations fall precautions;seizure precautions  -JV         Subjective Pain    Able to rate subjective pain? yes   -JV      Pre-Treatment Pain Level 0  -JV      Post-Treatment Pain Level 0  -JV      Subjective Pain Comment L knee pain only when kneeling on ground  -JV         Home Living    Current Living Arrangements home  -JV      Home Accessibility stairs to enter home  -JV      Number of Stairs, Main Entrance three  -JV      Home Equipment Rolling walker;Cane  -JV         Sensation    Light Touch No apparent deficits  -JV         Posture/Observations    Alignment Options Forward head;Thoracic kyphosis;Rounded shoulders;Lumbar lordosis  -JV      Forward Head Mild;Increased  -JV      Thoracic Kyphosis Mild;Increased  -JV      Rounded Shoulders Moderate;Increased  -JV      Lumbar lordosis Moderate;Decreased  -JV      Posture/Observations Comments PPT in sitting  -JV         General ROM    GENERAL ROM COMMENTS Atul LE ROM is WFL's  -JV         MMT (Manual Muscle Testing)    Rt Lower Ext Rt Hip WFL;Rt Knee WFL;Rt Ankle WFL  -JV      Lt Lower Ext Comments  -JV         MMT Left Lower Ext    Lt Lower Extremity Comments  L LE 4/5 at hip, 4+/5 at knee/ankle  -JV         Bed Mobility    Comment, (Bed Mobility) NT  -JV         Transfers    Bed-Chair [place] (Transfers) modified independence  -JV      Chair-Bed [place] (Transfers) modified independence  -JV      Transfers, Bed-Chair-Bed, Assist Device straight cane  -JV      Sit-Stand [place] (Transfers) independent  -JV      Stand-Sit [place] (Transfers) independent  -JV         Gait/Stairs (Locomotion)    [place] Level (Gait) modified independence  -JV      Assistive Device (Gait) cane, straight  -JV      Distance in Feet (Gait) 150  -JV      Pattern (Gait) step-through  -JV      Deviations/Abnormal Patterns (Gait) liyah decreased;gait speed decreased  -JV      Bilateral Gait Deviations forward flexed posture  -JV      [place] Level (Stairs) not tested  -JV         Balance Skills Training    Balance Comments See DU  -JV                User Key  (r) =  Recorded By, (t) = Taken By, (c) = Cosigned By      Initials Name Provider Type    Kathryn Ivan PT Physical Therapist                            Therapy Education  Education Details: Standing LE ther ex  Given: HEP  Program: New  How Provided: Verbal, Demonstration, Written  Provided to: Patient  Level of Understanding: Teach back education performed, Verbalized     PT OP Goals       Row Name 08/08/24 1000          PT Short Term Goals    STG Date to Achieve 09/07/24  -JV     STG 1 Pt will be I with HEP  -JV     STG 2 Pt will improve DU score by 1-2  points  -JV     STG 3 Admin 5XSTS  -JV        Long Term Goals    LTG Date to Achieve 10/07/24  -JV     LTG 1 Pt will improve LEFS score by 9 points  -JV     LTG 2 Pt will improve DU score by 2-3 points.  -JV     LTG 3 Pt will improve L LE strength to 4+/5 throughout  -JV        Time Calculation    PT Goal Re-Cert Due Date 09/07/24  -JV               User Key  (r) = Recorded By, (t) = Taken By, (c) = Cosigned By      Initials Name Provider Type    Kathryn Ivan PT Physical Therapist                     PT Assessment/Plan       Row Name 08/08/24 1357          PT Assessment    Functional Limitations Decreased safety during functional activities;Impaired gait;Limitation in home management;Performance in self-care ADL  -JV     Impairments Balance;Endurance;Gait;Muscle strength  -JV     Assessment Comments This pt is a 75 y/o male with hx of L sided CVA's in 2017/2018 who presented to the ED on 7/11/24 with acute R sided CVA. After D/C from the hospital, pt experienced increased L side weakness and several falls. Pt returned to ED and was found to have extended previous areas of ischemia. The pt went to [facility] for 2 weeks of acute rehab and was D/C'd home with wife. Pt reports he was doing well until having a seizure on 8/3/24 which pt thinks has negatively impacted his progress and recovery from stroke. The pt is currently amb with a single point cane and reports  1 fall since D/C home. DU balance score was 53/56.  -JV     Please refer to paper survey for additional self-reported information Yes  -JV     Rehab Potential Good  -JV     Patient/caregiver participated in establishment of treatment plan and goals Yes  -JV     Patient would benefit from skilled therapy intervention Yes  -JV        PT Plan    PT Frequency 1x/week  -JV     Predicted Duration of Therapy Intervention (PT) 8-12 weeks  -JV     Planned CPT's? PT EVAL LOW COMPLEXITY: 08635;PT THER PROC EA 15 MIN: 04349;PT THER ACT EA 15 MIN: 80476;PT NEUROMUSC RE-EDUCATION EA 15 MIN: 53603;PT GAIT TRAINING EA 15 MIN: 23341;PT SELF CARE/HOME MGMT/TRAIN EA 15: 08342;PT ELECTRICAL STIM UNATTEND:   -JV     PT Plan Comments Plan to see pt as noted above to help progress to an optimal and safe level of functional independence in his environment.  -JV               User Key  (r) = Recorded By, (t) = Taken By, (c) = Cosigned By      Initials Name Provider Type    Kathryn Ivan PT Physical Therapist                        OP Exercises       Row Name 08/08/24 1000             Precautions    Existing Precautions/Restrictions fall;seizures  -JV         Subjective    Subjective Comments The pt reports he had a seizure last week and thinks it has negatively impacted his progress/recovery from CVA.  -JV         Subjective Pain    Able to rate subjective pain? yes  -JV      Pre-Treatment Pain Level 0  -JV      Post-Treatment Pain Level 0  -JV      Subjective Pain Comment L knee pain only when kneeling on ground  -JV         Exercise 1    Exercise Name 1 The pt was provided with written, verbal and demo instructions for standing LE ther ex including squats, marching, hip ext, hip ABD and heel/toe raises.  -JV                User Key  (r) = Recorded By, (t) = Taken By, (c) = Cosigned By      Initials Name Provider Type    Kathryn Ivan PT Physical Therapist                                Outcome Measure Options: Ud  Balance, Lower Extremity Functional Scale (LEFS)  Man[place]ano Balance Scale  Sitting to Standing: able to stand without using hands and stabilize independently  Standing Unsupported: able to stand safely for 2 minutes  Sitting with Back Unsupported but Feet Supported on Floor or on Stool: able to sit safely and securely for 2 minutes  Standing to Sitting: sits safely with minimal use of hands  Transfers: able to transfer safely with minor use of hands  Standing Unsupported with Eyes Closed: able to stand 10 seconds safely  Standing Unsupported with Feet Together: able to place feet together independently and stand 1 minute safely  Reaching Forward with Outstretched Arm While Standing: can reach forward confidently 25 cm (10 inches)   Object From the Floor From a Standing Position: able to  object safely and easily  Turning to Look Behind Over Left and Right Shoulders While Standing: looks behind from both sides and weight shifts well  Turn 360 Degrees: able to turn 360 degrees safely one side only 4 seconds or less  Place Alternate Foot on Step or Stool While Standing Unsupported: able to stand independently and safely and complete 8 steps in 20 seconds  Standing Unsupported with One Foot in Front: able to place foot ahead independently and hold 30 seconds  Standing on One Leg: able to lift leg independently and hold 5-10 seconds  Manzano Total Score: 53  Lower Extremity Functional Index  Any of your usual work, housework or school activities: Moderate difficulty  Your usual hobbies, recreational or sporting activities: Quite a bit of difficulty  Getting into or out of the bath: Moderate difficulty  Walking between rooms: Moderate difficulty  Putting on your shoes or socks: Moderate difficulty  Squatting: Quite a bit of difficulty  Lifting an object, like a bag of groceries from the floor: Moderate difficulty  Performing light activities around your home: Moderate difficulty  Performing heavy activities around  your home: Quite a bit of difficulty  Getting into or out of a car: Moderate difficulty  Walking 2 blocks: Quite a bit of difficulty  Walking a mile: Extreme difficulty or unable to perform activity  Going up or down 10 stairs (about 1 flight of stairs): Moderate difficulty  Standing for 1 hour: Extreme difficulty or unable to perform activity  Sitting for 1 hour: A little bit of difficulty  Running on even ground: Extreme difficulty or unable to perform activity  Running on uneven ground: Extreme difficulty or unable to perform activity  Making sharp turns while running fast: Extreme difficulty or unable to perform activity  Hopping: Extreme difficulty or unable to perform activity  Rolling over in bed: Moderate difficulty  Total: 25    Time Calculation:   Start Time: 1000  Stop Time: 1100  Time Calculation (min): 60 min   Therapy Charges for Today       Code Description Service Date Service Provider Modifiers Qty    [id number] HC PT EVAL LOW COMPLEXITY 4 8/8/2024 Kathryn Dewitt, PT GP 1            PT G-Codes  Outcome Measure Options: [place] Balance, Lower Extremity Functional Scale (LEFS)  Manzano Total Score: 53  Total: 25         Kathryn Dewitt PT  8/8/2024

## 2024-08-08 NOTE — THERAPY TREATMENT NOTE
Outpatient Occupational Therapy Neuro Treatment Note  [place]     Patient Name: Ronnell Rizvi  : 1950  MRN: [id number]  Today's Date: 2024       Visit Date: 2024    Patient Active Problem List   Diagnosis    Anemia, unspecified    Vitamin B12 deficiency    Chronic fatigue    Benign prostatic hypertrophy (BPH) with weak urinary stream    Chronic bronchitis    ELIZABETH (obstructive sleep apnea)    Sinoatrial block    Cerebral aneurysm, nonruptured    TIA (transient ischemic attack)    Intracranial vascular stenosis    Sinus pause    S/P percutaneous patent foramen ovale closure    TIA on medication    HTN (hypertension)    COPD (chronic obstructive pulmonary disease)    Nocturnal seizures    Encounter for screening for malignant neoplasm of colon    Seizure    HL (hearing loss)    Pre-syncope    Mixed hyperlipidemia    History of stroke    Melena    Irritable bowel syndrome with both constipation and diarrhea    Gastroesophageal reflux disease    Stroke    CVA (cerebral vascular accident)        Past Medical History:   Diagnosis Date    Acquired dyslexia     pt  reports after 3rd stroke    Allergic     Allergic rhinitis     Anal fissure     Aortic insufficiency     moderate, THIERRY     Asthma     Asthma     Benign prostatic hypertrophy (BPH) with weak urinary stream 2016    Cataract     Chronic bronchitis     Colon polyp     COPD (chronic obstructive pulmonary disease)     Emphysema lung     Falls frequently     Fatty liver     GERD (gastroesophageal reflux disease)     Headache     Hepatitis     thought to be Hepatitis A     History of pneumonia     With left lower lobe atelectasis and scar tissue, being followed    HL (hearing loss)     Hypertension     Low back pain     Macular degeneration     Memory loss     Mixed hyperlipidemia 2023    Obstructive sleep apnea syndrome 2017    refuses c-pap    PFO (patent foramen ovale)     s/p percutaneous closure with a 25mm Amplatzer  device in December 2018    Pneumonia     LLL with scar tissue    Seizures     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 total of 3 strokes    Tremor     Comes & goes    Vision loss         Past Surgical History:   Procedure Laterality Date    CARDIAC CATHETERIZATION N/A 12/12/2018    Procedure: Patent foramen ovale closure- [employer];  Surgeon: Deangelo Harris MD;  Location:  [facility];  Service: Cardiology    CARDIAC CATHETERIZATION N/A 12/12/2018    Procedure: Intracardiac echocardiogram;  Surgeon: Deangelo Harris MD;  Location:  [facility];  Service: Cardiology    CARDIAC ELECTROPHYSIOLOGY PROCEDURE N/A 03/26/2018    Procedure: Loop insertion   CONFIRM RX;  Surgeon: Lusi Wyatt MD;  Location:  [facility];  Service: Cardiovascular    CARDIAC ELECTROPHYSIOLOGY PROCEDURE N/A 07/15/2020    Procedure: Loop recorder removal;  Surgeon: Starr Driscoll MD;  Location:  [facility];  Service: Cardiovascular;  Laterality: N/A;    CARDIAC SURGERY      CATARACT EXTRACTION Bilateral     COLONOSCOPY      COLONOSCOPY N/A 10/09/2020    Procedure: COLONOSCOPY with polypectomy;  Surgeon: Ras Mendoza MD;  Location:  [facility];  Service: Gastroenterology;  Laterality: N/A;  diverticulosis  ascending polyp  transverse polyp  sigmoid polyps X 2  rectal polyp    EYE SURGERY      HAND SURGERY Bilateral     INGUINAL HERNIA REPAIR Left     OTHER SURGICAL HISTORY      inguinal hernia repair for person over age 5    TONSILLECTOMY      TONSILLECTOMY           Visit Dx:    ICD-10-CM ICD-9-CM   1. Recurrent strokes  I63.9 434.91   2. Hemiparesis affecting left side as late effect of cerebrovascular accident (CVA)  I69.354 438.20   3. Lack of coordination as late effect of cerebrovascular accident (CVA)  I69.398 438.89    R27.9 781.3        OT Neuro       Row Name 08/08/24 0900              Subjective Comments    Subjective Comments Pt reports he had a seizure since his evaluation last week, and he feels generalized fatigue and weakness of left UE.  -SD         MMT (Manual Muscle Testing)    General MMT Comments LUE strength 3+/5  -SD                User Key  (r) = Recorded By, (t) = Taken By, (c) = Cosigned By      Initials Name Provider Type    Figueroa Carbajal OTR Occupational Therapist                    Hand Therapy (Last 24 Hours)       Hand Eval       Row Name 08/08/24 0900              Strength Right    # Reps 1  -SD      Right Rung 2  -SD      Right  Test 1 81  -SD       Strength Average Right 81  -SD          Strength Left    # Reps 1  -SD      Left Rung 2  -SD      Left  Test 1 61  -SD       Strength Average Left 61  -SD         Right Hand Strength - Pinch (lbs)    Lateral 20 lbs  -SD         Left Hand Strength - Pinch (lbs)    Lateral 15 lbs  -SD                User Key  (r) = Recorded By, (t) = Taken By, (c) = Cosigned By      Initials Name Provider Type    Figueroa Carbajal OTR Occupational Therapist                         OT Assessment/Plan       Row Name 08/08/24 1605          OT Assessment    Assessment Comments Pt reports he had a seizure this past week and has noticied generalized fatigue and continues with weakness, sensory deficits and coordination issues of LUE. Pt demonstrates functional rom today, yet his overall LUE strength is decreased since the evaluation. Pt also demonstrated more difficutly with fmc tasks (signifcantly more time needed to complete the  9 hole peg test). Reinforced benefits of activity, sensory re-education and neuromuscular activity to address LUE stregth and coordination.  -SD     OT Diagnosis CVA of left side weakness, sensory deficits and coordination deficits  -SD        OT Plan    OT Plan Comments continue with plan  -SD               User Key  (r) = Recorded By, (t) = Taken By, (c) = Cosigned By      Initials Name  Provider Type    Figueroa Carbajal OTTRACIE Occupational Therapist                     OT Goals       Row Name 08/08/24 0800          OT Short Term Goals    STG Date to Achieve 08/30/24  -SD     STG 1 Pt to be I with home neuromuscular re-education program to address LUE sensory awareness, coordination and strength.  -SD     STG 1 Progress Ongoing  -SD     STG 2 Pt to manage clothing fasteners independently utilizing compensatory strategies as needed.  -SD     STG 2 Progress Ongoing  -SD        Long Term Goals    LTG Date to Achieve 09/27/24  -SD     LTG 1 Pt to improve Quick Dash measure by 10 to demonstrate increased function for daily tasks.  -SD     LTG 1 Progress Ongoing  -SD     LTG 2 Pt to improve left hand coordination as demonstrated by a 20 second improvement of 9 hole peg test.  -SD     LTG 2 Progress Ongoing  -SD               User Key  (r) = Recorded By, (t) = Taken By, (c) = Cosigned By      Initials Name Provider Type    Figueroa Carbajal OTR Occupational Therapist                           Therapy Education  Education Details: Education provided regading benefits of active participation/use of LUE for daily tasks, safey during adl's due to balance issues, compensatory strategies for adl management due to fmc deficits and neuromuscular re-education program of LUE to address sensory issues, coordination and functional strength  Given: HEP, Symptoms/condition management  Program: Reinforced  How Provided: Verbal  Provided to: Patient  Level of Understanding: Teach back education performed, Verbalized, Demonstrated       OT Exercises       Row Name 08/08/24 0900             Exercise 1    Exercise Name 1 LUE weight bearing activity  -SD      Time (Minutes) 1 3  -SD         Exercise 2    Exercise Name 2 sensory awarenss/ re-education activity  -SD         Exercise 3    Exercise Name 3 functional coordination activity (grooved pegboard, nuts/bolts, shuffling/dealing cards)  -SD         Exercise 4     Exercise Name 4 functional hand strengthening  -SD      Equipment 4 --  resistive pins  -SD      Resistance 4 Yellow;Red;Green;Blue;Black  -SD         Exercise 5    Exercise Name 5 distal LUE strengthening  -SD      Cueing 5 Verbal;Demo  -SD      Equipment 5 Other  therabar  -SD      Resistance 5 Red  -SD                User Key  (r) = Recorded By, (t) = Taken By, (c) = Cosigned By      Initials Name Provider Type    SD Figueroa Ballard OTR Occupational Therapist                      9 Hole Peg  9-Hole Peg Left: 2 min 4 sec         Time Calculation:   OT Start Time: 0905  OT Stop Time: 1000  OT Time Calculation (min): 55 min  Timed Charges  02973 -  OT Neuromuscular Reeducation Minutes: 55  Total Minutes  Timed Charges Total Minutes: 55   Total Minutes: 55     Therapy Charges for Today       Code Description Service Date Service Provider Modifiers Qty    18452523537 HC OT NEUROMUSC RE EDUCATION EA 15 MIN 8/8/2024 Figueroa Ballard OTR GO 4                      DULCE Gastelum  8/8/2024

## 2024-08-08 NOTE — THERAPY TREATMENT NOTE
Outpatient Speech Language Pathology   Adult Speech Language Cognitive Treatment Note  [place]     Patient Name: Ronnell Rizvi  : 1950  MRN: [id number]  Today's Date: 2024         Visit Date: 2024   Patient Active Problem List   Diagnosis    Anemia, unspecified    Vitamin B12 deficiency    Chronic fatigue    Benign prostatic hypertrophy (BPH) with weak urinary stream    Chronic bronchitis    ELIZABETH (obstructive sleep apnea)    Sinoatrial block    Cerebral aneurysm, nonruptured    TIA (transient ischemic attack)    Intracranial vascular stenosis    Sinus pause    S/P percutaneous patent foramen ovale closure    TIA on medication    HTN (hypertension)    COPD (chronic obstructive pulmonary disease)    Nocturnal seizures    Encounter for screening for malignant neoplasm of colon    Seizure    HL (hearing loss)    Pre-syncope    Mixed hyperlipidemia    History of stroke    Melena    Irritable bowel syndrome with both constipation and diarrhea    Gastroesophageal reflux disease    Stroke    CVA (cerebral vascular accident)          Visit Dx:    ICD-10-CM ICD-9-CM   1. Dysarthria as late effect of cerebrovascular accident (CVA)  I69.322 438.13   2. Cognitive deficit as late effect of cerebrovascular accident (CVA)  I69.319 438.0        OP SLP Assessment/Plan - 24 0810          SLP Assessment    Clinical Impression Comments Pt with mild decline in overall intelligibility that may be adversely affected by seizure and/or biting of his tongue during the seizure. Moderate flaccid dysarthria with improvement over the course of the session with fading cues. Oromotor exercises also given and reviewed for increasing labial seal and movement. Pt able to return demonstration. Cognitive goals were not targeted during this session due to time constraints.  -AD    SLP Diagnosis Moderate flaccid dysarthria and mild cognitive communication deficit  -AD       SLP Plan    Plan Comments Will continue with therapy  next week and continue with current plan. Target cognition during session as well.  -AD              User Key  (r) = Recorded By, (t) = Taken By, (c) = Cosigned By      Initials Name Provider Type    Sunshine Starr MS CCC-SLP Speech and Language Pathologist                     SLP OP Goals       Row Name 08/08/24 0810          Subjective Comments    Subjective Comments Goldy was seen in therapy for 50 minutes and was alert and cooperative. He reports he had a seizure because someone took him off of his seizure medicine. He also reports that  -AD        Subjective Pain    Able to rate subjective pain? yes  -AD     Subjective Pain Comment Pt reports mild pain in his tongue where he bit it during a seizure. He also reports some pain in his left knee, but no level given.  -AD        Dysarthria Goals     Dysarthria LTG's Patient will be able to engage in speech at the conversational level with maximum articulatory accuracy so that speech is understood by familiar /unfamiliar listeners  -AD     Patient will be able to engage in speech at the conversational level with maximum articulatory accuracy so that speech is understood by familiar /unfamiliar listeners 90%:;without cues  -AD     Status: Patient will be able to engage in speech at the conversational level with maximum articulatory accuracy so that speech is understood by familiar /unfamiliar listeners Progress slower than expected  -AD     Comments: Patient will be able to engage in speech at the conversational level with maximum articulatory accuracy so that speech is understood by familiar /unfamiliar listeners Mild decline in overall intelligibility since evaluation. Felt may be due to sore tongue with limited movement/articulatory contact r/t pain.  -AD     Patient will improve comprehensibility of verbal messages by speaking at an appropriate vocal intensity 90%:;without cues  -AD     Status: Patient will improve comprehensibility of verbal messages by  speaking at an appropriate vocal intensity Progressing as expected  -AD     Comments: Patient will improve comprehensibility of verbal messages by speaking at an appropriate vocal intensity Goldy was able to demonstrate increased volume with initial cues from SLP at beginning of session that were faded. Consistent performance on 80% of trials.  -AD     Patient will improve respiratory support and the use of respiration for speech through use of increased strength of expiratory muscles as measured by Expiratory Muscle Strength Training device 90%:;without cues  -AD     Status: Patient will improve respiratory support and the use of respiration for speech through use of increased strength of expiratory muscles as measured by Expiratory Muscle Strength Training device New  -AD     Comments: Patient will improve respiratory support and the use of respiration for speech through use of increased strength of expiratory muscles as measured by Expiratory Muscle Strength Training device Not targeted. Pt did not bring device. Instructed to bring at the next visit.  -AD     Patient will apply compensatory strategies to improve intelligibility of speech during in spontaneous speech 90%:;without cues  -AD     Status: Patient will apply compensatory strategies to improve intelligibility of speech during in spontaneous speech Progressing as expected  -AD     Comments: Patient will apply compensatory strategies to improve intelligibility of speech during in spontaneous speech Goldy was able to demonstrate use of slower rate and overexaggeration of articulatory movements during practice on phrases consistently at 80% of trials.  -AD        Memory Goals    Memory LTG's Patient will be able to remember  information needed to participate in activities of daily living  -AD     Patient will be able to remember  information needed to participate in activities of daily living 90% w/use of internal and external strategies and aids  -AD      Status: Patient will be able to remember  information needed to participate in activities of daily living New  -AD     Comments: Patient will be able to remember  information needed to participate in activities of daily living Not targeted due to focus on other goals.  -AD     Patient’s memory skills will be enhanced as reported by patient by utilizing internal memory strategies to recall up to 3 pieces of information after a 5- minute delay 90%:;without cues  -AD     Status: Patient’s memory skills will be enhanced as reported by patient by utilizing internal memory strategies to recall up to 3 pieces of information after a 5- minute delay New  -AD     Comments: Patient’s memory skills will be enhanced as reported by patient by utilizing internal memory strategies to recall up to 3 pieces of information after a 5- minute delay Not targeted due to focus on other goals.  -AD     Patient’s memory skills will be enhanced as reported by patient by using external memory aides 90%:;without cues  -AD     Status: Patient’s memory skills will be enhanced as reported by patient by using external memory aides New  -AD     Comments: Patient’s memory skills will be enhanced as reported by patient by using external memory aides Not targeted due to focus on other goals.  -AD        SLP Time Calculation    SLP Goal Re-Cert Due Date 10/30/24  -AD               User Key  (r) = Recorded By, (t) = Taken By, (c) = Cosigned By      Initials Name Provider Type    Sunshine Starr MS CCC-SLP Speech and Language Pathologist                   OP SLP Education       Row Name 08/08/24 0810       Education    Learning Method Explanation;Demonstration;Written materials  -AD    Teaching Response Verbalized understanding;Demonstrated understanding  -AD    Education Comments Goldy was able to return demonstration of labial strength and ROM exercises. Able to demonstrate compensatory strategies. Handout provided for practice phrases at home  for use of compensatory motor speech strategies and labial exercises.  -AD              User Key  (r) = Recorded By, (t) = Taken By, (c) = Cosigned By      Initials Name Effective Dates    AD Sunshine Ballard, MS CCC-SLP 06/16/21 -                          Time Calculation:   SLP Start Time: 0810  SLP Stop Time: 0900  SLP Time Calculation (min): 50 min  SLP Non-Billable Time (min): 0 min  Total Timed Code Minutes- SLP: 0 minute(s)  Untimed Charges  05584-BS Treatment/ST Modification Prosth Aug Alter : 50  Total Minutes  Untimed Charges Total Minutes: 50   Total Minutes: 50    Therapy Charges for Today       Code Description Service Date Service Provider Modifiers Qty    54613876435 HC ST TREATMENT SPEECH 3 8/8/2024 Sunshine Ballard, MS CCC-SLP GN 1                     Sunshine Ballard MS CCC-SLP  8/8/2024

## 2024-08-09 ENCOUNTER — TELEPHONE (OUTPATIENT)
Dept: NEUROLOGY | Facility: CLINIC | Age: 74
End: 2024-08-09
Payer: MEDICARE

## 2024-08-09 NOTE — TELEPHONE ENCOUNTER
Provider: DR CRUZ    Caller: MELISSA    Relationship to Patient: [facility]    Pharmacy: [facility] [id number]    Phone Number: [phone]    Reason for Call: CALLED TO SEE IF PROVIDER WOULD REFILL THE ASPIRIN & PANTOPRAZOLE OR WHO THEY WOULD REQUEST REFILLS FROM AS THEY WERE PRESCRIBED IN THE HOSPITAL. PATIENT HAS 6 DAYS LEFT. PLEASE ADVISE, THANK YOU.

## 2024-08-14 ENCOUNTER — HOSPITAL ENCOUNTER (OUTPATIENT)
Dept: PHYSICAL THERAPY | Facility: HOSPITAL | Age: 74
Setting detail: THERAPIES SERIES
Discharge: HOME | End: 2024-08-14
Payer: MEDICARE

## 2024-08-14 ENCOUNTER — HOSPITAL ENCOUNTER (OUTPATIENT)
Dept: OCCUPATIONAL THERAPY | Facility: HOSPITAL | Age: 74
Setting detail: THERAPIES SERIES
Discharge: HOME | End: 2024-08-14
Payer: MEDICARE

## 2024-08-14 ENCOUNTER — HOSPITAL ENCOUNTER (OUTPATIENT)
Dept: SPEECH THERAPY | Facility: HOSPITAL | Age: 74
Setting detail: THERAPIES SERIES
Discharge: HOME | End: 2024-08-14
Payer: MEDICARE

## 2024-08-14 DIAGNOSIS — M62.3 IMMOBILITY SYNDROME: Primary | ICD-10-CM

## 2024-08-14 DIAGNOSIS — I69.322 DYSARTHRIA AS LATE EFFECT OF CEREBROVASCULAR ACCIDENT (CVA): Primary | ICD-10-CM

## 2024-08-14 DIAGNOSIS — I63.9 RECURRENT STROKES: ICD-10-CM

## 2024-08-14 DIAGNOSIS — R27.9 LACK OF COORDINATION AS LATE EFFECT OF CEREBROVASCULAR ACCIDENT (CVA): ICD-10-CM

## 2024-08-14 DIAGNOSIS — I69.354 HEMIPARESIS AFFECTING LEFT SIDE AS LATE EFFECT OF CEREBROVASCULAR ACCIDENT (CVA): ICD-10-CM

## 2024-08-14 DIAGNOSIS — I69.319 COGNITIVE DEFICIT AS LATE EFFECT OF CEREBROVASCULAR ACCIDENT (CVA): ICD-10-CM

## 2024-08-14 DIAGNOSIS — I69.398 LACK OF COORDINATION AS LATE EFFECT OF CEREBROVASCULAR ACCIDENT (CVA): ICD-10-CM

## 2024-08-14 DIAGNOSIS — I63.9 RECURRENT STROKES: Primary | ICD-10-CM

## 2024-08-14 PROCEDURE — 97110 THERAPEUTIC EXERCISES: CPT

## 2024-08-14 PROCEDURE — 92507 TX SP LANG VOICE COMM INDIV: CPT

## 2024-08-14 PROCEDURE — 97112 NEUROMUSCULAR REEDUCATION: CPT

## 2024-08-14 NOTE — THERAPY TREATMENT NOTE
Outpatient Occupational Therapy Neuro Treatment Note  [place]     Patient Name: Ronnell Rizvi  : 1950  MRN: [id number]  Today's Date: 2024       Visit Date: 2024    Patient Active Problem List   Diagnosis    Anemia, unspecified    Vitamin B12 deficiency    Chronic fatigue    Benign prostatic hypertrophy (BPH) with weak urinary stream    Chronic bronchitis    ELIZABETH (obstructive sleep apnea)    Sinoatrial block    Cerebral aneurysm, nonruptured    TIA (transient ischemic attack)    Intracranial vascular stenosis    Sinus pause    S/P percutaneous patent foramen ovale closure    TIA on medication    HTN (hypertension)    COPD (chronic obstructive pulmonary disease)    Nocturnal seizures    Encounter for screening for malignant neoplasm of colon    Seizure    HL (hearing loss)    Pre-syncope    Mixed hyperlipidemia    History of stroke    Melena    Irritable bowel syndrome with both constipation and diarrhea    Gastroesophageal reflux disease    Stroke    CVA (cerebral vascular accident)        Past Medical History:   Diagnosis Date    Acquired dyslexia     pt  reports after 3rd stroke    Allergic     Allergic rhinitis     Anal fissure     Aortic insufficiency     moderate, THIERRY     Asthma     Asthma     Benign prostatic hypertrophy (BPH) with weak urinary stream 2016    Cataract     Chronic bronchitis     Colon polyp     COPD (chronic obstructive pulmonary disease)     Emphysema lung     Falls frequently     Fatty liver     GERD (gastroesophageal reflux disease)     Headache     Hepatitis     thought to be Hepatitis A     History of pneumonia     With left lower lobe atelectasis and scar tissue, being followed    HL (hearing loss)     Hypertension     Low back pain     Macular degeneration     Memory loss     Mixed hyperlipidemia 2023    Obstructive sleep apnea syndrome 2017    refuses c-pap    PFO (patent foramen ovale)     s/p percutaneous closure with a 25mm Amplatzer  device in December 2018    Pneumonia     LLL with scar tissue    Seizures     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 total of 3 strokes    Tremor     Comes & goes    Vision loss         Past Surgical History:   Procedure Laterality Date    CARDIAC CATHETERIZATION N/A 12/12/2018    Procedure: Patent foramen ovale closure- Abbott;  Surgeon: Deangelo Harris MD;  Location:  [facility];  Service: Cardiology    CARDIAC CATHETERIZATION N/A 12/12/2018    Procedure: Intracardiac echocardiogram;  Surgeon: Deangelo Harris MD;  Location:  [facility];  Service: Cardiology    CARDIAC ELECTROPHYSIOLOGY PROCEDURE N/A 03/26/2018    Procedure: Loop insertion   CONFIRM RX;  Surgeon: Luis Wyatt MD;  Location:  [facility];  Service: Cardiovascular    CARDIAC ELECTROPHYSIOLOGY PROCEDURE N/A 07/15/2020    Procedure: Loop recorder removal;  Surgeon: Starr Driscoll MD;  Location:  [facility];  Service: Cardiovascular;  Laterality: N/A;    CARDIAC SURGERY      CATARACT EXTRACTION Bilateral     COLONOSCOPY      COLONOSCOPY N/A 10/09/2020    Procedure: COLONOSCOPY with polypectomy;  Surgeon: Ras Mendoza MD;  Location:  [facility];  Service: Gastroenterology;  Laterality: N/A;  diverticulosis  ascending polyp  transverse polyp  sigmoid polyps X 2  rectal polyp    EYE SURGERY      HAND SURGERY Bilateral     INGUINAL HERNIA REPAIR Left     OTHER SURGICAL HISTORY      inguinal hernia repair for person over age 5    TONSILLECTOMY      TONSILLECTOMY           Visit Dx:    ICD-10-CM ICD-9-CM   1. Recurrent strokes  I63.9 434.91   2. Hemiparesis affecting left side as late effect of cerebrovascular accident (CVA)  I69.354 438.20   3. Lack of coordination as late effect of cerebrovascular accident (CVA)  I69.398 438.89    R27.9 781.3           Hand Therapy (Last 24 Hours)       Hand Eval        Row Name 08/14/24 1000              Strength Left    # Reps 1  -SD      Left Rung 2  -SD      Left  Test 1 68  -SD       Strength Average Left 68  -SD         Left Hand Strength - Pinch (lbs)    Lateral 15 lbs  -SD                User Key  (r) = Recorded By, (t) = Taken By, (c) = Cosigned By      Initials Name Provider Type    Figueroa Carbajal, OTR Occupational Therapist                         OT Assessment/Plan       Row Name 08/14/24 1526          OT Assessment    Functional Limitations Performance in self-care ADL;Limitation in home management  -SD     Impairments Coordination;Sensation;Muscle strength  -SD     Assessment Comments Pt reports he feels his LUE strength has improved since last week. Pt did demonstrate improved functional  strength this week (returned to level measured at evaluation). Pt continues with functional coordination deficits and decreased sensory awareness of LUE. Reinforced benefits of activity, sensory re-education program and LUE rom/stregthening/coordination activity at home.  -SD     OT Diagnosis CVA of left side weakness, sensory deficits and coordination deficits  -SD        OT Plan    OT Plan Comments continue with plan  -SD               User Key  (r) = Recorded By, (t) = Taken By, (c) = Cosigned By      Initials Name Provider Type    Figueroa Carbajal, OTR Occupational Therapist                     OT Goals       Row Name 08/14/24 1000          OT Short Term Goals    STG Date to Achieve 08/30/24  -SD     STG 1 Pt to be I with home neuromuscular re-education program to address LUE sensory awareness, coordination and strength.  -SD     STG 1 Progress Ongoing  -SD     STG 2 Pt to manage clothing fasteners independently utilizing compensatory strategies as needed.  -SD     STG 2 Progress Ongoing  -SD        Long Term Goals    LTG Date to Achieve 09/27/24  -SD     LTG 1 Pt to improve Quick Dash measure by 10 to demonstrate increased function for daily  tasks.  -SD     LTG 1 Progress Ongoing  -SD     LTG 2 Pt to improve left hand coordination as demonstrated by a 20 second improvement of 9 hole peg test.  -SD     LTG 2 Progress Ongoing  -SD               User Key  (r) = Recorded By, (t) = Taken By, (c) = Cosigned By      Initials Name Provider Type    Figueroa Carbajal OTTRACIE Occupational Therapist                           Therapy Education  Education Details: Education provided regading benefits of active participation/use of LUE for daily tasks, safey during adl's due to balance issues, compensatory strategies for adl management due to fmc deficits and neuromuscular re-education program of LUE to address sensory issues, coordination and functional strength  Given: HEP  Program: Reinforced  How Provided: Verbal  Provided to: Patient  Level of Understanding: Teach back education performed, Verbalized, Demonstrated       OT Exercises       Row Name 08/14/24 1000             Exercise 1    Exercise Name 1 LUE weight bearing activity  -SD      Time (Minutes) 1 3  -SD         Exercise 2    Exercise Name 2 sensory awarenss/ re-education activity  -SD         Exercise 3    Exercise Name 3 functional coordination activity (wooden pegboard and grooved pegboard)  -SD         Exercise 4    Exercise Name 4 functional hand strengthening  -SD      Equipment 4 Other  resistive pins (1# cuff weight at left wrist for UE strengthening)  -SD      Resistance 4 Yellow;Red;Green;Blue  -SD         Exercise 5    Exercise Name 5 Functional activity (use of phone). Pt able to hold phone with left hand while using right hand to navigate emilia/screens. Pt did require vc's for sequencing/problem solving.  -SD      Cueing 5 --  -SD      Equipment 5 --  -SD      Resistance 5 --  -SD                User Key  (r) = Recorded By, (t) = Taken By, (c) = Cosigned By      Initials Name Provider Type    Figueroa Carbajal OTR Occupational Therapist                                Time Calculation:   OT  Start Time: 1010  OT Stop Time: 1050  OT Time Calculation (min): 40 min  Timed Charges  91229 -  OT Neuromuscular Reeducation Minutes: 40  Total Minutes  Timed Charges Total Minutes: 40   Total Minutes: 40     Therapy Charges for Today       Code Description Service Date Service Provider Modifiers Qty    35765722502 HC OT NEUROMUSC RE EDUCATION EA 15 MIN 8/14/2024 Figueroa Ballard OTR GO 3                      DULCE Gastelum  8/14/2024

## 2024-08-14 NOTE — THERAPY TREATMENT NOTE
"  Outpatient Physical Therapy Neuro Treatment Note  [place] [facility]     Patient Name: Ronnell Rizvi  : 1950  MRN: [id number]  Today's Date: 2024      Visit Date: 2024    Visit Dx:    ICD-10-CM ICD-9-CM   1. Immobility syndrome  M62.3 728.3       Patient Active Problem List   Diagnosis    Anemia, unspecified    Vitamin B12 deficiency    Chronic fatigue    Benign prostatic hypertrophy (BPH) with weak urinary stream    Chronic bronchitis    ELIZABETH (obstructive sleep apnea)    Sinoatrial block    Cerebral aneurysm, nonruptured    TIA (transient ischemic attack)    Intracranial vascular stenosis    Sinus pause    S/P percutaneous patent foramen ovale closure    TIA on medication    HTN (hypertension)    COPD (chronic obstructive pulmonary disease)    Nocturnal seizures    Encounter for screening for malignant neoplasm of colon    Seizure    HL (hearing loss)    Pre-syncope    Mixed hyperlipidemia    History of stroke    Melena    Irritable bowel syndrome with both constipation and diarrhea    Gastroesophageal reflux disease    Stroke    CVA (cerebral vascular accident)                        PT Assessment/Plan       Row Name 24 1143          PT Assessment    Functional Limitations Decreased safety during functional activities;Impaired gait;Limitation in home management;Performance in self-care ADL  -JV     Impairments Balance;Endurance;Gait;Muscle strength  -JV     Assessment Comments The pt presents for tx and reports 2 falls yesterday. Once when bending over to check air in a tire, and once standing at the kitchen sink. Pt states he turned around too fast and felt like his legs \"gave out\". Pt cont to have c/o's pain in the L knee and was encouraged to ask PCP for an xray. The pt rode recumbent bike with good abdelrahman, and worked on balance progression using normal Malathi. Pt stood on blue foam pad with normal size Malathi including head mov't with eyes open.  -JV     Rehab Potential Good  -JV     " Patient/caregiver participated in establishment of treatment plan and goals Yes  -JV     Patient would benefit from skilled therapy intervention Yes  -JV        PT Plan    PT Frequency 1x/week  -JV     PT Plan Comments Cont plan to progress toward goals.  -JV               User Key  (r) = Recorded By, (t) = Taken By, (c) = Cosigned By      Initials Name Provider Type    Kathryn Ivan PT Physical Therapist                        OP Exercises       Row Name 08/14/24 1100             Precautions    Existing Precautions/Restrictions fall;seizures  -JV         Subjective    Subjective Comments Pt reports compliance with HEP, and 2 falls yesterday.  -JV         Exercise 1    Exercise Name 1 Recumbent bike  -JV      Time 1 8 min  -JV      Additional Comments seat #12  -JV         Exercise 2    Exercise Name 2 Pt performed balance progression in corner using normal Malathi including head mov't with eyes open and static standing with eyes closed.  -JV         Exercise 3    Exercise Name 3 Pt worked on balance standing on blue foam pad using normal Malathi including head mov't with eyes open.  -JV                User Key  (r) = Recorded By, (t) = Taken By, (c) = Cosigned By      Initials Name Provider Type    Kathryn Ivan PT Physical Therapist                                    Therapy Education  Education Details: Add balance progression in normal size Malathi to HEP  Given: HEP  Program: New  How Provided: Verbal, Demonstration, Written  Provided to: Patient  Level of Understanding: Teach back education performed, Verbalized, Demonstrated              Time Calculation:   Start Time: 1100  Stop Time: 1140  Time Calculation (min): 40 min   Therapy Charges for Today       Code Description Service Date Service Provider Modifiers Qty    50549397826  PT THER PROC EA 15 MIN 8/14/2024 Kathryn Dewitt, PT GP 1    [id number]  PT NEUROMUSC RE EDUCATION EA 15 MIN 8/14/2024 Kathryn Dewitt, PT GP 1                      Kathryn Dewitt  PT  8/14/2024

## 2024-08-14 NOTE — THERAPY TREATMENT NOTE
Outpatient Speech Language Pathology   Adult Speech Language Cognitive Treatment Note  [place] [facility]     Patient Name: Ronnell Rizvi  : 1950  MRN: [id number]  Today's Date: 2024         Visit Date: 2024   Patient Active Problem List   Diagnosis    Anemia, unspecified    Vitamin B12 deficiency    Chronic fatigue    Benign prostatic hypertrophy (BPH) with weak urinary stream    Chronic bronchitis    ELIZABETH (obstructive sleep apnea)    Sinoatrial block    Cerebral aneurysm, nonruptured    TIA (transient ischemic attack)    Intracranial vascular stenosis    Sinus pause    S/P percutaneous patent foramen ovale closure    TIA on medication    HTN (hypertension)    COPD (chronic obstructive pulmonary disease)    Nocturnal seizures    Encounter for screening for malignant neoplasm of colon    Seizure    HL (hearing loss)    Pre-syncope    Mixed hyperlipidemia    History of stroke    Melena    Irritable bowel syndrome with both constipation and diarrhea    Gastroesophageal reflux disease    Stroke    CVA (cerebral vascular accident)          Visit Dx:    ICD-10-CM ICD-9-CM   1. Dysarthria as late effect of cerebrovascular accident (CVA)  I69.322 438.13   2. Cognitive deficit as late effect of cerebrovascular accident (CVA)  I69.319 438.0   3. Recurrent strokes  I63.9 434.91        OP SLP Assessment/Plan - 24 0910          SLP Assessment    Clinical Impression Comments Pt with mild to moderate deficits. Able to demonstrate use of strategies inconsistently on his own and improved with use of inconsistent cues. Overall speech was clearer today. Pt reports continued biting of his lip when speaking. Good tolerance of EMST at pressure of 60.  -AD    SLP Diagnosis Mild to moderate flaccid dysarthria.  -AD       SLP Plan    Plan Comments Continue with therapy next week wtih use of EMST and cognitive goals as well.  -AD              User Key  (r) = Recorded By, (t) = Taken By, (c) = Cosigned By      Initials Name  Provider Type    AD Sunshine Ballard, MS CCC-SLP Speech and Language Pathologist                     SLP OP Goals       Row Name 08/14/24 0910          Subjective Comments    Subjective Comments Goldy was seen in therapy for 59 minutes and was alert and cooperative. Brought EMST device with him today.  -AD        Subjective Pain    Able to rate subjective pain? yes  -AD     Subjective Pain Comment Continued pain in left knee. No level given.  -AD        Dysarthria Goals    Patient will be able to engage in speech at the conversational level with maximum articulatory accuracy so that speech is understood by familiar /unfamiliar listeners 90%:;without cues  -AD     Status: Patient will be able to engage in speech at the conversational level with maximum articulatory accuracy so that speech is understood by familiar /unfamiliar listeners Progressing as expected  -AD     Comments: Patient will be able to engage in speech at the conversational level with maximum articulatory accuracy so that speech is understood by familiar /unfamiliar listeners Improved motor speech skills with improved intelligibility of speech. He continues to have some breakdowns related to fast rate at times. Able to repair both spontaneously and with verbal cues.  -AD     Patient will improve comprehensibility of verbal messages by speaking at an appropriate vocal intensity 90%:;without cues  -AD     Status: Patient will improve comprehensibility of verbal messages by speaking at an appropriate vocal intensity Progressing as expected  -AD     Comments: Patient will improve comprehensibility of verbal messages by speaking at an appropriate vocal intensity Goldy was able to demonstrate consistent volume on 75% of trials during the session. Able to demonstrate improvement to 85% with inconsistent verbal prompts.  -AD     Patient will improve respiratory support and the use of respiration for speech through use of increased strength of expiratory  muscles as measured by Expiratory Muscle Strength Training device 90%:;without cues  -AD     Status: Patient will improve respiratory support and the use of respiration for speech through use of increased strength of expiratory muscles as measured by Expiratory Muscle Strength Training device Progressing as expected  -AD     Comments: Patient will improve respiratory support and the use of respiration for speech through use of increased strength of expiratory muscles as measured by Expiratory Muscle Strength Training device MEP at 60 today. He reports he thinks that is where he was when performing in rehab. Able to perform 5 sets of 5 breath trials each with inconsistent cues. Handout provided to track progress and performance. Pt verbalizes understanding of its use.  -AD     Patient will apply compensatory strategies to improve intelligibility of speech during in spontaneous speech 90%:;without cues  -AD     Status: Patient will apply compensatory strategies to improve intelligibility of speech during in spontaneous speech Progressing as expected  -AD     Comments: Patient will apply compensatory strategies to improve intelligibility of speech during in spontaneous speech Goldy was able to demonstrate use of overexaggeration on 70% of trials and improved to 90% with verbal cues. He was able to demonstrate slow rate on 50% spontaneously and improved to 70% with inconsistent verbal cues.  -AD        Memory Goals    Memory LTG's Patient will be able to remember  information needed to participate in activities of daily living  -AD     Patient will be able to remember  information needed to participate in activities of daily living 90% w/use of internal and external strategies and aids  -AD     Status: Patient will be able to remember  information needed to participate in activities of daily living New  -AD     Comments: Patient will be able to remember  information needed to participate in activities of daily living  Not targeted due to focus on other goals and time constraints.  -AD     Patient’s memory skills will be enhanced as reported by patient by utilizing internal memory strategies to recall up to 3 pieces of information after a 5- minute delay 90%:;without cues  -AD     Status: Patient’s memory skills will be enhanced as reported by patient by utilizing internal memory strategies to recall up to 3 pieces of information after a 5- minute delay New  -AD     Comments: Patient’s memory skills will be enhanced as reported by patient by utilizing internal memory strategies to recall up to 3 pieces of information after a 5- minute delay Not targeted due to focus on other goals and time constraints.  -AD     Patient’s memory skills will be enhanced as reported by patient by using external memory aides 90%:;without cues  -AD     Status: Patient’s memory skills will be enhanced as reported by patient by using external memory aides New  -AD     Comments: Patient’s memory skills will be enhanced as reported by patient by using external memory aides Not targeted due to focus on other goals and time constraints.  -AD        SLP Time Calculation    SLP Goal Re-Cert Due Date 10/30/24  -AD               User Key  (r) = Recorded By, (t) = Taken By, (c) = Cosigned By      Initials Name Provider Type    Sunshine Starr MS CCC-SLP Speech and Language Pathologist                   OP SLP Education       Row Name 08/14/24 0910       Education    Learning Method Explanation;Written materials  handout for EMST tracking/use  -AD    Teaching Response Verbalized understanding;Demonstrated understanding  -AD    Education Comments Demonstrates use of  EMST, compensatory strategies for motor speech skills. Reports practice at home on labial exercises given.  -AD              User Key  (r) = Recorded By, (t) = Taken By, (c) = Cosigned By      Initials Name Effective Dates    Sunshine Starr MS CCC-SLP 06/16/21 -                           Time Calculation:   SLP Start Time: 0910  SLP Stop Time: 1009  SLP Time Calculation (min): 59 min  SLP Non-Billable Time (min): 0 min  Total Timed Code Minutes- SLP: 0 minute(s)  Untimed Charges  34169-JO Treatment/ST Modification Prosth Aug Alter : 59  Total Minutes  Untimed Charges Total Minutes: 59   Total Minutes: 59    Therapy Charges for Today       Code Description Service Date Service Provider Modifiers Qty    [id number] HC ST TREATMENT SPEECH 4 8/14/2024 Sunshine Ballard, MS CCC-SLP GN 1                     Sunshine Ballard, MS CCC-SLP  8/14/2024

## 2024-08-20 ENCOUNTER — HOSPITAL ENCOUNTER (OUTPATIENT)
Dept: OCCUPATIONAL THERAPY | Facility: HOSPITAL | Age: 74
Setting detail: THERAPIES SERIES
Discharge: HOME | End: 2024-08-20
Payer: MEDICARE

## 2024-08-20 ENCOUNTER — HOSPITAL ENCOUNTER (OUTPATIENT)
Dept: PHYSICAL THERAPY | Facility: HOSPITAL | Age: 74
Setting detail: THERAPIES SERIES
Discharge: HOME | End: 2024-08-20
Payer: MEDICARE

## 2024-08-20 ENCOUNTER — HOSPITAL ENCOUNTER (OUTPATIENT)
Dept: SPEECH THERAPY | Facility: HOSPITAL | Age: 74
Setting detail: THERAPIES SERIES
Discharge: HOME | End: 2024-08-20
Payer: MEDICARE

## 2024-08-20 DIAGNOSIS — I69.322 DYSARTHRIA AS LATE EFFECT OF CEREBROVASCULAR ACCIDENT (CVA): Primary | ICD-10-CM

## 2024-08-20 DIAGNOSIS — I69.354 HEMIPARESIS AFFECTING LEFT SIDE AS LATE EFFECT OF CEREBROVASCULAR ACCIDENT (CVA): ICD-10-CM

## 2024-08-20 DIAGNOSIS — I63.9 RECURRENT STROKES: Primary | ICD-10-CM

## 2024-08-20 DIAGNOSIS — I69.398 LACK OF COORDINATION AS LATE EFFECT OF CEREBROVASCULAR ACCIDENT (CVA): ICD-10-CM

## 2024-08-20 DIAGNOSIS — R27.9 LACK OF COORDINATION AS LATE EFFECT OF CEREBROVASCULAR ACCIDENT (CVA): ICD-10-CM

## 2024-08-20 DIAGNOSIS — I69.319 COGNITIVE DEFICIT AS LATE EFFECT OF CEREBROVASCULAR ACCIDENT (CVA): ICD-10-CM

## 2024-08-20 DIAGNOSIS — I63.9 RECURRENT STROKES: ICD-10-CM

## 2024-08-20 DIAGNOSIS — M62.3 IMMOBILITY SYNDROME: Primary | ICD-10-CM

## 2024-08-20 PROCEDURE — 97110 THERAPEUTIC EXERCISES: CPT

## 2024-08-20 PROCEDURE — 92507 TX SP LANG VOICE COMM INDIV: CPT

## 2024-08-20 PROCEDURE — 97530 THERAPEUTIC ACTIVITIES: CPT

## 2024-08-20 PROCEDURE — 97112 NEUROMUSCULAR REEDUCATION: CPT

## 2024-08-20 NOTE — THERAPY TREATMENT NOTE
Outpatient Occupational Therapy Neuro Treatment Note  [place] [facility]     Patient Name: Ronnell Rizvi  : 1950  MRN: [id number]  Today's Date: 2024       Visit Date: 2024    Patient Active Problem List   Diagnosis    Anemia, unspecified    Vitamin B12 deficiency    Chronic fatigue    Benign prostatic hypertrophy (BPH) with weak urinary stream    Chronic bronchitis    ELIZABETH (obstructive sleep apnea)    Sinoatrial block    Cerebral aneurysm, nonruptured    TIA (transient ischemic attack)    Intracranial vascular stenosis    Sinus pause    S/P percutaneous patent foramen ovale closure    TIA on medication    HTN (hypertension)    COPD (chronic obstructive pulmonary disease)    Nocturnal seizures    Encounter for screening for malignant neoplasm of colon    Seizure    HL (hearing loss)    Pre-syncope    Mixed hyperlipidemia    History of stroke    Melena    Irritable bowel syndrome with both constipation and diarrhea    Gastroesophageal reflux disease    Stroke    CVA (cerebral vascular accident)        Past Medical History:   Diagnosis Date    Acquired dyslexia     pt  reports after 3rd stroke    Allergic     Allergic rhinitis     Anal fissure     Aortic insufficiency     moderate, THIERRY 2017    Asthma     Asthma     Benign prostatic hypertrophy (BPH) with weak urinary stream 2016    Cataract     Chronic bronchitis     Colon polyp     COPD (chronic obstructive pulmonary disease)     Emphysema lung     Falls frequently     Fatty liver     GERD (gastroesophageal reflux disease)     Headache     Hepatitis     thought to be Hepatitis A     History of pneumonia     With left lower lobe atelectasis and scar tissue, being followed    HL (hearing loss)     Hypertension     Low back pain     Macular degeneration     Memory loss     Mixed hyperlipidemia 2023    Obstructive sleep apnea syndrome 2017    refuses c-pap    PFO (patent foramen ovale)     s/p percutaneous closure with a 25mm Amplatzer  device in December 2018    Pneumonia     LLL with scar tissue    Seizures     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 total of 3 strokes    Tremor     Comes & goes    Vision loss         Past Surgical History:   Procedure Laterality Date    CARDIAC CATHETERIZATION N/A 12/12/2018    Procedure: Patent foramen ovale closure- [employer];  Surgeon: Deangelo Harris MD;  Location:  [facility];  Service: Cardiology    CARDIAC CATHETERIZATION N/A 12/12/2018    Procedure: Intracardiac echocardiogram;  Surgeon: Deangelo Harris MD;  Location:  [facility];  Service: Cardiology    CARDIAC ELECTROPHYSIOLOGY PROCEDURE N/A 03/26/2018    Procedure: Loop insertion   CONFIRM RX;  Surgeon: Luis Wyatt MD;  Location:  [facility];  Service: Cardiovascular    CARDIAC ELECTROPHYSIOLOGY PROCEDURE N/A 07/15/2020    Procedure: Loop recorder removal;  Surgeon: Starr Driscoll MD;  Location:  [facility];  Service: Cardiovascular;  Laterality: N/A;    CARDIAC SURGERY      CATARACT EXTRACTION Bilateral     COLONOSCOPY      COLONOSCOPY N/A 10/09/2020    Procedure: COLONOSCOPY with polypectomy;  Surgeon: Ras Mendoza MD;  Location:  [facility];  Service: Gastroenterology;  Laterality: N/A;  diverticulosis  ascending polyp  transverse polyp  sigmoid polyps X 2  rectal polyp    EYE SURGERY      HAND SURGERY Bilateral     INGUINAL HERNIA REPAIR Left     OTHER SURGICAL HISTORY      inguinal hernia repair for person over age 5    TONSILLECTOMY      TONSILLECTOMY           Visit Dx:    ICD-10-CM ICD-9-CM   1. Recurrent strokes  I63.9 434.91   2. Hemiparesis affecting left side as late effect of cerebrovascular accident (CVA)  I69.354 438.20   3. Lack of coordination as late effect of cerebrovascular accident (CVA)  I69.398 438.89    R27.9 781.3        OT Neuro       Row Name 08/20/24 1000              Subjective Comments    Subjective Comments Pt reports using his left hand more for tasks around the house.  -SD         Sensation    Additional Comments Pt reports the sensation of his left hand appears to be improving  -SD      Monofilaments Tested? Green  -SD      Green Monofilament Interpretation Normal  -SD      Green Monofilament Results Pt able to identify 2.83 monofilament with 100% accuracy for left hand.  -SD         Lower Body Dressing Assessment/Training    Comment, (Lower Body Dressing) Pt reports improved management of clothing fasteners  -SD         Grooming Assessment/Training    Comment, (Grooming) Pt states he is beginning to use his left hand more for light grooming tasks (holding objects and to assist with removing caps/lids)  -SD                User Key  (r) = Recorded By, (t) = Taken By, (c) = Cosigned By      Initials Name Provider Type    Figueroa Carbajal OTR Occupational Therapist                    Hand Therapy (Last 24 Hours)       Hand Eval       Row Name 08/20/24 1040             Subjective    Subjective Comments Pt states he feels his left hand is getting stronger.  -SD          Strength Right    # Reps 1  -SD      Right Rung 2  -SD      Right  Test 1 86  -SD       Strength Average Right 86  -SD          Strength Left    # Reps 1  -SD      Left Rung 2  -SD      Left  Test 1 75  -SD       Strength Average Left 75  -SD         Right Hand Strength - Pinch (lbs)    Lateral 25 lbs  -SD         Left Hand Strength - Pinch (lbs)    Lateral 19 lbs  -SD                User Key  (r) = Recorded By, (t) = Taken By, (c) = Cosigned By      Initials Name Provider Type    Figueroa Carbajal OTR Occupational Therapist                         OT Assessment/Plan       Row Name 08/20/24 1345          OT Assessment    Assessment Comments Pt demonstrated improved functional coordination and strength of left hand today. He still demonstrates some functional coordination deficits,  yet it has improved since the initial evaluation. Pt reports more active engagement at home with daily tasks, and he reports improved management of clothing fasteners. Reinforced benefits of sensory re-education program, LUE rom/strengthening/coordination tasks at home.  -SD     OT Diagnosis CVA of left side weakness, sensory deficits and coordination deficits  -SD        OT Plan    OT Plan Comments continue with plan  -SD               User Key  (r) = Recorded By, (t) = Taken By, (c) = Cosigned By      Initials Name Provider Type    Figueroa Carbajal OTR Occupational Therapist                     OT Goals       Row Name 08/20/24 1000          OT Short Term Goals    STG Date to Achieve 08/30/24  -SD     STG 1 Pt to be I with home neuromuscular re-education program to address LUE sensory awareness, coordination and strength.  -SD     STG 1 Progress Ongoing  -SD     STG 2 Pt to manage clothing fasteners independently utilizing compensatory strategies as needed.  -SD     STG 2 Progress Met  -SD        Long Term Goals    LTG Date to Achieve 09/27/24  -SD     LTG 1 Pt to improve Quick Dash measure by 10 to demonstrate increased function for daily tasks.  -SD     LTG 1 Progress Ongoing  -SD     LTG 2 Pt to improve left hand coordination as demonstrated by a 20 second improvement of 9 hole peg test.  -SD     LTG 2 Progress Ongoing;Progressing  -SD               User Key  (r) = Recorded By, (t) = Taken By, (c) = Cosigned By      Initials Name Provider Type    Figueroa Carbajal OTR Occupational Therapist                           Therapy Education  Education Details: Education provided regading benefits of active participation/use of LUE for daily tasks, safey during adl's due to balance issues, compensatory strategies for adl management due to fmc deficits and neuromuscular re-education program of LUE to address sensory issues, coordination and functional strength  Given: HEP  Program: Reinforced  How Provided:  Verbal  Provided to: Patient  Level of Understanding: Verbalized       OT Exercises       Row Name 08/20/24 1000             Exercise 1    Exercise Name 1 LUE weight bearing activity  -SD      Time (Minutes) 1 3  -SD         Exercise 2    Exercise Name 2 sensory awarenss/ re-education activity  -SD         Exercise 3    Exercise Name 3 functional coordination activity (pegboard, nuts/bolts activity-with and without visual cues, managing small objects)  -SD                User Key  (r) = Recorded By, (t) = Taken By, (c) = Cosigned By      Initials Name Provider Type    SD Figueroa Ballard OTR Occupational Therapist                      9 Hole Peg  9-Hole Peg Left: 60 sec         Time Calculation:   OT Start Time: 1000  OT Stop Time: 1044  OT Time Calculation (min): 44 min  Timed Charges  90543 - OT Therapeutic Activity Minutes: 40  Total Minutes  Timed Charges Total Minutes: 40   Total Minutes: 40     Therapy Charges for Today       Code Description Service Date Service Provider Modifiers Qty    87021124050 HC OT THERAPEUTIC ACT EA 15 MIN 8/20/2024 Figueroa Ballard OTR GO 3                      DULCE Gastelum  8/20/2024

## 2024-08-20 NOTE — THERAPY TREATMENT NOTE
Outpatient Speech Language Pathology   Adult Speech Language Cognitive Treatment Note  [place] [facility]     Patient Name: Ronnell Rizvi  : 1950  MRN: [id number]  Today's Date: 2024         Visit Date: 2024   Patient Active Problem List   Diagnosis    Anemia, unspecified    Vitamin B12 deficiency    Chronic fatigue    Benign prostatic hypertrophy (BPH) with weak urinary stream    Chronic bronchitis    ELIZABETH (obstructive sleep apnea)    Sinoatrial block    Cerebral aneurysm, nonruptured    TIA (transient ischemic attack)    Intracranial vascular stenosis    Sinus pause    S/P percutaneous patent foramen ovale closure    TIA on medication    HTN (hypertension)    COPD (chronic obstructive pulmonary disease)    Nocturnal seizures    Encounter for screening for malignant neoplasm of colon    Seizure    HL (hearing loss)    Pre-syncope    Mixed hyperlipidemia    History of stroke    Melena    Irritable bowel syndrome with both constipation and diarrhea    Gastroesophageal reflux disease    Stroke    CVA (cerebral vascular accident)          Visit Dx:    ICD-10-CM ICD-9-CM   1. Dysarthria as late effect of cerebrovascular accident (CVA)  I69.322 438.13   2. Cognitive deficit as late effect of cerebrovascular accident (CVA)  I69.319 438.0   3. Recurrent strokes  I63.9 434.91        OP SLP Assessment/Plan - 24 1045          SLP Assessment    Clinical Impression Comments Goldy demonstrates good progress towards his motor speech skills with improved intelligibility at the conversational level. Improved ROM of lips noted today as well as less drooping at rest. He reports continued biting of his lip occurring. Will look into sensory targets to aid with awareness of his lip in order to avoid biting. Memory goals targeted today and fair progress overall. Good response for recall with minimal level of cuing.  -AD    SLP Diagnosis Mild to moderate flaccid dysarthria of speech and mild cognitive communication  deficits following CVA  -AD       SLP Plan    Plan Comments Will continue with therapy next week with same goals and use of sensory input to lips to aid with less biting.  -AD              User Key  (r) = Recorded By, (t) = Taken By, (c) = Cosigned By      Initials Name Provider Type    Sunshine Starr MS CCC-SLP Speech and Language Pathologist                     SLP OP Goals       Row Name 08/20/24 4693          Subjective Comments    Subjective Comments Goldy was seen in therapy for 45 minutes. He was alert and cooperative during the session. Reports he is doing better overall and feels like his speech is improving some.  -AD        Subjective Pain    Able to rate subjective pain? yes  -AD     Subjective Pain Comment No pain reported or exhibited.  -AD        Dysarthria Goals    Patient will be able to engage in speech at the conversational level with maximum articulatory accuracy so that speech is understood by familiar /unfamiliar listeners 90%:;without cues  -AD     Status: Patient will be able to engage in speech at the conversational level with maximum articulatory accuracy so that speech is understood by familiar /unfamiliar listeners Progressing as expected  -AD     Patient will improve comprehensibility of verbal messages by speaking at an appropriate vocal intensity 90%:;without cues  -AD     Status: Patient will improve comprehensibility of verbal messages by speaking at an appropriate vocal intensity Progressing as expected  met x1  -AD     Comments: Patient will improve comprehensibility of verbal messages by speaking at an appropriate vocal intensity Pt was able to demonstrate use of appropriate vocal intensity and increasing volume when others having difficulty understanding him on 90% of trials w/o cues.  -AD     Patient will improve respiratory support and the use of respiration for speech through use of increased strength of expiratory muscles as measured by Expiratory Muscle Strength  Training device 90%:;without cues  -AD     Status: Patient will improve respiratory support and the use of respiration for speech through use of increased strength of expiratory muscles as measured by Expiratory Muscle Strength Training device Achieved  -AD     Comments: Patient will improve respiratory support and the use of respiration for speech through use of increased strength of expiratory muscles as measured by Expiratory Muscle Strength Training device Pt reports he would like to keep at MEP of 60 as this is about the pressure he can handle with his COPD. He was instructed to complete at the maintenance phase and complete 3x per week. He reports he is doing this and will continue at home.  -AD     Patient will apply compensatory strategies to improve intelligibility of speech during in spontaneous speech 90%:;without cues  -AD     Status: Patient will apply compensatory strategies to improve intelligibility of speech during in spontaneous speech Progressing as expected  -AD     Comments: Patient will apply compensatory strategies to improve intelligibility of speech during in spontaneous speech Pt was able to demonstrate overexaggeration/articulation of targets when breakdowns occurred and requests for clarifications given. He was able to demonstrate spontaneous use of providing semantic context to aid in both motor speech and word retrieval.  -AD        Memory Goals    Memory LTG's Patient will be able to remember  information needed to participate in activities of daily living  -AD     Patient will be able to remember  information needed to participate in activities of daily living 90% w/use of internal and external strategies and aids  -AD     Status: Patient will be able to remember  information needed to participate in activities of daily living Progressing as expected  -AD     Patient’s memory skills will be enhanced as reported by patient by utilizing internal memory strategies to recall up to 3  pieces of information after a 5- minute delay 90%:;without cues  -AD     Status: Patient’s memory skills will be enhanced as reported by patient by utilizing internal memory strategies to recall up to 3 pieces of information after a 5- minute delay Progressing as expected  -AD     Comments: Patient’s memory skills will be enhanced as reported by patient by utilizing internal memory strategies to recall up to 3 pieces of information after a 5- minute delay Pt was able to recall information while completing a dual recall tasks with 50% spontaneously and 100% with minimal consistent cues.Focus was on working memory/recall.  -AD     Patient’s memory skills will be enhanced as reported by patient by using external memory aides 90%:;without cues  -AD     Status: Patient’s memory skills will be enhanced as reported by patient by using external memory aides Progress slower than expected  -AD     Comments: Patient’s memory skills will be enhanced as reported by patient by using external memory aides Pt able to report use of external aids such as his phone and utilizing his wife to aid with appointment recall. He did forget to bring his phone with him today, but was able to bring his EMST device.  -AD        SLP Time Calculation    SLP Goal Re-Cert Due Date 10/30/24  -AD               User Key  (r) = Recorded By, (t) = Taken By, (c) = Cosigned By      Initials Name Provider Type    Sunshine Starr MS CCC-SLP Speech and Language Pathologist                   OP SLP Education       Row Name 08/20/24 1045       Education    Learning Method Explanation  -AD    Teaching Response Verbalized understanding  -AD    Education Comments Goldy verbalizes understanding of EMST device and maintenance use at home. Handouts provided at previous session.  -AD              User Key  (r) = Recorded By, (t) = Taken By, (c) = Cosigned By      Initials Name Effective Dates    Sunshine Starr MS CCC-SLP 06/16/21 -                           Time Calculation:   SLP Start Time: 1045  SLP Stop Time: 1130  SLP Time Calculation (min): 45 min  SLP Non-Billable Time (min): 0 min  Total Timed Code Minutes- SLP: 0 minute(s)  Untimed Charges  56885-AA Treatment/ST Modification Prosth Aug Alter : 45 (Primary focus on motor speech skills.)  Total Minutes  Untimed Charges Total Minutes: 45   Total Minutes: 45    Therapy Charges for Today       Code Description Service Date Service Provider Modifiers Qty    31876227696  ST TREATMENT SPEECH 3 8/20/2024 Sunshine Ballard, MS CCC-SLP GN 1                     Sunshine Ballard, MS CCC-SLP  8/20/2024

## 2024-08-20 NOTE — THERAPY DISCHARGE NOTE
Outpatient Physical Therapy Neuro Treatment Note/Discharge Summary  [place] [facility]     Patient Name: Ronnell Rizvi  : 1950  MRN: [id number]  Today's Date: 2024      Visit Date: 2024    Visit Dx:    ICD-10-CM ICD-9-CM   1. Immobility syndrome  M62.3 728.3       Patient Active Problem List   Diagnosis    Anemia, unspecified    Vitamin B12 deficiency    Chronic fatigue    Benign prostatic hypertrophy (BPH) with weak urinary stream    Chronic bronchitis    ELIZABETH (obstructive sleep apnea)    Sinoatrial block    Cerebral aneurysm, nonruptured    TIA (transient ischemic attack)    Intracranial vascular stenosis    Sinus pause    S/P percutaneous patent foramen ovale closure    TIA on medication    HTN (hypertension)    COPD (chronic obstructive pulmonary disease)    Nocturnal seizures    Encounter for screening for malignant neoplasm of colon    Seizure    HL (hearing loss)    Pre-syncope    Mixed hyperlipidemia    History of stroke    Melena    Irritable bowel syndrome with both constipation and diarrhea    Gastroesophageal reflux disease    Stroke    CVA (cerebral vascular accident)                         PT Assessment/Plan       Row Name 24 1035          PT Assessment    Functional Limitations Decreased safety during functional activities;Impaired gait;Limitation in home management;Performance in self-care ADL  -JV     Impairments Balance;Endurance;Gait;Muscle strength  -JV     Assessment Comments The pt presents for tx this date and reports he has been mowing grass and working around the house. Pt reports his short-term memory has been impaired since 2017 and he is unsure if he has performed HEP since last visit. Pt is amb without AD and states that he doesn't think he needs Physical Therapy. Verbally reviewed standing LE ther ex and balance progression in corner, encouraged pt to cont HEP as abdelrahman.  -JV        PT Plan    PT Plan Comments D/C physical therapy per pt request, no further follow-up  is planned at this time.  -JV               User Key  (r) = Recorded By, (t) = Taken By, (c) = Cosigned By      Initials Name Provider Type    Kathryn Ivan PT Physical Therapist                          OP Exercises       Row Name 08/20/24 0915             Precautions    Existing Precautions/Restrictions fall;seizures  -JV         Subjective    Subjective Comments Pt reports his short-term memory has been impaired since 2017.  -JV         Exercise 1    Exercise Name 1 Recumbent bike  -JV      Time 1 8 min  -JV      Additional Comments seat #12  -JV         Exercise 2    Exercise Name 2 Verbally reviewed standing LE ther ex and balance progression.  -JV         Exercise 3    Exercise Name 3 Pt worked on balance standing on blue foam pad using normal [place] including head mov't with eyes open and static standing with eyes closed.  -JV                User Key  (r) = Recorded By, (t) = Taken By, (c) = Cosigned By      Initials Name Provider Type    Kathryn Ivan PT Physical Therapist                                      Therapy Education  Education Details: Recommend pt cont HEP for LE strength and balance.  Given: HEP  Program: Reinforced  How Provided: Verbal, Demonstration, Written  Provided to: Patient  Level of Understanding: Teach back education performed, Verbalized, Demonstrated            Time Calculation:   Start Time: 0920  Stop Time: 1000  Time Calculation (min): 40 min     Therapy Charges for Today       Code Description Service Date Service Provider Modifiers Qty    24672889905 HC PT THER PROC EA 15 MIN 8/20/2024 Kathryn Dewitt, PT GP 1    42048129291 HC PT NEUROMUSC RE EDUCATION EA 15 MIN 8/20/2024 Kathryn Dewitt, PT GP 1                           Kathryn Dewitt PT  8/20/2024

## 2024-08-25 DIAGNOSIS — I10 PRIMARY HYPERTENSION: ICD-10-CM

## 2024-08-27 ENCOUNTER — HOSPITAL ENCOUNTER (OUTPATIENT)
Dept: SPEECH THERAPY | Facility: HOSPITAL | Age: 74
Setting detail: THERAPIES SERIES
Discharge: HOME | End: 2024-08-27
Payer: MEDICARE

## 2024-08-27 ENCOUNTER — HOSPITAL ENCOUNTER (OUTPATIENT)
Dept: OCCUPATIONAL THERAPY | Facility: HOSPITAL | Age: 74
Setting detail: THERAPIES SERIES
Discharge: HOME | End: 2024-08-27
Payer: MEDICARE

## 2024-08-27 ENCOUNTER — APPOINTMENT (OUTPATIENT)
Dept: PHYSICAL THERAPY | Facility: HOSPITAL | Age: 74
End: 2024-08-27
Payer: MEDICARE

## 2024-08-27 DIAGNOSIS — R27.9 LACK OF COORDINATION AS LATE EFFECT OF CEREBROVASCULAR ACCIDENT (CVA): ICD-10-CM

## 2024-08-27 DIAGNOSIS — I69.322 DYSARTHRIA AS LATE EFFECT OF CEREBROVASCULAR ACCIDENT (CVA): Primary | ICD-10-CM

## 2024-08-27 DIAGNOSIS — I63.9 RECURRENT STROKES: Primary | ICD-10-CM

## 2024-08-27 DIAGNOSIS — I69.354 HEMIPARESIS AFFECTING LEFT SIDE AS LATE EFFECT OF CEREBROVASCULAR ACCIDENT (CVA): ICD-10-CM

## 2024-08-27 DIAGNOSIS — I69.319 COGNITIVE DEFICIT AS LATE EFFECT OF CEREBROVASCULAR ACCIDENT (CVA): ICD-10-CM

## 2024-08-27 DIAGNOSIS — I69.398 LACK OF COORDINATION AS LATE EFFECT OF CEREBROVASCULAR ACCIDENT (CVA): ICD-10-CM

## 2024-08-27 DIAGNOSIS — I63.9 RECURRENT STROKES: ICD-10-CM

## 2024-08-27 PROCEDURE — 92507 TX SP LANG VOICE COMM INDIV: CPT

## 2024-08-27 PROCEDURE — 97530 THERAPEUTIC ACTIVITIES: CPT

## 2024-08-27 NOTE — TELEPHONE ENCOUNTER
The original prescription was discontinued on 4/16/2024 by Anne Urbano CMA for the following reason: *Therapy completed. Renewing this prescription may not be appropriate.   Rx Refill Note  Requested Prescriptions     Pending Prescriptions Disp Refills    losartan (COZAAR) 25 MG tablet [Pharmacy Med Name: LOSARTAN POTASSIUM 25 MG TAB] 90 tablet 1     Sig: Take 1 tablet by mouth Daily.      Last office visit with prescribing clinician: 4/30/2024   Last telemedicine visit with prescribing clinician: Visit date not found   Next office visit with prescribing clinician: 10/30/2024                         Would you like a call back once the refill request has been completed: [] Yes [] No    If the office needs to give you a call back, can they leave a voicemail: [] Yes [] No    Chel Sosa MA  08/27/24, 11:13 EDT

## 2024-08-27 NOTE — THERAPY TREATMENT NOTE
Outpatient Speech Language Pathology   Adult Speech Language Cognitive Treatment Note  [place]     Patient Name: Ronnell Rizvi  : 1950  MRN: [id number]  Today's Date: 2024         Visit Date: 2024   Patient Active Problem List   Diagnosis    Anemia, unspecified    Vitamin B12 deficiency    Chronic fatigue    Benign prostatic hypertrophy (BPH) with weak urinary stream    Chronic bronchitis    ELIZABETH (obstructive sleep apnea)    Sinoatrial block    Cerebral aneurysm, nonruptured    TIA (transient ischemic attack)    Intracranial vascular stenosis    Sinus pause    S/P percutaneous patent foramen ovale closure    TIA on medication    HTN (hypertension)    COPD (chronic obstructive pulmonary disease)    Nocturnal seizures    Encounter for screening for malignant neoplasm of colon    Seizure    HL (hearing loss)    Pre-syncope    Mixed hyperlipidemia    History of stroke    Melena    Irritable bowel syndrome with both constipation and diarrhea    Gastroesophageal reflux disease    Stroke    CVA (cerebral vascular accident)          Visit Dx:    ICD-10-CM ICD-9-CM   1. Dysarthria as late effect of cerebrovascular accident (CVA)  I69.322 438.13   2. Cognitive deficit as late effect of cerebrovascular accident (CVA)  I69.319 438.0   3. Recurrent strokes  I63.9 434.91        OP SLP Assessment/Plan - 24 1105          SLP Assessment    Clinical Impression Comments Goldy demonstrated good progress towards his functional goals.He demonstrated consistent performance at the conversational level with only occasional breakdowns where he was not understood. He is able to repair his errors consistently and a majority of the time independently. Spontaneous use of strategies. Continues to complain about saliva loss and difficulty keeping liquids in his mouth without use of straw. Reviewed labial exercises and will add new goal for lingual strengthening and ROM to improve bolus control.  -AD    SLP Diagnosis  Mild flaccid dysarthria of speech and mild cognitive communication deficit.  -AD       SLP Plan    Plan Comments Will continue with therapy next week and add new STG for lingual control and sensation.  -AD              User Key  (r) = Recorded By, (t) = Taken By, (c) = Cosigned By      Initials Name Provider Type    Sunshine Starr MS CCC-SLP Speech and Language Pathologist                     SLP OP Goals       Row Name 08/27/24 8133          Subjective Comments    Subjective Comments Goldy was seen in therapy for 55 minutes and was alert and cooperative. Reports his hearing was fixed last week and currently able to wear both aids.  -AD        Subjective Pain    Able to rate subjective pain? yes  -AD     Subjective Pain Comment No pain reported or exhibited.  -AD        Dysarthria Goals    Patient will be able to engage in speech at the conversational level with maximum articulatory accuracy so that speech is understood by familiar /unfamiliar listeners 90%:;without cues  -AD     Status: Patient will be able to engage in speech at the conversational level with maximum articulatory accuracy so that speech is understood by familiar /unfamiliar listeners Progressing as expected  -AD     Patient will improve comprehensibility of verbal messages by speaking at an appropriate vocal intensity 90%:;without cues  -AD     Status: Patient will improve comprehensibility of verbal messages by speaking at an appropriate vocal intensity Progressing as expected  met x2  -AD     Comments: Patient will improve comprehensibility of verbal messages by speaking at an appropriate vocal intensity Goldy was able to use consistent increased volume of speech consistently during conversational targets on 90% or greater of tasks w/o cues.  -AD     Patient will apply compensatory strategies to improve intelligibility of speech during in spontaneous speech 90%:;without cues  -AD     Status: Patient will apply compensatory strategies to  improve intelligibility of speech during in spontaneous speech Progressing as expected  met x1  -AD     Comments: Patient will apply compensatory strategies to improve intelligibility of speech during in spontaneous speech Goldy was able to demonstrate use of overexaggeration of lip and tongue movement during speech at the conversational level on 90% of trials. Able to repair errors and demonstrate use of technique w/inconsisent verbal prompts/requests for clarification.  -AD        Memory Goals    Patient will be able to remember  information needed to participate in activities of daily living 90% w/use of internal and external strategies and aids  -AD     Status: Patient will be able to remember  information needed to participate in activities of daily living Progressing as expected  -AD     Patient’s memory skills will be enhanced as reported by patient by utilizing internal memory strategies to recall up to 3 pieces of information after a 5- minute delay 90%:;without cues  -AD     Status: Patient’s memory skills will be enhanced as reported by patient by utilizing internal memory strategies to recall up to 3 pieces of information after a 5- minute delay Progressing as expected  -AD     Comments: Patient’s memory skills will be enhanced as reported by patient by utilizing internal memory strategies to recall up to 3 pieces of information after a 5- minute delay Goldy was able to recall name of this therapist at the end of the session w/o cues. He does report that he had to ask his wife to tell him prior to the session. He was able to recall use of EMST w/o cues and reports daily use.  -AD     Patient’s memory skills will be enhanced as reported by patient by using external memory aides 90%:;without cues  -AD     Status: Patient’s memory skills will be enhanced as reported by patient by using external memory aides Progressing as expected  -AD     Comments: Patient’s memory skills will be enhanced as reported by  patient by using external memory aides Pt demonstrates use of recall by asking his wife to remind him of the session prior to needing to go. He does use his phone to aid with recall of numbers and medications.  -AD        Other Goals    Other Adult Goal- 1 Pt will demonstrate use of lingual/labial exercise program to aid with control of saliva/decrease anterior loss by demonstrate independence in 2 weeks.  -AD     Status: Other Adult Goal- 1 New  -AD        SLP Time Calculation    SLP Goal Re-Cert Due Date 10/30/24  -AD               User Key  (r) = Recorded By, (t) = Taken By, (c) = Cosigned By      Initials Name Provider Type    Sunshine Starr MS CCC-SLP Speech and Language Pathologist                   OP SLP Education       Row Name 08/27/24 1105       Education    Learning Method Explanation  -AD    Teaching Response Verbalized understanding  -AD    Education Comments Goldy verbalizes understanding of goals and use of EMST 3 days per week for maintenance.  -AD              User Key  (r) = Recorded By, (t) = Taken By, (c) = Cosigned By      Initials Name Effective Dates    Sunshine Starr MS CCC-SLP 06/16/21 -                          Time Calculation:   SLP Start Time: 1105  SLP Stop Time: 1200  SLP Time Calculation (min): 55 min  SLP Non-Billable Time (min): 0 min  Total Timed Code Minutes- SLP: 0 minute(s)  Untimed Charges  41835-[place] Treatment/ST Modification Prosth Aug Alter : 55  Total Minutes  Untimed Charges Total Minutes: 55   Total Minutes: 55    Therapy Charges for Today       Code Description Service Date Service Provider Modifiers Qty    83277138631 HC ST TREATMENT SPEECH 4 8/27/2024 Sunshine Ballard MS CCC-SLP GN 1                     Sunshine Ballard MS CCC-SLP  8/27/2024

## 2024-08-27 NOTE — THERAPY TREATMENT NOTE
Outpatient Occupational Therapy Neuro Treatment Note  [place] [facility]     Patient Name: Ronnell Rizvi  : 1950  MRN: [id number]  Today's Date: 2024       Visit Date: 2024    Patient Active Problem List   Diagnosis    Anemia, unspecified    Vitamin B12 deficiency    Chronic fatigue    Benign prostatic hypertrophy (BPH) with weak urinary stream    Chronic bronchitis    ELIZABETH (obstructive sleep apnea)    Sinoatrial block    Cerebral aneurysm, nonruptured    TIA (transient ischemic attack)    Intracranial vascular stenosis    Sinus pause    S/P percutaneous patent foramen ovale closure    TIA on medication    HTN (hypertension)    COPD (chronic obstructive pulmonary disease)    Nocturnal seizures    Encounter for screening for malignant neoplasm of colon    Seizure    HL (hearing loss)    Pre-syncope    Mixed hyperlipidemia    History of stroke    Melena    Irritable bowel syndrome with both constipation and diarrhea    Gastroesophageal reflux disease    Stroke    CVA (cerebral vascular accident)        Past Medical History:   Diagnosis Date    Acquired dyslexia     pt  reports after 3rd stroke    Allergic     Allergic rhinitis     Anal fissure     Aortic insufficiency     moderate, THIERRY     Asthma     Asthma     Benign prostatic hypertrophy (BPH) with weak urinary stream 2016    Cataract     Chronic bronchitis     Colon polyp     COPD (chronic obstructive pulmonary disease)     Emphysema lung     Falls frequently     Fatty liver     GERD (gastroesophageal reflux disease)     Headache     Hepatitis     thought to be Hepatitis A     History of pneumonia     With left lower lobe atelectasis and scar tissue, being followed    HL (hearing loss)     Hypertension     Low back pain     Macular degeneration     Memory loss     Mixed hyperlipidemia 2023    Obstructive sleep apnea syndrome 2017    refuses c-pap    PFO (patent foramen ovale)     s/p percutaneous closure with a 25mm Amplatzer  device in December 2018    Pneumonia     LLL with scar tissue    Seizures     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 total of 3 strokes    Tremor     Comes & goes    Vision loss         Past Surgical History:   Procedure Laterality Date    CARDIAC CATHETERIZATION N/A 12/12/2018    Procedure: Patent foramen ovale closure- Abbott;  Surgeon: Deangelo Harris MD;  Location:  [facility];  Service: Cardiology    CARDIAC CATHETERIZATION N/A 12/12/2018    Procedure: Intracardiac echocardiogram;  Surgeon: Deangelo Harris MD;  Location:  [facility];  Service: Cardiology    CARDIAC ELECTROPHYSIOLOGY PROCEDURE N/A 03/26/2018    Procedure: Loop insertion   CONFIRM RX;  Surgeon: Luis Wyatt MD;  Location:  [facility];  Service: Cardiovascular    CARDIAC ELECTROPHYSIOLOGY PROCEDURE N/A 07/15/2020    Procedure: Loop recorder removal;  Surgeon: Starr Driscoll MD;  Location:  [facility];  Service: Cardiovascular;  Laterality: N/A;    CARDIAC SURGERY      CATARACT EXTRACTION Bilateral     COLONOSCOPY      COLONOSCOPY N/A 10/09/2020    Procedure: COLONOSCOPY with polypectomy;  Surgeon: Ras Mendoza MD;  Location:  [facility];  Service: Gastroenterology;  Laterality: N/A;  diverticulosis  ascending polyp  transverse polyp  sigmoid polyps X 2  rectal polyp    EYE SURGERY      HAND SURGERY Bilateral     INGUINAL HERNIA REPAIR Left     OTHER SURGICAL HISTORY      inguinal hernia repair for person over age 5    TONSILLECTOMY      TONSILLECTOMY           Visit Dx:    ICD-10-CM ICD-9-CM   1. Recurrent strokes  I63.9 434.91   2. Hemiparesis affecting left side as late effect of cerebrovascular accident (CVA)  I69.354 438.20   3. Lack of coordination as late effect of cerebrovascular accident (CVA)  I69.398 438.89    R27.9 781.3        OT Neuro       Row Name 08/27/24 1100              Subjective Comments    Subjective Comments Pt reports he tends to drop things and knock things over with his left hand.  -SD         Proprioception    Proprioception pt continues with decreased proprioceptive and kinesthetica awareness of LUE.  -SD                User Key  (r) = Recorded By, (t) = Taken By, (c) = Cosigned By      Initials Name Provider Type    Figueroa Carbajal OTR Occupational Therapist                             OT Assessment/Plan       Row Name 08/27/24 1125          OT Assessment    Assessment Comments Pt reports some coordination issues with LUE during daily tasks (knocking over a glass/container) which appears related to decreased proprioceptive/kinesthetic awareness. Education regarding use of visual cues to compensate for incoordnation/sensory issues. Pt continues with gross/fine coordination deficits. Reinforced home program.  -SD     OT Diagnosis CVA of left side weakness, sensory deficits and coordination deficits  -SD        OT Plan    OT Plan Comments continue with plan  -SD               User Key  (r) = Recorded By, (t) = Taken By, (c) = Cosigned By      Initials Name Provider Type    Figueroa Carbajal OTR Occupational Therapist                     OT Goals       Row Name 08/27/24 1000          OT Short Term Goals    STG Date to Achieve 08/30/24  -SD     STG 1 Pt to be I with home neuromuscular re-education program to address LUE sensory awareness, coordination and strength.  -SD     STG 1 Progress Ongoing  -SD     STG 2 Pt to manage clothing fasteners independently utilizing compensatory strategies as needed.  -SD     STG 2 Progress Met  -SD        Long Term Goals    LTG Date to Achieve 09/27/24  -SD     LTG 1 Pt to improve Quick Dash measure by 10 to demonstrate increased function for daily tasks.  -SD     LTG 1 Progress Ongoing  -SD     LTG 2 Pt to improve left hand coordination as demonstrated by a 20 second improvement of 9 hole peg test.  -SD     LTG 2 Progress Ongoing   -SD               User Key  (r) = Recorded By, (t) = Taken By, (c) = Cosigned By      Initials Name Provider Type    Figueroa Carbajal OTR Occupational Therapist                           Therapy Education  Education Details: Education provided regading benefits of active participation/use of LUE for daily tasks, safey during adl's due to balance issues, compensatory strategies for adl management due to fmc deficits and neuromuscular re-education program of LUE to address sensory issues, coordination and functional strength  Given: HEP  Program: Reinforced  How Provided: Verbal  Provided to: Patient  Level of Understanding: Verbalized       OT Exercises       Row Name 08/27/24 1000             Exercise 1    Exercise Name 1 LUE weight bearing activity  -SD      Time (Minutes) 1 3  -SD         Exercise 2    Exercise Name 2 sensory awareness/ re-education activity  -SD         Exercise 3    Exercise Name 3 functional coordination activity (purdue pegboard, [place] karie dexterity-with and without visual cues, managing small objects)  -SD         Exercise 4    Exercise Name 4 functional activity of LUE (resistive pins and MMDT) while seated on air filled balance disc to address/improve postural/trunk control during functional activity  -SD                User Key  (r) = Recorded By, (t) = Taken By, (c) = Cosigned By      Initials Name Provider Type    Figueroa Carbajal OTR Occupational Therapist                                Time Calculation:   OT Start Time: 1010  OT Stop Time: 1102  OT Time Calculation (min): 52 min  Timed Charges  41750 - OT Therapeutic Activity Minutes: 50  Total Minutes  Timed Charges Total Minutes: 50   Total Minutes: 50     Therapy Charges for Today       Code Description Service Date Service Provider Modifiers Qty    16760826861 HC OT THERAPEUTIC ACT EA 15 MIN 8/27/2024 Figueroa Ballard OTR GO 3                      DULCE Gastelum  8/27/2024

## 2024-08-28 RX ORDER — LOSARTAN POTASSIUM 25 MG/1
25 TABLET ORAL
Qty: 90 TABLET | Refills: 1 | OUTPATIENT
Start: 2024-08-28

## 2024-08-29 DIAGNOSIS — I10 PRIMARY HYPERTENSION: ICD-10-CM

## 2024-08-30 RX ORDER — ROSUVASTATIN CALCIUM 40 MG/1
40 TABLET, COATED ORAL NIGHTLY
Qty: 90 TABLET | Refills: 1 | Status: SHIPPED | OUTPATIENT
Start: 2024-08-30

## 2024-08-30 RX ORDER — LOSARTAN POTASSIUM 25 MG/1
25 TABLET ORAL
Qty: 90 TABLET | Refills: 1 | OUTPATIENT
Start: 2024-08-30

## 2024-08-30 RX ORDER — CETIRIZINE HYDROCHLORIDE 10 MG/1
10 TABLET ORAL DAILY
Qty: 90 TABLET | Refills: 1 | Status: SHIPPED | OUTPATIENT
Start: 2024-08-30

## 2024-08-30 RX ORDER — PANTOPRAZOLE SODIUM 40 MG/1
40 TABLET, DELAYED RELEASE ORAL DAILY
Qty: 90 TABLET | Refills: 1 | Status: SHIPPED | OUTPATIENT
Start: 2024-08-30

## 2024-09-03 ENCOUNTER — HOSPITAL ENCOUNTER (OUTPATIENT)
Dept: SPEECH THERAPY | Facility: HOSPITAL | Age: 74
Setting detail: THERAPIES SERIES
Discharge: HOME | End: 2024-09-03
Payer: MEDICARE

## 2024-09-03 ENCOUNTER — OFFICE VISIT (OUTPATIENT)
Dept: ORTHOPEDIC SURGERY | Facility: CLINIC | Age: 74
End: 2024-09-03
Payer: MEDICARE

## 2024-09-03 ENCOUNTER — APPOINTMENT (OUTPATIENT)
Dept: PHYSICAL THERAPY | Facility: HOSPITAL | Age: 74
End: 2024-09-03
Payer: MEDICARE

## 2024-09-03 ENCOUNTER — HOSPITAL ENCOUNTER (OUTPATIENT)
Dept: OCCUPATIONAL THERAPY | Facility: HOSPITAL | Age: 74
Setting detail: THERAPIES SERIES
Discharge: HOME | End: 2024-09-03
Payer: MEDICARE

## 2024-09-03 VITALS
SYSTOLIC BLOOD PRESSURE: 132 MMHG | WEIGHT: 172 LBS | HEIGHT: 72 IN | BODY MASS INDEX: 23.3 KG/M2 | HEART RATE: 63 BPM | DIASTOLIC BLOOD PRESSURE: 76 MMHG

## 2024-09-03 DIAGNOSIS — I63.9 RECURRENT STROKES: Primary | ICD-10-CM

## 2024-09-03 DIAGNOSIS — I69.398 LACK OF COORDINATION AS LATE EFFECT OF CEREBROVASCULAR ACCIDENT (CVA): ICD-10-CM

## 2024-09-03 DIAGNOSIS — I69.319 COGNITIVE DEFICIT AS LATE EFFECT OF CEREBROVASCULAR ACCIDENT (CVA): ICD-10-CM

## 2024-09-03 DIAGNOSIS — R27.9 LACK OF COORDINATION AS LATE EFFECT OF CEREBROVASCULAR ACCIDENT (CVA): ICD-10-CM

## 2024-09-03 DIAGNOSIS — I63.9 RECURRENT STROKES: ICD-10-CM

## 2024-09-03 DIAGNOSIS — G89.29 CHRONIC PAIN OF LEFT KNEE: ICD-10-CM

## 2024-09-03 DIAGNOSIS — M25.562 CHRONIC PAIN OF LEFT KNEE: ICD-10-CM

## 2024-09-03 DIAGNOSIS — I69.354 HEMIPARESIS AFFECTING LEFT SIDE AS LATE EFFECT OF CEREBROVASCULAR ACCIDENT (CVA): ICD-10-CM

## 2024-09-03 DIAGNOSIS — I69.322 DYSARTHRIA AS LATE EFFECT OF CEREBROVASCULAR ACCIDENT (CVA): Primary | ICD-10-CM

## 2024-09-03 DIAGNOSIS — R53.1 LEFT-SIDED WEAKNESS: Primary | ICD-10-CM

## 2024-09-03 PROCEDURE — 3078F DIAST BP <80 MM HG: CPT | Performed by: INTERNAL MEDICINE

## 2024-09-03 PROCEDURE — 3075F SYST BP GE 130 - 139MM HG: CPT | Performed by: INTERNAL MEDICINE

## 2024-09-03 PROCEDURE — 73562 X-RAY EXAM OF KNEE 3: CPT | Performed by: INTERNAL MEDICINE

## 2024-09-03 PROCEDURE — 92507 TX SP LANG VOICE COMM INDIV: CPT

## 2024-09-03 PROCEDURE — 99213 OFFICE O/P EST LOW 20 MIN: CPT | Performed by: INTERNAL MEDICINE

## 2024-09-03 PROCEDURE — 97530 THERAPEUTIC ACTIVITIES: CPT

## 2024-09-03 NOTE — THERAPY TREATMENT NOTE
Outpatient Occupational Therapy Neuro Treatment Note  [place] [facility]     Patient Name: Ronnell Rizvi  : 1950  MRN: [id number]  Today's Date: 9/3/2024       Visit Date: 2024    Patient Active Problem List   Diagnosis    Anemia, unspecified    Vitamin B12 deficiency    Chronic fatigue    Benign prostatic hypertrophy (BPH) with weak urinary stream    Chronic bronchitis    ELIZABETH (obstructive sleep apnea)    Sinoatrial block    Cerebral aneurysm, nonruptured    TIA (transient ischemic attack)    Intracranial vascular stenosis    Sinus pause    S/P percutaneous patent foramen ovale closure    TIA on medication    HTN (hypertension)    COPD (chronic obstructive pulmonary disease)    Nocturnal seizures    Encounter for screening for malignant neoplasm of colon    Seizure    HL (hearing loss)    Pre-syncope    Mixed hyperlipidemia    History of stroke    Melena    Irritable bowel syndrome with both constipation and diarrhea    Gastroesophageal reflux disease    Stroke    CVA (cerebral vascular accident)        Past Medical History:   Diagnosis Date    Acquired dyslexia     pt  reports after 3rd stroke    Allergic     Allergic rhinitis     Anal fissure     Aortic insufficiency     moderate, THIERRY 2017    Asthma     Asthma     Benign prostatic hypertrophy (BPH) with weak urinary stream 2016    Cataract     Chronic bronchitis     Colon polyp     COPD (chronic obstructive pulmonary disease)     Emphysema lung     Falls frequently     Fatty liver     GERD (gastroesophageal reflux disease)     Headache     Hepatitis     thought to be Hepatitis A     History of pneumonia     With left lower lobe atelectasis and scar tissue, being followed    HL (hearing loss)     Hypertension     Low back pain     Macular degeneration     Memory loss     Mixed hyperlipidemia 2023    Obstructive sleep apnea syndrome 2017    refuses c-pap    PFO (patent foramen ovale)     s/p percutaneous closure with a 25mm Amplatzer  device in December 2018    Pneumonia     LLL with scar tissue    Seizures     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 total of 3 strokes    Tremor     Comes & goes    Vision loss         Past Surgical History:   Procedure Laterality Date    CARDIAC CATHETERIZATION N/A 12/12/2018    Procedure: Patent foramen ovale closure- Abbott;  Surgeon: Deangelo Harris MD;  Location:  [facility];  Service: Cardiology    CARDIAC CATHETERIZATION N/A 12/12/2018    Procedure: Intracardiac echocardiogram;  Surgeon: Deangelo Harris MD;  Location:  [facility];  Service: Cardiology    CARDIAC ELECTROPHYSIOLOGY PROCEDURE N/A 03/26/2018    Procedure: Loop insertion   CONFIRM RX;  Surgeon: Luis Wyatt MD;  Location:  [facility];  Service: Cardiovascular    CARDIAC ELECTROPHYSIOLOGY PROCEDURE N/A 07/15/2020    Procedure: Loop recorder removal;  Surgeon: Starr Driscoll MD;  Location:  [facility];  Service: Cardiovascular;  Laterality: N/A;    CARDIAC SURGERY      CATARACT EXTRACTION Bilateral     COLONOSCOPY      COLONOSCOPY N/A 10/09/2020    Procedure: COLONOSCOPY with polypectomy;  Surgeon: Ras Mendoza MD;  Location:  [facility];  Service: Gastroenterology;  Laterality: N/A;  diverticulosis  ascending polyp  transverse polyp  sigmoid polyps X 2  rectal polyp    EYE SURGERY      HAND SURGERY Bilateral     INGUINAL HERNIA REPAIR Left     OTHER SURGICAL HISTORY      inguinal hernia repair for person over age 5    TONSILLECTOMY      TONSILLECTOMY           Visit Dx:    ICD-10-CM ICD-9-CM   1. Recurrent strokes  I63.9 434.91   2. Hemiparesis affecting left side as late effect of cerebrovascular accident (CVA)  I69.354 438.20   3. Lack of coordination as late effect of cerebrovascular accident (CVA)  I69.398 438.89    R27.9 781.3        OT Neuro       Row Name 09/03/24 1100              Subjective Comments    Subjective Comments Pt states he continues to drop things occassionaly with his left hand.  -SD                User Key  (r) = Recorded By, (t) = Taken By, (c) = Cosigned By      Initials Name Provider Type    Figueroa Carbajal OTR Occupational Therapist                             OT Assessment/Plan       Row Name 09/03/24 1121          OT Assessment    Assessment Comments Pt continues with decreased sensation/sensory awareness of left hand which impacts his functional coordination. Pt does report functional independence with basic adl tasks. Incoordination of left hand can increase time for manage basic daily tasks. Reinforced HEP to address sensory awareness and functional coordination.  -SD     OT Diagnosis CVA of left side weakness, sensory deficits and coordination deficits  -SD        OT Plan    OT Plan Comments continue with plan  -SD               User Key  (r) = Recorded By, (t) = Taken By, (c) = Cosigned By      Initials Name Provider Type    Figueroa Carbajal OTR Occupational Therapist                     OT Goals       Row Name 09/03/24 0900          OT Short Term Goals    STG Date to Achieve 08/30/24  -SD     STG 1 Pt to be I with home neuromuscular re-education program to address LUE sensory awareness, coordination and strength.  -SD     STG 1 Progress Met  -SD     STG 2 Pt to manage clothing fasteners independently utilizing compensatory strategies as needed.  -SD     STG 2 Progress Met  -SD        Long Term Goals    LTG Date to Achieve 09/27/24  -SD     LTG 1 Pt to improve Quick Dash measure by 10 to demonstrate increased function for daily tasks.  -SD     LTG 1 Progress Ongoing  -SD     LTG 2 Pt to improve left hand coordination as demonstrated by a 20 second improvement of 9 hole peg test.  -SD     LTG 2 Progress Ongoing  -SD               User Key  (r) = Recorded By, (t) = Taken By, (c) = Cosigned By      Initials Name Provider Type    Figueroa Carbajal OTR  [Time Spent: ___ minutes] : I have spent [unfilled] minutes of time on the encounter. Occupational Therapist                           Therapy Education  Education Details: Reinforced HEP for sensory awaress and functional coordination of LUE  Given: HEP  Program: Reinforced  How Provided: Verbal  Provided to: Patient  Level of Understanding: Verbalized       OT Exercises       Row Name 09/03/24 0900             Exercise 1    Exercise Name 1 LUE weight bearing activity  -SD      Time (Minutes) 1 3  -SD         Exercise 2    Exercise Name 2 sensory awareness/ re-education activity  -SD         Exercise 3    Exercise Name 3 functional coordination activity (management of fasteners/locks, grooved pegboard, PVC constructions tasks)  -SD         Exercise 4    Exercise Name 4 LUE strengthening using therabar  -SD      Cueing 4 Verbal;Demo  -SD      Equipment 4 --  therabar  -SD      Resistance 4 Red;Green  -SD                User Key  (r) = Recorded By, (t) = Taken By, (c) = Cosigned By      Initials Name Provider Type    Figueroa Carbajal OTR Occupational Therapist                                Time Calculation:   OT Start Time: 1005  OT Stop Time: 1050  OT Time Calculation (min): 45 min  Timed Charges  57832 - OT Therapeutic Activity Minutes: 45  Total Minutes  Timed Charges Total Minutes: 45   Total Minutes: 45     Therapy Charges for Today       Code Description Service Date Service Provider Modifiers Qty    49735431661  OT THERAPEUTIC ACT EA 15 MIN 9/3/2024 Fgiueroa Ballard OTR GO 3                      DULCE Gastelum  9/3/2024

## 2024-09-03 NOTE — THERAPY TREATMENT NOTE
Outpatient Speech Language Pathology   Adult Speech Language Cognitive Treatment Note  [place] [facility]     Patient Name: Ronnell Rizvi  : 1950  MRN: [id number]  Today's Date: 9/3/2024         Visit Date: 2024   Patient Active Problem List   Diagnosis    Anemia, unspecified    Vitamin B12 deficiency    Chronic fatigue    Benign prostatic hypertrophy (BPH) with weak urinary stream    Chronic bronchitis    ELIZABETH (obstructive sleep apnea)    Sinoatrial block    Cerebral aneurysm, nonruptured    TIA (transient ischemic attack)    Intracranial vascular stenosis    Sinus pause    S/P percutaneous patent foramen ovale closure    TIA on medication    HTN (hypertension)    COPD (chronic obstructive pulmonary disease)    Nocturnal seizures    Encounter for screening for malignant neoplasm of colon    Seizure    HL (hearing loss)    Pre-syncope    Mixed hyperlipidemia    History of stroke    Melena    Irritable bowel syndrome with both constipation and diarrhea    Gastroesophageal reflux disease    Stroke    CVA (cerebral vascular accident)          Visit Dx:    ICD-10-CM ICD-9-CM   1. Dysarthria as late effect of cerebrovascular accident (CVA)  I69.322 438.13   2. Cognitive deficit as late effect of cerebrovascular accident (CVA)  I69.319 438.0   3. Recurrent strokes  I63.9 434.91        OP SLP Assessment/Plan - 24 0915          SLP Assessment    Clinical Impression Comments mora demonstrated good progress towards his functional goals with consistent performance on intelligibility at the conversational level and meeting his goal related to appropriate volume for intelligibility during conversational speech. Able to verbalize and demonstrate two exercises to increase stimulation and sensation/awareness of his lip.  -AD    SLP Diagnosis Mild flaccid dysarthria of speech and mild cognitive communication deficit.  -AD       SLP Plan    Plan Comments Will continue with therapy next week. Increase focus on cognitive  goals at next session.  -AD              User Key  (r) = Recorded By, (t) = Taken By, (c) = Cosigned By      Initials Name Provider Type    Sunshine Starr MS CCC-SLP Speech and Language Pathologist                     SLP OP Goals       Row Name 09/03/24 0915          Subjective Comments    Subjective Comments Goldy was seen in therapy for 40 minutes. He was alert and cooperative during the session.  -AD        Subjective Pain    Able to rate subjective pain? yes  -AD     Subjective Pain Comment No pain reported or exhibited.  -AD        Dysarthria Goals    Patient will be able to engage in speech at the conversational level with maximum articulatory accuracy so that speech is understood by familiar /unfamiliar listeners 90%:;without cues  -AD     Status: Patient will be able to engage in speech at the conversational level with maximum articulatory accuracy so that speech is understood by familiar /unfamiliar listeners Progressing as expected  -AD     Patient will improve comprehensibility of verbal messages by speaking at an appropriate vocal intensity 90%:;without cues  -AD     Status: Patient will improve comprehensibility of verbal messages by speaking at an appropriate vocal intensity Achieved  -AD     Comments: Patient will improve comprehensibility of verbal messages by speaking at an appropriate vocal intensity Pt able to consistently demonstrate appropriate volume and increase in volume when needed to convey his message. Feel repair and use of hearing aids has improved appropriate volume.  -AD     Patient will apply compensatory strategies to improve intelligibility of speech during in spontaneous speech 90%:;without cues  -AD     Status: Patient will apply compensatory strategies to improve intelligibility of speech during in spontaneous speech Progressing as expected  met x2  -AD     Comments: Patient will apply compensatory strategies to improve intelligibility of speech during in spontaneous  speech Goldy was able to demonstrate use of strategies of overarticulation and slow rate on particular word that resulted in breakdown. He is able to demonstrate use of strategies consistently during conversational speech on 90% of trials overall.  -AD        Memory Goals    Memory LTG's Patient will be able to remember  information needed to participate in activities of daily living  -AD     Patient will be able to remember  information needed to participate in activities of daily living 90% w/use of internal and external strategies and aids  -AD     Status: Patient will be able to remember  information needed to participate in activities of daily living Progressing as expected  -AD     Patient’s memory skills will be enhanced as reported by patient by utilizing internal memory strategies to recall up to 3 pieces of information after a 5- minute delay 90%:;without cues  -AD     Comments: Patient’s memory skills will be enhanced as reported by patient by utilizing internal memory strategies to recall up to 3 pieces of information after a 5- minute delay Not targeted during this session.  -AD     Patient’s memory skills will be enhanced as reported by patient by using external memory aides 90%:;without cues  -AD     Comments: Patient’s memory skills will be enhanced as reported by patient by using external memory aides Not targeted during this session.  -AD        Other Goals    Other Adult Goal- 1 Pt will demonstrate use of lingual/labial exercise program to aid with control of saliva/decrease anterior loss by demonstrate independence in 2 weeks.  -AD     Status: Other Adult Goal- 1 Progressing as expected  -AD     Comments: Other Adult Goal- 1 Two new exercises given with focus on labial sensation/stimulation and labial strength. He was able to return demonstrate lingual sweep inside the oral cavity and handout provided. Able to demonstrate increased sensation to the lips/cheek for improved saliva control and  sensation to the lips to aid with not biting during eating. Instructed pt to perform prior to eating to see if this will increase his sensation and awareness so that he does not bite his lip.  -AD        SLP Time Calculation    SLP Goal Re-Cert Due Date 10/30/24  -AD               User Key  (r) = Recorded By, (t) = Taken By, (c) = Cosigned By      Initials Name Provider Type    Sunshine Starr MS CCC-SLP Speech and Language Pathologist                   OP SLP Education       Row Name 09/03/24 0915       Education    Learning Method Explanation;Demonstration;Written materials  -AD    Teaching Response Verbalized understanding  -AD    Education Comments Pt able to verbalize understanding of 2 new lingual/labial exercises given. SLP demonstrated and handout given.  -AD              User Key  (r) = Recorded By, (t) = Taken By, (c) = Cosigned By      Initials Name Effective Dates    Sunshine Starr MS CCC-SLP 06/16/21 -                          Time Calculation:   SLP Start Time: 0915  SLP Stop Time: 1000  SLP Time Calculation (min): 45 min  SLP Non-Billable Time (min): 0 min  Total Timed Code Minutes- SLP: 0 minute(s)  Untimed Charges  65806-XP Treatment/ST Modification Prosth Aug Alter : 45  Total Minutes  Untimed Charges Total Minutes: 45   Total Minutes: 45    Therapy Charges for Today       Code Description Service Date Service Provider Modifiers Qty    08528582813  ST TREATMENT SPEECH 3 9/3/2024 Sunshine Ballard MS CCC-SLP GN 1                     Sunshine Ballard MS CCC-SLP  9/3/2024

## 2024-09-03 NOTE — PROGRESS NOTES
Subjective:     Patient ID: Ronnell Rizvi is a 74 y.o. male.    Chief Complaint:    History of Present Illness  Ronnell Rizvi presents to clinic today for evaluation of left knee pain after squatting and falling multiple times in physical therapy.  The patient has been in PT for stroke recovery.  Stroke in July left him with some residual left-sided weakness.  He states that the only time that his knee bothers him is in PT when they are making him squat or when he gets too close to the ground and falls.  He denies any mechanical symptoms such as catching or locking.  He states the knee hurts all over.  He is not able to reproduce pain today.  He states as long as he does not squat it does not hurt.  He is hopeful to avoid surgery.     Social History     Occupational History    Occupation:      Employer: RETIRED   Tobacco Use    Smoking status: Former     Current packs/day: 0.00     Average packs/day: 1.5 packs/day for 15.0 years (22.5 ttl pk-yrs)     Types: Cigarettes     Start date: 1964     Quit date: 1979     Years since quittin.5     Passive exposure: Past    Smokeless tobacco: Never    Tobacco comments:     caffeine use: Drinks 4 glasses of Dt coke on avg.    Vaping Use    Vaping status: Never Used   Substance and Sexual Activity    Alcohol use: No    Drug use: No    Sexual activity: Defer      Past Medical History:   Diagnosis Date    Acquired dyslexia     pt  reports after 3rd stroke    Allergic     Allergic rhinitis     Anal fissure     Aortic insufficiency     moderate, THIERRY 2017    Asthma     Asthma     Benign prostatic hypertrophy (BPH) with weak urinary stream 2016    Cataract     Chronic bronchitis     Colon polyp     COPD (chronic obstructive pulmonary disease)     Emphysema lung     Falls frequently     Fatty liver     GERD (gastroesophageal reflux disease)     Headache     Hepatitis     thought to be Hepatitis A     History of pneumonia     With left lower lobe  atelectasis and scar tissue, being followed    HL (hearing loss)     Hypertension     Low back pain     Macular degeneration     Memory loss     Mixed hyperlipidemia 01/30/2023    Obstructive sleep apnea syndrome 08/23/2017    refuses c-pap    PFO (patent foramen ovale)     s/p percutaneous closure with a 25mm Amplatzer device in December 2018    Pneumonia     LLL with scar tissue    Seizures     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 total of 3 strokes    Tremor     Comes & goes    Vision loss      Past Surgical History:   Procedure Laterality Date    CARDIAC CATHETERIZATION N/A 12/12/2018    Procedure: Patent foramen ovale closure- Abbott;  Surgeon: Deangelo Harris MD;  Location:  [facility];  Service: Cardiology    CARDIAC CATHETERIZATION N/A 12/12/2018    Procedure: Intracardiac echocardiogram;  Surgeon: Deangelo Harris MD;  Location:  [facility];  Service: Cardiology    CARDIAC ELECTROPHYSIOLOGY PROCEDURE N/A 03/26/2018    Procedure: Loop insertion   CONFIRM RX;  Surgeon: Luis Wyatt MD;  Location:  [facility];  Service: Cardiovascular    CARDIAC ELECTROPHYSIOLOGY PROCEDURE N/A 07/15/2020    Procedure: Loop recorder removal;  Surgeon: Starr Driscoll MD;  Location:  [facility];  Service: Cardiovascular;  Laterality: N/A;    CARDIAC SURGERY      CATARACT EXTRACTION Bilateral     COLONOSCOPY      COLONOSCOPY N/A 10/09/2020    Procedure: COLONOSCOPY with polypectomy;  Surgeon: Ras Mendoza MD;  Location:  [facility];  Service: Gastroenterology;  Laterality: N/A;  diverticulosis  ascending polyp  transverse polyp  sigmoid polyps X 2  rectal polyp    EYE SURGERY      HAND SURGERY Bilateral     INGUINAL HERNIA REPAIR Left     OTHER SURGICAL HISTORY      inguinal hernia repair for person over age 5    TONSILLECTOMY      TONSILLECTOMY         Family History  "  Problem Relation Age of Onset    Obesity Mother     Clotting disorder Mother         Upper extremities    Alcohol abuse Father     Cancer Father 63        Esophagus and lung    Other Father         cardiac disorder    Heart disease Father     Depression Father     Kidney disease Sister     Kidney failure Sister     Drug abuse Paternal Uncle     Stroke Sister     Throat cancer Sister     Drug abuse Paternal Uncle                  Objective:  Vitals:    24 1436   BP: 132/76   Pulse: 63   Weight: 78 kg (172 lb)   Height: 182.9 cm (72\")         24  1436   Weight: 78 kg (172 lb)     Body mass index is 23.33 kg/m².        Left Knee Exam     Tenderness   The patient is experiencing tenderness in the medial retinaculum, medial joint line, lateral retinaculum, lateral joint line and patella.    Range of Motion   Extension:  0   Flexion:  130     Tests   Varus: negative Valgus: negative  Lachman:  Anterior - negative    Posterior - negative  Drawer:  Anterior - negative     Posterior - negative    Other   Erythema: absent  Scars: absent  Sensation: normal  Pulse: present  Swelling: none  Effusion: no effusion present               Imagin views of the left knee were ordered and reviewed by myself in the office today  Indication: left knee pain  Findings: X-rays demonstrate no acute osseous abnormality.  There is no signs of fracture dislocation or subluxation.  There is very mild joint space narrowing medially but no appreciable subchondral sclerosis, cystic changes, or periarticular osteophytes.  Comparative studies: None      Assessment:        1. Left-sided weakness    2. Chronic pain of left knee           Plan:          Discussed treatment options at length with patient at today's visit.  Discussed with patient that I do not see anything radiographically that I recommend surgery for.  Do not think advanced imaging is warranted at this time.  I discussed with him that if his knee only hurts when he is " doing squats he would avoid squats.  I discussed with him that at this point I would not recommend any further intervention if he is not having any pain or any symptoms today.  I discussed with him the clicking in his knee is okay as long as it is not painful.  He states the clicking does not hurt.  I discussed with him that at this point time I would not recommend any further imaging or advanced treatment options.  Discussed with them that if the pain returns or becomes more consistent we could try a steroid injection versus a topical cream.  The patient and his wife voiced understanding and agreement with that plan.  Follow up: As needed      Ronnell MENA Rizvi was in agreement with plan and had all questions answered.     Medications:  No orders of the defined types were placed in this encounter.      Followup:  No follow-ups on file.    Diagnoses and all orders for this visit:    1. Left-sided weakness (Primary)  -     XR Knee 3 View Left    2. Chronic pain of left knee          Dictated utilizing Dragon dictation

## 2024-09-10 ENCOUNTER — HOSPITAL ENCOUNTER (OUTPATIENT)
Dept: OCCUPATIONAL THERAPY | Facility: HOSPITAL | Age: 74
Setting detail: THERAPIES SERIES
Discharge: HOME | End: 2024-09-10
Payer: MEDICARE

## 2024-09-10 ENCOUNTER — HOSPITAL ENCOUNTER (OUTPATIENT)
Dept: SPEECH THERAPY | Facility: HOSPITAL | Age: 74
Setting detail: THERAPIES SERIES
Discharge: HOME | End: 2024-09-10
Payer: MEDICARE

## 2024-09-10 DIAGNOSIS — I69.319 COGNITIVE DEFICIT AS LATE EFFECT OF CEREBROVASCULAR ACCIDENT (CVA): ICD-10-CM

## 2024-09-10 DIAGNOSIS — I63.9 RECURRENT STROKES: ICD-10-CM

## 2024-09-10 DIAGNOSIS — I63.9 RECURRENT STROKES: Primary | ICD-10-CM

## 2024-09-10 DIAGNOSIS — I69.322 DYSARTHRIA AS LATE EFFECT OF CEREBROVASCULAR ACCIDENT (CVA): Primary | ICD-10-CM

## 2024-09-10 DIAGNOSIS — R27.9 LACK OF COORDINATION AS LATE EFFECT OF CEREBROVASCULAR ACCIDENT (CVA): ICD-10-CM

## 2024-09-10 DIAGNOSIS — I69.398 LACK OF COORDINATION AS LATE EFFECT OF CEREBROVASCULAR ACCIDENT (CVA): ICD-10-CM

## 2024-09-10 DIAGNOSIS — I69.354 HEMIPARESIS AFFECTING LEFT SIDE AS LATE EFFECT OF CEREBROVASCULAR ACCIDENT (CVA): ICD-10-CM

## 2024-09-10 PROCEDURE — 92507 TX SP LANG VOICE COMM INDIV: CPT

## 2024-09-10 PROCEDURE — 97530 THERAPEUTIC ACTIVITIES: CPT

## 2024-09-10 NOTE — THERAPY TREATMENT NOTE
Outpatient Occupational Therapy Neuro Treatment Note  [place] [facility]     Patient Name: Ronnell Rizvi  : 1950  MRN: [id number]  Today's Date: 9/10/2024       Visit Date: 09/10/2024    Patient Active Problem List   Diagnosis    Anemia, unspecified    Vitamin B12 deficiency    Chronic fatigue    Benign prostatic hypertrophy (BPH) with weak urinary stream    Chronic bronchitis    ELIZABETH (obstructive sleep apnea)    Sinoatrial block    Cerebral aneurysm, nonruptured    TIA (transient ischemic attack)    Intracranial vascular stenosis    Sinus pause    S/P percutaneous patent foramen ovale closure    TIA on medication    HTN (hypertension)    COPD (chronic obstructive pulmonary disease)    Nocturnal seizures    Encounter for screening for malignant neoplasm of colon    Seizure    HL (hearing loss)    Pre-syncope    Mixed hyperlipidemia    History of stroke    Melena    Irritable bowel syndrome with both constipation and diarrhea    Gastroesophageal reflux disease    Stroke    CVA (cerebral vascular accident)        Past Medical History:   Diagnosis Date    Acquired dyslexia     pt  reports after 3rd stroke    Allergic     Allergic rhinitis     Anal fissure     Aortic insufficiency     moderate, THIERRY 2017    Asthma     Asthma     Benign prostatic hypertrophy (BPH) with weak urinary stream 2016    Cataract     Chronic bronchitis     Colon polyp     COPD (chronic obstructive pulmonary disease)     Emphysema lung     Falls frequently     Fatty liver     GERD (gastroesophageal reflux disease)     Headache     Hepatitis     thought to be Hepatitis A     History of pneumonia     With left lower lobe atelectasis and scar tissue, being followed    HL (hearing loss)     Hypertension     Low back pain     Macular degeneration     Memory loss     Mixed hyperlipidemia 2023    Obstructive sleep apnea syndrome 2017    refuses c-pap    PFO (patent foramen ovale)     s/p percutaneous closure with a 25mm Amplatzer  device in December 2018    Pneumonia     LLL with scar tissue    Seizures     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 total of 3 strokes    Tremor     Comes & goes    Vision loss         Past Surgical History:   Procedure Laterality Date    CARDIAC CATHETERIZATION N/A 12/12/2018    Procedure: Patent foramen ovale closure- Abbott;  Surgeon: Deangelo Harris MD;  Location:  [facility];  Service: Cardiology    CARDIAC CATHETERIZATION N/A 12/12/2018    Procedure: Intracardiac echocardiogram;  Surgeon: Deangelo Harris MD;  Location:  [facility];  Service: Cardiology    CARDIAC ELECTROPHYSIOLOGY PROCEDURE N/A 03/26/2018    Procedure: Loop insertion   CONFIRM RX;  Surgeon: Luis Wyatt MD;  Location:  [facility];  Service: Cardiovascular    CARDIAC ELECTROPHYSIOLOGY PROCEDURE N/A 07/15/2020    Procedure: Loop recorder removal;  Surgeon: Starr Driscoll MD;  Location:  [facility];  Service: Cardiovascular;  Laterality: N/A;    CARDIAC SURGERY      CATARACT EXTRACTION Bilateral     COLONOSCOPY      COLONOSCOPY N/A 10/09/2020    Procedure: COLONOSCOPY with polypectomy;  Surgeon: Ras Mendoza MD;  Location:  [facility];  Service: Gastroenterology;  Laterality: N/A;  diverticulosis  ascending polyp  transverse polyp  sigmoid polyps X 2  rectal polyp    EYE SURGERY      HAND SURGERY Bilateral     INGUINAL HERNIA REPAIR Left     OTHER SURGICAL HISTORY      inguinal hernia repair for person over age 5    TONSILLECTOMY      TONSILLECTOMY           Visit Dx:    ICD-10-CM ICD-9-CM   1. Recurrent strokes  I63.9 434.91   2. Hemiparesis affecting left side as late effect of cerebrovascular accident (CVA)  I69.354 438.20   3. Lack of coordination as late effect of cerebrovascular accident (CVA)  I69.398 438.89    R27.9 781.3                    OT Assessment/Plan       Row Name  09/10/24 1227          OT Assessment    Assessment Comments Pt reports he is doing more for himself around the home. He is I with basic adl tasks. Pt continues to report decreased sensory awareness of left hand, yet his functional coordination is improving. Reinforced HEP for sensory awareness/re-education program of LUE, functional coordination program and LUE strengthening  -SD     OT Diagnosis CVA of left side weakness, sensory deficits and coordination deficits  -SD        OT Plan    OT Plan Comments continue with plan  -SD               User Key  (r) = Recorded By, (t) = Taken By, (c) = Cosigned By      Initials Name Provider Type    Figueroa Carbajal OTTRACIE Occupational Therapist                     OT Goals       Row Name 09/10/24 1000          OT Short Term Goals    STG Date to Achieve 08/30/24  -SD     STG 1 Pt to be I with home neuromuscular re-education program to address LUE sensory awareness, coordination and strength.  -SD     STG 1 Progress Met  -SD     STG 2 Pt to manage clothing fasteners independently utilizing compensatory strategies as needed.  -SD     STG 2 Progress Met  -SD        Long Term Goals    LTG Date to Achieve 09/27/24  -SD     LTG 1 Pt to improve Quick Dash measure by 10 to demonstrate increased function for daily tasks.  -SD     LTG 1 Progress Ongoing  -SD     LTG 2 Pt to improve left hand coordination as demonstrated by a 20 second improvement of 9 hole peg test.  -SD     LTG 2 Progress Met  -SD               User Key  (r) = Recorded By, (t) = Taken By, (c) = Cosigned By      Initials Name Provider Type    Figueroa Carbajal OTTRACIE Occupational Therapist                           Therapy Education  Education Details: Reinforced HEP for sensory awaress and functional coordination of LUE  Given: HEP  Program: Reinforced  How Provided: Verbal  Provided to: Patient  Level of Understanding: Verbalized       OT Exercises       Row Name 09/10/24 1000             Exercise 1    Exercise  Name 1 LUE weight bearing activity  -SD      Time (Minutes) 1 3  -SD         Exercise 2    Exercise Name 2 sensory awareness/ re-education activity  -SD         Exercise 3    Exercise Name 3 functional coordination activity (tweezer pegboard, grooved pegboard and 9 hole peg)  -SD         Exercise 4    Exercise Name 4 LUE strengthening  -SD      Cueing 4 Verbal;Demo  -SD      Equipment 4 Other  resistive pins  -SD      Resistance 4 Yellow;Red;Green;Blue;Black  -SD                User Key  (r) = Recorded By, (t) = Taken By, (c) = Cosigned By      Initials Name Provider Type    SD Figueroa Ballard OTR Occupational Therapist                      9 Hole Peg  9-Hole Peg Left: 51 sec         Time Calculation:   OT Start Time: 1021  OT Stop Time: 1059  OT Time Calculation (min): 38 min  Timed Charges  70411 - OT Therapeutic Activity Minutes: 38  Total Minutes  Timed Charges Total Minutes: 38   Total Minutes: 38     Therapy Charges for Today       Code Description Service Date Service Provider Modifiers Qty    [id number]  OT THERAPEUTIC ACT EA 15 MIN 9/10/2024 Figueroa Ballard OTR GO 3                      DULCE Gastelum  9/10/2024

## 2024-09-10 NOTE — THERAPY TREATMENT NOTE
Outpatient Speech Language Pathology   Adult Speech Language Cognitive Treatment Note  [place] [facility]     Patient Name: Ronnell Rizvi  : 1950  MRN: [id number]  Today's Date: 9/10/2024         Visit Date: 09/10/2024   Patient Active Problem List   Diagnosis    Anemia, unspecified    Vitamin B12 deficiency    Chronic fatigue    Benign prostatic hypertrophy (BPH) with weak urinary stream    Chronic bronchitis    ELIZABETH (obstructive sleep apnea)    Sinoatrial block    Cerebral aneurysm, nonruptured    TIA (transient ischemic attack)    Intracranial vascular stenosis    Sinus pause    S/P percutaneous patent foramen ovale closure    TIA on medication    HTN (hypertension)    COPD (chronic obstructive pulmonary disease)    Nocturnal seizures    Encounter for screening for malignant neoplasm of colon    Seizure    HL (hearing loss)    Pre-syncope    Mixed hyperlipidemia    History of stroke    Melena    Irritable bowel syndrome with both constipation and diarrhea    Gastroesophageal reflux disease    Stroke    CVA (cerebral vascular accident)          Visit Dx:    ICD-10-CM ICD-9-CM   1. Dysarthria as late effect of cerebrovascular accident (CVA)  I69.322 438.13   2. Cognitive deficit as late effect of cerebrovascular accident (CVA)  I69.319 438.0   3. Recurrent strokes  I63.9 434.91        OP SLP Assessment/Plan - 09/10/24 1105          SLP Assessment    Clinical Impression Comments Goldy demonstrated good performance overall towards his functional goals. Improved spontaneous use of strategies, but having to use more due to lack of one hearing aid. Fair use of external strategies.  -AD    SLP Diagnosis Mild flaccid dysarthria of speech and mild cognitive communication deficit.  -AD       SLP Plan    Plan Comments Will continue with therapy next week. Focus on internal strategies.  -AD              User Key  (r) = Recorded By, (t) = Taken By, (c) = Cosigned By      Initials Name Provider Type    Sunshine Starr,  MS CCC-SLP Speech and Language Pathologist                     SLP OP Goals       Row Name 09/10/24 1102          Subjective Comments    Subjective Comments Goldy was seen in therapy for 55 minutes and was alert and cooperative.  -AD        Subjective Pain    Able to rate subjective pain? yes  -AD     Subjective Pain Comment No pain reported or exhibited.  -AD        Dysarthria Goals    Patient will be able to engage in speech at the conversational level with maximum articulatory accuracy so that speech is understood by familiar /unfamiliar listeners 90%:;without cues  -AD     Status: Patient will be able to engage in speech at the conversational level with maximum articulatory accuracy so that speech is understood by familiar /unfamiliar listeners Progressing as expected  -AD     Patient will apply compensatory strategies to improve intelligibility of speech during in spontaneous speech 90%:;without cues  -AD     Status: Patient will apply compensatory strategies to improve intelligibility of speech during in spontaneous speech Progressing as expected  -AD     Comments: Patient will apply compensatory strategies to improve intelligibility of speech during in spontaneous speech Goldy was able to apply compensatory strategies without cues on 85% of trials. He demonstrate decreased volume at times which did affect his intelligibility at times. He reports one hearing aid is not working and had to be taken in to be repaired. He did demonstrate good use of semantic context during occasional breakdowns consistently w/o cues.  -AD        Memory Goals    Memory LTG's Patient will be able to remember  information needed to participate in activities of daily living  -AD     Patient will be able to remember  information needed to participate in activities of daily living 90% w/use of internal and external strategies and aids  -AD     Status: Patient will be able to remember  information needed to participate in activities of  daily living Progressing as expected  -AD     Patient’s memory skills will be enhanced as reported by patient by utilizing internal memory strategies to recall up to 3 pieces of information after a 5- minute delay 90%:;without cues  -AD     Comments: Patient’s memory skills will be enhanced as reported by patient by utilizing internal memory strategies to recall up to 3 pieces of information after a 5- minute delay Not targeted during this session.  -AD     Patient’s memory skills will be enhanced as reported by patient by using external memory aides 90%:;without cues  -AD     Status: Patient’s memory skills will be enhanced as reported by patient by using external memory aides Progressing as expected  -AD     Comments: Patient’s memory skills will be enhanced as reported by patient by using external memory aides Pt demonstrating use of phone to provide information to this therapist that was requested at his last visit. Use of external aids to help with recall reported and pt reports that he relies on his wife primarily. Unable to locate appointment card given at last session and had his wife call to find out his appt time. He did recall that it was on Tuesday but not the time.  -AD        Other Goals    Other Adult Goal- 1 Pt will demonstrate use of lingual/labial exercise program to aid with control of saliva/decrease anterior loss by demonstrate independence in 2 weeks.  -AD     Status: Other Adult Goal- 1 Progressing as expected  -AD     Comments: Other Adult Goal- 1 Goldy is able to demonstrate and verbalize use of strategies to aid with saliva control and decreased biting of his lower lip. Improved labial movement during speech and ability to move lip well with use of labial exercises. Reports practice at home.  -AD        SLP Time Calculation    SLP Goal Re-Cert Due Date 10/30/24  -AD               User Key  (r) = Recorded By, (t) = Taken By, (c) = Cosigned By      Initials Name Provider Type    AD Deburger,  Sunshine GARCIA MS CCC-SLP Speech and Language Pathologist                   OP SLP Education       Row Name 09/10/24 1105       Education    Learning Method Explanation  -AD    Teaching Response Verbalized understanding  -AD    Education Comments Goldy verbalizes understanding of speech strategies and demonstrates within the session.  -AD              User Key  (r) = Recorded By, (t) = Taken By, (c) = Cosigned By      Initials Name Effective Dates    AD Sunshine Ballard MS CCC-SLP 06/16/21 -                          Time Calculation:   SLP Start Time: 1105  SLP Stop Time: 1200  SLP Time Calculation (min): 55 min  SLP Non-Billable Time (min): 0 min  Total Timed Code Minutes- SLP: 0 minute(s)  Untimed Charges  06411-PU Treatment/ST Modification Prosth Aug Alter : 55 (primary focus on motor speech skills during the session.)  Total Minutes  Untimed Charges Total Minutes: 55   Total Minutes: 55    Therapy Charges for Today       Code Description Service Date Service Provider Modifiers Qty    [id number]  ST TREATMENT SPEECH 4 9/10/2024 Sunshine Ballard MS CCC-SLP GN 1                     Sunshine Ballard MS CCC-SLP  9/10/2024

## 2024-09-17 ENCOUNTER — HOSPITAL ENCOUNTER (OUTPATIENT)
Dept: OCCUPATIONAL THERAPY | Facility: HOSPITAL | Age: 74
Setting detail: THERAPIES SERIES
Discharge: HOME | End: 2024-09-17
Payer: MEDICARE

## 2024-09-17 ENCOUNTER — HOSPITAL ENCOUNTER (OUTPATIENT)
Dept: SPEECH THERAPY | Facility: HOSPITAL | Age: 74
Setting detail: THERAPIES SERIES
Discharge: HOME | End: 2024-09-17
Payer: MEDICARE

## 2024-09-17 DIAGNOSIS — I69.354 HEMIPARESIS AFFECTING LEFT SIDE AS LATE EFFECT OF CEREBROVASCULAR ACCIDENT (CVA): ICD-10-CM

## 2024-09-17 DIAGNOSIS — I69.398 LACK OF COORDINATION AS LATE EFFECT OF CEREBROVASCULAR ACCIDENT (CVA): ICD-10-CM

## 2024-09-17 DIAGNOSIS — R27.9 LACK OF COORDINATION AS LATE EFFECT OF CEREBROVASCULAR ACCIDENT (CVA): ICD-10-CM

## 2024-09-17 DIAGNOSIS — I69.322 DYSARTHRIA AS LATE EFFECT OF CEREBROVASCULAR ACCIDENT (CVA): Primary | ICD-10-CM

## 2024-09-17 DIAGNOSIS — I69.319 COGNITIVE DEFICIT AS LATE EFFECT OF CEREBROVASCULAR ACCIDENT (CVA): ICD-10-CM

## 2024-09-17 DIAGNOSIS — I63.9 RECURRENT STROKES: Primary | ICD-10-CM

## 2024-09-17 DIAGNOSIS — I63.9 RECURRENT STROKES: ICD-10-CM

## 2024-09-17 PROCEDURE — 97530 THERAPEUTIC ACTIVITIES: CPT

## 2024-09-17 PROCEDURE — 92507 TX SP LANG VOICE COMM INDIV: CPT

## 2024-09-24 ENCOUNTER — HOSPITAL ENCOUNTER (OUTPATIENT)
Dept: SPEECH THERAPY | Facility: HOSPITAL | Age: 74
Setting detail: THERAPIES SERIES
Discharge: HOME | End: 2024-09-24
Payer: MEDICARE

## 2024-09-24 DIAGNOSIS — I69.322 DYSARTHRIA AS LATE EFFECT OF CEREBROVASCULAR ACCIDENT (CVA): Primary | ICD-10-CM

## 2024-09-24 DIAGNOSIS — I63.9 RECURRENT STROKES: ICD-10-CM

## 2024-09-24 DIAGNOSIS — I69.319 COGNITIVE DEFICIT AS LATE EFFECT OF CEREBROVASCULAR ACCIDENT (CVA): ICD-10-CM

## 2024-09-24 PROCEDURE — 97130 THER IVNTJ EA ADDL 15 MIN: CPT

## 2024-09-24 PROCEDURE — 97129 THER IVNTJ 1ST 15 MIN: CPT

## 2024-10-01 ENCOUNTER — HOSPITAL ENCOUNTER (OUTPATIENT)
Dept: SPEECH THERAPY | Facility: HOSPITAL | Age: 74
Setting detail: THERAPIES SERIES
Discharge: HOME | End: 2024-10-01
Payer: MEDICARE

## 2024-10-01 ENCOUNTER — HOSPITAL ENCOUNTER (OUTPATIENT)
Dept: OCCUPATIONAL THERAPY | Facility: HOSPITAL | Age: 74
Setting detail: THERAPIES SERIES
Discharge: HOME | End: 2024-10-01
Payer: MEDICARE

## 2024-10-01 DIAGNOSIS — I69.322 DYSARTHRIA AS LATE EFFECT OF CEREBROVASCULAR ACCIDENT (CVA): Primary | ICD-10-CM

## 2024-10-01 DIAGNOSIS — I63.9 RECURRENT STROKES: ICD-10-CM

## 2024-10-01 DIAGNOSIS — I63.9 RECURRENT STROKES: Primary | ICD-10-CM

## 2024-10-01 DIAGNOSIS — I69.398 LACK OF COORDINATION AS LATE EFFECT OF CEREBROVASCULAR ACCIDENT (CVA): ICD-10-CM

## 2024-10-01 DIAGNOSIS — I69.319 COGNITIVE DEFICIT AS LATE EFFECT OF CEREBROVASCULAR ACCIDENT (CVA): ICD-10-CM

## 2024-10-01 DIAGNOSIS — R27.9 LACK OF COORDINATION AS LATE EFFECT OF CEREBROVASCULAR ACCIDENT (CVA): ICD-10-CM

## 2024-10-01 DIAGNOSIS — I69.354 HEMIPARESIS AFFECTING LEFT SIDE AS LATE EFFECT OF CEREBROVASCULAR ACCIDENT (CVA): ICD-10-CM

## 2024-10-01 PROCEDURE — 97530 THERAPEUTIC ACTIVITIES: CPT

## 2024-10-01 PROCEDURE — 92507 TX SP LANG VOICE COMM INDIV: CPT

## 2024-10-01 NOTE — THERAPY DISCHARGE NOTE
Outpatient Occupational Therapy Neuro Treatment Note/Discharge Summary  [place] [facility]     Patient Name: Ronnell Rizvi  : 1950  MRN: [id number]  Today's Date: 10/1/2024      Visit Date: 10/01/2024    Patient Active Problem List   Diagnosis    Anemia, unspecified    Vitamin B12 deficiency    Chronic fatigue    Benign prostatic hypertrophy (BPH) with weak urinary stream    Chronic bronchitis    ELIZABETH (obstructive sleep apnea)    Sinoatrial block    Cerebral aneurysm, nonruptured    TIA (transient ischemic attack)    Intracranial vascular stenosis    Sinus pause    S/P percutaneous patent foramen ovale closure    TIA on medication    HTN (hypertension)    COPD (chronic obstructive pulmonary disease)    Nocturnal seizures    Encounter for screening for malignant neoplasm of colon    Seizure    HL (hearing loss)    Pre-syncope    Mixed hyperlipidemia    History of stroke    Melena    Irritable bowel syndrome with both constipation and diarrhea    Gastroesophageal reflux disease    Stroke    CVA (cerebral vascular accident)        Past Medical History:   Diagnosis Date    Acquired dyslexia     pt  reports after 3rd stroke    Allergic     Allergic rhinitis     Anal fissure     Aortic insufficiency     moderate, THIERRY     Asthma     Asthma     Benign prostatic hypertrophy (BPH) with weak urinary stream 2016    Cataract     Chronic bronchitis     Colon polyp     COPD (chronic obstructive pulmonary disease)     Emphysema lung     Falls frequently     Fatty liver     GERD (gastroesophageal reflux disease)     Headache     Hepatitis     thought to be Hepatitis A     History of pneumonia     With left lower lobe atelectasis and scar tissue, being followed    HL (hearing loss)     Hypertension     Low back pain     Macular degeneration     Memory loss     Mixed hyperlipidemia 2023    Obstructive sleep apnea syndrome 2017    refuses c-pap    PFO (patent foramen ovale)     s/p percutaneous closure with a  25mm Amplatzer device in December 2018    Pneumonia     LLL with scar tissue    Seizures     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 total of 3 strokes    Tremor     Comes & goes    Vision loss         Past Surgical History:   Procedure Laterality Date    CARDIAC CATHETERIZATION N/A 12/12/2018    Procedure: Patent foramen ovale closure- [employer];  Surgeon: Deangelo Harris MD;  Location:  [facility];  Service: Cardiology    CARDIAC CATHETERIZATION N/A 12/12/2018    Procedure: Intracardiac echocardiogram;  Surgeon: Deangelo Harris MD;  Location:  [facility];  Service: Cardiology    CARDIAC ELECTROPHYSIOLOGY PROCEDURE N/A 03/26/2018    Procedure: Loop insertion   CONFIRM RX;  Surgeon: Luis Wyatt MD;  Location:  [facility];  Service: Cardiovascular    CARDIAC ELECTROPHYSIOLOGY PROCEDURE N/A 07/15/2020    Procedure: Loop recorder removal;  Surgeon: Starr Driscoll MD;  Location:  [facility];  Service: Cardiovascular;  Laterality: N/A;    CARDIAC SURGERY      CATARACT EXTRACTION Bilateral     COLONOSCOPY      COLONOSCOPY N/A 10/09/2020    Procedure: COLONOSCOPY with polypectomy;  Surgeon: Ras Mendoza MD;  Location:  [facility];  Service: Gastroenterology;  Laterality: N/A;  diverticulosis  ascending polyp  transverse polyp  sigmoid polyps X 2  rectal polyp    EYE SURGERY      HAND SURGERY Bilateral     INGUINAL HERNIA REPAIR Left     OTHER SURGICAL HISTORY      inguinal hernia repair for person over age 5    TONSILLECTOMY      TONSILLECTOMY           Visit Dx:    ICD-10-CM ICD-9-CM   1. Recurrent strokes  I63.9 434.91   2. Hemiparesis affecting left side as late effect of cerebrovascular accident (CVA)  I69.354 438.20   3. Lack of coordination as late effect of cerebrovascular accident (CVA)  I69.398 438.89    R27.9 781.3              OT Ortho       Row Name  10/01/24 1000             Sensation    Additional Comments Pt reports sensation of left hand continues to improve.  -SD         Left Hand Strength - Pinch (lbs)    Lateral 20 lbs  -SD                User Key  (r) = Recorded By, (t) = Taken By, (c) = Cosigned By      Initials Name Provider Type    Figueroa Carbajal, OTR Occupational Therapist                       OT Assessment/Plan       Row Name 10/01/24 1242          OT Assessment    Assessment Comments Pt reports the sensation of his left hand has continued to improve along with his functional strength. He continues with mild incoordination of LUE, yet he reports managing his basic daily tasks independently. Pt in agreement with transition to HEP only vs clinic based services.  -SD     OT Diagnosis CVA of left side weakness, sensory deficits and coordination deficits  -SD        OT Plan    OT Plan Comments [facility]  -SD               User Key  (r) = Recorded By, (t) = Taken By, (c) = Cosigned By      Initials Name Provider Type    Figueroa Carbajal OTR Occupational Therapist                      OT Goals       Row Name 10/01/24 1000          OT Short Term Goals    STG Date to Achieve 08/30/24  -SD     STG 1 Pt to be I with home neuromuscular re-education program to address LUE sensory awareness, coordination and strength.  -SD     STG 1 Progress Met  -SD     STG 2 Pt to manage clothing fasteners independently utilizing compensatory strategies as needed.  -SD     STG 2 Progress Met  -SD        Long Term Goals    LTG Date to Achieve 10/11/24  -SD     LTG 1 Pt to improve Quick Dash measure by 10 to demonstrate increased function for daily tasks.  -SD     LTG 1 Progress Met  -SD     LTG 2 Pt to improve left hand coordination as demonstrated by a 20 second improvement of 9 hole peg test.  -SD     LTG 2 Progress Met  -SD     LTG 3 Pt to utilize compensatory  strategies/equipment to independently cut food.  -SD     LTG 3 Progress Met  -SD               User Key  (r) = Recorded By, (t) = Taken By, (c) = Cosigned By      Initials Name Provider Type    Figueroa Carbajal OTR Occupational Therapist                    Therapy Education  Education Details: Reinforced benefits of activity to address LUE functional coordination and strength.  Given: HEP  Program: Reinforced  How Provided: Verbal  Provided to: Patient  Level of Understanding: Verbalized         OT Exercises       Row Name 10/01/24 1000             Exercise 1    Exercise Name 1 LUE weight bearing activity  -SD      Time (Minutes) 1 5 SD         Exercise 2    Exercise Name 2 sensory awareness/ re-education activity  -SD         Exercise 3    Exercise Name 3 functional coordination activity (tweezer pegboard, grooved pegboard, open/close a variety of lids and containers, manage nuts/bolts and 9 hole peg test)  -SD         Exercise 4    Exercise Name 4 LUE strengthening  -SD      Cueing 4 Verbal;Demo  -SD      Equipment 4 Other  resistive pins with 1# cuff weight at wrist  -SD      Resistance 4 Yellow;Red;Green;Blue;Black  -SD         Exercise 5    Exercise Name 5 Education with compensatory strategies and use of adaptive equipmment to manage daily tasks. Pt reports no specific concerns for management of basic daily tasks.  -SD                User Key  (r) = Recorded By, (t) = Taken By, (c) = Cosigned By      Initials Name Provider Type    Figueroa Carbajal OTR Occupational Therapist                        Outcome Measure Options: 9 Hole Peg  9 Hole Peg  9-Hole Peg Left: 45.75         Time Calculation:   OT Start Time: 1000  OT Stop Time: 1057  Timed Charges  98085 - OT Therapeutic Activity Minutes: 48  Total Minutes  Timed Charges Total Minutes: 48   Total Minutes: 48     Therapy Charges for Today       Code Description Service Date Service Provider Modifiers Qty    [id number]  OT THERAPEUTIC ACT EA 15 MIN  10/1/2024 Figueroa Ballard OTR GO 3                  OP OT Discharge Summary  Date of Discharge: 10/01/24  Reason for Discharge: All goals achieved  Outcomes Achieved: Able to achieve all goals within established timeline  Discharge Destination: Home with home program  Discharge Instructions: Reinforced benefits of activity to address LUE functional coordination, sensory awareness and functional strength.        DULCE Gastelum  10/1/2024

## 2024-10-01 NOTE — THERAPY DISCHARGE NOTE
Outpatient Speech Language Pathology   Adult Speech Language Cognitive Treatment Note/Discharge Summary  [place] [facility]     Patient Name: Ronnell Rizvi  : 1950  MRN: [id number]  Today's Date: 10/1/2024         Visit Date: 10/01/2024   Patient Active Problem List   Diagnosis    Anemia, unspecified    Vitamin B12 deficiency    Chronic fatigue    Benign prostatic hypertrophy (BPH) with weak urinary stream    Chronic bronchitis    ELIZABETH (obstructive sleep apnea)    Sinoatrial block    Cerebral aneurysm, nonruptured    TIA (transient ischemic attack)    Intracranial vascular stenosis    Sinus pause    S/P percutaneous patent foramen ovale closure    TIA on medication    HTN (hypertension)    COPD (chronic obstructive pulmonary disease)    Nocturnal seizures    Encounter for screening for malignant neoplasm of colon    Seizure    HL (hearing loss)    Pre-syncope    Mixed hyperlipidemia    History of stroke    Melena    Irritable bowel syndrome with both constipation and diarrhea    Gastroesophageal reflux disease    Stroke    CVA (cerebral vascular accident)          Visit Dx:    ICD-10-CM ICD-9-CM   1. Dysarthria as late effect of cerebrovascular accident (CVA)  I69.322 438.13   2. Cognitive deficit as late effect of cerebrovascular accident (CVA)  I69.319 438.0   3. Recurrent strokes  I63.9 434.91        OP SLP Assessment/Plan - 10/01/24 1105          SLP Assessment    Clinical Impression Comments Goldy demonstrates good performance and meeting of his goals related to motor speech functioning and is independent at the conversational level. Memory goals were not met and he is felt to be at his maximum functioning at this time.  -AD    SLP Diagnosis Mild flaccid dysarthria of speech and mild cognitive communication deficit.  -AD       SLP Plan    Plan Comments Will discharge at this time and pt in agreement with this plan.  -AD              User Key  (r) = Recorded By, (t) = Taken By, (c) = Cosigned By      Initials  Name Provider Type    Sunshine Starr, MS CCC-SLP Speech and Language Pathologist                     SLP OP Goals       Row Name 10/01/24 1105          Subjective Comments    Subjective Comments Goldy was seen in therapy fo 55 minutes and was alert and cooperative.  -AD        Subjective Pain    Able to rate subjective pain? yes  -AD     Subjective Pain Comment No pain reported or exhibited.  -AD        Dysarthria Goals    Patient will be able to engage in speech at the conversational level with maximum articulatory accuracy so that speech is understood by familiar /unfamiliar listeners 90%:;without cues  -AD     Status: Patient will be able to engage in speech at the conversational level with maximum articulatory accuracy so that speech is understood by familiar /unfamiliar listeners Achieved  -AD     Comments: Patient will be able to engage in speech at the conversational level with maximum articulatory accuracy so that speech is understood by familiar /unfamiliar listeners Goldy demonstrates the ability to converse at the conversational level with this therapist without intelligible speech on 98% of trials. Occasional difficulty with a word but able to demonstrate repairs and/or use of strategy to communicate his message. Goal met.  -AD     Patient will improve comprehensibility of verbal messages by speaking at an appropriate vocal intensity 90%:;without cues  -AD     Status: Patient will improve comprehensibility of verbal messages by speaking at an appropriate vocal intensity Achieved  9/3/24  -AD     Comments: Patient will improve comprehensibility of verbal messages by speaking at an appropriate vocal intensity Pt able to consistently demonstrate appropriate volume and increase in volume when needed to convey his message. Feel repair and use of hearing aids has improved appropriate volume.  -AD     Patient will improve respiratory support and the use of respiration for speech through use of increased  strength of expiratory muscles as measured by Expiratory Muscle Strength Training device 90%:;without cues  -AD     Status: Patient will improve respiratory support and the use of respiration for speech through use of increased strength of expiratory muscles as measured by Expiratory Muscle Strength Training device Achieved  8/20/24  -AD     Comments: Patient will improve respiratory support and the use of respiration for speech through use of increased strength of expiratory muscles as measured by Expiratory Muscle Strength Training device Pt reports he would like to keep at MEP of 60 as this is about the pressure he can handle with his COPD. He was instructed to complete at the maintenance phase and complete 3x per week. He reports he is doing this and will continue at home.  -AD     Patient will apply compensatory strategies to improve intelligibility of speech during in spontaneous speech 90%:;without cues  -AD     Status: Patient will apply compensatory strategies to improve intelligibility of speech during in spontaneous speech Achieved  -AD     Comments: Patient will apply compensatory strategies to improve intelligibility of speech during in spontaneous speech Pt able to demonstrate use of strategies consistently on all trials without cues to convey his intended message. Goal met.  -AD        Memory Goals    Memory LTG's Patient will be able to remember  information needed to participate in activities of daily living  -AD     Patient will be able to remember  information needed to participate in activities of daily living 90% w/use of internal and external strategies and aids  -AD     Status: Patient will be able to remember  information needed to participate in activities of daily living Discontinued  -AD     Comments: Patient will be able to remember  information needed to participate in activities of daily living Pt is felt to be at his maximum functioning for recall. He is able to participate in basic  ADLs without assistance or need for recall. He continue to require assistance for IADLs such as medications and recall of necessary information for appt recall and upcoming events. Goal discontinued.  -AD     Patient’s memory skills will be enhanced as reported by patient by utilizing internal memory strategies to recall up to 3 pieces of information after a 5- minute delay 90%:;without cues  -AD     Status: Patient’s memory skills will be enhanced as reported by patient by utilizing internal memory strategies to recall up to 3 pieces of information after a 5- minute delay Discontinued  -AD     Comments: Patient’s memory skills will be enhanced as reported by patient by utilizing internal memory strategies to recall up to 3 pieces of information after a 5- minute delay Pt felt to be at maximum functioning at this time. He benefits from spaced retrieval and can demonstrate good and consistent recall with this strategy. He does not perform consistently at home and relies on others to assist with recall.  -AD     Patient’s memory skills will be enhanced as reported by patient by using external memory aides 90%:;without cues  -AD     Status: Patient’s memory skills will be enhanced as reported by patient by using external memory aides Discontinued  -AD     Comments: Patient’s memory skills will be enhanced as reported by patient by using external memory aides Pt does demonstrate use of his phone and calendar at home, but difficulty with reading affects his ability to use independently per his report and observation.  -AD        Other Goals    Other Adult Goal- 1 Pt will demonstrate use of lingual/labial exercise program to aid with control of saliva/decrease anterior loss by demonstrate independence in 2 weeks.  -AD     Status: Other Adult Goal- 1 Achieved  9/17/24  -AD     Comments: Other Adult Goal- 1 Goldy reports complaince at home with exercise program given. Notable improvement in left facial droop since onset.   -AD        SLP Time Calculation    SLP Goal Re-Cert Due Date 10/30/24  -AD               User Key  (r) = Recorded By, (t) = Taken By, (c) = Cosigned By      Initials Name Provider Type    Sunshine Starr MS CCC-SLP Speech and Language Pathologist                     OP SLP Education       Row Name 10/01/24 1105       Education    Learning Method Explanation  -AD    Teaching Response Verbalized understanding  -AD    Education Comments Pt verbalizes understanding to continue with compensatory strategies and continue with use of external aids and internal memory strategies taught.  -AD              User Key  (r) = Recorded By, (t) = Taken By, (c) = Cosigned By      Initials Name Effective Dates    Sunshine Starr MS CCC-SLP 06/16/21 -                                  Time Calculation:   SLP Start Time: 1105  SLP Stop Time: 1200  SLP Time Calculation (min): 55 min  SLP Non-Billable Time (min): 0 min  Total Timed Code Minutes- SLP: 0 minute(s)  Untimed Charges  40536-IJ Treatment/ST Modification Prosth Aug Alter : 55  Total Minutes  Untimed Charges Total Minutes: 55   Total Minutes: 55    Therapy Charges for Today       Code Description Service Date Service Provider Modifiers Qty    [id number]  ST TREATMENT SPEECH 4 10/1/2024 Sunshine Ballard MS CCC-SLP GN 1                   OP SLP Discharge Summary  Date of Discharge: 10/01/24  Reason for Discharge: identified goals partially met, no further expectation of functional progress  Progress Toward Achieving Short/long Term Goals: goals partially met within established timelines  Discharge Destination: home w/ assist      Sunshine Ballard MS CCC-SLP  10/1/2024

## 2024-10-16 ENCOUNTER — TELEPHONE (OUTPATIENT)
Dept: INTERNAL MEDICINE | Facility: CLINIC | Age: 74
End: 2024-10-16
Payer: MEDICARE

## 2024-10-25 DIAGNOSIS — R53.82 CHRONIC FATIGUE: ICD-10-CM

## 2024-10-25 DIAGNOSIS — I10 PRIMARY HYPERTENSION: ICD-10-CM

## 2024-10-25 DIAGNOSIS — R73.9 HYPERGLYCEMIA, UNSPECIFIED: ICD-10-CM

## 2024-10-25 DIAGNOSIS — E78.2 MIXED HYPERLIPIDEMIA: ICD-10-CM

## 2024-10-25 DIAGNOSIS — R55 PRE-SYNCOPE: ICD-10-CM

## 2024-10-25 DIAGNOSIS — I10 PRIMARY HYPERTENSION: Primary | ICD-10-CM

## 2024-10-25 LAB
ALBUMIN SERPL-MCNC: 3.9 G/DL (ref 3.5–5.2)
ALBUMIN/GLOB SERPL: 1.6 G/DL
ALP SERPL-CCNC: 69 U/L (ref 39–117)
ALT SERPL-CCNC: 21 U/L (ref 1–41)
AST SERPL-CCNC: 24 U/L (ref 1–40)
BASOPHILS # BLD AUTO: 0.05 10*3/MM3 (ref 0–0.2)
BASOPHILS NFR BLD AUTO: 0.8 % (ref 0–1.5)
BILIRUB SERPL-MCNC: 0.4 MG/DL (ref 0–1.2)
BUN SERPL-MCNC: 12 MG/DL (ref 8–23)
BUN/CREAT SERPL: 10.6 (ref 7–25)
CALCIUM SERPL-MCNC: 9.3 MG/DL (ref 8.6–10.5)
CHLORIDE SERPL-SCNC: 106 MMOL/L (ref 98–107)
CHOLEST SERPL-MCNC: 125 MG/DL (ref 0–200)
CO2 SERPL-SCNC: 28.9 MMOL/L (ref 22–29)
CREAT SERPL-MCNC: 1.13 MG/DL (ref 0.76–1.27)
EGFRCR SERPLBLD CKD-EPI 2021: 67.8 ML/MIN/1.73
EOSINOPHIL # BLD AUTO: 0.16 10*3/MM3 (ref 0–0.4)
EOSINOPHIL NFR BLD AUTO: 2.7 % (ref 0.3–6.2)
ERYTHROCYTE [DISTWIDTH] IN BLOOD BY AUTOMATED COUNT: 12 % (ref 12.3–15.4)
GLOBULIN SER CALC-MCNC: 2.4 GM/DL
GLUCOSE SERPL-MCNC: 91 MG/DL (ref 65–99)
HBA1C MFR BLD: 5.2 % (ref 4.8–5.6)
HCT VFR BLD AUTO: 44.7 % (ref 37.5–51)
HDLC SERPL-MCNC: 55 MG/DL (ref 40–60)
HGB BLD-MCNC: 14.1 G/DL (ref 13–17.7)
IMM GRANULOCYTES # BLD AUTO: 0.02 10*3/MM3 (ref 0–0.05)
IMM GRANULOCYTES NFR BLD AUTO: 0.3 % (ref 0–0.5)
LDLC SERPL CALC-MCNC: 57 MG/DL (ref 0–100)
LDLC/HDLC SERPL: 1.06 {RATIO}
LYMPHOCYTES # BLD AUTO: 1.58 10*3/MM3 (ref 0.7–3.1)
LYMPHOCYTES NFR BLD AUTO: 26.3 % (ref 19.6–45.3)
MCH RBC QN AUTO: 30.5 PG (ref 26.6–33)
MCHC RBC AUTO-ENTMCNC: 31.5 G/DL (ref 31.5–35.7)
MCV RBC AUTO: 96.8 FL (ref 79–97)
MONOCYTES # BLD AUTO: 0.56 10*3/MM3 (ref 0.1–0.9)
MONOCYTES NFR BLD AUTO: 9.3 % (ref 5–12)
NEUTROPHILS # BLD AUTO: 3.64 10*3/MM3 (ref 1.7–7)
NEUTROPHILS NFR BLD AUTO: 60.6 % (ref 42.7–76)
NRBC BLD AUTO-RTO: 0 /100 WBC (ref 0–0.2)
PLATELET # BLD AUTO: 256 10*3/MM3 (ref 140–450)
POTASSIUM SERPL-SCNC: 4.7 MMOL/L (ref 3.5–5.2)
PROT SERPL-MCNC: 6.3 G/DL (ref 6–8.5)
RBC # BLD AUTO: 4.62 10*6/MM3 (ref 4.14–5.8)
SODIUM SERPL-SCNC: 141 MMOL/L (ref 136–145)
T4 FREE SERPL-MCNC: 1.02 NG/DL (ref 0.92–1.68)
TRIGL SERPL-MCNC: 59 MG/DL (ref 0–150)
TSH SERPL DL<=0.005 MIU/L-ACNC: 2.37 UIU/ML (ref 0.27–4.2)
VLDLC SERPL CALC-MCNC: 13 MG/DL (ref 5–40)
WBC # BLD AUTO: 6.01 10*3/MM3 (ref 3.4–10.8)

## 2024-10-30 ENCOUNTER — OFFICE VISIT (OUTPATIENT)
Dept: INTERNAL MEDICINE | Facility: CLINIC | Age: 74
End: 2024-10-30
Payer: MEDICARE

## 2024-10-30 VITALS
DIASTOLIC BLOOD PRESSURE: 82 MMHG | WEIGHT: 183.4 LBS | BODY MASS INDEX: 24.84 KG/M2 | TEMPERATURE: 98 F | OXYGEN SATURATION: 95 % | HEART RATE: 60 BPM | HEIGHT: 72 IN | SYSTOLIC BLOOD PRESSURE: 130 MMHG

## 2024-10-30 DIAGNOSIS — I10 PRIMARY HYPERTENSION: Primary | ICD-10-CM

## 2024-10-30 DIAGNOSIS — E78.2 MIXED HYPERLIPIDEMIA: ICD-10-CM

## 2024-10-30 DIAGNOSIS — R56.9 SEIZURE: ICD-10-CM

## 2024-10-30 PROCEDURE — 1126F AMNT PAIN NOTED NONE PRSNT: CPT | Performed by: INTERNAL MEDICINE

## 2024-10-30 PROCEDURE — 3075F SYST BP GE 130 - 139MM HG: CPT | Performed by: INTERNAL MEDICINE

## 2024-10-30 PROCEDURE — 1159F MED LIST DOCD IN RCRD: CPT | Performed by: INTERNAL MEDICINE

## 2024-10-30 PROCEDURE — 99214 OFFICE O/P EST MOD 30 MIN: CPT | Performed by: INTERNAL MEDICINE

## 2024-10-30 PROCEDURE — 1160F RVW MEDS BY RX/DR IN RCRD: CPT | Performed by: INTERNAL MEDICINE

## 2024-10-30 PROCEDURE — 3079F DIAST BP 80-89 MM HG: CPT | Performed by: INTERNAL MEDICINE

## 2024-10-30 NOTE — PROGRESS NOTES
"      Ronnell Rizvi is a 74 y.o. male who presents with a chief complaint of   Chief Complaint   Patient presents with    Hypertension     6 month follow up       HPI     Having increased constipation and gas.  Has been on chewable fiber chewables for some time.    The following portions of the patient's history were reviewed and updated as appropriate: allergies, current medications, past family history, past medical history, past social history, past surgical history and problem list.      Current Outpatient Medications:     Amantadine HCl (SYMMETREL) 50 MG/5ML oral solution, 5 mL., Disp: , Rfl:     aspirin 81 MG EC tablet, Take 1 tablet by mouth Daily. Take with Pantoprazole., Disp: 30 tablet, Rfl: 0    cetirizine (zyrTEC) 10 MG tablet, Take 1 tablet by mouth Daily., Disp: 90 tablet, Rfl: 1    Cyanocobalamin 1000 MCG/ML kit, Inject  as directed 1 (One) Time Per Week. 1/4 of a cc IM every sunday, Disp: , Rfl:     hydroCHLOROthiazide (MICROZIDE) 12.5 MG capsule, Take 1 capsule by mouth Daily., Disp: , Rfl:     lacosamide (VIMPAT) 100 MG tablet tablet, Take 1 tablet by mouth Every 12 (Twelve) Hours., Disp: 60 tablet, Rfl: 5    Multiple Vitamins-Minerals (OCUVITE ADULT 50+ PO), Take  by mouth., Disp: , Rfl:     pantoprazole (Protonix) 40 MG EC tablet, Take 1 tablet by mouth Daily., Disp: 90 tablet, Rfl: 1    rosuvastatin (Crestor) 40 MG tablet, Take 1 tablet by mouth Every Night., Disp: 90 tablet, Rfl: 1    ticagrelor (BRILINTA) 90 MG tablet tablet, Take 1 tablet by mouth 2 (Two) Times a Day., Disp: 180 tablet, Rfl: 1            Physical Exam  /82 (BP Location: Left arm, Patient Position: Sitting, Cuff Size: Adult)   Pulse 60   Temp 98 °F (36.7 °C) (Infrared)   Ht 182.9 cm (72\")   Wt 83.2 kg (183 lb 6.4 oz)   SpO2 95%   BMI 24.87 kg/m²     Physical Exam  Vitals reviewed.   Constitutional:       General: He is not in acute distress.     Appearance: Normal appearance.   HENT:      Head: Normocephalic and " atraumatic.      Nose: Nose normal.      Mouth/Throat:      Mouth: Mucous membranes are moist.   Eyes:      Conjunctiva/sclera: Conjunctivae normal.   Pulmonary:      Effort: Pulmonary effort is normal.   Skin:     General: Skin is warm and dry.   Neurological:      General: No focal deficit present.      Mental Status: He is alert.   Psychiatric:         Mood and Affect: Mood normal.           Results for orders placed or performed in visit on 10/25/24   Lipid Panel With LDL/HDL Ratio    Collection Time: 10/25/24  9:37 AM    Specimen: Blood   Result Value Ref Range    Total Cholesterol 125 0 - 200 mg/dL    Triglycerides 59 0 - 150 mg/dL    HDL Cholesterol 55 40 - 60 mg/dL    VLDL Cholesterol Mal 13 5 - 40 mg/dL    LDL Chol Calc (NIH) 57 0 - 100 mg/dL    LDL/HDL RATIO 1.06    T4, free    Collection Time: 10/25/24  9:37 AM    Specimen: Blood   Result Value Ref Range    Free T4 1.02 0.92 - 1.68 ng/dL   TSH    Collection Time: 10/25/24  9:37 AM    Specimen: Blood   Result Value Ref Range    TSH 2.370 0.270 - 4.200 uIU/mL   Hemoglobin A1c    Collection Time: 10/25/24  9:37 AM    Specimen: Blood   Result Value Ref Range    Hemoglobin A1C 5.20 4.80 - 5.60 %   Comprehensive metabolic panel    Collection Time: 10/25/24  9:37 AM    Specimen: Blood   Result Value Ref Range    Glucose 91 65 - 99 mg/dL    BUN 12 8 - 23 mg/dL    Creatinine 1.13 0.76 - 1.27 mg/dL    EGFR Result 67.8 >60.0 mL/min/1.73    BUN/Creatinine Ratio 10.6 7.0 - 25.0    Sodium 141 136 - 145 mmol/L    Potassium 4.7 3.5 - 5.2 mmol/L    Chloride 106 98 - 107 mmol/L    Total CO2 28.9 22.0 - 29.0 mmol/L    Calcium 9.3 8.6 - 10.5 mg/dL    Total Protein 6.3 6.0 - 8.5 g/dL    Albumin 3.9 3.5 - 5.2 g/dL    Globulin 2.4 gm/dL    A/G Ratio 1.6 g/dL    Total Bilirubin 0.4 0.0 - 1.2 mg/dL    Alkaline Phosphatase 69 39 - 117 U/L    AST (SGOT) 24 1 - 40 U/L    ALT (SGPT) 21 1 - 41 U/L   CBC & Differential    Collection Time: 10/25/24  9:37 AM    Specimen: Blood   Result  Value Ref Range    WBC 6.01 3.40 - 10.80 10*3/mm3    RBC 4.62 4.14 - 5.80 10*6/mm3    Hemoglobin 14.1 13.0 - 17.7 g/dL    Hematocrit 44.7 37.5 - 51.0 %    MCV 96.8 79.0 - 97.0 fL    MCH 30.5 26.6 - 33.0 pg    MCHC 31.5 31.5 - 35.7 g/dL    RDW 12.0 (L) 12.3 - 15.4 %    Platelets 256 140 - 450 10*3/mm3    Neutrophil Rel % 60.6 42.7 - 76.0 %    Lymphocyte Rel % 26.3 19.6 - 45.3 %    Monocyte Rel % 9.3 5.0 - 12.0 %    Eosinophil Rel % 2.7 0.3 - 6.2 %    Basophil Rel % 0.8 0.0 - 1.5 %    Neutrophils Absolute 3.64 1.70 - 7.00 10*3/mm3    Lymphocytes Absolute 1.58 0.70 - 3.10 10*3/mm3    Monocytes Absolute 0.56 0.10 - 0.90 10*3/mm3    Eosinophils Absolute 0.16 0.00 - 0.40 10*3/mm3    Basophils Absolute 0.05 0.00 - 0.20 10*3/mm3    Immature Granulocyte Rel % 0.3 0.0 - 0.5 %    Immature Grans Absolute 0.02 0.00 - 0.05 10*3/mm3    nRBC 0.0 0.0 - 0.2 /100 WBC           Diagnoses and all orders for this visit:    1. Primary hypertension (Primary)    2. Seizure    3. Mixed hyperlipidemia      Was in hospital and had not been seen by myself since although was seen by my partner.  BP looks good today.  Labs from 10/25/24 that I ordered reviewed together and they look excellent.  Nothing for me to change at this time.      F/u in 6 months

## 2024-11-05 ENCOUNTER — OFFICE VISIT (OUTPATIENT)
Dept: NEUROLOGY | Facility: CLINIC | Age: 74
End: 2024-11-05
Payer: MEDICARE

## 2024-11-05 VITALS
SYSTOLIC BLOOD PRESSURE: 120 MMHG | WEIGHT: 184.8 LBS | OXYGEN SATURATION: 97 % | DIASTOLIC BLOOD PRESSURE: 70 MMHG | BODY MASS INDEX: 25.06 KG/M2 | HEART RATE: 66 BPM

## 2024-11-05 DIAGNOSIS — G40.109 LOCALIZATION-RELATED SYMPTOMATIC EPILEPSY AND EPILEPTIC SYNDROMES WITH SIMPLE PARTIAL SEIZURES, NOT INTRACTABLE, WITHOUT STATUS EPILEPTICUS: Primary | ICD-10-CM

## 2024-11-05 DIAGNOSIS — Z86.73 HISTORY OF STROKE: ICD-10-CM

## 2024-11-05 PROCEDURE — 1159F MED LIST DOCD IN RCRD: CPT | Performed by: PSYCHIATRY & NEUROLOGY

## 2024-11-05 PROCEDURE — 3078F DIAST BP <80 MM HG: CPT | Performed by: PSYCHIATRY & NEUROLOGY

## 2024-11-05 PROCEDURE — 1160F RVW MEDS BY RX/DR IN RCRD: CPT | Performed by: PSYCHIATRY & NEUROLOGY

## 2024-11-05 PROCEDURE — 99214 OFFICE O/P EST MOD 30 MIN: CPT | Performed by: PSYCHIATRY & NEUROLOGY

## 2024-11-05 PROCEDURE — G2211 COMPLEX E/M VISIT ADD ON: HCPCS | Performed by: PSYCHIATRY & NEUROLOGY

## 2024-11-05 PROCEDURE — 3074F SYST BP LT 130 MM HG: CPT | Performed by: PSYCHIATRY & NEUROLOGY

## 2024-11-05 NOTE — PROGRESS NOTES
Notes by MA:  Mr Rizvi is her with his wife today for a follow up appointment. He denies any seizures or stroke symptoms. His wife reports that he has been experiencing a lot of gas and wondering if it is due to any of his medications.      Subjective:     Patient ID: Ronnell Rizvi is a 74 y.o. male.    History of Present Illness  The following portions of the patient's history were reviewed and updated as appropriate: allergies, current medications, past family history, past medical history, past social history, past surgical history, and problem list.    Review of Systems   Neurological:  Positive for seizures.        Objective:    Neurological Exam   Awake and alert pleasant cooperative with significantly dysarthric speech.  Speech comprehension is reasonable only hampered by his significant hearing loss.     Cranial nerve examination reveals a little bit of left lower facial droop but minimal asymmetry.     Motor exam reveals left-sided drift.  Walking with a cane.     Coordination testing reveals slow movements on the left.     Tendon reflexes are brisk on the left with an extensor plantar sign on that side.  Physical Exam    Assessment/Plan:     Diagnoses and all orders for this visit:    1. Localization-related symptomatic epilepsy and epileptic syndromes with simple partial seizures, not intractable, without status epilepticus (Primary)    2. History of stroke     History of CVA.  He has reached maximal medical recovery and is doing quite well.  Still just a touch of drift on the left.  Continuing antiplatelet therapy, lipid therapy and again encouraged him to follow his primary doctor's advice on risk factor management.    Seizures are well-controlled on Vimpat 100 mg twice daily which he appears to be tolerating well with no problematic side effects and no toxic findings on exam.  No changes today.    Will bring him back to neurology clinic in 6 months, earlier if necessary.

## 2024-12-02 DIAGNOSIS — E78.2 MIXED HYPERLIPIDEMIA: ICD-10-CM

## 2024-12-03 RX ORDER — ROSUVASTATIN CALCIUM 20 MG/1
20 TABLET, COATED ORAL DAILY
Qty: 90 TABLET | Refills: 1 | OUTPATIENT
Start: 2024-12-03

## 2024-12-03 NOTE — TELEPHONE ENCOUNTER
Looks like this medication was discontinued in the ER, is the patient still supposed to be taking this medication?    Rx Refill Note  Requested Prescriptions     Pending Prescriptions Disp Refills    rosuvastatin (CRESTOR) 20 MG tablet [Pharmacy Med Name: ROSUVASTATIN CALCIUM 20 MG TAB] 90 tablet 1     Sig: Take 1 tablet by mouth Daily.      Last office visit with prescribing clinician: 10/30/2024   Last telemedicine visit with prescribing clinician: Visit date not found   Next office visit with prescribing clinician: 5/7/2025                         Would you like a call back once the refill request has been completed: [] Yes [] No    If the office needs to give you a call back, can they leave a voicemail: [] Yes [] No    Gerda Rankin MA  12/03/24, 17:33 EST

## 2024-12-05 DIAGNOSIS — E78.2 MIXED HYPERLIPIDEMIA: ICD-10-CM

## 2024-12-09 DIAGNOSIS — Z86.73 HISTORY OF STROKE: ICD-10-CM

## 2024-12-09 RX ORDER — ROSUVASTATIN CALCIUM 20 MG/1
20 TABLET, COATED ORAL DAILY
Qty: 90 TABLET | Refills: 1 | OUTPATIENT
Start: 2024-12-09

## 2024-12-10 NOTE — TELEPHONE ENCOUNTER
Spoke to the pt to see if he was taking or not taking.    He will have his wife to call us back to let us know.

## 2024-12-11 RX ORDER — CLOPIDOGREL BISULFATE 75 MG/1
75 TABLET ORAL DAILY
Qty: 30 TABLET | Refills: 11 | OUTPATIENT
Start: 2024-12-11

## 2024-12-15 DIAGNOSIS — E78.2 MIXED HYPERLIPIDEMIA: ICD-10-CM

## 2024-12-16 NOTE — PLAN OF CARE
Problem: Patient Care Overview  Goal: Plan of Care Review  Outcome: Ongoing (interventions implemented as appropriate)   12/10/18 9763   Coping/Psychosocial   Plan of Care Reviewed With patient   Plan of Care Review   Progress no change   OTHER   Outcome Summary Bedside Swallow eval completed. Recommend continue on regular diet. ST is not indicated at this time. Please reconsult if needed.           Female

## 2024-12-17 ENCOUNTER — TELEPHONE (OUTPATIENT)
Dept: INTERNAL MEDICINE | Facility: CLINIC | Age: 74
End: 2024-12-17
Payer: MEDICARE

## 2024-12-17 RX ORDER — ROSUVASTATIN CALCIUM 20 MG/1
20 TABLET, COATED ORAL DAILY
Qty: 90 TABLET | Refills: 1 | OUTPATIENT
Start: 2024-12-17

## 2024-12-17 NOTE — TELEPHONE ENCOUNTER
I spoke with clinical and Mr. Rizvi has 1 refill on this medication and he should be good till Feb of 2025.  I called his wife to let her know  this and she is calling [facility] today.

## 2024-12-17 NOTE — TELEPHONE ENCOUNTER
He will be out of his medication ROSUVASTAIN tomorrow.  The wife said the pharmacy requested but I told her I will send a message today.

## 2024-12-23 DIAGNOSIS — Z86.73 HISTORY OF STROKE: ICD-10-CM

## 2024-12-23 DIAGNOSIS — G45.9 TIA (TRANSIENT ISCHEMIC ATTACK): ICD-10-CM

## 2024-12-23 DIAGNOSIS — G45.9 TIA (TRANSIENT ISCHEMIC ATTACK): Primary | ICD-10-CM

## 2024-12-23 DIAGNOSIS — E78.2 MIXED HYPERLIPIDEMIA: ICD-10-CM

## 2024-12-23 RX ORDER — ROSUVASTATIN CALCIUM 20 MG/1
20 TABLET, COATED ORAL NIGHTLY
Qty: 90 TABLET | Refills: 1 | Status: SHIPPED | OUTPATIENT
Start: 2024-12-23

## 2024-12-23 RX ORDER — ROSUVASTATIN CALCIUM 20 MG/1
20 TABLET, COATED ORAL NIGHTLY
Qty: 90 TABLET | Refills: 1 | Status: SHIPPED | OUTPATIENT
Start: 2024-12-23 | End: 2024-12-23 | Stop reason: SDUPTHER

## 2025-02-07 RX ORDER — TICAGRELOR 90 MG/1
90 TABLET ORAL 2 TIMES DAILY
Qty: 180 TABLET | Refills: 1 | Status: SHIPPED | OUTPATIENT
Start: 2025-02-07

## 2025-02-07 NOTE — TELEPHONE ENCOUNTER
Rx Refill Note  Requested Prescriptions     Pending Prescriptions Disp Refills    ticagrelor (Brilinta) 90 MG tablet tablet [Pharmacy Med Name: BRILINTA 90 MG TABLET] 180 tablet 1     Sig: TAKE 1 TABLET BY MOUTH 2 TIMES A DAY      Last office visit with prescribing clinician: 10/30/2024   Last telemedicine visit with prescribing clinician: Visit date not found   Next office visit with prescribing clinician: 5/7/2025                         Would you like a call back once the refill request has been completed: [] Yes [] No    If the office needs to give you a call back, can they leave a voicemail: [] Yes [] No    Gerda Rankin MA  02/07/25, 15:58 EST

## 2025-02-09 DIAGNOSIS — R56.9 SEIZURE: Primary | ICD-10-CM

## 2025-02-10 RX ORDER — LACOSAMIDE 100 MG/1
100 TABLET ORAL EVERY 12 HOURS
Qty: 60 TABLET | Refills: 3 | Status: SHIPPED | OUTPATIENT
Start: 2025-02-10

## 2025-02-13 ENCOUNTER — APPOINTMENT (OUTPATIENT)
Dept: GENERAL RADIOLOGY | Facility: HOSPITAL | Age: 75
End: 2025-02-13
Payer: MEDICARE

## 2025-02-13 ENCOUNTER — APPOINTMENT (OUTPATIENT)
Dept: CT IMAGING | Facility: HOSPITAL | Age: 75
End: 2025-02-13
Payer: MEDICARE

## 2025-02-13 ENCOUNTER — HOSPITAL ENCOUNTER (EMERGENCY)
Facility: HOSPITAL | Age: 75
Discharge: HOME | End: 2025-02-13
Attending: EMERGENCY MEDICINE
Payer: MEDICARE

## 2025-02-13 VITALS
DIASTOLIC BLOOD PRESSURE: 64 MMHG | WEIGHT: 183 LBS | OXYGEN SATURATION: 96 % | RESPIRATION RATE: 16 BRPM | TEMPERATURE: 97.9 F | HEIGHT: 73 IN | SYSTOLIC BLOOD PRESSURE: 121 MMHG | BODY MASS INDEX: 24.25 KG/M2 | HEART RATE: 57 BPM

## 2025-02-13 DIAGNOSIS — R53.83 OTHER FATIGUE: ICD-10-CM

## 2025-02-13 DIAGNOSIS — R41.82 ALTERED MENTAL STATUS, UNSPECIFIED ALTERED MENTAL STATUS TYPE: Primary | ICD-10-CM

## 2025-02-13 LAB
ALBUMIN SERPL-MCNC: 3.7 G/DL (ref 3.5–5.2)
ALBUMIN/GLOB SERPL: 1.6 G/DL
ALP SERPL-CCNC: 54 U/L (ref 39–117)
ALT SERPL W P-5'-P-CCNC: 26 U/L (ref 1–41)
ANION GAP SERPL CALCULATED.3IONS-SCNC: 10.7 MMOL/L (ref 5–15)
AST SERPL-CCNC: 46 U/L (ref 1–40)
BASOPHILS # BLD AUTO: 0.04 10*3/MM3 (ref 0–0.2)
BASOPHILS NFR BLD AUTO: 0.6 % (ref 0–1.5)
BILIRUB SERPL-MCNC: 0.3 MG/DL (ref 0–1.2)
BUN SERPL-MCNC: 17 MG/DL (ref 8–23)
BUN/CREAT SERPL: 13.8 (ref 7–25)
CALCIUM SPEC-SCNC: 8.6 MG/DL (ref 8.6–10.5)
CHLORIDE SERPL-SCNC: 102 MMOL/L (ref 98–107)
CO2 SERPL-SCNC: 26.3 MMOL/L (ref 22–29)
CREAT SERPL-MCNC: 1.23 MG/DL (ref 0.76–1.27)
DEPRECATED RDW RBC AUTO: 45.5 FL (ref 37–54)
EGFRCR SERPLBLD CKD-EPI 2021: 60.8 ML/MIN/1.73
EOSINOPHIL # BLD AUTO: 0.06 10*3/MM3 (ref 0–0.4)
EOSINOPHIL NFR BLD AUTO: 0.9 % (ref 0.3–6.2)
ERYTHROCYTE [DISTWIDTH] IN BLOOD BY AUTOMATED COUNT: 13.4 % (ref 12.3–15.4)
GLOBULIN UR ELPH-MCNC: 2.3 GM/DL
GLUCOSE SERPL-MCNC: 135 MG/DL (ref 65–99)
HCT VFR BLD AUTO: 41 % (ref 37.5–51)
HGB BLD-MCNC: 13.8 G/DL (ref 13–17.7)
IMM GRANULOCYTES # BLD AUTO: 0.01 10*3/MM3 (ref 0–0.05)
IMM GRANULOCYTES NFR BLD AUTO: 0.2 % (ref 0–0.5)
LYMPHOCYTES # BLD AUTO: 1.79 10*3/MM3 (ref 0.7–3.1)
LYMPHOCYTES NFR BLD AUTO: 27.1 % (ref 19.6–45.3)
MAGNESIUM SERPL-MCNC: 2.4 MG/DL (ref 1.6–2.4)
MCH RBC QN AUTO: 30.9 PG (ref 26.6–33)
MCHC RBC AUTO-ENTMCNC: 33.7 G/DL (ref 31.5–35.7)
MCV RBC AUTO: 91.9 FL (ref 79–97)
MONOCYTES # BLD AUTO: 0.63 10*3/MM3 (ref 0.1–0.9)
MONOCYTES NFR BLD AUTO: 9.5 % (ref 5–12)
NEUTROPHILS NFR BLD AUTO: 4.07 10*3/MM3 (ref 1.7–7)
NEUTROPHILS NFR BLD AUTO: 61.7 % (ref 42.7–76)
NRBC BLD AUTO-RTO: 0 /100 WBC (ref 0–0.2)
PHOSPHATE SERPL-MCNC: 3.6 MG/DL (ref 2.5–4.5)
PLATELET # BLD AUTO: 195 10*3/MM3 (ref 140–450)
PMV BLD AUTO: 9.7 FL (ref 6–12)
POTASSIUM SERPL-SCNC: 3.8 MMOL/L (ref 3.5–5.2)
PROT SERPL-MCNC: 6 G/DL (ref 6–8.5)
QT INTERVAL: 444 MS
QTC INTERVAL: 452 MS
RBC # BLD AUTO: 4.46 10*6/MM3 (ref 4.14–5.8)
SODIUM SERPL-SCNC: 139 MMOL/L (ref 136–145)
TROPONIN T SERPL HS-MCNC: 21 NG/L
WBC NRBC COR # BLD AUTO: 6.6 10*3/MM3 (ref 3.4–10.8)

## 2025-02-13 PROCEDURE — 85025 COMPLETE CBC W/AUTO DIFF WBC: CPT | Performed by: EMERGENCY MEDICINE

## 2025-02-13 PROCEDURE — 93010 ELECTROCARDIOGRAM REPORT: CPT | Performed by: INTERNAL MEDICINE

## 2025-02-13 PROCEDURE — 99284 EMERGENCY DEPT VISIT MOD MDM: CPT | Performed by: EMERGENCY MEDICINE

## 2025-02-13 PROCEDURE — 84100 ASSAY OF PHOSPHORUS: CPT | Performed by: EMERGENCY MEDICINE

## 2025-02-13 PROCEDURE — 80053 COMPREHEN METABOLIC PANEL: CPT | Performed by: EMERGENCY MEDICINE

## 2025-02-13 PROCEDURE — 70450 CT HEAD/BRAIN W/O DYE: CPT

## 2025-02-13 PROCEDURE — 83735 ASSAY OF MAGNESIUM: CPT | Performed by: EMERGENCY MEDICINE

## 2025-02-13 PROCEDURE — 93005 ELECTROCARDIOGRAM TRACING: CPT | Performed by: EMERGENCY MEDICINE

## 2025-02-13 PROCEDURE — 84484 ASSAY OF TROPONIN QUANT: CPT | Performed by: EMERGENCY MEDICINE

## 2025-02-13 PROCEDURE — 73130 X-RAY EXAM OF HAND: CPT

## 2025-02-13 RX ORDER — SODIUM CHLORIDE 0.9 % (FLUSH) 0.9 %
10 SYRINGE (ML) INJECTION AS NEEDED
Status: DISCONTINUED | OUTPATIENT
Start: 2025-02-13 | End: 2025-02-13 | Stop reason: HOSPADM

## 2025-02-13 NOTE — ED PROVIDER NOTES
Subjective   History of Present Illness  Patient presents with being off for about 2 days.  Patient's wife thinks he must of had a seizure in the night and tried to bring him in the other day but he refused.  She said that she never heard him screaming or yelling and that usually what he does during his seizures.  His last known seizure was in September of last year.  Patient sees Dr. Sanchez and of neurology.  Patient and spouse are unsure if patient ever had an EEG although it was recommended.  Patient's had multiple small vessel strokes that are likely the cause of his seizure disorder but patient and spouse have no idea.  Patient does take daily medication for seizures.  No recent doses have been missed.  Patient is otherwise been at baseline health and no recent fevers, cough, syncope, chest pain, shortness of breath, rash.  Patient's been eating and drinking at normal.  Patient's spouse just thinks he is still a little bit off and wanted him to be evaluated.  She denies seeing any type of focal weakness, facial asymmetry, altered mental status and she denies any recent travel, sick contact, bad food exposure, or trauma.      Review of Systems   All other systems reviewed and are negative.      Past Medical History:   Diagnosis Date    Acquired dyslexia     pt  reports after 3rd stroke    Allergic 1985    Allergic rhinitis     Anal fissure     Aortic insufficiency     moderate, THIERRY 2017    Asthma     Asthma     Benign prostatic hypertrophy (BPH) with weak urinary stream 11/01/2016    Cataract     Chronic bronchitis     Colon polyp     COPD (chronic obstructive pulmonary disease)     Emphysema lung     Falls frequently     Fatty liver     GERD (gastroesophageal reflux disease)     Headache     Hepatitis     thought to be Hepatitis A     History of pneumonia     With left lower lobe atelectasis and scar tissue, being followed    HL (hearing loss)     Hypertension     Low back pain     Macular degeneration      "Memory loss     Mixed hyperlipidemia 01/30/2023    Obstructive sleep apnea syndrome 08/23/2017    refuses c-pap    PFO (patent foramen ovale)     s/p percutaneous closure with a 25mm Amplatzer device in December 2018    Pneumonia     LLL with scar tissue    Seizures     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 total of 3 strokes    Tremor     Comes & goes    Vision loss        Allergies   Allergen Reactions    Aspirin Nausea Only     Pt states he \"can tolerate enteric coated aspirin only.\"     Citrus      Blisters on mouth      Combivent [Ipratropium-Albuterol] Other (See Comments)     Throat swelling    Statins Mental Status Change     Severe memory impairment       Past Surgical History:   Procedure Laterality Date    CARDIAC CATHETERIZATION N/A 12/12/2018    Procedure: Patent foramen ovale closure- Abbott;  Surgeon: Deangelo Harris MD;  Location:  [facility];  Service: Cardiology    CARDIAC CATHETERIZATION N/A 12/12/2018    Procedure: Intracardiac echocardiogram;  Surgeon: Deangelo Harris MD;  Location:  [facility];  Service: Cardiology    CARDIAC ELECTROPHYSIOLOGY PROCEDURE N/A 03/26/2018    Procedure: Loop insertion   CONFIRM RX;  Surgeon: Luis Wyatt MD;  Location:  [facility];  Service: Cardiovascular    CARDIAC ELECTROPHYSIOLOGY PROCEDURE N/A 07/15/2020    Procedure: Loop recorder removal;  Surgeon: Starr Driscoll MD;  Location:  [facility];  Service: Cardiovascular;  Laterality: N/A;    CARDIAC SURGERY      CATARACT EXTRACTION Bilateral     COLONOSCOPY      COLONOSCOPY N/A 10/09/2020    Procedure: COLONOSCOPY with polypectomy;  Surgeon: Ras Mendoza MD;  Location: [facility];  Service: Gastroenterology;  Laterality: N/A;  diverticulosis  ascending polyp  transverse polyp  sigmoid polyps X 2  rectal polyp    EYE SURGERY      HAND SURGERY Bilateral  "    INGUINAL HERNIA REPAIR Left     OTHER SURGICAL HISTORY      inguinal hernia repair for person over age 5    TONSILLECTOMY      TONSILLECTOMY         Family History   Problem Relation Age of Onset    Obesity Mother     Clotting disorder Mother         Upper extremities    Alcohol abuse Father     Cancer Father 63        Esophagus and lung    Other Father         cardiac disorder    Heart disease Father     Depression Father     Kidney disease Sister     Kidney failure Sister     Drug abuse Paternal Uncle     Stroke Sister     Throat cancer Sister     Drug abuse Paternal Uncle        Social History     Socioeconomic History    Marital status:      Spouse name: Joey   Tobacco Use    Smoking status: Former     Current packs/day: 0.00     Average packs/day: 1.5 packs/day for 15.0 years (22.5 ttl pk-yrs)     Types: Cigarettes     Start date: 1964     Quit date: 1979     Years since quittin.0     Passive exposure: Past    Smokeless tobacco: Never    Tobacco comments:     caffeine use: Drinks 4 glasses of Dt coke on avg.    Vaping Use    Vaping status: Never Used   Substance and Sexual Activity    Alcohol use: No    Drug use: No    Sexual activity: Defer           Objective   Physical Exam  Vitals and nursing note reviewed.   HENT:      Head: Normocephalic and atraumatic.      Right Ear: External ear normal.      Left Ear: External ear normal.      Nose: Nose normal.      Mouth/Throat:      Mouth: Mucous membranes are moist.      Pharynx: Oropharynx is clear.   Eyes:      Extraocular Movements: Extraocular movements intact.      Conjunctiva/sclera: Conjunctivae normal.      Pupils: Pupils are equal, round, and reactive to light.   Cardiovascular:      Rate and Rhythm: Normal rate and regular rhythm.      Pulses:           Radial pulses are 2+ on the right side and 2+ on the left side.        Dorsalis pedis pulses are 2+ on the right side and 2+ on the left side.        Posterior tibial pulses are  2+ on the right side and 2+ on the left side.      Heart sounds: S1 normal and S2 normal. No murmur heard.  Pulmonary:      Effort: Pulmonary effort is normal.      Breath sounds: Normal breath sounds.   Abdominal:      General: Abdomen is flat.      Palpations: Abdomen is soft.   Musculoskeletal:      Cervical back: Neck supple.      Right lower leg: No edema.      Left lower leg: No edema.      Comments: Equal  strength bilaterally.  Patient does have some tenderness to his fingers on his left hand but he still has good range of motion.   Skin:     General: Skin is warm and dry.      Capillary Refill: Capillary refill takes 2 to 3 seconds.   Neurological:      Mental Status: He is alert and oriented to person, place, and time.      Cranial Nerves: No cranial nerve deficit.      Sensory: No sensory deficit.      Motor: No weakness.      Coordination: Coordination normal.   Psychiatric:         Mood and Affect: Mood normal.         Behavior: Behavior normal.         Procedures           ED Course  ED Course as of 02/14/25 1511   Thu Feb 13, 2025   1553 EKG-rate of 62, sinus rhythm, normal axis, prolonged ID interval, no acute ST elevation or depression.  When compared to EKG from 7/15/2024 it is generally unchanged. [AW]      ED Course User Index  [AW] Riki Gatica MD                                                       Medical Decision Making  Ddx CVA, seizure, dehydration, electrolyte abnormality, ACS    Labs Reviewed  COMPREHENSIVE METABOLIC PANEL - Abnormal; Notable for the following components:     Glucose                       135 (*)                AST (SGOT)                    46 (*)              All other components within normal limits         Narrative: GFR Categories in Chronic Kidney Disease (CKD)                                      GFR Category          GFR (mL/min/1.73)    Interpretation                  G1                     90 or greater         Normal or high (1)                  G2                       60-89                Mild decrease (1)                  G3a                   45-59                Mild to moderate decrease                  G3b                   30-44                Moderate to severe decrease                  G4                    15-29                Severe decrease                  G5                    14 or less           Kidney failure                                          (1)In the absence of evidence of kidney disease, neither GFR category G1 or G2 fulfill the criteria for CKD.                                    eGFR calculation 2021 CKD-EPI creatinine equation, which does not include race as a factor  CBC WITH AUTO DIFFERENTIAL - Normal  MAGNESIUM - Normal  PHOSPHORUS - Normal  TROPONIN - Normal         Narrative: High Sensitive Troponin T Reference Range:                  <14.0 ng/L- Negative Female for AMI                  <22.0 ng/L- Negative Male for AMI                  >=14 - Abnormal Female indicating possible myocardial injury.                  >=22 - Abnormal Male indicating possible myocardial injury.                   Clinicians would have to utilize clinical acumen, EKG, Troponin, and serial changes to determine if it is an Acute Myocardial Infarction or myocardial injury due to an underlying chronic condition.                                       CBC AND DIFFERENTIAL      XR Hand 3+ View Left    Result Date: 2/13/2025  Impression: 1. No evidence of fracture. 2. 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 Multiple chronic appearing infarcts, as above. Electronically Signed: Kishore Ramirez  2/13/2025 5:37 PM EST  Workstation ID: [id number]    1815 Pt seen again prior to d/c.  Labs/Imaging reviewed and are unremarkable.  Patient unable to provide a urine during the stay so  advised following up with PCP if they felt the patient need a urine test but patient has no fever or urinary symptoms so I do not feel this is necessary.  Vitals stable and pt. in NAD. Non-toxic. Comfortable.  Tolerating po.  Relaxed breathing.  All questions personally answered at the bedside and all d/c instructions personally reviewed with pt.  Discussed the importance of close outpt. f/u and pt. understands this and agrees to do so.  Pt agrees to return to ED immediately for any new, persistent, or worsening symptoms.    EMR Dragon/Transcription disclaimer:  Much of this encounter note is an electronic transcription/translation of spoken language to printed text using the Dragon Dictation System       Problems Addressed:  Altered mental status, unspecified altered mental status type: complicated acute illness or injury  Other fatigue: complicated acute illness or injury    Amount and/or Complexity of Data Reviewed  Labs: ordered.  Radiology: ordered.  ECG/medicine tests: ordered.    Risk  Prescription drug management.        Final diagnoses:   Other fatigue   Altered mental status, unspecified altered mental status type       ED Disposition  ED Disposition       ED Disposition   Discharge    Condition   Stable    Comment   --               Deena Tarango MD  [address] [id number] [phone]    In 3 days           Medication List      No changes were made to your prescriptions during this visit.            Riki Gatica MD  02/14/25 [id number]

## 2025-02-21 ENCOUNTER — OFFICE VISIT (OUTPATIENT)
Dept: INTERNAL MEDICINE | Facility: CLINIC | Age: 75
End: 2025-02-21
Payer: MEDICARE

## 2025-02-21 VITALS
OXYGEN SATURATION: 96 % | SYSTOLIC BLOOD PRESSURE: 138 MMHG | BODY MASS INDEX: 24.76 KG/M2 | HEIGHT: 73 IN | TEMPERATURE: 97.7 F | WEIGHT: 186.8 LBS | HEART RATE: 91 BPM | RESPIRATION RATE: 18 BRPM | DIASTOLIC BLOOD PRESSURE: 74 MMHG

## 2025-02-21 DIAGNOSIS — E78.2 MIXED HYPERLIPIDEMIA: Primary | ICD-10-CM

## 2025-02-21 DIAGNOSIS — Z86.73 HISTORY OF STROKE: ICD-10-CM

## 2025-02-21 PROCEDURE — 99213 OFFICE O/P EST LOW 20 MIN: CPT | Performed by: INTERNAL MEDICINE

## 2025-02-21 PROCEDURE — 3075F SYST BP GE 130 - 139MM HG: CPT | Performed by: INTERNAL MEDICINE

## 2025-02-21 PROCEDURE — 1159F MED LIST DOCD IN RCRD: CPT | Performed by: INTERNAL MEDICINE

## 2025-02-21 PROCEDURE — 3078F DIAST BP <80 MM HG: CPT | Performed by: INTERNAL MEDICINE

## 2025-02-21 PROCEDURE — 1126F AMNT PAIN NOTED NONE PRSNT: CPT | Performed by: INTERNAL MEDICINE

## 2025-02-21 PROCEDURE — 1160F RVW MEDS BY RX/DR IN RCRD: CPT | Performed by: INTERNAL MEDICINE

## 2025-02-21 NOTE — PROGRESS NOTES
"Chief Complaint  Seizures (Hospital follow up: 2/14/25)    Subjective        Ronnell Rizvi presents to [facility]  History of Present Illness    Doing okay s/p another seizure.      Objective   Vital Signs:  /74 (BP Location: Left arm, Patient Position: Sitting, Cuff Size: Adult)   Pulse 91   Temp 97.7 °F (36.5 °C) (Infrared)   Resp 18   Ht 185.4 cm (73\")   Wt 84.7 kg (186 lb 12.8 oz)   SpO2 96%   BMI 24.65 kg/m²   Estimated body mass index is 24.65 kg/m² as calculated from the following:    Height as of this encounter: 185.4 cm (73\").    Weight as of this encounter: 84.7 kg (186 lb 12.8 oz).    BMI is within normal parameters. No other follow-up for BMI required.      Physical Exam  Vitals reviewed.   Constitutional:       General: He is not in acute distress.     Appearance: Normal appearance.   HENT:      Head: Normocephalic and atraumatic.      Nose: Nose normal.      Mouth/Throat:      Mouth: Mucous membranes are moist.   Eyes:      Conjunctiva/sclera: Conjunctivae normal.   Pulmonary:      Effort: Pulmonary effort is normal.   Skin:     General: Skin is warm and dry.   Neurological:      General: No focal deficit present.      Mental Status: He is alert.   Psychiatric:         Mood and Affect: Mood normal.        Result Review :  The following data was reviewed by: Deena Tarango MD on 02/21/2025:  Common labs          8/4/2024    16:34 10/25/2024    09:37 2/13/2025    17:12   Common Labs   Glucose 99  91  135    BUN 20  12  17    Creatinine 1.11  1.13  1.23    Sodium 139  141  139    Potassium 3.5  4.7  3.8    Chloride 104  106  102    Calcium 9.7  9.3  8.6    Albumin 4.2  3.9  3.7    Total Bilirubin 0.3  0.4  0.3    Alkaline Phosphatase 95  69  54    AST (SGOT) 27  24  46    ALT (SGPT) 18  21  26    WBC 9.53  6.01  6.60    Hemoglobin 15.8  14.1  13.8    Hematocrit 45.0  44.7  41.0    Platelets 232  256  195    Total Cholesterol  125     Triglycerides  59     HDL " "Cholesterol  55     LDL Cholesterol   57     Hemoglobin A1C  5.20       Data reviewed : reviewed ED note           Assessment and Plan   Diagnoses and all orders for this visit:    1. Mixed hyperlipidemia (Primary)    2. History of stroke        Taking Brillanta and \"a handful of pills\".  Messaged Neurology to ask if anything else I can do to help. He is not as certain of his meds today as his wife is not here to discuss.          Follow Up   Keep AWV visit  Patient was given instructions and counseling regarding his condition or for health maintenance advice. Please see specific information pulled into the AVS if appropriate.             "

## 2025-03-10 ENCOUNTER — TELEPHONE (OUTPATIENT)
Dept: INTERNAL MEDICINE | Facility: CLINIC | Age: 75
End: 2025-03-10
Payer: MEDICARE

## 2025-03-10 NOTE — TELEPHONE ENCOUNTER
Caller: Joey Rizvi    Relationship: Emergency Contact    Best call back number: [phone]     What was the call regarding: PATIENT HAD A CALL FRIDAY REGARDING MEDICATION AND HIS WIFE WOULD LIKE A CALL TO DISCUSS.

## 2025-03-11 NOTE — TELEPHONE ENCOUNTER
Contacted wife to discuss Vimpat med change - wife is agreeable to change the patient's medication to 150 mg BID. She will start him on 1.5 tabs BID and contact with any side effects or concerns. She will make us aware with a refill request due to running out of meds early.

## 2025-04-01 ENCOUNTER — OFFICE VISIT (OUTPATIENT)
Dept: INTERNAL MEDICINE | Facility: CLINIC | Age: 75
End: 2025-04-01
Payer: MEDICARE

## 2025-04-01 VITALS
TEMPERATURE: 98.6 F | SYSTOLIC BLOOD PRESSURE: 126 MMHG | HEART RATE: 62 BPM | WEIGHT: 185.6 LBS | OXYGEN SATURATION: 97 % | DIASTOLIC BLOOD PRESSURE: 70 MMHG | RESPIRATION RATE: 16 BRPM | BODY MASS INDEX: 25.14 KG/M2 | HEIGHT: 72 IN

## 2025-04-01 DIAGNOSIS — K92.89 GAS BLOAT SYNDROME: ICD-10-CM

## 2025-04-01 DIAGNOSIS — K59.1 FUNCTIONAL DIARRHEA: Primary | ICD-10-CM

## 2025-04-01 RX ORDER — FEXOFENADINE HCL 180 MG/1
180 TABLET ORAL DAILY
COMMUNITY

## 2025-04-01 NOTE — PROGRESS NOTES
"Chief Complaint  Gas (Daily (since July 2024))    Subjective        Ronnell Rizvi presents to [facility]  Gas      Diarrhea has now improved.  Stopping protonix has not helped with gas.  Daily fiber did not help with gas.     Objective   Vital Signs:  /70 (BP Location: Left arm, Patient Position: Sitting, Cuff Size: Large Adult)   Pulse 62   Temp 98.6 °F (37 °C) (Infrared)   Resp 16   Ht 182.9 cm (72\")   Wt 84.2 kg (185 lb 9.6 oz)   SpO2 97%   BMI 25.17 kg/m²   Estimated body mass index is 25.17 kg/m² as calculated from the following:    Height as of this encounter: 182.9 cm (72\").    Weight as of this encounter: 84.2 kg (185 lb 9.6 oz).            Physical Exam  Vitals reviewed.   Constitutional:       General: He is not in acute distress.     Appearance: Normal appearance.   HENT:      Head: Normocephalic and atraumatic.      Nose: Nose normal.      Mouth/Throat:      Mouth: Mucous membranes are moist.   Eyes:      Conjunctiva/sclera: Conjunctivae normal.   Pulmonary:      Effort: Pulmonary effort is normal.   Skin:     General: Skin is warm and dry.   Neurological:      General: No focal deficit present.      Mental Status: He is alert.   Psychiatric:         Mood and Affect: Mood normal.        Result Review :           Assessment and Plan   Diagnoses and all orders for this visit:    1. Functional diarrhea (Primary)  -     Ambulatory Referral to Gastroenterology    2. Gas bloat syndrome  -     Ambulatory Referral to Gastroenterology        We've tried consistent probiotics, consistent fiber, PRN miralax for constipation.  We stopped protonix and none of this has made a difference.  Due for colonoscopy so will go ahead and refer to GI.          Follow Up   Keep  f/u in May  Patient was given instructions and counseling regarding his condition or for health maintenance advice. Please see specific information pulled into the AVS if appropriate.             "

## 2025-04-11 ENCOUNTER — TELEPHONE (OUTPATIENT)
Dept: NEUROLOGY | Facility: CLINIC | Age: 75
End: 2025-04-11
Payer: MEDICARE

## 2025-04-11 DIAGNOSIS — R56.9 NOCTURNAL SEIZURES: Primary | ICD-10-CM

## 2025-04-11 DIAGNOSIS — R56.9 SEIZURE: Primary | ICD-10-CM

## 2025-04-11 RX ORDER — LACOSAMIDE 150 MG/1
150 TABLET ORAL 2 TIMES DAILY
Qty: 60 TABLET | Refills: 0 | Status: SHIPPED | OUTPATIENT
Start: 2025-04-11

## 2025-04-11 NOTE — TELEPHONE ENCOUNTER
Requesting emergency fill until neurology sends in new script.     Rx Refill Note  Requested Prescriptions     Pending Prescriptions Disp Refills    Lacosamide (Vimpat) 150 MG tablet 60 tablet 0     Sig: Take 150 mg by mouth 2 (Two) Times a Day.      Last office visit with prescribing clinician: 4/1/2025   Last telemedicine visit with prescribing clinician: Visit date not found   Next office visit with prescribing clinician: 5/7/2025                         Would you like a call back once the refill request has been completed: [] Yes [] No    If the office needs to give you a call back, can they leave a voicemail: [] Yes [] No    Steph Augustine MA  04/11/25, 15:22 EDT

## 2025-04-11 NOTE — TELEPHONE ENCOUNTER
Called and spoke to spouse: explained that Dr Hubbard had left for the day by the time we received the request.  She is frustrated because they are out of medication and gave an explanation as to what they have been through in trying to get Rx filled. I listened to what she had to say and let her know that this office had not been contacted until today regarding a refill. Advised to call PCP office and see if they will fill Rx at least for this weekend. V/u

## 2025-04-11 NOTE — TELEPHONE ENCOUNTER
RX CALLING REGARDING MEDICATION VIMPAT.    THEY STATE PROVIDER GAVE A VERABL OKAY TO INCREASE DOSE OF MEDICATION BUT HAS SENT NO PRESCRIPTION.    THEY NEED A PRESCRIPTION WITH THESE CHANGES ASAP - PT ON THE WAY TO RX TO

## 2025-04-11 NOTE — TELEPHONE ENCOUNTER
Dr. Tarango's office called in regards to patients Vimpat refill request being sent to them by mistake. Patient is needing a refill sent to [facility] in [place]. Patients wife states the dose was increased to 1.5 tablets twice daily of the 100 mg tablets.

## 2025-04-14 RX ORDER — LACOSAMIDE 100 MG/1
150 TABLET ORAL EVERY 12 HOURS SCHEDULED
Qty: 90 TABLET | Refills: 1 | Status: SHIPPED | OUTPATIENT
Start: 2025-04-14

## 2025-04-29 ENCOUNTER — TELEPHONE (OUTPATIENT)
Dept: INTERNAL MEDICINE | Facility: CLINIC | Age: 75
End: 2025-04-29
Payer: MEDICARE

## 2025-05-01 LAB
CHOLEST SERPL-MCNC: 119 MG/DL (ref 0–200)
HDLC SERPL-MCNC: 39 MG/DL (ref 40–60)
LDLC SERPL CALC-MCNC: 65 MG/DL (ref 0–100)
LDLC/HDLC SERPL: 1.69 {RATIO}
PSA SERPL-MCNC: 0.76 NG/ML (ref 0–4)
TRIGL SERPL-MCNC: 70 MG/DL (ref 0–150)
VLDLC SERPL CALC-MCNC: 15 MG/DL (ref 5–40)

## 2025-05-06 ENCOUNTER — OFFICE VISIT (OUTPATIENT)
Dept: NEUROLOGY | Facility: CLINIC | Age: 75
End: 2025-05-06
Payer: MEDICARE

## 2025-05-06 VITALS — WEIGHT: 181 LBS | HEIGHT: 72 IN | BODY MASS INDEX: 24.52 KG/M2

## 2025-05-06 DIAGNOSIS — R56.9 SEIZURE: Primary | ICD-10-CM

## 2025-05-06 DIAGNOSIS — Z86.73 HISTORY OF STROKE: ICD-10-CM

## 2025-05-06 DIAGNOSIS — R29.898 WEAKNESS OF BOTH LOWER EXTREMITIES: ICD-10-CM

## 2025-05-06 DIAGNOSIS — G40.109 LOCALIZATION-RELATED SYMPTOMATIC EPILEPSY AND EPILEPTIC SYNDROMES WITH SIMPLE PARTIAL SEIZURES, NOT INTRACTABLE, WITHOUT STATUS EPILEPTICUS: ICD-10-CM

## 2025-05-06 RX ORDER — LACOSAMIDE 100 MG/1
TABLET ORAL
Qty: 90 TABLET | Refills: 1 | Status: CANCELLED | OUTPATIENT
Start: 2025-05-06

## 2025-05-06 RX ORDER — LACOSAMIDE 100 MG/1
TABLET ORAL
Qty: 90 TABLET | Refills: 1 | Status: SHIPPED | OUTPATIENT
Start: 2025-05-06

## 2025-05-06 NOTE — PROGRESS NOTES
"Patient or patient representative verbalized consent for the use of Ambient Listening during the visit with  CHINEDU Whitaker for chart documentation. 5/6/2025  11:01 EDT      Notes by LPN:  Patient presents for follow up hx of seizures/ hx of stroke. No seizures reported. Vimpat was increased to 150mg bid. It \"knocks him out\" about an hour after he takes it. Also wants to know what kind of weight bearing exercises he can do.   History of Present Illness  The patient is a 75-year-old right-handed male with a known past medical history of nocturnal seizures, initially started on Keppra in 02/2020, but it was discontinued due to side effects such as rash and sedation. He is currently on Vimpat 150 mg twice daily. He has a prior left occipital/temporal cerebrovascular accident (CVA) in 2017, status post patent foramen ovale (PFO) closure, and had a LINQ placement with no arrhythmias identified since its removal. 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 He is here today for a follow-up visit regarding his seizures and prior CVA. He was last seen by my colleague in 11/2024 for the same issues. At that time, he was on Vimpat 100 mg twice daily, but he is currently taking 1-1/2 tablets, which is 150 mg twice daily. He is also on Crestor 20 mg, aspirin 81 mg daily, and Brilinta 90 mg twice daily for secondary stroke prevention. He is accompanied by his wife, who assists with providing some portion of his medical history.    He experienced two minor seizures at night in 07/2024, which were initially suspected to be strokes. These episodes were characterized by tongue biting but did not involve urinary incontinence. His Vimpat dosage was increased to 150 mg twice daily approximately one month ago, " resulting in noticeable drowsiness. He reports no seizure-like activity since 07/2024. His seizures typically last less than 5 minutes and do not occur consecutively. He does not experience a postictal state lasting more than 30 minutes. He has not missed any doses of his medication.    He was seen in the emergency room on 02/13/2025 due to altered mental status. During that admission, he was treated for dehydration. A CT scan of the head was negative for acute findings but showed evidence of multiple chronic-appearing infarcts. He was later discharged home. He continues to take Brilinta, aspirin, and Crestor. He reports no new stroke symptoms such as sudden weakness or difficulty with speech or comprehension. However, he does report some difficulty with word finding.    Today, he complains of bilateral leg weakness and expresses a desire for exercises to help with strengthening. He has not experienced any falls since his last visit and does not use a cane or walker. He left his job at the shelter nearly three years ago. He experiences significant leg weakness, particularly when transitioning in and out of his recliner, and is interested in weight-bearing exercises.    INTERVAL: Since last visit, he experienced two minor seizures at night in 07/2024, resulting in an increase of Vimpat dosage to 150 mg twice daily. He was seen in the emergency room on 02/13/2025 due to altered mental status and treated for dehydration. He was seen in urgent care on 04/20/2025 for postnasal drip.    SOCIAL HISTORY  Marital Status:   Occupations: Worked in a shelter, left almost 3 years ago  Hobbies: Golf, bird rescue program (recently cut back)  Sleep: Sleeps in a recliner, no specific hours mentioned    MEDICATIONS  CURRENT MEDS:  Vimpat 150 mg Oral Twice daily  Start Date: 04/2025  Crestor 20 mg Oral Daily  Aspirin 81 mg Oral Daily  Brilinta 90 mg Oral Twice daily  PREVIOUS MEDS:  Keppra Oral  Start Date: 02/2020  Reason for  Discontinuation: Side effects, rash, and sedation  Vimpat 100 mg Oral Twice daily  End Date: 04/2025  Reason for Discontinuation: Dosage increased to 150 mg    Past Medical History:   Diagnosis Date    Acquired dyslexia     pt  reports after 3rd stroke    Allergic 1985    Allergic rhinitis     Anal fissure     Aortic insufficiency     moderate, THIERRY 2017    Asthma     Asthma     Benign prostatic hypertrophy (BPH) with weak urinary stream 11/01/2016    Cataract     Chronic bronchitis     Colon polyp     COPD (chronic obstructive pulmonary disease)     Emphysema lung     Falls frequently     Fatty liver     GERD (gastroesophageal reflux disease)     Headache     Hepatitis     thought to be Hepatitis A     History of pneumonia     With left lower lobe atelectasis and scar tissue, being followed    HL (hearing loss)     Hypertension     Low back pain     Macular degeneration     Memory loss     Mixed hyperlipidemia 01/30/2023    Obstructive sleep apnea syndrome 08/23/2017    refuses c-pap    PFO (patent foramen ovale)     s/p percutaneous closure with a 25mm Amplatzer device in December 2018    Pneumonia     LLL with scar tissue    Seizures     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 total of 3 strokes    Tremor     Comes & goes    Vision loss          Past Surgical History:   Procedure Laterality Date    CARDIAC CATHETERIZATION N/A 12/12/2018    Procedure: Patent foramen ovale closure- Abbott;  Surgeon: Deangelo Harris MD;  Location:  [facility];  Service: Cardiology    CARDIAC CATHETERIZATION N/A 12/12/2018    Procedure: Intracardiac echocardiogram;  Surgeon: Deangelo Harris MD;  Location:  [facility];  Service: Cardiology    CARDIAC ELECTROPHYSIOLOGY PROCEDURE N/A 03/26/2018    Procedure: Loop insertion   CONFIRM RX;  Surgeon: Luis Wyatt MD;  Location:  [facility];  Service:  Cardiovascular    CARDIAC ELECTROPHYSIOLOGY PROCEDURE N/A 07/15/2020    Procedure: Loop recorder removal;  Surgeon: Starr Driscoll MD;  Location:  [facility];  Service: Cardiovascular;  Laterality: N/A;    CARDIAC SURGERY      CATARACT EXTRACTION Bilateral     COLONOSCOPY      COLONOSCOPY N/A 10/09/2020    Procedure: COLONOSCOPY with polypectomy;  Surgeon: Ras Mendoaz MD;  Location:  [facility];  Service: Gastroenterology;  Laterality: N/A;  diverticulosis  ascending polyp  transverse polyp  sigmoid polyps X 2  rectal polyp    EYE SURGERY      HAND SURGERY Bilateral     INGUINAL HERNIA REPAIR Left     OTHER SURGICAL HISTORY      inguinal hernia repair for person over age 5    TONSILLECTOMY      TONSILLECTOMY             Current Outpatient Medications:     lacosamide (VIMPAT) 100 MG tablet tablet, 100mg in am and 200mg nightly, Disp: 90 tablet, Rfl: 1    aspirin 81 MG EC tablet, Take 1 tablet by mouth Daily. Take with Pantoprazole., Disp: 30 tablet, Rfl: 0    Brilinta 90 MG tablet tablet, TAKE 1 TABLET BY MOUTH 2 TIMES A DAY, Disp: 180 tablet, Rfl: 1    Cyanocobalamin 1000 MCG/ML kit, Inject  as directed 1 (One) Time Per Week. 1/4 of a cc IM every sunday, Disp: , Rfl:     fexofenadine (ALLEGRA) 180 MG tablet, Take 1 tablet by mouth Daily., Disp: , Rfl:     hydroCHLOROthiazide (MICROZIDE) 12.5 MG capsule, Take 1 capsule by mouth Daily., Disp: , Rfl:     Multiple Vitamins-Minerals (OCUVITE ADULT 50+ PO), Take  by mouth., Disp: , Rfl:     pantoprazole (Protonix) 40 MG EC tablet, Take 1 tablet by mouth Daily. (Patient not taking: Reported on 4/1/2025), Disp: 90 tablet, Rfl: 1    rosuvastatin (Crestor) 20 MG tablet, Take 1 tablet by mouth Every Night., Disp: 90 tablet, Rfl: 1      Family History   Problem Relation Age of Onset    Obesity Mother     Clotting disorder Mother         Upper extremities    Alcohol abuse Father     Cancer Father 63        Esophagus and lung    Other Father         " cardiac disorder    Heart disease Father     Depression Father     Kidney disease Sister     Kidney failure Sister     Drug abuse Paternal Uncle     Stroke Sister     Throat cancer Sister     Drug abuse Paternal Uncle          Social History     Socioeconomic History    Marital status:      Spouse name: Joey   Tobacco Use    Smoking status: Former     Current packs/day: 0.00     Average packs/day: 1.5 packs/day for 15.0 years (22.5 ttl pk-yrs)     Types: Cigarettes     Start date: 1964     Quit date: 1979     Years since quittin.2     Passive exposure: Past    Smokeless tobacco: Never    Tobacco comments:     caffeine use: Drinks 4 glasses of Dt coke on avg.    Vaping Use    Vaping status: Never Used   Substance and Sexual Activity    Alcohol use: No    Drug use: No    Sexual activity: Defer         Allergies   Allergen Reactions    Aspirin Nausea Only     Pt states he \"can tolerate enteric coated aspirin only.\"     Citrus      Blisters on mouth      Combivent [Ipratropium-Albuterol] Other (See Comments)     Throat swelling    Statins Mental Status Change     Severe memory impairment       ROS:  Review of Systems   HENT:  Positive for hearing loss.    Neurological:  Positive for weakness (legs).           Physical Exam:  Vitals:    25 1022   Weight: 82.1 kg (181 lb)   Height: 182.9 cm (72.01\")     Body mass index is 24.54 kg/m².      Physical Exam  Head: Head is erect and midline: skull is normocephalic, symmetrical and smooth without deformity. Facial features are symmetrical;   Neck:  Trachea is midline; no JVD.   Eyes: conjunctivae pink; sclerae white; PERRL. No tearing noted. Pupils constricted 4mm to 2mm   Ears:  Normal position.  Chest:  The trachea is midline. The chest is normal in appearance. The chest is clear to percussion and auscultation.  Heart:  S1 and S2 noted with regular rhythm. No abnormal jugular venous distention. No rubs, murmurs, or gallops noted upon " auscultation.   Musculoskeletal:  The extremities are normal in color, size, and temperature. All pulses in the upper and lower extremities are grade II and equal. No clubbing, cyanosis, or edema is present.   Neurological:  Alert, relaxed, cooperative. Thought process coherent. Oriented to person, place and time. CN II- XII intact except right superior quadrant and obese and impaired hearing bilaterally left greater than right. Good muscle bulk and tone. Strength 5/5 bilateral upper extremity; decreased strength bilateral lower extremities at least 4 -/5. Cerebellar: Rapid alternating movements:finger-to-nose intact.  Gait: Unsteady/imbalance although does not use assistive device. Sensory:  light touch intact.    Additional spoken exam findings  Cranial Nerve Examination    CN II: Right superior quadrant anopia.    CN VIII: Poor hearing in both ears, left greater than right.      Results  Imaging   - CT of the head: 02/13/2025, Negative for acute findings but showed evidence of multiple chronic appearing infarcts.      Lab Results   Component Value Date    GLUCOSE 135 (H) 02/13/2025    BUN 17 02/13/2025    CREATININE 1.23 02/13/2025    EGFRIFNONA 64 12/27/2021    EGFRIFAFRI 74 12/27/2021    BCR 13.8 02/13/2025    CO2 26.3 02/13/2025    CALCIUM 8.6 02/13/2025    ALBUMIN 3.7 02/13/2025    AST 46 (H) 02/13/2025    ALT 26 02/13/2025       Lab Results   Component Value Date    WBC 6.60 02/13/2025    HGB 13.8 02/13/2025    HCT 41.0 02/13/2025    MCV 91.9 02/13/2025     02/13/2025         Lab Results   Component Value Date    TSH 2.370 10/25/2024    U4VRHKF 6.02 10/14/2022         Lab Results   Component Value Date    PJGGFWNJ09 465 07/12/2024         Lab Results   Component Value Date    FOLATE 9.52 07/12/2024         Lab Results   Component Value Date    HGBA1C 5.20 10/25/2024         Assessment & Plan  1. Seizure disorder.  He has been experiencing drowsiness since the increase in Vimpat dosage to 150 mg twice  daily. The current plan is to adjust the medication regimen to 100 mg during the day and 200 mg at night to maintain the same daily dose while providing additional coverage during nighttime when seizures primarily occur. This adjustment aims to reduce daytime drowsiness. A new prescription for Vimpat 100 mg will be sent to [facility] pharmacy in [place]. If he continues to experience seizures, the addition of a second agent will be considered.    2. History of cerebrovascular accident (CVA).  He is currently on Crestor 20 mg, aspirin 81 mg daily, and Brilinta 90 mg twice daily for secondary stroke prevention. He reports no new signs or symptoms of stroke, such as sudden weakness or difficulty speaking. He will continue his current medications.    3. Bilateral leg weakness.  He reports significant weakness in his legs, particularly when getting in and out of his recliner. A referral to physical therapy will be made for core strengthening exercises. He is advised to attend a few sessions to learn exercises that can be continued at home.    Follow-up  The patient will follow up in 3 months.      Plan:  Outpatient physical therapy due to deconditioning/generalized weakness with bilateral leg weakness greater than other weaknesses  Vimpat 100 mg in a.m. and 200 mg nightly due to excessive daytime sleepiness; offered rescue medication patient and wife declined need at this time  Continue aspirin/Brilinta and Crestor as written  Follow-up with me in 3 months; sooner if needed    Diagnoses and all orders for this visit:    1. Seizure (Primary)  -     lacosamide (VIMPAT) 100 MG tablet tablet; 100mg in am and 200mg nightly  Dispense: 90 tablet; Refill: 1    2. Weakness of both lower extremities  -     Ambulatory Referral to Physical Therapy for Evaluation & Treatment    3. History of stroke  -     Ambulatory Referral to Physical Therapy for Evaluation & Treatment    4. Localization-related symptomatic epilepsy and epileptic  syndromes with simple partial seizures, not intractable, without status epilepticus  -     lacosamide (VIMPAT) 100 MG tablet tablet; 100mg in am and 200mg nightly  Dispense: 90 tablet; Refill: 1                                        Dictated utilizing Dragon dictation.

## 2025-05-07 ENCOUNTER — OFFICE VISIT (OUTPATIENT)
Dept: INTERNAL MEDICINE | Facility: CLINIC | Age: 75
End: 2025-05-07
Payer: MEDICARE

## 2025-05-07 VITALS
BODY MASS INDEX: 24.38 KG/M2 | SYSTOLIC BLOOD PRESSURE: 118 MMHG | DIASTOLIC BLOOD PRESSURE: 74 MMHG | HEIGHT: 72 IN | TEMPERATURE: 98.2 F | OXYGEN SATURATION: 98 % | RESPIRATION RATE: 16 BRPM | WEIGHT: 180 LBS | HEART RATE: 62 BPM

## 2025-05-07 DIAGNOSIS — R06.02 SHORTNESS OF BREATH: ICD-10-CM

## 2025-05-07 DIAGNOSIS — Z12.11 SCREENING FOR COLON CANCER: ICD-10-CM

## 2025-05-07 DIAGNOSIS — Z86.73 HISTORY OF STROKE: ICD-10-CM

## 2025-05-07 DIAGNOSIS — Z00.00 MEDICARE ANNUAL WELLNESS VISIT, SUBSEQUENT: Primary | ICD-10-CM

## 2025-05-07 NOTE — PROGRESS NOTES
Subjective   The ABCs of the Annual Wellness Visit  Medicare Wellness Visit      Ronnell Rizvi is a 75 y.o. patient who presents for a Medicare Wellness Visit.    The following portions of the patient's history were reviewed and   updated as appropriate: allergies, current medications, past family history, past medical history, past social history, past surgical history, and problem list.    Compared to one year ago, the patient's physical   health is the same.  Compared to one year ago, the patient's mental   health is the same.    Recent Hospitalizations:  This patient has had a [facility] admission record on file within the last 365 days.  Current Medical Providers:  Patient Care Team:  Deena Tarango MD as PCP - General (Internal Medicine)  Gregory King MD as Consulting Physician (Hematology and Oncology)  Chase Haque MD as Referring Physician (Internal Medicine)  Luis Wyatt MD (Inactive) as Consulting Physician (Cardiology)    Outpatient Medications Prior to Visit   Medication Sig Dispense Refill    aspirin 81 MG EC tablet Take 1 tablet by mouth Daily. Take with Pantoprazole. 30 tablet 0    Brilinta 90 MG tablet tablet TAKE 1 TABLET BY MOUTH 2 TIMES A  tablet 1    Cyanocobalamin 1000 MCG/ML kit Inject  as directed 1 (One) Time Per Week. 1/4 of a cc IM every sunday      fexofenadine (ALLEGRA) 180 MG tablet Take 1 tablet by mouth Daily.      hydroCHLOROthiazide (MICROZIDE) 12.5 MG capsule Take 1 capsule by mouth Daily.      lacosamide (VIMPAT) 100 MG tablet tablet 100mg in am and 200mg nightly 90 tablet 1    Multiple Vitamins-Minerals (OCUVITE ADULT 50+ PO) Take  by mouth.      pantoprazole (Protonix) 40 MG EC tablet Take 1 tablet by mouth Daily. 90 tablet 1    rosuvastatin (Crestor) 20 MG tablet Take 1 tablet by mouth Every Night. 90 tablet 1     No facility-administered medications prior to visit.     No opioid medication identified on active medication list. I have  "reviewed chart for other potential  high risk medication/s and harmful drug interactions in the elderly.      Aspirin is on active medication list. Aspirin use is indicated based on review of current medical condition/s. Pros and cons of this therapy have been discussed today. Benefits of this medication outweigh potential harm.  Patient has been encouraged to continue taking this medication.  .      Patient Active Problem List   Diagnosis    Anemia, unspecified    Vitamin B12 deficiency    Chronic fatigue    Benign prostatic hypertrophy (BPH) with weak urinary stream    Chronic bronchitis    ELIZABETH (obstructive sleep apnea)    Sinoatrial block    Cerebral aneurysm, nonruptured    TIA (transient ischemic attack)    Intracranial vascular stenosis    Sinus pause    S/P percutaneous patent foramen ovale closure    TIA on medication    HTN (hypertension)    COPD (chronic obstructive pulmonary disease)    Nocturnal seizures    Encounter for screening for malignant neoplasm of colon    Seizure    HL (hearing loss)    Pre-syncope    Mixed hyperlipidemia    History of stroke    Melena    Irritable bowel syndrome with both constipation and diarrhea    Gastroesophageal reflux disease    Stroke    CVA (cerebral vascular accident)     Advance Care Planning Advance Directive is on file.  ACP discussion was held with the patient during this visit. Patient has an advance directive in EMR which is still valid.             Objective   Vitals:    05/07/25 0823   BP: 118/74   BP Location: Left arm   Patient Position: Sitting   Cuff Size: Adult   Pulse: 62   Resp: 16   Temp: 98.2 °F (36.8 °C)   TempSrc: Infrared   SpO2: 98%   Weight: 81.6 kg (180 lb)   Height: 182.9 cm (72\")   PainSc: 4    PainLoc: Knee  Comment: right       Estimated body mass index is 24.41 kg/m² as calculated from the following:    Height as of this encounter: 182.9 cm (72\").    Weight as of this encounter: 81.6 kg (180 lb).    BMI is within normal parameters. No other " follow-up for BMI required.       Does the patient have evidence of cognitive impairment?  At times has some confusion with answers  Lab Results   Component Value Date    CHLPL 119 2025    TRIG 70 2025    HDL 39 (L) 2025    LDL 65 2025    VLDL 15 2025                                                                                               Health  Risk Assessment    Smoking Status:  Social History     Tobacco Use   Smoking Status Former    Current packs/day: 0.00    Average packs/day: 1.5 packs/day for 15.0 years (22.5 ttl pk-yrs)    Types: Cigarettes    Start date: 1964    Quit date: 1979    Years since quittin.2    Passive exposure: Past   Smokeless Tobacco Never   Tobacco Comments    caffeine use: Drinks 4 glasses of Dt coke on avg.      Alcohol Consumption:  Social History     Substance and Sexual Activity   Alcohol Use No       Fall Risk Screen  STEADI Fall Risk Assessment was completed, and patient is at LOW risk for falls.Assessment completed on:2025    Depression Screening   Little interest or pleasure in doing things? Not at all   Feeling down, depressed, or hopeless? Not at all   PHQ-2 Total Score 0      Health Habits and Functional and Cognitive Screenin/7/2025     8:27 AM   Functional & Cognitive Status   Do you have difficulty preparing food and eating? No   Do you have difficulty bathing yourself, getting dressed or grooming yourself? No   Do you have difficulty using the toilet? No   Do you have difficulty moving around from place to place? No   Do you have trouble with steps or getting out of a bed or a chair? Yes   Current Diet Well Balanced Diet   Dental Exam Up to date   Eye Exam Up to date   Exercise (times per week) 0 times per week   Current Exercises Include No Regular Exercise   Do you need help using the phone?  No   Are you deaf or do you have serious difficulty hearing?  Yes   Do you need help to go to places out of walking  distance? Yes   Do you need help shopping? No   Do you need help preparing meals?  No   Do you need help with housework?  No   Do you need help with laundry? No   Do you need help taking your medications? Yes   Do you need help managing money? Yes   Do you ever drive or ride in a car without wearing a seat belt? No   Have you felt unusual stress, anger or loneliness in the last month? No   Who do you live with? Spouse   If you need help, do you have trouble finding someone available to you? No   Have you been bothered in the last four weeks by sexual problems? No   Do you have difficulty concentrating, remembering or making decisions? Yes           Age-appropriate Screening Schedule:  Refer to the list below for future screening recommendations based on patient's age, sex and/or medical conditions. Orders for these recommended tests are listed in the plan section. The patient has been provided with a written plan.    Health Maintenance List  Health Maintenance   Topic Date Due    ZOSTER VACCINE (3 of 3) 12/27/2016    COVID-19 Vaccine (10 - 2024-25 season) 04/18/2025    COLORECTAL CANCER SCREENING  10/09/2025    INFLUENZA VACCINE  07/01/2025    LIPID PANEL  04/30/2026    ANNUAL WELLNESS VISIT  05/07/2026    TDAP/TD VACCINES (4 - Td or Tdap) 06/21/2030    HEPATITIS C SCREENING  Completed    RSV Vaccine - Adults  Completed    Pneumococcal Vaccine 50+  Completed                                                                                                                                                CMS Preventative Services Quick Reference  Risk Factors Identified During Encounter  Fall Risk-High or Moderate: Information on Fall Prevention Shared in After Visit Summary  Inactivity/Sedentary:  Continue exercise and start PT as recommended by neurology.    The above risks/problems have been discussed with the patient.  Pertinent information has been shared with the patient in the After Visit Summary.  An After Visit  Summary and PPPS were made available to the patient.    Follow Up:   Next Medicare Wellness visit to be scheduled in 1 year.     Assessment & Plan  Medicare annual wellness visit, subsequent         Shortness of breath    Orders:    Complete PFT - Pre & Post Bronchodilator    Screening for colon cancer    Orders:    Ambulatory Referral For Screening Colonoscopy    History of stroke  Neurology increased Vimpat due to issues with breakthrough seizures            Follow Up:   Return in about 6 months (around 11/7/2025) for f/u stroke history, PFTs.

## 2025-05-19 ENCOUNTER — OFFICE VISIT (OUTPATIENT)
Dept: GASTROENTEROLOGY | Facility: CLINIC | Age: 75
End: 2025-05-19
Payer: MEDICARE

## 2025-05-19 VITALS
WEIGHT: 183.2 LBS | HEIGHT: 72 IN | BODY MASS INDEX: 24.81 KG/M2 | DIASTOLIC BLOOD PRESSURE: 60 MMHG | SYSTOLIC BLOOD PRESSURE: 110 MMHG

## 2025-05-19 DIAGNOSIS — K21.00 GASTROESOPHAGEAL REFLUX DISEASE WITH ESOPHAGITIS, UNSPECIFIED WHETHER HEMORRHAGE: Primary | ICD-10-CM

## 2025-05-19 DIAGNOSIS — R14.3 FLATULENCE: ICD-10-CM

## 2025-05-19 DIAGNOSIS — K58.1 IRRITABLE BOWEL SYNDROME WITH CONSTIPATION: ICD-10-CM

## 2025-05-19 NOTE — PROGRESS NOTES
RM:________     PCP: Deena Tarango MD    : 1950  AGE: 75 y.o.  EST PATIENT   REASON FOR VISIT/  CC:    Wt Readings from Last 3 Encounters:   25 81.6 kg (180 lb)   25 82.1 kg (181 lb)   25 83.9 kg (185 lb)      BP Readings from Last 3 Encounters:   25 118/74   25 117/75   25 126/70      WT: ____________ BP: __________L __________R HR______    ALLERGIES:Aspirin, Citrus, Combivent [ipratropium-albuterol], and Statins SMOKING HISTORY:  Social History     Tobacco Use    Smoking status: Former     Current packs/day: 0.00     Average packs/day: 1.5 packs/day for 15.0 years (22.5 ttl pk-yrs)     Types: Cigarettes     Start date: 1964     Quit date: 1979     Years since quittin.2     Passive exposure: Past    Smokeless tobacco: Never    Tobacco comments:     caffeine use: Drinks 4 glasses of Dt coke on avg.    Vaping Use    Vaping status: Never Used   Substance Use Topics    Alcohol use: No    Drug use: No     CAFFEINE USE_________________  ALCOHOL ______________________    Below is the patient's most recent value for Albumin, ALT, AST, BUN, Calcium, Chloride, Cholesterol, CO2, Creatinine, GFR, Glucose, HDL, Hematocrit, Hemoglobin, Hemoglobin A1C, LDL, Magnesium, Phosphorus, Platelets, Potassium, PSA, Sodium, Triglycerides, TSH and WBC.   Lab Results   Component Value Date    ALBUMIN 3.7 2025    ALT 26 2025    AST 46 (H) 2025    BUN 17 2025    CALCIUM 8.6 2025     2025    CHOL 203 (H) 2024    CO2 26.3 2025    CREATININE 1.23 2025    HDL 39 (L) 2025    HCT 41.0 2025    HGB 13.8 2025    HGBA1C 5.20 10/25/2024    LDL 65 2025    MG 2.4 2025    PHOS 3.6 2025     2025    K 3.8 2025    PSA 0.763 2025     2025    TRIG 70 2025    TSH 2.370 10/25/2024    WBC 6.60 2025          NEW DIAGNOSIS/ SURGERY/ HOSP OR ED VISITS:  ______________________    __________________________________________________________________      RECENT LABS OR DIAGNOSTIC TESTING:  _____________________________    __________________________________________________________________      ASSESSMENT/ PLAN: _______________________________________________    __________________________________________________________________

## 2025-05-19 NOTE — PROGRESS NOTES
PATIENT INFORMATION  Ronnell Rizvi       - 1950    CHIEF COMPLAINT  Chief Complaint   Patient presents with    Constipation    Diarrhea    Gas       HISTORY OF PRESENT ILLNESS    Here today for IBS and GERD follow-up    He does have a lot of gas. He was taking a chewable fiber gummy which gave him diarrhea, bowels moving a few times a day and constipated. Stools are hard to get out. Gas is foul. Gas pills help, no bloating.No laxatives or bloating. He does have some issues with urgency.    He is lactose intolerant, takes lactaid when eating something there. He has not been on PPI for awhile, no UGI issues.      Colon 10/9/2020 pan colonic diverticulosis with inflammatory polyps and 2/5 adenomas.          REVIEWED PERTINENT RESULTS/ LABS  Lab Results   Component Value Date    CASEREPORT  10/09/2020     Surgical Pathology Report                         Case: [id number]                                  Authorizing Provider:  Ras Mendoza        Collected:           10/09/2020 09:41 AM                                 MD Ninfa                                                                   Ordering Location:     [facility]   Received:            10/09/2020 12:50 PM                                 OR                                                                           Pathologist:           Warren Curry MD                                                         Specimens:   1) - Large Intestine, Right / Ascending Colon, ascending polyp                                      2) - Large Intestine, Transverse Colon, transverse polyp                                            3) - Large Intestine, Sigmoid Colon, sigmoid polyps X 2                                             4) - Large Intestine, Rectum, rectal polyp                                                 FINALDX  10/09/2020     1. Right Ascending Colon Biopsy: Benign colonic mucosa with   A. Active moderate chronic  inflammation with mild crypt distortion noted.   B. Focal active cryptitis with rare crypt abscess formation noted, no granulomatous inflammation identified.    2. Transverse Colon Biopsy: Polypoid colonic mucosa with   A. Focal changes suggesting mixed developing hyperplastic polyp and tubular adenoma.   B. Focal active cryptitis noted, no well-developed crypt abscess formation nor granulomatous inflammation                    identified.   C. No high grade glandular dysplasia nor malignancy identified.    3. Sigmoid Colon Biopsy: Benign colonic mucosa with    A. Focal active mild chronic inflammation.   B. Focal active cryptitis with crypt abscess formation noted, no well-developed granulomatous inflammation nor                    crypt distortion identified.    4. Rectum Biopsy:    A. Focal changes consistent with mixed hyperplastic polyp and developing tubular adenoma.   B. No high grade glandular dysplasia nor malignancy identified.    tashit/clair        Lab Results   Component Value Date    HGB 13.8 02/13/2025    MCV 91.9 02/13/2025     02/13/2025    ALT 26 02/13/2025    AST 46 (H) 02/13/2025    HGBA1C 5.20 10/25/2024    INR 0.98 07/15/2024    TRIG 70 04/30/2025    IRON 102 05/02/2023    TIBC 291 (L) 05/02/2023      No results found.    REVIEW OF SYSTEMS  Review of Systems      ACTIVE PROBLEMS  Patient Active Problem List    Diagnosis     CVA (cerebral vascular accident) [I63.9]     Stroke [I63.9]     Irritable bowel syndrome with constipation [K58.1]     Gastroesophageal reflux disease [K21.9]     Melena [K92.1]     History of stroke [Z86.73]     Pre-syncope [R55]     Mixed hyperlipidemia [E78.2]     HL (hearing loss) [H91.90]     Seizure [R56.9]     Encounter for screening for malignant neoplasm of colon [Z12.11]     COPD (chronic obstructive pulmonary disease) [J44.9]     Nocturnal seizures [R56.9]     TIA on medication [G45.9]     HTN (hypertension) [I10]     S/P percutaneous patent foramen ovale  closure [Z87.74]     Sinus pause [I45.5]     Intracranial vascular stenosis [I67.9]     TIA (transient ischemic attack) [G45.9]     Cerebral aneurysm, nonruptured [I67.1]     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]     ELIZABETH (obstructive sleep apnea) [G47.33]     Chronic bronchitis [J42]     Vitamin B12 deficiency [E53.8]     Chronic fatigue [R53.82]     Benign prostatic hypertrophy (BPH) with weak urinary stream [N40.1, R39.12]     Anemia, unspecified [D64.9]          PAST MEDICAL HISTORY  Past Medical History:   Diagnosis Date    Acquired dyslexia     pt  reports after 3rd stroke    Allergic 1985    Allergic rhinitis     Anal fissure     Aortic insufficiency     moderate, THIERRY 2017    Asthma     Asthma     Benign prostatic hypertrophy (BPH) with weak urinary stream 11/01/2016    Cataract     Chronic bronchitis     Colon polyp     COPD (chronic obstructive pulmonary disease)     Emphysema lung     Falls frequently     Fatty liver     GERD (gastroesophageal reflux disease)     Headache     Hepatitis     thought to be Hepatitis A     History of pneumonia     With left lower lobe atelectasis and scar tissue, being followed    HL (hearing loss)     Hypertension     Low back pain     Macular degeneration     Memory loss     Mixed hyperlipidemia 01/30/2023    Obstructive sleep apnea syndrome 08/23/2017    refuses c-pap    PFO (patent foramen ovale)     s/p percutaneous closure with a 25mm Amplatzer device in December 2018    Pneumonia     LLL with scar tissue    Seizures     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 total of 3 strokes    Tremor     Comes & goes    Vision loss          SURGICAL HISTORY  Past Surgical History:   Procedure Laterality Date    CARDIAC CATHETERIZATION N/A 12/12/2018    Procedure: Patent foramen ovale closure- Abbott;  Surgeon: Deangelo Harris MD;  Location: [facility] CATH INVASIVE LOCATION;  Service: Cardiology    CARDIAC CATHETERIZATION N/A 12/12/2018     Procedure: Intracardiac echocardiogram;  Surgeon: Deangelo Harris MD;  Location:  [facility] CATH INVASIVE LOCATION;  Service: Cardiology    CARDIAC ELECTROPHYSIOLOGY PROCEDURE N/A 2018    Procedure: Loop insertion   CONFIRM RX;  Surgeon: Luis Wyatt MD;  Location:  [facility] CATH INVASIVE LOCATION;  Service: Cardiovascular    CARDIAC ELECTROPHYSIOLOGY PROCEDURE N/A 07/15/2020    Procedure: Loop recorder removal;  Surgeon: Starr Driscoll MD;  Location:  [facility] CATH INVASIVE LOCATION;  Service: Cardiovascular;  Laterality: N/A;    CARDIAC SURGERY      CATARACT EXTRACTION Bilateral     COLONOSCOPY      COLONOSCOPY N/A 10/09/2020    Procedure: COLONOSCOPY with polypectomy;  Surgeon: Ras Mendoza MD;  Location:  [facility] OR;  Service: Gastroenterology;  Laterality: N/A;  diverticulosis  ascending polyp  transverse polyp  sigmoid polyps X 2  rectal polyp    EYE SURGERY      HAND SURGERY Bilateral     INGUINAL HERNIA REPAIR Left     OTHER SURGICAL HISTORY      inguinal hernia repair for person over age 5    TONSILLECTOMY      TONSILLECTOMY           FAMILY HISTORY  Family History   Problem Relation Age of Onset    Obesity Mother     Clotting disorder Mother         Upper extremities    Alcohol abuse Father     Cancer Father 63        Esophagus and lung    Other Father         cardiac disorder    Heart disease Father     Depression Father     Kidney disease Sister     Kidney failure Sister     Drug abuse Paternal Uncle     Stroke Sister     Throat cancer Sister     Drug abuse Paternal Uncle          SOCIAL HISTORY  Social History     Occupational History    Occupation:      Employer: RETIRED   Tobacco Use    Smoking status: Former     Current packs/day: 0.00     Average packs/day: 1.5 packs/day for 15.0 years (22.5 ttl pk-yrs)     Types: Cigarettes     Start date: 1964     Quit date: 1979     Years since quittin.2     Passive exposure: Past    Smokeless tobacco: Never     "Tobacco comments:     caffeine use: Drinks 4 glasses of Dt coke on avg.    Vaping Use    Vaping status: Never Used   Substance and Sexual Activity    Alcohol use: No    Drug use: No    Sexual activity: Defer         CURRENT MEDICATIONS    Current Outpatient Medications:     aspirin 81 MG EC tablet, Take 1 tablet by mouth Daily. Take with Pantoprazole., Disp: 30 tablet, Rfl: 0    Brilinta 90 MG tablet tablet, TAKE 1 TABLET BY MOUTH 2 TIMES A DAY, Disp: 180 tablet, Rfl: 1    Cyanocobalamin 1000 MCG/ML kit, Inject  as directed 1 (One) Time Per Week. 1/4 of a cc IM every sunday, Disp: , Rfl:     fexofenadine (ALLEGRA) 180 MG tablet, Take 1 tablet by mouth Daily., Disp: , Rfl:     hydroCHLOROthiazide (MICROZIDE) 12.5 MG capsule, Take 1 capsule by mouth Daily., Disp: , Rfl:     lacosamide (VIMPAT) 100 MG tablet tablet, 100mg in am and 200mg nightly, Disp: 90 tablet, Rfl: 1    Multiple Vitamins-Minerals (OCUVITE ADULT 50+ PO), Take  by mouth., Disp: , Rfl:     rosuvastatin (Crestor) 20 MG tablet, Take 1 tablet by mouth Every Night., Disp: 90 tablet, Rfl: 1    ALLERGIES  Aspirin, Citrus, Combivent [ipratropium-albuterol], and Statins    VITALS  Vitals:    05/19/25 1050   BP: 110/60   BP Location: Left arm   Patient Position: Sitting   Cuff Size: Large Adult   Weight: 83.1 kg (183 lb 3.2 oz)   Height: 183.9 cm (72.4\")       PHYSICAL EXAM  Debilities/Disabilities Identified: None  Emotional Behavior: Appropriate  Wt Readings from Last 3 Encounters:   05/19/25 83.1 kg (183 lb 3.2 oz)   05/07/25 81.6 kg (180 lb)   05/06/25 82.1 kg (181 lb)     Ht Readings from Last 1 Encounters:   05/19/25 183.9 cm (72.4\")     Body mass index is 24.57 kg/m².  Physical Exam  Constitutional:       General: He is not in acute distress.     Appearance: Normal appearance. He is not ill-appearing.   HENT:      Head: Normocephalic and atraumatic.      Mouth/Throat:      Mouth: Mucous membranes are moist.      Pharynx: No posterior oropharyngeal " erythema.   Eyes:      General: No scleral icterus.  Cardiovascular:      Rate and Rhythm: Normal rate and regular rhythm.      Heart sounds: Normal heart sounds.   Pulmonary:      Effort: Pulmonary effort is normal.      Breath sounds: Normal breath sounds.   Abdominal:      General: Abdomen is flat. Bowel sounds are normal. There is no distension.      Palpations: Abdomen is soft. There is no mass.      Tenderness: There is no abdominal tenderness. There is no guarding or rebound. Negative signs include Reis's sign.      Hernia: No hernia is present.   Musculoskeletal:      Cervical back: Neck supple.   Skin:     General: Skin is warm.      Capillary Refill: Capillary refill takes less than 2 seconds.   Neurological:      General: No focal deficit present.      Mental Status: He is alert and oriented to person, place, and time.   Psychiatric:         Mood and Affect: Mood normal.         Behavior: Behavior normal.         Thought Content: Thought content normal.         Judgment: Judgment normal.         CLINICAL DATA REVIEWED   reviewed previous lab results and integrated with today's visit, reviewed notes from other physicians and/or last GI encounter, reviewed previous endoscopy results and available photos, reviewed surgical pathology results from previous biopsies    ASSESSMENT  Diagnoses and all orders for this visit:    Gastroesophageal reflux disease with esophagitis, unspecified whether hemorrhage    Irritable bowel syndrome with constipation    Flatulence          PLAN    Constipation: Likely etiology of gas, reviewed fiber, fluids and exercise, add miralax at night  GERD: Well controlled  Colonoscopy due 2027 for now    Return in about 3 months (around 8/19/2025).    I have discussed the above plan with the patient.  They verbalize understanding and are in agreement with the plan.  They have been advised to contact the office for any questions, concerns, or changes related to their  health.

## 2025-05-20 ENCOUNTER — HOSPITAL ENCOUNTER (OUTPATIENT)
Dept: PHYSICAL THERAPY | Facility: HOSPITAL | Age: 75
Setting detail: THERAPIES SERIES
Discharge: HOME | End: 2025-05-20
Payer: MEDICARE

## 2025-05-20 DIAGNOSIS — R29.898 WEAKNESS OF BOTH LOWER EXTREMITIES: Primary | ICD-10-CM

## 2025-05-20 PROCEDURE — 97161 PT EVAL LOW COMPLEX 20 MIN: CPT

## 2025-05-20 NOTE — THERAPY EVALUATION
.Outpatient Physical Therapy Neuro Initial Evaluation   [place]     Patient Name: Ronnell Rizvi  : 1950  MRN: [id number]  Today's Date: 2025      Visit Date: 2025    Patient Active Problem List   Diagnosis    Anemia, unspecified    Vitamin B12 deficiency    Chronic fatigue    Benign prostatic hypertrophy (BPH) with weak urinary stream    Chronic bronchitis    ELIZABETH (obstructive sleep apnea)    Sinoatrial block    Cerebral aneurysm, nonruptured    TIA (transient ischemic attack)    Intracranial vascular stenosis    Sinus pause    S/P percutaneous patent foramen ovale closure    TIA on medication    HTN (hypertension)    COPD (chronic obstructive pulmonary disease)    Nocturnal seizures    Encounter for screening for malignant neoplasm of colon    Seizure    HL (hearing loss)    Pre-syncope    Mixed hyperlipidemia    History of stroke    Melena    Irritable bowel syndrome with constipation    Gastroesophageal reflux disease    Stroke    CVA (cerebral vascular accident)        Past Medical History:   Diagnosis Date    Acquired dyslexia     pt  reports after 3rd stroke    Allergic     Allergic rhinitis     Anal fissure     Aortic insufficiency     moderate, THIERRY     Asthma     Asthma     Benign prostatic hypertrophy (BPH) with weak urinary stream 2016    Cataract     Chronic bronchitis     Colon polyp     COPD (chronic obstructive pulmonary disease)     Emphysema lung     Falls frequently     Fatty liver     GERD (gastroesophageal reflux disease)     Headache     Hepatitis     thought to be Hepatitis A     History of pneumonia     With left lower lobe atelectasis and scar tissue, being followed    HL (hearing loss)     Hypertension     Low back pain     Macular degeneration     Memory loss     Mixed hyperlipidemia 2023    Obstructive sleep apnea syndrome 2017    refuses c-pap    PFO (patent foramen ovale)     s/p percutaneous closure with a 25mm Amplatzer device in December  2018    Pneumonia     LLL with scar tissue    Seizures     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 total of 3 strokes    Tremor     Comes & goes    Vision loss         Past Surgical History:   Procedure Laterality Date    CARDIAC CATHETERIZATION N/A 12/12/2018    Procedure: Patent foramen ovale closure- [employer];  Surgeon: Deangelo Harris MD;  Location:  [facility];  Service: Cardiology    CARDIAC CATHETERIZATION N/A 12/12/2018    Procedure: Intracardiac echocardiogram;  Surgeon: Deangelo Harris MD;  Location:  [facility];  Service: Cardiology    CARDIAC ELECTROPHYSIOLOGY PROCEDURE N/A 03/26/2018    Procedure: Loop insertion   CONFIRM RX;  Surgeon: Luis Wyatt MD;  Location:  [facility];  Service: Cardiovascular    CARDIAC ELECTROPHYSIOLOGY PROCEDURE N/A 07/15/2020    Procedure: Loop recorder removal;  Surgeon: Starr Driscoll MD;  Location:  [facility];  Service: Cardiovascular;  Laterality: N/A;    CARDIAC SURGERY      CATARACT EXTRACTION Bilateral     COLONOSCOPY      COLONOSCOPY N/A 10/09/2020    Procedure: COLONOSCOPY with polypectomy;  Surgeon: Ras Mendoza MD;  Location:  [facility];  Service: Gastroenterology;  Laterality: N/A;  diverticulosis  ascending polyp  transverse polyp  sigmoid polyps X 2  rectal polyp    EYE SURGERY      HAND SURGERY Bilateral     INGUINAL HERNIA REPAIR Left     OTHER SURGICAL HISTORY      inguinal hernia repair for person over age 5    TONSILLECTOMY      TONSILLECTOMY           Visit Dx:     ICD-10-CM ICD-9-CM   1. Weakness of both lower extremities  R29.898 729.89        Patient History       Row Name 05/20/25 0900             History    Chief Complaint Difficulty with daily activities;Muscle weakness;Difficulty Walking  left side weakness  -JV      Date Current Problem(s) Began 07/11/24  -JV      Brief Description of  Current Complaint This pt is referrred by Neurology for PT eval and tx of Atul LE weakness and history of CVA.  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 Neuro  -JV      How has patient tried to help current problem? --  -JV      What clinical tests have you had for this problem? --  -JV      Results of Clinical Tests --  -JV      Related/Recent Hospitalizations --  -JV      Date of Hospitalization --  -JV      Location (Hospital Name) --  -JV         Fall Risk Assessment    Any falls in the past year: No  -JV      Number of falls reported in the last 12 months --  -JV      Factors that contributed to the fall: --  -JV      Does patient have a fear of falling No  -JV         Services    Prior Rehab/Home Health Experiences --  -JV      When was the prior experience with Rehab/Home Health --  -JV      Where was the prior experience with Rehab/Home Health --  -JV      Are you currently receiving Home Health services No  -JV      Do you plan to receive Home Health services in the near future No  -JV         Daily Activities    Primary Language English  -JV      Are you able to read Other (comment)  pt's reading/writing skills impacted from 2017 CVA.  -JV      Are you able to write Other (comment)  pt's reading/writing skills impacted from 2017 CVA.  -JV      How does patient learn best? Listening  -JV      Teaching needs identified Home Exercise Program;Management of Condition  -JV      Patient is concerned about/has problems with Difficulty with self care (i.e. bathing, dressing, toileting:;Grasping objects lifting;Coordination;Climbing Stairs;Performing home management  (household chores, shopping, care of dependents);Transfers (getting out of a chair, bed);Walking  -JV      Does patient have problems with the following? None  -JV      Barriers to learning --  -JV      Pt Participated in POC and Goals Yes  -JV         Safety    Are you being hurt, hit, or frightened by anyone at home or in your life? No  -JV      Are you being neglected by a caregiver No  -JV      Have you had any of the following issues with N/A  -JV                User Key  (r) = Recorded By, (t) = Taken By, (c) = Cosigned By      Initials Name Provider Type    JV Kathryn Dewitt, PT Physical Therapist                         PT Neuro       Row Name 05/20/25 0900             Subjective    Subjective Comments Pt reports his L side feels weak and he has poor endurance.  -JV         Precautions and Contraindications    Precautions/Limitations seizure precautions  -JV         Subjective Pain    Able to rate subjective pain? yes  -JV      Pre-Treatment Pain Level 0  -JV      Post-Treatment Pain Level 0  -JV         Home Living    Current Living Arrangements home  -JV      Home Accessibility stairs to enter home  -JV      Number of Stairs, Main Entrance three  -JV      Home Equipment Rolling walker;Cane  -JV         Sensation    Light Touch No apparent deficits  -JV         Posture/Observations    Alignment Options Forward head;Thoracic kyphosis;Rounded shoulders;Lumbar lordosis  -JV      Forward Head Mild;Increased  -JV      Thoracic Kyphosis Mild;Increased  -JV      Rounded Shoulders Moderate;Increased  -JV      Lumbar lordosis Moderate;Decreased  -JV      Posture/Observations Comments PPT in sitting  -JV         General ROM    GENERAL ROM COMMENTS Atul LE ROM is WFL's  -JV         MMT (Manual Muscle Testing)    Rt Lower Ext Rt Hip Flexion;Rt Hip ABduction;Rt Hip ADduction;Rt Knee Extension;Rt Ankle Dorsiflexion  -JV      Lt Lower Ext Lt Hip Flexion;Lt Hip ABduction;Lt Hip ADduction;Lt Knee Extension;Lt Ankle  Dorsiflexion  -JV         MMT Right Lower Ext    Rt Hip Flexion MMT, Gross Movement (4+/5) good plus  -JV      Rt Hip ABduction MMT, Gross Movement (4+/5) good plus  -JV      Rt Hip ADduction MMT, Gross Movement (4+/5) good plus  -JV      Rt Knee Extension MMT, Gross Movement (4+/5) good plus  -JV      Rt Ankle Dorsiflexion MMT, Gross Movement (4+/5) good plus  -JV         MMT Left Lower Ext    Lt Hip Flexion MMT, Gross Movement (4/5) good  -JV      Lt Hip ABduction MMT, Gross Movement (4/5) good  -JV      Lt Hip ADduction MMT, Gross Movement (4/5) good  -JV      Lt Knee Extension MMT, Gross Movement (4+/5) good plus  -JV      Lt Ankle Dorsiflexion MMT, Gross Movement (4+/5) good plus  -JV         Bed Mobility    Comment, (Bed Mobility) NT  -JV         Transfers    Bed-Chair [place] (Transfers) independent  -JV      Chair-Bed [place] (Transfers) independent  -JV      Sit-Stand [place] (Transfers) independent  -JV      Stand-Sit [place] (Transfers) independent  -JV         Gait/Stairs (Locomotion)    [place] Level (Gait) independent  -JV      Patient was able to Ambulate yes  -JV      Distance in Feet (Gait) 300  -JV      Pattern (Gait) step-through  -JV      Deviations/Abnormal Patterns (Gait) base of support, wide  -JV      Bilateral Gait Deviations forward flexed posture  -JV      [place] Level (Stairs) not tested  -JV         Balance Skills Training    Balance Comments see DU  -JV                User Key  (r) = Recorded By, (t) = Taken By, (c) = Cosigned By      Initials Name Provider Type    JKathryn Falcon, PT Physical Therapist                            Therapy Education  Education Details: Encouraged pt to obtain a floor bike to use at home.  Given: HEP  Program: New  How Provided: Verbal, Demonstration, Written  Provided to: Patient  Level of Understanding: Teach back education performed, Verbalized, Demonstrated     PT OP Goals       Row Name 05/20/25 0900          PT  "Short Term Goals    STG Date to Achieve 06/03/25  -JV     STG 1 Pt will be I with HEP  -JV     STG 2 Admin 5XSTS  -JV        Long Term Goals    LTG Date to Achieve 07/19/25  -JV     LTG 1 Pt will improve L LE strength to 4+/5 throughout  -JV     LTG 2 Pt will improve DU score by 1-2 points  -JV        Time Calculation    PT Goal Re-Cert Due Date 06/19/25  -JV               User Key  (r) = Recorded By, (t) = Taken By, (c) = Cosigned By      Initials Name Provider Type    Kathryn Ivan PT Physical Therapist                     PT Assessment/Plan       Row Name 05/20/25 1656          PT Assessment    Functional Limitations Impaired gait;Limitation in home management;Limitations in community activities;Performance in leisure activities  -JV     Impairments Balance;Endurance;Gait;Muscle strength  -JV     Assessment Comments This pt is a 76 y/o male with hx of CVA in 7/2024, referred to PT by Neuro for eval and tx of Atul LE weakness. The pt is amb without an AD and reports no falls in the last year. Mild weakness noted L hip, and pt states he has \"poor endurance\". The pt was provided with standing LE strengthening exercises and encouraged to obtain a floor bike for use at home. Score on the DU balance assessment was 54/56.  -JV     Please refer to paper survey for additional self-reported information Yes  -JV     Rehab Potential Good  -JV     Patient/caregiver participated in establishment of treatment plan and goals Yes  -JV     Patient would benefit from skilled therapy intervention Yes  -JV        PT Plan    PT Frequency Other (comment)  Bi-weekly  -JV     PT Plan Comments Plan to see pt as noted above to help progress to an optimal and safe level of functional independence in his environment.  -JV               User Key  (r) = Recorded By, (t) = Taken By, (c) = Cosigned By      Initials Name Provider Type    Kathryn Ivan PT Physical Therapist                        OP Exercises       Row Name 05/20/25 " 0900             Subjective    Subjective Comments Pt reports his L side feels weak and he has poor endurance.  -JV         Subjective Pain    Able to rate subjective pain? yes  -JV      Pre-Treatment Pain Level 0  -JV      Post-Treatment Pain Level 0  -JV         Exercise 1    Exercise Name 1 The pt was provided with written, verbal and demo instructions for standing LE ther ex including squats, marching, hip ext, hip ABD and heel/toe raises.  -JV                User Key  (r) = Recorded By, (t) = Taken By, (c) = Cosigned By      Initials Name Provider Type    Kathryn Ivan, [place] Physical Therapist                                Outcome Measure Options: Manzano Balance, Lower Extremity Functional Scale (LEFS)  Manzano Balance Scale  Sitting to Standing: able to stand without using hands and stabilize independently  Standing Unsupported: able to stand safely for 2 minutes  Sitting with Back Unsupported but Feet Supported on Floor or on Stool: able to sit safely and securely for 2 minutes  Standing to Sitting: sits safely with minimal use of hands  Transfers: able to transfer safely with minor use of hands  Standing Unsupported with Eyes Closed: able to stand 10 seconds safely  Standing Unsupported with Feet Together: able to place feet together independently and stand 1 minute safely  Reaching Forward with Outstretched Arm While Standing: can reach forward confidently 25 cm (10 inches)   Object From the Floor From a Standing Position: able to  object safely and easily  Turning to Look Behind Over Left and Right Shoulders While Standing: looks behind from both sides and weight shifts well  Turn 360 Degrees: able to turn 360 degrees safely in 4 seconds or less  Place Alternate Foot on Step or Stool While Standing Unsupported: able to stand independently and safely and complete 8 steps in 20 seconds  Standing Unsupported with One Foot in Front: able to place foot ahead independently and hold 30  seconds  Standing on One Leg: able to lift leg independently and hold 5-10 seconds  Manzano Total Score: 54  Lower Extremity Functional Index  Any of your usual work, housework or school activities: Moderate difficulty  Your usual hobbies, recreational or sporting activities: A little bit of difficulty  Getting into or out of the bath: No difficulty  Walking between rooms: No difficulty  Putting on your shoes or socks: A little bit of difficulty  Squatting: A little bit of difficulty  Lifting an object, like a bag of groceries from the floor: No difficulty  Performing light activities around your home: No difficulty  Performing heavy activities around your home: A little bit of difficulty  Getting into or out of a car: A little bit of difficulty  Walking 2 blocks: A little bit of difficulty  Walking a mile: Quite a bit of difficulty  Going up or down 10 stairs (about 1 flight of stairs): No difficulty  Standing for 1 hour: Moderate difficulty  Sitting for 1 hour: No difficulty  Running on even ground: Quite a bit of difficulty  Running on uneven ground: Quite a bit of difficulty  Making sharp turns while running fast: Quite a bit of difficulty  Hopping: Quite a bit of difficulty  Rolling over in bed: A little bit of difficulty  Total: 54    Time Calculation:   Start Time: 0900  Stop Time: 1000  Time Calculation (min): 60 min   Therapy Charges for Today       Code Description Service Date Service Provider Modifiers Qty    [id number] HC PT EVAL LOW COMPLEXITY 4 5/20/2025 Kathryn Dewitt, PT GP 1            PT G-Codes  Outcome Measure Options: Manzano Balance, Lower Extremity Functional Scale (LEFS)  Manzano Total Score: 54  Total: 54         Kathryn Dewitt PT  5/20/2025

## 2025-05-23 ENCOUNTER — HOSPITAL ENCOUNTER (OUTPATIENT)
Dept: PULMONOLOGY | Facility: HOSPITAL | Age: 75
Discharge: HOME | End: 2025-05-23
Payer: MEDICARE

## 2025-05-23 LAB
BDY SITE: NORMAL
HGB BLDA-MCNC: 16.5 G/DL (ref 14–18)
Lab: NORMAL

## 2025-05-23 PROCEDURE — 94729 DIFFUSING CAPACITY: CPT

## 2025-05-23 PROCEDURE — 82820 HEMOGLOBIN-OXYGEN AFFINITY: CPT

## 2025-05-23 PROCEDURE — 94010 BREATHING CAPACITY TEST: CPT

## 2025-05-23 PROCEDURE — 94726 PLETHYSMOGRAPHY LUNG VOLUMES: CPT

## 2025-05-27 ENCOUNTER — OFFICE VISIT (OUTPATIENT)
Dept: CARDIOLOGY | Facility: CLINIC | Age: 75
End: 2025-05-27
Payer: MEDICARE

## 2025-05-27 VITALS
DIASTOLIC BLOOD PRESSURE: 58 MMHG | SYSTOLIC BLOOD PRESSURE: 132 MMHG | WEIGHT: 180.3 LBS | HEIGHT: 72 IN | BODY MASS INDEX: 24.42 KG/M2 | OXYGEN SATURATION: 97 % | HEART RATE: 59 BPM

## 2025-05-27 DIAGNOSIS — I35.1 NONRHEUMATIC AORTIC VALVE INSUFFICIENCY: ICD-10-CM

## 2025-05-27 DIAGNOSIS — I45.5 SINUS PAUSE: ICD-10-CM

## 2025-05-27 DIAGNOSIS — I45.5 SINOATRIAL BLOCK: ICD-10-CM

## 2025-05-27 DIAGNOSIS — Z87.74 S/P PERCUTANEOUS PATENT FORAMEN OVALE CLOSURE: ICD-10-CM

## 2025-05-27 DIAGNOSIS — I10 PRIMARY HYPERTENSION: ICD-10-CM

## 2025-05-27 DIAGNOSIS — I67.9 INTRACRANIAL VASCULAR STENOSIS: Chronic | ICD-10-CM

## 2025-05-27 DIAGNOSIS — Z86.73 HISTORY OF STROKE: Primary | ICD-10-CM

## 2025-05-27 PROBLEM — G45.9 TIA ON MEDICATION: Status: RESOLVED | Noted: 2020-01-14 | Resolved: 2025-05-27

## 2025-05-27 PROBLEM — R55 PRE-SYNCOPE: Status: RESOLVED | Noted: 2023-01-30 | Resolved: 2025-05-27

## 2025-05-27 NOTE — PROGRESS NOTES
Date of Office Visit: 2025  Encounter Provider: Christopher Sawant MD  Place of Service: [facility] CARDIOLOGY  Patient Name: Ronnell Rizvi  :1950    Chief Complaint   Patient presents with    Follow-up   :     HPI: Ronnell Rizvi is a 75 y.o. male who presents today to follow up. I have reviewed prior notes and there are no changes except for any new updates described below. I have also reviewed any information entered into the medical record by the patient or by ancillary staff.     I first met him in 2016 when he was hospitalized for chest pain.  A Cardiolite stress was normal (EF 54%).  He did have some Wenckebach at the beginning of stress which normalized with exercise.    In 2017, he had an episode of visual changes that resolved on their own.  The next morning it happened again and he came to the ED and was diagnosed with a stroke of the left occipital/temporal lobe.  He was found to have some diffuse small vessel disease.  An echo wasn't performed but a Zio patch was placed.  We were not consulted in the hospital.  He was placed on clopidogrel and atorvastatin.    In 2017, the Zio showed some atrial ectopy (there was a 13 beat run of PACs) but no atrial fibrillation.  An echo showed normal LV systolic function and a small PFO.    His studies were then reviewed by a different neurologist, who felt that this was actually embolic. A THIERRY showed a moderate sized PFO and moderate aortic insufficiency but was otherwise normal. A Confirm device was placed (implanted monitor from [employer]).  Upon arrival he was noted to have an irregular rhythm (not atrial fibrillation).  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  The monitor failed to show any atrial fibrillation (see below).    In 2018, he presented with recurrent neurological symptoms.  He was found to have a subacute infarct as well as diffuse intracranial atherosclerosis.  He  underwent percutaneous PFO closure with a 25mm Amplatzer device during that admission.    His loop recorder showed episodes of type II sinoatrial exit block; the loop was removed in 2020.  He also had a normal perfusion stress test in 2020 (he reported atypical chest pain at the time).     While on clopidogrel, he suffered another stroke in 2024. Aspirin was added and he then had another stroke. He was found to have fairly severe intracranial atherosclerosis. He was switched to aspirin/ticagrelor. A THIERRY in 2024 showed no shunt/thrombus. It did show moderate AI.    He feels well and denies any cardiac complaints at all.  He denies palpitations, lightheadedness, syncope, edema, chest pain, dyspnea, or recurrent stroke symptoms. He denies bleeding.     Past Medical History:   Diagnosis Date    Acquired dyslexia     pt  reports after 3rd stroke    Allergic rhinitis     Anal fissure     Aortic insufficiency     moderate, THIERRY 2017    Asthma     Asthma     Benign prostatic hypertrophy (BPH) with weak urinary stream 11/01/2016    Cataract     Chronic bronchitis     Colon polyp     COPD (chronic obstructive pulmonary disease)     Emphysema lung     Falls frequently     Fatty liver     GERD (gastroesophageal reflux disease)     Headache     Hepatitis     thought to be Hepatitis A     History of pneumonia     With left lower lobe atelectasis and scar tissue, being followed    HL (hearing loss)     Hypertension     Low back pain     Macular degeneration     Memory loss     Mixed hyperlipidemia 01/30/2023    Obstructive sleep apnea syndrome 08/23/2017    refuses c-pap    PFO (patent foramen ovale)     s/p percutaneous closure with a 25mm Amplatzer device in December 2018    Pneumonia     LLL with scar tissue    Seizures     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 total of 3 strokes    Tremor     Comes & goes    Vision loss        Past Surgical History:   Procedure  Laterality Date    CARDIAC CATHETERIZATION N/A 2018    Procedure: Patent foramen ovale closure- [employer];  Surgeon: Deangelo Harris MD;  Location:  [facility];  Service: Cardiology    CARDIAC CATHETERIZATION N/A 2018    Procedure: Intracardiac echocardiogram;  Surgeon: Deangelo Harris MD;  Location:  [facility];  Service: Cardiology    CARDIAC ELECTROPHYSIOLOGY PROCEDURE N/A 2018    Procedure: Loop insertion   CONFIRM RX;  Surgeon: Luis Wyatt MD;  Location:  [facility];  Service: Cardiovascular    CARDIAC ELECTROPHYSIOLOGY PROCEDURE N/A 07/15/2020    Procedure: Loop recorder removal;  Surgeon: Starr Driscoll MD;  Location:  [facility];  Service: Cardiovascular;  Laterality: N/A;    CARDIAC SURGERY      CATARACT EXTRACTION Bilateral     COLONOSCOPY      COLONOSCOPY N/A 10/09/2020    Procedure: COLONOSCOPY with polypectomy;  Surgeon: Ras Mendoza MD;  Location:  [facility];  Service: Gastroenterology;  Laterality: N/A;  diverticulosis  ascending polyp  transverse polyp  sigmoid polyps X 2  rectal polyp    EYE SURGERY      HAND SURGERY Bilateral     INGUINAL HERNIA REPAIR Left     OTHER SURGICAL HISTORY      inguinal hernia repair for person over age 5    TONSILLECTOMY      TONSILLECTOMY         Social History     Socioeconomic History    Marital status:      Spouse name: Joey   Tobacco Use    Smoking status: Former     Current packs/day: 0.00     Average packs/day: 1.5 packs/day for 15.0 years (22.5 ttl pk-yrs)     Types: Cigarettes     Start date: 1964     Quit date: 1979     Years since quittin.2     Passive exposure: Past    Smokeless tobacco: Never    Tobacco comments:     caffeine use: Drinks 4 glasses of Dt coke on avg.    Vaping Use    Vaping status: Never Used   Substance and Sexual Activity    Alcohol use: No    Drug use: No    Sexual activity: Defer       Family  "History   Problem Relation Age of Onset    Obesity Mother     Clotting disorder Mother         Upper extremities    Alcohol abuse Father     Cancer Father 63        Esophagus and lung    Other Father         cardiac disorder    Heart disease Father     Depression Father     Kidney disease Sister     Kidney failure Sister     Drug abuse Paternal Uncle     Stroke Sister     Throat cancer Sister     Drug abuse Paternal Uncle        Review of Systems   HENT:  Positive for hearing loss.    Cardiovascular:  Negative for chest pain.   Respiratory:  Positive for snoring. Negative for shortness of breath.    Neurological:  Negative for dizziness and light-headedness.        As per HPI   Psychiatric/Behavioral:  Positive for memory loss.    All other systems reviewed and are negative.      Allergies   Allergen Reactions    Aspirin Nausea Only     Pt states he \"can tolerate enteric coated aspirin only.\"     Citrus      Blisters on mouth      Combivent [Ipratropium-Albuterol] Other (See Comments)     Throat swelling    Statins Mental Status Change     Severe memory impairment         Current Outpatient Medications:     aspirin 81 MG EC tablet, Take 1 tablet by mouth Daily. Take with Pantoprazole., Disp: 30 tablet, Rfl: 0    Brilinta 90 MG tablet tablet, TAKE 1 TABLET BY MOUTH 2 TIMES A DAY, Disp: 180 tablet, Rfl: 1    Cyanocobalamin 1000 MCG/ML kit, Inject  as directed 1 (One) Time Per Week. 1/4 of a cc IM every sunday, Disp: , Rfl:     fexofenadine (ALLEGRA) 180 MG tablet, Take 1 tablet by mouth Daily., Disp: , Rfl:     hydroCHLOROthiazide (MICROZIDE) 12.5 MG capsule, Take 1 capsule by mouth Daily., Disp: , Rfl:     lacosamide (VIMPAT) 100 MG tablet tablet, 100mg in am and 200mg nightly, Disp: 90 tablet, Rfl: 1    Multiple Vitamins-Minerals (OCUVITE ADULT 50+ PO), Take  by mouth., Disp: , Rfl:     rosuvastatin (Crestor) 20 MG tablet, Take 1 tablet by mouth Every Night., Disp: 90 tablet, Rfl: 1     Objective:     Vitals:    " "05/27/25 1027   BP: 132/58   Pulse: 59   SpO2: 97%   Weight: 81.8 kg (180 lb 4.8 oz)   Height: 183.9 cm (72.4\")     Body mass index is 24.18 kg/m².    Physical Exam  Vitals reviewed.   Constitutional:       Appearance: He is well-developed.   HENT:      Head: Normocephalic.      Nose: Nose normal.      Mouth/Throat:      Pharynx: Oropharynx is clear.   Eyes:      Conjunctiva/sclera: Conjunctivae normal.   Neck:      Vascular: No JVD.   Cardiovascular:      Rate and Rhythm: Normal rate. Rhythm regularly irregular.      Pulses: Normal pulses.      Heart sounds: Normal heart sounds.   Pulmonary:      Effort: Pulmonary effort is normal.      Breath sounds: Normal breath sounds.   Abdominal:      Palpations: Abdomen is soft.      Tenderness: There is no abdominal tenderness.   Musculoskeletal:         General: Normal range of motion.      Cervical back: Normal range of motion.      Right lower leg: No edema.      Left lower leg: No edema.   Skin:     General: Skin is warm and dry.   Neurological:      General: No focal deficit present.      Mental Status: He is alert.   Psychiatric:         Mood and Affect: Mood normal.           ECG 12 Lead    Date/Time: 5/27/2025 10:39 AM  Performed by: Christopher Sawant MD    Authorized by: Christopher Sawant MD  Comparison: compared with previous ECG   Similar to previous ECG  Rhythm: sinus rhythm  Conduction comments: SA exit block  ST Segments: ST segments normal  T Waves: T waves normal    Clinical impression: abnormal EKG          Assessment:      Diagnoses and all orders for this visit:    1. History of stroke (Primary)    2. S/P percutaneous patent foramen ovale closure    3. Intracranial vascular stenosis    4. Primary hypertension    5. Sinoatrial block    6. Sinus pause    7. Nonrheumatic aortic valve insufficiency    Other orders  -     ECG 12 Lead       Plan:       He has had recurrent strokes which were originally presumed to be due to small vessel disease. He was placed on " clopidogrel but had recurrent events, so aspirin was added. He was found to have a PFO on THIERRY, and then he had another stroke while on DAPT.  He then had the PFO closed. He continued to have events, so clopidogrel was changed to ticagrelor.     He has sinus node disease but does not have symptoms of higher level block. We'll observe this for now.     He had moderate AI in 2024; we'll recheck this in 2026.    Sincerely,       Christopher Sawant MD

## 2025-06-03 ENCOUNTER — RESULTS FOLLOW-UP (OUTPATIENT)
Dept: INTERNAL MEDICINE | Facility: CLINIC | Age: 75
End: 2025-06-03
Payer: MEDICARE

## 2025-06-03 ENCOUNTER — HOSPITAL ENCOUNTER (OUTPATIENT)
Dept: PHYSICAL THERAPY | Facility: HOSPITAL | Age: 75
Setting detail: THERAPIES SERIES
Discharge: HOME | End: 2025-06-03
Payer: MEDICARE

## 2025-06-03 DIAGNOSIS — R29.898 WEAKNESS OF BOTH LOWER EXTREMITIES: Primary | ICD-10-CM

## 2025-06-03 DIAGNOSIS — M62.3 IMMOBILITY SYNDROME: ICD-10-CM

## 2025-06-03 PROCEDURE — 97110 THERAPEUTIC EXERCISES: CPT

## 2025-06-03 NOTE — THERAPY TREATMENT NOTE
Outpatient Physical Therapy Neuro Treatment Note  [facility]     Patient Name: Ronnell Rizvi  : 1950  MRN: [id number]  Today's Date: 6/3/2025      Visit Date: 2025    Visit Dx:    ICD-10-CM ICD-9-CM   1. Weakness of both lower extremities  R29.898 729.89   2. Immobility syndrome  M62.3 728.3       Patient Active Problem List   Diagnosis    Anemia, unspecified    Vitamin B12 deficiency    Chronic fatigue    Benign prostatic hypertrophy (BPH) with weak urinary stream    Chronic bronchitis    ELIZABETH (obstructive sleep apnea)    Sinoatrial block    Cerebral aneurysm, nonruptured    TIA (transient ischemic attack)    Intracranial vascular stenosis    Sinus pause    S/P percutaneous patent foramen ovale closure    HTN (hypertension)    COPD (chronic obstructive pulmonary disease)    Nocturnal seizures    Encounter for screening for malignant neoplasm of colon    Seizure    HL (hearing loss)    Mixed hyperlipidemia    History of stroke    Melena    Irritable bowel syndrome with constipation    Gastroesophageal reflux disease    Stroke    CVA (cerebral vascular accident)                        PT Assessment/Plan       Row Name 25 1200          PT Assessment    Functional Limitations Impaired gait;Limitation in home management;Limitations in community activities;Performance in leisure activities  -JV     Impairments Balance;Endurance;Gait;Muscle strength  -JV     Assessment Comments The pt presents for tx and reports obtaining a floor bike for home use. Pt has been doing LE strength exercises daily with good abdelrahman. Pt rode recumbent bike and HEP was verbally reviewed. Admin TUG and 5XSTS assessments and updated goals with age-related norms for time. Added multiple sit-stand from chair to HEP.  -JV     Rehab Potential Good  -JV     Patient/caregiver participated in establishment of treatment plan and goals Yes  -JV     Patient would benefit from skilled therapy intervention Yes  -JV        PT Plan    PT Plan  Comments Cont plan to progress toward goals.  -JV               User Key  (r) = Recorded By, (t) = Taken By, (c) = Cosigned By      Initials Name Provider Type    Kathryn Ivan PT Physical Therapist                        OP Exercises       Row Name 06/03/25 1000             Precautions    Existing Precautions/Restrictions fall;seizures  -JV         Subjective    Subjective Comments Pt reports he obtained a floor bike for home use.  -JV         Exercise 1    Exercise Name 1 Bike  -JV      Time 1 5 min  -JV         Exercise 2    Exercise Name 2 Verbally reviewed standing LE ther ex  -JV         Exercise 3    Exercise Name 3 Admin TUG  -JV         Exercise 4    Exercise Name 4 Admin 5XSTS  -JV                User Key  (r) = Recorded By, (t) = Taken By, (c) = Cosigned By      Initials Name Provider Type    Kathryn Ivan PT Physical Therapist                                 PT OP Goals       Row Name 06/03/25 1000          PT Short Term Goals    STG Date to Achieve 06/19/25  -JV     STG 1 Pt will be I with HEP  -JV     STG 2 Admin 5XSTS  -JV     STG 2 Progress Met  -JV     STG 3 Improve 5XSTS to 12 sec or less  -JV        Long Term Goals    LTG Date to Achieve 07/19/25  -JV     LTG 1 Pt will improve L LE strength to 4+/5 throughout  -JV     LTG 2 Pt will improve DU score by 1-2 points  -JV     LTG 3 Improve TUG to 10 sec or less  -JV        Time Calculation    PT Goal Re-Cert Due Date 06/19/25  -JV               User Key  (r) = Recorded By, (t) = Taken By, (c) = Cosigned By      Initials Name Provider Type    Kathryn Ivan PT Physical Therapist                    Therapy Education  Education Details: Add sit-stand from chair to HEP  Given: HEP  Program: New  How Provided: Verbal, Demonstration, Written  Provided to: Patient  Level of Understanding: Teach back education performed, Verbalized, Demonstrated    Outcome Measure Options: 5x Sit to Stand, Timed Up and Go (TUG)  5 Times Sit to Stand  5 Times  Sit to Stand (seconds): 15.14 seconds  5 Times Sit to Stand Comments: Age norm 12 sec  Timed Up and Go (TUG)  TUG Test 1: 14.74 seconds  TUG Test 2: 12.22 seconds  Timed Up and Go Comments: Age norm 10 sec      Time Calculation:   Start Time: 1000  Stop Time: 1045  Time Calculation (min): 45 min   Therapy Charges for Today       Code Description Service Date Service Provider Modifiers Qty    43388024958 HC PT THER PROC EA 15 MIN 6/3/2025 Kathryn Dewitt, PT GP 3            PT G-Codes  Outcome Measure Options: 5x Sit to Stand, Timed Up and Go (TUG)  TUG Test 1: 14.74 seconds  TUG Test 2: 12.22 seconds         Kathryn Dewitt, CLAYTON  6/3/2025

## 2025-06-17 ENCOUNTER — HOSPITAL ENCOUNTER (OUTPATIENT)
Dept: PHYSICAL THERAPY | Facility: HOSPITAL | Age: 75
Setting detail: THERAPIES SERIES
Discharge: HOME | End: 2025-06-17
Payer: MEDICARE

## 2025-06-17 DIAGNOSIS — M62.3 IMMOBILITY SYNDROME: ICD-10-CM

## 2025-06-17 DIAGNOSIS — R29.898 WEAKNESS OF BOTH LOWER EXTREMITIES: Primary | ICD-10-CM

## 2025-06-17 PROCEDURE — 97110 THERAPEUTIC EXERCISES: CPT

## 2025-06-17 NOTE — THERAPY TREATMENT NOTE
Outpatient Physical Therapy Neuro Treatment Note  [facility]     Patient Name: Ronnell Rizvi  : 1950  MRN: [id number]  Today's Date: 2025      Visit Date: 2025    Visit Dx:    ICD-10-CM ICD-9-CM   1. Weakness of both lower extremities  R29.898 729.89   2. Immobility syndrome  M62.3 728.3       Patient Active Problem List   Diagnosis    Anemia, unspecified    Vitamin B12 deficiency    Chronic fatigue    Benign prostatic hypertrophy (BPH) with weak urinary stream    Chronic bronchitis    ELIZABETH (obstructive sleep apnea)    Sinoatrial block    Cerebral aneurysm, nonruptured    TIA (transient ischemic attack)    Intracranial vascular stenosis    Sinus pause    S/P percutaneous patent foramen ovale closure    HTN (hypertension)    COPD (chronic obstructive pulmonary disease)    Nocturnal seizures    Encounter for screening for malignant neoplasm of colon    Seizure    HL (hearing loss)    Mixed hyperlipidemia    History of stroke    Melena    Irritable bowel syndrome with constipation    Gastroesophageal reflux disease    Stroke    CVA (cerebral vascular accident)                        PT Assessment/Plan       Row Name 25 1504          PT Assessment    Functional Limitations Impaired gait;Limitation in home management;Limitations in community activities;Performance in leisure activities  -JV     Impairments Balance;Endurance;Gait;Muscle strength  -JV     Assessment Comments The pt presents for tx and reports compliance with HEP. Pt performed seated LE ther ex vs blue T'band including hip ADD/ABD, marching, LAQ's and heel/toe raises. Pt performed standing UE ther ex vs blue T'band including shoulder flex/ABD/ext and elbow flex/ext. Pt was instructed to cont using floor bike at home and alternate UE and LE ther ex as abdelrahman.  -JV     Please refer to paper survey for additional self-reported information Yes  -JV     Rehab Potential Good  -JV     Patient/caregiver participated in establishment of  treatment plan and goals Yes  -JV     Patient would benefit from skilled therapy intervention Yes  -JV        PT Plan    PT Plan Comments Cont plan to progress toward goals.  -JV               User Key  (r) = Recorded By, (t) = Taken By, (c) = Cosigned By      Initials Name Provider Type    Kathrny Ivan PT Physical Therapist                        OP Exercises       Row Name 06/17/25 1000             Precautions    Existing Precautions/Restrictions fall;seizures  -JV         Subjective    Subjective Comments Pt reports he has been using floor bike and performing standing LE ther ex at home.  -JV         Exercise 1    Exercise Name 1 Provided pt with written, verbal and demo instructions for seated LE ther ex including hip ADD, hip ABD, marching, LAQ's and heel/toe raises.  -JV      Cueing 1 Verbal;Demo  -JV      Sets 1 1  -JV      Reps 1 10 each  -JV      Additional Comments Blue T'band dispensed  -JV         Exercise 2    Exercise Name 2 Pt was provided with written, verbal and demo instructions for standing UE ther ex including shoulder flex/ABD/ext, elbow flex/ext vs T'band  -JV      Cueing 2 Verbal;Demo  -JV      Sets 2 1  -JV      Reps 2 10 each  -JV                User Key  (r) = Recorded By, (t) = Taken By, (c) = Cosigned By      Initials Name Provider Type    Kathryn Ivan PT Physical Therapist                                    Therapy Education  Education Details: Add seated LE ther ex vs T'band and standing UE ther ex to HEP.  Given: HEP  Program: New  How Provided: Verbal, Demonstration, Written  Provided to: Patient  Level of Understanding: Teach back education performed, Verbalized, Demonstrated              Time Calculation:   Start Time: 1000  Stop Time: 1045  Time Calculation (min): 45 min   Therapy Charges for Today       Code Description Service Date Service Provider Modifiers Qty    95145114608 HC PT THER PROC EA 15 MIN 6/17/2025 Kathryn Dewitt, C[place]TON GP 3                      Kathryn  Renate, PT  6/17/2025

## 2025-06-23 DIAGNOSIS — Z86.73 HISTORY OF STROKE: ICD-10-CM

## 2025-06-23 DIAGNOSIS — G45.9 TIA (TRANSIENT ISCHEMIC ATTACK): ICD-10-CM

## 2025-06-23 DIAGNOSIS — E78.2 MIXED HYPERLIPIDEMIA: ICD-10-CM

## 2025-06-24 RX ORDER — ROSUVASTATIN CALCIUM 20 MG/1
20 TABLET, COATED ORAL NIGHTLY
Qty: 90 TABLET | Refills: 1 | Status: SHIPPED | OUTPATIENT
Start: 2025-06-24

## 2025-06-24 NOTE — TELEPHONE ENCOUNTER
Rx Refill Note  Requested Prescriptions     Pending Prescriptions Disp Refills    rosuvastatin (CRESTOR) 20 MG tablet [Pharmacy Med Name: ROSUVASTATIN CALCIUM 20 MG TAB] 90 tablet 1     Sig: TAKE ONE TABLET BY MOUTH ONCE NIGHTLY      Last office visit with prescribing clinician: 5/7/2025   Last telemedicine visit with prescribing clinician: Visit date not found   Next office visit with prescribing clinician: 11/7/2025                         Would you like a call back once the refill request has been completed: [] Yes [] No    If the office needs to give you a call back, can they leave a voicemail: [] Yes [] No    Steph Augustine MA  06/24/25, 15:09 EDT

## 2025-06-27 DIAGNOSIS — I10 PRIMARY HYPERTENSION: ICD-10-CM

## 2025-06-27 NOTE — TELEPHONE ENCOUNTER
We are not the original prescribers        Rx Refill Note  Requested Prescriptions     Pending Prescriptions Disp Refills    hydroCHLOROthiazide 12.5 MG tablet [Pharmacy Med Name: hydroCHLOROthiazide 12.5 MG TABLET] 90 tablet 1     Sig: TAKE 1 TABLET BY MOUTH DAILY      Last office visit with prescribing clinician: 7/31/2024   Last telemedicine visit with prescribing clinician: Visit date not found   Next office visit with prescribing clinician: Visit date not found                         Would you like a call back once the refill request has been completed: [] Yes [] No    If the office needs to give you a call back, can they leave a voicemail: [] Yes [] No    Madeline Bullard MA  06/27/25, 08:26 EDT

## 2025-07-01 ENCOUNTER — HOSPITAL ENCOUNTER (OUTPATIENT)
Dept: PHYSICAL THERAPY | Facility: HOSPITAL | Age: 75
Setting detail: THERAPIES SERIES
Discharge: HOME | End: 2025-07-01
Payer: MEDICARE

## 2025-07-01 DIAGNOSIS — R29.898 WEAKNESS OF BOTH LOWER EXTREMITIES: Primary | ICD-10-CM

## 2025-07-01 PROCEDURE — 97110 THERAPEUTIC EXERCISES: CPT

## 2025-07-01 PROCEDURE — 97112 NEUROMUSCULAR REEDUCATION: CPT

## 2025-07-01 NOTE — TELEPHONE ENCOUNTER
RELAY:      Phone call made - voicemail left. Need for clarification - is patient taking hydrochlorothiazide 12.5 mg daily? This was discontinued in August 2024. If taking, is he taking 1 tablet daily? If not, how often, etc.      What pharmacy?

## 2025-07-01 NOTE — THERAPY PROGRESS REPORT/RE-CERT
Outpatient Physical Therapy Neuro Progress Note  [facility]     Patient Name: Ronnell Rizvi  : 1950  MRN: [id number]  Today's Date: 2025      Visit Date: 2025    Visit Dx:    ICD-10-CM ICD-9-CM   1. Weakness of both lower extremities  R29.898 729.89       Patient Active Problem List   Diagnosis    Anemia, unspecified    Vitamin B12 deficiency    Chronic fatigue    Benign prostatic hypertrophy (BPH) with weak urinary stream    Chronic bronchitis    ELIZABETH (obstructive sleep apnea)    Sinoatrial block    Cerebral aneurysm, nonruptured    TIA (transient ischemic attack)    Intracranial vascular stenosis    Sinus pause    S/P percutaneous patent foramen ovale closure    HTN (hypertension)    COPD (chronic obstructive pulmonary disease)    Nocturnal seizures    Encounter for screening for malignant neoplasm of colon    Seizure    HL (hearing loss)    Mixed hyperlipidemia    History of stroke    Melena    Irritable bowel syndrome with constipation    Gastroesophageal reflux disease    Stroke    CVA (cerebral vascular accident)                        PT Assessment/Plan       Row Name 25 1253          PT Assessment    Functional Limitations Impaired gait;Limitation in home management;Limitations in community activities;Performance in leisure activities  -JV     Impairments Balance;Endurance;Gait;Muscle strength  -JV     Assessment Comments The pt presents for tx and re-assessment this date, reports he hurt his back moving furniture and has been unable to perform UE ther ex. Pt rode recumbent bike with good abdelrahman, and performed 1 set of 10 each UE ther ex vs Red T'band. Pt was instructed to try to add this to HEP, but stop if LBP is exacerbated. Re-admin 5XSTS with time of 13.2 seconds, improved from 15.14 sec. Overall, the pt is making good progress toward outcomes with improved LE strength and balance. STG #1 is ongoing for changes to HEP, #2 has been met, and #3 has been partially met. LTG #1 is  progressing.  -JV     Please refer to paper survey for additional self-reported information Yes  -JV     Rehab Potential Good  -JV     Patient/caregiver participated in establishment of treatment plan and goals Yes  -JV     Patient would benefit from skilled therapy intervention Yes  -JV        PT Plan    PT Plan Comments Cont plan to progress toward goals.  -JV               User Key  (r) = Recorded By, (t) = Taken By, (c) = Cosigned By      Initials Name Provider Type    Kathryn Ivan, PT Physical Therapist                        OP Exercises       Row Name 07/01/25 1000             Precautions    Existing Precautions/Restrictions fall;seizures  -JV         Subjective    Subjective Comments Pt reports he hurt his back and was unable to perform UE ther ex provided at last visit.  -JV         Exercise 1    Exercise Name 1 Recumbent bike  -JV      Time 1 7 min  -JV      Additional Comments seat #11  -JV         Exercise 2    Exercise Name 2 Pt performed standing UE ther ex including shoulder flex/ABD/ext, elbow flex/ext vs Red T'band  -JV      Cueing 2 Verbal;Demo  -JV      Sets 2 1  -JV      Reps 2 10 each  -JV      Additional Comments Red T'band dispensed  -JV         Exercise 4    Exercise Name 4 Admin 5XSTS  -JV                User Key  (r) = Recorded By, (t) = Taken By, (c) = Cosigned By      Initials Name Provider Type    Kathryn Ivan, PT Physical Therapist                                 PT OP Goals       Row Name 07/01/25 1000          PT Short Term Goals    STG Date to Achieve 06/19/25  -JV     STG 1 Pt will be I with HEP  -JV     STG 1 Progress Ongoing  -JV     STG 2 Admin 5XSTS  -JV     STG 2 Progress Met  -JV     STG 3 Improve 5XSTS to 12 sec or less  -JV     STG 3 Progress Progressing;Partially Met  -JV        Long Term Goals    LTG Date to Achieve 07/31/25  -JV     LTG 1 Pt will improve L LE strength to 4+/5 throughout  -JV     LTG 1 Progress Progressing  -JV     LTG 2 Pt will improve DU  score by 1-2 points  -JV     LTG 3 Improve TUG to 10 sec or less  -JSTEPHANIE        Time Calculation    PT Goal Re-Cert Due Date 07/31/25  -JV               User Key  (r) = Recorded By, (t) = Taken By, (c) = Cosigned By      Initials Name Provider Type    Kathryn Ivan, PT Physical Therapist                    Therapy Education  Education Details: Add standing UE ther ex vs Red T'band, discontinue if it exacerbates LBP.  Given: HEP  Program: Reinforced  How Provided: Verbal, Demonstration  Provided to: Patient  Level of Understanding: Teach back education performed, Verbalized, Demonstrated    Outcome Measure Options: 5x Sit to Stand  5 Times Sit to Stand  5 Times Sit to Stand (seconds): 13.2 seconds  5 Times Sit to Stand Comments: Age norm 12 sec         Time Calculation:   Start Time: 1000  Stop Time: 1055  Time Calculation (min): 55 min   Therapy Charges for Today       Code Description Service Date Service Provider Modifiers Qty    52102852538 HC PT THER PROC EA 15 MIN 7/1/2025 Kathryn Dewitt, PT GP 2    88370854337 HC PT NEUROMUSC RE EDUCATION EA 15 MIN 7/1/2025 Kathryn Dewitt, PT GP 1            PT G-Codes  Outcome Measure Options: 5x Sit to Stand         Kathryn Dewitt PT  7/1/2025

## 2025-07-02 RX ORDER — HYDROCHLOROTHIAZIDE 12.5 MG/1
12.5 TABLET ORAL DAILY
Qty: 90 TABLET | Refills: 1 | Status: SHIPPED | OUTPATIENT
Start: 2025-07-02

## 2025-07-15 ENCOUNTER — HOSPITAL ENCOUNTER (OUTPATIENT)
Dept: PHYSICAL THERAPY | Facility: HOSPITAL | Age: 75
Setting detail: THERAPIES SERIES
Discharge: HOME | End: 2025-07-15
Payer: MEDICARE

## 2025-07-15 DIAGNOSIS — M62.3 IMMOBILITY SYNDROME: ICD-10-CM

## 2025-07-15 DIAGNOSIS — R29.898 WEAKNESS OF BOTH LOWER EXTREMITIES: Primary | ICD-10-CM

## 2025-07-15 PROCEDURE — 97110 THERAPEUTIC EXERCISES: CPT

## 2025-07-15 NOTE — THERAPY TREATMENT NOTE
"  Outpatient Physical Therapy Neuro Treatment Note  [facility]     Patient Name: Ronnell Rizvi  : 1950  MRN: [id number]  Today's Date: 7/15/2025      Visit Date: 07/15/2025    Visit Dx:    ICD-10-CM ICD-9-CM   1. Weakness of both lower extremities  R29.898 729.89   2. Immobility syndrome  M62.3 728.3       Patient Active Problem List   Diagnosis    Anemia, unspecified    Vitamin B12 deficiency    Chronic fatigue    Benign prostatic hypertrophy (BPH) with weak urinary stream    Chronic bronchitis    ELIZABETH (obstructive sleep apnea)    Sinoatrial block    Cerebral aneurysm, nonruptured    TIA (transient ischemic attack)    Intracranial vascular stenosis    Sinus pause    S/P percutaneous patent foramen ovale closure    HTN (hypertension)    COPD (chronic obstructive pulmonary disease)    Nocturnal seizures    Encounter for screening for malignant neoplasm of colon    Seizure    HL (hearing loss)    Mixed hyperlipidemia    History of stroke    Melena    Irritable bowel syndrome with constipation    Gastroesophageal reflux disease    Stroke    CVA (cerebral vascular accident)                        PT Assessment/Plan       Row Name 07/15/25 1625          PT Assessment    Assessment Comments The pt presents for tx and reports he had an episode of vertigo \"spinning\" last week that lasted for about 15 min. Pt was encouraged to report this to Neuro. Pt also states he almost fell twice yesterday while outside. Pt did not seem to feel well today and was walking slowly with smaller steps than usual. Pt rode recumbent bike with good abdelrahman and all previous UE/LE ther ex was reviewed. Encouraged pt to use a walking stick when outside or on uneven surfaces.  -JV     Please refer to paper survey for additional self-reported information Yes  -JV     Rehab Potential Good  -JV     Patient/caregiver participated in establishment of treatment plan and goals Yes  -JV     Patient would benefit from skilled therapy intervention Yes  " -JV        PT Plan    PT Plan Comments Cont plan to progress toward goals.  -JV               User Key  (r) = Recorded By, (t) = Taken By, (c) = Cosigned By      Initials Name Provider Type    Kathryn Ivan PT Physical Therapist                        OP Exercises       Row Name 07/15/25 1000             Precautions    Existing Precautions/Restrictions fall;seizures  -JV         Subjective    Subjective Comments Pt reports he had an episode of vertigo last week, and nearly fell twice yesterday.  -JV         Exercise 1    Exercise Name 1 Recumbent bike  -JV      Time 1 10 min  -JV      Additional Comments seat #11  -JV         Exercise 2    Exercise Name 2 Verbally reviewed UE ther ex.  -JV         Exercise 3    Exercise Name 3 Verbally reviewed seated and standing LE ther ex.  -JV                User Key  (r) = Recorded By, (t) = Taken By, (c) = Cosigned By      Initials Name Provider Type    Kathryn Ivan PT Physical Therapist                                    Therapy Education  Education Details: Pt was encouraged to discuss episode of Vertigo with Neuro. Pt was encouraged to use a walking stick or hiking pole when outside on uneven terrain.  Given: HEP, Symptoms/condition management  Program: New  How Provided: Verbal, Demonstration, Written  Provided to: Patient  Level of Understanding: Teach back education performed, Verbalized              Time Calculation:   Start Time: 1000  Stop Time: 1100  Time Calculation (min): 60 min   Therapy Charges for Today       Code Description Service Date Service Provider Modifiers Qty    [id number]  PT THER PROC EA 15 MIN 7/15/2025 Kathryn Dewitt PT GP 2                      Kathryn Dewitt PT  7/15/2025

## 2025-07-16 ENCOUNTER — TELEPHONE (OUTPATIENT)
Dept: NEUROLOGY | Facility: CLINIC | Age: 75
End: 2025-07-16
Payer: MEDICARE

## 2025-07-16 DIAGNOSIS — Z51.81 THERAPEUTIC DRUG MONITORING: ICD-10-CM

## 2025-07-16 DIAGNOSIS — R56.9 SEIZURE: Primary | ICD-10-CM

## 2025-07-16 NOTE — TELEPHONE ENCOUNTER
Caller: Joey Rizvi    Relationship: Emergency Contact    Best call back number: [phone]    Which medication are you concerned about: LACOSAMIDE    Who prescribed you this medication: JOSÉ    When did you start taking this medication: 2 YEARS (DOSAGE CHANGE LAST APPOINTMENT    What are your concerns: PATIENT IS EXPERIENCING NYSTAGMUS AND DIZZINESS    How long have you had these concerns: SINCE MONDAY (7/14/25)    PLEASE REVIEW

## 2025-07-17 ENCOUNTER — LAB (OUTPATIENT)
Dept: LAB | Facility: HOSPITAL | Age: 75
End: 2025-07-17
Payer: MEDICARE

## 2025-07-17 DIAGNOSIS — R56.9 SEIZURE: ICD-10-CM

## 2025-07-17 DIAGNOSIS — Z51.81 THERAPEUTIC DRUG MONITORING: ICD-10-CM

## 2025-07-17 PROCEDURE — 80235 DRUG ASSAY LACOSAMIDE: CPT

## 2025-07-17 PROCEDURE — 36415 COLL VENOUS BLD VENIPUNCTURE: CPT

## 2025-07-21 LAB — LACOSAMIDE SERPL-MCNC: 8.5 UG/ML (ref 5–10)

## 2025-07-22 ENCOUNTER — HOSPITAL ENCOUNTER (OUTPATIENT)
Dept: PHYSICAL THERAPY | Facility: HOSPITAL | Age: 75
Setting detail: THERAPIES SERIES
Discharge: HOME | End: 2025-07-22
Payer: MEDICARE

## 2025-07-22 DIAGNOSIS — Z86.73 HISTORY OF STROKE: ICD-10-CM

## 2025-07-22 DIAGNOSIS — R26.89 IMBALANCE: ICD-10-CM

## 2025-07-22 DIAGNOSIS — R42 DIZZINESS: Primary | ICD-10-CM

## 2025-07-22 DIAGNOSIS — H81.8X2 UNILATERAL VESTIBULAR WEAKNESS, LEFT: ICD-10-CM

## 2025-07-22 DIAGNOSIS — R29.898 WEAKNESS OF BOTH LOWER EXTREMITIES: Primary | ICD-10-CM

## 2025-07-22 PROCEDURE — 97112 NEUROMUSCULAR REEDUCATION: CPT

## 2025-07-22 PROCEDURE — 97110 THERAPEUTIC EXERCISES: CPT

## 2025-07-22 NOTE — PROGRESS NOTES
Patient with persistent symptom dizziness/nystagmus concerning for lacosamide toxicity; lab level within normal-on further discussion today wife reports some unilateral weakness that has persisted x 2 weeks along with nystagmus and dizziness and imbalance-discussed potentially presenting to the ER which family and patient would prefer not to do therefore discussed completing MRI of the brain and de-escalating Vimpat to 100 mg twice daily to see if symptoms improve recommended that patient present to the emergency room for any worsening or changing neurologic symptoms specifically seizures that are back-to-back/seizures that last greater than 5 minutes and seizures with prolonged postictal state advised against driving/swimming/climbing ladders/bathing alone while medication de-escalate.  Patient and wife verbalized understanding.

## 2025-07-22 NOTE — THERAPY TREATMENT NOTE
Outpatient Physical Therapy Neuro Treatment Note  [facility]     Patient Name: Ronnell Rizvi  : 1950  MRN: [id number]  Today's Date: 2025      Visit Date: 2025    Visit Dx:    ICD-10-CM ICD-9-CM   1. Weakness of both lower extremities  R29.898 729.89       Patient Active Problem List   Diagnosis    Anemia, unspecified    Vitamin B12 deficiency    Chronic fatigue    Benign prostatic hypertrophy (BPH) with weak urinary stream    Chronic bronchitis    ELIZABETH (obstructive sleep apnea)    Sinoatrial block    Cerebral aneurysm, nonruptured    TIA (transient ischemic attack)    Intracranial vascular stenosis    Sinus pause    S/P percutaneous patent foramen ovale closure    HTN (hypertension)    COPD (chronic obstructive pulmonary disease)    Nocturnal seizures    Encounter for screening for malignant neoplasm of colon    Seizure    HL (hearing loss)    Mixed hyperlipidemia    History of stroke    Melena    Irritable bowel syndrome with constipation    Gastroesophageal reflux disease    Stroke    CVA (cerebral vascular accident)                        PT Assessment/Plan       Row Name 25 1328          PT Assessment    Functional Limitations Impaired gait;Limitation in home management;Limitations in community activities;Performance in leisure activities  -JV     Impairments Balance;Endurance;Gait;Muscle strength  -JV     Assessment Comments The pt presents for tx this date and reports increased drooling from the left side of his mouth, especially when concentrating on another task. Pt also reports he was mowing grass and noticed that he was leaning toward the left instead of sitting upright. The pt rode recumbent bike with good abdelrahman, and reports compliance with UE/LE ther ex, spine stabilization and use of floor bike at home. Pt worked on balance standing on blue foam pad using normal Malathi including head mov't with eyes open.  -JV     Rehab Potential Good  -JV     Patient/caregiver participated in  establishment of treatment plan and goals Yes  -JV     Patient would benefit from skilled therapy intervention Yes  -JV        PT Plan    PT Plan Comments Cont plan to progress toward goals.  -JV               User Key  (r) = Recorded By, (t) = Taken By, (c) = Cosigned By      Initials Name Provider Type    Kathryn Ivan PT Physical Therapist                        OP Exercises       Row Name 07/22/25 1000             Precautions    Existing Precautions/Restrictions fall;seizures  -JV         Subjective    Subjective Comments Pt reports some increased drooling from the left side of his mouth, especially when concentrating on another task.  -JV         Exercise 1    Exercise Name 1 Recumbent bike  -JV      Time 1 10 min  -JV      Additional Comments seat #11  -JV         Exercise 2    Exercise Name 2 Pt worked on balance standing on blue foam pad using normal Malathi including head mov't with eyes open.  -JV         Exercise 3    Exercise Name 3 Pt reports compliance with standing UE and LE ther ex, as well as some spinal stabilization and use of floor bike at home.  -JV                User Key  (r) = Recorded By, (t) = Taken By, (c) = Cosigned By      Initials Name Provider Type    Kathryn Ivan PT Physical Therapist                                    Therapy Education  Education Details: Cont HEP as abdelrahman  Given: HEP  Program: Reinforced  How Provided: Verbal, Demonstration  Provided to: Patient  Level of Understanding: Teach back education performed, Verbalized              Time Calculation:   Start Time: 1000  Stop Time: 1045  Time Calculation (min): 45 min   Therapy Charges for Today       Code Description Service Date Service Provider Modifiers Qty    40309756173  PT THER PROC EA 15 MIN 7/22/2025 Kathryn Dewitt, PT GP 1    [id number]  PT NEUROMUSC RE EDUCATION EA 15 MIN 7/22/2025 Kathryn Dewitt PT GP 1                      Kathryn Dewitt PT  7/22/2025

## 2025-07-28 ENCOUNTER — TELEPHONE (OUTPATIENT)
Dept: NEUROLOGY | Facility: CLINIC | Age: 75
End: 2025-07-28
Payer: MEDICARE

## 2025-07-28 NOTE — TELEPHONE ENCOUNTER
Provider: JOSÉ     Caller: Melissa Rizvi    Relationship to Patient: Emergency Contact      Phone Number: [phone]      Reason for Call: PATIENTS WIFE MELISSA CALLING REGARDING THE MRI THAT WAS NEEDING TO BE SCHEDULED. I DO SEE A REFERRAL HAS BEEN PUT IN FOR THIS BUT ITS STILL PENDING REVIEW. PATIENTS WIFE IS WANTING TO KNOW WHO SHE CAN REACH OUT TO AND BE ABLE TO GET THIS MRI SCHEDULED. I DID NOT SEE A LOCATION ON THE REFERRAL TO GIVE HER A NUMBER TO CALL.    When was the patient last seen: 5/6/25    PATIENTS WIFE REQUEST A CALL BACK    PLEASE ADVISE THANK YOU

## 2025-08-03 DIAGNOSIS — G40.109 LOCALIZATION-RELATED SYMPTOMATIC EPILEPSY AND EPILEPTIC SYNDROMES WITH SIMPLE PARTIAL SEIZURES, NOT INTRACTABLE, WITHOUT STATUS EPILEPTICUS: ICD-10-CM

## 2025-08-03 DIAGNOSIS — R56.9 SEIZURE: ICD-10-CM

## 2025-08-04 RX ORDER — LACOSAMIDE 100 MG/1
TABLET ORAL
Qty: 90 TABLET | Refills: 1 | Status: SHIPPED | OUTPATIENT
Start: 2025-08-04

## 2025-08-05 ENCOUNTER — HOSPITAL ENCOUNTER (OUTPATIENT)
Dept: PHYSICAL THERAPY | Facility: HOSPITAL | Age: 75
Setting detail: THERAPIES SERIES
Discharge: HOME | End: 2025-08-05
Payer: MEDICARE

## 2025-08-05 DIAGNOSIS — R29.898 WEAKNESS OF BOTH LOWER EXTREMITIES: Primary | ICD-10-CM

## 2025-08-05 PROCEDURE — 97112 NEUROMUSCULAR REEDUCATION: CPT

## 2025-08-05 PROCEDURE — 97110 THERAPEUTIC EXERCISES: CPT

## 2025-08-11 ENCOUNTER — OFFICE VISIT (OUTPATIENT)
Dept: NEUROLOGY | Facility: CLINIC | Age: 75
End: 2025-08-11
Payer: MEDICARE

## 2025-08-11 ENCOUNTER — SPECIALTY PHARMACY (OUTPATIENT)
Dept: INFUSION THERAPY | Facility: HOSPITAL | Age: 75
End: 2025-08-11
Payer: MEDICARE

## 2025-08-11 VITALS
DIASTOLIC BLOOD PRESSURE: 82 MMHG | HEIGHT: 72 IN | OXYGEN SATURATION: 95 % | BODY MASS INDEX: 24.79 KG/M2 | SYSTOLIC BLOOD PRESSURE: 142 MMHG | HEART RATE: 59 BPM | WEIGHT: 183 LBS

## 2025-08-11 DIAGNOSIS — R29.898 WEAKNESS OF BOTH LOWER EXTREMITIES: ICD-10-CM

## 2025-08-11 DIAGNOSIS — R56.9 SEIZURE: ICD-10-CM

## 2025-08-11 DIAGNOSIS — F48.2 PBA (PSEUDOBULBAR AFFECT): Primary | ICD-10-CM

## 2025-08-11 DIAGNOSIS — R42 DIZZINESS: ICD-10-CM

## 2025-08-11 DIAGNOSIS — R26.89 IMBALANCE: ICD-10-CM

## 2025-08-11 DIAGNOSIS — I10 PRIMARY HYPERTENSION: ICD-10-CM

## 2025-08-11 DIAGNOSIS — F48.2 PBA (PSEUDOBULBAR AFFECT): ICD-10-CM

## 2025-08-11 DIAGNOSIS — Z86.73 HISTORY OF STROKE: Primary | ICD-10-CM

## 2025-08-11 RX ORDER — DEXTROMETHORPHAN HYDROBROMIDE AND QUINIDINE SULFATE 20; 10 MG/1; MG/1
1 CAPSULE, GELATIN COATED ORAL DAILY
Qty: 30 CAPSULE | Refills: 0 | Status: SHIPPED | OUTPATIENT
Start: 2025-08-11

## 2025-08-19 ENCOUNTER — OFFICE VISIT (OUTPATIENT)
Dept: GASTROENTEROLOGY | Facility: CLINIC | Age: 75
End: 2025-08-19
Payer: MEDICARE

## 2025-08-19 ENCOUNTER — TELEPHONE (OUTPATIENT)
Dept: GASTROENTEROLOGY | Facility: CLINIC | Age: 75
End: 2025-08-19

## 2025-08-19 ENCOUNTER — HOSPITAL ENCOUNTER (OUTPATIENT)
Dept: PHYSICAL THERAPY | Facility: HOSPITAL | Age: 75
Setting detail: THERAPIES SERIES
Discharge: HOME | End: 2025-08-19
Payer: MEDICARE

## 2025-08-19 VITALS
DIASTOLIC BLOOD PRESSURE: 60 MMHG | SYSTOLIC BLOOD PRESSURE: 110 MMHG | WEIGHT: 184.2 LBS | BODY MASS INDEX: 24.95 KG/M2 | HEIGHT: 72 IN

## 2025-08-19 DIAGNOSIS — M62.3 IMMOBILITY SYNDROME: ICD-10-CM

## 2025-08-19 DIAGNOSIS — K58.0 IRRITABLE BOWEL SYNDROME WITH DIARRHEA: ICD-10-CM

## 2025-08-19 DIAGNOSIS — R29.898 WEAKNESS OF BOTH LOWER EXTREMITIES: Primary | ICD-10-CM

## 2025-08-19 DIAGNOSIS — R14.3 FLATULENCE: ICD-10-CM

## 2025-08-19 DIAGNOSIS — K59.04 CHRONIC IDIOPATHIC CONSTIPATION: Primary | ICD-10-CM

## 2025-08-19 PROCEDURE — 97112 NEUROMUSCULAR REEDUCATION: CPT

## 2025-08-19 PROCEDURE — 97110 THERAPEUTIC EXERCISES: CPT

## 2025-08-27 ENCOUNTER — HOSPITAL ENCOUNTER (OUTPATIENT)
Dept: MRI IMAGING | Facility: HOSPITAL | Age: 75
Discharge: HOME | End: 2025-08-27
Admitting: NURSE PRACTITIONER
Payer: MEDICARE

## 2025-08-27 DIAGNOSIS — R26.89 IMBALANCE: ICD-10-CM

## 2025-08-27 DIAGNOSIS — R42 DIZZINESS: ICD-10-CM

## 2025-08-27 DIAGNOSIS — H81.8X2 UNILATERAL VESTIBULAR WEAKNESS, LEFT: ICD-10-CM

## 2025-08-27 DIAGNOSIS — Z86.73 HISTORY OF STROKE: ICD-10-CM

## 2025-08-27 PROCEDURE — A9573 GADOPICLENOL 0.5 MMOL/ML SOLUTION: HCPCS | Performed by: NURSE PRACTITIONER

## 2025-08-27 PROCEDURE — 70553 MRI BRAIN STEM W/O & W/DYE: CPT

## 2025-08-27 PROCEDURE — 25510000001 GADOPICLENOL 0.5 MMOL/ML SOLUTION: Performed by: NURSE PRACTITIONER

## 2025-08-27 RX ADMIN — GADOPICLENOL 8 ML: 485.1 INJECTION INTRAVENOUS at 11:17

## (undated) DEVICE — BALN SIZING AMPLATZER 2 7F 24MM

## (undated) DEVICE — SPNG GZ WOVN 4X4IN 12PLY 10/BX STRL

## (undated) DEVICE — Device

## (undated) DEVICE — GW AMPLATZER SS .035 1.5MM J 260CM

## (undated) DEVICE — PK CATH CARD 40

## (undated) DEVICE — JACKT LAB F/R KNIT CUFF/COLR XLG BLU

## (undated) DEVICE — SUCTION CANISTER, 3000CC,SAFELINER: Brand: DEROYAL

## (undated) DEVICE — VIAL FORMALIN CAP 10P 40ML

## (undated) DEVICE — INTRO SHEATH ART/FEM ENGAGE .035 8F12CM

## (undated) DEVICE — KT ORCA ORCAPOD DISP STRL

## (undated) DEVICE — GLV SURG SENSICARE PI MIC PF SZ7.5 LF STRL

## (undated) DEVICE — KT MANIFLD CARDIAC

## (undated) DEVICE — BW-412T DISP COMBO CLEANING BRUSH: Brand: SINGLE USE COMBINATION CLEANING BRUSH

## (undated) DEVICE — CATH ULTRASND ECHO ACUNAV FOR ACUSON 8F 90CM

## (undated) DEVICE — SYS DEL AMPLTZ TORQVUE 45D 8FR 80CM

## (undated) DEVICE — SYR LL 3CC

## (undated) DEVICE — GW EMR FIX EXCHG J STD .035 3MM 260CM

## (undated) DEVICE — PINNACLE INTRODUCER SHEATH: Brand: PINNACLE

## (undated) DEVICE — FRCP BX RADJAW4 NDL 2.8 240CM LG OG BX40

## (undated) DEVICE — CATH DIAG EXPO .052 MPA2 6F 100CM